# Patient Record
Sex: MALE | Race: WHITE | Employment: OTHER | URBAN - METROPOLITAN AREA
[De-identification: names, ages, dates, MRNs, and addresses within clinical notes are randomized per-mention and may not be internally consistent; named-entity substitution may affect disease eponyms.]

---

## 2020-10-28 ENCOUNTER — HOSPITAL ENCOUNTER (OUTPATIENT)
Age: 70
Setting detail: OBSERVATION
Discharge: OTHER INSTITUTION | End: 2020-10-29
Attending: EMERGENCY MEDICINE | Admitting: HOSPITALIST

## 2020-10-28 ENCOUNTER — APPOINTMENT (OUTPATIENT)
Dept: GENERAL RADIOLOGY | Age: 70
End: 2020-10-28
Attending: EMERGENCY MEDICINE

## 2020-10-28 DIAGNOSIS — I50.9 CONGESTIVE HEART FAILURE, UNSPECIFIED HF CHRONICITY, UNSPECIFIED HEART FAILURE TYPE (HCC): ICD-10-CM

## 2020-10-28 DIAGNOSIS — I21.4 NSTEMI (NON-ST ELEVATED MYOCARDIAL INFARCTION) (HCC): Primary | ICD-10-CM

## 2020-10-28 DIAGNOSIS — R77.8 ELEVATED TROPONIN: ICD-10-CM

## 2020-10-28 PROBLEM — I25.10 CAD (CORONARY ARTERY DISEASE): Status: ACTIVE | Noted: 2020-10-28

## 2020-10-28 PROBLEM — R07.9 CHEST PAIN: Status: ACTIVE | Noted: 2020-10-28

## 2020-10-28 LAB
ALBUMIN SERPL-MCNC: 3.4 G/DL (ref 3.5–5)
ALBUMIN/GLOB SERPL: 0.9 {RATIO} (ref 1.1–2.2)
ALP SERPL-CCNC: 71 U/L (ref 45–117)
ALT SERPL-CCNC: 21 U/L (ref 12–78)
ANION GAP SERPL CALC-SCNC: 9 MMOL/L (ref 5–15)
AST SERPL W P-5'-P-CCNC: 12 U/L (ref 15–37)
ATRIAL RATE: 109 BPM
BASOPHILS # BLD: 0 K/UL (ref 0–0.1)
BASOPHILS NFR BLD: 0 % (ref 0–1)
BILIRUB SERPL-MCNC: 0.6 MG/DL (ref 0.2–1)
BNP SERPL-MCNC: 277 PG/ML
BUN SERPL-MCNC: 20 MG/DL (ref 6–20)
BUN/CREAT SERPL: 14 (ref 12–20)
CA-I BLD-MCNC: 8.4 MG/DL (ref 8.5–10.1)
CALCULATED P AXIS, ECG09: 62 DEGREES
CALCULATED R AXIS, ECG10: 34 DEGREES
CALCULATED T AXIS, ECG11: 78 DEGREES
CHLORIDE SERPL-SCNC: 108 MMOL/L (ref 97–108)
CO2 SERPL-SCNC: 21 MMOL/L (ref 21–32)
CREAT SERPL-MCNC: 1.38 MG/DL (ref 0.7–1.3)
DIAGNOSIS, 93000: NORMAL
DIFFERENTIAL METHOD BLD: ABNORMAL
EOSINOPHIL # BLD: 0.2 K/UL (ref 0–0.4)
EOSINOPHIL NFR BLD: 2 % (ref 0–7)
ERYTHROCYTE [DISTWIDTH] IN BLOOD BY AUTOMATED COUNT: 18.4 % (ref 11.5–14.5)
EST. AVERAGE GLUCOSE BLD GHB EST-MCNC: 206 MG/DL
GLOBULIN SER CALC-MCNC: 3.8 G/DL (ref 2–4)
GLUCOSE BLD STRIP.AUTO-MCNC: 239 MG/DL (ref 65–100)
GLUCOSE SERPL-MCNC: 254 MG/DL (ref 65–100)
HBA1C MFR BLD: 8.8 % (ref 4–5.6)
HCT VFR BLD AUTO: 35.7 % (ref 36.6–50.3)
HGB BLD-MCNC: 11 G/DL (ref 12.1–17)
IMM GRANULOCYTES # BLD AUTO: 0 K/UL (ref 0–0.04)
IMM GRANULOCYTES NFR BLD AUTO: 0 % (ref 0–0.5)
INR PPP: 1.1 (ref 0.9–1.1)
LYMPHOCYTES # BLD: 2.5 K/UL (ref 0.8–3.5)
LYMPHOCYTES NFR BLD: 23 % (ref 12–49)
MCH RBC QN AUTO: 22.7 PG (ref 26–34)
MCHC RBC AUTO-ENTMCNC: 30.8 G/DL (ref 30–36.5)
MCV RBC AUTO: 73.8 FL (ref 80–99)
MONOCYTES # BLD: 0.7 K/UL (ref 0–1)
MONOCYTES NFR BLD: 6 % (ref 5–13)
NEUTS SEG # BLD: 7.3 K/UL (ref 1.8–8)
NEUTS SEG NFR BLD: 69 % (ref 32–75)
P-R INTERVAL, ECG05: 156 MS
PERFORMED BY, TECHID: ABNORMAL
PLATELET # BLD AUTO: 249 K/UL (ref 150–400)
PMV BLD AUTO: 9.8 FL (ref 8.9–12.9)
POTASSIUM SERPL-SCNC: 4 MMOL/L (ref 3.5–5.1)
PROT SERPL-MCNC: 7.2 G/DL (ref 6.4–8.2)
PROTHROMBIN TIME: 14.3 SEC (ref 11.9–14.7)
Q-T INTERVAL, ECG07: 336 MS
QRS DURATION, ECG06: 90 MS
QTC CALCULATION (BEZET), ECG08: 452 MS
RBC # BLD AUTO: 4.84 M/UL (ref 4.1–5.7)
SALICYLATES SERPL-MCNC: 2.5 MG/DL (ref 2.8–20)
SODIUM SERPL-SCNC: 138 MMOL/L (ref 136–145)
TROPONIN I SERPL-MCNC: 0.08 NG/ML
TROPONIN I SERPL-MCNC: 1.8 NG/ML
VENTRICULAR RATE, ECG03: 109 BPM
WBC # BLD AUTO: 10.8 K/UL (ref 4.1–11.1)

## 2020-10-28 PROCEDURE — 99218 HC RM OBSERVATION: CPT

## 2020-10-28 PROCEDURE — 82962 GLUCOSE BLOOD TEST: CPT

## 2020-10-28 PROCEDURE — 77030029065 HC DRSG HEMO QCLOT ZMED -B: Performed by: INTERNAL MEDICINE

## 2020-10-28 PROCEDURE — C1894 INTRO/SHEATH, NON-LASER: HCPCS | Performed by: INTERNAL MEDICINE

## 2020-10-28 PROCEDURE — 74011250636 HC RX REV CODE- 250/636: Performed by: PHYSICIAN ASSISTANT

## 2020-10-28 PROCEDURE — 77030019698 HC SYR ANGI MDLON MRTM -A: Performed by: INTERNAL MEDICINE

## 2020-10-28 PROCEDURE — 36415 COLL VENOUS BLD VENIPUNCTURE: CPT

## 2020-10-28 PROCEDURE — C1887 CATHETER, GUIDING: HCPCS | Performed by: INTERNAL MEDICINE

## 2020-10-28 PROCEDURE — 83036 HEMOGLOBIN GLYCOSYLATED A1C: CPT

## 2020-10-28 PROCEDURE — C1769 GUIDE WIRE: HCPCS | Performed by: INTERNAL MEDICINE

## 2020-10-28 PROCEDURE — 96376 TX/PRO/DX INJ SAME DRUG ADON: CPT

## 2020-10-28 PROCEDURE — 74011000636 HC RX REV CODE- 636: Performed by: INTERNAL MEDICINE

## 2020-10-28 PROCEDURE — 77030003394 HC NDL ART COOK -A: Performed by: INTERNAL MEDICINE

## 2020-10-28 PROCEDURE — 74011000250 HC RX REV CODE- 250: Performed by: INTERNAL MEDICINE

## 2020-10-28 PROCEDURE — 84484 ASSAY OF TROPONIN QUANT: CPT

## 2020-10-28 PROCEDURE — 99152 MOD SED SAME PHYS/QHP 5/>YRS: CPT | Performed by: INTERNAL MEDICINE

## 2020-10-28 PROCEDURE — 96374 THER/PROPH/DIAG INJ IV PUSH: CPT

## 2020-10-28 PROCEDURE — 85025 COMPLETE CBC W/AUTO DIFF WBC: CPT

## 2020-10-28 PROCEDURE — 74011250637 HC RX REV CODE- 250/637: Performed by: NURSE PRACTITIONER

## 2020-10-28 PROCEDURE — 80307 DRUG TEST PRSMV CHEM ANLYZR: CPT

## 2020-10-28 PROCEDURE — 74011250636 HC RX REV CODE- 250/636: Performed by: NURSE PRACTITIONER

## 2020-10-28 PROCEDURE — 74011250637 HC RX REV CODE- 250/637: Performed by: PHYSICIAN ASSISTANT

## 2020-10-28 PROCEDURE — 71045 X-RAY EXAM CHEST 1 VIEW: CPT

## 2020-10-28 PROCEDURE — 93458 L HRT ARTERY/VENTRICLE ANGIO: CPT | Performed by: INTERNAL MEDICINE

## 2020-10-28 PROCEDURE — 74011636637 HC RX REV CODE- 636/637: Performed by: PHYSICIAN ASSISTANT

## 2020-10-28 PROCEDURE — 77030029997 HC DEV COM RDL R BND TELE -B: Performed by: INTERNAL MEDICINE

## 2020-10-28 PROCEDURE — 76210000006 HC OR PH I REC 0.5 TO 1 HR: Performed by: INTERNAL MEDICINE

## 2020-10-28 PROCEDURE — 99285 EMERGENCY DEPT VISIT HI MDM: CPT

## 2020-10-28 PROCEDURE — 99153 MOD SED SAME PHYS/QHP EA: CPT | Performed by: INTERNAL MEDICINE

## 2020-10-28 PROCEDURE — 80053 COMPREHEN METABOLIC PANEL: CPT

## 2020-10-28 PROCEDURE — 85610 PROTHROMBIN TIME: CPT

## 2020-10-28 PROCEDURE — 93005 ELECTROCARDIOGRAM TRACING: CPT

## 2020-10-28 PROCEDURE — 77030016699 HC CATH ANGI DX INFN1 CARD -A: Performed by: INTERNAL MEDICINE

## 2020-10-28 PROCEDURE — 74011250636 HC RX REV CODE- 250/636: Performed by: INTERNAL MEDICINE

## 2020-10-28 PROCEDURE — 83880 ASSAY OF NATRIURETIC PEPTIDE: CPT

## 2020-10-28 RX ORDER — AMLODIPINE BESYLATE 10 MG/1
TABLET ORAL DAILY
COMMUNITY
End: 2022-07-29

## 2020-10-28 RX ORDER — INSULIN LISPRO 100 [IU]/ML
INJECTION, SOLUTION INTRAVENOUS; SUBCUTANEOUS
Status: DISCONTINUED | OUTPATIENT
Start: 2020-10-28 | End: 2020-10-29 | Stop reason: HOSPADM

## 2020-10-28 RX ORDER — HEPARIN SODIUM 1000 [USP'U]/ML
2000 INJECTION, SOLUTION INTRAVENOUS; SUBCUTANEOUS AS NEEDED
Status: DISCONTINUED | OUTPATIENT
Start: 2020-10-28 | End: 2020-10-29 | Stop reason: HOSPADM

## 2020-10-28 RX ORDER — ACETAMINOPHEN 325 MG/1
650 TABLET ORAL
Status: DISCONTINUED | OUTPATIENT
Start: 2020-10-28 | End: 2020-10-29 | Stop reason: HOSPADM

## 2020-10-28 RX ORDER — HEPARIN SODIUM 1000 [USP'U]/ML
4000 INJECTION, SOLUTION INTRAVENOUS; SUBCUTANEOUS AS NEEDED
Status: DISCONTINUED | OUTPATIENT
Start: 2020-10-28 | End: 2020-10-29 | Stop reason: HOSPADM

## 2020-10-28 RX ORDER — HEPARIN SODIUM 1000 [USP'U]/ML
INJECTION, SOLUTION INTRAVENOUS; SUBCUTANEOUS AS NEEDED
Status: DISCONTINUED | OUTPATIENT
Start: 2020-10-28 | End: 2020-10-28 | Stop reason: HOSPADM

## 2020-10-28 RX ORDER — ACETAMINOPHEN 325 MG/1
650 TABLET ORAL
Status: CANCELLED | OUTPATIENT
Start: 2020-10-28

## 2020-10-28 RX ORDER — INSULIN ASPART 100 [IU]/ML
INJECTION, SOLUTION INTRAVENOUS; SUBCUTANEOUS
COMMUNITY
End: 2022-07-29

## 2020-10-28 RX ORDER — GLYBURIDE 5 MG/1
TABLET ORAL
COMMUNITY
End: 2022-07-29

## 2020-10-28 RX ORDER — ENOXAPARIN SODIUM 100 MG/ML
40 INJECTION SUBCUTANEOUS DAILY
Status: DISCONTINUED | OUTPATIENT
Start: 2020-10-29 | End: 2020-10-28

## 2020-10-28 RX ORDER — NORTRIPTYLINE HYDROCHLORIDE 25 MG/1
CAPSULE ORAL
COMMUNITY
End: 2022-07-29

## 2020-10-28 RX ORDER — HEPARIN SODIUM 200 [USP'U]/100ML
INJECTION, SOLUTION INTRAVENOUS
Status: COMPLETED | OUTPATIENT
Start: 2020-10-28 | End: 2020-10-28

## 2020-10-28 RX ORDER — ASPIRIN 81 MG/1
81 TABLET ORAL DAILY
COMMUNITY

## 2020-10-28 RX ORDER — SODIUM CHLORIDE 0.9 % (FLUSH) 0.9 %
5-40 SYRINGE (ML) INJECTION EVERY 8 HOURS
Status: CANCELLED | OUTPATIENT
Start: 2020-10-28

## 2020-10-28 RX ORDER — PROMETHAZINE HYDROCHLORIDE 25 MG/1
12.5 TABLET ORAL
Status: DISCONTINUED | OUTPATIENT
Start: 2020-10-28 | End: 2020-10-29 | Stop reason: HOSPADM

## 2020-10-28 RX ORDER — ATORVASTATIN CALCIUM 40 MG/1
40 TABLET, FILM COATED ORAL DAILY
COMMUNITY

## 2020-10-28 RX ORDER — GUAIFENESIN 100 MG/5ML
81 LIQUID (ML) ORAL
Status: COMPLETED | OUTPATIENT
Start: 2020-10-28 | End: 2020-10-28

## 2020-10-28 RX ORDER — OMEPRAZOLE 20 MG/1
20 CAPSULE, DELAYED RELEASE ORAL DAILY
COMMUNITY

## 2020-10-28 RX ORDER — NITROGLYCERIN 0.4 MG/1
0.4 TABLET SUBLINGUAL AS NEEDED
Status: DISCONTINUED | OUTPATIENT
Start: 2020-10-28 | End: 2020-10-29 | Stop reason: HOSPADM

## 2020-10-28 RX ORDER — AMLODIPINE BESYLATE 5 MG/1
10 TABLET ORAL DAILY
Status: DISCONTINUED | OUTPATIENT
Start: 2020-10-29 | End: 2020-10-29 | Stop reason: HOSPADM

## 2020-10-28 RX ORDER — SPIRONOLACTONE 25 MG/1
25 TABLET ORAL DAILY
Status: DISCONTINUED | OUTPATIENT
Start: 2020-10-29 | End: 2020-10-29 | Stop reason: HOSPADM

## 2020-10-28 RX ORDER — ONDANSETRON 2 MG/ML
4 INJECTION INTRAMUSCULAR; INTRAVENOUS
Status: DISCONTINUED | OUTPATIENT
Start: 2020-10-28 | End: 2020-10-29 | Stop reason: HOSPADM

## 2020-10-28 RX ORDER — NICARDIPINE HYDROCHLORIDE 2.5 MG/ML
INJECTION INTRAVENOUS AS NEEDED
Status: DISCONTINUED | OUTPATIENT
Start: 2020-10-28 | End: 2020-10-28 | Stop reason: HOSPADM

## 2020-10-28 RX ORDER — POLYETHYLENE GLYCOL 3350 17 G/17G
17 POWDER, FOR SOLUTION ORAL DAILY PRN
Status: DISCONTINUED | OUTPATIENT
Start: 2020-10-28 | End: 2020-10-29 | Stop reason: HOSPADM

## 2020-10-28 RX ORDER — LIDOCAINE HYDROCHLORIDE 10 MG/ML
INJECTION INFILTRATION; PERINEURAL AS NEEDED
Status: DISCONTINUED | OUTPATIENT
Start: 2020-10-28 | End: 2020-10-28 | Stop reason: HOSPADM

## 2020-10-28 RX ORDER — FUROSEMIDE 10 MG/ML
40 INJECTION INTRAMUSCULAR; INTRAVENOUS ONCE
Status: COMPLETED | OUTPATIENT
Start: 2020-10-28 | End: 2020-10-28

## 2020-10-28 RX ORDER — LISINOPRIL 20 MG/1
20 TABLET ORAL DAILY
Status: DISCONTINUED | OUTPATIENT
Start: 2020-10-29 | End: 2020-10-29 | Stop reason: HOSPADM

## 2020-10-28 RX ORDER — MIDAZOLAM HYDROCHLORIDE 1 MG/ML
INJECTION, SOLUTION INTRAMUSCULAR; INTRAVENOUS AS NEEDED
Status: DISCONTINUED | OUTPATIENT
Start: 2020-10-28 | End: 2020-10-28 | Stop reason: HOSPADM

## 2020-10-28 RX ORDER — SODIUM CHLORIDE 0.9 % (FLUSH) 0.9 %
5-40 SYRINGE (ML) INJECTION AS NEEDED
Status: DISCONTINUED | OUTPATIENT
Start: 2020-10-28 | End: 2020-10-29 | Stop reason: HOSPADM

## 2020-10-28 RX ORDER — HEPARIN SODIUM 10000 [USP'U]/100ML
12-25 INJECTION, SOLUTION INTRAVENOUS
Status: DISCONTINUED | OUTPATIENT
Start: 2020-10-28 | End: 2020-10-29 | Stop reason: HOSPADM

## 2020-10-28 RX ORDER — PANTOPRAZOLE SODIUM 20 MG/1
20 TABLET, DELAYED RELEASE ORAL DAILY
Status: DISCONTINUED | OUTPATIENT
Start: 2020-10-29 | End: 2020-10-29 | Stop reason: HOSPADM

## 2020-10-28 RX ORDER — HYDROCHLOROTHIAZIDE 25 MG/1
25 TABLET ORAL DAILY
Status: DISCONTINUED | OUTPATIENT
Start: 2020-10-29 | End: 2020-10-29 | Stop reason: HOSPADM

## 2020-10-28 RX ORDER — NORTRIPTYLINE HYDROCHLORIDE 25 MG/1
25 CAPSULE ORAL
Status: DISCONTINUED | OUTPATIENT
Start: 2020-10-28 | End: 2020-10-29 | Stop reason: HOSPADM

## 2020-10-28 RX ORDER — INSULIN GLARGINE 100 [IU]/ML
8 INJECTION, SOLUTION SUBCUTANEOUS
COMMUNITY
End: 2022-07-29

## 2020-10-28 RX ORDER — DEXTROSE 50 % IN WATER (D50W) INTRAVENOUS SYRINGE
25-50 AS NEEDED
Status: DISCONTINUED | OUTPATIENT
Start: 2020-10-28 | End: 2020-10-29 | Stop reason: HOSPADM

## 2020-10-28 RX ORDER — ATORVASTATIN CALCIUM 40 MG/1
40 TABLET, FILM COATED ORAL DAILY
Status: DISCONTINUED | OUTPATIENT
Start: 2020-10-29 | End: 2020-10-29 | Stop reason: HOSPADM

## 2020-10-28 RX ORDER — INSULIN GLARGINE 100 [IU]/ML
8 INJECTION, SOLUTION SUBCUTANEOUS
Status: DISCONTINUED | OUTPATIENT
Start: 2020-10-28 | End: 2020-10-29 | Stop reason: HOSPADM

## 2020-10-28 RX ORDER — LISINOPRIL AND HYDROCHLOROTHIAZIDE 20; 25 MG/1; MG/1
TABLET ORAL DAILY
COMMUNITY
End: 2022-07-29

## 2020-10-28 RX ORDER — ASPIRIN 81 MG/1
81 TABLET ORAL DAILY
Status: DISCONTINUED | OUTPATIENT
Start: 2020-10-29 | End: 2020-10-29 | Stop reason: HOSPADM

## 2020-10-28 RX ORDER — CLOPIDOGREL 300 MG/1
600 TABLET, FILM COATED ORAL ONCE
Status: COMPLETED | OUTPATIENT
Start: 2020-10-28 | End: 2020-10-28

## 2020-10-28 RX ORDER — MAGNESIUM SULFATE 100 %
4 CRYSTALS MISCELLANEOUS AS NEEDED
Status: DISCONTINUED | OUTPATIENT
Start: 2020-10-28 | End: 2020-10-29 | Stop reason: HOSPADM

## 2020-10-28 RX ORDER — SODIUM CHLORIDE 0.9 % (FLUSH) 0.9 %
5-40 SYRINGE (ML) INJECTION EVERY 8 HOURS
Status: DISCONTINUED | OUTPATIENT
Start: 2020-10-28 | End: 2020-10-29 | Stop reason: HOSPADM

## 2020-10-28 RX ORDER — ACETAMINOPHEN 650 MG/1
650 SUPPOSITORY RECTAL
Status: DISCONTINUED | OUTPATIENT
Start: 2020-10-28 | End: 2020-10-29 | Stop reason: HOSPADM

## 2020-10-28 RX ORDER — SODIUM CHLORIDE 0.9 % (FLUSH) 0.9 %
5-40 SYRINGE (ML) INJECTION AS NEEDED
Status: CANCELLED | OUTPATIENT
Start: 2020-10-28

## 2020-10-28 RX ORDER — FENTANYL CITRATE 50 UG/ML
INJECTION, SOLUTION INTRAMUSCULAR; INTRAVENOUS AS NEEDED
Status: DISCONTINUED | OUTPATIENT
Start: 2020-10-28 | End: 2020-10-28 | Stop reason: HOSPADM

## 2020-10-28 RX ADMIN — CLOPIDOGREL BISULFATE 600 MG: 300 TABLET, FILM COATED ORAL at 14:37

## 2020-10-28 RX ADMIN — NORTRIPTYLINE HYDROCHLORIDE 25 MG: 25 CAPSULE ORAL at 23:28

## 2020-10-28 RX ADMIN — HEPARIN SODIUM 12 UNITS/KG/HR: 10000 INJECTION, SOLUTION INTRAVENOUS at 20:00

## 2020-10-28 RX ADMIN — Medication 10 ML: at 22:00

## 2020-10-28 RX ADMIN — ASPIRIN 81 MG CHEWABLE TABLET 81 MG: 81 TABLET CHEWABLE at 14:37

## 2020-10-28 RX ADMIN — INSULIN LISPRO 2 UNITS: 100 INJECTION, SOLUTION INTRAVENOUS; SUBCUTANEOUS at 23:36

## 2020-10-28 RX ADMIN — FUROSEMIDE 40 MG: 10 INJECTION, SOLUTION INTRAMUSCULAR; INTRAVENOUS at 12:15

## 2020-10-28 RX ADMIN — FUROSEMIDE 40 MG: 10 INJECTION, SOLUTION INTRAMUSCULAR; INTRAVENOUS at 14:38

## 2020-10-28 RX ADMIN — INSULIN GLARGINE 8 UNITS: 100 INJECTION, SOLUTION SUBCUTANEOUS at 23:28

## 2020-10-28 NOTE — Clinical Note
TRANSFER - OUT REPORT:     Verbal report given to: Pura. Report consisted of patient's Situation, Background, Assessment and   Recommendations(SBAR). Opportunity for questions and clarification was provided. Patient transported with a Registered Nurse. Patient transported to: PACU.

## 2020-10-28 NOTE — ED PROVIDER NOTES
HPI   Chief Complaint   Patient presents with    Chest Pain   69yoM hx cirrhosis, DM, HTN, stroke presents with chest pain. Pt reports stroke last year with left side numbness, this past few months he has had intermittent mild moderate left shoulder/bicep pain whenever he moved the left arm. This past 3 days he ran out of all his meds and the left shoulder/bicep pain worsened. He says whenever he moves any of his body parts the pain would come up, along with intermittent mild twinges of pain in his entire anterior chest. He thinks his belly and both ankles swelled up as well but they are not painful. He says his BP has been high. He reports chronic SOB due to being bed ridden for 7-8 months. Pt says he has been \"popping baby aspirin like candy\" and says he took at least 8 baby aspirins overnight. Per EMS he had EKG en route showing inferolateral ST depression but no elevation. He denies fever, cough, sick contacts. Pt is on glyburide 5mg, omperazole 20mg, amlodipine 10mg, lisinopril HCTZ 20-25mgaspirin 81mg, nortriptyline 25mg qHS, atorvastatin 40. Pt thinks he also might be on insulin but does not know the dose. Past Medical History:   Diagnosis Date    Cirrhosis ([facility] [address]     Diabetes ([facility] [address] [facility]     Hypertension     Stroke [facility])        Past Surgical History:   Procedure Laterality Date    HX BACK SURGERY      HX GI           No family history on file.     Social History     Socioeconomic History    Marital status: SINGLE     Spouse name: Not on file    Number of children: Not on file    Years of education: Not on file    Highest education level: Not on file   Occupational History    Not on file   Social Needs    Financial resource strain: Not on file    Food insecurity     Worry: Not on file     Inability: Not on file    Transportation needs     Medical: Not on file     Non-medical: Not on file   Tobacco Use    Smoking status: Former Smoker    Smokeless tobacco: Never Used   Substance and Sexual Activity    Alcohol use: Not Currently    Drug use: Not on file    Sexual activity: Not on file   Lifestyle    Physical activity     Days per week: Not on file     Minutes per session: Not on file    Stress: Not on file   Relationships    Social connections     Talks on phone: Not on file     Gets together: Not on file     Attends Hindu service: Not on file     Active member of club or organization: Not on file     Attends meetings of clubs or organizations: Not on file     Relationship status: Not on file    Intimate partner violence     Fear of current or ex partner: Not on file     Emotionally abused: Not on file     Physically abused: Not on file     Forced sexual activity: Not on file   Other Topics Concern    Not on file   Social History Narrative    Not on file         ALLERGIES: Patient has no known allergies. Review of Systems   Respiratory: Positive for shortness of breath (chronic). Cardiovascular: Positive for chest pain and leg swelling. BP high   Gastrointestinal: Positive for abdominal distention. Musculoskeletal:        Left arm pain   All other systems reviewed and are negative. Vitals:    10/28/20 0718   BP: (!) 154/81   Pulse: (!) 109   Resp: 24   Temp: 98.1 °F (36.7 °C)   SpO2: 94%   Weight: 99.8 kg (220 lb)   Height: 5' 8\" (1.727 m)            Physical Exam   Patient Vitals for the past 8 hrs:   Temp Pulse Resp BP SpO2   10/28/20 0718 98.1 °F (36.7 °C) (!) 109 24 (!) 154/81 94 %        Nursing note and vitals reviewed. Constitutional: NAD. Head: Normocephalic and atraumatic. Mouth/Throat: Airway patent. Moist mucous membranes. Eyes: EOMI. No scleral icterus. Neck: Neck supple. Cardiovascular: Tachy rate, regular rhythm. Normal heart sounds. No murmur, rub, or gallop. Good pulses throughout. Pulmonary/Chest: No respiratory distress. Bibasilar crackles. Abdominal/GI: BS normal, Soft, non-tender, moderately distended versus baseline obese.  No rebound or guarding. Musculoskeletal: No gross injuries or deformities. LUE non-tender, ROM intact. Chest wall non-tender. B/l 2+ non-pitting pedal edema up to the calves. Neurological: Alert and oriented to person, place, and time. Cranial Nerves 2-12 intact. Left side sensation decreased to light touch (baseline from old stroke from 2019). No tremor. Psych: Pleasant, cooperative. No SI/HI. Skin: Skin is warm and dry. No rash noted. MDM   Ddx = ACS, angina, heart failure, MSK pain, PE, lower suspicion for aorta emergency, lower suspicion for decompensated cirrhosis without encephalopathy/asterixis; very low suspicion for SBP. EKG read by me at 7:17 showing sinus stachycardia rate 109, inferolateral ST depressions, mild avR elevation, no aVL ST change. No STEMI. Heart score is 8, very high risk for MACE. Labs Reviewed   CBC WITH AUTOMATED DIFF - Abnormal; Notable for the following components:       Result Value    HGB 11.0 (*)     HCT 35.7 (*)     MCV 73.8 (*)     MCH 22.7 (*)     RDW 18.4 (*)     All other components within normal limits   METABOLIC PANEL, COMPREHENSIVE - Abnormal; Notable for the following components:    Glucose 254 (*)     Creatinine 1.38 (*)     GFR est non-AA 51 (*)     Calcium 8.4 (*)     AST (SGOT) 12 (*)     Albumin 3.4 (*)     A-G Ratio 0.9 (*)     All other components within normal limits   TROPONIN I - Abnormal; Notable for the following components:    Troponin-I, Qt. 0.08 (*)     All other components within normal limits   SALICYLATE - Abnormal; Notable for the following components:    Salicylate level 2.5 (*)     All other components within normal limits   PROTHROMBIN TIME + INR   BNP     XR CHEST PORT    (Results Pending)     Medications - No data to display  I, Dwayne Thompson MD, am  the first and primary ED provider for this patient. ED Course as of Oct 28 0826   Wed Oct 28, 2020   0825 On re-assessment pt says he now has mild chest pressure radiating up his neck/chin.  I explained that his troponin is normal and I recommend admission for cardiology evaluation. He agrees with admission. I spoke with Dr. Sulema Wei (hospitalist) for consultation for admission.  Admitting to Dr. Sulema Wei.     [HW]   0825 Troponin-I, Qt.(!): 0.08 [HW]      ED Course User Index  [HW] Jeffery Rosas MD       Procedures

## 2020-10-28 NOTE — H&P
History and Physical    Patient: Carmelo Khan MRN: [id number]  SSN: [id number]    YOB: 1950  Age: 71 y.o. Sex: male      Subjective: Carmelo Khan is a 71 y.o. male who presents emergency department with a history of alcohol abuse, possible cirrhosis, essential hypertension, diabetes mellitus, type II and previous CVA with left-sided paresthesias who ran out of his medications and developed atypical chest pain. Troponin was 0.08 with a chest x-ray showing findings consistent with volume overload. BNP was 277 and the patient has 2+ lower extremity edema. Patient will need assistance from case management with social aspects of his insurance as well as medications assistance and a work-up from cardiology. Past Medical History:   Diagnosis Date    [facility] [address]     [facility]     Hypertension     Stroke [facility])      Past Surgical History:   Procedure Laterality Date    HX BACK SURGERY      HX GI        Family History   Problem Relation Age of Onset    Heart Disease Father      Social History     Tobacco Use    Smoking status: Former Smoker    Smokeless tobacco: Never Used   Substance Use Topics    Alcohol use: Not Currently      Prior to Admission medications    Medication Sig Start Date End Date Taking? Authorizing Provider   lisinopril-hydroCHLOROthiazide (PRINZIDE, ZESTORETIC) 20-25 mg per tablet Take  by mouth daily. Yes Rowan, MD Adam   amLODIPine (NORVASC) 10 mg tablet Take  by mouth daily. Yes Rowan, MD Adam   glyBURIDE (DIABETA) 5 mg tablet Take  by mouth Daily (before breakfast). Yes Rowan, MD Adam   aspirin delayed-release 81 mg tablet Take  by mouth daily. Yes Other, MD Adam   omeprazole (PRILOSEC) 20 mg capsule Take 20 mg by mouth daily. Yes Rowan, MD Adam   atorvastatin (LIPITOR) 40 mg tablet Take  by mouth daily. Yes Rowan, MD Adam   nortriptyline (PAMELOR) 25 mg capsule Take  by mouth nightly.    Yes Rowan, MD Adam   insulin glargine (Lantus U-100 Insulin) 100 unit/mL injection 8 Units by SubCUTAneous route nightly. Yes Other, MD Adam   insulin aspart U-100 (NovoLOG U-100 Insulin aspart) 100 unit/mL injection by SubCUTAneous route. Yes Other, MD Adam        No Known Allergies    Review of Systems:  Constitutional: negative for fevers, chills, sweats, and fatigue  Eyes: negative for visual disturbance, redness, and icterus  Ears, Nose, Mouth, Throat, and Face: negative for ear drainage, nasal congestion, and sore throat  Respiratory: negative for cough, sputum, wheezing, or dyspnea on exertion  Cardiovascular: +chest pain, dyspnea, ++lower extremity edema, dyspnea on exertion  Gastrointestinal: negative for nausea, vomiting, diarrhea, and abdominal pain  Genitourinary:negative for frequency, dysuria, and hematuria  Integument/Breast: negative for rash, skin lesion(s), and dryness  Hematologic/Lymphatic: negative for bleeding, lymphadenopathy, and blood in stool or urine  Musculoskeletal:negative for arthralgias, neck pain, and back pain  Neurological:+ Paresthesias on the left side   behavioral/Psychiatric: negative for anxiety and depression  Endocrine: negative for temperature intolerance and excessive urination  Allergic/Immunologic: negative for urticaria and angioedema    Objective:     Vitals:    10/28/20 0900 10/28/20 1000 10/28/20 1014 10/28/20 1110   BP: (!) 153/80 (!) 151/89  129/78   Pulse: 91 91  87   Resp: 21 25  17   Temp:       SpO2:  92% 96% 95%   Weight:       Height:            Physical Exam:  Constitutional: Alert in no acute distress   HEENT: Sclerae anicteric, The neck is supple. Cardiovascular: Regular rate and rhythm. No murmurs, gallops, or rubs. Niya Simpler Respiratory: Clear breath sounds with no wheezes, rales, or rhonchi. GI: Abdomen nondistended, soft, and nontender. Normal active bowel sounds. Rectal: Deferred   Musculoskeletal: 2+ pitting edema of the lower legs. Extremities have good range of motion.     Neurological: Patient is alert and oriented. Cranial nerves II-XII intact  Psychiatric: Mood appears appropriate with judgement intact. Lymphatic: No cervical or supraclavicular adenopathy. Skin: No rashes or breakdown of the skin    Assessment:     Hospital Problems  Never Reviewed          Codes Class Noted POA    Chest pain ICD-10-CM: R07.9  ICD-9-CM: 786.50  10/28/2020 Unknown            Impression:    1. Atypical chest pain  2. History of alcohol abuse with possible cirrhosis  3. Essential hypertension  4. Acute diastolic heart failure  5. Pulmonary edema  6. Diabetes mellitus, type II  Plan:         Plan:    1. Atypical chest pain  Cardiac consultation  Trend troponins  Echocardiogram  Continue aspirin  Nitroglycerin for pain control  EKG reveals ST depression without elevation in the inferior lateral leads. Repeat EKG as needed  Currently chest pain free    2. History of alcohol abuse with possible cirrhosis  Discharged from prison 3 months ago and discontinued drinking while in prison  Abdomen is soft and nondistended without obvious ascites    3. Essential hypertension  Renew home medications to include Norvasc and lisinopril   Add spironolactone to hydrochlorothiazide although may need Lasix    4. Acute diastolic dysfunction  Echocardiogram pending  Lasix x1  Add spironolactone to hydrochlorothiazide    5. Mild pulmonary edema  Monitor closely    6. Diabetes mellitus, type II  Continue Lantus 8 units every evening  Sliding scale insulin    7.   Social issues  Patient needs assistance with his Medicare insurance eligibility  Patient needs assistance and medication refills  Case management consult    CODE STATUS: Full    DVT prophylaxis: Lovenox  Ulcer prophylaxis: Omeprazole    Admit for observation    Time for admission: 56 minutes    Signed By: Víctor Grimaldo PA-C     October 28, 2020

## 2020-10-28 NOTE — ED TRIAGE NOTES
Pt reports chest pressure and left arm pain. Pt reports that when his left arm is hurting, he was taking baby aspirin, maybe 8-9 a day. Pt reports he had a stroke back in march.

## 2020-10-28 NOTE — DISCHARGE SUMMARY
Admit date: 10/28/2020   Admitting Provider: Josh Whitten MD    Discharge date: 10/28/2020  Discharging Provider: Lucretia Babb PA-C      * Admission Diagnoses: Chest pain [R07.9]    * Discharge Diagnoses:    Hospital Problems as of 10/28/2020 Never Reviewed          Codes Class Noted - Resolved POA    Chest pain ICD-10-CM: R07.9  ICD-9-CM: 786.50  10/28/2020 - Present Yes        CAD (coronary artery disease) ICD-10-CM: I25.10  ICD-9-CM: 414.00  10/28/2020 - Present Unknown        NSTEMI (non-ST elevated myocardial infarction) [facility]) ICD-10-CM: I21.4  ICD-9-CM: 410.70  10/28/2020 - Present Yes              * Hospital Course: This is a 80-year-old male admitted for observation on October 20, 2020 with atypical chest pain. Initial troponin was 0.08. EKG with mild ST depression in 2+ lower extremity edema. Cardiology consultation, Dr. Van De La Paz and repeat troponin that was 1.80 that precipitated a emergent cardiac catheterization. Cardiac catheterization revealed a left anterior descending artery of approximately 80% stenosis. Mid LAD with diffuse 70% disease. Left circumflex with severe disease at 80%. Right coronary artery with severe disease present of approximately 80 disease proximally 90% distally. For this reason the patient will be transferred to [facility] for a coronary artery bypass graft. Dr. Van De La Paz is contacting the transfer center and discussing cardiologist cardiologist.  Currently the patient is chest pain-free. He remains on a heparin drip with aspirin ordered. In addition patient will need case management services at the other facility for assistance with his medications and Medicare establishment.     * Procedures:   Procedure(s):  LEFT HEART CATH / CORONARY ANGIOGRAPHY      Consults:   Dr. Van De La Paz, cardiology    Significant Diagnostic Studies: As discussed in hospital course    Discharge Exam:  Visit Vitals  /74   Pulse 93   Temp 98.1 °F (36.7 °C)   Resp 18   Ht 5' 8\" (1.727 m)   Wt 99.8 kg (220 lb)   SpO2 94%   BMI 33.45 kg/m²     PHYSICAL EXAM:  Constitutional: Alert in no acute distress   HEENT: Sclerae anicteric, The neck is supple. Cardiovascular: Regular rate and rhythm. No murmurs, gallops, or rubs. Tillman Quince Respiratory: Clear breath sounds with no wheezes, rales, or rhonchi. GI: Abdomen nondistended, soft, and nontender. Normal active bowel sounds. Rectal: Deferred   Musculoskeletal: No pitting edema of the lower legs. Extremities have good range of motion. Neurological:  Patient is alert and oriented. Cranial nerves II-XII intact  Psychiatric: Mood appears appropriate with judgement intact. Lymphatic: No cervical or supraclavicular adenopathy.    Skin: 2+ lower extremity edema      Current Facility-Administered Medications:     sodium chloride (NS) flush 5-40 mL, 5-40 mL, IntraVENous, Q8H, Brien Mcneill PA-C    sodium chloride (NS) flush 5-40 mL, 5-40 mL, IntraVENous, PRN, Macarena Mcneill PA-C    acetaminophen (TYLENOL) tablet 650 mg, 650 mg, Oral, Q6H PRN **OR** acetaminophen (TYLENOL) suppository 650 mg, 650 mg, Rectal, Q6H PRN, Macarena Mcneill PA-C    polyethylene glycol (MIRALAX) packet 17 g, 17 g, Oral, DAILY PRN, Macarena Mcneill PA-C    promethazine (PHENERGAN) tablet 12.5 mg, 12.5 mg, Oral, Q6H PRN **OR** ondansetron (ZOFRAN) injection 4 mg, 4 mg, IntraVENous, Q6H PRN, Brien Mcneill PA-C    [START ON 10/29/2020] amLODIPine (NORVASC) tablet 10 mg, 10 mg, Oral, DAILY, Brien Mcneill PA-C    [START ON 10/29/2020] aspirin delayed-release tablet 81 mg, 81 mg, Oral, DAILY, Brien Mcneill PA-C    [START ON 10/29/2020] atorvastatin (LIPITOR) tablet 40 mg, 40 mg, Oral, DAILY, Brien Mcneill PA-C    insulin glargine (LANTUS) injection 8 Units, 8 Units, SubCUTAneous, QHS, Brien Mcneill PA-C    nortriptyline (PAMELOR) capsule 25 mg, 25 mg, Oral, QHS, Brien Mcneill PA-C    [START ON 10/29/2020] pantoprazole (PROTONIX) tablet 20 mg, 20 mg, Oral, DAILY, Edith Mcneill PA-C    nitroglycerin (NITROSTAT) tablet 0.4 mg, 0.4 mg, SubLINGual, PRN, Brien Mcneill PA-C    [START ON 10/29/2020] lisinopriL (PRINIVIL, ZESTRIL) tablet 20 mg, 20 mg, Oral, DAILY, Brien Mcneill PA-C    [START ON 10/29/2020] spironolactone (ALDACTONE) tablet 25 mg, 25 mg, Oral, DAILY, Brien Mcneill PA-C    [START ON 10/29/2020] hydroCHLOROthiazide (HYDRODIURIL) tablet 25 mg, 25 mg, Oral, DAILY, Brien Mcneill PA-C    glucose chewable tablet 16 g, 4 Tab, Oral, PRN, Brien Mcneill PA-C    dextrose (D50W) injection syrg 12.5-25 g, 25-50 mL, IntraVENous, PRN, Edith Mcneill PA-C    glucagon (GLUCAGEN) injection 1 mg, 1 mg, IntraMUSCular, PRN, Edith Mcneill PA-C    insulin lispro (HUMALOG) injection, , SubCUTAneous, AC&HS, Edith Mcneill PA-C    heparin (porcine) 25,000 units in 0.45% saline 250 ml infusion, 12-25 Units/kg/hr (Adjusted), IntraVENous, TITRATE, Radha Rowe NP    heparin (porcine) 1,000 unit/mL injection 4,000 Units, 4,000 Units, IntraVENous, PRN **OR** heparin (porcine) 1,000 unit/mL injection 2,000 Units, 2,000 Units, IntraVENous, PRN, Ronaldo Germain MD    Current Outpatient Medications:     lisinopril-hydroCHLOROthiazide (PRINZIDE, ZESTORETIC) 20-25 mg per tablet, Take  by mouth daily. , Disp: , Rfl:     amLODIPine (NORVASC) 10 mg tablet, Take  by mouth daily. , Disp: , Rfl:     glyBURIDE (DIABETA) 5 mg tablet, Take  by mouth Daily (before breakfast). , Disp: , Rfl:     aspirin delayed-release 81 mg tablet, Take  by mouth daily. , Disp: , Rfl:     omeprazole (PRILOSEC) 20 mg capsule, Take 20 mg by mouth daily. , Disp: , Rfl:     atorvastatin (LIPITOR) 40 mg tablet, Take  by mouth daily. , Disp: , Rfl:     nortriptyline (PAMELOR) 25 mg capsule, Take  by mouth nightly., Disp: , Rfl:     insulin glargine (Lantus U-100 Insulin) 100 unit/mL injection, 8 Units by SubCUTAneous route nightly., Disp: , Rfl:     insulin aspart U-100 (NovoLOG U-100 Insulin aspart) 100 unit/mL injection, by SubCUTAneous route., Disp: , Rfl:     * Discharge Condition: fair  * Disposition: Transfer to [address] [place] [address]    Discharge Medications:  Current Discharge Medication List          * Follow-up Care/Patient Instructions:   Activity: See surgical instructions  Diet: NPO  Wound Care: None needed    Follow-up Information    None         Discharge summary greater than 35 minutes spent with the patient performing discharge instructions, medication review and physical exam    Signed:  Billy Moran PA-C  10/28/2020  5:09 PM

## 2020-10-28 NOTE — Clinical Note
93 [facility]  Hospitalist Group Hospitalist Progress Note Belen Bowen MD 
Answering service: [phone] OR [phone] from in house phone NAME:  Damon Espinoza :  1959 MRN:  [id number] Admission Summary: A 61year old female with past medical history HTN, DM Type II on insulin, Atrial fibrillation/flutter, CKD stage III, recent admission 2020-2020 for left foot cellulitis/OM s/p left BKA, bacteremia MSSA, endocarditis, Pacemaker vegetation s/p lead extraction, presenting to [facility] as a direct transfer from [facility] emergency department for thoracic surgery evaluation.  Patient developed right-sided chest pain/tenderness radiating to right shoulder.  Seen at urgent care and found to have atrial fibrillation with RVR. Sent to the emergency department for further evaluation.  In the ED, CT chest demonstrated septic arthritis of the right sternal clavicular joint and no abscess.  Given Merrem and vancomycin and transferred to [facility] 2020 
  
Interval history / Subjective:  
 
Patient seen and examined at the bedside. No complaints ordered Simone catheter for the IV antibiotics but  INR 2.3  now Coumadin is held discussed with  patient will need reevaluation by PT OT and a IV line will be placed probably today and patient will be [facility] she will need another authorization as per the  no other active issues Assessment & Plan:  
 
Sepsis secondary to septic right sternoclavicular joint and abscess- resolved - Recent complicated hospitalization with MSSA bacteremia/endocarditis - Incision and debridement of right sternoclavicular joint.  Resection of medial third of right clavicle.  Placement of wound VAC on  
- OR cx: MSSA Catheter removed. - continue IV vancomycin - plan is for 6 weeks until 10/5/2020. Will need to see ID within 2 weeks of d/c. Upon discharge will need to place orders for weekly labs with CBC with diff, BMP, and vanc trough to be faxed to Dr. Shan Devries for review - pain control with dilaudid - ID consulted, appreciate recs 
  
Atrial fibrillation with RVR 
- rate controlled, off Cardizem drip, continue with oral diltiazem 
- HR improved and < 100  
- restarted on warfarin-INR was therapeutic and then was held for the [facility] catheter placement for 2 consecutive nights will restart after the catheter placement pharmacy is dosing Hx of pacemaker lead vegetation and s/p removal of device 
-stable, continue cardiac monitoring 
  
PARAG on CKD stage III  
- creatinine starting to rise, now 1.67 from 1.65 yesterday--> 1.86--1.99  
- continue decreased bumex dosing - now at 0.5 BID from 2 mg daily 
-May need to reduce even more stop for a day and hold the Bumex 
- renally dose vanc 
  
T2DM, controlled: Episodes of hyperglycemia noted  
-continue long-acting, continue SSI. A1c 6.1, monitor finger stick glucose 
  
HTN  
- BP stable on hydralazine 
  
Depression 
-stable, continue buspirone, Zoloft Vaginal yeast infection  
-continue nystatin Code status: Full Code DVT prophylaxis: SCD, on coumadin, pharmacy to dose Care Plan discussed with: Patient/Family, Nurse and  Anticipated Disposition: TBD Anticipated Discharge: Next week after the home catheter placed to [facility]  
 
Hospital Problems  Date Reviewed: 8/24/2020 None Vital Signs:  
 Last 24hrs VS reviewed since prior progress note. Most recent are: 
Visit Vitals /75 (BP 1 Location: Left arm, BP Patient Position: At rest) Pulse 86 Temp 97.8 °F (36.6 °C) Resp 16 Ht 5' 10\" (1.778 m) Wt 101.2 kg (223 lb 1.7 oz) SpO2 95% BMI 32.01 kg/m² Intake/Output Summary (Last 24 hours) at 9/8/2020 6093 Last data filed at 9/8/2020 4533 Gross per 24 hour Intake 480 ml Output 1600 ml Net -1120 ml Physical Examination:  
 
Constitutional:  No acute distress, cooperative, pleasant ENT:  Oral mucosa moist, oropharynx benign. Resp:  Chest wall incision at R c/d/i, + drains, CTA bilaterally. No wheezing/rhonchi/rales. No accessory muscle use CV:  Regular rhythm, normal rate, no murmurs, gallops, rubs GI:  Soft, non distended, non tender. normoactive bowel sounds, no hepatosplenomegaly Musculoskeletal:  No edema, warm, Neurologic:  L BKA, R arm in sling. AAOx3, cranial nerves grossly normal, normal speech rate and rhythm Data Review:  
 Review and/or order of clinical lab test 
Review and/or order of tests in the radiology section of CPT Review and/or order of tests in the medicine section of CPT Labs:  
 
No results for input(s): WBC, HGB, HCT, PLT, HGBEXT, HCTEXT, PLTEXT, HGBEXT, HCTEXT, PLTEXT in the last 72 hours. Recent Labs  
  09/08/20 
0506 09/07/20 0245 09/06/20 
0047  136 136  
K 4.2 4.4 4.1  105 105 CO2 24 25 26 BUN 25* 26* 25* CREA 1.92* 1.97* 2.06* GLU 74 87 99  
CA 9.0 8.6 8.6 No results for input(s): ALT, AP, TBIL, TBILI, TP, ALB, GLOB, GGT, AML, LPSE in the last 72 hours. No lab exists for component: SGOT, GPT, AMYP, HLPSE Recent Labs  
  09/08/20 
0506 09/07/20 
0245 09/06/20 
0047 INR 2.3* 2.3* 2.7* PTP 21.9* 22.6* 26.0* No results for input(s): FE, TIBC, PSAT, FERR in the last 72 hours. Lab Results Component Value Date/Time Folate 8.5 03/14/2020 02:49 PM  
  
No results for input(s): PH, PCO2, PO2 in the last 72 hours. No results for input(s): CPK, CKNDX, TROIQ in the last 72 hours. No lab exists for component: CPKMB Lab Results Component Value Date/Time  Cholesterol, total 141 11/11/2019 08:47 AM  
 HDL Cholesterol 32 (L) 11/11/2019 08:47 AM  
 LDL, calculated 82 11/11/2019 08:47 AM  
 Triglyceride 137 11/11/2019 08:47 AM  
 
Lab Results Component Value Date/Time Glucose (POC) 91 09/08/2020 07:48 AM  
 Glucose (POC) 137 (H) 09/07/2020 09:39 PM  
 Glucose (POC) 122 (H) 09/07/2020 04:37 PM  
 Glucose (POC) 118 (H) 09/07/2020 11:59 AM  
 Glucose (POC) 85 09/07/2020 07:41 AM  
 
Lab Results Component Value Date/Time Color YELLOW/STRAW 05/23/2020 02:35 PM  
 Appearance CLOUDY (A) 05/23/2020 02:35 PM  
 Specific gravity 1.009 05/23/2020 02:35 PM  
 pH (UA) 6.0 05/23/2020 02:35 PM  
 Protein Negative 05/23/2020 02:35 PM  
 Glucose Negative 05/23/2020 02:35 PM  
 Ketone Negative 05/23/2020 02:35 PM  
 Bilirubin Negative 05/23/2020 02:35 PM  
 Urobilinogen 0.2 05/23/2020 02:35 PM  
 Nitrites Positive (A) 05/23/2020 02:35 PM  
 Leukocyte Esterase LARGE (A) 05/23/2020 02:35 PM  
 Epithelial cells FEW 05/23/2020 02:35 PM  
 Bacteria 4+ (A) 05/23/2020 02:35 PM  
 WBC >100 (H) 05/23/2020 02:35 PM  
 RBC 0-5 05/23/2020 02:35 PM  
 
 
 
Medications Reviewed:  
 
Current Facility-Administered Medications Medication Dose Route Frequency  bacitracin 500 unit/gram ointment   Topical TID  [START ON 9/9/2020] vancomycin (VANCOCIN) 1250 mg in  ml infusion  1,250 mg IntraVENous Q48H  
 diphenhydrAMINE (BENADRYL) capsule 25 mg  25 mg Oral Q6H PRN  Vancomycin- Pharmacy Dosing   Other Rx Dosing/Monitoring  potassium chloride SR (KLOR-CON 10) tablet 20 mEq  20 mEq Oral DAILY  [Held by provider] bumetanide (BUMEX) tablet 0.5 mg  0.5 mg Oral BID  
 HYDROmorphone (DILAUDID) tablet 3 mg  3 mg Oral Q4H PRN  
 sodium chloride (NS) flush 5-40 mL  5-40 mL IntraVENous Q8H  
 sodium chloride (NS) flush 5-40 mL  5-40 mL IntraVENous PRN  
 naloxone (NARCAN) injection 0.4 mg  0.4 mg IntraVENous PRN  
 ondansetron (ZOFRAN) injection 4 mg  4 mg IntraVENous Q4H PRN  
 insulin glargine (LANTUS) injection 10 Units  10 Units SubCUTAneous QHS  Warfarin- Pharmacy Dosing   Other Rx Dosing/Monitoring  HYDROmorphone (PF) (DILAUDID) injection 0.5 mg  0.5 mg IntraVENous Q3H PRN  
 nystatin (MYCOSTATIN) 100,000 unit/gram cream   Topical BID  sodium chloride (NS) flush 5-40 mL  5-40 mL IntraVENous Q8H  
 sodium chloride (NS) flush 5-40 mL  5-40 mL IntraVENous PRN  
 naloxone (NARCAN) injection 0.1 mg  0.1 mg IntraVENous PRN  
 bisacodyL (DULCOLAX) tablet 10 mg  10 mg Oral DAILY  hydrALAZINE (APRESOLINE) 20 mg/mL injection 20 mg  20 mg IntraVENous Q6H PRN  
 hydrALAZINE (APRESOLINE) tablet 100 mg  100 mg Oral TID  busPIRone (BUSPAR) tablet 10 mg  10 mg Oral BID  dilTIAZem ER (CARDIZEM CD) capsule 180 mg  180 mg Oral DAILY  sertraline (ZOLOFT) tablet 75 mg  75 mg Oral DAILY  acetaminophen (TYLENOL) tablet 650 mg  650 mg Oral Q6H PRN Or  
 acetaminophen (TYLENOL) suppository 650 mg  650 mg Rectal Q6H PRN  polyethylene glycol (MIRALAX) packet 17 g  17 g Oral DAILY PRN  promethazine (PHENERGAN) tablet 12.5 mg  12.5 mg Oral Q6H PRN  
 glucose chewable tablet 16 g  4 Tab Oral PRN  
 glucagon (GLUCAGEN) injection 1 mg  1 mg IntraMUSCular PRN  
 dextrose 10% infusion 0-250 mL  0-250 mL IntraVENous PRN  
 gabapentin (NEURONTIN) capsule 100 mg  100 mg Oral TID PRN  
 insulin regular (NOVOLIN R, HUMULIN R) injection   SubCUTAneous AC&HS  
 
______________________________________________________________________ EXPECTED LENGTH OF STAY: 3d 17h ACTUAL LENGTH OF STAY:          19 Jadon Tyler MD

## 2020-10-28 NOTE — CONSULTS
[facility] CARDIOLOGY  CARDIOLOGY CONSULTATION    REASON FOR CONSULT:  hypertension    REQUESTING PROVIDER:  PABLO Stanley    CHIEF COMPLAINT: Left shoulder/bicep pain x 3 days    HISTORY OF PRESENT ILLNESS:  A  is a 71year-old  male with past medical history significant for hypertension, mild stroke with left-sided weakness/numbness, and DM who presents today for evaluation of mild to moderate left shoulder/bicep pain x 3 days. Worsened by movement and improved with aspirin temporarily, requiring him to re-dose throughout the night. Patient admits to shortness of breath. Denies chest pain or discomfort. The severity is mild to moderate. Denies any recent sick contacts. Since admission, patient has had cardiac labs and EKG. PAST MEDICAL HISTORY:  Notes above. Relevant cardiac issues include hypertension. No recent testing. HOME MEDICATIONS:  Home medications reviewed, no side effects. ALLERGIES:  Allergies reviewed with the patient, XI. SOCIAL HISTORY:  Former smoker, denies heavy alcohol or illicit drug use. REVIEW OF SYSTEMS:  Complete review of systems performed, pertinents noted above, all other systems are negative. PHYSICAL EXAMINATION:  Vital sign assessment reveal a blood pressure of 155/72  and pulse rate of 92. Body mass index is calculated at 33.45. Cardiovascular exam has a heart with a NRR, normal S1 and S2. No murmurs present. There are no rubs or gallops. Good peripheral pulses. No jugular venous distension. No carotid bruits are present. Respiratory exam reveals clear lung fields, no rales or rhonchi. Lymphatic exam reveals 2+ pedal edema L>R and no varicosities. Recent labs results and imaging reviewed. EKG notable findings include marked ST abnormalities with concern for ischemia anterolaterally. CASE WAS DISCUSSED WITH DR. ZURITA [facility] AND OUR IMPRESSION AND RECOMMENDATIONS ARE AS FOLLOWS:    1. NSTEMI: EKG showed ST segment abnormalities. Troponin 0.08, 1.80. Patient is scheduled for cardiac catheterization today. Patient was loaded with Plavix. Lasix 40 mg x 1 dose. Patient verbalized understanding of risks versus benefits of procedure. Consent was obtained and placed chart. 2. Acute/chronic CHF:  . 2+ LE edema. CXR showed bilateral patchy airspace disease. 2-d echo pending. Will continue spironalactone, HCTZ, lisinopril, and lasix. Will repeat labs in the am. Continue to monitor. 3. Hypertension: Patient states that he was out of his home medications for the past 1 week. Started Norvasc 10 mg, HCTZ 25 mg, lisinopril 20 mg, and spironolactone 25 mg. Will continue to monitor. 4. CVA: Patient has a remote history with  left-sided paraesthesias. Continue aspirin and atorvastatin. Thank you for involving us in the care of this patient. Please do not hesitate to call if additional questions arise.

## 2020-10-29 VITALS
DIASTOLIC BLOOD PRESSURE: 77 MMHG | TEMPERATURE: 98.8 F | SYSTOLIC BLOOD PRESSURE: 134 MMHG | OXYGEN SATURATION: 100 % | WEIGHT: 220 LBS | BODY MASS INDEX: 33.34 KG/M2 | HEIGHT: 68 IN | RESPIRATION RATE: 20 BRPM | HEART RATE: 100 BPM

## 2020-10-29 LAB
APTT PPP: 34.7 SEC (ref 23–35.7)
BASOPHILS # BLD: 0 K/UL (ref 0–0.1)
BASOPHILS NFR BLD: 0 % (ref 0–1)
DIFFERENTIAL METHOD BLD: ABNORMAL
EOSINOPHIL # BLD: 0.2 K/UL (ref 0–0.4)
EOSINOPHIL NFR BLD: 1 % (ref 0–7)
ERYTHROCYTE [DISTWIDTH] IN BLOOD BY AUTOMATED COUNT: 18.7 % (ref 11.5–14.5)
HCT VFR BLD AUTO: 37.4 % (ref 36.6–50.3)
HGB BLD-MCNC: 11.3 G/DL (ref 12.1–17)
IMM GRANULOCYTES # BLD AUTO: 0 K/UL (ref 0–0.04)
IMM GRANULOCYTES NFR BLD AUTO: 0 % (ref 0–0.5)
LYMPHOCYTES # BLD: 2.6 K/UL (ref 0.8–3.5)
LYMPHOCYTES NFR BLD: 19 % (ref 12–49)
MCH RBC QN AUTO: 22.3 PG (ref 26–34)
MCHC RBC AUTO-ENTMCNC: 30.2 G/DL (ref 30–36.5)
MCV RBC AUTO: 73.8 FL (ref 80–99)
MONOCYTES # BLD: 1.1 K/UL (ref 0–1)
MONOCYTES NFR BLD: 8 % (ref 5–13)
NEUTS SEG # BLD: 10 K/UL (ref 1.8–8)
NEUTS SEG NFR BLD: 72 % (ref 32–75)
NRBC # BLD: 0 K/UL (ref 0–0.01)
NRBC BLD-RTO: 0 PER 100 WBC
PLATELET # BLD AUTO: 229 K/UL (ref 150–400)
PMV BLD AUTO: 10 FL (ref 8.9–12.9)
RBC # BLD AUTO: 5.07 M/UL (ref 4.1–5.7)
THERAPEUTIC RANGE,PTTT: NORMAL SEC (ref 68–109)
TROPONIN I SERPL-MCNC: 3.78 NG/ML
WBC # BLD AUTO: 13.9 K/UL (ref 4.1–11.1)

## 2020-10-29 PROCEDURE — 99218 HC RM OBSERVATION: CPT

## 2020-10-29 PROCEDURE — 84484 ASSAY OF TROPONIN QUANT: CPT

## 2020-10-29 PROCEDURE — 85730 THROMBOPLASTIN TIME PARTIAL: CPT

## 2020-10-29 PROCEDURE — 74011250636 HC RX REV CODE- 250/636: Performed by: HOSPITALIST

## 2020-10-29 PROCEDURE — 36415 COLL VENOUS BLD VENIPUNCTURE: CPT

## 2020-10-29 PROCEDURE — 96375 TX/PRO/DX INJ NEW DRUG ADDON: CPT

## 2020-10-29 PROCEDURE — 85025 COMPLETE CBC W/AUTO DIFF WBC: CPT

## 2020-10-29 RX ADMIN — HEPARIN SODIUM 4000 UNITS: 1000 INJECTION, SOLUTION INTRAVENOUS; SUBCUTANEOUS at 05:02

## 2020-10-29 NOTE — PROGRESS NOTES
Problem: Falls - Risk of  Goal: *Absence of Falls  Description: Document [facility] Fall Risk and appropriate interventions in the flowsheet.   Outcome: Progressing Towards Goal  Note: Fall Risk Interventions:

## 2020-10-29 NOTE — PROGRESS NOTES
Patient with multivessel disease discharged to [facility] for CABG. Patient discharged with IV to left hand intact with Hep infusing at 16 units/kg/hr. Patient VSS, no apparent distress. Patient transported by [facility] on cardiac monitoring. Telemetry box dc'd and returned to telemetry room.   Report called to Deann Brady RN of [facility].

## 2020-10-29 NOTE — PROGRESS NOTES
Phase 2 outpatient cardiac rehab referral is not appropriate for this patient at this time due to physical and/or mental limitations, diagnosis and/or treatment plans.

## 2022-01-01 ENCOUNTER — TELEPHONE (OUTPATIENT)
Dept: SURGERY | Age: 72
End: 2022-01-01

## 2022-01-01 ENCOUNTER — APPOINTMENT (OUTPATIENT)
Dept: GENERAL RADIOLOGY | Age: 72
DRG: 871 | End: 2022-01-01
Attending: INTERNAL MEDICINE
Payer: MEDICARE

## 2022-01-01 ENCOUNTER — HOSPICE ADMISSION (OUTPATIENT)
Dept: HOSPICE | Facility: HOSPICE | Age: 72
End: 2022-01-01
Payer: MEDICARE

## 2022-01-01 ENCOUNTER — HOSPITAL ENCOUNTER (INPATIENT)
Age: 72
LOS: 2 days | DRG: 951 | End: 2022-12-08
Attending: FAMILY MEDICINE | Admitting: FAMILY MEDICINE

## 2022-01-01 ENCOUNTER — HOSPITAL ENCOUNTER (INPATIENT)
Age: 72
LOS: 7 days | Discharge: HOSPICE | DRG: 871 | End: 2022-12-06
Attending: STUDENT IN AN ORGANIZED HEALTH CARE EDUCATION/TRAINING PROGRAM | Admitting: INTERNAL MEDICINE
Payer: MEDICARE

## 2022-01-01 ENCOUNTER — APPOINTMENT (OUTPATIENT)
Dept: GENERAL RADIOLOGY | Age: 72
DRG: 871 | End: 2022-01-01
Attending: STUDENT IN AN ORGANIZED HEALTH CARE EDUCATION/TRAINING PROGRAM
Payer: MEDICARE

## 2022-01-01 VITALS
BODY MASS INDEX: 20.24 KG/M2 | TEMPERATURE: 99.8 F | RESPIRATION RATE: 35 BRPM | DIASTOLIC BLOOD PRESSURE: 62 MMHG | SYSTOLIC BLOOD PRESSURE: 126 MMHG | WEIGHT: 136.69 LBS | HEART RATE: 92 BPM | OXYGEN SATURATION: 71 % | HEIGHT: 69 IN

## 2022-01-01 VITALS
HEIGHT: 69 IN | TEMPERATURE: 97.8 F | SYSTOLIC BLOOD PRESSURE: 116 MMHG | BODY MASS INDEX: 20.24 KG/M2 | WEIGHT: 136.69 LBS | OXYGEN SATURATION: 96 % | DIASTOLIC BLOOD PRESSURE: 58 MMHG | RESPIRATION RATE: 21 BRPM | HEART RATE: 77 BPM

## 2022-01-01 DIAGNOSIS — J69.0 ASPIRATION PNEUMONIA OF BOTH LUNGS, UNSPECIFIED ASPIRATION PNEUMONIA TYPE, UNSPECIFIED PART OF LUNG (HCC): ICD-10-CM

## 2022-01-01 DIAGNOSIS — I50.9 CONGESTIVE HEART FAILURE, UNSPECIFIED HF CHRONICITY, UNSPECIFIED HEART FAILURE TYPE (HCC): ICD-10-CM

## 2022-01-01 DIAGNOSIS — A49.8 CARBAPENEM-RESISTANT BACTERIAL INFECTION: ICD-10-CM

## 2022-01-01 DIAGNOSIS — A41.9 SEPSIS, DUE TO UNSPECIFIED ORGANISM, UNSPECIFIED WHETHER ACUTE ORGAN DYSFUNCTION PRESENT (HCC): ICD-10-CM

## 2022-01-01 DIAGNOSIS — J96.01 ACUTE RESPIRATORY FAILURE WITH HYPOXIA (HCC): ICD-10-CM

## 2022-01-01 DIAGNOSIS — R41.82 ALTERED MENTAL STATUS, UNSPECIFIED ALTERED MENTAL STATUS TYPE: ICD-10-CM

## 2022-01-01 DIAGNOSIS — R06.03 RESPIRATORY DISTRESS: Primary | ICD-10-CM

## 2022-01-01 DIAGNOSIS — Z16.24 CARBAPENEM-RESISTANT BACTERIAL INFECTION: ICD-10-CM

## 2022-01-01 LAB
ALBUMIN SERPL-MCNC: 1.5 G/DL (ref 3.5–5)
ALBUMIN SERPL-MCNC: 1.6 G/DL (ref 3.5–5)
ALBUMIN SERPL-MCNC: 2.1 G/DL (ref 3.5–5)
ALBUMIN/GLOB SERPL: 0.4 {RATIO} (ref 1.1–2.2)
ALBUMIN/GLOB SERPL: 0.6 {RATIO} (ref 1.1–2.2)
ALP SERPL-CCNC: 152 U/L (ref 45–117)
ALP SERPL-CCNC: 70 U/L (ref 45–117)
ALT SERPL-CCNC: 422 U/L (ref 12–78)
ALT SERPL-CCNC: 48 U/L (ref 12–78)
ANION GAP SERPL CALC-SCNC: 6 MMOL/L (ref 5–15)
ANION GAP SERPL CALC-SCNC: 6 MMOL/L (ref 5–15)
ANION GAP SERPL CALC-SCNC: 7 MMOL/L (ref 5–15)
ANION GAP SERPL CALC-SCNC: 9 MMOL/L (ref 5–15)
ANION GAP SERPL CALC-SCNC: 9 MMOL/L (ref 5–15)
APPEARANCE UR: CLEAR
ARTERIAL PATENCY WRIST A: YES
AST SERPL W P-5'-P-CCNC: 43 U/L (ref 15–37)
AST SERPL W P-5'-P-CCNC: 445 U/L (ref 15–37)
ATRIAL RATE: 102 BPM
BACTERIA SPEC CULT: NORMAL
BASE DEFICIT BLDA-SCNC: 1.9 MMOL/L
BASE EXCESS BLDA CALC-SCNC: 0.4 MMOL/L (ref 0–3)
BASE EXCESS BLDA CALC-SCNC: 1.5 MMOL/L (ref 0–3)
BASE EXCESS BLDA CALC-SCNC: 1.7 MMOL/L (ref 0–3)
BASE EXCESS BLDA CALC-SCNC: 4 MMOL/L (ref 0–3)
BASOPHILS # BLD: 0 K/UL (ref 0–0.1)
BASOPHILS NFR BLD: 0 % (ref 0–1)
BDY SITE: ABNORMAL
BILIRUB SERPL-MCNC: 0.6 MG/DL (ref 0.2–1)
BILIRUB SERPL-MCNC: 1.9 MG/DL (ref 0.2–1)
BILIRUB UR QL: NEGATIVE
BNP SERPL-MCNC: ABNORMAL PG/ML
BODY TEMPERATURE: 101
BODY TEMPERATURE: 98
BODY TEMPERATURE: 98
BODY TEMPERATURE: 99.2
BODY TEMPERATURE: 99.6
BUN SERPL-MCNC: 15 MG/DL (ref 6–20)
BUN SERPL-MCNC: 19 MG/DL (ref 6–20)
BUN SERPL-MCNC: 20 MG/DL (ref 6–20)
BUN SERPL-MCNC: 23 MG/DL (ref 6–20)
BUN SERPL-MCNC: 24 MG/DL (ref 6–20)
BUN/CREAT SERPL: 27 (ref 12–20)
BUN/CREAT SERPL: 32 (ref 12–20)
BUN/CREAT SERPL: 32 (ref 12–20)
BUN/CREAT SERPL: 33 (ref 12–20)
BUN/CREAT SERPL: 35 (ref 12–20)
BUN/CREAT SERPL: 40 (ref 12–20)
BUN/CREAT SERPL: 41 (ref 12–20)
C DIFF GDH STL QL: NEGATIVE
C DIFF TOX A+B STL QL IA: NEGATIVE
CA-I BLD-MCNC: 7.7 MG/DL (ref 8.5–10.1)
CA-I BLD-MCNC: 7.7 MG/DL (ref 8.5–10.1)
CA-I BLD-MCNC: 8 MG/DL (ref 8.5–10.1)
CA-I BLD-MCNC: 8.2 MG/DL (ref 8.5–10.1)
CA-I BLD-MCNC: 8.2 MG/DL (ref 8.5–10.1)
CALCULATED P AXIS, ECG09: 59 DEGREES
CALCULATED R AXIS, ECG10: 5 DEGREES
CALCULATED T AXIS, ECG11: 121 DEGREES
CHLORIDE SERPL-SCNC: 110 MMOL/L (ref 97–108)
CHLORIDE SERPL-SCNC: 112 MMOL/L (ref 97–108)
CHLORIDE SERPL-SCNC: 113 MMOL/L (ref 97–108)
CHLORIDE SERPL-SCNC: 113 MMOL/L (ref 97–108)
CHLORIDE SERPL-SCNC: 117 MMOL/L (ref 97–108)
CHLORIDE SERPL-SCNC: 118 MMOL/L (ref 97–108)
CHLORIDE SERPL-SCNC: 118 MMOL/L (ref 97–108)
CO2 SERPL-SCNC: 21 MMOL/L (ref 21–32)
CO2 SERPL-SCNC: 22 MMOL/L (ref 21–32)
CO2 SERPL-SCNC: 22 MMOL/L (ref 21–32)
CO2 SERPL-SCNC: 23 MMOL/L (ref 21–32)
CO2 SERPL-SCNC: 24 MMOL/L (ref 21–32)
CO2 SERPL-SCNC: 30 MMOL/L (ref 21–32)
CO2 SERPL-SCNC: 30 MMOL/L (ref 21–32)
COHGB MFR BLD: 0 % (ref 1–2)
COHGB MFR BLD: 0.1 % (ref 1–2)
COHGB MFR BLD: 0.3 % (ref 1–2)
COHGB MFR BLD: 0.4 % (ref 1–2)
COHGB MFR BLD: 0.4 % (ref 1–2)
COLOR UR: ABNORMAL
COVID-19 RAPID TEST, COVR: NOT DETECTED
CREAT SERPL-MCNC: 0.49 MG/DL (ref 0.7–1.3)
CREAT SERPL-MCNC: 0.55 MG/DL (ref 0.7–1.3)
CREAT SERPL-MCNC: 0.58 MG/DL (ref 0.7–1.3)
CREAT SERPL-MCNC: 0.6 MG/DL (ref 0.7–1.3)
CREAT SERPL-MCNC: 0.62 MG/DL (ref 0.7–1.3)
CREAT SERPL-MCNC: 0.63 MG/DL (ref 0.7–1.3)
CREAT SERPL-MCNC: 0.66 MG/DL (ref 0.7–1.3)
CRP SERPL-MCNC: 9.24 MG/DL (ref 0–0.6)
DIAGNOSIS, 93000: NORMAL
DIFFERENTIAL METHOD BLD: ABNORMAL
EOSINOPHIL # BLD: 0 K/UL (ref 0–0.4)
EOSINOPHIL NFR BLD: 0 % (ref 0–7)
ERYTHROCYTE [DISTWIDTH] IN BLOOD BY AUTOMATED COUNT: 16.2 % (ref 11.5–14.5)
ERYTHROCYTE [DISTWIDTH] IN BLOOD BY AUTOMATED COUNT: 16.3 % (ref 11.5–14.5)
ERYTHROCYTE [DISTWIDTH] IN BLOOD BY AUTOMATED COUNT: 17.1 % (ref 11.5–14.5)
ERYTHROCYTE [DISTWIDTH] IN BLOOD BY AUTOMATED COUNT: 17.2 % (ref 11.5–14.5)
ERYTHROCYTE [DISTWIDTH] IN BLOOD BY AUTOMATED COUNT: 17.3 % (ref 11.5–14.5)
ERYTHROCYTE [DISTWIDTH] IN BLOOD BY AUTOMATED COUNT: 17.3 % (ref 11.5–14.5)
ERYTHROCYTE [DISTWIDTH] IN BLOOD BY AUTOMATED COUNT: 17.4 % (ref 11.5–14.5)
FIO2 ON VENT: 50 %
FIO2 ON VENT: 60 %
FIO2 ON VENT: 60 %
FIO2 ON VENT: 80 %
FIO2 ON VENT: 80 %
FLUAV AG NPH QL IA: NEGATIVE
FLUBV AG NOSE QL IA: NEGATIVE
GAS FLOW.O2 O2 DELIVERY SYS: 45 L/MIN
GAS FLOW.O2 O2 DELIVERY SYS: 50 L/MIN
GAS FLOW.O2 O2 DELIVERY SYS: 50 L/MIN
GAS FLOW.O2 O2 DELIVERY SYS: 60 L/MIN
GLOBULIN SER CALC-MCNC: 3.6 G/DL (ref 2–4)
GLOBULIN SER CALC-MCNC: 3.9 G/DL (ref 2–4)
GLUCOSE BLD STRIP.AUTO-MCNC: 101 MG/DL (ref 65–100)
GLUCOSE BLD STRIP.AUTO-MCNC: 103 MG/DL (ref 65–100)
GLUCOSE BLD STRIP.AUTO-MCNC: 105 MG/DL (ref 65–100)
GLUCOSE BLD STRIP.AUTO-MCNC: 111 MG/DL (ref 65–100)
GLUCOSE BLD STRIP.AUTO-MCNC: 114 MG/DL (ref 65–100)
GLUCOSE BLD STRIP.AUTO-MCNC: 124 MG/DL (ref 65–100)
GLUCOSE BLD STRIP.AUTO-MCNC: 132 MG/DL (ref 65–100)
GLUCOSE BLD STRIP.AUTO-MCNC: 134 MG/DL (ref 65–100)
GLUCOSE BLD STRIP.AUTO-MCNC: 136 MG/DL (ref 65–100)
GLUCOSE BLD STRIP.AUTO-MCNC: 146 MG/DL (ref 65–100)
GLUCOSE BLD STRIP.AUTO-MCNC: 149 MG/DL (ref 65–100)
GLUCOSE BLD STRIP.AUTO-MCNC: 160 MG/DL (ref 65–100)
GLUCOSE BLD STRIP.AUTO-MCNC: 167 MG/DL (ref 65–100)
GLUCOSE BLD STRIP.AUTO-MCNC: 173 MG/DL (ref 65–100)
GLUCOSE BLD STRIP.AUTO-MCNC: 173 MG/DL (ref 65–100)
GLUCOSE BLD STRIP.AUTO-MCNC: 178 MG/DL (ref 65–100)
GLUCOSE BLD STRIP.AUTO-MCNC: 190 MG/DL (ref 65–100)
GLUCOSE BLD STRIP.AUTO-MCNC: 191 MG/DL (ref 65–100)
GLUCOSE BLD STRIP.AUTO-MCNC: 193 MG/DL (ref 65–100)
GLUCOSE BLD STRIP.AUTO-MCNC: 195 MG/DL (ref 65–100)
GLUCOSE BLD STRIP.AUTO-MCNC: 197 MG/DL (ref 65–100)
GLUCOSE BLD STRIP.AUTO-MCNC: 203 MG/DL (ref 65–100)
GLUCOSE BLD STRIP.AUTO-MCNC: 205 MG/DL (ref 65–100)
GLUCOSE BLD STRIP.AUTO-MCNC: 92 MG/DL (ref 65–100)
GLUCOSE SERPL-MCNC: 117 MG/DL (ref 65–100)
GLUCOSE SERPL-MCNC: 146 MG/DL (ref 65–100)
GLUCOSE SERPL-MCNC: 160 MG/DL (ref 65–100)
GLUCOSE SERPL-MCNC: 163 MG/DL (ref 65–100)
GLUCOSE SERPL-MCNC: 175 MG/DL (ref 65–100)
GLUCOSE SERPL-MCNC: 189 MG/DL (ref 65–100)
GLUCOSE SERPL-MCNC: 194 MG/DL (ref 65–100)
GLUCOSE UR STRIP.AUTO-MCNC: NEGATIVE MG/DL
GRAM STN SPEC: NORMAL
HCO3 BLDA-SCNC: 20 MMOL/L (ref 22–26)
HCO3 BLDA-SCNC: 23 MMOL/L (ref 22–26)
HCO3 BLDA-SCNC: 24 MMOL/L (ref 22–26)
HCO3 BLDA-SCNC: 25 MMOL/L (ref 22–26)
HCO3 BLDA-SCNC: 27 MMOL/L (ref 22–26)
HCT VFR BLD AUTO: 24.9 % (ref 36.6–50.3)
HCT VFR BLD AUTO: 25.6 % (ref 36.6–50.3)
HCT VFR BLD AUTO: 26.8 % (ref 36.6–50.3)
HCT VFR BLD AUTO: 27.7 % (ref 36.6–50.3)
HCT VFR BLD AUTO: 28.8 % (ref 36.6–50.3)
HCT VFR BLD AUTO: 29 % (ref 36.6–50.3)
HCT VFR BLD AUTO: 32 % (ref 36.6–50.3)
HGB BLD-MCNC: 7.9 G/DL (ref 12.1–17)
HGB BLD-MCNC: 8 G/DL (ref 12.1–17)
HGB BLD-MCNC: 8.4 G/DL (ref 12.1–17)
HGB BLD-MCNC: 8.5 G/DL (ref 12.1–17)
HGB BLD-MCNC: 8.7 G/DL (ref 12.1–17)
HGB BLD-MCNC: 8.8 G/DL (ref 12.1–17)
HGB BLD-MCNC: 9.8 G/DL (ref 12.1–17)
HGB UR QL STRIP: NEGATIVE
IMM GRANULOCYTES # BLD AUTO: 0 K/UL
IMM GRANULOCYTES # BLD AUTO: 0 K/UL
IMM GRANULOCYTES # BLD AUTO: 0 K/UL (ref 0–0.04)
IMM GRANULOCYTES # BLD AUTO: 0.1 K/UL (ref 0–0.04)
IMM GRANULOCYTES NFR BLD AUTO: 0 %
IMM GRANULOCYTES NFR BLD AUTO: 0 %
IMM GRANULOCYTES NFR BLD AUTO: 0 % (ref 0–0.5)
IMM GRANULOCYTES NFR BLD AUTO: 1 % (ref 0–0.5)
INTERPRETATION: NORMAL
KETONES UR QL STRIP.AUTO: NEGATIVE MG/DL
LACTATE SERPL-SCNC: 1.6 MMOL/L (ref 0.4–2)
LEUKOCYTE ESTERASE UR QL STRIP.AUTO: NEGATIVE
LYMPHOCYTES # BLD: 0.1 K/UL (ref 0.8–3.5)
LYMPHOCYTES # BLD: 0.4 K/UL (ref 0.8–3.5)
LYMPHOCYTES # BLD: 0.6 K/UL (ref 0.8–3.5)
LYMPHOCYTES # BLD: 0.8 K/UL (ref 0.8–3.5)
LYMPHOCYTES # BLD: 0.8 K/UL (ref 0.8–3.5)
LYMPHOCYTES # BLD: 0.9 K/UL (ref 0.8–3.5)
LYMPHOCYTES NFR BLD: 10 % (ref 12–49)
LYMPHOCYTES NFR BLD: 13 % (ref 12–49)
LYMPHOCYTES NFR BLD: 16 % (ref 12–49)
LYMPHOCYTES NFR BLD: 3 % (ref 12–49)
LYMPHOCYTES NFR BLD: 5 % (ref 12–49)
LYMPHOCYTES NFR BLD: 8 % (ref 12–49)
MCH RBC QN AUTO: 28 PG (ref 26–34)
MCH RBC QN AUTO: 28.3 PG (ref 26–34)
MCH RBC QN AUTO: 28.6 PG (ref 26–34)
MCH RBC QN AUTO: 28.8 PG (ref 26–34)
MCH RBC QN AUTO: 28.9 PG (ref 26–34)
MCH RBC QN AUTO: 29.1 PG (ref 26–34)
MCH RBC QN AUTO: 29.2 PG (ref 26–34)
MCHC RBC AUTO-ENTMCNC: 30.2 G/DL (ref 30–36.5)
MCHC RBC AUTO-ENTMCNC: 30.3 G/DL (ref 30–36.5)
MCHC RBC AUTO-ENTMCNC: 30.6 G/DL (ref 30–36.5)
MCHC RBC AUTO-ENTMCNC: 30.7 G/DL (ref 30–36.5)
MCHC RBC AUTO-ENTMCNC: 31.3 G/DL (ref 30–36.5)
MCHC RBC AUTO-ENTMCNC: 31.3 G/DL (ref 30–36.5)
MCHC RBC AUTO-ENTMCNC: 31.7 G/DL (ref 30–36.5)
MCV RBC AUTO: 91.9 FL (ref 80–99)
MCV RBC AUTO: 92.3 FL (ref 80–99)
MCV RBC AUTO: 92.4 FL (ref 80–99)
MCV RBC AUTO: 92.6 FL (ref 80–99)
MCV RBC AUTO: 92.7 FL (ref 80–99)
MCV RBC AUTO: 94.1 FL (ref 80–99)
MCV RBC AUTO: 94.2 FL (ref 80–99)
METHGB MFR BLD: 0.1 % (ref 0–1.4)
METHGB MFR BLD: 0.3 % (ref 0–1.4)
METHGB MFR BLD: 0.6 % (ref 0–1.4)
MONOCYTES # BLD: 0.1 K/UL (ref 0–1)
MONOCYTES # BLD: 0.2 K/UL (ref 0–1)
MONOCYTES # BLD: 0.3 K/UL (ref 0–1)
MONOCYTES # BLD: 0.4 K/UL (ref 0–1)
MONOCYTES NFR BLD: 1 % (ref 5–13)
MONOCYTES NFR BLD: 3 % (ref 5–13)
MONOCYTES NFR BLD: 3 % (ref 5–13)
MONOCYTES NFR BLD: 4 % (ref 5–13)
MRSA DNA SPEC QL NAA+PROBE: DETECTED
NEUTS BAND NFR BLD MANUAL: 1 % (ref 0–6)
NEUTS SEG # BLD: 13.4 K/UL (ref 1.8–8)
NEUTS SEG # BLD: 4 K/UL (ref 1.8–8)
NEUTS SEG # BLD: 4 K/UL (ref 1.8–8)
NEUTS SEG # BLD: 4.4 K/UL (ref 1.8–8)
NEUTS SEG # BLD: 5.1 K/UL (ref 1.8–8)
NEUTS SEG # BLD: 5.4 K/UL (ref 1.8–8)
NEUTS SEG NFR BLD: 82 % (ref 32–75)
NEUTS SEG NFR BLD: 83 % (ref 32–75)
NEUTS SEG NFR BLD: 85 % (ref 32–75)
NEUTS SEG NFR BLD: 88 % (ref 32–75)
NEUTS SEG NFR BLD: 92 % (ref 32–75)
NEUTS SEG NFR BLD: 92 % (ref 32–75)
NITRITE UR QL STRIP.AUTO: NEGATIVE
NRBC # BLD: 0 K/UL (ref 0–0.01)
NRBC BLD-RTO: 0 PER 100 WBC
OXYHGB MFR BLD: 86.2 % (ref 95–99)
OXYHGB MFR BLD: 88.1 % (ref 95–99)
OXYHGB MFR BLD: 91.2 % (ref 95–99)
OXYHGB MFR BLD: 96.4 % (ref 95–99)
OXYHGB MFR BLD: 98.2 % (ref 95–99)
P-R INTERVAL, ECG05: 144 MS
PCO2 BLDA: 25 MMHG (ref 35–45)
PCO2 BLDA: 28 MMHG (ref 35–45)
PCO2 BLDA: 29 MMHG (ref 35–45)
PCO2 BLDA: 34 MMHG (ref 35–45)
PCO2 BLDA: 36 MMHG (ref 35–45)
PEEP RESPIRATORY: 7 CM[H2O]
PERFORMED BY, TECHID: ABNORMAL
PERFORMED BY, TECHID: NORMAL
PH BLDA: 7.47 [PH] (ref 7.35–7.45)
PH BLDA: 7.51 [PH] (ref 7.35–7.45)
PH BLDA: 7.52 [PH] (ref 7.35–7.45)
PH BLDA: 7.52 [PH] (ref 7.35–7.45)
PH BLDA: 7.55 [PH] (ref 7.35–7.45)
PH UR STRIP: 6 [PH]
PHOSPHATE SERPL-MCNC: 3.3 MG/DL (ref 2.6–4.7)
PLATELET # BLD AUTO: 161 K/UL (ref 150–400)
PLATELET # BLD AUTO: 169 K/UL (ref 150–400)
PLATELET # BLD AUTO: 174 K/UL (ref 150–400)
PLATELET # BLD AUTO: 177 K/UL (ref 150–400)
PLATELET # BLD AUTO: 181 K/UL (ref 150–400)
PLATELET # BLD AUTO: 182 K/UL (ref 150–400)
PLATELET # BLD AUTO: 317 K/UL (ref 150–400)
PMV BLD AUTO: 10.1 FL (ref 8.9–12.9)
PMV BLD AUTO: 10.4 FL (ref 8.9–12.9)
PMV BLD AUTO: 10.4 FL (ref 8.9–12.9)
PMV BLD AUTO: 10.7 FL (ref 8.9–12.9)
PMV BLD AUTO: 10.9 FL (ref 8.9–12.9)
PMV BLD AUTO: 11.1 FL (ref 8.9–12.9)
PMV BLD AUTO: 11.7 FL (ref 8.9–12.9)
PO2 BLDA: 151 MMHG (ref 80–100)
PO2 BLDA: 52 MMHG (ref 80–100)
PO2 BLDA: 57 MMHG (ref 80–100)
PO2 BLDA: 62 MMHG (ref 80–100)
PO2 BLDA: 90 MMHG (ref 80–100)
POTASSIUM SERPL-SCNC: 3 MMOL/L (ref 3.5–5.1)
POTASSIUM SERPL-SCNC: 3 MMOL/L (ref 3.5–5.1)
POTASSIUM SERPL-SCNC: 3.1 MMOL/L (ref 3.5–5.1)
POTASSIUM SERPL-SCNC: 3.2 MMOL/L (ref 3.5–5.1)
POTASSIUM SERPL-SCNC: 3.5 MMOL/L (ref 3.5–5.1)
POTASSIUM SERPL-SCNC: 3.8 MMOL/L (ref 3.5–5.1)
POTASSIUM SERPL-SCNC: 3.9 MMOL/L (ref 3.5–5.1)
PRESSURE SUPPORT SETTING VENT: 25 CM[H2O]
PROCALCITONIN SERPL-MCNC: 0.78 NG/ML
PROT SERPL-MCNC: 5.5 G/DL (ref 6.4–8.2)
PROT SERPL-MCNC: 5.7 G/DL (ref 6.4–8.2)
PROT UR STRIP-MCNC: NEGATIVE MG/DL
Q-T INTERVAL, ECG07: 372 MS
QRS DURATION, ECG06: 90 MS
QTC CALCULATION (BEZET), ECG08: 484 MS
RBC # BLD AUTO: 2.71 M/UL (ref 4.1–5.7)
RBC # BLD AUTO: 2.77 M/UL (ref 4.1–5.7)
RBC # BLD AUTO: 2.89 M/UL (ref 4.1–5.7)
RBC # BLD AUTO: 3 M/UL (ref 4.1–5.7)
RBC # BLD AUTO: 3.08 M/UL (ref 4.1–5.7)
RBC # BLD AUTO: 3.11 M/UL (ref 4.1–5.7)
RBC # BLD AUTO: 3.4 M/UL (ref 4.1–5.7)
RBC MORPH BLD: ABNORMAL
RBC MORPH BLD: ABNORMAL
SAO2 % BLD: 87 % (ref 95–99)
SAO2 % BLD: 89 % (ref 95–99)
SAO2 % BLD: 92 % (ref 95–99)
SAO2 % BLD: 97 % (ref 95–99)
SAO2 % BLD: 99 % (ref 95–99)
SAO2% DEVICE SAO2% SENSOR NAME: ABNORMAL
SERVICE CMNT-IMP: ABNORMAL
SODIUM SERPL-SCNC: 140 MMOL/L (ref 136–145)
SODIUM SERPL-SCNC: 144 MMOL/L (ref 136–145)
SODIUM SERPL-SCNC: 147 MMOL/L (ref 136–145)
SODIUM SERPL-SCNC: 148 MMOL/L (ref 136–145)
SODIUM SERPL-SCNC: 149 MMOL/L (ref 136–145)
SP GR UR REFRACTOMETRY: 1.01 (ref 1–1.03)
SPECIAL REQUESTS,SREQ: NORMAL
SPECIMEN SITE: ABNORMAL
TROPONIN-HIGH SENSITIVITY: 111 NG/L (ref 0–76)
UROBILINOGEN UR QL STRIP.AUTO: 4 EU/DL (ref 0.2–1)
VANCOMYCIN SERPL-MCNC: 16.9 UG/ML
VANCOMYCIN TROUGH SERPL-MCNC: 9.8 UG/ML (ref 5–10)
VENTRICULAR RATE, ECG03: 102 BPM
VT SETTING VENT: 480 ML
WBC # BLD AUTO: 14.8 K/UL (ref 4.1–11.1)
WBC # BLD AUTO: 4.2 K/UL (ref 4.1–11.1)
WBC # BLD AUTO: 4.6 K/UL (ref 4.1–11.1)
WBC # BLD AUTO: 5.3 K/UL (ref 4.1–11.1)
WBC # BLD AUTO: 5.6 K/UL (ref 4.1–11.1)
WBC # BLD AUTO: 6 K/UL (ref 4.1–11.1)
WBC # BLD AUTO: 6.6 K/UL (ref 4.1–11.1)

## 2022-01-01 PROCEDURE — 96365 THER/PROPH/DIAG IV INF INIT: CPT

## 2022-01-01 PROCEDURE — 74011250637 HC RX REV CODE- 250/637: Performed by: INTERNAL MEDICINE

## 2022-01-01 PROCEDURE — 74011250636 HC RX REV CODE- 250/636: Performed by: FAMILY MEDICINE

## 2022-01-01 PROCEDURE — 77010033711 HC HIGH FLOW OXYGEN

## 2022-01-01 PROCEDURE — 74011000258 HC RX REV CODE- 258: Performed by: INTERNAL MEDICINE

## 2022-01-01 PROCEDURE — 74011250636 HC RX REV CODE- 250/636: Performed by: INTERNAL MEDICINE

## 2022-01-01 PROCEDURE — 65270000029 HC RM PRIVATE

## 2022-01-01 PROCEDURE — 36600 WITHDRAWAL OF ARTERIAL BLOOD: CPT

## 2022-01-01 PROCEDURE — 80069 RENAL FUNCTION PANEL: CPT

## 2022-01-01 PROCEDURE — 71045 X-RAY EXAM CHEST 1 VIEW: CPT

## 2022-01-01 PROCEDURE — 82962 GLUCOSE BLOOD TEST: CPT

## 2022-01-01 PROCEDURE — 74011250636 HC RX REV CODE- 250/636: Performed by: NURSE PRACTITIONER

## 2022-01-01 PROCEDURE — 85027 COMPLETE CBC AUTOMATED: CPT

## 2022-01-01 PROCEDURE — 99222 1ST HOSP IP/OBS MODERATE 55: CPT | Performed by: PODIATRIST

## 2022-01-01 PROCEDURE — 99222 1ST HOSP IP/OBS MODERATE 55: CPT | Performed by: INTERNAL MEDICINE

## 2022-01-01 PROCEDURE — 74011000250 HC RX REV CODE- 250: Performed by: INTERNAL MEDICINE

## 2022-01-01 PROCEDURE — 82803 BLOOD GASES ANY COMBINATION: CPT

## 2022-01-01 PROCEDURE — 87186 SC STD MICRODIL/AGAR DIL: CPT

## 2022-01-01 PROCEDURE — 80048 BASIC METABOLIC PNL TOTAL CA: CPT

## 2022-01-01 PROCEDURE — 74011000258 HC RX REV CODE- 258: Performed by: STUDENT IN AN ORGANIZED HEALTH CARE EDUCATION/TRAINING PROGRAM

## 2022-01-01 PROCEDURE — 94761 N-INVAS EAR/PLS OXIMETRY MLT: CPT

## 2022-01-01 PROCEDURE — 86140 C-REACTIVE PROTEIN: CPT

## 2022-01-01 PROCEDURE — 85025 COMPLETE CBC W/AUTO DIFF WBC: CPT

## 2022-01-01 PROCEDURE — 80053 COMPREHEN METABOLIC PANEL: CPT

## 2022-01-01 PROCEDURE — 0656 HSPC GENERAL INPATIENT

## 2022-01-01 PROCEDURE — 36415 COLL VENOUS BLD VENIPUNCTURE: CPT

## 2022-01-01 PROCEDURE — 74011000250 HC RX REV CODE- 250: Performed by: NURSE PRACTITIONER

## 2022-01-01 PROCEDURE — 99291 CRITICAL CARE FIRST HOUR: CPT

## 2022-01-01 PROCEDURE — 93005 ELECTROCARDIOGRAM TRACING: CPT

## 2022-01-01 PROCEDURE — 83880 ASSAY OF NATRIURETIC PEPTIDE: CPT

## 2022-01-01 PROCEDURE — 84145 PROCALCITONIN (PCT): CPT

## 2022-01-01 PROCEDURE — 77010033678 HC OXYGEN DAILY

## 2022-01-01 PROCEDURE — 74011000250 HC RX REV CODE- 250: Performed by: FAMILY MEDICINE

## 2022-01-01 PROCEDURE — 87641 MR-STAPH DNA AMP PROBE: CPT

## 2022-01-01 PROCEDURE — 74011250636 HC RX REV CODE- 250/636

## 2022-01-01 PROCEDURE — 94660 CPAP INITIATION&MGMT: CPT | Performed by: INTERNAL MEDICINE

## 2022-01-01 PROCEDURE — 65610000006 HC RM INTENSIVE CARE

## 2022-01-01 PROCEDURE — 74011250637 HC RX REV CODE- 250/637: Performed by: STUDENT IN AN ORGANIZED HEALTH CARE EDUCATION/TRAINING PROGRAM

## 2022-01-01 PROCEDURE — 74011250636 HC RX REV CODE- 250/636: Performed by: STUDENT IN AN ORGANIZED HEALTH CARE EDUCATION/TRAINING PROGRAM

## 2022-01-01 PROCEDURE — 96367 TX/PROPH/DG ADDL SEQ IV INF: CPT

## 2022-01-01 PROCEDURE — 87324 CLOSTRIDIUM AG IA: CPT

## 2022-01-01 PROCEDURE — 87804 INFLUENZA ASSAY W/OPTIC: CPT

## 2022-01-01 PROCEDURE — 87040 BLOOD CULTURE FOR BACTERIA: CPT

## 2022-01-01 PROCEDURE — 87635 SARS-COV-2 COVID-19 AMP PRB: CPT

## 2022-01-01 PROCEDURE — 99232 SBSQ HOSP IP/OBS MODERATE 35: CPT | Performed by: INTERNAL MEDICINE

## 2022-01-01 PROCEDURE — 87070 CULTURE OTHR SPECIMN AEROBIC: CPT

## 2022-01-01 PROCEDURE — 84484 ASSAY OF TROPONIN QUANT: CPT

## 2022-01-01 PROCEDURE — 80202 ASSAY OF VANCOMYCIN: CPT

## 2022-01-01 PROCEDURE — 87077 CULTURE AEROBIC IDENTIFY: CPT

## 2022-01-01 PROCEDURE — 83605 ASSAY OF LACTIC ACID: CPT

## 2022-01-01 PROCEDURE — 81003 URINALYSIS AUTO W/O SCOPE: CPT

## 2022-01-01 RX ORDER — TRAMADOL HYDROCHLORIDE 50 MG/1
50 TABLET ORAL
Status: DISCONTINUED | OUTPATIENT
Start: 2022-01-01 | End: 2022-01-01 | Stop reason: HOSPADM

## 2022-01-01 RX ORDER — BACLOFEN 5 MG/1
5 TABLET ORAL 2 TIMES DAILY
Status: DISCONTINUED | OUTPATIENT
Start: 2022-01-01 | End: 2022-01-01

## 2022-01-01 RX ORDER — LOSARTAN POTASSIUM 50 MG/1
TABLET ORAL
COMMUNITY
Start: 2022-01-01 | End: 2022-01-01

## 2022-01-01 RX ORDER — ACETAMINOPHEN 650 MG/1
650 SUPPOSITORY RECTAL
Status: DISCONTINUED | OUTPATIENT
Start: 2022-01-01 | End: 2022-01-01 | Stop reason: HOSPADM

## 2022-01-01 RX ORDER — HYDROXYZINE 25 MG/1
25 TABLET, FILM COATED ORAL
Status: DISCONTINUED | OUTPATIENT
Start: 2022-01-01 | End: 2022-01-01

## 2022-01-01 RX ORDER — CARVEDILOL 12.5 MG/1
12.5 TABLET ORAL 2 TIMES DAILY
Status: DISCONTINUED | OUTPATIENT
Start: 2022-01-01 | End: 2022-01-01

## 2022-01-01 RX ORDER — KETOROLAC TROMETHAMINE 15 MG/ML
15 INJECTION, SOLUTION INTRAMUSCULAR; INTRAVENOUS
Status: DISCONTINUED | OUTPATIENT
Start: 2022-01-01 | End: 2022-01-01 | Stop reason: HOSPADM

## 2022-01-01 RX ORDER — MIDODRINE HYDROCHLORIDE 5 MG/1
10 TABLET ORAL
Status: DISCONTINUED | OUTPATIENT
Start: 2022-01-01 | End: 2022-01-01

## 2022-01-01 RX ORDER — GUAIFENESIN 100 MG/5ML
81 LIQUID (ML) ORAL DAILY
COMMUNITY

## 2022-01-01 RX ORDER — ATORVASTATIN CALCIUM 40 MG/1
40 TABLET, FILM COATED ORAL DAILY
Status: DISCONTINUED | OUTPATIENT
Start: 2022-01-01 | End: 2022-01-01

## 2022-01-01 RX ORDER — CLOPIDOGREL BISULFATE 75 MG/1
75 TABLET ORAL DAILY
Status: DISCONTINUED | OUTPATIENT
Start: 2022-01-01 | End: 2022-01-01 | Stop reason: HOSPADM

## 2022-01-01 RX ORDER — LORAZEPAM 2 MG/ML
1 INJECTION INTRAMUSCULAR EVERY 4 HOURS
Status: DISCONTINUED | OUTPATIENT
Start: 2022-01-01 | End: 2022-01-01

## 2022-01-01 RX ORDER — ONDANSETRON 4 MG/1
4 TABLET, ORALLY DISINTEGRATING ORAL
Status: DISCONTINUED | OUTPATIENT
Start: 2022-01-01 | End: 2022-01-01 | Stop reason: HOSPADM

## 2022-01-01 RX ORDER — POTASSIUM CHLORIDE 1.5 G/1.77G
20 POWDER, FOR SOLUTION ORAL 2 TIMES DAILY WITH MEALS
COMMUNITY

## 2022-01-01 RX ORDER — MORPHINE SULFATE 2 MG/ML
1 INJECTION, SOLUTION INTRAMUSCULAR; INTRAVENOUS ONCE
Status: COMPLETED | OUTPATIENT
Start: 2022-01-01 | End: 2022-01-01

## 2022-01-01 RX ORDER — GABAPENTIN 100 MG/1
100 CAPSULE ORAL
Status: DISCONTINUED | OUTPATIENT
Start: 2022-01-01 | End: 2022-01-01 | Stop reason: HOSPADM

## 2022-01-01 RX ORDER — CLOBETASOL PROPIONATE 0.5 MG/G
CREAM TOPICAL
COMMUNITY
Start: 2022-01-01

## 2022-01-01 RX ORDER — FACIAL-BODY WIPES
10 EACH TOPICAL DAILY PRN
Status: DISCONTINUED | OUTPATIENT
Start: 2022-01-01 | End: 2022-01-01 | Stop reason: HOSPADM

## 2022-01-01 RX ORDER — ESCITALOPRAM OXALATE 10 MG/1
20 TABLET ORAL DAILY
Status: DISCONTINUED | OUTPATIENT
Start: 2022-01-01 | End: 2022-01-01

## 2022-01-01 RX ORDER — ASPIRIN 81 MG/1
81 TABLET ORAL DAILY
Status: DISCONTINUED | OUTPATIENT
Start: 2022-01-01 | End: 2022-01-01

## 2022-01-01 RX ORDER — ESCITALOPRAM OXALATE 10 MG/1
20 TABLET ORAL DAILY
Status: DISCONTINUED | OUTPATIENT
Start: 2022-01-01 | End: 2022-01-01 | Stop reason: HOSPADM

## 2022-01-01 RX ORDER — FUROSEMIDE 20 MG/1
40 TABLET ORAL DAILY
Status: ON HOLD | COMMUNITY
Start: 2022-01-01 | End: 2022-01-01

## 2022-01-01 RX ORDER — HYDROMORPHONE HYDROCHLORIDE 1 MG/ML
1 INJECTION, SOLUTION INTRAMUSCULAR; INTRAVENOUS; SUBCUTANEOUS EVERY 4 HOURS
Status: DISCONTINUED | OUTPATIENT
Start: 2022-01-01 | End: 2022-01-01

## 2022-01-01 RX ORDER — INSULIN GLARGINE 100 [IU]/ML
INJECTION, SOLUTION SUBCUTANEOUS
COMMUNITY
End: 2022-01-01

## 2022-01-01 RX ORDER — LORAZEPAM 2 MG/ML
2 INJECTION INTRAMUSCULAR
Status: DISCONTINUED | OUTPATIENT
Start: 2022-01-01 | End: 2022-01-01 | Stop reason: HOSPADM

## 2022-01-01 RX ORDER — TRAMADOL HYDROCHLORIDE 50 MG/1
50 TABLET ORAL
Status: DISCONTINUED | OUTPATIENT
Start: 2022-01-01 | End: 2022-01-01

## 2022-01-01 RX ORDER — FUROSEMIDE 10 MG/ML
40 INJECTION INTRAMUSCULAR; INTRAVENOUS DAILY
Status: DISCONTINUED | OUTPATIENT
Start: 2022-01-01 | End: 2022-01-01 | Stop reason: HOSPADM

## 2022-01-01 RX ORDER — LORAZEPAM 2 MG/ML
1 INJECTION INTRAMUSCULAR
Status: DISCONTINUED | OUTPATIENT
Start: 2022-01-01 | End: 2022-01-01

## 2022-01-01 RX ORDER — SODIUM CHLORIDE 0.9 % (FLUSH) 0.9 %
5-40 SYRINGE (ML) INJECTION EVERY 8 HOURS
Status: DISCONTINUED | OUTPATIENT
Start: 2022-01-01 | End: 2022-01-01 | Stop reason: HOSPADM

## 2022-01-01 RX ORDER — INSULIN ASPART 100 [IU]/ML
INJECTION, SOLUTION INTRAVENOUS; SUBCUTANEOUS
COMMUNITY

## 2022-01-01 RX ORDER — MIDODRINE HYDROCHLORIDE 5 MG/1
10 TABLET ORAL ONCE
Status: ACTIVE | OUTPATIENT
Start: 2022-01-01 | End: 2022-01-01

## 2022-01-01 RX ORDER — MORPHINE SULFATE 2 MG/ML
2 INJECTION, SOLUTION INTRAMUSCULAR; INTRAVENOUS
Status: DISCONTINUED | OUTPATIENT
Start: 2022-01-01 | End: 2022-01-01 | Stop reason: HOSPADM

## 2022-01-01 RX ORDER — BALSAM PERU/CASTOR OIL
OINTMENT (GRAM) TOPICAL 2 TIMES DAILY
Status: DISCONTINUED | OUTPATIENT
Start: 2022-01-01 | End: 2022-01-01 | Stop reason: HOSPADM

## 2022-01-01 RX ORDER — SODIUM CHLORIDE 0.9 % (FLUSH) 0.9 %
5-40 SYRINGE (ML) INJECTION AS NEEDED
Status: DISCONTINUED | OUTPATIENT
Start: 2022-01-01 | End: 2022-01-01 | Stop reason: HOSPADM

## 2022-01-01 RX ORDER — POLYETHYLENE GLYCOL 3350 17 G/17G
17 POWDER, FOR SOLUTION ORAL DAILY PRN
Status: DISCONTINUED | OUTPATIENT
Start: 2022-01-01 | End: 2022-01-01 | Stop reason: HOSPADM

## 2022-01-01 RX ORDER — LOSARTAN POTASSIUM 50 MG/1
100 TABLET ORAL DAILY
Status: DISCONTINUED | OUTPATIENT
Start: 2022-01-01 | End: 2022-01-01

## 2022-01-01 RX ORDER — POTASSIUM CHLORIDE 1.5 G/1.77G
20 POWDER, FOR SOLUTION ORAL DAILY
Status: DISCONTINUED | OUTPATIENT
Start: 2022-01-01 | End: 2022-01-01 | Stop reason: HOSPADM

## 2022-01-01 RX ORDER — POTASSIUM CHLORIDE 1.5 G/1.77G
40 POWDER, FOR SOLUTION ORAL
Status: COMPLETED | OUTPATIENT
Start: 2022-01-01 | End: 2022-01-01

## 2022-01-01 RX ORDER — FUROSEMIDE 10 MG/ML
20 INJECTION INTRAMUSCULAR; INTRAVENOUS
Status: COMPLETED | OUTPATIENT
Start: 2022-01-01 | End: 2022-01-01

## 2022-01-01 RX ORDER — GLYCOPYRROLATE 0.2 MG/ML
0.2 INJECTION INTRAMUSCULAR; INTRAVENOUS EVERY 4 HOURS
Status: DISCONTINUED | OUTPATIENT
Start: 2022-01-01 | End: 2022-01-01

## 2022-01-01 RX ORDER — ATORVASTATIN CALCIUM 40 MG/1
40 TABLET, FILM COATED ORAL DAILY
Status: DISCONTINUED | OUTPATIENT
Start: 2022-01-01 | End: 2022-01-01 | Stop reason: HOSPADM

## 2022-01-01 RX ORDER — CLOPIDOGREL BISULFATE 75 MG/1
75 TABLET ORAL DAILY
Status: DISCONTINUED | OUTPATIENT
Start: 2022-01-01 | End: 2022-01-01

## 2022-01-01 RX ORDER — ONDANSETRON 2 MG/ML
4 INJECTION INTRAMUSCULAR; INTRAVENOUS
Status: DISCONTINUED | OUTPATIENT
Start: 2022-01-01 | End: 2022-01-01 | Stop reason: HOSPADM

## 2022-01-01 RX ORDER — TAMSULOSIN HYDROCHLORIDE 0.4 MG/1
0.4 CAPSULE ORAL
Status: DISCONTINUED | OUTPATIENT
Start: 2022-01-01 | End: 2022-01-01

## 2022-01-01 RX ORDER — ARIPIPRAZOLE 2 MG/1
2 TABLET ORAL DAILY
Status: DISCONTINUED | OUTPATIENT
Start: 2022-01-01 | End: 2022-01-01

## 2022-01-01 RX ORDER — ASPIRIN 300 MG/1
300 SUPPOSITORY RECTAL
Status: COMPLETED | OUTPATIENT
Start: 2022-01-01 | End: 2022-01-01

## 2022-01-01 RX ORDER — BACLOFEN 10 MG/1
5 TABLET ORAL 2 TIMES DAILY
Status: DISCONTINUED | OUTPATIENT
Start: 2022-01-01 | End: 2022-01-01 | Stop reason: HOSPADM

## 2022-01-01 RX ORDER — HYDROMORPHONE HYDROCHLORIDE 2 MG/ML
2 INJECTION, SOLUTION INTRAMUSCULAR; INTRAVENOUS; SUBCUTANEOUS
Status: DISCONTINUED | OUTPATIENT
Start: 2022-01-01 | End: 2022-01-01 | Stop reason: HOSPADM

## 2022-01-01 RX ORDER — HYDROMORPHONE HYDROCHLORIDE 1 MG/ML
1 INJECTION, SOLUTION INTRAMUSCULAR; INTRAVENOUS; SUBCUTANEOUS
Status: DISCONTINUED | OUTPATIENT
Start: 2022-01-01 | End: 2022-01-01

## 2022-01-01 RX ORDER — ARIPIPRAZOLE 2 MG/1
2 TABLET ORAL DAILY
Status: DISCONTINUED | OUTPATIENT
Start: 2022-01-01 | End: 2022-01-01 | Stop reason: HOSPADM

## 2022-01-01 RX ORDER — HYDROMORPHONE HYDROCHLORIDE 2 MG/ML
2 INJECTION, SOLUTION INTRAMUSCULAR; INTRAVENOUS; SUBCUTANEOUS
Status: DISCONTINUED | OUTPATIENT
Start: 2022-12-10 | End: 2022-01-01

## 2022-01-01 RX ORDER — CARVEDILOL 12.5 MG/1
12.5 TABLET ORAL 2 TIMES DAILY
Status: DISCONTINUED | OUTPATIENT
Start: 2022-01-01 | End: 2022-01-01 | Stop reason: HOSPADM

## 2022-01-01 RX ORDER — CHOLECALCIFEROL (VITAMIN D3) 125 MCG
10 CAPSULE ORAL
Status: DISCONTINUED | OUTPATIENT
Start: 2022-01-01 | End: 2022-01-01

## 2022-01-01 RX ORDER — LOSARTAN POTASSIUM 50 MG/1
100 TABLET ORAL DAILY
Status: DISCONTINUED | OUTPATIENT
Start: 2022-01-01 | End: 2022-01-01 | Stop reason: HOSPADM

## 2022-01-01 RX ORDER — CHOLECALCIFEROL (VITAMIN D3) 125 MCG
10 CAPSULE ORAL
Status: DISCONTINUED | OUTPATIENT
Start: 2022-01-01 | End: 2022-01-01 | Stop reason: HOSPADM

## 2022-01-01 RX ORDER — GLYBURIDE 5 MG/1
TABLET ORAL
COMMUNITY
End: 2022-01-01

## 2022-01-01 RX ORDER — MIDODRINE HYDROCHLORIDE 5 MG/1
10 TABLET ORAL
Status: DISCONTINUED | OUTPATIENT
Start: 2022-01-01 | End: 2022-01-01 | Stop reason: HOSPADM

## 2022-01-01 RX ORDER — ACETAMINOPHEN 325 MG/1
650 TABLET ORAL
Status: DISCONTINUED | OUTPATIENT
Start: 2022-01-01 | End: 2022-01-01 | Stop reason: HOSPADM

## 2022-01-01 RX ORDER — ENOXAPARIN SODIUM 100 MG/ML
40 INJECTION SUBCUTANEOUS DAILY
Status: DISCONTINUED | OUTPATIENT
Start: 2022-01-01 | End: 2022-01-01 | Stop reason: HOSPADM

## 2022-01-01 RX ORDER — SODIUM CHLORIDE 9 MG/ML
500 INJECTION, SOLUTION INTRAVENOUS ONCE
Status: COMPLETED | OUTPATIENT
Start: 2022-01-01 | End: 2022-01-01

## 2022-01-01 RX ORDER — MUPIROCIN 20 MG/G
OINTMENT TOPICAL 2 TIMES DAILY
Status: COMPLETED | OUTPATIENT
Start: 2022-01-01 | End: 2022-01-01

## 2022-01-01 RX ORDER — GABAPENTIN 100 MG/1
100 CAPSULE ORAL
Status: DISCONTINUED | OUTPATIENT
Start: 2022-01-01 | End: 2022-01-01

## 2022-01-01 RX ORDER — HYDROXYZINE 25 MG/1
25 TABLET, FILM COATED ORAL
Status: DISCONTINUED | OUTPATIENT
Start: 2022-01-01 | End: 2022-01-01 | Stop reason: HOSPADM

## 2022-01-01 RX ORDER — POTASSIUM CHLORIDE 7.45 MG/ML
10 INJECTION INTRAVENOUS
Status: COMPLETED | OUTPATIENT
Start: 2022-01-01 | End: 2022-01-01

## 2022-01-01 RX ORDER — GLYCOPYRROLATE 0.2 MG/ML
0.2 INJECTION INTRAMUSCULAR; INTRAVENOUS EVERY 6 HOURS
Status: DISCONTINUED | OUTPATIENT
Start: 2022-01-01 | End: 2022-01-01 | Stop reason: HOSPADM

## 2022-01-01 RX ORDER — SODIUM CHLORIDE 0.9 % (FLUSH) 0.9 %
5-10 SYRINGE (ML) INJECTION AS NEEDED
Status: DISCONTINUED | OUTPATIENT
Start: 2022-01-01 | End: 2022-01-01 | Stop reason: HOSPADM

## 2022-01-01 RX ADMIN — CARVEDILOL 12.5 MG: 12.5 TABLET, FILM COATED ORAL at 09:09

## 2022-01-01 RX ADMIN — VANCOMYCIN HYDROCHLORIDE 1000 MG: 1 INJECTION, POWDER, LYOPHILIZED, FOR SOLUTION INTRAVENOUS at 01:34

## 2022-01-01 RX ADMIN — ONDANSETRON 4 MG: 4 TABLET, ORALLY DISINTEGRATING ORAL at 21:30

## 2022-01-01 RX ADMIN — MIDODRINE HYDROCHLORIDE 10 MG: 5 TABLET ORAL at 08:28

## 2022-01-01 RX ADMIN — SODIUM CHLORIDE, PRESERVATIVE FREE 10 ML: 5 INJECTION INTRAVENOUS at 05:20

## 2022-01-01 RX ADMIN — SODIUM CHLORIDE, PRESERVATIVE FREE 10 ML: 5 INJECTION INTRAVENOUS at 05:23

## 2022-01-01 RX ADMIN — CLOPIDOGREL BISULFATE 75 MG: 75 TABLET, FILM COATED ORAL at 08:19

## 2022-01-01 RX ADMIN — FUROSEMIDE 40 MG: 10 INJECTION, SOLUTION INTRAMUSCULAR; INTRAVENOUS at 08:27

## 2022-01-01 RX ADMIN — CLOPIDOGREL BISULFATE 75 MG: 75 TABLET, FILM COATED ORAL at 09:00

## 2022-01-01 RX ADMIN — ARIPIPRAZOLE 2 MG: 2 TABLET ORAL at 08:28

## 2022-01-01 RX ADMIN — SODIUM CHLORIDE, PRESERVATIVE FREE 10 ML: 5 INJECTION INTRAVENOUS at 23:44

## 2022-01-01 RX ADMIN — ESCITALOPRAM OXALATE 20 MG: 10 TABLET ORAL at 08:28

## 2022-01-01 RX ADMIN — SODIUM CHLORIDE, PRESERVATIVE FREE 10 ML: 5 INJECTION INTRAVENOUS at 05:27

## 2022-01-01 RX ADMIN — FUROSEMIDE 40 MG: 10 INJECTION, SOLUTION INTRAMUSCULAR; INTRAVENOUS at 09:32

## 2022-01-01 RX ADMIN — VANCOMYCIN HYDROCHLORIDE 1000 MG: 1 INJECTION, POWDER, LYOPHILIZED, FOR SOLUTION INTRAVENOUS at 12:48

## 2022-01-01 RX ADMIN — MIDODRINE HYDROCHLORIDE 10 MG: 5 TABLET ORAL at 17:57

## 2022-01-01 RX ADMIN — POTASSIUM CHLORIDE 20 MEQ: 1.5 POWDER, FOR SOLUTION ORAL at 13:00

## 2022-01-01 RX ADMIN — TRAMADOL HYDROCHLORIDE 50 MG: 50 TABLET, COATED ORAL at 09:32

## 2022-01-01 RX ADMIN — GLYCOPYRROLATE 0.2 MG: 0.2 INJECTION INTRAMUSCULAR; INTRAVENOUS at 07:54

## 2022-01-01 RX ADMIN — HYDROMORPHONE HYDROCHLORIDE 1 MG: 1 INJECTION, SOLUTION INTRAMUSCULAR; INTRAVENOUS; SUBCUTANEOUS at 15:51

## 2022-01-01 RX ADMIN — BACLOFEN 5 MG: 10 TABLET ORAL at 08:28

## 2022-01-01 RX ADMIN — CLOPIDOGREL BISULFATE 75 MG: 75 TABLET, FILM COATED ORAL at 09:09

## 2022-01-01 RX ADMIN — CEFTAZIDIME, AVIBACTAM 2.5 G: 2; .5 POWDER, FOR SOLUTION INTRAVENOUS at 09:35

## 2022-01-01 RX ADMIN — MUPIROCIN: 20 OINTMENT TOPICAL at 21:17

## 2022-01-01 RX ADMIN — CASTOR OIL AND BALSAM, PERU: 788; 87 OINTMENT TOPICAL at 20:43

## 2022-01-01 RX ADMIN — BACLOFEN 5 MG: 10 TABLET ORAL at 22:20

## 2022-01-01 RX ADMIN — CEFTAZIDIME, AVIBACTAM 2.5 G: 2; .5 POWDER, FOR SOLUTION INTRAVENOUS at 01:02

## 2022-01-01 RX ADMIN — MUPIROCIN: 20 OINTMENT TOPICAL at 09:47

## 2022-01-01 RX ADMIN — BACLOFEN 5 MG: 10 TABLET ORAL at 21:14

## 2022-01-01 RX ADMIN — PIPERACILLIN AND TAZOBACTAM 3.38 G: 3; .375 INJECTION, POWDER, LYOPHILIZED, FOR SOLUTION INTRAVENOUS at 17:44

## 2022-01-01 RX ADMIN — HYDROXYZINE HYDROCHLORIDE 25 MG: 25 TABLET, FILM COATED ORAL at 08:28

## 2022-01-01 RX ADMIN — SODIUM CHLORIDE, PRESERVATIVE FREE 10 ML: 5 INJECTION INTRAVENOUS at 01:34

## 2022-01-01 RX ADMIN — HYDROXYZINE HYDROCHLORIDE 25 MG: 25 TABLET, FILM COATED ORAL at 19:47

## 2022-01-01 RX ADMIN — TRAMADOL HYDROCHLORIDE 50 MG: 50 TABLET, COATED ORAL at 05:12

## 2022-01-01 RX ADMIN — MIDODRINE HYDROCHLORIDE 10 MG: 5 TABLET ORAL at 11:29

## 2022-01-01 RX ADMIN — ENOXAPARIN SODIUM 40 MG: 100 INJECTION SUBCUTANEOUS at 08:37

## 2022-01-01 RX ADMIN — SODIUM CHLORIDE, PRESERVATIVE FREE 10 ML: 5 INJECTION INTRAVENOUS at 09:52

## 2022-01-01 RX ADMIN — BACLOFEN 5 MG: 10 TABLET ORAL at 08:27

## 2022-01-01 RX ADMIN — LORAZEPAM 2 MG: 2 INJECTION INTRAMUSCULAR; INTRAVENOUS at 12:34

## 2022-01-01 RX ADMIN — LORAZEPAM 1 MG: 2 INJECTION INTRAMUSCULAR; INTRAVENOUS at 23:58

## 2022-01-01 RX ADMIN — GLYCOPYRROLATE 0.2 MG: 0.2 INJECTION INTRAMUSCULAR; INTRAVENOUS at 18:48

## 2022-01-01 RX ADMIN — CASTOR OIL AND BALSAM, PERU: 788; 87 OINTMENT TOPICAL at 00:55

## 2022-01-01 RX ADMIN — ENOXAPARIN SODIUM 40 MG: 100 INJECTION SUBCUTANEOUS at 08:26

## 2022-01-01 RX ADMIN — HYDROMORPHONE HYDROCHLORIDE 2 MG: 2 INJECTION, SOLUTION INTRAMUSCULAR; INTRAVENOUS; SUBCUTANEOUS at 23:07

## 2022-01-01 RX ADMIN — ATORVASTATIN CALCIUM 40 MG: 40 TABLET, FILM COATED ORAL at 08:19

## 2022-01-01 RX ADMIN — ENOXAPARIN SODIUM 40 MG: 100 INJECTION SUBCUTANEOUS at 09:10

## 2022-01-01 RX ADMIN — BACLOFEN 5 MG: 10 TABLET ORAL at 09:09

## 2022-01-01 RX ADMIN — CEFTAZIDIME, AVIBACTAM 2.5 G: 2; .5 POWDER, FOR SOLUTION INTRAVENOUS at 21:36

## 2022-01-01 RX ADMIN — GLYCOPYRROLATE 0.2 MG: 0.2 INJECTION INTRAMUSCULAR; INTRAVENOUS at 23:58

## 2022-01-01 RX ADMIN — ATORVASTATIN CALCIUM 40 MG: 40 TABLET, FILM COATED ORAL at 08:27

## 2022-01-01 RX ADMIN — POTASSIUM CHLORIDE 10 MEQ: 7.46 INJECTION, SOLUTION INTRAVENOUS at 13:43

## 2022-01-01 RX ADMIN — CASTOR OIL AND BALSAM, PERU: 788; 87 OINTMENT TOPICAL at 08:28

## 2022-01-01 RX ADMIN — CARVEDILOL 12.5 MG: 12.5 TABLET, FILM COATED ORAL at 08:27

## 2022-01-01 RX ADMIN — ARIPIPRAZOLE 2 MG: 2 TABLET ORAL at 09:36

## 2022-01-01 RX ADMIN — ENOXAPARIN SODIUM 40 MG: 100 INJECTION SUBCUTANEOUS at 09:34

## 2022-01-01 RX ADMIN — CASTOR OIL AND BALSAM, PERU: 788; 87 OINTMENT TOPICAL at 21:02

## 2022-01-01 RX ADMIN — LORAZEPAM 1 MG: 2 INJECTION INTRAMUSCULAR; INTRAVENOUS at 15:51

## 2022-01-01 RX ADMIN — HYDROMORPHONE HYDROCHLORIDE 2 MG: 2 INJECTION INTRAMUSCULAR; INTRAVENOUS; SUBCUTANEOUS at 16:34

## 2022-01-01 RX ADMIN — MUPIROCIN: 20 OINTMENT TOPICAL at 08:27

## 2022-01-01 RX ADMIN — VANCOMYCIN HYDROCHLORIDE 750 MG: 750 INJECTION, POWDER, LYOPHILIZED, FOR SOLUTION INTRAVENOUS at 13:21

## 2022-01-01 RX ADMIN — MIDODRINE HYDROCHLORIDE 10 MG: 5 TABLET ORAL at 09:42

## 2022-01-01 RX ADMIN — SODIUM CHLORIDE, PRESERVATIVE FREE 30 ML: 5 INJECTION INTRAVENOUS at 14:17

## 2022-01-01 RX ADMIN — ARIPIPRAZOLE 2 MG: 2 TABLET ORAL at 08:27

## 2022-01-01 RX ADMIN — ASPIRIN 300 MG: 300 SUPPOSITORY RECTAL at 21:06

## 2022-01-01 RX ADMIN — BACLOFEN 5 MG: 10 TABLET ORAL at 09:36

## 2022-01-01 RX ADMIN — TRAMADOL HYDROCHLORIDE 50 MG: 50 TABLET, COATED ORAL at 20:16

## 2022-01-01 RX ADMIN — ATORVASTATIN CALCIUM 40 MG: 40 TABLET, FILM COATED ORAL at 08:29

## 2022-01-01 RX ADMIN — TRAMADOL HYDROCHLORIDE 50 MG: 50 TABLET, COATED ORAL at 06:34

## 2022-01-01 RX ADMIN — ACETAMINOPHEN 650 MG: 325 SUPPOSITORY RECTAL at 11:28

## 2022-01-01 RX ADMIN — HYDROMORPHONE HYDROCHLORIDE 1 MG: 1 INJECTION, SOLUTION INTRAMUSCULAR; INTRAVENOUS; SUBCUTANEOUS at 07:55

## 2022-01-01 RX ADMIN — GLYCOPYRROLATE 0.2 MG: 0.2 INJECTION INTRAMUSCULAR; INTRAVENOUS at 12:34

## 2022-01-01 RX ADMIN — HYDROMORPHONE HYDROCHLORIDE 2 MG: 2 INJECTION INTRAMUSCULAR; INTRAVENOUS; SUBCUTANEOUS at 13:36

## 2022-01-01 RX ADMIN — TRAMADOL HYDROCHLORIDE 50 MG: 50 TABLET, COATED ORAL at 19:37

## 2022-01-01 RX ADMIN — POTASSIUM CHLORIDE 20 MEQ: 1.5 POWDER, FOR SOLUTION ORAL at 08:27

## 2022-01-01 RX ADMIN — MORPHINE SULFATE 2 MG: 2 INJECTION, SOLUTION INTRAMUSCULAR; INTRAVENOUS at 16:45

## 2022-01-01 RX ADMIN — PIPERACILLIN AND TAZOBACTAM 3.38 G: 3; .375 INJECTION, POWDER, LYOPHILIZED, FOR SOLUTION INTRAVENOUS at 19:11

## 2022-01-01 RX ADMIN — CASTOR OIL AND BALSAM, PERU: 788; 87 OINTMENT TOPICAL at 08:37

## 2022-01-01 RX ADMIN — LORAZEPAM 2 MG: 2 INJECTION INTRAMUSCULAR; INTRAVENOUS at 20:32

## 2022-01-01 RX ADMIN — LORAZEPAM 2 MG: 2 INJECTION INTRAMUSCULAR; INTRAVENOUS at 13:34

## 2022-01-01 RX ADMIN — MELATONIN TAB 5 MG 10 MG: 5 TAB at 21:37

## 2022-01-01 RX ADMIN — BACLOFEN 5 MG: 10 TABLET ORAL at 08:12

## 2022-01-01 RX ADMIN — POTASSIUM CHLORIDE 20 MEQ: 1.5 POWDER, FOR SOLUTION ORAL at 08:29

## 2022-01-01 RX ADMIN — MUPIROCIN: 20 OINTMENT TOPICAL at 13:00

## 2022-01-01 RX ADMIN — POTASSIUM CHLORIDE 20 MEQ: 1.5 POWDER, FOR SOLUTION ORAL at 09:41

## 2022-01-01 RX ADMIN — TRAMADOL HYDROCHLORIDE 50 MG: 50 TABLET, COATED ORAL at 12:48

## 2022-01-01 RX ADMIN — CEFTAZIDIME, AVIBACTAM 2.5 G: 2; .5 POWDER, FOR SOLUTION INTRAVENOUS at 09:10

## 2022-01-01 RX ADMIN — ENOXAPARIN SODIUM 40 MG: 100 INJECTION SUBCUTANEOUS at 08:13

## 2022-01-01 RX ADMIN — BACLOFEN 5 MG: 10 TABLET ORAL at 08:19

## 2022-01-01 RX ADMIN — ESCITALOPRAM OXALATE 20 MG: 10 TABLET ORAL at 09:47

## 2022-01-01 RX ADMIN — POTASSIUM CHLORIDE 40 MEQ: 1.5 POWDER, FOR SOLUTION ORAL at 12:58

## 2022-01-01 RX ADMIN — MIDODRINE HYDROCHLORIDE 10 MG: 5 TABLET ORAL at 17:39

## 2022-01-01 RX ADMIN — PIPERACILLIN AND TAZOBACTAM 3.38 G: 3; .375 INJECTION, POWDER, LYOPHILIZED, FOR SOLUTION INTRAVENOUS at 03:01

## 2022-01-01 RX ADMIN — LORAZEPAM 2 MG: 2 INJECTION INTRAMUSCULAR; INTRAVENOUS at 16:32

## 2022-01-01 RX ADMIN — CARVEDILOL 12.5 MG: 12.5 TABLET, FILM COATED ORAL at 23:43

## 2022-01-01 RX ADMIN — HYDROMORPHONE HYDROCHLORIDE 2 MG: 2 INJECTION, SOLUTION INTRAMUSCULAR; INTRAVENOUS; SUBCUTANEOUS at 20:32

## 2022-01-01 RX ADMIN — ENOXAPARIN SODIUM 40 MG: 100 INJECTION SUBCUTANEOUS at 08:18

## 2022-01-01 RX ADMIN — GLYCOPYRROLATE 0.2 MG: 0.2 INJECTION INTRAMUSCULAR; INTRAVENOUS at 20:07

## 2022-01-01 RX ADMIN — ATORVASTATIN CALCIUM 40 MG: 40 TABLET, FILM COATED ORAL at 08:13

## 2022-01-01 RX ADMIN — POTASSIUM CHLORIDE 40 MEQ: 1.5 POWDER, FOR SOLUTION ORAL at 08:18

## 2022-01-01 RX ADMIN — ACETAMINOPHEN 650 MG: 325 TABLET ORAL at 19:10

## 2022-01-01 RX ADMIN — GABAPENTIN 100 MG: 100 CAPSULE ORAL at 21:37

## 2022-01-01 RX ADMIN — MIDODRINE HYDROCHLORIDE 10 MG: 5 TABLET ORAL at 17:35

## 2022-01-01 RX ADMIN — PIPERACILLIN AND TAZOBACTAM 3.38 G: 3; .375 INJECTION, POWDER, FOR SOLUTION INTRAVENOUS at 11:11

## 2022-01-01 RX ADMIN — GABAPENTIN 100 MG: 100 CAPSULE ORAL at 21:06

## 2022-01-01 RX ADMIN — CARVEDILOL 12.5 MG: 12.5 TABLET, FILM COATED ORAL at 21:03

## 2022-01-01 RX ADMIN — MELATONIN TAB 5 MG 10 MG: 5 TAB at 22:23

## 2022-01-01 RX ADMIN — HYDROMORPHONE HYDROCHLORIDE 2 MG: 2 INJECTION, SOLUTION INTRAMUSCULAR; INTRAVENOUS; SUBCUTANEOUS at 02:02

## 2022-01-01 RX ADMIN — ATORVASTATIN CALCIUM 40 MG: 40 TABLET, FILM COATED ORAL at 09:32

## 2022-01-01 RX ADMIN — HYDROXYZINE HYDROCHLORIDE 25 MG: 25 TABLET, FILM COATED ORAL at 21:01

## 2022-01-01 RX ADMIN — POTASSIUM CHLORIDE 20 MEQ: 1.5 POWDER, FOR SOLUTION ORAL at 09:42

## 2022-01-01 RX ADMIN — CLOPIDOGREL BISULFATE 75 MG: 75 TABLET, FILM COATED ORAL at 11:10

## 2022-01-01 RX ADMIN — CARVEDILOL 12.5 MG: 12.5 TABLET, FILM COATED ORAL at 21:01

## 2022-01-01 RX ADMIN — CLOPIDOGREL BISULFATE 75 MG: 75 TABLET, FILM COATED ORAL at 08:28

## 2022-01-01 RX ADMIN — ACETAMINOPHEN 650 MG: 325 TABLET ORAL at 08:19

## 2022-01-01 RX ADMIN — GLYCOPYRROLATE 0.2 MG: 0.2 INJECTION INTRAMUSCULAR; INTRAVENOUS at 15:50

## 2022-01-01 RX ADMIN — MUPIROCIN: 20 OINTMENT TOPICAL at 20:44

## 2022-01-01 RX ADMIN — MIDODRINE HYDROCHLORIDE 10 MG: 5 TABLET ORAL at 13:21

## 2022-01-01 RX ADMIN — CEFTAZIDIME, AVIBACTAM 2.5 G: 2; .5 POWDER, FOR SOLUTION INTRAVENOUS at 01:37

## 2022-01-01 RX ADMIN — ARIPIPRAZOLE 2 MG: 2 TABLET ORAL at 10:28

## 2022-01-01 RX ADMIN — SODIUM CHLORIDE, PRESERVATIVE FREE 10 ML: 5 INJECTION INTRAVENOUS at 14:15

## 2022-01-01 RX ADMIN — PIPERACILLIN AND TAZOBACTAM 3.38 G: 3; .375 INJECTION, POWDER, LYOPHILIZED, FOR SOLUTION INTRAVENOUS at 03:05

## 2022-01-01 RX ADMIN — POTASSIUM CHLORIDE 10 MEQ: 7.46 INJECTION, SOLUTION INTRAVENOUS at 12:02

## 2022-01-01 RX ADMIN — CASTOR OIL AND BALSAM, PERU: 788; 87 OINTMENT TOPICAL at 09:36

## 2022-01-01 RX ADMIN — ARIPIPRAZOLE 2 MG: 2 TABLET ORAL at 09:32

## 2022-01-01 RX ADMIN — ESCITALOPRAM OXALATE 20 MG: 10 TABLET ORAL at 08:19

## 2022-01-01 RX ADMIN — HYDROMORPHONE HYDROCHLORIDE 2 MG: 2 INJECTION INTRAMUSCULAR; INTRAVENOUS; SUBCUTANEOUS at 12:34

## 2022-01-01 RX ADMIN — CARVEDILOL 12.5 MG: 12.5 TABLET, FILM COATED ORAL at 20:44

## 2022-01-01 RX ADMIN — FUROSEMIDE 20 MG: 10 INJECTION, SOLUTION INTRAMUSCULAR; INTRAVENOUS at 14:55

## 2022-01-01 RX ADMIN — MUPIROCIN: 20 OINTMENT TOPICAL at 21:37

## 2022-01-01 RX ADMIN — LORAZEPAM 2 MG: 2 INJECTION INTRAMUSCULAR; INTRAVENOUS at 18:57

## 2022-01-01 RX ADMIN — ACETAMINOPHEN 650 MG: 325 TABLET ORAL at 19:31

## 2022-01-01 RX ADMIN — SODIUM CHLORIDE, PRESERVATIVE FREE 10 ML: 5 INJECTION INTRAVENOUS at 21:41

## 2022-01-01 RX ADMIN — MUPIROCIN: 20 OINTMENT TOPICAL at 09:36

## 2022-01-01 RX ADMIN — BACLOFEN 5 MG: 10 TABLET ORAL at 20:43

## 2022-01-01 RX ADMIN — CLOPIDOGREL BISULFATE 75 MG: 75 TABLET, FILM COATED ORAL at 09:32

## 2022-01-01 RX ADMIN — PIPERACILLIN AND TAZOBACTAM 3.38 G: 3; .375 INJECTION, POWDER, LYOPHILIZED, FOR SOLUTION INTRAVENOUS at 02:59

## 2022-01-01 RX ADMIN — PIPERACILLIN AND TAZOBACTAM 3.38 G: 3; .375 INJECTION, POWDER, LYOPHILIZED, FOR SOLUTION INTRAVENOUS at 18:04

## 2022-01-01 RX ADMIN — GABAPENTIN 100 MG: 100 CAPSULE ORAL at 23:43

## 2022-01-01 RX ADMIN — SODIUM CHLORIDE, PRESERVATIVE FREE 10 ML: 5 INJECTION INTRAVENOUS at 06:00

## 2022-01-01 RX ADMIN — POTASSIUM CHLORIDE 10 MEQ: 7.46 INJECTION, SOLUTION INTRAVENOUS at 13:50

## 2022-01-01 RX ADMIN — ESCITALOPRAM OXALATE 20 MG: 10 TABLET ORAL at 08:12

## 2022-01-01 RX ADMIN — SODIUM CHLORIDE 500 ML: 9 INJECTION, SOLUTION INTRAVENOUS at 05:02

## 2022-01-01 RX ADMIN — CEFTAZIDIME, AVIBACTAM 2.5 G: 2; .5 POWDER, FOR SOLUTION INTRAVENOUS at 16:38

## 2022-01-01 RX ADMIN — VANCOMYCIN HYDROCHLORIDE 1000 MG: 1 INJECTION, POWDER, LYOPHILIZED, FOR SOLUTION INTRAVENOUS at 01:24

## 2022-01-01 RX ADMIN — CEFTAZIDIME, AVIBACTAM 2.5 G: 2; .5 POWDER, FOR SOLUTION INTRAVENOUS at 11:27

## 2022-01-01 RX ADMIN — VANCOMYCIN HYDROCHLORIDE 1000 MG: 1 INJECTION, POWDER, LYOPHILIZED, FOR SOLUTION INTRAVENOUS at 13:00

## 2022-01-01 RX ADMIN — ACETAMINOPHEN 650 MG: 325 TABLET ORAL at 05:12

## 2022-01-01 RX ADMIN — CASTOR OIL AND BALSAM, PERU: 788; 87 OINTMENT TOPICAL at 08:21

## 2022-01-01 RX ADMIN — MELATONIN TAB 5 MG 10 MG: 5 TAB at 21:30

## 2022-01-01 RX ADMIN — ESCITALOPRAM OXALATE 20 MG: 10 TABLET ORAL at 08:27

## 2022-01-01 RX ADMIN — VANCOMYCIN HYDROCHLORIDE 1000 MG: 1 INJECTION, POWDER, LYOPHILIZED, FOR SOLUTION INTRAVENOUS at 11:28

## 2022-01-01 RX ADMIN — HYDROMORPHONE HYDROCHLORIDE 2 MG: 2 INJECTION INTRAMUSCULAR; INTRAVENOUS; SUBCUTANEOUS at 18:57

## 2022-01-01 RX ADMIN — BACLOFEN 5 MG: 10 TABLET ORAL at 09:32

## 2022-01-01 RX ADMIN — MUPIROCIN: 20 OINTMENT TOPICAL at 08:29

## 2022-01-01 RX ADMIN — CARVEDILOL 12.5 MG: 12.5 TABLET, FILM COATED ORAL at 21:37

## 2022-01-01 RX ADMIN — PIPERACILLIN AND TAZOBACTAM 3.38 G: 3; .375 INJECTION, POWDER, LYOPHILIZED, FOR SOLUTION INTRAVENOUS at 10:00

## 2022-01-01 RX ADMIN — MIDODRINE HYDROCHLORIDE 10 MG: 5 TABLET ORAL at 08:27

## 2022-01-01 RX ADMIN — FUROSEMIDE 40 MG: 10 INJECTION, SOLUTION INTRAMUSCULAR; INTRAVENOUS at 08:28

## 2022-01-01 RX ADMIN — SODIUM CHLORIDE 500 ML: 9 INJECTION, SOLUTION INTRAVENOUS at 01:27

## 2022-01-01 RX ADMIN — CLOPIDOGREL BISULFATE 75 MG: 75 TABLET, FILM COATED ORAL at 09:36

## 2022-01-01 RX ADMIN — TRAMADOL HYDROCHLORIDE 50 MG: 50 TABLET, COATED ORAL at 12:33

## 2022-01-01 RX ADMIN — MUPIROCIN: 20 OINTMENT TOPICAL at 23:43

## 2022-01-01 RX ADMIN — HYDROMORPHONE HYDROCHLORIDE 1 MG: 1 INJECTION, SOLUTION INTRAMUSCULAR; INTRAVENOUS; SUBCUTANEOUS at 20:07

## 2022-01-01 RX ADMIN — BACLOFEN 5 MG: 10 TABLET ORAL at 21:04

## 2022-01-01 RX ADMIN — LORAZEPAM 1 MG: 2 INJECTION INTRAMUSCULAR; INTRAVENOUS at 07:55

## 2022-01-01 RX ADMIN — TRAMADOL HYDROCHLORIDE 50 MG: 50 TABLET, COATED ORAL at 18:36

## 2022-01-01 RX ADMIN — MELATONIN TAB 5 MG 10 MG: 5 TAB at 22:20

## 2022-01-01 RX ADMIN — PIPERACILLIN AND TAZOBACTAM 3.38 G: 3; .375 INJECTION, POWDER, LYOPHILIZED, FOR SOLUTION INTRAVENOUS at 02:21

## 2022-01-01 RX ADMIN — PIPERACILLIN AND TAZOBACTAM 3.38 G: 3; .375 INJECTION, POWDER, LYOPHILIZED, FOR SOLUTION INTRAVENOUS at 11:26

## 2022-01-01 RX ADMIN — MUPIROCIN: 20 OINTMENT TOPICAL at 22:20

## 2022-01-01 RX ADMIN — LORAZEPAM 1 MG: 2 INJECTION INTRAMUSCULAR; INTRAVENOUS at 20:07

## 2022-01-01 RX ADMIN — CASTOR OIL AND BALSAM, PERU: 788; 87 OINTMENT TOPICAL at 09:32

## 2022-01-01 RX ADMIN — VANCOMYCIN HYDROCHLORIDE 1000 MG: 1 INJECTION, POWDER, LYOPHILIZED, FOR SOLUTION INTRAVENOUS at 12:58

## 2022-01-01 RX ADMIN — CASTOR OIL AND BALSAM, PERU: 788; 87 OINTMENT TOPICAL at 09:12

## 2022-01-01 RX ADMIN — MIDODRINE HYDROCHLORIDE 10 MG: 5 TABLET ORAL at 13:00

## 2022-01-01 RX ADMIN — HYDROMORPHONE HYDROCHLORIDE 1 MG: 1 INJECTION, SOLUTION INTRAMUSCULAR; INTRAVENOUS; SUBCUTANEOUS at 23:59

## 2022-01-01 RX ADMIN — MIDODRINE HYDROCHLORIDE 10 MG: 5 TABLET ORAL at 09:32

## 2022-01-01 RX ADMIN — ESCITALOPRAM OXALATE 20 MG: 10 TABLET ORAL at 09:09

## 2022-01-01 RX ADMIN — KETOROLAC TROMETHAMINE 15 MG: 15 INJECTION, SOLUTION INTRAMUSCULAR; INTRAVENOUS at 18:57

## 2022-01-01 RX ADMIN — PIPERACILLIN AND TAZOBACTAM 3.38 G: 3; .375 INJECTION, POWDER, LYOPHILIZED, FOR SOLUTION INTRAVENOUS at 13:20

## 2022-01-01 RX ADMIN — SODIUM CHLORIDE, PRESERVATIVE FREE 10 ML: 5 INJECTION INTRAVENOUS at 13:00

## 2022-01-01 RX ADMIN — SODIUM CHLORIDE, PRESERVATIVE FREE 10 ML: 5 INJECTION INTRAVENOUS at 17:35

## 2022-01-01 RX ADMIN — ACETAMINOPHEN 650 MG: 325 TABLET ORAL at 01:19

## 2022-01-01 RX ADMIN — LORAZEPAM 2 MG: 2 INJECTION INTRAMUSCULAR; INTRAVENOUS at 02:02

## 2022-01-01 RX ADMIN — ESCITALOPRAM OXALATE 20 MG: 10 TABLET ORAL at 09:36

## 2022-01-01 RX ADMIN — SODIUM CHLORIDE, PRESERVATIVE FREE 10 ML: 5 INJECTION INTRAVENOUS at 05:22

## 2022-01-01 RX ADMIN — ATORVASTATIN CALCIUM 40 MG: 40 TABLET, FILM COATED ORAL at 09:36

## 2022-01-01 RX ADMIN — SODIUM CHLORIDE, PRESERVATIVE FREE 10 ML: 5 INJECTION INTRAVENOUS at 22:27

## 2022-01-01 RX ADMIN — GABAPENTIN 100 MG: 100 CAPSULE ORAL at 22:23

## 2022-01-01 RX ADMIN — GABAPENTIN 100 MG: 100 CAPSULE ORAL at 22:20

## 2022-01-01 RX ADMIN — VANCOMYCIN HYDROCHLORIDE 750 MG: 750 INJECTION, POWDER, LYOPHILIZED, FOR SOLUTION INTRAVENOUS at 00:44

## 2022-01-01 RX ADMIN — ARIPIPRAZOLE 2 MG: 2 TABLET ORAL at 08:19

## 2022-01-01 RX ADMIN — SODIUM CHLORIDE, PRESERVATIVE FREE 10 ML: 5 INJECTION INTRAVENOUS at 13:20

## 2022-01-01 RX ADMIN — MIDODRINE HYDROCHLORIDE 10 MG: 5 TABLET ORAL at 12:01

## 2022-01-01 RX ADMIN — SODIUM CHLORIDE, PRESERVATIVE FREE 10 ML: 5 INJECTION INTRAVENOUS at 05:36

## 2022-01-01 RX ADMIN — ARIPIPRAZOLE 2 MG: 2 TABLET ORAL at 09:09

## 2022-01-01 RX ADMIN — PIPERACILLIN AND TAZOBACTAM 3.38 G: 3; .375 INJECTION, POWDER, LYOPHILIZED, FOR SOLUTION INTRAVENOUS at 20:06

## 2022-01-01 RX ADMIN — SODIUM CHLORIDE, PRESERVATIVE FREE 10 ML: 5 INJECTION INTRAVENOUS at 21:39

## 2022-01-01 RX ADMIN — LORAZEPAM 2 MG: 2 INJECTION INTRAMUSCULAR; INTRAVENOUS at 23:07

## 2022-01-01 RX ADMIN — SODIUM CHLORIDE, PRESERVATIVE FREE 10 ML: 5 INJECTION INTRAVENOUS at 14:55

## 2022-01-01 RX ADMIN — MELATONIN TAB 5 MG 10 MG: 5 TAB at 21:06

## 2022-01-01 RX ADMIN — CASTOR OIL AND BALSAM, PERU: 788; 87 OINTMENT TOPICAL at 21:38

## 2022-01-01 RX ADMIN — TRAMADOL HYDROCHLORIDE 50 MG: 50 TABLET, COATED ORAL at 18:06

## 2022-01-01 RX ADMIN — ATORVASTATIN CALCIUM 40 MG: 40 TABLET, FILM COATED ORAL at 09:09

## 2022-01-01 RX ADMIN — MORPHINE SULFATE 2 MG: 2 INJECTION, SOLUTION INTRAMUSCULAR; INTRAVENOUS at 22:27

## 2022-01-01 RX ADMIN — MIDODRINE HYDROCHLORIDE 10 MG: 5 TABLET ORAL at 12:29

## 2022-01-01 RX ADMIN — MORPHINE SULFATE 1 MG: 2 INJECTION, SOLUTION INTRAMUSCULAR; INTRAVENOUS at 22:46

## 2022-01-01 RX ADMIN — FUROSEMIDE 40 MG: 10 INJECTION, SOLUTION INTRAMUSCULAR; INTRAVENOUS at 09:37

## 2022-01-01 RX ADMIN — POTASSIUM CHLORIDE 20 MEQ: 1.5 POWDER, FOR SOLUTION ORAL at 09:09

## 2022-01-01 RX ADMIN — MIDODRINE HYDROCHLORIDE 10 MG: 5 TABLET ORAL at 09:09

## 2022-01-01 RX ADMIN — POTASSIUM CHLORIDE 10 MEQ: 7.46 INJECTION, SOLUTION INTRAVENOUS at 09:47

## 2022-01-01 RX ADMIN — CARVEDILOL 12.5 MG: 12.5 TABLET, FILM COATED ORAL at 22:20

## 2022-01-01 RX ADMIN — FUROSEMIDE 40 MG: 10 INJECTION, SOLUTION INTRAMUSCULAR; INTRAVENOUS at 09:10

## 2022-01-01 RX ADMIN — BACLOFEN 5 MG: 10 TABLET ORAL at 21:37

## 2022-01-01 RX ADMIN — PIPERACILLIN AND TAZOBACTAM 3.38 G: 3; .375 INJECTION, POWDER, LYOPHILIZED, FOR SOLUTION INTRAVENOUS at 19:42

## 2022-01-01 RX ADMIN — ENOXAPARIN SODIUM 40 MG: 100 INJECTION SUBCUTANEOUS at 09:36

## 2022-01-01 RX ADMIN — SODIUM CHLORIDE, PRESERVATIVE FREE 10 ML: 5 INJECTION INTRAVENOUS at 22:23

## 2022-01-01 RX ADMIN — GABAPENTIN 100 MG: 100 CAPSULE ORAL at 21:30

## 2022-01-01 RX ADMIN — SODIUM CHLORIDE, PRESERVATIVE FREE 10 ML: 5 INJECTION INTRAVENOUS at 17:07

## 2022-01-01 RX ADMIN — CARVEDILOL 12.5 MG: 12.5 TABLET, FILM COATED ORAL at 21:29

## 2022-01-01 RX ADMIN — MELATONIN TAB 5 MG 10 MG: 5 TAB at 01:07

## 2022-01-01 RX ADMIN — CASTOR OIL AND BALSAM, PERU: 788; 87 OINTMENT TOPICAL at 21:16

## 2022-01-01 RX ADMIN — PIPERACILLIN AND TAZOBACTAM 3.38 G: 3; .375 INJECTION, POWDER, LYOPHILIZED, FOR SOLUTION INTRAVENOUS at 10:28

## 2022-01-01 RX ADMIN — CARVEDILOL 12.5 MG: 12.5 TABLET, FILM COATED ORAL at 08:28

## 2022-01-01 RX ADMIN — GLYCOPYRROLATE 0.2 MG: 0.2 INJECTION INTRAMUSCULAR; INTRAVENOUS at 00:02

## 2022-01-01 RX ADMIN — MIDODRINE HYDROCHLORIDE 10 MG: 5 TABLET ORAL at 17:07

## 2022-01-01 RX ADMIN — CASTOR OIL AND BALSAM, PERU: 788; 87 OINTMENT TOPICAL at 22:20

## 2022-01-01 RX ADMIN — SODIUM CHLORIDE, PRESERVATIVE FREE 10 ML: 5 INJECTION INTRAVENOUS at 21:06

## 2022-01-01 RX ADMIN — BACLOFEN 5 MG: 10 TABLET ORAL at 23:43

## 2022-07-29 ENCOUNTER — HOSPITAL ENCOUNTER (INPATIENT)
Age: 72
LOS: 3 days | Discharge: NURSING, REHAB OR LONG-TERM CARE | DRG: 065 | End: 2022-08-03
Attending: EMERGENCY MEDICINE | Admitting: INTERNAL MEDICINE
Payer: MEDICARE

## 2022-07-29 ENCOUNTER — APPOINTMENT (OUTPATIENT)
Dept: GENERAL RADIOLOGY | Age: 72
DRG: 065 | End: 2022-07-29
Attending: EMERGENCY MEDICINE
Payer: MEDICARE

## 2022-07-29 ENCOUNTER — APPOINTMENT (OUTPATIENT)
Dept: CT IMAGING | Age: 72
DRG: 065 | End: 2022-07-29
Attending: EMERGENCY MEDICINE
Payer: MEDICARE

## 2022-07-29 DIAGNOSIS — G45.9 TIA (TRANSIENT ISCHEMIC ATTACK): Primary | ICD-10-CM

## 2022-07-29 LAB
ALBUMIN SERPL-MCNC: 3.8 G/DL (ref 3.5–5)
ALBUMIN/GLOB SERPL: 1.1 {RATIO} (ref 1.1–2.2)
ALP SERPL-CCNC: 44 U/L (ref 45–117)
ALT SERPL-CCNC: 24 U/L (ref 12–78)
AMMONIA PLAS-SCNC: 31 UMOL/L
ANION GAP SERPL CALC-SCNC: 11 MMOL/L (ref 5–15)
APPEARANCE UR: ABNORMAL
AST SERPL W P-5'-P-CCNC: 23 U/L (ref 15–37)
BACTERIA URNS QL MICRO: NEGATIVE /HPF
BASOPHILS # BLD: 0 K/UL (ref 0–0.1)
BASOPHILS NFR BLD: 0 % (ref 0–1)
BILIRUB SERPL-MCNC: 1.3 MG/DL (ref 0.2–1)
BILIRUB UR QL: NEGATIVE
BUN SERPL-MCNC: 21 MG/DL (ref 6–20)
BUN/CREAT SERPL: 20 (ref 12–20)
CA-I BLD-MCNC: 9.5 MG/DL (ref 8.5–10.1)
CHLORIDE SERPL-SCNC: 101 MMOL/L (ref 97–108)
CO2 SERPL-SCNC: 25 MMOL/L (ref 21–32)
COLOR UR: ABNORMAL
CREAT SERPL-MCNC: 1.03 MG/DL (ref 0.7–1.3)
DIFFERENTIAL METHOD BLD: ABNORMAL
EOSINOPHIL # BLD: 0.2 K/UL (ref 0–0.4)
EOSINOPHIL NFR BLD: 2 % (ref 0–7)
ERYTHROCYTE [DISTWIDTH] IN BLOOD BY AUTOMATED COUNT: 15.3 % (ref 11.5–14.5)
GLOBULIN SER CALC-MCNC: 3.6 G/DL (ref 2–4)
GLUCOSE BLD STRIP.AUTO-MCNC: 111 MG/DL (ref 65–117)
GLUCOSE SERPL-MCNC: 108 MG/DL (ref 65–100)
GLUCOSE UR STRIP.AUTO-MCNC: NORMAL MG/DL
HCT VFR BLD AUTO: 42.9 % (ref 36.6–50.3)
HGB BLD-MCNC: 14 G/DL (ref 12.1–17)
HGB UR QL STRIP: NEGATIVE
IMM GRANULOCYTES # BLD AUTO: 0 K/UL (ref 0–0.04)
IMM GRANULOCYTES NFR BLD AUTO: 0 % (ref 0–0.5)
INR PPP: 1.1 (ref 0.9–1.1)
KETONES UR QL STRIP.AUTO: NEGATIVE MG/DL
LEUKOCYTE ESTERASE UR QL STRIP.AUTO: 500
LYMPHOCYTES # BLD: 2 K/UL (ref 0.8–3.5)
LYMPHOCYTES NFR BLD: 23 % (ref 12–49)
MCH RBC QN AUTO: 28 PG (ref 26–34)
MCHC RBC AUTO-ENTMCNC: 32.6 G/DL (ref 30–36.5)
MCV RBC AUTO: 85.8 FL (ref 80–99)
MONOCYTES # BLD: 0.8 K/UL (ref 0–1)
MONOCYTES NFR BLD: 9 % (ref 5–13)
NEUTS SEG # BLD: 5.9 K/UL (ref 1.8–8)
NEUTS SEG NFR BLD: 66 % (ref 32–75)
NITRITE UR QL STRIP.AUTO: NEGATIVE
NRBC # BLD: 0 K/UL (ref 0–0.01)
NRBC BLD-RTO: 0 PER 100 WBC
PERFORMED BY, TECHID: NORMAL
PH UR STRIP: 6.5 [PH] (ref 5–8)
PLATELET # BLD AUTO: 186 K/UL (ref 150–400)
PMV BLD AUTO: 10.3 FL (ref 8.9–12.9)
POTASSIUM SERPL-SCNC: 4 MMOL/L (ref 3.5–5.1)
PROT SERPL-MCNC: 7.4 G/DL (ref 6.4–8.2)
PROT UR STRIP-MCNC: NEGATIVE MG/DL
PROTHROMBIN TIME: 14.5 SEC (ref 11.9–14.6)
RBC # BLD AUTO: 5 M/UL (ref 4.1–5.7)
RBC #/AREA URNS HPF: <1 /HPF (ref 0–5)
SODIUM SERPL-SCNC: 137 MMOL/L (ref 136–145)
SP GR UR REFRACTOMETRY: 1.01 (ref 1–1.03)
SP GR UR REFRACTOMETRY: 1.01 (ref 1–1.03)
TROPONIN-HIGH SENSITIVITY: 11 NG/L (ref 0–76)
UA: UC IF INDICATED,UAUC: ABNORMAL
UROBILINOGEN UR QL STRIP.AUTO: NORMAL EU/DL (ref 0.1–1)
WBC # BLD AUTO: 9 K/UL (ref 4.1–11.1)
WBC URNS QL MICRO: 31 /HPF (ref 0–4)

## 2022-07-29 PROCEDURE — 87077 CULTURE AEROBIC IDENTIFY: CPT

## 2022-07-29 PROCEDURE — 81001 URINALYSIS AUTO W/SCOPE: CPT

## 2022-07-29 PROCEDURE — 82140 ASSAY OF AMMONIA: CPT

## 2022-07-29 PROCEDURE — 82962 GLUCOSE BLOOD TEST: CPT

## 2022-07-29 PROCEDURE — 85610 PROTHROMBIN TIME: CPT

## 2022-07-29 PROCEDURE — 80053 COMPREHEN METABOLIC PANEL: CPT

## 2022-07-29 PROCEDURE — G0378 HOSPITAL OBSERVATION PER HR: HCPCS

## 2022-07-29 PROCEDURE — 87086 URINE CULTURE/COLONY COUNT: CPT

## 2022-07-29 PROCEDURE — 93005 ELECTROCARDIOGRAM TRACING: CPT

## 2022-07-29 PROCEDURE — 74011000250 HC RX REV CODE- 250: Performed by: INTERNAL MEDICINE

## 2022-07-29 PROCEDURE — 85025 COMPLETE CBC W/AUTO DIFF WBC: CPT

## 2022-07-29 PROCEDURE — 99285 EMERGENCY DEPT VISIT HI MDM: CPT

## 2022-07-29 PROCEDURE — 84484 ASSAY OF TROPONIN QUANT: CPT

## 2022-07-29 PROCEDURE — 70450 CT HEAD/BRAIN W/O DYE: CPT

## 2022-07-29 PROCEDURE — 36415 COLL VENOUS BLD VENIPUNCTURE: CPT

## 2022-07-29 PROCEDURE — 71045 X-RAY EXAM CHEST 1 VIEW: CPT

## 2022-07-29 PROCEDURE — 87186 SC STD MICRODIL/AGAR DIL: CPT

## 2022-07-29 RX ORDER — ARIPIPRAZOLE 2 MG/1
2 TABLET ORAL DAILY
COMMUNITY

## 2022-07-29 RX ORDER — INSULIN LISPRO 100 [IU]/ML
10 INJECTION, SOLUTION INTRAVENOUS; SUBCUTANEOUS
COMMUNITY
End: 2022-08-03

## 2022-07-29 RX ORDER — ACETAMINOPHEN 650 MG/1
650 SUPPOSITORY RECTAL
Status: DISCONTINUED | OUTPATIENT
Start: 2022-07-29 | End: 2022-08-03 | Stop reason: HOSPADM

## 2022-07-29 RX ORDER — METFORMIN HYDROCHLORIDE 500 MG/1
1000 TABLET ORAL 2 TIMES DAILY WITH MEALS
COMMUNITY

## 2022-07-29 RX ORDER — CHOLECALCIFEROL (VITAMIN D3) 125 MCG
10 CAPSULE ORAL
COMMUNITY

## 2022-07-29 RX ORDER — POTASSIUM CHLORIDE 20 MEQ/1
20 TABLET, EXTENDED RELEASE ORAL 2 TIMES DAILY
COMMUNITY

## 2022-07-29 RX ORDER — LOSARTAN POTASSIUM 100 MG/1
100 TABLET ORAL DAILY
COMMUNITY

## 2022-07-29 RX ORDER — TAMSULOSIN HYDROCHLORIDE 0.4 MG/1
0.4 CAPSULE ORAL
COMMUNITY

## 2022-07-29 RX ORDER — AMOXICILLIN 250 MG
1 CAPSULE ORAL DAILY
COMMUNITY

## 2022-07-29 RX ORDER — AMLODIPINE BESYLATE 5 MG/1
5 TABLET ORAL DAILY
Status: DISCONTINUED | OUTPATIENT
Start: 2022-07-30 | End: 2022-07-30 | Stop reason: ALTCHOICE

## 2022-07-29 RX ORDER — NORTRIPTYLINE HYDROCHLORIDE 25 MG/1
25 CAPSULE ORAL
Status: DISCONTINUED | OUTPATIENT
Start: 2022-07-29 | End: 2022-07-30 | Stop reason: ALTCHOICE

## 2022-07-29 RX ORDER — MAGNESIUM SULFATE 100 %
16 CRYSTALS MISCELLANEOUS AS NEEDED
COMMUNITY
End: 2022-08-03

## 2022-07-29 RX ORDER — ASPIRIN 81 MG/1
81 TABLET ORAL DAILY
Status: DISCONTINUED | OUTPATIENT
Start: 2022-07-30 | End: 2022-08-03 | Stop reason: HOSPADM

## 2022-07-29 RX ORDER — ESCITALOPRAM OXALATE 20 MG/1
20 TABLET ORAL DAILY
COMMUNITY

## 2022-07-29 RX ORDER — LORATADINE 10 MG/1
10 TABLET ORAL DAILY
COMMUNITY
End: 2022-08-03

## 2022-07-29 RX ORDER — ACETAMINOPHEN 325 MG/1
650 TABLET ORAL
COMMUNITY
End: 2022-08-03

## 2022-07-29 RX ORDER — ATORVASTATIN CALCIUM 40 MG/1
40 TABLET, FILM COATED ORAL DAILY
Status: DISCONTINUED | OUTPATIENT
Start: 2022-07-30 | End: 2022-07-30

## 2022-07-29 RX ORDER — ONDANSETRON 4 MG/1
4 TABLET, ORALLY DISINTEGRATING ORAL
Status: DISCONTINUED | OUTPATIENT
Start: 2022-07-29 | End: 2022-08-03 | Stop reason: HOSPADM

## 2022-07-29 RX ORDER — LIDOCAINE 50 MG/G
OINTMENT TOPICAL 2 TIMES DAILY
COMMUNITY
End: 2022-08-03

## 2022-07-29 RX ORDER — ENOXAPARIN SODIUM 100 MG/ML
40 INJECTION SUBCUTANEOUS DAILY
Status: DISCONTINUED | OUTPATIENT
Start: 2022-07-30 | End: 2022-08-03 | Stop reason: HOSPADM

## 2022-07-29 RX ORDER — ACETAMINOPHEN 325 MG/1
650 TABLET ORAL
Status: DISCONTINUED | OUTPATIENT
Start: 2022-07-29 | End: 2022-08-03 | Stop reason: HOSPADM

## 2022-07-29 RX ORDER — HYDROXYZINE 25 MG/1
25 TABLET, FILM COATED ORAL
COMMUNITY

## 2022-07-29 RX ORDER — CARVEDILOL 12.5 MG/1
12.5 TABLET ORAL 2 TIMES DAILY
COMMUNITY

## 2022-07-29 RX ORDER — PANTOPRAZOLE SODIUM 40 MG/1
40 TABLET, DELAYED RELEASE ORAL DAILY
Status: DISCONTINUED | OUTPATIENT
Start: 2022-07-30 | End: 2022-08-03 | Stop reason: HOSPADM

## 2022-07-29 RX ORDER — BACLOFEN 5 MG/1
5 TABLET ORAL 2 TIMES DAILY
COMMUNITY

## 2022-07-29 RX ORDER — INSULIN LISPRO 100 [IU]/ML
INJECTION, SOLUTION INTRAVENOUS; SUBCUTANEOUS
Status: DISCONTINUED | OUTPATIENT
Start: 2022-07-29 | End: 2022-08-03 | Stop reason: HOSPADM

## 2022-07-29 RX ORDER — INSULIN GLARGINE 100 [IU]/ML
8 INJECTION, SOLUTION SUBCUTANEOUS
Status: DISCONTINUED | OUTPATIENT
Start: 2022-07-29 | End: 2022-07-30 | Stop reason: ALTCHOICE

## 2022-07-29 RX ORDER — GABAPENTIN 100 MG/1
100 CAPSULE ORAL
COMMUNITY

## 2022-07-29 RX ORDER — FUROSEMIDE 40 MG/1
40 TABLET ORAL DAILY
COMMUNITY

## 2022-07-29 RX ORDER — POLYETHYLENE GLYCOL 3350 17 G/17G
17 POWDER, FOR SOLUTION ORAL DAILY PRN
Status: DISCONTINUED | OUTPATIENT
Start: 2022-07-29 | End: 2022-08-03 | Stop reason: HOSPADM

## 2022-07-29 RX ORDER — DICLOFENAC SODIUM 10 MG/G
GEL TOPICAL 3 TIMES DAILY
COMMUNITY
End: 2022-08-03

## 2022-07-29 RX ORDER — SODIUM CHLORIDE 0.9 % (FLUSH) 0.9 %
5-40 SYRINGE (ML) INJECTION AS NEEDED
Status: DISCONTINUED | OUTPATIENT
Start: 2022-07-29 | End: 2022-08-03 | Stop reason: HOSPADM

## 2022-07-29 RX ORDER — ONDANSETRON 2 MG/ML
4 INJECTION INTRAMUSCULAR; INTRAVENOUS
Status: DISCONTINUED | OUTPATIENT
Start: 2022-07-29 | End: 2022-08-03 | Stop reason: HOSPADM

## 2022-07-29 RX ORDER — SODIUM CHLORIDE 0.9 % (FLUSH) 0.9 %
5-40 SYRINGE (ML) INJECTION EVERY 8 HOURS
Status: DISCONTINUED | OUTPATIENT
Start: 2022-07-29 | End: 2022-08-03 | Stop reason: HOSPADM

## 2022-07-29 RX ORDER — DEXTROSE MONOHYDRATE 100 MG/ML
0-250 INJECTION, SOLUTION INTRAVENOUS AS NEEDED
Status: DISCONTINUED | OUTPATIENT
Start: 2022-07-29 | End: 2022-08-03 | Stop reason: HOSPADM

## 2022-07-29 RX ORDER — MAGNESIUM SULFATE 100 %
4 CRYSTALS MISCELLANEOUS AS NEEDED
Status: DISCONTINUED | OUTPATIENT
Start: 2022-07-29 | End: 2022-08-03 | Stop reason: HOSPADM

## 2022-07-29 RX ADMIN — SODIUM CHLORIDE, PRESERVATIVE FREE 10 ML: 5 INJECTION INTRAVENOUS at 22:08

## 2022-07-29 NOTE — H&P
History and Physical    Patient: Laly Olson MRN: [id number]  SSN: [id number]    YOB: 1950  Age: 70 y.o. Sex: male      Subjective: Laly Olson is a 70 y.o. male who had reportedly 2 episodes of facial droop. Sent from SNF. Currently patient is oriented and coherent. Able to give personal hx. Denied having had any active fever, cough, n/v/d, chills, focal weakness. He is very anxious and laughs abruptly. Stroke Alert was done with negative findings. Patient has hx of prior CVA; patient said it had cause LLE weakness but with therapy it has resolved. Past Medical History:   Diagnosis Date    Cirrhosis ([facility] [address]     Diabetes ([facility] [address]     Hypertension     Stroke [facility])      Past Surgical History:   Procedure Laterality Date    HX BACK SURGERY      HX GI        Family History   Problem Relation Age of Onset    Heart Disease Father      Social History     Tobacco Use    Smoking status: Former    Smokeless tobacco: Never   Substance Use Topics    Alcohol use: Not Currently      Prior to Admission medications    Medication Sig Start Date End Date Taking? Authorizing Provider   lisinopril-hydroCHLOROthiazide (PRINZIDE, ZESTORETIC) 20-25 mg per tablet Take  by mouth daily. Adam Donahue MD   amLODIPine (NORVASC) 10 mg tablet Take  by mouth daily. Adam Donahue MD   glyBURIDE (DIABETA) 5 mg tablet Take  by mouth Daily (before breakfast). Adam Donahue MD   aspirin delayed-release 81 mg tablet Take  by mouth daily. Adam Donahue MD   omeprazole (PRILOSEC) 20 mg capsule Take 20 mg by mouth daily. Adam Donahue MD   atorvastatin (LIPITOR) 40 mg tablet Take  by mouth daily. Adam Donahue MD   nortriptyline (PAMELOR) 25 mg capsule Take  by mouth nightly. Adam Donahue MD   insulin glargine (Lantus U-100 Insulin) 100 unit/mL injection 8 Units by SubCUTAneous route nightly.     Adam Donahue MD   insulin aspart U-100 (NovoLOG U-100 Insulin aspart) 100 unit/mL injection by SubCUTAneous route. Other, MD Adam        No Known Allergies    Review of Systems:  Constitutional: No fevers, No chills, No night sweats, No fatigue, No weakness, No significant weight change  Eyes: No visual disturbance, No loss of vision  HENT: No nasal congestion, No sore throat, No head lesion, No hearing deficit  Respiratory: No cough, No sputum, No wheezing, No SOB, No hemoptysis, No pleuritic CP  Cardiovascular: No chest pain, No lower extremity edema, No palpitations, No PND, No orthopnea  Gastrointestinal: No nausea, No vomiting, No diarrhea, No constipation, No abdominal pain, No Melena, No hematemesis, No BRBPR  Genitourinary: No frequency, No dysuria, No hematuria  Integument/Breast: No rash, No new skin lesion(s), No dryness, No new palpable nodule  Musculoskeletal: No arthralgias, No neck pain, No back pain, No joint pain, No fall or injury  Neurological: See HPI  Heme/Onc: No overt bleeding noted, No obvious swollen glands  Behavioral/Psychiatric: No change in mood; no SI; no hallucination      Objective:     Vitals:    07/29/22 1432 07/29/22 1445   BP: 130/82    Pulse: 66    Resp: 17    Temp: 97.6 °F (36.4 °C)    SpO2: 100%    Weight:  84.8 kg (187 lb)   Height:  5' 8\" (1.727 m)        Physical Exam:    General: Alert and responsive, NAD  Head: atraumatic  Eye: No overt orbital findings, PERRLA   ENT: No overt hearing loss noted; No nasal lesion; Throat marcus  Neck: Supple, No overt palpable mass, No significant palpable lymph nodes  Vascular: No carotid bruit, palpable pulses bilat  Lung: CTA bilat, vesicular breath sounds  Heart: S1S2, No significant murmur appreciated  Abdomen: Soft, NT, No rigidity, No rebound, Bowel sounds +  Extremities: No edema  M/S: No traumatic change, active mobility noted  Skin: No cyanosis, No rash of significance (other than chronic lesions)  Neurologic: No overt focal motor deficit. Oriented.    Psychiatric: Coherent and age appropriate    Recent Results (from the past 25 hour(s))   PROTHROMBIN TIME + INR    Collection Time: 07/29/22  2:39 PM   Result Value Ref Range    Prothrombin time 14.5 11.9 - 14.6 sec    INR 1.1 0.9 - 1.1     METABOLIC PANEL, COMPREHENSIVE    Collection Time: 07/29/22  2:39 PM   Result Value Ref Range    Sodium 137 136 - 145 mmol/L    Potassium 4.0 3.5 - 5.1 mmol/L    Chloride 101 97 - 108 mmol/L    CO2 25 21 - 32 mmol/L    Anion gap 11 5 - 15 mmol/L    Glucose 108 (H) 65 - 100 mg/dL    BUN 21 (H) 6 - 20 mg/dL    Creatinine 1.03 0.70 - 1.30 mg/dL    BUN/Creatinine ratio 20 12 - 20      GFR est AA >60 >60 ml/min/1.73m2    GFR est non-AA >60 >60 ml/min/1.73m2    Calcium 9.5 8.5 - 10.1 mg/dL    Bilirubin, total 1.3 (H) 0.2 - 1.0 mg/dL    AST (SGOT) 23 15 - 37 U/L    ALT (SGPT) 24 12 - 78 U/L    Alk. phosphatase 44 (L) 45 - 117 U/L    Protein, total 7.4 6.4 - 8.2 g/dL    Albumin 3.8 3.5 - 5.0 g/dL    Globulin 3.6 2.0 - 4.0 g/dL    A-G Ratio 1.1 1.1 - 2.2     TROPONIN-HIGH SENSITIVITY    Collection Time: 07/29/22  2:39 PM   Result Value Ref Range    Troponin-High Sensitivity 11 0 - 76 ng/L   CBC WITH AUTOMATED DIFF    Collection Time: 07/29/22  2:39 PM   Result Value Ref Range    WBC 9.0 4.1 - 11.1 K/uL    RBC 5.00 4. 10 - 5.70 M/uL    HGB 14.0 12.1 - 17.0 g/dL    HCT 42.9 36.6 - 50.3 %    MCV 85.8 80.0 - 99.0 FL    MCH 28.0 26.0 - 34.0 PG    MCHC 32.6 30.0 - 36.5 g/dL    RDW 15.3 (H) 11.5 - 14.5 %    PLATELET 343 553 - 601 K/uL    MPV 10.3 8.9 - 12.9 FL    NRBC 0.0 0.0  WBC    ABSOLUTE NRBC 0.00 0.00 - 0.01 K/uL    NEUTROPHILS 66 32 - 75 %    LYMPHOCYTES 23 12 - 49 %    MONOCYTES 9 5 - 13 %    EOSINOPHILS 2 0 - 7 %    BASOPHILS 0 0 - 1 %    IMMATURE GRANULOCYTES 0 0 - 0.5 %    ABS. NEUTROPHILS 5.9 1.8 - 8.0 K/UL    ABS. LYMPHOCYTES 2.0 0.8 - 3.5 K/UL    ABS. MONOCYTES 0.8 0.0 - 1.0 K/UL    ABS. EOSINOPHILS 0.2 0.0 - 0.4 K/UL    ABS. BASOPHILS 0.0 0.0 - 0.1 K/UL    ABS. IMM.  GRANS. 0.0 0.00 - 0.04 K/UL    DF AUTOMATED         XR Results (maximum last 3):  Results from Hospital Encounter encounter on 07/29/22    XR CHEST PORT    Narrative  Clinical indication: Possible pneumonia. Portable AP view of the chest obtained, comparison October 28, 2020. Prior sternotomy. Normal size heart. No acute infiltrate. Impression  No acute infiltrate. Results from Hospital Encounter encounter on 10/28/20    XR CHEST PORT    Narrative  Study: XR CHEST PORT    Clinical indication: chest pain    Comparison: None. Impression  Findings/impression:    Mild patchy bibasilar airspace disease/edema. Trace pleural effusions. No  evidence of pneumothorax. Cardiac silhouette within normal limits in size. No acute osseous abnormality identified. CT Results (maximum last 3): Results from [place] encounter on 07/29/22    CT CODE NEURO HEAD WO CONTRAST    Narrative  Clinical indication: Code stroke, left-sided weakness. Axial CT scan of the brain obtained without intravenous contrast, no prior. CT  dose reduction was achieved through the use of a standardized protocol tailored  for this examination and automatic exposure control for dose modulation . Carlos Manuel Kartik There is some mild atrophy and nonspecific white matter changes. Remote basal ganglia lacune once bilaterally. Remote left pontine infarct. No extra-axial fluid collection hemorrhage or shift. No mass. Next    Impression  No acute findings,  Atrophy white matter disease and remote basal ganglia infarct.       Active Problems:    TIA (transient ischemic attack) (7/29/2022)        Assessment/Plan:     71 WM from SNF with Hx of Right Basal Ganglia CVA in past with questionable recurrent facial droop; already on ASA and Statin.    => Observe on Tele  => C/s Neuro  => Cont ASA/Statin  => Echo  => Carotid Duplex    Hx DM, HTN    DVT Prophylaxis: AC  Code Status: Presumed FULL      Signed By: Dylan Rutherford MD     July 29, 2022

## 2022-07-29 NOTE — PROGRESS NOTES
Medicare Outpatient Observation Notice (MOON)/ [place] Outpatient Observation Notice (Amauri [place]) provided to patient/representative with verbal explanation of the notice. Time allotted for questions regarding the notice. Patient /representative provided a completed copy of the MOON/VOON notice. Copy placed on bedside chart.       Verbal from Ever Moreno, brother, [phone]

## 2022-07-29 NOTE — ED TRIAGE NOTES
Pt arrived via EMS at [id number] [phone]. Pt came in left sided weakness possible TIA. History of 2 strokes in the past. Neuro assessment completed by doctor. Patient was taken to CT at 14:23.

## 2022-07-29 NOTE — PROGRESS NOTES
Reason for Admission:  TIA                     RUR Score:  N/A                   Plan for utilizing home health:   No       PCP: First and Last name:  None     Name of Practice:    Are you a current patient: Yes/No:    Approximate date of last visit:    Can you participate in a virtual visit with your PCP:                     Current Advanced Directive/Advance Care Plan: Full Code      Healthcare Decision Maker:   Click here to complete [address] including selection of the Healthcare Decision Maker Relationship (ie \"Primary\")       Jessica Higginbotham, keara, [phone]                      Transition of Care Plan:       Called Pt brother, he stated that Pt has been at [facility] for about a year and  half. Verbal choice letter signed for [facility]. Choice letter placed on Pt chart, will send referral.    CM dispo: Return to [facility].

## 2022-07-29 NOTE — ED PROVIDER NOTES
EMERGENCY DEPARTMENT HISTORY AND PHYSICAL EXAM      Date: 7/29/2022  Patient Name: Johnna Puga      History of Presenting Illness     Chief Complaint   Patient presents with    Altered mental status    Extremity Weakness       History Provided By: EMS    HPI: Johnna Puga, 70 y.o. male with a past medical history significant diabetes, hypertension, stroke, and cirrhosis  presents to the ED with cc of alert. Per facility patient had a transient facial droop this morning at an unknown time. Then at 1130 the facial droop recurred so they called EMS. Patient has a history of strokes but unknown baseline deficits. Patient states he feels normal.  He has no complaints at this time. There are no other complaints, changes, or physical findings at this time. PCP: None    Current Outpatient Medications   Medication Sig Dispense Refill    lisinopril-hydroCHLOROthiazide (PRINZIDE, ZESTORETIC) 20-25 mg per tablet Take  by mouth daily. amLODIPine (NORVASC) 10 mg tablet Take  by mouth daily. glyBURIDE (DIABETA) 5 mg tablet Take  by mouth Daily (before breakfast). aspirin delayed-release 81 mg tablet Take  by mouth daily. omeprazole (PRILOSEC) 20 mg capsule Take 20 mg by mouth daily. atorvastatin (LIPITOR) 40 mg tablet Take  by mouth daily. nortriptyline (PAMELOR) 25 mg capsule Take  by mouth nightly. insulin glargine (Lantus U-100 Insulin) 100 unit/mL injection 8 Units by SubCUTAneous route nightly. insulin aspart U-100 (NovoLOG U-100 Insulin aspart) 100 unit/mL injection by SubCUTAneous route.          Past History     Past Medical History:  Past Medical History:   Diagnosis Date    Cirrhosis ([facility] [address]     Diabetes ([facility] [address]     Hypertension     Stroke [facility])        Past Surgical History:  Past Surgical History:   Procedure Laterality Date    HX BACK SURGERY      HX GI         Family History:  Family History   Problem Relation Age of Onset    Heart Disease Father        Social History:  Social History     Tobacco Use    Smoking status: Former    Smokeless tobacco: Never   Substance Use Topics    Alcohol use: Not Currently    Drug use: Not Currently       Allergies:  No Known Allergies      Review of Systems     Review of Systems   Unable to perform ROS: Acuity of condition     Physical Exam     Physical Exam  Vitals and nursing note reviewed. Constitutional:       General: He is not in acute distress. Appearance: Normal appearance. He is not toxic-appearing. Comments: Disheveled appearance   HENT:      Head: Normocephalic and atraumatic. Eyes:      Extraocular Movements: Extraocular movements intact. Pupils: Pupils are equal, round, and reactive to light. Cardiovascular:      Rate and Rhythm: Normal rate and regular rhythm. Pulses: Normal pulses. Pulmonary:      Effort: Pulmonary effort is normal.   Musculoskeletal:         General: No swelling or deformity. Skin:     Coloration: Skin is not pale. Findings: No rash. Neurological:      General: No focal deficit present. Mental Status: He is alert. Cranial Nerves: No cranial nerve deficit. Sensory: No sensory deficit. Comments: A&O x1. Left upper extremity weakness compared to right and decreased  strength on the left. No facial droop.  Normal speech   Psychiatric:         Mood and Affect: Mood normal.         Behavior: Behavior normal.       Lab and Diagnostic Study Results     Labs -     Recent Results (from the past 12 hour(s))   PROTHROMBIN TIME + INR    Collection Time: 07/29/22  2:39 PM   Result Value Ref Range    Prothrombin time 14.5 11.9 - 14.6 sec    INR 1.1 0.9 - 1.1     METABOLIC PANEL, COMPREHENSIVE    Collection Time: 07/29/22  2:39 PM   Result Value Ref Range    Sodium 137 136 - 145 mmol/L    Potassium 4.0 3.5 - 5.1 mmol/L    Chloride 101 97 - 108 mmol/L    CO2 25 21 - 32 mmol/L    Anion gap 11 5 - 15 mmol/L    Glucose 108 (H) 65 - 100 mg/dL    BUN 21 (H) 6 - 20 mg/dL    Creatinine 1.03 0.70 - 1.30 mg/dL    BUN/Creatinine ratio 20 12 - 20      GFR est AA >60 >60 ml/min/1.73m2    GFR est non-AA >60 >60 ml/min/1.73m2    Calcium 9.5 8.5 - 10.1 mg/dL    Bilirubin, total 1.3 (H) 0.2 - 1.0 mg/dL    AST (SGOT) 23 15 - 37 U/L    ALT (SGPT) 24 12 - 78 U/L    Alk. phosphatase 44 (L) 45 - 117 U/L    Protein, total 7.4 6.4 - 8.2 g/dL    Albumin 3.8 3.5 - 5.0 g/dL    Globulin 3.6 2.0 - 4.0 g/dL    A-G Ratio 1.1 1.1 - 2.2     TROPONIN-HIGH SENSITIVITY    Collection Time: 07/29/22  2:39 PM   Result Value Ref Range    Troponin-High Sensitivity 11 0 - 76 ng/L   CBC WITH AUTOMATED DIFF    Collection Time: 07/29/22  2:39 PM   Result Value Ref Range    WBC 9.0 4.1 - 11.1 K/uL    RBC 5.00 4. 10 - 5.70 M/uL    HGB 14.0 12.1 - 17.0 g/dL    HCT 42.9 36.6 - 50.3 %    MCV 85.8 80.0 - 99.0 FL    MCH 28.0 26.0 - 34.0 PG    MCHC 32.6 30.0 - 36.5 g/dL    RDW 15.3 (H) 11.5 - 14.5 %    PLATELET 355 181 - 147 K/uL    MPV 10.3 8.9 - 12.9 FL    NRBC 0.0 0.0  WBC    ABSOLUTE NRBC 0.00 0.00 - 0.01 K/uL    NEUTROPHILS 66 32 - 75 %    LYMPHOCYTES 23 12 - 49 %    MONOCYTES 9 5 - 13 %    EOSINOPHILS 2 0 - 7 %    BASOPHILS 0 0 - 1 %    IMMATURE GRANULOCYTES 0 0 - 0.5 %    ABS. NEUTROPHILS 5.9 1.8 - 8.0 K/UL    ABS. LYMPHOCYTES 2.0 0.8 - 3.5 K/UL    ABS. MONOCYTES 0.8 0.0 - 1.0 K/UL    ABS. EOSINOPHILS 0.2 0.0 - 0.4 K/UL    ABS. BASOPHILS 0.0 0.0 - 0.1 K/UL    ABS. IMM. GRANS. 0.0 0.00 - 0.04 K/UL    DF AUTOMATED         Radiologic Studies -   [unfilled]  CT Results  (Last 48 hours)                 07/29/22 1445  CT CODE NEURO HEAD WO CONTRAST Final result    Impression:  No acute findings,   Atrophy white matter disease and remote basal ganglia infarct. Narrative:  Clinical indication: Code stroke, left-sided weakness. Axial CT scan of the brain obtained without intravenous contrast, no prior.  CT   dose reduction was achieved through the use of a standardized protocol tailored   for this examination and automatic exposure control for dose modulation . Ian Hewitt There is some mild atrophy and nonspecific white matter changes. Remote basal ganglia lacune once bilaterally. Remote left pontine infarct. No extra-axial fluid collection hemorrhage or shift. No mass. Next                 CXR Results  (Last 48 hours)                 07/29/22 1447  XR CHEST PORT Final result    Impression:  No acute infiltrate. Narrative:  Clinical indication: Possible pneumonia. Portable AP view of the chest obtained, comparison October 28, 2020. Prior sternotomy. Normal size heart. No acute infiltrate. Medical Decision Making and ED Course   - I am the first and primary provider for this patient AND AM THE PRIMARY PROVIDER OF RECORD. - I reviewed the vital signs, available nursing notes, past medical history, past surgical history, family history and social history. - Initial assessment performed. The patients presenting problems have been discussed, and the staff are in agreement with the care plan formulated and outlined with them. I have encouraged them to ask questions as they arise throughout their visit. Vital Signs-Reviewed the patient's vital signs. Patient Vitals for the past 12 hrs:   Temp Pulse Resp BP SpO2   07/29/22 1432 97.6 °F (36.4 °C) 66 17 130/82 100 %       Records Reviewed: Nursing Notes      ED Course:       ED Course as of 07/29/22 1626   Fri Jul 29, 2022   130 66-year-old male presents for evaluation of a stroke alert. Per EMS patient had an episode of facial droop this morning that resolved and then recurred. Last known well was 1130 but this is after the first episode of facial droop and that time is unknown. Patient does have a history of stroke but unknown baseline deficits. Attempting to contact facility to get more information at baseline. Patient has no complaints. Blood glucose normal for EMS.   Differential diagnosis includes TIA versus CVA versus metabolic encephalopathy versus electrolyte disarray. Getting labs including CBC, CMP, troponin, INR, UA, chest x-ray and CT head. Teleneurology consulted. Disposition as per clinical course. [LW]   1615 EKG performed at 1431, read at 1440. Sinus bradycardia with a rate of 56. Normal WA, normal QRS, normal QTC. [LW]   Javi Barnes 1460 unremarkable. Spoke with teleneurology. They recommended MRI and metabolic encephalopathy work-up. Still attempting to get more information from nursing home. [LW]   1625 Admitted to Dr. Ashley Peterson. [LW]      ED Course User Index  [LW] Salma Otoole MD     Consultations:       Consultations: -  Hospitalist Consultant: Dr. Ashley Peterson: We have asked for emergent assistance with regard to this patient. We have discussed the patients HPI, ROS, PE and results this far. They will come and evaluate the patient for admission. and - Tele-Neurology Consultant: We have asked for emergent assistance with regard to this patient. We have discussed the acute and any chronic neurologic presentations of this patient with our Neurologist partner as well as the findings on the imaging and labs studies available thus far. They are recommending metabolic encephalopathy workup, MRI      Disposition     Disposition: Admitted to Floor Medical Floor the case was discussed with the admitting physician Ashley Peterson. Admitted    Diagnosis     Clinical Impression:   1. TIA (transient ischemic attack)        Attestations:    Destiny Nowak MD    Please note that this dictation was completed with [employer], the computer voice recognition software. Quite often unanticipated grammatical, syntax, homophones, and other interpretive errors are inadvertently transcribed by the computer software. Please disregard these errors. Please excuse any errors that have escaped final proofreading. Thank you.

## 2022-07-29 NOTE — ED NOTES
Report called to 5 [facility], RN at this time. Receiving nurse denies having any further questions at this time.

## 2022-07-29 NOTE — PROGRESS NOTES
Admission Medication Reconciliation:    Information obtained from:  Transfer papers [facility])    Comments/Recommendations: Reviewed PTA medications and patient's allergies. Removed amlodipine 10 mg  Removed glyburide 5 mg  Removed novolog  Removed lantus  Removed lisinopril-hctz 20-25 mg  Removed nortriptyline 25 mg        Allergies:  Patient has no known allergies. Significant PMH/Disease States:   Past Medical History:   Diagnosis Date    Cirrhosis ([facility] [address]     Diabetes ([facility] [address]     Hypertension     Stroke [facility])      Chief Complaint for this Admission:    Chief Complaint   Patient presents with    Altered mental status    Extremity Weakness     Prior to Admission Medications:   Prior to Admission Medications   Prescriptions Last Dose Informant Patient Reported? Taking? ARIPiprazole (ABILIFY) 2 mg tablet  Transfer Papers Yes Yes   Sig: Take 2 mg by mouth in the morning. acetaminophen (TYLENOL) 325 mg tablet  Transfer Papers Yes Yes   Sig: Take 650 mg by mouth every eight (8) hours as needed for Pain. aspirin delayed-release 81 mg tablet  Transfer Papers Yes Yes   Sig: Take 81 mg by mouth in the morning. atorvastatin (LIPITOR) 40 mg tablet  Transfer Papers Yes Yes   Sig: Take 40 mg by mouth in the morning. baclofen 5 mg tab  Transfer Papers Yes Yes   Sig: Take 5 mg by mouth two (2) times a day. carvediloL (COREG) 12.5 mg tablet  Transfer Papers Yes Yes   Sig: Take 12.5 mg by mouth two (2) times a day. Indications: high blood pressure   diclofenac (VOLTAREN) 1 % gel  Transfer Papers Yes Yes   Sig: Apply  to affected area three (3) times daily. Apply to left great toe   escitalopram oxalate (LEXAPRO) 20 mg tablet  Transfer Papers Yes Yes   Sig: Take 20 mg by mouth in the morning. furosemide (LASIX) 40 mg tablet  Transfer Papers Yes Yes   Sig: Take 40 mg by mouth in the morning.  Indications: visible water retention   gabapentin (NEURONTIN) 100 mg capsule  Transfer Papers Yes Yes Sig: Take 100 mg by mouth nightly. glucose 4 gram chewable tablet  Transfer Papers Yes Yes   Sig: Take 16 g by mouth as needed. BS less than 70   hydrOXYzine HCL (ATARAX) 25 mg tablet  Transfer Papers Yes Yes   Sig: Take 25 mg by mouth nightly as needed for Anxiety. insulin lispro (HUMALOG) 100 unit/mL injection  Transfer Papers Yes Yes   Sig: 10 Units by SubCUTAneous route nightly. lidocaine (XYLOCAINE) 5 % ointment  Transfer Papers Yes Yes   Sig: Apply  to affected area two (2) times a day. Apply to knees   loratadine (CLARITIN) 10 mg tablet  Transfer Papers Yes Yes   Sig: Take 10 mg by mouth in the morning. losartan (COZAAR) 100 mg tablet  Transfer Papers Yes Yes   Sig: Take 100 mg by mouth in the morning. melatonin 5 mg tablet  Transfer Papers Yes Yes   Sig: Take 10 mg by mouth nightly. metFORMIN (GLUCOPHAGE) 500 mg tablet  Transfer Papers Yes Yes   Sig: Take 1,000 mg by mouth two (2) times daily (with meals). omeprazole (PRILOSEC) 20 mg capsule  Transfer Papers Yes Yes   Sig: Take 20 mg by mouth daily. potassium chloride (K-DUR, KLOR-CON M20) 20 mEq tablet  Transfer Papers Yes Yes   Sig: Take 20 mEq by mouth two (2) times a day. senna-docusate (PERICOLACE) 8.6-50 mg per tablet  Transfer Papers Yes Yes   Sig: Take 1 Tablet by mouth in the morning. tamsulosin (FLOMAX) 0.4 mg capsule  Transfer Papers Yes Yes   Sig: Take 0.4 mg by mouth nightly.       Facility-Administered Medications: None       Cristy Fontana

## 2022-07-30 ENCOUNTER — APPOINTMENT (OUTPATIENT)
Dept: NON INVASIVE DIAGNOSTICS | Age: 72
DRG: 065 | End: 2022-07-30
Attending: INTERNAL MEDICINE
Payer: MEDICARE

## 2022-07-30 ENCOUNTER — APPOINTMENT (OUTPATIENT)
Dept: CT IMAGING | Age: 72
DRG: 065 | End: 2022-07-30
Attending: INTERNAL MEDICINE
Payer: MEDICARE

## 2022-07-30 ENCOUNTER — APPOINTMENT (OUTPATIENT)
Dept: MRI IMAGING | Age: 72
DRG: 065 | End: 2022-07-30
Attending: PSYCHIATRY & NEUROLOGY
Payer: MEDICARE

## 2022-07-30 LAB
ALBUMIN SERPL-MCNC: 3.3 G/DL (ref 3.5–5)
ALBUMIN/GLOB SERPL: 1.2 {RATIO} (ref 1.1–2.2)
ALP SERPL-CCNC: 35 U/L (ref 45–117)
ALT SERPL-CCNC: 22 U/L (ref 12–78)
AMMONIA PLAS-SCNC: 35 UMOL/L
ANION GAP SERPL CALC-SCNC: 5 MMOL/L (ref 5–15)
AST SERPL W P-5'-P-CCNC: 24 U/L (ref 15–37)
BASOPHILS # BLD: 0 K/UL (ref 0–0.1)
BASOPHILS NFR BLD: 0 % (ref 0–1)
BILIRUB SERPL-MCNC: 1.4 MG/DL (ref 0.2–1)
BUN SERPL-MCNC: 17 MG/DL (ref 6–20)
BUN/CREAT SERPL: 20 (ref 12–20)
CA-I BLD-MCNC: 8.9 MG/DL (ref 8.5–10.1)
CHLORIDE SERPL-SCNC: 107 MMOL/L (ref 97–108)
CO2 SERPL-SCNC: 29 MMOL/L (ref 21–32)
CREAT SERPL-MCNC: 0.84 MG/DL (ref 0.7–1.3)
CRP SERPL-MCNC: <0.29 MG/DL (ref 0–0.6)
DIFFERENTIAL METHOD BLD: ABNORMAL
ECHO AO ROOT DIAM: 4 CM
ECHO AO ROOT INDEX: 2.01 CM/M2
ECHO AV PEAK VELOCITY: 1.2 M/S
ECHO EST RA PRESSURE: 3 MMHG
ECHO LA DIAMETER INDEX: 2.16 CM/M2
ECHO LA DIAMETER: 4.3 CM
ECHO LA TO AORTIC ROOT RATIO: 1.08
ECHO LA VOL 4C: 59 ML (ref 18–58)
ECHO LA VOLUME INDEX A4C: 30 ML/M2 (ref 16–34)
ECHO LV EDV A2C: 33 ML
ECHO LV EDV A4C: 80 ML
ECHO LV EDV INDEX A4C: 40 ML/M2
ECHO LV EDV NDEX A2C: 17 ML/M2
ECHO LV ESV A2C: 8 ML
ECHO LV ESV A4C: 50 ML
ECHO LV ESV INDEX A2C: 4 ML/M2
ECHO LV ESV INDEX A4C: 25 ML/M2
ECHO LV FRACTIONAL SHORTENING: 49 % (ref 28–44)
ECHO LV INTERNAL DIMENSION DIASTOLE INDEX: 2.16 CM/M2
ECHO LV INTERNAL DIMENSION DIASTOLIC: 4.3 CM (ref 4.2–5.9)
ECHO LV INTERNAL DIMENSION SYSTOLIC INDEX: 1.11 CM/M2
ECHO LV INTERNAL DIMENSION SYSTOLIC: 2.2 CM
ECHO LV IVSD: 1.3 CM (ref 0.6–1)
ECHO LV MASS 2D: 219.8 G (ref 88–224)
ECHO LV MASS INDEX 2D: 110.5 G/M2 (ref 49–115)
ECHO LV POSTERIOR WALL DIASTOLIC: 1.4 CM (ref 0.6–1)
ECHO LV RELATIVE WALL THICKNESS RATIO: 0.65
ECHO LVOT AREA: 4.5 CM2
ECHO LVOT DIAM: 2.4 CM
ECHO MV A VELOCITY: 71.3 M/S
ECHO MV AREA PHT: 2.7 CM2
ECHO MV E VELOCITY: 40.7 M/S
ECHO MV E/A RATIO: 0.57
ECHO MV PRESSURE HALF TIME (PHT): 81 MS
ECHO MV REGURGITANT PEAK GRADIENT: 108 MMHG
ECHO MV REGURGITANT PEAK VELOCITY: 5.2 M/S
ECHO PV MAX VELOCITY: 1 M/S
ECHO PV PEAK GRADIENT: 4 MMHG
ECHO PV REGURGITANT MAX VELOCITY: 0.4 M/S
ECHO RA END SYSTOLIC VOLUME APICAL 4 CHAMBER INDEX BSA: 17 ML/M2
ECHO RA VOLUME: 33 ML
ECHO RIGHT VENTRICULAR SYSTOLIC PRESSURE (RVSP): 34 MMHG
ECHO RV BASAL DIMENSION: 3 CM
ECHO RV MID DIMENSION: 2.2 CM
ECHO TV REGURGITANT MAX VELOCITY: 2.8 M/S
EOSINOPHIL # BLD: 0.1 K/UL (ref 0–0.4)
EOSINOPHIL NFR BLD: 2 % (ref 0–7)
ERYTHROCYTE [DISTWIDTH] IN BLOOD BY AUTOMATED COUNT: 14.8 % (ref 11.5–14.5)
ERYTHROCYTE [SEDIMENTATION RATE] IN BLOOD: 14 MM/HR (ref 0–20)
EST. AVERAGE GLUCOSE BLD GHB EST-MCNC: 146 MG/DL
GLOBULIN SER CALC-MCNC: 2.7 G/DL (ref 2–4)
GLUCOSE BLD STRIP.AUTO-MCNC: 100 MG/DL (ref 65–117)
GLUCOSE BLD STRIP.AUTO-MCNC: 120 MG/DL (ref 65–117)
GLUCOSE BLD STRIP.AUTO-MCNC: 126 MG/DL (ref 65–117)
GLUCOSE BLD STRIP.AUTO-MCNC: 182 MG/DL (ref 65–117)
GLUCOSE SERPL-MCNC: 102 MG/DL (ref 65–100)
HBA1C MFR BLD: 6.7 % (ref 4–5.6)
HCT VFR BLD AUTO: 38.1 % (ref 36.6–50.3)
HGB BLD-MCNC: 12.8 G/DL (ref 12.1–17)
IMM GRANULOCYTES # BLD AUTO: 0 K/UL (ref 0–0.04)
IMM GRANULOCYTES NFR BLD AUTO: 0 % (ref 0–0.5)
LYMPHOCYTES # BLD: 1.7 K/UL (ref 0.8–3.5)
LYMPHOCYTES NFR BLD: 27 % (ref 12–49)
Lab: 0.8 M/S
MAGNESIUM SERPL-MCNC: 1.5 MG/DL (ref 1.6–2.4)
MCH RBC QN AUTO: 28 PG (ref 26–34)
MCHC RBC AUTO-ENTMCNC: 33.6 G/DL (ref 30–36.5)
MCV RBC AUTO: 83.4 FL (ref 80–99)
MONOCYTES # BLD: 0.6 K/UL (ref 0–1)
MONOCYTES NFR BLD: 10 % (ref 5–13)
NEUTS SEG # BLD: 3.7 K/UL (ref 1.8–8)
NEUTS SEG NFR BLD: 61 % (ref 32–75)
NRBC # BLD: 0 K/UL (ref 0–0.01)
NRBC BLD-RTO: 0 PER 100 WBC
PERFORMED BY, TECHID: ABNORMAL
PERFORMED BY, TECHID: NORMAL
PLATELET # BLD AUTO: 153 K/UL (ref 150–400)
PMV BLD AUTO: 10.6 FL (ref 8.9–12.9)
POTASSIUM SERPL-SCNC: 3.6 MMOL/L (ref 3.5–5.1)
PROCALCITONIN SERPL-MCNC: <0.05 NG/ML
PROT SERPL-MCNC: 6 G/DL (ref 6.4–8.2)
RBC # BLD AUTO: 4.57 M/UL (ref 4.1–5.7)
SODIUM SERPL-SCNC: 141 MMOL/L (ref 136–145)
TSH SERPL DL<=0.05 MIU/L-ACNC: 1.67 UIU/ML (ref 0.36–3.74)
WBC # BLD AUTO: 6.1 K/UL (ref 4.1–11.1)

## 2022-07-30 PROCEDURE — 74011000250 HC RX REV CODE- 250: Performed by: INTERNAL MEDICINE

## 2022-07-30 PROCEDURE — 70496 CT ANGIOGRAPHY HEAD: CPT

## 2022-07-30 PROCEDURE — 84443 ASSAY THYROID STIM HORMONE: CPT

## 2022-07-30 PROCEDURE — 93880 EXTRACRANIAL BILAT STUDY: CPT

## 2022-07-30 PROCEDURE — 74011250637 HC RX REV CODE- 250/637: Performed by: INTERNAL MEDICINE

## 2022-07-30 PROCEDURE — 93306 TTE W/DOPPLER COMPLETE: CPT

## 2022-07-30 PROCEDURE — 96372 THER/PROPH/DIAG INJ SC/IM: CPT

## 2022-07-30 PROCEDURE — 36415 COLL VENOUS BLD VENIPUNCTURE: CPT

## 2022-07-30 PROCEDURE — 80053 COMPREHEN METABOLIC PANEL: CPT

## 2022-07-30 PROCEDURE — G0378 HOSPITAL OBSERVATION PER HR: HCPCS

## 2022-07-30 PROCEDURE — 97530 THERAPEUTIC ACTIVITIES: CPT

## 2022-07-30 PROCEDURE — 82140 ASSAY OF AMMONIA: CPT

## 2022-07-30 PROCEDURE — 96374 THER/PROPH/DIAG INJ IV PUSH: CPT

## 2022-07-30 PROCEDURE — 85652 RBC SED RATE AUTOMATED: CPT

## 2022-07-30 PROCEDURE — 97161 PT EVAL LOW COMPLEX 20 MIN: CPT

## 2022-07-30 PROCEDURE — 70551 MRI BRAIN STEM W/O DYE: CPT

## 2022-07-30 PROCEDURE — 74011250636 HC RX REV CODE- 250/636: Performed by: INTERNAL MEDICINE

## 2022-07-30 PROCEDURE — 74011000636 HC RX REV CODE- 636: Performed by: INTERNAL MEDICINE

## 2022-07-30 PROCEDURE — 82962 GLUCOSE BLOOD TEST: CPT

## 2022-07-30 PROCEDURE — 85025 COMPLETE CBC W/AUTO DIFF WBC: CPT

## 2022-07-30 PROCEDURE — 86140 C-REACTIVE PROTEIN: CPT

## 2022-07-30 PROCEDURE — 97165 OT EVAL LOW COMPLEX 30 MIN: CPT

## 2022-07-30 PROCEDURE — 84145 PROCALCITONIN (PCT): CPT

## 2022-07-30 PROCEDURE — 83036 HEMOGLOBIN GLYCOSYLATED A1C: CPT

## 2022-07-30 PROCEDURE — 83735 ASSAY OF MAGNESIUM: CPT

## 2022-07-30 RX ORDER — CHOLECALCIFEROL (VITAMIN D3) 125 MCG
10 CAPSULE ORAL
Status: DISCONTINUED | OUTPATIENT
Start: 2022-07-30 | End: 2022-08-03 | Stop reason: HOSPADM

## 2022-07-30 RX ORDER — HYDROXYZINE 25 MG/1
25 TABLET, FILM COATED ORAL
Status: DISCONTINUED | OUTPATIENT
Start: 2022-07-30 | End: 2022-08-03 | Stop reason: HOSPADM

## 2022-07-30 RX ORDER — CLOPIDOGREL BISULFATE 75 MG/1
75 TABLET ORAL DAILY
Status: DISCONTINUED | OUTPATIENT
Start: 2022-07-30 | End: 2022-08-03 | Stop reason: HOSPADM

## 2022-07-30 RX ORDER — ATORVASTATIN CALCIUM 40 MG/1
80 TABLET, FILM COATED ORAL DAILY
Status: DISCONTINUED | OUTPATIENT
Start: 2022-07-31 | End: 2022-08-03 | Stop reason: HOSPADM

## 2022-07-30 RX ORDER — MAGNESIUM SULFATE HEPTAHYDRATE 40 MG/ML
2 INJECTION, SOLUTION INTRAVENOUS ONCE
Status: COMPLETED | OUTPATIENT
Start: 2022-07-30 | End: 2022-07-30

## 2022-07-30 RX ADMIN — IOPAMIDOL 100 ML: 755 INJECTION, SOLUTION INTRAVENOUS at 18:17

## 2022-07-30 RX ADMIN — SODIUM CHLORIDE, PRESERVATIVE FREE 10 ML: 5 INJECTION INTRAVENOUS at 15:18

## 2022-07-30 RX ADMIN — ATORVASTATIN CALCIUM 40 MG: 40 TABLET, FILM COATED ORAL at 08:42

## 2022-07-30 RX ADMIN — MAGNESIUM SULFATE HEPTAHYDRATE 2 G: 40 INJECTION, SOLUTION INTRAVENOUS at 15:18

## 2022-07-30 RX ADMIN — ENOXAPARIN SODIUM 40 MG: 100 INJECTION SUBCUTANEOUS at 08:42

## 2022-07-30 RX ADMIN — PANTOPRAZOLE SODIUM 40 MG: 40 TABLET, DELAYED RELEASE ORAL at 08:42

## 2022-07-30 RX ADMIN — ASPIRIN 81 MG: 81 TABLET, COATED ORAL at 08:42

## 2022-07-30 RX ADMIN — SODIUM CHLORIDE, PRESERVATIVE FREE 10 ML: 5 INJECTION INTRAVENOUS at 06:34

## 2022-07-30 RX ADMIN — HYDROXYZINE HYDROCHLORIDE 25 MG: 25 TABLET ORAL at 20:50

## 2022-07-30 RX ADMIN — MELATONIN TAB 5 MG 10 MG: 5 TAB at 20:50

## 2022-07-30 RX ADMIN — SODIUM CHLORIDE, PRESERVATIVE FREE 10 ML: 5 INJECTION INTRAVENOUS at 20:50

## 2022-07-30 RX ADMIN — CLOPIDOGREL BISULFATE 75 MG: 75 TABLET ORAL at 15:18

## 2022-07-30 NOTE — PROGRESS NOTES
Hospitalist Progress Note    Subjective:   Daily Progress Note: 7/30/2022 4:00 PM    Admission HPI/Hx:  Vitaliy Gonzales is a 70 y.o. male who had reportedly 2 episodes of facial droop. Sent from SNF. Currently patient is oriented and coherent. Able to give personal hx. Denied having had any active fever, cough, n/v/d, chills, focal weakness. He is very anxious and laughs abruptly. Stroke Alert was done with negative findings. Patient has hx of prior CVA; patient said it had cause LLE weakness but with therapy it has resolved.  -----------------    Subjective: No new issues or neuro changes. Good affect. Carotid Duplex with severe ICA. Explained to patient.     Current Facility-Administered Medications   Medication Dose Route Frequency    hydrOXYzine HCL (ATARAX) tablet 25 mg  25 mg Oral QHS PRN    melatonin tablet 10 mg  10 mg Oral QHS    magnesium sulfate 2 g/50 ml IVPB (premix or compounded)  2 g IntraVENous ONCE    clopidogreL (PLAVIX) tablet 75 mg  75 mg Oral DAILY    [START ON 7/31/2022] atorvastatin (LIPITOR) tablet 80 mg  80 mg Oral DAILY    aspirin delayed-release tablet 81 mg  81 mg Oral DAILY    pantoprazole (PROTONIX) tablet 40 mg  40 mg Oral DAILY    sodium chloride (NS) flush 5-40 mL  5-40 mL IntraVENous Q8H    sodium chloride (NS) flush 5-40 mL  5-40 mL IntraVENous PRN    acetaminophen (TYLENOL) tablet 650 mg  650 mg Oral Q6H PRN    Or    acetaminophen (TYLENOL) suppository 650 mg  650 mg Rectal Q6H PRN    polyethylene glycol (MIRALAX) packet 17 g  17 g Oral DAILY PRN    ondansetron (ZOFRAN ODT) tablet 4 mg  4 mg Oral Q8H PRN    Or    ondansetron (ZOFRAN) injection 4 mg  4 mg IntraVENous Q6H PRN    enoxaparin (LOVENOX) injection 40 mg  40 mg SubCUTAneous DAILY    insulin lispro (HUMALOG) injection   SubCUTAneous AC&HS    glucose chewable tablet 16 g  4 Tablet Oral PRN    glucagon (GLUCAGEN) injection 1 mg  1 mg IntraMUSCular PRN    dextrose 10% infusion 0-250 mL  0-250 mL IntraVENous PRN        Review of Systems    Constitutional: No - fever, chills, weight change    HEENT: No - sore throat, sinus pressure, ear pain    Respiratory: No - cough, sob, wheeze, hemoptysis, pleuritic pain    Cardiovascular: No - chest pain, palpitations, extremity edema    Gastrointestinal: No - n/v/d/bleeding/abd pain    Genitourinary: No - dysuria, hematuria    Neurological: No - headache, focal weakness, LOC, paresthesia    Skin: No - new rash, discoloration    M/S: No - joint pain, back pain, injury      Objective:     Visit Vitals  BP (!) 149/77   Pulse 65   Temp 97.7 °F (36.5 °C)   Resp 16   Ht 5' 8\" (1.727 m)   Wt 84.8 kg (187 lb)   SpO2 94%   BMI 28.43 kg/m²      O2 Device: None (Room air)    Temp (24hrs), Av.7 °F (36.5 °C), Min:97.5 °F (36.4 °C), Max:97.9 °F (36.6 °C)      No intake/output data recorded. No intake/output data recorded. PHYSICAL EXAM    Constitutional: NAD, Awake    Neck: Supple     Cardiovascular: S1S2, no murmur    Respiratory:  CTA ant bilat; no overt wheeze     GI: Soft, NT; no rigidity; + bowel sounds    Musculoskeletal: Moving all extremities spontaneously; no overt injury; no significant joint swelling    Neurological:  AxOx2; No new focal weakness; No tremors; No facial droop. Psychiatric: Appropriate mood; oriented    Skin: No new rash or significant discoloration     Vascular: Palpable pulses;  No edema      Data Review    Recent Results (from the past 24 hour(s))   URINALYSIS W/ REFLEX CULTURE    Collection Time: 22  4:16 PM    Specimen: Urine   Result Value Ref Range    Color Straw     Appearance Hazy Clear    Specific gravity 1.010 1.003 - 1.030      Specific gravity 1.010 1.003 - 1.030      pH (UA) 6.5 5.0 - 8.0      Protein Negative Negative mg/dL    Glucose Normal (A) Negative mg/dL    Ketone Negative Negative mg/dL    Bilirubin Negative Negative      Blood Negative Negative      Urobilinogen Normal 0.1 - 1.0 EU/dL    Nitrites Negative Negative      Leukocyte Esterase 500 (A) Negative      WBC 31 (H) 0 - 4 /hpf    RBC <1 0 - 5 /hpf    Bacteria Negative Negative /hpf    UA:UC IF INDICATED Urine Culture Ordered (A) Culture not indicated by UA result     AMMONIA    Collection Time: 07/29/22  5:04 PM   Result Value Ref Range    Ammonia, plasma 31 <32 umol/L   GLUCOSE, POC    Collection Time: 07/29/22  9:20 PM   Result Value Ref Range    Glucose (POC) 111 65 - 117 mg/dL    Performed by Erwin Leyden    CBC WITH AUTOMATED DIFF    Collection Time: 07/30/22  7:11 AM   Result Value Ref Range    WBC 6.1 4.1 - 11.1 K/uL    RBC 4.57 4.10 - 5.70 M/uL    HGB 12.8 12.1 - 17.0 g/dL    HCT 38.1 36.6 - 50.3 %    MCV 83.4 80.0 - 99.0 FL    MCH 28.0 26.0 - 34.0 PG    MCHC 33.6 30.0 - 36.5 g/dL    RDW 14.8 (H) 11.5 - 14.5 %    PLATELET 864 712 - 531 K/uL    MPV 10.6 8.9 - 12.9 FL    NRBC 0.0 0.0  WBC    ABSOLUTE NRBC 0.00 0.00 - 0.01 K/uL    NEUTROPHILS 61 32 - 75 %    LYMPHOCYTES 27 12 - 49 %    MONOCYTES 10 5 - 13 %    EOSINOPHILS 2 0 - 7 %    BASOPHILS 0 0 - 1 %    IMMATURE GRANULOCYTES 0 0 - 0.5 %    ABS. NEUTROPHILS 3.7 1.8 - 8.0 K/UL    ABS. LYMPHOCYTES 1.7 0.8 - 3.5 K/UL    ABS. MONOCYTES 0.6 0.0 - 1.0 K/UL    ABS. EOSINOPHILS 0.1 0.0 - 0.4 K/UL    ABS. BASOPHILS 0.0 0.0 - 0.1 K/UL    ABS. IMM. GRANS. 0.0 0.00 - 0.04 K/UL    DF AUTOMATED     METABOLIC PANEL, COMPREHENSIVE    Collection Time: 07/30/22  7:11 AM   Result Value Ref Range    Sodium 141 136 - 145 mmol/L    Potassium 3.6 3.5 - 5.1 mmol/L    Chloride 107 97 - 108 mmol/L    CO2 29 21 - 32 mmol/L    Anion gap 5 5 - 15 mmol/L    Glucose 102 (H) 65 - 100 mg/dL    BUN 17 6 - 20 mg/dL    Creatinine 0.84 0.70 - 1.30 mg/dL    BUN/Creatinine ratio 20 12 - 20      GFR est AA >60 >60 ml/min/1.73m2    GFR est non-AA >60 >60 ml/min/1.73m2    Calcium 8.9 8.5 - 10.1 mg/dL    Bilirubin, total 1.4 (H) 0.2 - 1.0 mg/dL    AST (SGOT) 24 15 - 37 U/L    ALT (SGPT) 22 12 - 78 U/L    Alk.  phosphatase 35 (L) 45 - 117 U/L    Protein, total 6.0 (L) 6.4 - 8.2 g/dL    Albumin 3.3 (L) 3.5 - 5.0 g/dL    Globulin 2.7 2.0 - 4.0 g/dL    A-G Ratio 1.2 1.1 - 2.2     C REACTIVE PROTEIN, QT    Collection Time: 07/30/22  7:11 AM   Result Value Ref Range    C-Reactive protein <0.29 0.00 - 0.60 mg/dL   MAGNESIUM    Collection Time: 07/30/22  7:11 AM   Result Value Ref Range    Magnesium 1.5 (L) 1.6 - 2.4 mg/dL   TSH 3RD GENERATION    Collection Time: 07/30/22  7:11 AM   Result Value Ref Range    TSH 1.67 0.36 - 3.74 uIU/mL   AMMONIA    Collection Time: 07/30/22  7:11 AM   Result Value Ref Range    Ammonia, plasma 35 (H) <32 umol/L   PROCALCITONIN    Collection Time: 07/30/22  7:11 AM   Result Value Ref Range    Procalcitonin <0.05 (H) 0 ng/mL   SED RATE (ESR)    Collection Time: 07/30/22  7:11 AM   Result Value Ref Range    Sed rate, automated 14 0 - 20 mm/hr   GLUCOSE, POC    Collection Time: 07/30/22  7:35 AM   Result Value Ref Range    Glucose (POC) 100 65 - 117 mg/dL    Performed by JONH BACON CAROTID BILATERAL    Collection Time: 07/30/22 10:02 AM   Result Value Ref Range    Left CCA dist sys 80.2 cm/s    Left CCA dist carrasco 7.6 cm/s    Left CCA prox sys 73.0 cm/s    Left CCA prox carrasco 7.5 cm/s    Left ICA dist sys 118.0 cm/s    Left ICA dist carrasco 18.3 cm/s    Left ICA mid sys 439.0 cm/s    Left ICA mid carrasco 81.2 cm/s    Left ICA prox sys 470.0 cm/s    Left ICA prox carrasco 107.0 cm/s    Left ECA sys 156.0 cm/s    LEFT EXTERNAL CAROTID ARTERY D 0.00 cm/s    Left subclavian prox .0 cm/s    Left vertebral sys 66.6 cm/s    LEFT VERTEBRAL ARTERY D 10.70 cm/s    Right cca dist sys 83.9 cm/s    Right CCA dist carrasco 11.0 cm/s    Right CCA prox sys 93.5 cm/s    Right CCA prox carrasco 1.4 cm/s    Right ICA dist sys 106.0 cm/s    Right ICA dist carrasco 25.8 cm/s    Right ICA prox sys 60.9 cm/s    Right ICA prox carrasco 15.1 cm/s    Right eca sys 113.0 cm/s    RIGHT EXTERNAL CAROTID ARTERY D 0.00 cm/s    Right subclavian prox .0 cm/s    Right subclavian prox EDV 0.0 cm/s    Right vertebral sys 46.2 cm/s    RIGHT VERTEBRAL ARTERY D 0.00 cm/s    Left arm  mmHg    Right arm  mmHg    Right ICA/CCA sys 1.26     Left ICA/CCA sys 5.88    ECHO ADULT COMPLETE    Collection Time: 07/30/22 11:20 AM   Result Value Ref Range    IVSd 1.3 0.6 - 1.0 cm    Fractional Shortening 2D 49 28 - 44 %    LV ESV A4C 50 mL    LV ESV Index A4C 25 mL/m2    LV EDV A4C 80 mL    LV EDV Index A4C 40 mL/m2    LV ESV A2C 8 mL    LV ESV Index A2C 4 mL/m2    LV EDV A2C 33 (A) mL    LV EDV Index A2C 17 mL/m2    LVIDd 4.3 4.2 - 5.9 cm    LVIDd Index 2.16 cm/m2    LVIDs 2.2 cm    LVIDs Index 1.11 cm/m2    LVPWd 1.4 0.6 - 1.0 cm    LV RWT Ratio 0.65     LV Mass 2D 219.8 88 - 224 g    LV Mass 2D Index 110.5 49 - 115 g/m2    MV E Velocity 40.70 m/s    MV A Velocity 71.30 m/s    MV E/A 0.57     LVOT Diameter 2.4 cm    LVOT Area 4.5 cm2    LA Volume 4C 59 (A) 18 - 58 mL    LA Volume Index 4C 30 16 - 34 mL/m2    LA Diameter 4.3 cm    LA Size Index 2.16 cm/m2    LA/AO Root Ratio 1.08     RV Basal Dimension 3.0 cm    RV Mid Dimension 2.2 cm    RA Volume 33 ml    RA Volume Index A4C 17 mL/m2    Est. RA Pressure 3 mmHg    Aortic Root 4.0 cm    Ao Root Index 2.01 cm/m2    LVOT Peak Velocity Valsalva 0.8 m/s    AV Peak Velocity 1.2 m/s    MR Peak Velocity 5.2 m/s    MR Peak Gradient 108 mmHg    MV PHT 81.0 ms    MV Area by PHT 2.7 cm2    TR Max Velocity 2.80 m/s    RVSP 34 mmHg    DC Max Velocity 0.4 m/s    PV Max Velocity 1.0 m/s    PV Peak Gradient 4 mmHg   GLUCOSE, POC    Collection Time: 07/30/22 11:45 AM   Result Value Ref Range    Glucose (POC) 126 (H) 65 - 117 mg/dL    Performed by [employer] [facility]        DUPLEX CAROTID BILATERAL         XR CHEST PORT   Final Result   No acute infiltrate. CT CODE NEURO HEAD WO CONTRAST   Final Result   No acute findings,   Atrophy white matter disease and remote basal ganglia infarct.       CTA CODE NEURO HEAD AND NECK W CONT    (Results Pending)     Echo 7/30/22       Left Ventricle: Normal left ventricular systolic function with a visually estimated EF of 60 - 65%. Left ventricle size is normal. Increased wall thickness. Findings consistent with concentric hypertrophy. Abnormal diastolic function. Aortic Valve: Tricuspid valve.  --------------------------------------------    Carotid Duplex 7/30/22 - PRELIMINARY:    Right Carotid    Right arm BP: 153 mmHg. There is mild stenosis in the right ICA (<50%) and at the proximal segment. The right ICA has calcific plaque. The right ECA is patent. The right vertebral is antegrade. There is no stenosis in the right vertebral artery. The right subclavian is normal.     Left Carotid    Left arm BP: 165 mmHg. Carotid bulb has diffuse plaque. There is severe stenosis in the left ICA (>70%) and at the proximal segment . The left ICA has severe and heterogeneous plaque. The left ECA is patent. The left vertebral is antegrade. There is no stenosis in the left vertebral artery. The left subclavian is normal.       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 TIA - Facial Droop resolved.     Left ICA Disease  - CTA Head/Neck  - Add Plavix  - Augment Statin    Continue to observe in hospital.    DVT Prophylaxis: On AC  Code Status: Full Code    ________________________________________  Duane Favors, MD

## 2022-07-30 NOTE — PROGRESS NOTES
OCCUPATIONAL THERAPY EVALUATION  Patient: Beckie Olszewski (89 y.o. male)  Date: 7/30/2022  Primary Diagnosis: TIA (transient ischemic attack) [G45.9]       Precautions: fall risk       ASSESSMENT  Pt is a 71 y/o M with PMH of DM, HTN, and stroke presenting to [facility] from [place] with c/o 2 episodes of facial droop, admitted 7/29  and being treated for stroke work up. Per medical chart, stroke alert was done w/ negative findings. Further testing is pending. Pt received semi-supine in bed upon arrival, alert to person and place (disoriented to time and situation), and agreeable to OT/PT evaluations at this time. Per pt report, pt lives at [place]. He reports he ambulates about  ft using RW and reports someone is typically near him when he gets up. He reports 3 falls recently (1 w/out walker and 2 w/ walker). He completes most of his ADLs in bed and people will gather the items for him. He does ambulate to the bathroom. Based on current observations, pt presents with deficits in generalized strength/AROM, bed mobility, static/dynamic sitting balance, static/dynamic standing balance, functional activity tolerance, and confusion impacting overall performance of ADLs and functional transfers/mobility. Pt currently requires SBA and prolonged time for bed mobility and sup>sit transfer w/ HOB slightly elevated. Pt demo'd intact sitting balance at EOB while completing BUE assessment (grossly 3/5 and decreased B shoulder flexion) and OT donned B socks w/ total A. Pt reports occasionally numbness in LUE, however, none at this time and demonstrated intact coordination in [address] [place] [address]. Pt req'd CGA x2 for STS using RW and progressed to CGA x1 for ambulation to chair (see PT note for further gait details). Pt was left in chair w/ all needs/call bell in reach and breakfast tray in front of him. Overall, pt tolerates session fair w/out c/o pain but report slight lightheadedness during change in position.  Pt would benefit from continued skilled OT services to address current impairments, prevent further decline during admission, and improve IND and safety with self cares and functional transfers/mobility. Current OT d/c recommendation return to LTC once medically appropriate. Other factors to consider for discharge: family/social support, DME, time since onset, severity of deficits, decline from functional baseline     Patient will benefit from skilled therapy intervention to address the above noted impairments. PLAN :  Recommendations and Planned Interventions: self care training, functional mobility training, therapeutic exercise, balance training, therapeutic activities, endurance activities, patient education, and home safety training    Frequency/Duration: Patient will be followed by occupational therapy:  3-5x/week to address goals. Recommendation for discharge: (in order for the patient to meet his/her long term goals)  Return to LTC    This discharge recommendation:  Has been made in collaboration with the attending provider and/or case management    IF patient discharges home will need the following DME: none at this time        SUBJECTIVE:   Patient stated Did I have a stroke or heart attack.     OBJECTIVE DATA SUMMARY:   HISTORY:   Past Medical History:   Diagnosis Date    Cirrhosis ([facility] [address]     Diabetes ([facility] [address]     Hypertension     Stroke [facility])      Past Surgical History:   Procedure Laterality Date    HX BACK SURGERY      HX GI         Expanded or extensive additional review of patient history:     Home Situation  Home Environment: [address] [place] [address] Name: Maeve  One/Two Story Residence: One story  Living Alone: No  Support Systems: Child(migdalia)  Patient Expects to be Discharged to[de-identified] Skilled nursing facility  Current DME Used/Available at Home: Walker, rolling      EXAMINATION OF PERFORMANCE DEFICITS:  Cognitive/Behavioral Status:  Neurologic State: Alert  Orientation Level: Oriented to person;Oriented to place; Disoriented to time;Disoriented to situation (asked if he had a heart attack or stroke)  Cognition: Decreased attention/concentration; Follows commands; Impaired decision making; Impulsive;Memory loss;Poor safety awareness               Hearing: Auditory  Auditory Impairment: None        Range of Motion:  AROM: Generally decreased, functional                         Strength:  Strength: Generally decreased, functional                Coordination:  Coordination: Within functional limits  Fine Motor Skills-Upper: Left Intact; Right Intact    Gross Motor Skills-Upper: Left Intact; Right Intact    Tone & Sensation:     Sensation: Intact                      Balance:  Sitting: Intact; Without support  Standing: Impaired; With support  Standing - Static: Fair;Constant support  Standing - Dynamic : Fair;Constant support    Functional Mobility and Transfers for ADLs:  Bed Mobility:  Supine to Sit: Stand-by assistance; Additional time  Sit to Supine: Stand-by assistance; Additional time    Transfers:  Sit to Stand: Contact guard assistance; Additional time;Assist x2  Stand to Sit: Contact guard assistance;Assist x1;Additional time  Bed to Chair: Contact guard assistance;Assist x1;Additional time        ADL Intervention and task modifications:                           Lower Body Dressing Assistance  Socks: Total assistance (dependent)              Therapeutic Exercise:  Pt will benefit from BUE HEP to improve participation in ADLs and mobility. Plan will be initiated at next session. Functional Measure:    Asif KIM \"6 Clicks\"                                                       Daily Activity Inpatient Short Form  How much help from another person does the patient currently need. .. Total; A Lot A Little None   1. Putting on and taking off regular lower body clothing? [x]  1 []  2 []  3 []  4   2. Bathing (including washing, rinsing, drying)? []  1 [x]  2 []  3 []  4   3. Toileting, which includes using toilet, bedpan or urinal? [] 1 [x]  2 []  3 []  4   4. Putting on and taking off regular upper body clothing? []  1 []  2 [x]  3 []  4   5. Taking care of personal grooming such as brushing teeth? []  1 []  2 [x]  3 []  4   6. Eating meals? []  1 []  2 [x]  3 []  4   © , Trustees of [address] [place] [address] [address] [address], under license to [employer]. All rights reserved     Score:      Interpretation of Tool:  Represents clinically-significant functional categories (i.e. Activities of daily living). Percentage of Impairment CH    0%   CI    1-19% CJ    20-39% CK    40-59% CL    60-79% CM    80-99% CN     100%   [facility]  Score 6-24 24 23 20-22 15-19 10-14 7-9 6         Occupational Therapy Evaluation Charge Determination   History Examination Decision-Making   LOW Complexity : Brief history review  LOW Complexity : 1-3 performance deficits relating to physical, cognitive , or psychosocial skils that result in activity limitations and / or participation restrictions  MEDIUM Complexity : Patient may present with comorbidities that affect occupational performnce. Miniml to moderate modification of tasks or assistance (eg, physical or verbal ) with assesment(s) is necessary to enable patient to complete evaluation       Based on the above components, the patient evaluation is determined to be of the following complexity level: LOW   Pain Ratin/10 pain    Activity Tolerance:   Fair and requires rest breaks    After treatment patient left in no apparent distress:    Sitting in chair and Call bell within reach    COMMUNICATION/EDUCATION:   The patients plan of care was discussed with: Physical therapist and Registered nurse. Patient/family have participated as able in goal setting and plan of care. and Patient/family agree to work toward stated goals and plan of care. This patients plan of care is appropriate for delegation to [facility].     OT/PT sessions occurred together for increased patient and clinician safety as pt's first time ambulating in hospital    Thank you for this referral.  Marilu Guzman, OT  Time Calculation: 23 mins   Problem: Self Care Deficits Care Plan (Adult)  Goal: *Acute Goals and Plan of Care (Insert Text)  Description: Pt stated goal \"I want to get better\".      Pt will be IND sup <>sit in prep for EOB ADLs  Pt will be Mod I grooming sitting/standing EOB LRAD  Pt will be IND LE dressing sitting EOB/long sit  Pt will be Mod I sit <>  prep for toileting LRAD  Pt will be Mod I toileting/toilet transfer/cloth mgmt LRAD  Pt will be IND following UE HEP in prep for self care tasks   Outcome: Not Met

## 2022-07-30 NOTE — PROGRESS NOTES
Paged Dr. Flo Moon via  regarding this patient's CT results. Hand off was given to  overnight nurse when Dr. Flo Moon calls back.

## 2022-07-30 NOTE — PROGRESS NOTES
PHYSICAL THERAPY EVALUATION  Patient: Carol Rodriguez (84 y.o. male)  Date: 7/30/2022  Primary Diagnosis: TIA (transient ischemic attack) [G45.9]       Precautions: falls, aspiration       ASSESSMENT  Pt is a 69 yo male admitted on 7/29/2022 for 2 episodes of facial droop per LTC staff; pt currently being treated for TIA. Per medical chart, stroke alert issued at admission. Head CT negative for acute findings, showed atrophy white matter disease and remote basal ganglia infarct. Further testing is pending. PMH: DM, HTN, and CVA. Pt received semi-supine in bed upon arrival, alert to person and place (disoriented to time and situation), and agreeable to PT/OT evaluations at this time. Per pt report, pt lives at [place]. He reports he ambulates about  ft using RW and reports someone is typically near him when he gets up. He reports 3 falls recently (1 w/out walker and 2 w/ walker). He completes most of his ADLs in bed and people will gather the items for him. He does ambulate to the bathroom    Based on the objective data described below, the patient presents with generalized weakness, impaired functional mobility, impaired amb, impaired balance, and decreased activity tolerance. Pt required SBA with additional time for bed mobility, SBA with additional time supine <> sit, CGA with additional time and cueing for safety sit <> stand transfers. Pt amb 3 feet with gt belt, RW, and CGA to min A with additional time and cueing; demonstrating NBOS with short, shuffling step to gt pattern with generalized unsteadiness noted throughout mobility. Pt did fair with session today with intact BUE sensation, decrease in left foot light touch, intact finger opposition bilaterally, and equal  strength, slight decreased MMT left LE (4-/5) compared to right MMT (4/5). Pt will benefit from continued skilled PT to address above deficits and return to PLOF. Current PT DC recommendation SNF vs return to LTC.      Current Level of Function Impacting Discharge (mobility/balance): SBA to min A    Other factors to consider for discharge: PMH, PLOF      PLAN :  Recommendations and Planned Interventions: bed mobility training, transfer training, gait training, therapeutic exercises, patient and family training/education, and therapeutic activities      Recommend with staff: min A, gt belt and RW    Frequency/Duration: Patient will be followed by physical therapy:  2-3x/week to address goals. Recommendation for discharge: (in order for the patient to meet his/her long term goals)  Matt Avelar    This discharge recommendation:  Has been made in collaboration with the attending provider and/or case management         SUBJECTIVE:   Patient stated i'm ravenous.     OBJECTIVE DATA SUMMARY:   HISTORY:    Past Medical History:   Diagnosis Date    Cirrhosis ([facility]     Diabetes ([facility]     Hypertension     Stroke [facility])      Past Surgical History:   Procedure Laterality Date    HX BACK SURGERY      HX GI         Home Situation  Home Environment: [address] [place] [address] Name: [place]  [place] Residence: One story  Living Alone: No  Support Systems: Child(migdalia)  Patient Expects to be Discharged to[de-identified] Skilled nursing facility  Current DME Used/Available at Home: Walker, rolling    EXAMINATION/PRESENTATION/DECISION MAKING:   Critical Behavior:  Neurologic State: Alert  Orientation Level: Oriented to person, Oriented to place, Disoriented to time, Disoriented to situation (asked if he had a heart attack or stroke)  Cognition: Decreased attention/concentration, Follows commands, Impaired decision making, Impulsive, Memory loss, Poor safety awareness     Hearing:   Auditory  Auditory Impairment: None  Skin:  multiple open areas and scabs noted on bilateral LE  Edema: none noted   Range Of Motion:  AROM: Generally decreased, functional                       Strength:    Strength: Generally decreased, functional Tone & Sensation:                  Sensation: Intact               Coordination:  Coordination: Within functional limits  Vision:      Functional Mobility:  Bed Mobility:     Supine to Sit: Stand-by assistance; Additional time  Sit to Supine: Stand-by assistance; Additional time     Transfers:  Sit to Stand: Contact guard assistance; Additional time;Assist x2  Stand to Sit: Contact guard assistance;Assist x1;Additional time        Bed to Chair: Contact guard assistance;Assist x1;Additional time              Balance:   Sitting: Intact; Without support  Standing: Impaired; With support  Standing - Static: Fair;Constant support  Standing - Dynamic : Fair;Constant support  Ambulation/Gait Training:  Distance (ft): 3 Feet (ft)  Assistive Device: Gait belt;Walker, rolling  Ambulation - Level of Assistance: Contact guard assistance;Minimal assistance     Gait Description (WDL): Exceptions to WDL           Base of Support: Widened     Speed/Kassy: Slow;Shuffled         Therapeutic Exercises:   Not completed this session    Functional Measure:  [facility] Mobility Inpatient Short Form  How much difficulty does the patient currently have. .. Unable A Lot A Little None   1. Turning over in bed (including adjusting bedclothes, sheets and blankets)? [] 1   [] 2   [x] 3   [] 4   2. Sitting down on and standing up from a chair with arms ( e.g., wheelchair, bedside commode, etc.)   [] 1   [] 2   [x] 3   [] 4   3. Moving from lying on back to sitting on the side of the bed? [] 1   [] 2   [x] 3   [] 4          How much help from another person does the patient currently need. .. Total A Lot A Little None   4. Moving to and from a bed to a chair (including a wheelchair)? [] 1   [] 2   [x] 3   [] 4   5. Need to walk in hospital room? [] 1   [x] 2   [] 3   [] 4   6. Climbing 3-5 steps with a railing?    [] 1   [x] 2   [] 3   [] 4   © 2007, Trustees of [facility] [employer], under license to [employer]. All rights reserved     Score:  Initial:  Most Recent: X (Date: 22 )   Interpretation of Tool:  Represents activities that are increasingly more difficult (i.e. Bed mobility, Transfers, Gait). Score 24 23 22-20 19-15 14-10 9-7 6   Modifier CH CI CJ CK CL CM CN         Physical Therapy Evaluation Charge Determination   History Examination Presentation Decision-Making   HIGH Complexity :3+ comorbidities / personal factors will impact the outcome/ POC  HIGH Complexity : 4+ Standardized tests and measures addressing body structure, function, activity limitation and / or participation in recreation  LOW Complexity : Stable, uncomplicated  Other outcome measures ampac 6  mod      Based on the above components, the patient evaluation is determined to be of the following complexity level: LOW     Pain Ratin/10 reported    Activity Tolerance:   Good    After treatment patient left in no apparent distress:   Sitting in chair and Call bell within reach and nsg updated. GOALS:    Problem: Mobility Impaired (Adult and Pediatric)  Goal: *Acute Goals and Plan of Care (Insert Text)  Description: Pt stated goal: to get better  Pt will be I with LE HEP in 7 days. Pt will perform bed mobility with mod I in 7 days. Pt will perform transfers with mod I in 7 days. Pt will amb 25-50 feet with LRAD safely with mod I in 7 days. Outcome: Not Met       COMMUNICATION/EDUCATION:   The patients plan of care was discussed with: Occupational therapist, Registered nurse, and Case management. Fall prevention education was provided and the patient/caregiver indicated understanding., Patient/family have participated as able in goal setting and plan of care. , and Patient/family agree to work toward stated goals and plan of care. PT/OT sessions occurred together for increased safety of pt and clinician.        Thank you for this referral.  Leonce Sandifer, PT, DPT   Time Calculation: 21 mins

## 2022-07-30 NOTE — CONSULTS
NEUROLOGY CONSULT    Name Ignacio Hudson Age 70 y.o. MRN [id number]  1950     Referring Physician: None    Chief Complaint: TIA     This is a 70 y.o. right handed  male with history of prior CVA, who was at work yesterday and pushing something when he developed this real intense right-sided headache along with nausea and also felt funny on the left side of the face arm and leg including some numbness. With his previous history of right CVA which affected the left side he got concerned and decided come to the ED. CT scan of the head showed old basal ganglia infarct and some atrophy otherwise no acute findings his symptoms have resolved but still has some headache on the right side he has no previous history of headaches or migraines. At the time of my evaluation this morning the patient denies any left-sided numbness or tingling but still has the headache. Assessment and Plan:  1. Intense right-sided headache with nausea and left-sided numbness and tingling: With previous history of right basal ganglia infarct, the possibility of TIA and lacunar infarct is considered. Differential will include a small possibility of complicated migraine headache. However, he does not have any history of any migraine headaches in the past.  We will order an MRI of the brain without contrast.  Further work-up will depend on the results of the MRI. 2.  History of right CVA with good recovery  3. Cirrhosis of liver  4. Hypertension  5.   Diabetes mellitus    Thank you for the consult,    No Known Allergies     Current Facility-Administered Medications   Medication Dose Route Frequency    hydrOXYzine HCL (ATARAX) tablet 25 mg  25 mg Oral QHS PRN    melatonin tablet 10 mg  10 mg Oral QHS    magnesium sulfate 2 g/50 ml IVPB (premix or compounded)  2 g IntraVENous ONCE    clopidogreL (PLAVIX) tablet 75 mg  75 mg Oral DAILY    [START ON 2022] atorvastatin (LIPITOR) tablet 80 mg  80 mg Oral DAILY    aspirin delayed-release tablet 81 mg  81 mg Oral DAILY    pantoprazole (PROTONIX) tablet 40 mg  40 mg Oral DAILY    sodium chloride (NS) flush 5-40 mL  5-40 mL IntraVENous Q8H    sodium chloride (NS) flush 5-40 mL  5-40 mL IntraVENous PRN    acetaminophen (TYLENOL) tablet 650 mg  650 mg Oral Q6H PRN    Or    acetaminophen (TYLENOL) suppository 650 mg  650 mg Rectal Q6H PRN    polyethylene glycol (MIRALAX) packet 17 g  17 g Oral DAILY PRN    ondansetron (ZOFRAN ODT) tablet 4 mg  4 mg Oral Q8H PRN    Or    ondansetron (ZOFRAN) injection 4 mg  4 mg IntraVENous Q6H PRN    enoxaparin (LOVENOX) injection 40 mg  40 mg SubCUTAneous DAILY    insulin lispro (HUMALOG) injection   SubCUTAneous AC&HS    glucose chewable tablet 16 g  4 Tablet Oral PRN    glucagon (GLUCAGEN) injection 1 mg  1 mg IntraMUSCular PRN    dextrose 10% infusion 0-250 mL  0-250 mL IntraVENous PRN     Past Medical History:   Diagnosis Date    Cir[facility]osis ([facility]     Diabetes ([facility]     Hypertension     Stroke ([facility]      Social History     Tobacco Use    Smoking status: Former    Smokeless tobacco: Never   Substance Use Topics    Alcohol use: Not Currently    Drug use: Not Currently     Exam  Visit Vitals  BP (!) 149/77   Pulse 65   Temp 97.7 °F (36.5 °C)   Resp 16   Ht 5' 8\" (1.727 m)   Wt 84.8 kg (187 lb)   SpO2 94%   BMI 28.43 kg/m²     General: Well developed, well nourished. Patient in no distress   Head: Normocephalic, atraumatic, anicteric sclera   Neck Normal ROM, No thyromegally   Lungs:  Clear to auscultation    Cardiac: Regular rate and rhythm with no murmurs. Abd: Bowel sounds were audible   Ext: No pedal edema   Skin: Supple no rash     NeurologicExam:  Mental Status: Alert and oriented to person place and time   Speech: Fluent no aphasia or dysarthria.    Cranial Nerves:   Pupils are equal round and reactive to light and accommodation, extraocular movements are intact and full, visual fields are intact by confrontation, no nystagmus noted, face is symmetric, sensation in face is intact and symmetric, hearing is intact and symmetric, tongue and uvula are in midline with normal movements, palate is elevating equally, shoulder shrug is intact and symmetric. Motor:  Full and symmetric strength of upper and lower ext . Normal bulk and tone. Reflexes:   Deep tendon reflexes 2/4 and symmetric. Sensory:   Symmetric and intact    Gait:  Gait is deferred. Tremor:   No tremor noted. Cerebellar:  Coordination intact for finger-nose-finger. Neurovascular: No carotid bruits.  No JVD      Lab Review  Lab Results   Component Value Date/Time    WBC 6.1 07/30/2022 07:11 AM    HCT 38.1 07/30/2022 07:11 AM    HGB 12.8 07/30/2022 07:11 AM    PLATELET 371 64/21/5216 07:11 AM     Lab Results   Component Value Date/Time    Sodium 141 07/30/2022 07:11 AM    Potassium 3.6 07/30/2022 07:11 AM    Chloride 107 07/30/2022 07:11 AM    CO2 29 07/30/2022 07:11 AM    Glucose 102 (H) 07/30/2022 07:11 AM    BUN 17 07/30/2022 07:11 AM    Creatinine 0.84 07/30/2022 07:11 AM    Calcium 8.9 07/30/2022 07:11 AM     No results found for: B12LT, FOL, RBCF  No results found for: LDL, LDLC, DLDLP  Lab Results   Component Value Date/Time    Hemoglobin A1c 8.8 (H) 10/28/2020 12:15 PM     No components found for: TROPQUANT  No results found for: KRISTEN

## 2022-07-31 PROBLEM — I63.9 CVA (CEREBRAL VASCULAR ACCIDENT) (HCC): Status: ACTIVE | Noted: 2022-07-31

## 2022-07-31 LAB
GLUCOSE BLD STRIP.AUTO-MCNC: 110 MG/DL (ref 65–117)
GLUCOSE BLD STRIP.AUTO-MCNC: 121 MG/DL (ref 65–117)
GLUCOSE BLD STRIP.AUTO-MCNC: 130 MG/DL (ref 65–117)
GLUCOSE BLD STRIP.AUTO-MCNC: 246 MG/DL (ref 65–117)
PERFORMED BY, TECHID: ABNORMAL
PERFORMED BY, TECHID: NORMAL

## 2022-07-31 PROCEDURE — 65270000029 HC RM PRIVATE

## 2022-07-31 PROCEDURE — 74011250636 HC RX REV CODE- 250/636: Performed by: INTERNAL MEDICINE

## 2022-07-31 PROCEDURE — 74011636637 HC RX REV CODE- 636/637: Performed by: INTERNAL MEDICINE

## 2022-07-31 PROCEDURE — 74011250637 HC RX REV CODE- 250/637: Performed by: INTERNAL MEDICINE

## 2022-07-31 PROCEDURE — 82962 GLUCOSE BLOOD TEST: CPT

## 2022-07-31 PROCEDURE — G0378 HOSPITAL OBSERVATION PER HR: HCPCS

## 2022-07-31 PROCEDURE — 74011000250 HC RX REV CODE- 250: Performed by: INTERNAL MEDICINE

## 2022-07-31 RX ADMIN — ENOXAPARIN SODIUM 40 MG: 100 INJECTION SUBCUTANEOUS at 08:15

## 2022-07-31 RX ADMIN — ATORVASTATIN CALCIUM 80 MG: 40 TABLET, FILM COATED ORAL at 08:15

## 2022-07-31 RX ADMIN — CLOPIDOGREL BISULFATE 75 MG: 75 TABLET ORAL at 08:15

## 2022-07-31 RX ADMIN — SODIUM CHLORIDE, PRESERVATIVE FREE 10 ML: 5 INJECTION INTRAVENOUS at 05:42

## 2022-07-31 RX ADMIN — SODIUM CHLORIDE, PRESERVATIVE FREE 10 ML: 5 INJECTION INTRAVENOUS at 21:12

## 2022-07-31 RX ADMIN — SODIUM CHLORIDE, PRESERVATIVE FREE 10 ML: 5 INJECTION INTRAVENOUS at 14:46

## 2022-07-31 RX ADMIN — MELATONIN TAB 5 MG 10 MG: 5 TAB at 21:12

## 2022-07-31 RX ADMIN — ASPIRIN 81 MG: 81 TABLET, COATED ORAL at 08:15

## 2022-07-31 RX ADMIN — PANTOPRAZOLE SODIUM 40 MG: 40 TABLET, DELAYED RELEASE ORAL at 08:15

## 2022-07-31 RX ADMIN — HYDROXYZINE HYDROCHLORIDE 25 MG: 25 TABLET ORAL at 21:12

## 2022-07-31 RX ADMIN — INSULIN LISPRO 4 UNITS: 100 INJECTION, SOLUTION INTRAVENOUS; SUBCUTANEOUS at 12:46

## 2022-07-31 NOTE — PROGRESS NOTES
Hospitalist Progress Note    Subjective:   Daily Progress Note: 7/31/2022 4:00 PM    Admission HPI/Hx:  Rolando Kc is a 70 y.o. male who had reportedly 2 episodes of facial droop. Sent from SNF. Currently patient is oriented and coherent. Able to give personal hx. Denied having had any active fever, cough, n/v/d, chills, focal weakness. He is very anxious and laughs abruptly. Stroke Alert was done with negative findings. Patient has hx of prior CVA; patient said it had cause LLE weakness but with therapy it has resolved.  -----------------    Subjective: No new change. Continues clinically stability. I discussed with Dr. Trey Jones/Neurology: Vascular consult.       Current Facility-Administered Medications   Medication Dose Route Frequency    hydrOXYzine HCL (ATARAX) tablet 25 mg  25 mg Oral QHS PRN    melatonin tablet 10 mg  10 mg Oral QHS    clopidogreL (PLAVIX) tablet 75 mg  75 mg Oral DAILY    atorvastatin (LIPITOR) tablet 80 mg  80 mg Oral DAILY    aspirin delayed-release tablet 81 mg  81 mg Oral DAILY    pantoprazole (PROTONIX) tablet 40 mg  40 mg Oral DAILY    sodium chloride (NS) flush 5-40 mL  5-40 mL IntraVENous Q8H    sodium chloride (NS) flush 5-40 mL  5-40 mL IntraVENous PRN    acetaminophen (TYLENOL) tablet 650 mg  650 mg Oral Q6H PRN    Or    acetaminophen (TYLENOL) suppository 650 mg  650 mg Rectal Q6H PRN    polyethylene glycol (MIRALAX) packet 17 g  17 g Oral DAILY PRN    ondansetron (ZOFRAN ODT) tablet 4 mg  4 mg Oral Q8H PRN    Or    ondansetron (ZOFRAN) injection 4 mg  4 mg IntraVENous Q6H PRN    enoxaparin (LOVENOX) injection 40 mg  40 mg SubCUTAneous DAILY    insulin lispro (HUMALOG) injection   SubCUTAneous AC&HS    glucose chewable tablet 16 g  4 Tablet Oral PRN    glucagon (GLUCAGEN) injection 1 mg  1 mg IntraMUSCular PRN    dextrose 10% infusion 0-250 mL  0-250 mL IntraVENous PRN        Review of Systems    Constitutional: No - fever, chills, weight change    HEENT: No - sore throat, sinus pressure, ear pain    Respiratory: No - cough, sob, wheeze, hemoptysis, pleuritic pain    Cardiovascular: No - chest pain, palpitations, extremity edema    Gastrointestinal: No - n/v/d/bleeding/abd pain    Genitourinary: No - dysuria, hematuria    Neurological: No - headache, focal weakness, LOC, paresthesia    Skin: No - new rash, discoloration    M/S: No - joint pain, back pain, injury      Objective:     Visit Vitals  /77 (BP 1 Location: Right upper arm, BP Patient Position: At rest)   Pulse 64   Temp 97.7 °F (36.5 °C)   Resp 16   Ht 5' 8\" (1.727 m)   Wt 84.8 kg (187 lb)   SpO2 99%   BMI 28.43 kg/m²      O2 Device: None (Room air)    Temp (24hrs), Av.7 °F (36.5 °C), Min:97.5 °F (36.4 °C), Max:97.9 °F (36.6 °C)      PHYSICAL EXAM    Constitutional: NAD, Awake    Neck: Supple     Cardiovascular: S1S2, no murmur    Respiratory:  CTA ant bilat; no overt wheeze     GI: Soft, NT; no rigidity; + bowel sounds    Musculoskeletal: Moving all extremities spontaneously; no overt injury; no significant joint swelling    Neurological:  AxOx2; No new focal weakness; No tremors; No facial droop. Psychiatric: Appropriate mood; oriented    Skin: No new rash or significant discoloration     Vascular: Palpable pulses;  No edema      Data Review    Recent Results (from the past 24 hour(s))   DUPLEX CAROTID BILATERAL    Collection Time: 22 10:02 AM   Result Value Ref Range    Left CCA dist sys 80.2 cm/s    Left CCA dist carrasco 7.6 cm/s    Left CCA prox sys 73.0 cm/s    Left CCA prox carrasco 7.5 cm/s    Left ICA dist sys 118.0 cm/s    Left ICA dist carrasco 18.3 cm/s    Left ICA mid sys 439.0 cm/s    Left ICA mid carrasco 81.2 cm/s    Left ICA prox sys 470.0 cm/s    Left ICA prox carrasco 107.0 cm/s    Left ECA sys 156.0 cm/s    LEFT EXTERNAL CAROTID ARTERY D 0.00 cm/s    Left subclavian prox .0 cm/s    Left vertebral sys 66.6 cm/s    LEFT VERTEBRAL ARTERY D 10.70 cm/s    Right cca dist sys 83.9 cm/s    Right CCA dist carrasco 11.0 cm/s    Right CCA prox sys 93.5 cm/s    Right CCA prox carrasco 1.4 cm/s    Right ICA dist sys 106.0 cm/s    Right ICA dist carrasco 25.8 cm/s    Right ICA prox sys 60.9 cm/s    Right ICA prox carrasco 15.1 cm/s    Right eca sys 113.0 cm/s    RIGHT EXTERNAL CAROTID ARTERY D 0.00 cm/s    Right subclavian prox .0 cm/s    Right subclavian prox EDV 0.0 cm/s    Right vertebral sys 46.2 cm/s    RIGHT VERTEBRAL ARTERY D 0.00 cm/s    Left arm  mmHg    Right arm  mmHg    Right ICA/CCA sys 1.26     Left ICA/CCA sys 5.88    ECHO ADULT COMPLETE    Collection Time: 07/30/22 11:20 AM   Result Value Ref Range    IVSd 1.3 0.6 - 1.0 cm    Fractional Shortening 2D 49 28 - 44 %    LV ESV A4C 50 mL    LV ESV Index A4C 25 mL/m2    LV EDV A4C 80 mL    LV EDV Index A4C 40 mL/m2    LV ESV A2C 8 mL    LV ESV Index A2C 4 mL/m2    LV EDV A2C 33 (A) mL    LV EDV Index A2C 17 mL/m2    LVIDd 4.3 4.2 - 5.9 cm    LVIDd Index 2.16 cm/m2    LVIDs 2.2 cm    LVIDs Index 1.11 cm/m2    LVPWd 1.4 0.6 - 1.0 cm    LV RWT Ratio 0.65     LV Mass 2D 219.8 88 - 224 g    LV Mass 2D Index 110.5 49 - 115 g/m2    MV E Velocity 40.70 m/s    MV A Velocity 71.30 m/s    MV E/A 0.57     LVOT Diameter 2.4 cm    LVOT Area 4.5 cm2    LA Volume 4C 59 (A) 18 - 58 mL    LA Volume Index 4C 30 16 - 34 mL/m2    LA Diameter 4.3 cm    LA Size Index 2.16 cm/m2    LA/AO Root Ratio 1.08     RV Basal Dimension 3.0 cm    RV Mid Dimension 2.2 cm    RA Volume 33 ml    RA Volume Index A4C 17 mL/m2    Est. RA Pressure 3 mmHg    Aortic Root 4.0 cm    Ao Root Index 2.01 cm/m2    LVOT Peak Velocity Valsalva 0.8 m/s    AV Peak Velocity 1.2 m/s    MR Peak Velocity 5.2 m/s    MR Peak Gradient 108 mmHg    MV PHT 81.0 ms    MV Area by PHT 2.7 cm2    TR Max Velocity 2.80 m/s    RVSP 34 mmHg    NH Max Velocity 0.4 m/s    PV Max Velocity 1.0 m/s    PV Peak Gradient 4 mmHg   GLUCOSE, POC    Collection Time: 07/30/22 11:45 AM   Result Value Ref Range    Glucose (POC) 126 (H) 65 - 117 mg/dL Performed by Kelly Burgess, POC    Collection Time: 07/30/22  4:03 PM   Result Value Ref Range    Glucose (POC) 120 (H) 65 - 117 mg/dL    Performed by JONH FERNANDEZ    GLUCOSE, POC    Collection Time: 07/30/22  9:52 PM   Result Value Ref Range    Glucose (POC) 182 (H) 65 - 117 mg/dL    Performed by [id number] [place] [address] Road, POC    Collection Time: 07/31/22  7:28 AM   Result Value Ref Range    Glucose (POC) 130 (H) 65 - 117 mg/dL    Performed by JONH FERNANDEZ        MRI BRAIN WO CONT   Final Result   Acute ischemia in the right posterior periventricular white matter involving the   right parietal lobe. No evidence of hemorrhage or significant mass effect or   edema. CTA CODE NEURO HEAD AND NECK W CONT   Final Result   Addendum (preliminary) 1 of 1   Addendum: Findings were called to the patient's nurse Khloe Mcmanus at 6:45 PM on   7/30/2022. Final   Severe cervical and cerebral atherosclerotic vasculopathy. .   Greater than 90% stenosis in the proximal ICA on the left with strings of the   proximal left ICA. Severe stenosis/near occlusion proximal M2 anterior division MCA on the right. Severe stenosis/near occlusion proximal M2 segment right MCA posterior division. Mild to moderate stenosis distal left M1. Mild stenosis at proximal right M1. Moderate chronic microvascular ischemic change and cerebral atrophy. Numerous   scattered chronic small remote lacunar infarctions right and left basal ganglia,   right and left frontal lobes, left francine. DUPLEX CAROTID BILATERAL         XR CHEST PORT   Final Result   No acute infiltrate. CT CODE NEURO HEAD WO CONTRAST   Final Result   No acute findings,   Atrophy white matter disease and remote basal ganglia infarct. Echo 7/30/22       Left Ventricle: Normal left ventricular systolic function with a visually estimated EF of 60 - 65%. Left ventricle size is normal. Increased wall thickness.  Findings consistent with concentric hypertrophy. Abnormal diastolic function. Aortic Valve: Tricuspid valve.  --------------------------------------------    Carotid Duplex 7/30/22 - PRELIMINARY:    Right Carotid    Right arm BP: 153 mmHg. There is mild stenosis in the right ICA (<50%) and at the proximal segment. The right ICA has calcific plaque. The right ECA is patent. The right vertebral is antegrade. There is no stenosis in the right vertebral artery. The right subclavian is normal.     Left Carotid    Left arm BP: 165 mmHg. Carotid bulb has diffuse plaque. There is severe stenosis in the left ICA (>70%) and at the proximal segment . The left ICA has severe and heterogeneous plaque. The left ECA is patent. The left vertebral is antegrade. There is no stenosis in the left vertebral artery. The left subclavian is normal.       Assessment/Plan:     70 WM from SNF with Hx of prior Right Basal Ganglia CVA in past with questionable initial recurrent facial droop; was already on ASA and Statin.     Acute CVA with MCA and Left ICA Severe ASCD   SEE MRI & CTA    Presenting sxs resolved    - Vascular c/s  - Continue Plavix + ASA + High dose statin  - PT/OT    I spoke with Dr. Saniya Bowles (Neuro-Interventionalist at [address] [place] [address] in [address]) => continue med mgmt as above; f/up outpatient for revascularization eval.    => Admit    DVT Prophylaxis: On AC  Code Status: Full Code    ________________________________________  Rustam Anderson MD

## 2022-07-31 NOTE — PROGRESS NOTES
CTA head/neck and MRI Brain resulted. Dr. Michelle Wu paged and made aware of results for both tests. Gave orders to consult Vascular Surgeon Dr. Amanda Corrales for greater than 90% stenosis in the proximal left ICA. Dr. Orellana Sat went over [facility] and made sure patient is prescribed aspirin  and plavix. Patient already prescribed both these medications (81 mg ASA daily and 75 mg Plavix daily). No other orders at this time. Will continue Q4H neurochecks and vital signs and will report any changes in patient condition.

## 2022-07-31 NOTE — PROGRESS NOTES
Neurology Progress Note    Patient ID:  Jose Jordan  [id number]  76 y.o.  1950    Subjective: Gary Leary is seen in follow-up and he has been feeling well with no more numbness or tingling of the left side. He also denied any other new neurological symptoms. Patient is a 70 y.o. right handed  male with history of prior CVA, who was at work yesterday and pushing something when he developed this real intense right-sided headache along with nausea and also felt funny on the left side of the face arm and leg including some numbness. With his previous history of right CVA which affected the left side he got concerned and decided come to the ED. CT scan of the head showed old basal ganglia infarct and some atrophy otherwise no acute findings his symptoms have resolved but still has some headache on the right side he has no previous history of headaches or migraines.     Current Facility-Administered Medications   Medication Dose Route Frequency    hydrOXYzine HCL (ATARAX) tablet 25 mg  25 mg Oral QHS PRN    melatonin tablet 10 mg  10 mg Oral QHS    clopidogreL (PLAVIX) tablet 75 mg  75 mg Oral DAILY    atorvastatin (LIPITOR) tablet 80 mg  80 mg Oral DAILY    aspirin delayed-release tablet 81 mg  81 mg Oral DAILY    pantoprazole (PROTONIX) tablet 40 mg  40 mg Oral DAILY    sodium chloride (NS) flush 5-40 mL  5-40 mL IntraVENous Q8H    sodium chloride (NS) flush 5-40 mL  5-40 mL IntraVENous PRN    acetaminophen (TYLENOL) tablet 650 mg  650 mg Oral Q6H PRN    Or    acetaminophen (TYLENOL) suppository 650 mg  650 mg Rectal Q6H PRN    polyethylene glycol (MIRALAX) packet 17 g  17 g Oral DAILY PRN    ondansetron (ZOFRAN ODT) tablet 4 mg  4 mg Oral Q8H PRN    Or    ondansetron (ZOFRAN) injection 4 mg  4 mg IntraVENous Q6H PRN    enoxaparin (LOVENOX) injection 40 mg  40 mg SubCUTAneous DAILY    insulin lispro (HUMALOG) injection   SubCUTAneous AC&HS    glucose chewable tablet 16 g  4 Tablet Oral PRN glucagon (GLUCAGEN) injection 1 mg  1 mg IntraMUSCular PRN    dextrose 10% infusion 0-250 mL  0-250 mL IntraVENous PRN     Objective:     Patient Vitals for the past 8 hrs:   BP Temp Pulse Resp SpO2   07/31/22 1142 (!) 143/69 98.7 °F (37.1 °C) 72 16 94 %   07/31/22 0812 (!) 159/73 98.5 °F (36.9 °C) 69 16 92 %     No intake/output data recorded. No intake/output data recorded. Lab Review   Recent Results (from the past 24 hour(s))   GLUCOSE, POC    Collection Time: 07/30/22  4:03 PM   Result Value Ref Range    Glucose (POC) 120 (H) 65 - 117 mg/dL    Performed by JONH FERNANDEZ    GLUCOSE, POC    Collection Time: 07/30/22  9:52 PM   Result Value Ref Range    Glucose (POC) 182 (H) 65 - 117 mg/dL    Performed by Crystal Hardy, POC    Collection Time: 07/31/22  7:28 AM   Result Value Ref Range    Glucose (POC) 130 (H) 65 - 117 mg/dL    Performed by JONH FERNANDEZ    GLUCOSE, POC    Collection Time: 07/31/22 11:03 AM   Result Value Ref Range    Glucose (POC) 246 (H) 65 - 117 mg/dL    Performed by [facility]      Additional comments: 1. MRI of the brain showed small acute infarct in the right posterior periventricular white matter, involving the right parietal lobe. 2. CTA of the head and neck showed severe cervical and cerebral atherosclerotic vasculopathy. Greater than 90% stenosis in the proximal left ICA. Severe stenosis/near occlusion of proximal M2 anterior and posterior divisions of the right MCA. Mild to moderate stenosis of distal left M1 and mild stenosis of proximal right M1    NEUROLOGICAL EXAM:  Appearance: The patient is well developed, well nourished, provides a coherent history and is in no acute distress. Mental Status: Oriented to time, place and person. Mood and affect appropriate. Cranial Nerves:   Intact visual fields. VANGIE, EOM's full, no nystagmus, no ptosis. Facial movement is symmetric. Hearing is normal bilaterally.     Motor:  5/5 strength in upper and lower proximal and distal muscles. Normal bulk and tone. Reflexes:   Deep tendon reflexes 2/4 and symmetrical.   Sensory:   Normal to touch, pinprick. Gait:  Deferred. Tremor:   No tremor noted. Cerebellar:  No cerebellar signs present. Neurovascular:  Normal heart sounds and regular rhythm. Assessment:   1. Right hemispheric white matter periventricular and parietal infarct: Intense right-sided headache with nausea and left-sided numbness and tingling: His MRI of the brain without contrast showed acute infarct. 2.  Severe left cervical ICA stenosis at 90% along with moderate to severe stenotic disease of the intracranial vessels bilaterally: Consulting vascular surgery. He is already on aspirin and Plavix which we will continue and agree with the increase in the dose of the atorvastatin. We will order lipid panel for tomorrow. If his LDL is high, more than 70, consider Repatha  3. History of right CVA with good recovery  4. Cirrhosis of liver  5. Hypertension  6. Diabetes mellitus     Plan:   1. Continue with aspirin and the Plavix. 2.  Vascular surgery consult for left ICA severe stenosis at 90%  3. Fasting lipid panel in the morning and if the LDL more is more than 70 consider Repatha 140 mg subcu every 2 weeks. Will continue to follow.     Thank you,    Signed:  Alek Pedersen MD  7/31/2022  2:02 PM

## 2022-08-01 LAB
CHOLEST SERPL-MCNC: 100 MG/DL
GLUCOSE BLD STRIP.AUTO-MCNC: 124 MG/DL (ref 65–117)
GLUCOSE BLD STRIP.AUTO-MCNC: 126 MG/DL (ref 65–117)
GLUCOSE BLD STRIP.AUTO-MCNC: 165 MG/DL (ref 65–117)
GLUCOSE BLD STRIP.AUTO-MCNC: 192 MG/DL (ref 65–117)
HDLC SERPL-MCNC: 37 MG/DL
HDLC SERPL: 2.7 {RATIO} (ref 0–5)
LDLC SERPL CALC-MCNC: 32.6 MG/DL (ref 0–100)
LIPID PROFILE,FLP: ABNORMAL
PERFORMED BY, TECHID: ABNORMAL
TRIGL SERPL-MCNC: 152 MG/DL (ref ?–150)
VLDLC SERPL CALC-MCNC: 30.4 MG/DL

## 2022-08-01 PROCEDURE — 36415 COLL VENOUS BLD VENIPUNCTURE: CPT

## 2022-08-01 PROCEDURE — 80061 LIPID PANEL: CPT

## 2022-08-01 PROCEDURE — 74011250637 HC RX REV CODE- 250/637: Performed by: INTERNAL MEDICINE

## 2022-08-01 PROCEDURE — 74011636637 HC RX REV CODE- 636/637: Performed by: INTERNAL MEDICINE

## 2022-08-01 PROCEDURE — 74011000250 HC RX REV CODE- 250: Performed by: INTERNAL MEDICINE

## 2022-08-01 PROCEDURE — 99222 1ST HOSP IP/OBS MODERATE 55: CPT | Performed by: SURGERY

## 2022-08-01 PROCEDURE — 82962 GLUCOSE BLOOD TEST: CPT

## 2022-08-01 PROCEDURE — 74011250636 HC RX REV CODE- 250/636: Performed by: INTERNAL MEDICINE

## 2022-08-01 PROCEDURE — 65270000029 HC RM PRIVATE

## 2022-08-01 RX ADMIN — PANTOPRAZOLE SODIUM 40 MG: 40 TABLET, DELAYED RELEASE ORAL at 08:00

## 2022-08-01 RX ADMIN — CLOPIDOGREL BISULFATE 75 MG: 75 TABLET ORAL at 08:00

## 2022-08-01 RX ADMIN — ASPIRIN 81 MG: 81 TABLET, COATED ORAL at 08:00

## 2022-08-01 RX ADMIN — SODIUM CHLORIDE, PRESERVATIVE FREE 10 ML: 5 INJECTION INTRAVENOUS at 05:44

## 2022-08-01 RX ADMIN — MELATONIN TAB 5 MG 10 MG: 5 TAB at 21:34

## 2022-08-01 RX ADMIN — INSULIN LISPRO 2 UNITS: 100 INJECTION, SOLUTION INTRAVENOUS; SUBCUTANEOUS at 13:11

## 2022-08-01 RX ADMIN — ACETAMINOPHEN 650 MG: 325 TABLET, FILM COATED ORAL at 07:52

## 2022-08-01 RX ADMIN — SODIUM CHLORIDE, PRESERVATIVE FREE 10 ML: 5 INJECTION INTRAVENOUS at 22:52

## 2022-08-01 RX ADMIN — HYDROXYZINE HYDROCHLORIDE 25 MG: 25 TABLET ORAL at 21:37

## 2022-08-01 RX ADMIN — ENOXAPARIN SODIUM 40 MG: 100 INJECTION SUBCUTANEOUS at 08:00

## 2022-08-01 RX ADMIN — ATORVASTATIN CALCIUM 80 MG: 40 TABLET, FILM COATED ORAL at 08:00

## 2022-08-01 NOTE — PROGRESS NOTES
CM reviewed chart. Discharge dispo: SNF. Patient has been accepted back by [facility]. CM will continue to follow.

## 2022-08-01 NOTE — CONSULTS
History and Physical    Chief complaints: Stroke   History of Presenting Illness:  Hossein Howell is a 70 y.o. very pleasant man currently hospitalized with a facial droop and history of left lower leg weakness in the left arm weakness. Patient had a previous 3 strokes in the past.  Patient's facial droop is resolved. Patient examined this morning. He is awake and alert. No obvious distress. Vital signs stable. Patient denies any pain. Denies abdominal pain. Chart reviewed and imagings reviewed. Past Medical History:   Diagnosis Date    Cirrhosis ([facility] [address]     Diabetes ([facility] [address]     Hypertension     Stroke [facility])       Past Surgical History:   Procedure Laterality Date    HX BACK SURGERY      HX GI       Family History   Problem Relation Age of Onset    Heart Disease Father       Social History     Tobacco Use    Smoking status: Former    Smokeless tobacco: Never   Substance Use Topics    Alcohol use: Not Currently       Prior to Admission medications    Medication Sig Start Date End Date Taking? Authorizing Provider   acetaminophen (TYLENOL) 325 mg tablet Take 650 mg by mouth every eight (8) hours as needed for Pain. Yes Provider, Historical   ARIPiprazole (ABILIFY) 2 mg tablet Take 2 mg by mouth in the morning. Yes Provider, Historical   baclofen 5 mg tab Take 5 mg by mouth two (2) times a day. Yes Provider, Historical   carvediloL (COREG) 12.5 mg tablet Take 12.5 mg by mouth two (2) times a day. Indications: high blood pressure   Yes Provider, Historical   diclofenac (VOLTAREN) 1 % gel Apply  to affected area three (3) times daily. Apply to left great toe   Yes Provider, Historical   escitalopram oxalate (LEXAPRO) 20 mg tablet Take 20 mg by mouth in the morning. Yes Provider, Historical   gabapentin (NEURONTIN) 100 mg capsule Take 100 mg by mouth nightly. Yes Provider, Historical   glucose 4 gram chewable tablet Take 16 g by mouth as needed.  BS less than 70   Yes Provider, Historical hydrOXYzine HCL (ATARAX) 25 mg tablet Take 25 mg by mouth nightly as needed for Anxiety. Yes Provider, Historical   insulin lispro (HUMALOG) 100 unit/mL injection 10 Units by SubCUTAneous route nightly. Yes Provider, Historical   furosemide (LASIX) 40 mg tablet Take 40 mg by mouth in the morning. Indications: visible water retention   Yes Provider, Historical   lidocaine (XYLOCAINE) 5 % ointment Apply  to affected area two (2) times a day. Apply to knees   Yes Provider, Historical   loratadine (CLARITIN) 10 mg tablet Take 10 mg by mouth in the morning. Yes Provider, Historical   losartan (COZAAR) 100 mg tablet Take 100 mg by mouth in the morning. Yes Provider, Historical   melatonin 5 mg tablet Take 10 mg by mouth nightly. Yes Provider, Historical   metFORMIN (GLUCOPHAGE) 500 mg tablet Take 1,000 mg by mouth two (2) times daily (with meals). Yes Provider, Historical   potassium chloride (K-DUR, KLOR-CON M20) 20 mEq tablet Take 20 mEq by mouth two (2) times a day. Yes Provider, Historical   senna-docusate (PERICOLACE) 8.6-50 mg per tablet Take 1 Tablet by mouth in the morning. Yes Provider, Historical   tamsulosin (FLOMAX) 0.4 mg capsule Take 0.4 mg by mouth nightly. Yes Provider, Historical   aspirin delayed-release 81 mg tablet Take 81 mg by mouth in the morning. Yes Other, MD Adam   omeprazole (PRILOSEC) 20 mg capsule Take 20 mg by mouth daily. Yes Other, MD Adam   atorvastatin (LIPITOR) 40 mg tablet Take 40 mg by mouth in the morning. Yes Rowan, MD Adam     No Known Allergies     Review of Systems:  Pertinent review of systems discussed in HPI, and rest of organ systems personally reviewed and they are negative.     Objective:   Vital signs reviewed:      Visit Vitals  BP (!) 150/85 (BP 1 Location: Right upper arm)   Pulse 62   Temp 97.9 °F (36.6 °C)   Resp 18   Ht 5' 8\" (1.727 m)   Wt 187 lb (84.8 kg)   SpO2 92%   BMI 28.43 kg/m²       Physical Exam:   General appearance:   Patient is awake and alert, not in particular distress. Head and neck atraumatic normocephalic. ENT shows normal oral mucosa, no jaundice no hoarse voice. Eyes: Pupil equal gaze appropriate. Cardiac system regular rate rhythm. Pulmonary: No audible wheeze. Chest wall: Chest wall excursion normal with respiration cycle, there is no deformity or chest trauma. Abdomen: Soft not tender or distended, bowel sounds active. There is no obvious palpable mass, or hernia. Neurologic: Nonfocal.  Cranial nerves intact, no new focal findings. Musculoskeletal system: Motor function is about 4 out of 5 left arm and the left leg. Hematologic system: No obvious bruising. Psychosocial: Appropriate and cooperative. Vascular examination: Lower and upper extremities warm to touch, no signs of ischemia or cyanosis. Data Review: Labs are reviewed. Discussed  Recent Results (from the past 24 hour(s))   GLUCOSE, POC    Collection Time: 07/31/22 11:03 AM   Result Value Ref Range    Glucose (POC) 246 (H) 65 - 117 mg/dL    Performed by [address] [place] [address], POC    Collection Time: 07/31/22  3:34 PM   Result Value Ref Range    Glucose (POC) 110 65 - 117 mg/dL    Performed by Darrick Morejon    GLUCOSE, POC    Collection Time: 07/31/22  7:13 PM   Result Value Ref Range    Glucose (POC) 121 (H) 65 - 117 mg/dL    Performed by Horris Mcburney    GLUCOSE, POC    Collection Time: 08/01/22  7:25 AM   Result Value Ref Range    Glucose (POC) 124 (H) 65 - 117 mg/dL    Performed by Matthias Hernadezon              Imagings reviewed: discussed as below. No name on file. Assessment:     Active Problems:    TIA (transient ischemic attack) (7/29/2022)      CVA (cerebral vascular accident) ([facility] [address] (7/31/2022)        Plan:     I did review MRI. Patient does have a acute ischemic stroke right parietal area. It appears the patient has completed stroke, not TIA.       CT angiogram neck shows about 50% stenosis right proximal ICA without soft plaque or thrombus. Most likely this is a intracerebral event. However left proximal ICA has significant narrowing may benefit from left carotid endarterectomy. Patient has ischemic changes in MRI. Therefore recommend, carotid endarterectomy outpatient 4 to 6 weeks time. I would recommend cardiac work-up while patient is hospitalized for preop evaluation. Once her cardiac work-up is completed patient can be discharged and I will discuss and offer patient left carotid endarterectomy as outpatient.

## 2022-08-01 NOTE — PROGRESS NOTES
Problem: Falls - Risk of  Goal: *Absence of Falls  Description: Document Tucker Mg Fall Risk and appropriate interventions in the flowsheet.   Outcome: Progressing Towards Goal  Note: Fall Risk Interventions:  Mobility Interventions: Assess mobility with egress test, Patient to call before getting OOB    Mentation Interventions: Bed/chair exit alarm, Adequate sleep, hydration, pain control    Medication Interventions: Bed/chair exit alarm, Patient to call before getting OOB    Elimination Interventions: Call light in reach, Bed/chair exit alarm    History of Falls Interventions: Bed/chair exit alarm

## 2022-08-01 NOTE — PROGRESS NOTES
Hospitalist Progress Note    Subjective:   Daily Progress Note: 8/1/2022 10:45 AM    Patient's main complaint was dysuria that began yesterday. Patient reports no changes in sensation or strength and denied headache, facial droop, chest pain, shortness of breath, nausea, vomiting. Current Facility-Administered Medications   Medication Dose Route Frequency    hydrOXYzine HCL (ATARAX) tablet 25 mg  25 mg Oral QHS PRN    melatonin tablet 10 mg  10 mg Oral QHS    clopidogreL (PLAVIX) tablet 75 mg  75 mg Oral DAILY    atorvastatin (LIPITOR) tablet 80 mg  80 mg Oral DAILY    aspirin delayed-release tablet 81 mg  81 mg Oral DAILY    pantoprazole (PROTONIX) tablet 40 mg  40 mg Oral DAILY    sodium chloride (NS) flush 5-40 mL  5-40 mL IntraVENous Q8H    sodium chloride (NS) flush 5-40 mL  5-40 mL IntraVENous PRN    acetaminophen (TYLENOL) tablet 650 mg  650 mg Oral Q6H PRN    Or    acetaminophen (TYLENOL) suppository 650 mg  650 mg Rectal Q6H PRN    polyethylene glycol (MIRALAX) packet 17 g  17 g Oral DAILY PRN    ondansetron (ZOFRAN ODT) tablet 4 mg  4 mg Oral Q8H PRN    Or    ondansetron (ZOFRAN) injection 4 mg  4 mg IntraVENous Q6H PRN    enoxaparin (LOVENOX) injection 40 mg  40 mg SubCUTAneous DAILY    insulin lispro (HUMALOG) injection   SubCUTAneous AC&HS    glucose chewable tablet 16 g  4 Tablet Oral PRN    glucagon (GLUCAGEN) injection 1 mg  1 mg IntraMUSCular PRN    dextrose 10% infusion 0-250 mL  0-250 mL IntraVENous PRN        Review of Systems  Review of Systems   Constitutional: Negative. HENT: Negative. Eyes: Negative. Respiratory: Negative. Cardiovascular: Negative. Gastrointestinal: Negative. Genitourinary:  Positive for dysuria. Negative for flank pain and hematuria. Musculoskeletal: Negative. Skin: Negative. Neurological: Negative. Endo/Heme/Allergies: Negative. Psychiatric/Behavioral: Negative.             Objective:     Visit Vitals  BP (!) 150/85 (BP 1 Location: Right upper arm)   Pulse 62   Temp 97.9 °F (36.6 °C)   Resp 18   Ht 5' 8\" (1.727 m)   Wt 187 lb (84.8 kg)   SpO2 92%   BMI 28.43 kg/m²      O2 Device: None (Room air)    Temp (24hrs), Av.1 °F (36.7 °C), Min:97.7 °F (36.5 °C), Max:98.8 °F (37.1 °C)      No intake/output data recorded.  190 -  0700  In: 1440 [P.O.:1440]  Out: 2150 [Urine:2150]    Recent Results (from the past 24 hour(s))   GLUCOSE, POC    Collection Time: 22 11:03 AM   Result Value Ref Range    Glucose (POC) 246 (H) 65 - 117 mg/dL    Performed by [address], POC    Collection Time: 22  3:34 PM   Result Value Ref Range    Glucose (POC) 110 65 - 117 mg/dL    Performed by Chalino Arita    GLUCOSE, POC    Collection Time: 22  7:13 PM   Result Value Ref Range    Glucose (POC) 121 (H) 65 - 117 mg/dL    Performed by Darion lAmeida    GLUCOSE, POC    Collection Time: 22  7:25 AM   Result Value Ref Range    Glucose (POC) 124 (H) 65 - 117 mg/dL    Performed by Juanjo Bernardo         MRI BRAIN WO CONT   Final Result   Acute ischemia in the right posterior periventricular white matter involving the   right parietal lobe. No evidence of hemorrhage or significant mass effect or   edema. CTA CODE NEURO HEAD AND NECK W CONT   Final Result   Addendum (preliminary)    Addendum: Findings were called to the patient's nurse Alonzo Chen at 6:45 PM on   2022. Final   Severe cervical and cerebral atherosclerotic vasculopathy. .   Greater than 90% stenosis in the proximal ICA on the left with strings of the   proximal left ICA. Severe stenosis/near occlusion proximal M2 anterior division MCA on the right. Severe stenosis/near occlusion proximal M2 segment right MCA posterior division. Mild to moderate stenosis distal left M1. Mild stenosis at proximal right M1. Moderate chronic microvascular ischemic change and cerebral atrophy.  Numerous   scattered chronic small remote lacunar infarctions right and left basal ganglia,   right and left frontal lobes, left francine. DUPLEX CAROTID BILATERAL         XR CHEST PORT   Final Result   No acute infiltrate. CT CODE NEURO HEAD WO CONTRAST   Final Result   No acute findings,   Atrophy white matter disease and remote basal ganglia infarct. PHYSICAL EXAM:    Physical Exam  Constitutional:       General: He is not in acute distress. Appearance: Normal appearance. He is not ill-appearing. HENT:      Head: Normocephalic and atraumatic. Eyes:      Extraocular Movements: Extraocular movements intact. Pupils: Pupils are equal, round, and reactive to light. Cardiovascular:      Rate and Rhythm: Normal rate and regular rhythm. Pulmonary:      Effort: Pulmonary effort is normal.      Breath sounds: Normal breath sounds. Abdominal:      General: Abdomen is flat. Bowel sounds are normal.      Palpations: Abdomen is soft. Tenderness: There is no abdominal tenderness. Musculoskeletal:         General: No swelling. Cervical back: Normal range of motion and neck supple. Skin:     General: Skin is warm and dry. Findings: Lesion present. Comments: Several scattered ulcer-like wounds on his bilateral knees and anterior shins with dried blood   Neurological:      General: No focal deficit present. Mental Status: He is alert and oriented to person, place, and time. Mental status is at baseline. Cranial Nerves: No cranial nerve deficit. Sensory: No sensory deficit. Motor: No weakness.       Coordination: Coordination normal.   Psychiatric:         Mood and Affect: Mood normal.         Behavior: Behavior normal.        Data Review    Recent Results (from the past 24 hour(s))   GLUCOSE, POC    Collection Time: 07/31/22 11:03 AM   Result Value Ref Range    Glucose (POC) 246 (H) 65 - 117 mg/dL    Performed by [address] [place] [address] POC    Collection Time: 07/31/22  3:34 PM   Result Value Ref Range    Glucose (POC) 110 65 - 117 mg/dL    Performed by Ricardo Estrada, POC    Collection Time: 07/31/22  7:13 PM   Result Value Ref Range    Glucose (POC) 121 (H) 65 - 117 mg/dL    Performed by Shruthi Moody    GLUCOSE, POC    Collection Time: 08/01/22  7:25 AM   Result Value Ref Range    Glucose (POC) 124 (H) 65 - 117 mg/dL    Performed by Amanda Caceres         Assessment/Plan:     Active Problems:    TIA (transient ischemic attack) (7/29/2022)      CVA (cerebral vascular accident) ([facility] [address] (7/31/2022)    Patient was admitted on 7/29/2022 following presentation to the ED for transient facial droop 11:30 AM.  Due to history of strokes, suspected TIA versus CVA. CT was negative, patient was admitted for possible TIA and MRI was ordered. MRI showed acute ischemic stroke of the right parietal area. Bilateral carotid ultrasounds revealed 50% stenosis of right ICA, 90% stenosis of left ICA. Urine culture positive for gram-negative rods. Vascular surgery consultation and will need carotid endarterectomy in approximately 4 weeks due to the recent stroke. Cardiac clearance while patient is admitted and he can be discharged after evaluation back to [address] [place] [address]. Acute ischemic stroke right parietal area,  CTA neck showed 50% stenosis right proximal ICA  Patient currently on aspirin, Plavix and Lipitor, continue    2. Left carotid artery stenosis    Vascular Surgery recommended left carotid endarterectomy as outpatient in 4 to 6 weeks  90% stenosis of the left carotid artery, 50% stenosis of the right carotid artery    3. UTI   gram-negative rods  Rocephin    4. History of right CVA with good recovery  Patient reports no residual weakness    5. Essential hypertension  /85  Allowing permissive hypertension due to the severe stenosis of the left carotid artery. We will allow up to 227 systolic at this point until surgery. Continue Coreg and Lasix as needed    6. Diabetes mellitus   Continue sliding-scale insulin    CODE STATUS: Full code    DVT prophylaxis: Lovenox  Ulcer prophylaxis: Protonix    Discharge barriers: Cardiac clearance and discharge to SNF with carotid enterectomy scheduled for 4 weeks from now    Care Plan discussed with: Patient/Family    Total time spent with patient: >35 minutes.

## 2022-08-01 NOTE — CONSULTS
Neurology Progress Note    Patient ID:  Sena Client  [id number]  51 y.o.  1950    Subjective: Margareth Fisher is seen in follow-up and he has been feeling well with no more numbness or tingling of the left side. He also denied any other new neurological symptoms. Patient is a 70 y.o. right handed  male with history of prior CVA, who was at work yesterday and pushing something when he developed this real intense right-sided headache along with nausea and also felt funny on the left side of the face arm and leg including some numbness. With his previous history of right CVA which affected the left side he got concerned and decided come to the ED. CT scan of the head showed old basal ganglia infarct and some atrophy otherwise no acute findings his symptoms have resolved but still has some headache on the right side he has no previous history of headaches or migraines.     Current Facility-Administered Medications   Medication Dose Route Frequency    hydrOXYzine HCL (ATARAX) tablet 25 mg  25 mg Oral QHS PRN    melatonin tablet 10 mg  10 mg Oral QHS    clopidogreL (PLAVIX) tablet 75 mg  75 mg Oral DAILY    atorvastatin (LIPITOR) tablet 80 mg  80 mg Oral DAILY    aspirin delayed-release tablet 81 mg  81 mg Oral DAILY    pantoprazole (PROTONIX) tablet 40 mg  40 mg Oral DAILY    sodium chloride (NS) flush 5-40 mL  5-40 mL IntraVENous Q8H    sodium chloride (NS) flush 5-40 mL  5-40 mL IntraVENous PRN    acetaminophen (TYLENOL) tablet 650 mg  650 mg Oral Q6H PRN    Or    acetaminophen (TYLENOL) suppository 650 mg  650 mg Rectal Q6H PRN    polyethylene glycol (MIRALAX) packet 17 g  17 g Oral DAILY PRN    ondansetron (ZOFRAN ODT) tablet 4 mg  4 mg Oral Q8H PRN    Or    ondansetron (ZOFRAN) injection 4 mg  4 mg IntraVENous Q6H PRN    enoxaparin (LOVENOX) injection 40 mg  40 mg SubCUTAneous DAILY    insulin lispro (HUMALOG) injection   SubCUTAneous AC&HS    glucose chewable tablet 16 g  4 Tablet Oral PRN glucagon (GLUCAGEN) injection 1 mg  1 mg IntraMUSCular PRN    dextrose 10% infusion 0-250 mL  0-250 mL IntraVENous PRN     Objective:     Patient Vitals for the past 8 hrs:   BP Temp Pulse Resp SpO2   08/01/22 0749 (!) 150/85 97.9 °F (36.6 °C) 62 18 92 %   08/01/22 0315 (!) 149/76 97.9 °F (36.6 °C) 68 18 96 %     No intake/output data recorded. 07/30 1901 - 08/01 0700  In: 1440 [P.O.:1440]  Out: 2150 [Urine:2150]    Lab Review   Recent Results (from the past 24 hour(s))   GLUCOSE, POC    Collection Time: 07/31/22 11:03 AM   Result Value Ref Range    Glucose (POC) 246 (H) 65 - 117 mg/dL    Performed by [facility], POC    Collection Time: 07/31/22  3:34 PM   Result Value Ref Range    Glucose (POC) 110 65 - 117 mg/dL    Performed by Drake Darling    GLUCOSE, POC    Collection Time: 07/31/22  7:13 PM   Result Value Ref Range    Glucose (POC) 121 (H) 65 - 117 mg/dL    Performed by Grant Jin    GLUCOSE, POC    Collection Time: 08/01/22  7:25 AM   Result Value Ref Range    Glucose (POC) 124 (H) 65 - 117 mg/dL    Performed by Paloma Toure      Additional comments: 1. MRI of the brain showed small acute infarct in the right posterior periventricular white matter, involving the right parietal lobe. 2. CTA of the head and neck showed severe cervical and cerebral atherosclerotic vasculopathy. Greater than 90% stenosis in the proximal left ICA. Severe stenosis/near occlusion of proximal M2 anterior and posterior divisions of the right MCA. Mild to moderate stenosis of distal left M1 and mild stenosis of proximal right M1    NEUROLOGICAL EXAM:  Appearance: The patient is well developed, well nourished, provides a coherent history and is in no acute distress. Mental Status: Oriented to time, place and person. Mood and affect appropriate. Cranial Nerves:   Intact visual fields. VANGIE, EOM's full, no nystagmus, no ptosis. Facial movement is symmetric. Hearing is normal bilaterally.     Motor: 5/5 strength in upper and lower proximal and distal muscles. Normal bulk and tone. Reflexes:   Deep tendon reflexes 2/4 and symmetrical.   Sensory:   Normal to touch, pinprick. Gait:  Deferred. Tremor:   No tremor noted. Cerebellar:  No cerebellar signs present. Neurovascular:  Normal heart sounds and regular rhythm. Assessment:   1. Right hemispheric white matter periventricular and parietal infarct: Intense right-sided headache with nausea and left-sided numbness and tingling: His MRI of the brain without contrast showed acute infarct. 2.  Severe left cervical ICA stenosis at 90% along with moderate to severe stenotic disease of the intracranial vessels bilaterally: Consulting vascular surgery. He is already on aspirin and Plavix which we will continue and agree with the increase in the dose of the atorvastatin. We will order lipid panel for tomorrow. If his LDL is high, more than 70, consider Repatha (willl discuss outpatient)  3. History of right CVA with good recovery  4. Cirrhosis of liver  5. Hypertension  6. Diabetes mellitus     Plan:   1. Continue with aspirin and the Plavix, atorvastatin 80mg daily   2. Vascular surgery consult for left ICA severe stenosis at 90%.  CEA planned outpatient  3 follow up with Dr. Julio Sauceda in 4-6 weeks      Thank you,    Signed:  Marilu Brunner, MD  8/1/2022  2:02 PM

## 2022-08-02 PROBLEM — N30.90 CYSTITIS: Status: ACTIVE | Noted: 2022-01-01

## 2022-08-02 PROBLEM — I65.29 CAROTID ARTERY STENOSIS: Status: ACTIVE | Noted: 2022-08-02

## 2022-08-02 LAB
ATRIAL RATE: 56 BPM
ATRIAL RATE: 79 BPM
BACTERIA SPEC CULT: ABNORMAL
CALCULATED P AXIS, ECG09: 55 DEGREES
CALCULATED R AXIS, ECG10: 20 DEGREES
CALCULATED R AXIS, ECG10: 6 DEGREES
CALCULATED T AXIS, ECG11: 130 DEGREES
CALCULATED T AXIS, ECG11: 131 DEGREES
COLONY COUNT,CNT: ABNORMAL
DIAGNOSIS, 93000: NORMAL
DIAGNOSIS, 93000: NORMAL
GLUCOSE BLD STRIP.AUTO-MCNC: 128 MG/DL (ref 65–117)
GLUCOSE BLD STRIP.AUTO-MCNC: 146 MG/DL (ref 65–117)
GLUCOSE BLD STRIP.AUTO-MCNC: 169 MG/DL (ref 65–117)
GLUCOSE BLD STRIP.AUTO-MCNC: 208 MG/DL (ref 65–117)
LEFT ARM BP: 165 MMHG
LEFT CCA DIST DIAS: 7.6 CM/S
LEFT CCA DIST SYS: 80.2 CM/S
LEFT CCA PROX DIAS: 7.5 CM/S
LEFT CCA PROX SYS: 73 CM/S
LEFT ECA DIAS: 0 CM/S
LEFT ECA SYS: 156 CM/S
LEFT ICA DIST DIAS: 18.3 CM/S
LEFT ICA DIST SYS: 118 CM/S
LEFT ICA MID DIAS: 81.2 CM/S
LEFT ICA MID SYS: 439 CM/S
LEFT ICA PROX DIAS: 107 CM/S
LEFT ICA PROX SYS: 470 CM/S
LEFT ICA/CCA SYS: 5.88
LEFT VERTEBRAL DIAS: 10.7 CM/S
LEFT VERTEBRAL SYS: 66.6 CM/S
P-R INTERVAL, ECG05: 142 MS
P-R INTERVAL, ECG05: 156 MS
PERFORMED BY, TECHID: ABNORMAL
Q-T INTERVAL, ECG07: 418 MS
Q-T INTERVAL, ECG07: 460 MS
QRS DURATION, ECG06: 86 MS
QRS DURATION, ECG06: 92 MS
QTC CALCULATION (BEZET), ECG08: 443 MS
QTC CALCULATION (BEZET), ECG08: 479 MS
RIGHT ARM BP: 153 MMHG
RIGHT CCA DIST DIAS: 11 CM/S
RIGHT CCA DIST SYS: 83.9 CM/S
RIGHT CCA PROX DIAS: 1.4 CM/S
RIGHT CCA PROX SYS: 93.5 CM/S
RIGHT ECA DIAS: 0 CM/S
RIGHT ECA SYS: 113 CM/S
RIGHT ICA DIST DIAS: 25.8 CM/S
RIGHT ICA DIST SYS: 106 CM/S
RIGHT ICA PROX DIAS: 15.1 CM/S
RIGHT ICA PROX SYS: 60.9 CM/S
RIGHT ICA/CCA SYS: 1.26
RIGHT VERTEBRAL DIAS: 0 CM/S
RIGHT VERTEBRAL SYS: 46.2 CM/S
SPECIAL REQUESTS,SREQ: ABNORMAL
VAS LEFT SUBCLAVIAN PROX PSV: 131 CM/S
VAS RIGHT SUBCLAVIAN PROX EDV: 0 CM/S
VAS RIGHT SUBCLAVIAN PROX PSV: 251 CM/S
VENTRICULAR RATE, ECG03: 56 BPM
VENTRICULAR RATE, ECG03: 79 BPM

## 2022-08-02 PROCEDURE — 93880 EXTRACRANIAL BILAT STUDY: CPT | Performed by: SURGERY

## 2022-08-02 PROCEDURE — 65270000029 HC RM PRIVATE

## 2022-08-02 PROCEDURE — 74011250637 HC RX REV CODE- 250/637: Performed by: INTERNAL MEDICINE

## 2022-08-02 PROCEDURE — 74011636637 HC RX REV CODE- 636/637: Performed by: INTERNAL MEDICINE

## 2022-08-02 PROCEDURE — 74011250636 HC RX REV CODE- 250/636: Performed by: INTERNAL MEDICINE

## 2022-08-02 PROCEDURE — 97116 GAIT TRAINING THERAPY: CPT

## 2022-08-02 PROCEDURE — 82962 GLUCOSE BLOOD TEST: CPT

## 2022-08-02 PROCEDURE — 99232 SBSQ HOSP IP/OBS MODERATE 35: CPT | Performed by: SURGERY

## 2022-08-02 PROCEDURE — 93005 ELECTROCARDIOGRAM TRACING: CPT

## 2022-08-02 PROCEDURE — 74011000250 HC RX REV CODE- 250: Performed by: INTERNAL MEDICINE

## 2022-08-02 RX ORDER — CLOPIDOGREL BISULFATE 75 MG/1
75 TABLET ORAL DAILY
Qty: 30 TABLET | Refills: 1 | Status: SHIPPED | OUTPATIENT
Start: 2022-08-03

## 2022-08-02 RX ORDER — INSULIN LISPRO 100 [IU]/ML
INJECTION, SOLUTION INTRAVENOUS; SUBCUTANEOUS
Qty: 1 EACH | Refills: 1 | Status: SHIPPED | OUTPATIENT
Start: 2022-08-02

## 2022-08-02 RX ORDER — CEPHALEXIN 500 MG/1
500 CAPSULE ORAL 4 TIMES DAILY
Qty: 28 CAPSULE | Refills: 0 | Status: SHIPPED | OUTPATIENT
Start: 2022-08-02 | End: 2022-08-09

## 2022-08-02 RX ADMIN — CLOPIDOGREL BISULFATE 75 MG: 75 TABLET ORAL at 10:36

## 2022-08-02 RX ADMIN — ATORVASTATIN CALCIUM 80 MG: 40 TABLET, FILM COATED ORAL at 10:37

## 2022-08-02 RX ADMIN — INSULIN LISPRO 2 UNITS: 100 INJECTION, SOLUTION INTRAVENOUS; SUBCUTANEOUS at 10:36

## 2022-08-02 RX ADMIN — ASPIRIN 81 MG: 81 TABLET, COATED ORAL at 10:36

## 2022-08-02 RX ADMIN — MELATONIN TAB 5 MG 10 MG: 5 TAB at 22:14

## 2022-08-02 RX ADMIN — INSULIN LISPRO 4 UNITS: 100 INJECTION, SOLUTION INTRAVENOUS; SUBCUTANEOUS at 13:54

## 2022-08-02 RX ADMIN — SODIUM CHLORIDE, PRESERVATIVE FREE 10 ML: 5 INJECTION INTRAVENOUS at 06:08

## 2022-08-02 RX ADMIN — HYDROXYZINE HYDROCHLORIDE 25 MG: 25 TABLET ORAL at 12:27

## 2022-08-02 RX ADMIN — ENOXAPARIN SODIUM 40 MG: 100 INJECTION SUBCUTANEOUS at 10:36

## 2022-08-02 RX ADMIN — PANTOPRAZOLE SODIUM 40 MG: 40 TABLET, DELAYED RELEASE ORAL at 10:37

## 2022-08-02 NOTE — PROGRESS NOTES
PHYSICAL THERAPY TREATMENT  Patient: Joss Castano (22 y.o. male)  Date: 8/2/2022  Diagnosis: TIA (transient ischemic attack) [G45.9]  [facility] [I63.9] <principal problem not specified>      Precautions:    Chart, physical therapy assessment, plan of care and goals were reviewed. ASSESSMENT  Patient continues with skilled PT services and is progressing towards goals. Patient supine in bed upon approach asleep but awoke to voice and agreed to therapy session today. Patient was SBA for bed mobility,, supine<>sit and scooting EOB. Patient then demonstrated intact sitting balance while sitting EOB. Patient then performed STS to RW at min A and ambulated 12 feet inside room with RW at [facility]. Patient demonstrated slow shuffled gait with decreased YANDEL stride length but no LOB or knee buckling noted. Patient then sat EOB at min A for 1-2 minutes for short rest break before. Performing second STS at min A and ambulating another 12 feet at [facility]. During second ambulation patient urinated on floor. Patient returned to seated EOB at min A and sat EOB while therapist cleaned floor and changed/cleaned patients feet and socks. Patient did not wet gown. Patient then returned to supine in bed at [facility] for sit>supine transfer. Patient then repositioned in bed at max A and left supine in bed with call bell within reach and all needs meet. Current Level of Function Impacting Discharge (mobility/balance): impaired balance, general weakness,a poor activity tolerance    Other factors to consider for discharge: PLOF, assistance at home, level of deficits, acute medical state, timse since onset. PLAN :  Patient continues to benefit from skilled intervention to address the above impairments. Continue treatment per established plan of care. to address goals.     Recommendation for discharge: (in order for the patient to meet his/her long term goals)  Matt Avelar vs return to LTC    This discharge recommendation:  Has been made in collaboration with the attending provider and/or case management    IF patient discharges home will need the following DME: gait belt and rolling walker       SUBJECTIVE:   Patient stated I have walked twice today now.     OBJECTIVE DATA SUMMARY:   Critical Behavior:  Neurologic State: Alert  Orientation Level: Oriented to person  Cognition: Follows commands     Functional Mobility Training:  Bed Mobility:  Rolling: Stand-by assistance  Supine to Sit: Stand-by assistance  Sit to Supine: Stand-by assistance  Scooting: Stand-by assistance  Transfers:  Sit to Stand: Minimum assistance  Stand to Sit: Minimum assistance  Balance:  Sitting: Intact; Without support  Standing: Impaired; With support;Pull to stand  Standing - Static: Fair;Constant support  Standing - Dynamic : Fair;Constant support  Ambulation/Gait Training:  Distance (ft): 24 Feet (ft)  Assistive Device: Gait belt;Walker, rolling  Ambulation - Level of Assistance: Contact guard assistance  Base of Support: Narrowed  Speed/Kassy: Slow;Shuffled;Pace decreased (<100 feet/min)  Pain Ratin/10    Activity Tolerance:   Good  Please refer to the flowsheet for vital signs taken during this treatment. After treatment patient left in no apparent distress:   Supine in bed, Call bell within reach, Bed / chair alarm activated, and Side rails x 3    COMMUNICATION/COLLABORATION:   The patients plan of care was discussed with: Registered nurse. Problem: Mobility Impaired (Adult and Pediatric)  Goal: *Acute Goals and Plan of Care (Insert Text)  Description: Pt stated goal: to get better  Pt will be I with LE HEP in 7 days. Pt will perform bed mobility with mod I in 7 days. Pt will perform transfers with mod I in 7 days. Pt will amb 25-50 feet with LRAD safely with mod I in 7 days.        Outcome: Progressing Towards Goal       Noemi Cain PTA   Time Calculation: 30 mins

## 2022-08-02 NOTE — DISCHARGE SUMMARY
Admit date: 7/29/2022   Admitting Provider: Van Conrad MD    Discharge date: 8/2/2022  Discharging Provider: Chris Garza PA-C      * Admission Diagnoses: TIA (transient ischemic attack) [G45.9]  CVA (cerebral vascular accident) [facility]) [I63.9]    * Discharge Diagnoses:    Hospital Problems as of 8/2/2022 Never Reviewed            Codes Class Noted - Resolved POA    Carotid artery stenosis ICD-10-CM: I65.29  ICD-9-CM: 433.10  8/2/2022 - Present Unknown        Cystitis ICD-10-CM: N30.90  ICD-9-CM: 595.9  8/2/2022 - Present Unknown        CVA (cerebral vascular accident) [facility]) ICD-10-CM: I63.9  ICD-9-CM: 434.91  7/31/2022 - Present Unknown        TIA (transient ischemic attack) ICD-10-CM: G45.9  ICD-9-CM: 435.9  7/29/2022 - Present Unknown           * Hospital Course:   Patient was admitted on 7/29/2022 following presentation to the ED for transient facial droop 11:30 AM.  Due to history of strokes, suspected TIA versus CVA. CT was negative, patient was admitted for possible TIA and MRI was ordered. MRI showed acute ischemic stroke of the right parietal area. Bilateral carotid ultrasounds revealed 50% stenosis of right ICA, 90% stenosis of left ICA. Urine culture positive for gram-negative rods Proteus Mirabella's and sensitive to ceftriaxone and cefazolin. Patient will receive 7 days worth of cefazolin as an outpatient. Vascular surgery consultation and will need carotid endarterectomy in approximately 4 weeks due to the recent stroke. Cardiac consultation and patient will have outpatient stress test prior to carotid endarterectomy. Patient will follow up with Dr. Bharti Reese in approximately 2 weeks. Patient is being discharged to skilled nursing facility. Discussed case with son, Mishel John via phone        Acute ischemic stroke right parietal area,  CTA neck showed 50% stenosis right proximal ICA  Patient currently on aspirin, Plavix and Lipitor,      2.   Left carotid artery stenosis     Vascular Surgery recommended left carotid endarterectomy as outpatient in 4 to 6 weeks  90% stenosis of the left carotid artery, 50% stenosis of the right carotid artery     3. UTI  gram-negative rods  Rocephin-->Keflex for 7 days     4. History of right CVA with good recovery  Patient reports no residual weakness     5. Essential hypertension  /85  Allowing permissive hypertension due to the severe stenosis of the left carotid artery. We will allow up to 130 systolic at this point until surgery. Continue Coreg and Lasix as needed     6. Diabetes mellitus   Continue sliding-scale insulin    * Procedures:   * No surgery found *      Consults:   Cardiology, vascular surgery, neurology    Significant Diagnostic Studies: As discussed in hospital course    Discharge Exam:  Visit Vitals  BP (!) 149/72   Pulse 76   Temp 97.6 °F (36.4 °C)   Resp 18   Ht 5' 8\" (1.727 m)   Wt 84.8 kg (187 lb)   SpO2 96%   BMI 28.43 kg/m²     PHYSICAL EXAM:  Constitutional: Alert in no acute distress   HEENT: Sclerae anicteric, The neck is supple. Cardiovascular: Regular rate and rhythm. No murmurs, gallops, or rubs. Ekta Graven Respiratory: Clear breath sounds with no wheezes, rales, or rhonchi. GI: Abdomen nondistended, soft, and nontender. Normal active bowel sounds. Rectal: Deferred   Musculoskeletal: No pitting edema of the lower legs. Extremities have good range of motion. Neurological:  Patient is alert and oriented. Cranial nerves II-XII intact  Psychiatric: Mildly confused  Lymphatic: No cervical or supraclavicular adenopathy. Skin: No rashes or breakdown of the skin      * Discharge Condition: stable  * Disposition: [facility])    Discharge Medications:  Current Discharge Medication List        START taking these medications    Details   clopidogreL (PLAVIX) 75 mg tab Take 1 Tablet by mouth in the morning.   Qty: 30 Tablet, Refills: 1  Start date: 8/3/2022      cephALEXin (Keflex) 500 mg capsule Take 1 Capsule by mouth four (4) times daily for 7 days. Qty: 28 Capsule, Refills: 0  Start date: 8/2/2022, End date: 8/9/2022           CONTINUE these medications which have CHANGED    Details   insulin lispro (HUMALOG) 100 unit/mL injection INITIATE INSULIN CORRECTIVE PROTOCOL:  Normal Insulin Sensitivity   For Blood Sugar (mg/dL) of:     Less than 150 =   0 units           150 -199 =   2 units  200 -249 =   4 units  250 -299 =   6 units  300 -349 =   8 units  350 and above = 10 units and Call Physician     Initiate Hypoglycemia protocol if blood glucose is <70 mg/dL  Fast Acting - Administer Immediately - or within 15 minutes of start of meal, if mealtime coverage. Qty: 1 Each, Refills: 1  Start date: 8/2/2022           CONTINUE these medications which have NOT CHANGED    Details   ARIPiprazole (ABILIFY) 2 mg tablet Take 2 mg by mouth in the morning. baclofen 5 mg tab Take 5 mg by mouth two (2) times a day. carvediloL (COREG) 12.5 mg tablet Take 12.5 mg by mouth two (2) times a day. Indications: high blood pressure      escitalopram oxalate (LEXAPRO) 20 mg tablet Take 20 mg by mouth in the morning.      gabapentin (NEURONTIN) 100 mg capsule Take 100 mg by mouth nightly. hydrOXYzine HCL (ATARAX) 25 mg tablet Take 25 mg by mouth nightly as needed for Anxiety. furosemide (LASIX) 40 mg tablet Take 40 mg by mouth in the morning. Indications: visible water retention      losartan (COZAAR) 100 mg tablet Take 100 mg by mouth in the morning. melatonin 5 mg tablet Take 10 mg by mouth nightly. metFORMIN (GLUCOPHAGE) 500 mg tablet Take 1,000 mg by mouth two (2) times daily (with meals). potassium chloride (K-DUR, KLOR-CON M20) 20 mEq tablet Take 20 mEq by mouth two (2) times a day. senna-docusate (PERICOLACE) 8.6-50 mg per tablet Take 1 Tablet by mouth in the morning. tamsulosin (FLOMAX) 0.4 mg capsule Take 0.4 mg by mouth nightly.       aspirin delayed-release 81 mg tablet Take 81 mg by mouth in the morning. omeprazole (PRILOSEC) 20 mg capsule Take 20 mg by mouth daily. atorvastatin (LIPITOR) 40 mg tablet Take 40 mg by mouth in the morning. STOP taking these medications       acetaminophen (TYLENOL) 325 mg tablet Comments:   Reason for Stopping:         diclofenac (VOLTAREN) 1 % gel Comments:   Reason for Stopping:         glucose 4 gram chewable tablet Comments:   Reason for Stopping:         lidocaine (XYLOCAINE) 5 % ointment Comments:   Reason for Stopping:         loratadine (CLARITIN) 10 mg tablet Comments:   Reason for Stopping:               * Follow-up Care/Patient Instructions:   Activity: Activity as tolerated  Diet: Diabetic Diet  Wound Care: None needed    Follow-up Information       Follow up With Specialties Details Why Contact Info    None    None ([phone]) Patient stated that they have no PCP      Daryn Persaud MD Surgery Physician, General and Vascular Surgery Follow up in 2 week(s)  [address] [place] [address]  [phone]      Angela Sheldon MD Cardiovascular Disease Physician, Internal Medicine Physician, Interventional Cardiology Physician Follow up in 1 week(s)  Heather Bruce [address]  [address]  [phone]      Lamar Ojeda MD Neurology Follow up in 1 week(s)  Yoel Yao [id number] [facility]  [phone]              Discharge summary greater than 35 minutes spent with the patient performing discharge instructions, medication review and physical exam    Signed:  La Nena Goode PA-C  8/2/2022  3:15 PM

## 2022-08-02 NOTE — PROGRESS NOTES
CM reviewed chart, noted discharge order. Patient will be discharging back to [facility], where he is a long term care resident. [facility] is located at [address] [place] [address] room [address]B. Report may be called to [phone] [facility]. Patient is clear to discharge to SNF by CM. Nurse is aware. Discharge plan of care/case management plan validated with provider discharge order. CM notified patient's son, Nga Madsen ([phone]) of patient's discharge to [facility]. He verbalized his understanding. Medicare pt has received, reviewed, and signed 2nd IM letter informing them of their right to appeal the discharge. Signed copied has been placed on pt bedside chart.

## 2022-08-02 NOTE — PROGRESS NOTES
TRANSFER - OUT REPORT:    Verbal report given to Fred Bill on Katy Aquino  being transferred to [facility](unit) for routine progression of care       Report consisted of patients Situation, Background, Assessment and   Recommendations(SBAR). Information from the following report(s) SBAR, Procedure Summary, Intake/Output, MAR, Accordion, Recent Results, and Med Rec Status was reviewed with the receiving nurse. Lines:       Opportunity for questions and clarification was provided.       Patient transported with:   [employer]

## 2022-08-02 NOTE — PROGRESS NOTES
PROGRESS NOTE      Chief Complaints:  Patient has no new complaints. HPI and  Objective:    Patient examined this morning. No new focal deficits. Patient awake and alert. Coherent. Review of Systems:  Rest review of system negative, personally reviewed. EXAM:  Visit Vitals  BP (!) 152/77 (BP 1 Location: Right upper arm, BP Patient Position: Supine)   Pulse 79   Temp 98.3 °F (36.8 °C)   Resp 18   Ht 5' 8\" (1.727 m)   Wt 187 lb (84.8 kg)   SpO2 93%   BMI 28.43 kg/m²       Patient is awake and alert  Head and neck atraumatic, normocephalic. ENT: No hoarse voice  Cardiac system regular rate rhythm. Pulmonary no audible wheeze  Chest wall excursion normal with respiration cycle  Abdomen is soft not particularly distended. Neurologically nonfocal.  Skin is warm and moist.  Psychosocial: Cooperative. Vascular examination as previously noted no changes. Recent Results (from the past 24 hour(s))   GLUCOSE, POC    Collection Time: 08/01/22 11:58 AM   Result Value Ref Range    Glucose (POC) 192 (H) 65 - 117 mg/dL    Performed by [facility]    GLUCOSE, POC    Collection Time: 08/01/22  5:03 PM   Result Value Ref Range    Glucose (POC) 126 (H) 65 - 117 mg/dL    Performed by [facility] [place])    GLUCOSE, POC    Collection Time: 08/01/22  7:43 PM   Result Value Ref Range    Glucose (POC) 165 (H) 65 - 117 mg/dL    Performed by Megan Hutchins        ASSESSMENT:   Patient is 70 y.o. with diagnosis of : Active Problems:    TIA (transient ischemic attack) (7/29/2022)      CVA (cerebral vascular accident) ([facility] (7/31/2022)        PLAN:                 I discussed with the patient briefly about the outpatient procedure left carotid endarterectomy. Please have a cardiac evaluation prior to discharge, if this cannot be done as inpatient please make outpatient follow-up with cardiology service. And have patient come see me for follow-up 2 weeks.

## 2022-08-02 NOTE — PROGRESS NOTES
Problem: Pressure Injury - Risk of  Goal: *Prevention of pressure injury  Description: Document Kermit Scale and appropriate interventions in the flowsheet. Outcome: Progressing Towards Goal  Note: Pressure Injury Interventions:  Sensory Interventions: Assess changes in LOC, Minimize linen layers, Keep linens dry and wrinkle-free    Moisture Interventions: Minimize layers, Internal/External urinary devices    Activity Interventions: Increase time out of bed    Mobility Interventions: HOB 30 degrees or less    Nutrition Interventions: Document food/fluid/supplement intake    Friction and Shear Interventions: Minimize layers                Problem: Patient Education: Go to Patient Education Activity  Goal: Patient/Family Education  Outcome: Progressing Towards Goal     Problem: Falls - Risk of  Goal: *Absence of Falls  Description: Document Reese Fall Risk and appropriate interventions in the flowsheet.   Outcome: Progressing Towards Goal  Note: Fall Risk Interventions:  Mobility Interventions: Assess mobility with egress test, Patient to call before getting OOB    Mentation Interventions: Bed/chair exit alarm, Adequate sleep, hydration, pain control    Medication Interventions: Bed/chair exit alarm, Patient to call before getting OOB    Elimination Interventions: Call light in reach, Bed/chair exit alarm    History of Falls Interventions: Bed/chair exit alarm         Problem: Patient Education: Go to Patient Education Activity  Goal: Patient/Family Education  Outcome: Progressing Towards Goal

## 2022-08-02 NOTE — PROGRESS NOTES
Problem: Pressure Injury - Risk of  Goal: *Prevention of pressure injury  Description: Document Kermit Scale and appropriate interventions in the flowsheet. Outcome: Progressing Towards Goal  Note: Pressure Injury Interventions:  Sensory Interventions: Assess changes in LOC, Minimize linen layers    Moisture Interventions: Absorbent underpads, Internal/External urinary devices, Minimize layers    Activity Interventions: Increase time out of bed    Mobility Interventions: HOB 30 degrees or less    Nutrition Interventions: Document food/fluid/supplement intake    Friction and Shear Interventions: Minimize layers                Problem: Patient Education: Go to Patient Education Activity  Goal: Patient/Family Education  Outcome: Progressing Towards Goal     Problem: Falls - Risk of  Goal: *Absence of Falls  Description: Document Reese Fall Risk and appropriate interventions in the flowsheet. Outcome: Progressing Towards Goal  Note: Fall Risk Interventions:  Mobility Interventions: Bed/chair exit alarm    Mentation Interventions: Bed/chair exit alarm, Update white board, Reorient patient    Medication Interventions: Bed/chair exit alarm    Elimination Interventions: Bed/chair exit alarm, Call light in reach    History of Falls Interventions: Bed/chair exit alarm         Problem: Diabetes Self-Management  Goal: *Disease process and treatment process  Description: Define diabetes and identify own type of diabetes; list 3 options for treating diabetes. Outcome: Progressing Towards Goal  Goal: *Incorporating nutritional management into lifestyle  Description: Describe effect of type, amount and timing of food on blood glucose; list 3 methods for planning meals. Outcome: Progressing Towards Goal  Goal: *Incorporating physical activity into lifestyle  Description: State effect of exercise on blood glucose levels.   Outcome: Progressing Towards Goal  Goal: *Developing strategies to promote health/change behavior  Description: Define the ABC's of diabetes; identify appropriate screenings, schedule and personal plan for screenings. Outcome: Progressing Towards Goal  Goal: *Using medications safely  Description: State effect of diabetes medications on diabetes; name diabetes medication taking, action and side effects. Outcome: Progressing Towards Goal  Goal: *Monitoring blood glucose, interpreting and using results  Description: Identify recommended blood glucose targets  and personal targets. Outcome: Progressing Towards Goal  Goal: *Prevention, detection, treatment of acute complications  Description: List symptoms of hyper- and hypoglycemia; describe how to treat low blood sugar and actions for lowering  high blood glucose level. Outcome: Progressing Towards Goal  Goal: *Prevention, detection and treatment of chronic complications  Description: Define the natural course of diabetes and describe the relationship of blood glucose levels to long term complications of diabetes.   Outcome: Progressing Towards Goal  Goal: *Developing strategies to address psychosocial issues  Description: Describe feelings about living with diabetes; identify support needed and support network  Outcome: Progressing Towards Goal  Goal: *Insulin pump training  Outcome: Progressing Towards Goal  Goal: *Sick day guidelines  Outcome: Progressing Towards Goal  Goal: *Patient Specific Goal (EDIT GOAL, INSERT TEXT)  Outcome: Progressing Towards Goal     Problem: Patient Education: Go to Patient Education Activity  Goal: Patient/Family Education  Outcome: Progressing Towards Goal

## 2022-08-02 NOTE — PROGRESS NOTES
Bedside shift change report given to [address] [place] [address]) by Lety(offgoing nurse). Report included the following information SBAR, Intake/Output, MAR, Recent Results, and Med Rec Status.

## 2022-08-02 NOTE — CONSULTS
CARDIOLOGY CONSULTATION      REASON FOR CONSULT: Preoperative cardiac risk assessment    REQUESTING PROVIDER: Reasoner    CHIEF COMPLAINT:  Facial droop    HISTORY OF PRESENT ILLNESS:  Eddie Mosley is a 70y.o. year-old male with past medical history significant for CAD s/p CABG 2020, hypertension, CVA, DM who presented to the ED with facial droop. He  was found to have acute CVA on MRI. Carotid duplex with LICA stenosis. Will need left CEA in 4-6 weeks with Dr. Bridger Sheffield. Seen in his room, resting in bed. He feels okay, somewhat poor historian. Says his brother helps out with medical issues. He does not appear to have had any follow up post bypass in 2020. He denies chest pain but does endorse some shortness of breath on exertion. No palpitations, dizziness, syncope. Records from hospital admission course thus far reviewed. INPATIENT MEDICATIONS:  Home medications reviewed.     Current Facility-Administered Medications:     cefTRIAXone (ROCEPHIN) 1 g in sterile water (preservative free) 10 mL IV syringe, 1 g, IntraVENous, Q24H, Brien Mcneill PA-C    hydrOXYzine HCL (ATARAX) tablet 25 mg, 25 mg, Oral, QHS PRN, Kg Garcia MD, 25 mg at 08/02/22 1227    melatonin tablet 10 mg, 10 mg, Oral, QHS, Kg Garcia MD, 10 mg at 08/01/22 2134    clopidogreL (PLAVIX) tablet 75 mg, 75 mg, Oral, DAILY, Skip Valentin MD, 75 mg at 08/02/22 1036    atorvastatin (LIPITOR) tablet 80 mg, 80 mg, Oral, DAILY, Skip Valentin MD, 80 mg at 08/02/22 1037    aspirin delayed-release tablet 81 mg, 81 mg, Oral, DAILY, Skip Valentin MD, 81 mg at 08/02/22 1036    pantoprazole (PROTONIX) tablet 40 mg, 40 mg, Oral, DAILY, Skip Valentin MD, 40 mg at 08/02/22 1037    sodium chloride (NS) flush 5-40 mL, 5-40 mL, IntraVENous, Q8H, Chilo Méndez MD, 10 mL at 08/02/22 5106    sodium chloride (NS) flush 5-40 mL, 5-40 mL, IntraVENous, PRN, Skip Valentin MD    acetaminophen (TYLENOL) tablet 650 mg, 650 mg, Oral, Q6H PRN, 650 mg at 08/01/22 0752 **OR** acetaminophen (TYLENOL) suppository 650 mg, 650 mg, Rectal, Q6H PRN, Harsha Ramirez MD    polyethylene glycol (MIRALAX) packet 17 g, 17 g, Oral, DAILY PRN, Harsha Ramirez MD    ondansetron (ZOFRAN ODT) tablet 4 mg, 4 mg, Oral, Q8H PRN **OR** ondansetron (ZOFRAN) injection 4 mg, 4 mg, IntraVENous, Q6H PRN, Harsha Méndez MD    enoxaparin (LOVENOX) injection 40 mg, 40 mg, SubCUTAneous, DAILY, Harsha Méndez MD, 40 mg at 08/02/22 1036    insulin lispro (HUMALOG) injection, , SubCUTAneous, AC&HS, Soundra Osgood, MD, 2 Units at 08/02/22 1036    glucose chewable tablet 16 g, 4 Tablet, Oral, PRN, Soundra Osgood, MD    glucagon (GLUCAGEN) injection 1 mg, 1 mg, IntraMUSCular, PRN, Harsha Méndez MD    dextrose 10% infusion 0-250 mL, 0-250 mL, IntraVENous, PRN, Harsha Ramirez MD     ALLERGIES:  Allergies reviewed with the patient,No Known Allergies . FAMILY HISTORY:  Family history reviewed. SOCIAL HISTORY:  Notable for  tobacco use, no heavy alcohol or illicit drug use. REVIEW OF SYSTEMS:  Complete review of systems performed, pertinents noted above, all other systems are negative. PHYSICAL EXAMINATION:   General:  Alert, in no acute distress  Cardiovascular:  Regular rate and rhythm  Respiratory:  Clear to auscultation  Abdomen:  Soft  Extremities:  No LE edema    Visit Vitals  BP (!) 186/89 (BP 1 Location: Right upper arm, BP Patient Position: At rest)   Pulse 72   Temp 98 °F (36.7 °C)   Resp 18   Ht 5' 8\" (1.727 m)   Wt 84.8 kg (187 lb)   SpO2 99%   BMI 28.43 kg/m²         Recent labs results and imaging reviewed.         Discussed case with Dr. Sean Ferrell and our impression and recommendations are as follows:  Preoperative cardiac risk management, dyspnea on exertion reported, history of CABG with no follow up  Obtain ECG, echo here with preserved EF  Will plan for OP stress testing about a month post CVA but prior to CEA  Follow up in office in 1-2 weeks  Hypertension, BP elevated, management per neurology  CAD, no chest pain, on plavix, asa, atorvastatin  CVA, neurology following      Thank you for involving us in the care of this patient. Please do not hesitate to call me or Dr. Jacqueline Giron if additional questions arise.

## 2022-08-02 NOTE — PROGRESS NOTES
Called [facility] and set up transportation for patient to go to [facility] @ 7 pm. Reference  # [id number]. Discharge Nurse Dianne notified.

## 2022-08-03 VITALS
HEART RATE: 65 BPM | BODY MASS INDEX: 28.34 KG/M2 | WEIGHT: 187 LBS | RESPIRATION RATE: 18 BRPM | DIASTOLIC BLOOD PRESSURE: 73 MMHG | HEIGHT: 68 IN | SYSTOLIC BLOOD PRESSURE: 152 MMHG | OXYGEN SATURATION: 96 % | TEMPERATURE: 97.6 F

## 2022-08-03 LAB
GLUCOSE BLD STRIP.AUTO-MCNC: 138 MG/DL (ref 65–117)
PERFORMED BY, TECHID: ABNORMAL

## 2022-08-03 PROCEDURE — 74011250637 HC RX REV CODE- 250/637: Performed by: INTERNAL MEDICINE

## 2022-08-03 PROCEDURE — 82962 GLUCOSE BLOOD TEST: CPT

## 2022-08-03 PROCEDURE — 74011250636 HC RX REV CODE- 250/636: Performed by: INTERNAL MEDICINE

## 2022-08-03 RX ADMIN — ENOXAPARIN SODIUM 40 MG: 100 INJECTION SUBCUTANEOUS at 08:02

## 2022-08-03 RX ADMIN — ATORVASTATIN CALCIUM 80 MG: 40 TABLET, FILM COATED ORAL at 08:00

## 2022-08-03 RX ADMIN — PANTOPRAZOLE SODIUM 40 MG: 40 TABLET, DELAYED RELEASE ORAL at 08:01

## 2022-08-03 RX ADMIN — CLOPIDOGREL BISULFATE 75 MG: 75 TABLET ORAL at 08:02

## 2022-08-03 RX ADMIN — ASPIRIN 81 MG: 81 TABLET, COATED ORAL at 08:00

## 2022-08-12 ENCOUNTER — APPOINTMENT (OUTPATIENT)
Dept: GENERAL RADIOLOGY | Age: 72
End: 2022-08-12
Attending: STUDENT IN AN ORGANIZED HEALTH CARE EDUCATION/TRAINING PROGRAM
Payer: MEDICARE

## 2022-08-12 ENCOUNTER — HOSPITAL ENCOUNTER (EMERGENCY)
Age: 72
Discharge: NURSING, REHAB OR LONG-TERM CARE | End: 2022-08-12
Attending: STUDENT IN AN ORGANIZED HEALTH CARE EDUCATION/TRAINING PROGRAM
Payer: MEDICARE

## 2022-08-12 ENCOUNTER — APPOINTMENT (OUTPATIENT)
Dept: CT IMAGING | Age: 72
End: 2022-08-12
Attending: STUDENT IN AN ORGANIZED HEALTH CARE EDUCATION/TRAINING PROGRAM
Payer: MEDICARE

## 2022-08-12 VITALS
OXYGEN SATURATION: 96 % | TEMPERATURE: 98 F | HEIGHT: 68 IN | HEART RATE: 55 BPM | DIASTOLIC BLOOD PRESSURE: 45 MMHG | RESPIRATION RATE: 18 BRPM | BODY MASS INDEX: 28.34 KG/M2 | SYSTOLIC BLOOD PRESSURE: 127 MMHG | WEIGHT: 187 LBS

## 2022-08-12 DIAGNOSIS — R40.4 TRANSIENT ALTERATION OF AWARENESS: Primary | ICD-10-CM

## 2022-08-12 LAB
ALBUMIN SERPL-MCNC: 3.7 G/DL (ref 3.5–5)
ALBUMIN/GLOB SERPL: 1.1 {RATIO} (ref 1.1–2.2)
ALP SERPL-CCNC: 42 U/L (ref 45–117)
ALT SERPL-CCNC: 29 U/L (ref 12–78)
ANION GAP SERPL CALC-SCNC: 10 MMOL/L (ref 5–15)
APPEARANCE UR: CLEAR
AST SERPL W P-5'-P-CCNC: 21 U/L (ref 15–37)
ATRIAL RATE: 49 BPM
BACTERIA URNS QL MICRO: NEGATIVE /HPF
BASOPHILS # BLD: 0 K/UL (ref 0–0.1)
BASOPHILS NFR BLD: 0 % (ref 0–1)
BILIRUB SERPL-MCNC: 1.7 MG/DL (ref 0.2–1)
BILIRUB UR QL: NEGATIVE
BUN SERPL-MCNC: 26 MG/DL (ref 6–20)
BUN/CREAT SERPL: 17 (ref 12–20)
CA-I BLD-MCNC: 9.6 MG/DL (ref 8.5–10.1)
CALCULATED P AXIS, ECG09: 41 DEGREES
CALCULATED R AXIS, ECG10: 1 DEGREES
CALCULATED T AXIS, ECG11: 148 DEGREES
CHLORIDE SERPL-SCNC: 106 MMOL/L (ref 97–108)
CO2 SERPL-SCNC: 23 MMOL/L (ref 21–32)
COLOR UR: YELLOW
CREAT SERPL-MCNC: 1.5 MG/DL (ref 0.7–1.3)
DIAGNOSIS, 93000: NORMAL
DIFFERENTIAL METHOD BLD: ABNORMAL
EOSINOPHIL # BLD: 0.1 K/UL (ref 0–0.4)
EOSINOPHIL NFR BLD: 2 % (ref 0–7)
ERYTHROCYTE [DISTWIDTH] IN BLOOD BY AUTOMATED COUNT: 15.2 % (ref 11.5–14.5)
GLOBULIN SER CALC-MCNC: 3.3 G/DL (ref 2–4)
GLUCOSE SERPL-MCNC: 146 MG/DL (ref 65–100)
GLUCOSE UR STRIP.AUTO-MCNC: NEGATIVE MG/DL
HCT VFR BLD AUTO: 40.4 % (ref 36.6–50.3)
HGB BLD-MCNC: 13.1 G/DL (ref 12.1–17)
HGB UR QL STRIP: NEGATIVE
IMM GRANULOCYTES # BLD AUTO: 0 K/UL (ref 0–0.04)
IMM GRANULOCYTES NFR BLD AUTO: 0 % (ref 0–0.5)
KETONES UR QL STRIP.AUTO: NEGATIVE MG/DL
LEUKOCYTE ESTERASE UR QL STRIP.AUTO: NEGATIVE
LYMPHOCYTES # BLD: 2 K/UL (ref 0.8–3.5)
LYMPHOCYTES NFR BLD: 24 % (ref 12–49)
MCH RBC QN AUTO: 28.2 PG (ref 26–34)
MCHC RBC AUTO-ENTMCNC: 32.4 G/DL (ref 30–36.5)
MCV RBC AUTO: 87.1 FL (ref 80–99)
MONOCYTES # BLD: 1 K/UL (ref 0–1)
MONOCYTES NFR BLD: 11 % (ref 5–13)
MUCOUS THREADS URNS QL MICRO: NORMAL /LPF
NEUTS SEG # BLD: 5.4 K/UL (ref 1.8–8)
NEUTS SEG NFR BLD: 63 % (ref 32–75)
NITRITE UR QL STRIP.AUTO: NEGATIVE
NRBC # BLD: 0 K/UL (ref 0–0.01)
NRBC BLD-RTO: 0 PER 100 WBC
P-R INTERVAL, ECG05: 146 MS
PH UR STRIP: 5 [PH] (ref 5–8)
PLATELET # BLD AUTO: 177 K/UL (ref 150–400)
PMV BLD AUTO: 10.4 FL (ref 8.9–12.9)
POTASSIUM SERPL-SCNC: 4.4 MMOL/L (ref 3.5–5.1)
PROT SERPL-MCNC: 7 G/DL (ref 6.4–8.2)
PROT UR STRIP-MCNC: NEGATIVE MG/DL
Q-T INTERVAL, ECG07: 502 MS
QRS DURATION, ECG06: 88 MS
QTC CALCULATION (BEZET), ECG08: 453 MS
RBC # BLD AUTO: 4.64 M/UL (ref 4.1–5.7)
RBC #/AREA URNS HPF: NORMAL /HPF (ref 0–5)
SODIUM SERPL-SCNC: 139 MMOL/L (ref 136–145)
SP GR UR REFRACTOMETRY: 1.01 (ref 1–1.03)
TROPONIN-HIGH SENSITIVITY: 12 NG/L (ref 0–76)
UA: UC IF INDICATED,UAUC: NORMAL
UROBILINOGEN UR QL STRIP.AUTO: 0.1 EU/DL (ref 0.1–1)
VENTRICULAR RATE, ECG03: 49 BPM
WBC # BLD AUTO: 8.6 K/UL (ref 4.1–11.1)
WBC URNS QL MICRO: NORMAL /HPF (ref 0–4)

## 2022-08-12 PROCEDURE — 93005 ELECTROCARDIOGRAM TRACING: CPT

## 2022-08-12 PROCEDURE — 80053 COMPREHEN METABOLIC PANEL: CPT

## 2022-08-12 PROCEDURE — 70450 CT HEAD/BRAIN W/O DYE: CPT

## 2022-08-12 PROCEDURE — 99285 EMERGENCY DEPT VISIT HI MDM: CPT

## 2022-08-12 PROCEDURE — 72125 CT NECK SPINE W/O DYE: CPT

## 2022-08-12 PROCEDURE — 36415 COLL VENOUS BLD VENIPUNCTURE: CPT

## 2022-08-12 PROCEDURE — 71045 X-RAY EXAM CHEST 1 VIEW: CPT

## 2022-08-12 PROCEDURE — 84484 ASSAY OF TROPONIN QUANT: CPT

## 2022-08-12 PROCEDURE — 85025 COMPLETE CBC W/AUTO DIFF WBC: CPT

## 2022-08-12 PROCEDURE — 74011250636 HC RX REV CODE- 250/636: Performed by: STUDENT IN AN ORGANIZED HEALTH CARE EDUCATION/TRAINING PROGRAM

## 2022-08-12 PROCEDURE — 81001 URINALYSIS AUTO W/SCOPE: CPT

## 2022-08-12 PROCEDURE — 96360 HYDRATION IV INFUSION INIT: CPT

## 2022-08-12 RX ADMIN — SODIUM CHLORIDE 1000 ML: 9 INJECTION, SOLUTION INTRAVENOUS at 14:47

## 2022-08-12 NOTE — ED TRIAGE NOTES
[facility] was called for a the pt who had a moment where he was altered; pt seemed to have a staring gaze and leaning to the right; staff reported that pt also did have a fall last night as well;

## 2022-08-12 NOTE — ED PROVIDER NOTES
EMERGENCY DEPARTMENT HISTORY AND PHYSICAL EXAM      Date: 8/12/2022  Patient Name: Marcio Sow    History of Presenting Illness     Chief Complaint   Patient presents with    Altered mental status       History Provided By: Patient    HPI: Marcio Sow, 70 y.o. male with a past medical history significant diabetes and stroke presents to the ED with cc of fall, mental status change patient was recently admitted to hospital for TIA, Mri did show an acute acute ischemic stroke of right parietal area. Additionally bilateral carotid ultrasound showed stenosis of right ICA and left ICA. Patient was sent to ED today because patient was reportedly minimally responsive while seated in medical lounge chair. Upon EMS arrival EMS concerned about rightward gaze preference, right facial droop. On route patient became more responsive, stated that he felt well, denied any weakness dizziness. Currently patient states he feels well, cannot exactly recall what happened. Denies any weakness on any 1 side no numbness tingling, no chest pains or shortness of breath. Patient reportedly states that he did fall yesterday, denies any extremity pain, no deformity able to ambulate today. .    There are no other complaints, changes, or physical findings at this time. PCP: None    Current Outpatient Medications   Medication Sig Dispense Refill    insulin lispro (HUMALOG) 100 unit/mL injection INITIATE INSULIN CORRECTIVE PROTOCOL:  Normal Insulin Sensitivity   For Blood Sugar (mg/dL) of:     Less than 150 =   0 units           150 -199 =   2 units  200 -249 =   4 units  250 -299 =   6 units  300 -349 =   8 units  350 and above = 10 units and Call Physician     Initiate Hypoglycemia protocol if blood glucose is <70 mg/dL  Fast Acting - Administer Immediately - or within 15 minutes of start of meal, if mealtime coverage. 1 Each 1    clopidogreL (PLAVIX) 75 mg tab Take 1 Tablet by mouth in the morning.  30 Tablet 1    ARIPiprazole (ABILIFY) 2 mg tablet Take 2 mg by mouth in the morning. baclofen 5 mg tab Take 5 mg by mouth two (2) times a day. carvediloL (COREG) 12.5 mg tablet Take 12.5 mg by mouth two (2) times a day. Indications: high blood pressure      escitalopram oxalate (LEXAPRO) 20 mg tablet Take 20 mg by mouth in the morning.      gabapentin (NEURONTIN) 100 mg capsule Take 100 mg by mouth nightly. hydrOXYzine HCL (ATARAX) 25 mg tablet Take 25 mg by mouth nightly as needed for Anxiety. furosemide (LASIX) 40 mg tablet Take 40 mg by mouth in the morning. Indications: visible water retention      losartan (COZAAR) 100 mg tablet Take 100 mg by mouth in the morning. melatonin 5 mg tablet Take 10 mg by mouth nightly. metFORMIN (GLUCOPHAGE) 500 mg tablet Take 1,000 mg by mouth two (2) times daily (with meals). potassium chloride (K-DUR, KLOR-CON M20) 20 mEq tablet Take 20 mEq by mouth two (2) times a day. senna-docusate (PERICOLACE) 8.6-50 mg per tablet Take 1 Tablet by mouth in the morning. tamsulosin (FLOMAX) 0.4 mg capsule Take 0.4 mg by mouth nightly. aspirin delayed-release 81 mg tablet Take 81 mg by mouth in the morning. omeprazole (PRILOSEC) 20 mg capsule Take 20 mg by mouth daily. atorvastatin (LIPITOR) 40 mg tablet Take 40 mg by mouth in the morning.          Past History     Past Medical History:  Past Medical History:   Diagnosis Date    Cirrhosis ([facility] [address]     Diabetes ([facility] [address]     Hypertension     Stroke [facility])        Past Surgical History:  Past Surgical History:   Procedure Laterality Date    HX BACK SURGERY      HX GI         Family History:  Family History   Problem Relation Age of Onset    Heart Disease Father        Social History:  Social History     Tobacco Use    Smoking status: Former    Smokeless tobacco: Never   Substance Use Topics    Alcohol use: Not Currently    Drug use: Not Currently       Allergies:  No Known Allergies      Review of Systems     Review of Systems   Constitutional:  Negative for activity change, appetite change, chills and fever. HENT:  Negative for congestion, sore throat and trouble swallowing. Eyes:  Negative for photophobia and visual disturbance. Respiratory:  Negative for cough, chest tightness and shortness of breath. Cardiovascular:  Negative for chest pain and palpitations. Gastrointestinal:  Negative for abdominal pain and nausea. Genitourinary:  Negative for dysuria and flank pain. Musculoskeletal:  Negative for arthralgias and neck pain. Skin:  Negative for color change and pallor. Neurological:  Positive for light-headedness. Negative for dizziness, weakness, numbness and headaches. Physical Exam     Physical Exam  Vitals and nursing note reviewed. Constitutional:       General: He is not in acute distress. Appearance: Normal appearance. He is normal weight. He is not toxic-appearing. HENT:      Head: Normocephalic and atraumatic. Nose: Nose normal.      Mouth/Throat:      Mouth: Mucous membranes are moist.   Eyes:      General: No scleral icterus. Extraocular Movements: Extraocular movements intact. Pupils: Pupils are equal, round, and reactive to light. Cardiovascular:      Rate and Rhythm: Normal rate and regular rhythm. Pulses: Normal pulses. Heart sounds: Normal heart sounds. No murmur heard. Pulmonary:      Effort: Pulmonary effort is normal.      Breath sounds: Normal breath sounds. Abdominal:      General: Abdomen is flat. Bowel sounds are normal.      Palpations: Abdomen is soft. Musculoskeletal:         General: No swelling or tenderness. Normal range of motion. Cervical back: Normal range of motion and neck supple. No rigidity. Skin:     General: Skin is warm and dry. Capillary Refill: Capillary refill takes less than 2 seconds. Neurological:      General: No focal deficit present.       Mental Status: He is alert and oriented to person, place, and time.      GCS: GCS eye subscore is 4. GCS verbal subscore is 5. GCS motor subscore is 6. Cranial Nerves: No cranial nerve deficit, dysarthria or facial asymmetry. Sensory: No sensory deficit. Motor: No weakness. Coordination: Coordination normal.   Psychiatric:         Mood and Affect: Mood normal.         Speech: Speech normal.         Behavior: Behavior normal.       Diagnostic Study Results     Labs -     Recent Results (from the past 12 hour(s))   EKG, 12 LEAD, INITIAL    Collection Time: 08/12/22 11:12 AM   Result Value Ref Range    Ventricular Rate 49 BPM    Atrial Rate 49 BPM    P-R Interval 146 ms    QRS Duration 88 ms    Q-T Interval 502 ms    QTC Calculation (Bezet) 453 ms    Calculated P Axis 41 degrees    Calculated R Axis 1 degrees    Calculated T Axis 148 degrees    Diagnosis       Sinus bradycardia  ST & T wave abnormality, consider lateral ischemia  Abnormal ECG  When compared with ECG of 02-AUG-2022 13:46,  Vent. rate has decreased BY  30 BPM  Nonspecific T wave abnormality has replaced inverted T waves in Anterior   leads  Confirmed by Benoit Thomas MD, Edwin Maldonado ([id number]) on 8/12/2022 4:37:57 PM     CBC WITH AUTOMATED DIFF    Collection Time: 08/12/22 11:20 AM   Result Value Ref Range    WBC 8.6 4.1 - 11.1 K/uL    RBC 4.64 4.10 - 5.70 M/uL    HGB 13.1 12.1 - 17.0 g/dL    HCT 40.4 36.6 - 50.3 %    MCV 87.1 80.0 - 99.0 FL    MCH 28.2 26.0 - 34.0 PG    MCHC 32.4 30.0 - 36.5 g/dL    RDW 15.2 (H) 11.5 - 14.5 %    PLATELET 680 534 - 622 K/uL    MPV 10.4 8.9 - 12.9 FL    NRBC 0.0 0.0  WBC    ABSOLUTE NRBC 0.00 0.00 - 0.01 K/uL    NEUTROPHILS 63 32 - 75 %    LYMPHOCYTES 24 12 - 49 %    MONOCYTES 11 5 - 13 %    EOSINOPHILS 2 0 - 7 %    BASOPHILS 0 0 - 1 %    IMMATURE GRANULOCYTES 0 0 - 0.5 %    ABS. NEUTROPHILS 5.4 1.8 - 8.0 K/UL    ABS. LYMPHOCYTES 2.0 0.8 - 3.5 K/UL    ABS. MONOCYTES 1.0 0.0 - 1.0 K/UL    ABS. EOSINOPHILS 0.1 0.0 - 0.4 K/UL    ABS. BASOPHILS 0.0 0.0 - 0.1 K/UL    ABS. IMM. GRANS. 0.0 0.00 - 0.04 K/UL    DF AUTOMATED     METABOLIC PANEL, COMPREHENSIVE    Collection Time: 08/12/22 11:20 AM   Result Value Ref Range    Sodium 139 136 - 145 mmol/L    Potassium 4.4 3.5 - 5.1 mmol/L    Chloride 106 97 - 108 mmol/L    CO2 23 21 - 32 mmol/L    Anion gap 10 5 - 15 mmol/L    Glucose 146 (H) 65 - 100 mg/dL    BUN 26 (H) 6 - 20 mg/dL    Creatinine 1.50 (H) 0.70 - 1.30 mg/dL    BUN/Creatinine ratio 17 12 - 20      GFR est AA 56 (L) >60 ml/min/1.73m2    GFR est non-AA 46 (L) >60 ml/min/1.73m2    Calcium 9.6 8.5 - 10.1 mg/dL    Bilirubin, total 1.7 (H) 0.2 - 1.0 mg/dL    AST (SGOT) 21 15 - 37 U/L    ALT (SGPT) 29 12 - 78 U/L    Alk. phosphatase 42 (L) 45 - 117 U/L    Protein, total 7.0 6.4 - 8.2 g/dL    Albumin 3.7 3.5 - 5.0 g/dL    Globulin 3.3 2.0 - 4.0 g/dL    A-G Ratio 1.1 1.1 - 2.2     TROPONIN-HIGH SENSITIVITY    Collection Time: 08/12/22 11:20 AM   Result Value Ref Range    Troponin-High Sensitivity 12 0 - 76 ng/L   URINALYSIS W/ REFLEX CULTURE    Collection Time: 08/12/22  2:31 PM    Specimen: Urine   Result Value Ref Range    Color Yellow      Appearance Clear Clear      Specific gravity 1.010 1.003 - 1.030      pH (UA) 5.0 5.0 - 8.0      Protein Negative Negative mg/dL    Glucose Negative Negative mg/dL    Ketone Negative Negative mg/dL    Bilirubin Negative Negative      Blood Negative Negative      Urobilinogen 0.1 0.1 - 1.0 EU/dL    Nitrites Negative Negative      Leukocyte Esterase Negative Negative      UA:UC IF INDICATED Culture not indicated by UA result Culture not indicated by UA result      WBC 0-4 0 - 4 /hpf    RBC 0-5 0 - 5 /hpf    Bacteria Negative Negative /hpf    Mucus Trace /lpf       Radiologic Studies -   [unfilled]  CT Results  (Last 48 hours)                 08/12/22 1419  CT HEAD WO CONT Final result    Impression:  No acute intracranial hemorrhage, mass or infarct.  Right parietal   encephalomalacia at site of prior known infarct with no significant change in bilateral basal ganglia and corona radiata lacunar infarcts. No significant   change in pattern of atrophy and chronic white matter change most compatible   with small vessel ischemic and/or senescent change. Intracranial   atherosclerosis. Narrative:  EXAM: CT HEAD WO CONT       INDICATION: s/p fall       COMPARISON: MRI 7/30/2022 and CT 7/29/2022. CONTRAST: None. TECHNIQUE: Unenhanced CT of the head was performed using 5 mm images. Brain and   bone windows were generated. Coronal and sagittal reformats. CT dose reduction   was achieved through use of a standardized protocol tailored for this   examination and automatic exposure control for dose modulation. FINDINGS: There is expected evolution of encephalomalacia in the right parietal   lobe corresponding with the previously seen right parietal infarct on MRI   without hemorrhagic transformation. There is no new acute intracranial   hemorrhage, mass, mass effect or herniation. Ventricles and sulci show no   significant change in proportionate and symmetric prominence. There is no   significant change in multiple bilateral basal ganglia and corona radiata   lacunae There is no significant change in pattern of periventricular white   matter hypodensity. The gray-white matter differentiation is well-preserved. Atherosclerotic calcifications are seen within the carotid siphons and vertebral   arteries. The mastoid air cells are well pneumatized. The visualized paranasal   sinuses are normal.           08/12/22 1419  CT SPINE CERV WO CONT Final result    Impression:  No fracture or other acute findings. Multilevel degenerative spine   changes mostly at the C4-5 level as above. Narrative:  EXAM:  CT CERVICAL SPINE WITHOUT CONTRAST       INDICATION: s/p fall. COMPARISON: None. CONTRAST:  None. TECHNIQUE: Multislice helical CT of the cervical spine was performed without   intravenous contrast administration.   Sagittal and coronal reformats were   generated. CT dose reduction was achieved through use of a standardized   protocol tailored for this examination and automatic exposure control for dose   modulation. FINDINGS:       The alignment is within normal limits. There is no fracture or compression   deformity. The odontoid process is intact. The craniocervical junction is within   normal limits. There is loss of vertical disc height at the C4-C5, C5-6, C6-7,   and C7-T1 levels with autofusion at C6-7. The incidentally imaged soft tissues are within normal limits. Visualized lung   apices show severe centrilobular bullous emphysematous change. Atherosclerotic   calcifications of the carotids are noted. C2-C3: There is no spinal canal or neural foraminal stenosis. C3-C4: There is no spinal canal or neural foraminal stenosis. C4-C5: There is posterior disc osteophyte complex with large right uncovertebral   hypertrophy and severe right neural foraminal narrowing. There is mild spinal   canal stenosis and minimal left neural foraminal narrowing. C5-C6: There is no spinal canal or neural foraminal stenosis. C6-C7: There is no spinal canal or neural foraminal stenosis. C7-T1: There is no spinal canal or neural foraminal stenosis. CXR Results  (Last 48 hours)                 08/12/22 1156  XR CHEST PORT Final result    Impression:  There is minor blunting left CP angle which may represent a very   small effusion. There is minor left basilar atelectasis otherwise lungs are   clear. Narrative:  EXAM: XR CHEST PORT       INDICATION: AMS       COMPARISON: 7/29/2022       FINDINGS: A portable AP radiograph of the chest was obtained at 1135 hours. The   patient is on a cardiac monitor. The left hand obscures a portion of the left   upper thorax. The right lung is clear. There is minor blunting of the left CP   angle. There is minor left basilar atelectasis. Kathrene Bolk  Heart size is stable. .  Patient   is status post median sternotomy. .                    Medical Decision Making and ED Course   I am the first provider for this patient. I reviewed the vital signs, available nursing notes, past medical history, past surgical history, family history and social history. Vital Signs-Reviewed the patient's vital signs. Patient Vitals for the past 12 hrs:   Temp Pulse Resp BP SpO2   08/12/22 1524 -- (!) 50 -- (!) 143/57 --   08/12/22 1509 -- (!) 48 -- (!) 137/49 --   08/12/22 1454 -- (!) 48 -- 128/78 --   08/12/22 1424 -- 71 24 (!) 144/62 --   08/12/22 1101 97.6 °F (36.4 °C) (!) 47 16 (!) 94/50 94 %       EKG interpretation: (Preliminary)  Sinus bradycardia 49, T wave inversions in lateral leads, unchanged from previous EKG. No ST elevations, normal intervals      Records Reviewed: Nursing Notes    The patient presents with brief upper total altered mentation yesterday at nursing home with a differential diagnosis of cranial hemorrhage, cervical spine injury, CVA, TIA, electro abnormality, UTI, dehydration, ACS      Provider Notes (Medical Decision Making):     MDM     54-year-old male, history of prior CVA, COPD, presents emergency department for evaluation of brief episode of minimal responsiveness while seated in chair today, EMS was concerned about possible rightward gaze deviation and right facial droop. Symptoms have currently resolved, patient denies any weakness, is alert and oriented x3. Patient was placed in cervical collar by EMS, patient denies any cervical spine tenderness, will obtain head CT, C-spine CT to rule any traumatic injury. Additionally we will draw basic lab work including cardiac enzymes, patient's vitals show mild hypotension 94/50 additionally patient bradycardic at 47. EKG shows no signs of blocks or significant rhythm abnormalities from previous EKG. Will administer 1 L IV fluids, continue to monitor patient.     ED Course:   Initial assessment performed. The patients presenting problems have been discussed, and they are in agreement with the care plan formulated and outlined with them. I have encouraged them to ask questions as they arise throughout their visit. ED Course as of 08/12/22 1832   Fri Aug 12, 2022   1516 Head CT shows no signs of acute intracranial injury, cervical spine CT shows no injury. Chest x-ray shows small pleural effusion, no signs of acute injury. [PZ]   1536 Repeat vitals show blood pressure 139/70, pulse 60. At this time feel patient stable for discharge. Will discharge patient back to nursing home most likely patient had a vasovagal event. [PZ]      ED Course User Index  [PZ] Josefa Ralph MD         Procedures       Argentina Guerrero MD  Procedures                 Disposition       Discharged    Remove if not discharged  DISCHARGE PLAN:  1. Current Discharge Medication List        CONTINUE these medications which have NOT CHANGED    Details   insulin lispro (HUMALOG) 100 unit/mL injection INITIATE INSULIN CORRECTIVE PROTOCOL:  Normal Insulin Sensitivity   For Blood Sugar (mg/dL) of:     Less than 150 =   0 units           150 -199 =   2 units  200 -249 =   4 units  250 -299 =   6 units  300 -349 =   8 units  350 and above = 10 units and Call Physician     Initiate Hypoglycemia protocol if blood glucose is <70 mg/dL  Fast Acting - Administer Immediately - or within 15 minutes of start of meal, if mealtime coverage. Qty: 1 Each, Refills: 1      clopidogreL (PLAVIX) 75 mg tab Take 1 Tablet by mouth in the morning. Qty: 30 Tablet, Refills: 1      ARIPiprazole (ABILIFY) 2 mg tablet Take 2 mg by mouth in the morning. baclofen 5 mg tab Take 5 mg by mouth two (2) times a day. carvediloL (COREG) 12.5 mg tablet Take 12.5 mg by mouth two (2) times a day.  Indications: high blood pressure      escitalopram oxalate (LEXAPRO) 20 mg tablet Take 20 mg by mouth in the morning.      gabapentin (NEURONTIN) 100 mg capsule Take 100 mg by mouth nightly. hydrOXYzine HCL (ATARAX) 25 mg tablet Take 25 mg by mouth nightly as needed for Anxiety. furosemide (LASIX) 40 mg tablet Take 40 mg by mouth in the morning. Indications: visible water retention      losartan (COZAAR) 100 mg tablet Take 100 mg by mouth in the morning. melatonin 5 mg tablet Take 10 mg by mouth nightly. metFORMIN (GLUCOPHAGE) 500 mg tablet Take 1,000 mg by mouth two (2) times daily (with meals). potassium chloride (K-DUR, KLOR-CON M20) 20 mEq tablet Take 20 mEq by mouth two (2) times a day. senna-docusate (PERICOLACE) 8.6-50 mg per tablet Take 1 Tablet by mouth in the morning. tamsulosin (FLOMAX) 0.4 mg capsule Take 0.4 mg by mouth nightly. aspirin delayed-release 81 mg tablet Take 81 mg by mouth in the morning. omeprazole (PRILOSEC) 20 mg capsule Take 20 mg by mouth daily. atorvastatin (LIPITOR) 40 mg tablet Take 40 mg by mouth in the morning. 2.   Follow-up Information       Follow up With Specialties Details Why Contact Info    Your primary care physician  In 3 days As needed           3. Return to ED if worse     Diagnosis     Clinical Impression:   1. Transient alteration of awareness        Attestations:    Americo Grubbs MD    Please note that this dictation was completed with [employer], the computer voice recognition software. Quite often unanticipated grammatical, syntax, homophones, and other interpretive errors are inadvertently transcribed by the computer software. Please disregard these errors. Please excuse any errors that have escaped final proofreading. Thank you.

## 2022-09-15 ENCOUNTER — OFFICE VISIT (OUTPATIENT)
Dept: SURGERY | Age: 72
End: 2022-09-15
Payer: MEDICARE

## 2022-09-15 ENCOUNTER — TELEPHONE (OUTPATIENT)
Dept: SURGERY | Age: 72
End: 2022-09-15

## 2022-09-15 VITALS
SYSTOLIC BLOOD PRESSURE: 112 MMHG | HEART RATE: 79 BPM | OXYGEN SATURATION: 98 % | BODY MASS INDEX: 27.62 KG/M2 | DIASTOLIC BLOOD PRESSURE: 59 MMHG | HEIGHT: 69 IN | TEMPERATURE: 97.5 F

## 2022-09-15 DIAGNOSIS — I65.29 STENOSIS OF CAROTID ARTERY, UNSPECIFIED LATERALITY: Primary | ICD-10-CM

## 2022-09-15 PROCEDURE — 3017F COLORECTAL CA SCREEN DOC REV: CPT | Performed by: SURGERY

## 2022-09-15 PROCEDURE — G8510 SCR DEP NEG, NO PLAN REQD: HCPCS | Performed by: SURGERY

## 2022-09-15 PROCEDURE — G8536 NO DOC ELDER MAL SCRN: HCPCS | Performed by: SURGERY

## 2022-09-15 PROCEDURE — G8419 CALC BMI OUT NRM PARAM NOF/U: HCPCS | Performed by: SURGERY

## 2022-09-15 PROCEDURE — G8427 DOCREV CUR MEDS BY ELIG CLIN: HCPCS | Performed by: SURGERY

## 2022-09-15 PROCEDURE — 99213 OFFICE O/P EST LOW 20 MIN: CPT | Performed by: SURGERY

## 2022-09-15 PROCEDURE — 1101F PT FALLS ASSESS-DOCD LE1/YR: CPT | Performed by: SURGERY

## 2022-09-15 PROCEDURE — 1123F ACP DISCUSS/DSCN MKR DOCD: CPT | Performed by: SURGERY

## 2022-09-15 RX ORDER — CEPHALEXIN 500 MG/1
500 CAPSULE ORAL 3 TIMES DAILY
COMMUNITY
Start: 2022-09-12 | End: 2022-09-19

## 2022-09-15 RX ORDER — TRAMADOL HYDROCHLORIDE 50 MG/1
50 TABLET ORAL
COMMUNITY

## 2022-09-15 NOTE — TELEPHONE ENCOUNTER
Arsh Pink (son) came in this morning after his dads appointment and wanted to know if someone could call him with an update on what happen in the appointment w/ Dr Nelson Luz.  Please call when able [phone]

## 2022-09-15 NOTE — PROGRESS NOTES
Chief Complaint   Patient presents with    [facility] follow up - left carotid artery stenosis     Visit Vitals  BP (!) 112/59 (BP 1 Location: Right upper arm, BP Patient Position: Sitting, BP Cuff Size: Adult)   Pulse 79   Temp 97.5 °F (36.4 °C) (Temporal)   Ht 5' 9\" (1.753 m)   SpO2 98%   BMI 27.62 kg/m²

## 2022-09-15 NOTE — TELEPHONE ENCOUNTER
Called patient's son, Sasha Montanez. Reviewed Dr. Pita Luna orders from the patient's visit today. Patient is scheduled with [facility] on 9/19/2022 at 8:45am. I gave the appointment information to patient's son. He states he will try to make that appointment, but is unsure if he will be able to make it. Advised him to call our office with any other questions/concerns.

## 2022-09-19 NOTE — PROGRESS NOTES
VASCULAR FOLLOW UP      Subjective:   CHIEF COMPLAINTS:  Carotid artery stenosis. PRESENTATION OF ILLNESS:  Jose Jordan is a 70 y.o. very pleasant gentleman recently hospitalized with stroke symptoms. Patient was found to have a 50% stenosis of right ICA whereas left side 90% stenosis. Patient states he feels very weak and fatigued. Patient currently on wheelchair-bound. Patient is yet to see cardiologist.    Past Medical History:   Diagnosis Date    Cirrhosis ([facility] [address]     Diabetes ([facility] [address]     Hypertension     Stroke [facility])       Past Surgical History:   Procedure Laterality Date    HX BACK SURGERY      HX GI       Family History   Problem Relation Age of Onset    Heart Disease Father       Social History     Tobacco Use    Smoking status: Former    Smokeless tobacco: Never   Substance Use Topics    Alcohol use: Not Currently       Prior to Admission medications    Medication Sig Start Date End Date Taking? Authorizing Provider   cephALEXin (KEFLEX) 500 mg capsule Take 500 mg by mouth three (3) times daily. 9/12/22 9/19/22 Yes Provider, Historical   traMADoL (ULTRAM) 50 mg tablet Take 50 mg by mouth every six (6) hours as needed for Pain. Yes Provider, Historical   insulin lispro (HUMALOG) 100 unit/mL injection INITIATE INSULIN CORRECTIVE PROTOCOL:  Normal Insulin Sensitivity   For Blood Sugar (mg/dL) of:     Less than 150 =   0 units           150 -199 =   2 units  200 -249 =   4 units  250 -299 =   6 units  300 -349 =   8 units  350 and above = 10 units and Call Physician     Initiate Hypoglycemia protocol if blood glucose is <70 mg/dL  Fast Acting - Administer Immediately - or within 15 minutes of start of meal, if mealtime coverage. 8/2/22  Yes Jes Mcneill PA-C   clopidogreL (PLAVIX) 75 mg tab Take 1 Tablet by mouth in the morning. 8/3/22  Yes Jse Mcneill PA-C   ARIPiprazole (ABILIFY) 2 mg tablet Take 2 mg by mouth in the morning.    Yes Provider, Historical   baclofen 5 mg tab Take 5 mg by mouth two (2) times a day. Yes Provider, Historical   carvediloL (COREG) 12.5 mg tablet Take 12.5 mg by mouth two (2) times a day. Indications: high blood pressure   Yes Provider, Historical   escitalopram oxalate (LEXAPRO) 20 mg tablet Take 20 mg by mouth in the morning. Yes Provider, Historical   gabapentin (NEURONTIN) 100 mg capsule Take 100 mg by mouth nightly. Yes Provider, Historical   hydrOXYzine HCL (ATARAX) 25 mg tablet Take 25 mg by mouth nightly as needed for Anxiety. Yes Provider, Historical   losartan (COZAAR) 100 mg tablet Take 100 mg by mouth in the morning. Yes Provider, Historical   melatonin 5 mg tablet Take 10 mg by mouth nightly. Yes Provider, Historical   metFORMIN (GLUCOPHAGE) 500 mg tablet Take 1,000 mg by mouth two (2) times daily (with meals). Yes Provider, Historical   potassium chloride (K-DUR, KLOR-CON M20) 20 mEq tablet Take 20 mEq by mouth two (2) times a day. Yes Provider, Historical   senna-docusate (PERICOLACE) 8.6-50 mg per tablet Take 1 Tablet by mouth in the morning. Yes Provider, Historical   tamsulosin (FLOMAX) 0.4 mg capsule Take 0.4 mg by mouth nightly. Yes Provider, Historical   aspirin delayed-release 81 mg tablet Take 81 mg by mouth in the morning. Yes Other, MD Adam   omeprazole (PRILOSEC) 20 mg capsule Take 20 mg by mouth daily. Yes Other, MD Adam   atorvastatin (LIPITOR) 40 mg tablet Take 40 mg by mouth in the morning. Yes Rowan, MD Adam   furosemide (LASIX) 40 mg tablet Take 40 mg by mouth in the morning.  Indications: visible water retention  Patient not taking: Reported on 9/15/2022    Provider, Historical     No Known Allergies     Review of Systems:  I reviewed the rest of organ systems personally and they were negative signed by Dr. Ac Rand    Objective:   Visit Vitals  BP (!) 112/59 (BP 1 Location: Right upper arm, BP Patient Position: Sitting, BP Cuff Size: Adult)   Pulse 79   Temp 97.5 °F (36.4 °C) (Temporal)   Ht 5' 9\" (1.753 m)   SpO2 98%   BMI 27.62 kg/m²     VITAL SIGNS REVIEWED. Physical Exam:  Patient is well-nourished pleasant in conversation is appropriate. Head and neck examination atraumatic, normocephalic. Gaze appropriate. Conversation appropriate. Neck examination shows supple. No mass. No obvious carotid bruit. Chest examination shows lungs are clear bilaterally well-expanded, no crackles or wheezes. Cardiovascular system regular rate, no obvious murmur. Skin warm to touch  and moist, no skin lesions. Abdomen is soft ,not tender or distended bowel sounds present. No palpable mass. Neurological examinations, no focal neuro deficits moving all 4 extremities. Cranial nerves intact. Sensation is intact as well. Hematologic: No obvious bruise or swelling or obvious lymphadenopathy. Psychosocial: Appropriate. Has good effect. Musculoskeletal system: No muscle wasting, appropriate movements upper and lower extremity. Vascular examination: No carotid bruit. Data Review:   No visits with results within 1 Month(s) from this visit.    Latest known visit with results is:   Admission on 08/12/2022, Discharged on 08/12/2022   Component Date Value Ref Range Status    WBC 08/12/2022 8.6  4.1 - 11.1 K/uL Final    RBC 08/12/2022 4.64  4.10 - 5.70 M/uL Final    HGB 08/12/2022 13.1  12.1 - 17.0 g/dL Final    HCT 08/12/2022 40.4  36.6 - 50.3 % Final    MCV 08/12/2022 87.1  80.0 - 99.0 FL Final    MCH 08/12/2022 28.2  26.0 - 34.0 PG Final    MCHC 08/12/2022 32.4  30.0 - 36.5 g/dL Final    RDW 08/12/2022 15.2 (A) 11.5 - 14.5 % Final    PLATELET 69/74/0148 224  150 - 400 K/uL Final    MPV 08/12/2022 10.4  8.9 - 12.9 FL Final    NRBC 08/12/2022 0.0  0.0  WBC Final    ABSOLUTE NRBC 08/12/2022 0.00  0.00 - 0.01 K/uL Final    NEUTROPHILS 08/12/2022 63  32 - 75 % Final    LYMPHOCYTES 08/12/2022 24  12 - 49 % Final    MONOCYTES 08/12/2022 11  5 - 13 % Final    EOSINOPHILS 08/12/2022 2  0 - 7 % Final    BASOPHILS 08/12/2022 0  0 - 1 % Final    IMMATURE GRANULOCYTES 08/12/2022 0  0 - 0.5 % Final    ABS. NEUTROPHILS 08/12/2022 5.4  1.8 - 8.0 K/UL Final    ABS. LYMPHOCYTES 08/12/2022 2.0  0.8 - 3.5 K/UL Final    ABS. MONOCYTES 08/12/2022 1.0  0.0 - 1.0 K/UL Final    ABS. EOSINOPHILS 08/12/2022 0.1  0.0 - 0.4 K/UL Final    ABS. BASOPHILS 08/12/2022 0.0  0.0 - 0.1 K/UL Final    ABS. IMM. GRANS. 08/12/2022 0.0  0.00 - 0.04 K/UL Final    DF 08/12/2022 AUTOMATED    Final    Sodium 08/12/2022 139  136 - 145 mmol/L Final    Potassium 08/12/2022 4.4  3.5 - 5.1 mmol/L Final    Chloride 08/12/2022 106  97 - 108 mmol/L Final    CO2 08/12/2022 23  21 - 32 mmol/L Final    Anion gap 08/12/2022 10  5 - 15 mmol/L Final    Glucose 08/12/2022 146 (A) 65 - 100 mg/dL Final    BUN 08/12/2022 26 (A) 6 - 20 mg/dL Final    Creatinine 08/12/2022 1.50 (A) 0.70 - 1.30 mg/dL Final    BUN/Creatinine ratio 08/12/2022 17  12 - 20   Final    GFR est AA 08/12/2022 56 (A) >60 ml/min/1.73m2 Final    GFR est non-AA 08/12/2022 46 (A) >60 ml/min/1.73m2 Final    Calcium 08/12/2022 9.6  8.5 - 10.1 mg/dL Final    Bilirubin, total 08/12/2022 1.7 (A) 0.2 - 1.0 mg/dL Final    AST (SGOT) 08/12/2022 21  15 - 37 U/L Final    ALT (SGPT) 08/12/2022 29  12 - 78 U/L Final    Alk.  phosphatase 08/12/2022 42 (A) 45 - 117 U/L Final    Protein, total 08/12/2022 7.0  6.4 - 8.2 g/dL Final    Albumin 08/12/2022 3.7  3.5 - 5.0 g/dL Final    Globulin 08/12/2022 3.3  2.0 - 4.0 g/dL Final    A-G Ratio 08/12/2022 1.1  1.1 - 2.2   Final    Troponin-High Sensitivity 08/12/2022 12  0 - 76 ng/L Final    Ventricular Rate 08/12/2022 49  BPM Final    Atrial Rate 08/12/2022 49  BPM Final    P-R Interval 08/12/2022 146  ms Final    QRS Duration 08/12/2022 88  ms Final    Q-T Interval 08/12/2022 502  ms Final    QTC Calculation (Bezet) 08/12/2022 453  ms Final    Calculated P Axis 08/12/2022 41  degrees Final    Calculated R Axis 08/12/2022 1  degrees Final    Calculated T Axis 08/12/2022 148  degrees Final Diagnosis 08/12/2022    Final                    Value:Sinus bradycardia  ST & T wave abnormality, consider lateral ischemia  Abnormal ECG  When compared with ECG of 02-AUG-2022 13:46,  Vent. rate has decreased BY  30 BPM  Nonspecific T wave abnormality has replaced inverted T waves in Anterior   leads  Confirmed by Ancelmo Ellison MD, [place] [facility] ([id number]) on 8/12/2022 4:37:57 PM      Color 08/12/2022 Yellow    Final    Appearance 08/12/2022 Clear  Clear   Final    Specific gravity 08/12/2022 1.010  1.003 - 1.030   Final    pH (UA) 08/12/2022 5.0  5.0 - 8.0   Final    Protein 08/12/2022 Negative  Negative mg/dL Final    Glucose 08/12/2022 Negative  Negative mg/dL Final    Ketone 08/12/2022 Negative  Negative mg/dL Final    Bilirubin 08/12/2022 Negative  Negative   Final    Blood 08/12/2022 Negative  Negative   Final    Urobilinogen 08/12/2022 0.1  0.1 - 1.0 EU/dL Final    Nitrites 08/12/2022 Negative  Negative   Final    Leukocyte Esterase 08/12/2022 Negative  Negative   Final    UA:UC IF INDICATED 08/12/2022 Culture not indicated by UA result  Culture not indicated by UA result   Final    WBC 08/12/2022 0-4  0 - 4 /hpf Final    RBC 08/12/2022 0-5  0 - 5 /hpf Final    Bacteria 08/12/2022 Negative  Negative /hpf Final    Mucus 08/12/2022 Trace  /lpf Final        Assessment:     Problem List Items Addressed This Visit          Circulatory    Stenosis of carotid artery - Primary           Plan:     Patient requires a preop clearance for possible carotid surgery. Patient supposed to see [facility] cardiology. Patient appears to be very weak today. I am not sure patient will be a good candidate for open carotid surgery on the left side. We will await for cardiac work-up and will go from there.         Yvon Trevino MD

## 2022-09-20 ENCOUNTER — TELEPHONE (OUTPATIENT)
Dept: SURGERY | Age: 72
End: 2022-09-20

## 2022-09-20 NOTE — TELEPHONE ENCOUNTER
Gretchen Gacsa called this afternoon and stated that his father was supposed to have gone to a cardiologist appointment yesterday that was supposed to be set up between Dr Leobardo Oconnor (nurse) & rehab, the rehab stated noone reached out to them regarding a cardiologist appointment so his father did not go. Mishel John asked if Araseli could contact [address] [place] [address] and coordinate that cardiologist appointment for his father so he could be cleared for surgery.

## 2022-09-20 NOTE — TELEPHONE ENCOUNTER
[address] [place] [address]. They have rescheduled patient for 10/6/2022 at [address] [facility] [address] at [facility]. I called patient's son back and gave him the appointment information. Advised him that his dad does need to come back and see Dr. Jennifer Fisher once he's been to the cardiologist, but we should wait to scheduled until after he's made that appointment. He verbalized understanding and is agreeable about making the next appointment with Dr. Jennifer Fisher. He states he is going to try and make it to the cardiology appointment. Advised to call us back if there is anything else we can do.

## 2022-10-05 ENCOUNTER — TELEPHONE (OUTPATIENT)
Dept: SURGERY | Age: 72
End: 2022-10-05

## 2022-10-05 NOTE — TELEPHONE ENCOUNTER
Patients son Heavenly Karimi called stating that he has been trying to get in touch with [facility] to find out about [facility] cardiologist appointment.  He would like to know when the appointment is and when should they make a follow up with Abhijeet Day. Call back # [phone]

## 2022-10-13 ENCOUNTER — TELEPHONE (OUTPATIENT)
Dept: SURGERY | Age: 72
End: 2022-10-13

## 2022-10-13 NOTE — TELEPHONE ENCOUNTER
Lauren Valverde, son, called stating patient had appt with cardiologist and wants to know if surgery is going to be scheduled or if patient needs to be seen here first? Please call back [phone]

## 2022-10-18 NOTE — TELEPHONE ENCOUNTER
Tomás Billie Baker son Heavenly Karimi called in about his father follow up appointment with cardiology if you could give him a call back when able [phone]

## 2022-10-18 NOTE — TELEPHONE ENCOUNTER
Called son back - no answer so I left a message letting him know that I had sent over a cardiac clearance request to [facility] to see about his dad having surgery. Advised to call back if he has any questions.

## 2022-10-19 NOTE — TELEPHONE ENCOUNTER
Patient's son, Terry Tolbert, called me back. He wasn't able to make it to patient's cardiology appointment. I updated him that I had sent a fax requesting a cardiac clearance. I told him I will contact [facility] and request an update and then call him back with it.

## 2022-10-19 NOTE — TELEPHONE ENCOUNTER
[facility] and requested last office note from 10/6/2022. They took a message and state they were sending a message to the medical record team and confirmed the fax number.

## 2022-10-20 NOTE — TELEPHONE ENCOUNTER
Patient's son called again today regarding cardiac clearance. Please call with information.  [phone]

## 2022-10-21 NOTE — TELEPHONE ENCOUNTER
Spoke to [facility]. According to the  - patient came to his appointment on 10/6/2022. He was then scheduled for a nuclear stress test on 10/17/2022 but it was cancelled for an unknown reason. Patient is scheduled to see them on 11/3/2022. I called patient's son and gave him the up date. He verbalized appreciation and states he will contact the nursing home to make sure they get transportation arranged for the patient. Render Risk Assessment In Note?: no

## 2022-10-21 NOTE — TELEPHONE ENCOUNTER
Called patient's son - no answer, so I left a message letting him know I had reached out to [facility] for the records, and that our fax machine has been down for most of the day.

## 2022-10-21 NOTE — TELEPHONE ENCOUNTER
Son called me back. I gave him the update. He states that his uncle went to the nursing home the other day and was told that patient did not get seen due to a transportation issue. He wasn't completely sure what happened. I advised him that when I called [facility], the man that answered just put a message in, but I would reach again and see if I could speak to a nurse and find out what happened. Son verbalized understanding and requested a call back if I find out anything today and that he will call me again on Monday if he hasn't heard from me.

## 2022-10-26 NOTE — TELEPHONE ENCOUNTER
Kartik Quiñones wanted to know if Dr. Pooja Welch heard anything back from the cardiologist to see if his father was approved.  Please call back when able [phone]

## 2022-10-26 NOTE — TELEPHONE ENCOUNTER
Called son back. Advised him that the cardiologist won't clear him until after the stress test is done and that's scheduled on 11/3/2022. He asked for the time and I told him I wasn't sure. He asked if they said why he wasn't seen at his last appointment. I told him they didn't tell me. He confirmed the cardiologist as [facility] and will contact them for further information.

## 2022-11-02 ENCOUNTER — TRANSCRIBE ORDER (OUTPATIENT)
Dept: SCHEDULING | Age: 72
End: 2022-11-02

## 2022-11-08 ENCOUNTER — APPOINTMENT (OUTPATIENT)
Dept: GENERAL RADIOLOGY | Age: 72
DRG: 871 | End: 2022-11-08
Attending: EMERGENCY MEDICINE
Payer: MEDICARE

## 2022-11-08 ENCOUNTER — TRANSCRIBE ORDER (OUTPATIENT)
Dept: SCHEDULING | Age: 72
End: 2022-11-08

## 2022-11-08 ENCOUNTER — APPOINTMENT (OUTPATIENT)
Dept: CT IMAGING | Age: 72
DRG: 871 | End: 2022-11-08
Attending: HOSPITALIST
Payer: MEDICARE

## 2022-11-08 ENCOUNTER — HOSPITAL ENCOUNTER (INPATIENT)
Age: 72
LOS: 19 days | Discharge: NURSING, REHAB OR LONG-TERM CARE | DRG: 871 | End: 2022-11-27
Attending: EMERGENCY MEDICINE | Admitting: INTERNAL MEDICINE
Payer: MEDICARE

## 2022-11-08 DIAGNOSIS — A41.9 SEVERE SEPSIS (HCC): ICD-10-CM

## 2022-11-08 DIAGNOSIS — R41.82 ALTERED MENTAL STATUS, UNSPECIFIED ALTERED MENTAL STATUS TYPE: ICD-10-CM

## 2022-11-08 DIAGNOSIS — L97.519 ULCER OF TOE OF RIGHT FOOT, UNSPECIFIED ULCER STAGE (HCC): ICD-10-CM

## 2022-11-08 DIAGNOSIS — N30.00 ACUTE CYSTITIS WITHOUT HEMATURIA: ICD-10-CM

## 2022-11-08 DIAGNOSIS — J69.0 ASPIRATION PNEUMONIA, UNSPECIFIED ASPIRATION PNEUMONIA TYPE, UNSPECIFIED LATERALITY, UNSPECIFIED PART OF LUNG (HCC): ICD-10-CM

## 2022-11-08 DIAGNOSIS — D72.829 LEUKOCYTOSIS, UNSPECIFIED TYPE: ICD-10-CM

## 2022-11-08 DIAGNOSIS — N39.0 URINARY TRACT INFECTION WITHOUT HEMATURIA, SITE UNSPECIFIED: Primary | ICD-10-CM

## 2022-11-08 DIAGNOSIS — I25.10 CAD (CORONARY ARTERY DISEASE): Primary | ICD-10-CM

## 2022-11-08 DIAGNOSIS — N30.90 CYSTITIS: ICD-10-CM

## 2022-11-08 DIAGNOSIS — I95.9 HYPOTENSION, UNSPECIFIED HYPOTENSION TYPE: ICD-10-CM

## 2022-11-08 DIAGNOSIS — J96.01 ACUTE RESPIRATORY FAILURE WITH HYPOXIA (HCC): ICD-10-CM

## 2022-11-08 DIAGNOSIS — A49.02 MRSA INFECTION: ICD-10-CM

## 2022-11-08 DIAGNOSIS — I63.9 ACUTE CVA (CEREBROVASCULAR ACCIDENT) (HCC): ICD-10-CM

## 2022-11-08 DIAGNOSIS — L97.519 CHRONIC ULCER OF GREAT TOE OF RIGHT FOOT, UNSPECIFIED ULCER STAGE (HCC): ICD-10-CM

## 2022-11-08 DIAGNOSIS — R65.20 SEVERE SEPSIS (HCC): ICD-10-CM

## 2022-11-08 LAB
ALBUMIN SERPL-MCNC: 2.8 G/DL (ref 3.5–5)
ALBUMIN/GLOB SERPL: 1 {RATIO} (ref 1.1–2.2)
ALP SERPL-CCNC: 39 U/L (ref 45–117)
ALT SERPL-CCNC: 15 U/L (ref 12–78)
ANION GAP SERPL CALC-SCNC: 12 MMOL/L (ref 5–15)
APPEARANCE UR: ABNORMAL
ARTERIAL PATENCY WRIST A: YES
AST SERPL W P-5'-P-CCNC: 29 U/L (ref 15–37)
BACTERIA URNS QL MICRO: ABNORMAL /HPF
BASE DEFICIT BLDA-SCNC: 10 MMOL/L
BASOPHILS # BLD: 0 K/UL (ref 0–0.1)
BASOPHILS NFR BLD: 0 % (ref 0–1)
BDY SITE: ABNORMAL
BILIRUB SERPL-MCNC: 1.1 MG/DL (ref 0.2–1)
BILIRUB UR QL: NEGATIVE
BUN SERPL-MCNC: 36 MG/DL (ref 6–20)
BUN/CREAT SERPL: 25 (ref 12–20)
CA-I BLD-MCNC: 8.1 MG/DL (ref 8.5–10.1)
CHLORIDE SERPL-SCNC: 105 MMOL/L (ref 97–108)
CO2 SERPL-SCNC: 18 MMOL/L (ref 21–32)
COHGB MFR BLD: 0.5 % (ref 1–2)
COLOR UR: ABNORMAL
CREAT SERPL-MCNC: 1.46 MG/DL (ref 0.7–1.3)
DIFFERENTIAL METHOD BLD: ABNORMAL
EOSINOPHIL # BLD: 0 K/UL (ref 0–0.4)
EOSINOPHIL NFR BLD: 0 % (ref 0–7)
ERYTHROCYTE [DISTWIDTH] IN BLOOD BY AUTOMATED COUNT: 15.9 % (ref 11.5–14.5)
FIO2 ON VENT: 44 %
GAS FLOW.O2 O2 DELIVERY SYS: 6 L/MIN
GLOBULIN SER CALC-MCNC: 2.8 G/DL (ref 2–4)
GLUCOSE BLD STRIP.AUTO-MCNC: 78 MG/DL (ref 65–100)
GLUCOSE BLD STRIP.AUTO-MCNC: 79 MG/DL (ref 65–100)
GLUCOSE BLD STRIP.AUTO-MCNC: 83 MG/DL (ref 65–100)
GLUCOSE SERPL-MCNC: 134 MG/DL (ref 65–100)
GLUCOSE UR STRIP.AUTO-MCNC: NEGATIVE MG/DL
HCO3 BLDA-SCNC: 12 MMOL/L (ref 22–26)
HCT VFR BLD AUTO: 38.2 % (ref 36.6–50.3)
HGB BLD-MCNC: 12.5 G/DL (ref 12.1–17)
HGB UR QL STRIP: ABNORMAL
HYALINE CASTS URNS QL MICRO: ABNORMAL /LPF (ref 0–5)
IMM GRANULOCYTES # BLD AUTO: 0.1 K/UL (ref 0–0.04)
IMM GRANULOCYTES NFR BLD AUTO: 1 % (ref 0–0.5)
KETONES UR QL STRIP.AUTO: 5 MG/DL
LACTATE SERPL-SCNC: 2.1 MMOL/L (ref 0.4–2)
LACTATE SERPL-SCNC: 2.4 MMOL/L (ref 0.4–2)
LEUKOCYTE ESTERASE UR QL STRIP.AUTO: ABNORMAL
LYMPHOCYTES # BLD: 1.7 K/UL (ref 0.8–3.5)
LYMPHOCYTES NFR BLD: 11 % (ref 12–49)
MCH RBC QN AUTO: 28.4 PG (ref 26–34)
MCHC RBC AUTO-ENTMCNC: 32.7 G/DL (ref 30–36.5)
MCV RBC AUTO: 86.8 FL (ref 80–99)
METHGB MFR BLD: 0.3 % (ref 0–1.4)
MONOCYTES # BLD: 0.9 K/UL (ref 0–1)
MONOCYTES NFR BLD: 6 % (ref 5–13)
MUCOUS THREADS URNS QL MICRO: ABNORMAL /LPF
NEUTS SEG # BLD: 13 K/UL (ref 1.8–8)
NEUTS SEG NFR BLD: 82 % (ref 32–75)
NITRITE UR QL STRIP.AUTO: NEGATIVE
NRBC # BLD: 0 K/UL (ref 0–0.01)
NRBC BLD-RTO: 0 PER 100 WBC
OXYHGB MFR BLD: 96.7 % (ref 95–99)
PCO2 BLDA: 20 MMHG (ref 35–45)
PERFORMED BY, TECHID: ABNORMAL
PERFORMED BY, TECHID: NORMAL
PH BLDA: 7.41 [PH] (ref 7.35–7.45)
PH UR STRIP: 5 [PH] (ref 5–8)
PLATELET # BLD AUTO: 182 K/UL (ref 150–400)
PMV BLD AUTO: 10.1 FL (ref 8.9–12.9)
PO2 BLDA: 103 MMHG (ref 80–100)
POTASSIUM SERPL-SCNC: 4.8 MMOL/L (ref 3.5–5.1)
PROT SERPL-MCNC: 5.6 G/DL (ref 6.4–8.2)
PROT UR STRIP-MCNC: NEGATIVE MG/DL
RBC # BLD AUTO: 4.4 M/UL (ref 4.1–5.7)
RBC #/AREA URNS HPF: ABNORMAL /HPF (ref 0–5)
SAO2 % BLD: 98 % (ref 95–99)
SAO2% DEVICE SAO2% SENSOR NAME: ABNORMAL
SODIUM SERPL-SCNC: 135 MMOL/L (ref 136–145)
SP GR UR REFRACTOMETRY: 1.01 (ref 1–1.03)
SPECIMEN SITE: ABNORMAL
TROPONIN-HIGH SENSITIVITY: 26 NG/L (ref 0–76)
UROBILINOGEN UR QL STRIP.AUTO: 0.1 EU/DL (ref 0.1–1)
WBC # BLD AUTO: 15.8 K/UL (ref 4.1–11.1)
WBC URNS QL MICRO: >100 /HPF (ref 0–4)

## 2022-11-08 PROCEDURE — 82803 BLOOD GASES ANY COMBINATION: CPT

## 2022-11-08 PROCEDURE — 65610000006 HC RM INTENSIVE CARE

## 2022-11-08 PROCEDURE — 70450 CT HEAD/BRAIN W/O DYE: CPT

## 2022-11-08 PROCEDURE — 36600 WITHDRAWAL OF ARTERIAL BLOOD: CPT

## 2022-11-08 PROCEDURE — 74011250636 HC RX REV CODE- 250/636: Performed by: EMERGENCY MEDICINE

## 2022-11-08 PROCEDURE — 74011000250 HC RX REV CODE- 250: Performed by: INTERNAL MEDICINE

## 2022-11-08 PROCEDURE — 36415 COLL VENOUS BLD VENIPUNCTURE: CPT

## 2022-11-08 PROCEDURE — 82962 GLUCOSE BLOOD TEST: CPT

## 2022-11-08 PROCEDURE — 81001 URINALYSIS AUTO W/SCOPE: CPT

## 2022-11-08 PROCEDURE — 74011250636 HC RX REV CODE- 250/636: Performed by: INTERNAL MEDICINE

## 2022-11-08 PROCEDURE — 84484 ASSAY OF TROPONIN QUANT: CPT

## 2022-11-08 PROCEDURE — 83605 ASSAY OF LACTIC ACID: CPT

## 2022-11-08 PROCEDURE — 87040 BLOOD CULTURE FOR BACTERIA: CPT

## 2022-11-08 PROCEDURE — 71045 X-RAY EXAM CHEST 1 VIEW: CPT

## 2022-11-08 PROCEDURE — 93005 ELECTROCARDIOGRAM TRACING: CPT

## 2022-11-08 PROCEDURE — 74011000250 HC RX REV CODE- 250

## 2022-11-08 PROCEDURE — 87086 URINE CULTURE/COLONY COUNT: CPT

## 2022-11-08 PROCEDURE — 85025 COMPLETE CBC W/AUTO DIFF WBC: CPT

## 2022-11-08 PROCEDURE — 99285 EMERGENCY DEPT VISIT HI MDM: CPT

## 2022-11-08 PROCEDURE — 80053 COMPREHEN METABOLIC PANEL: CPT

## 2022-11-08 RX ORDER — ARIPIPRAZOLE 2 MG/1
2 TABLET ORAL DAILY
Status: DISCONTINUED | OUTPATIENT
Start: 2022-11-09 | End: 2022-11-19

## 2022-11-08 RX ORDER — PANTOPRAZOLE SODIUM 20 MG/1
20 TABLET, DELAYED RELEASE ORAL DAILY
Status: DISCONTINUED | OUTPATIENT
Start: 2022-11-09 | End: 2022-11-09

## 2022-11-08 RX ORDER — BACLOFEN 10 MG/1
5 TABLET ORAL
Status: DISCONTINUED | OUTPATIENT
Start: 2022-11-08 | End: 2022-11-09

## 2022-11-08 RX ORDER — NOREPINEPHRINE BIT/0.9 % NACL 8 MG/250ML
INFUSION BOTTLE (ML) INTRAVENOUS
Status: COMPLETED
Start: 2022-11-08 | End: 2022-11-08

## 2022-11-08 RX ORDER — ACETAMINOPHEN 325 MG/1
650 TABLET ORAL
Status: DISCONTINUED | OUTPATIENT
Start: 2022-11-08 | End: 2022-11-27 | Stop reason: HOSPADM

## 2022-11-08 RX ORDER — GABAPENTIN 100 MG/1
100 CAPSULE ORAL
Status: DISCONTINUED | OUTPATIENT
Start: 2022-11-08 | End: 2022-11-09

## 2022-11-08 RX ORDER — ATORVASTATIN CALCIUM 40 MG/1
40 TABLET, FILM COATED ORAL DAILY
Status: DISCONTINUED | OUTPATIENT
Start: 2022-11-09 | End: 2022-11-09

## 2022-11-08 RX ORDER — CARVEDILOL 12.5 MG/1
12.5 TABLET ORAL 2 TIMES DAILY
Status: DISCONTINUED | OUTPATIENT
Start: 2022-11-08 | End: 2022-11-19

## 2022-11-08 RX ORDER — HYDROXYZINE 25 MG/1
25 TABLET, FILM COATED ORAL
Status: DISCONTINUED | OUTPATIENT
Start: 2022-11-08 | End: 2022-11-09

## 2022-11-08 RX ORDER — ASPIRIN 81 MG/1
81 TABLET ORAL DAILY
Status: DISCONTINUED | OUTPATIENT
Start: 2022-11-09 | End: 2022-11-09

## 2022-11-08 RX ORDER — FUROSEMIDE 40 MG/1
20 TABLET ORAL DAILY
Status: DISCONTINUED | OUTPATIENT
Start: 2022-11-09 | End: 2022-11-19

## 2022-11-08 RX ORDER — MORPHINE SULFATE 4 MG/ML
4 INJECTION INTRAVENOUS ONCE
Status: COMPLETED | OUTPATIENT
Start: 2022-11-08 | End: 2022-11-08

## 2022-11-08 RX ORDER — FUROSEMIDE 40 MG/1
40 TABLET ORAL DAILY
Status: DISCONTINUED | OUTPATIENT
Start: 2022-11-09 | End: 2022-11-08

## 2022-11-08 RX ORDER — POLYETHYLENE GLYCOL 3350 17 G/17G
17 POWDER, FOR SOLUTION ORAL DAILY PRN
Status: DISCONTINUED | OUTPATIENT
Start: 2022-11-08 | End: 2022-11-09

## 2022-11-08 RX ORDER — CLOPIDOGREL BISULFATE 75 MG/1
75 TABLET ORAL DAILY
Status: DISCONTINUED | OUTPATIENT
Start: 2022-11-09 | End: 2022-11-19

## 2022-11-08 RX ORDER — AMOXICILLIN 250 MG
1 CAPSULE ORAL DAILY
Status: DISCONTINUED | OUTPATIENT
Start: 2022-11-09 | End: 2022-11-19

## 2022-11-08 RX ORDER — INSULIN LISPRO 100 [IU]/ML
INJECTION, SOLUTION INTRAVENOUS; SUBCUTANEOUS
Status: DISCONTINUED | OUTPATIENT
Start: 2022-11-08 | End: 2022-11-27 | Stop reason: HOSPADM

## 2022-11-08 RX ORDER — ONDANSETRON 4 MG/1
4 TABLET, ORALLY DISINTEGRATING ORAL
Status: DISCONTINUED | OUTPATIENT
Start: 2022-11-08 | End: 2022-11-27 | Stop reason: HOSPADM

## 2022-11-08 RX ORDER — SODIUM CHLORIDE 0.9 % (FLUSH) 0.9 %
5-40 SYRINGE (ML) INJECTION AS NEEDED
Status: DISCONTINUED | OUTPATIENT
Start: 2022-11-08 | End: 2022-11-27 | Stop reason: HOSPADM

## 2022-11-08 RX ORDER — ENOXAPARIN SODIUM 100 MG/ML
40 INJECTION SUBCUTANEOUS DAILY
Status: DISCONTINUED | OUTPATIENT
Start: 2022-11-09 | End: 2022-11-27 | Stop reason: HOSPADM

## 2022-11-08 RX ORDER — LOSARTAN POTASSIUM 50 MG/1
50 TABLET ORAL DAILY
Status: DISCONTINUED | OUTPATIENT
Start: 2022-11-09 | End: 2022-11-19

## 2022-11-08 RX ORDER — ACETAMINOPHEN 650 MG/1
650 SUPPOSITORY RECTAL
Status: DISCONTINUED | OUTPATIENT
Start: 2022-11-08 | End: 2022-11-27 | Stop reason: HOSPADM

## 2022-11-08 RX ORDER — SODIUM BICARBONATE 1 MEQ/ML
SYRINGE (ML) INTRAVENOUS
Status: COMPLETED
Start: 2022-11-08 | End: 2022-11-08

## 2022-11-08 RX ORDER — TAMSULOSIN HYDROCHLORIDE 0.4 MG/1
0.4 CAPSULE ORAL
Status: DISCONTINUED | OUTPATIENT
Start: 2022-11-08 | End: 2022-11-19

## 2022-11-08 RX ORDER — SODIUM CHLORIDE 0.9 % (FLUSH) 0.9 %
5-40 SYRINGE (ML) INJECTION EVERY 8 HOURS
Status: DISCONTINUED | OUTPATIENT
Start: 2022-11-08 | End: 2022-11-27 | Stop reason: HOSPADM

## 2022-11-08 RX ORDER — SODIUM BICARBONATE 1 MEQ/ML
100 SYRINGE (ML) INTRAVENOUS ONCE
Status: COMPLETED | OUTPATIENT
Start: 2022-11-08 | End: 2022-11-08

## 2022-11-08 RX ORDER — ONDANSETRON 2 MG/ML
4 INJECTION INTRAMUSCULAR; INTRAVENOUS
Status: DISCONTINUED | OUTPATIENT
Start: 2022-11-08 | End: 2022-11-27 | Stop reason: HOSPADM

## 2022-11-08 RX ORDER — NOREPINEPHRINE BIT/0.9 % NACL 8 MG/250ML
5-50 INFUSION BOTTLE (ML) INTRAVENOUS
Status: DISCONTINUED | OUTPATIENT
Start: 2022-11-08 | End: 2022-11-11

## 2022-11-08 RX ORDER — ESCITALOPRAM OXALATE 10 MG/1
20 TABLET ORAL DAILY
Status: DISCONTINUED | OUTPATIENT
Start: 2022-11-09 | End: 2022-11-09

## 2022-11-08 RX ORDER — DEXTROSE MONOHYDRATE 100 MG/ML
0-250 INJECTION, SOLUTION INTRAVENOUS AS NEEDED
Status: DISCONTINUED | OUTPATIENT
Start: 2022-11-08 | End: 2022-11-27 | Stop reason: HOSPADM

## 2022-11-08 RX ORDER — TRAMADOL HYDROCHLORIDE 50 MG/1
50 TABLET ORAL
Status: DISCONTINUED | OUTPATIENT
Start: 2022-11-08 | End: 2022-11-09

## 2022-11-08 RX ORDER — POTASSIUM CHLORIDE 20 MEQ/1
20 TABLET, EXTENDED RELEASE ORAL 2 TIMES DAILY
Status: DISCONTINUED | OUTPATIENT
Start: 2022-11-08 | End: 2022-11-09

## 2022-11-08 RX ORDER — CHOLECALCIFEROL (VITAMIN D3) 125 MCG
10 CAPSULE ORAL
Status: DISCONTINUED | OUTPATIENT
Start: 2022-11-08 | End: 2022-11-09

## 2022-11-08 RX ORDER — MAGNESIUM SULFATE 100 %
4 CRYSTALS MISCELLANEOUS AS NEEDED
Status: DISCONTINUED | OUTPATIENT
Start: 2022-11-08 | End: 2022-11-27 | Stop reason: HOSPADM

## 2022-11-08 RX ADMIN — CEFTRIAXONE 2 G: 2 INJECTION, POWDER, FOR SOLUTION INTRAMUSCULAR; INTRAVENOUS at 14:12

## 2022-11-08 RX ADMIN — Medication 10 MCG/MIN: at 22:02

## 2022-11-08 RX ADMIN — SODIUM CHLORIDE 1000 ML: 9 INJECTION, SOLUTION INTRAVENOUS at 11:38

## 2022-11-08 RX ADMIN — SODIUM BICARBONATE 100 MEQ: 84 INJECTION, SOLUTION INTRAVENOUS at 22:01

## 2022-11-08 RX ADMIN — MORPHINE SULFATE 4 MG: 4 INJECTION, SOLUTION INTRAMUSCULAR; INTRAVENOUS at 13:58

## 2022-11-08 RX ADMIN — Medication 100 MEQ: at 22:01

## 2022-11-08 RX ADMIN — SODIUM CHLORIDE, PRESERVATIVE FREE 10 ML: 5 INJECTION INTRAVENOUS at 22:00

## 2022-11-08 NOTE — ED PROVIDER NOTES
EMERGENCY DEPARTMENT HISTORY AND PHYSICAL EXAM      Date: 11/8/2022  Patient Name: Edwin Mayorga    History of Presenting Illness     Chief Complaint   Patient presents with    Hypotension       History Provided By: Patient    HPI: Edwin Mayorga, 70 y.o. male  with a past medical history significant diabetes and stroke presents to the ED with weakness and hypotension. Patient had gotten breakfast and the nursing home staff was bathing him when he became unresponsive. He's blood pressure was 70/42. On EMS arrival and improved somewhat to the 80s. He was given a small bolus of fluid and improved further. Patient has no complaints aside from chronic back pain    There are no other complaints, changes, or physical findings at this time. PCP: Ocie Runner, MD    No current facility-administered medications on file prior to encounter. Current Outpatient Medications on File Prior to Encounter   Medication Sig Dispense Refill    traMADoL (ULTRAM) 50 mg tablet Take 50 mg by mouth every six (6) hours as needed for Pain. insulin lispro (HUMALOG) 100 unit/mL injection INITIATE INSULIN CORRECTIVE PROTOCOL:  Normal Insulin Sensitivity   For Blood Sugar (mg/dL) of:     Less than 150 =   0 units           150 -199 =   2 units  200 -249 =   4 units  250 -299 =   6 units  300 -349 =   8 units  350 and above = 10 units and Call Physician     Initiate Hypoglycemia protocol if blood glucose is <70 mg/dL  Fast Acting - Administer Immediately - or within 15 minutes of start of meal, if mealtime coverage. 1 Each 1    clopidogreL (PLAVIX) 75 mg tab Take 1 Tablet by mouth in the morning. 30 Tablet 1    ARIPiprazole (ABILIFY) 2 mg tablet Take 2 mg by mouth in the morning. baclofen 5 mg tab Take 5 mg by mouth two (2) times a day. carvediloL (COREG) 12.5 mg tablet Take 12.5 mg by mouth two (2) times a day.  Indications: high blood pressure      escitalopram oxalate (LEXAPRO) 20 mg tablet Take 20 mg by mouth in the morning.      gabapentin (NEURONTIN) 100 mg capsule Take 100 mg by mouth nightly. hydrOXYzine HCL (ATARAX) 25 mg tablet Take 25 mg by mouth nightly as needed for Anxiety. furosemide (LASIX) 40 mg tablet Take 40 mg by mouth in the morning. Indications: visible water retention (Patient not taking: Reported on 9/15/2022)      losartan (COZAAR) 100 mg tablet Take 100 mg by mouth in the morning. melatonin 5 mg tablet Take 10 mg by mouth nightly. metFORMIN (GLUCOPHAGE) 500 mg tablet Take 1,000 mg by mouth two (2) times daily (with meals). potassium chloride (K-DUR, KLOR-CON M20) 20 mEq tablet Take 20 mEq by mouth two (2) times a day. senna-docusate (PERICOLACE) 8.6-50 mg per tablet Take 1 Tablet by mouth in the morning. tamsulosin (FLOMAX) 0.4 mg capsule Take 0.4 mg by mouth nightly. aspirin delayed-release 81 mg tablet Take 81 mg by mouth in the morning. omeprazole (PRILOSEC) 20 mg capsule Take 20 mg by mouth daily. atorvastatin (LIPITOR) 40 mg tablet Take 40 mg by mouth in the morning. Past History     Past Medical History:  Past Medical History:   Diagnosis Date    Cirrhosis ([facility] [address]     Diabetes ([facility] [address]     Hypertension     Stroke [facility])        Past Surgical History:  Past Surgical History:   Procedure Laterality Date    HX BACK SURGERY      HX GI         Family History:  Family History   Problem Relation Age of Onset    Heart Disease Father        Social History:  Social History     Tobacco Use    Smoking status: Former    Smokeless tobacco: Never   Vaping Use    Vaping Use: Never used   Substance Use Topics    Alcohol use: Not Currently    Drug use: Not Currently       Allergies:  No Known Allergies    Review of Systems   Review of Systems   Constitutional:  Negative for chills and fever. HENT:  Negative for sore throat. Eyes:  Negative for redness. Respiratory:  Negative for shortness of breath. Cardiovascular:  Negative for chest pain. Gastrointestinal:  Negative for abdominal pain, nausea and vomiting. Genitourinary:  Negative for flank pain. Musculoskeletal:  Negative for myalgias. Skin:  Negative for rash. Neurological:  Negative for headaches. Physical Exam   Physical Exam  Vitals and nursing note reviewed. Constitutional:       General: He is not in acute distress. Appearance: Normal appearance. HENT:      Head: Normocephalic and atraumatic. Mouth/Throat:      Mouth: Mucous membranes are moist.   Eyes:      Extraocular Movements: Extraocular movements intact. Conjunctiva/sclera: Conjunctivae normal.   Cardiovascular:      Rate and Rhythm: Normal rate and regular rhythm. Pulmonary:      Effort: Pulmonary effort is normal. No respiratory distress. Breath sounds: Normal breath sounds. No wheezing, rhonchi or rales. Abdominal:      General: There is no distension. Palpations: Abdomen is soft. Tenderness: There is no abdominal tenderness. Musculoskeletal:         General: Normal range of motion. Cervical back: Normal range of motion. Skin:     General: Skin is warm and dry. Comments: Stage I sacral decubitus ulcer   Neurological:      Mental Status: He is alert. Mental status is at baseline. Lab and Diagnostic Study Results   Labs -     Recent Results (from the past 12 hour(s))   METABOLIC PANEL, COMPREHENSIVE    Collection Time: 11/08/22 11:02 AM   Result Value Ref Range    Sodium 135 (L) 136 - 145 mmol/L    Potassium 4.8 3.5 - 5.1 mmol/L    Chloride 105 97 - 108 mmol/L    CO2 18 (L) 21 - 32 mmol/L    Anion gap 12 5 - 15 mmol/L    Glucose 134 (H) 65 - 100 mg/dL    BUN 36 (H) 6 - 20 mg/dL    Creatinine 1.46 (H) 0.70 - 1.30 mg/dL    BUN/Creatinine ratio 25 (H) 12 - 20      eGFR 51 (L) >60 ml/min/1.73m2    Calcium 8.1 (L) 8.5 - 10.1 mg/dL    Bilirubin, total 1.1 (H) 0.2 - 1.0 mg/dL    AST (SGOT) 29 15 - 37 U/L    ALT (SGPT) 15 12 - 78 U/L    Alk.  phosphatase 39 (L) 45 - 117 U/L Protein, total 5.6 (L) 6.4 - 8.2 g/dL    Albumin 2.8 (L) 3.5 - 5.0 g/dL    Globulin 2.8 2.0 - 4.0 g/dL    A-G Ratio 1.0 (L) 1.1 - 2.2     CBC WITH AUTOMATED DIFF    Collection Time: 11/08/22 11:02 AM   Result Value Ref Range    WBC 15.8 (H) 4.1 - 11.1 K/uL    RBC 4.40 4. 10 - 5.70 M/uL    HGB 12.5 12.1 - 17.0 g/dL    HCT 38.2 36.6 - 50.3 %    MCV 86.8 80.0 - 99.0 FL    MCH 28.4 26.0 - 34.0 PG    MCHC 32.7 30.0 - 36.5 g/dL    RDW 15.9 (H) 11.5 - 14.5 %    PLATELET 325 520 - 155 K/uL    MPV 10.1 8.9 - 12.9 FL    NRBC 0.0 0.0  WBC    ABSOLUTE NRBC 0.00 0.00 - 0.01 K/uL    NEUTROPHILS 82 (H) 32 - 75 %    LYMPHOCYTES 11 (L) 12 - 49 %    MONOCYTES 6 5 - 13 %    EOSINOPHILS 0 0 - 7 %    BASOPHILS 0 0 - 1 %    IMMATURE GRANULOCYTES 1 (H) 0 - 0.5 %    ABS. NEUTROPHILS 13.0 (H) 1.8 - 8.0 K/UL    ABS. LYMPHOCYTES 1.7 0.8 - 3.5 K/UL    ABS. MONOCYTES 0.9 0.0 - 1.0 K/UL    ABS. EOSINOPHILS 0.0 0.0 - 0.4 K/UL    ABS. BASOPHILS 0.0 0.0 - 0.1 K/UL    ABS. IMM.  GRANS. 0.1 (H) 0.00 - 0.04 K/UL    DF AUTOMATED     LACTIC ACID    Collection Time: 11/08/22 11:02 AM   Result Value Ref Range    Lactic acid 2.4 (HH) 0.4 - 2.0 mmol/L   TROPONIN-HIGH SENSITIVITY    Collection Time: 11/08/22 11:02 AM   Result Value Ref Range    Troponin-High Sensitivity 26 0 - 76 ng/L   URINALYSIS W/ RFLX MICROSCOPIC    Collection Time: 11/08/22 11:46 AM   Result Value Ref Range    Color Yellow/Straw      Appearance Turbid (A) Clear      Specific gravity 1.013 1.003 - 1.030      pH (UA) 5.0 5.0 - 8.0      Protein Negative Negative mg/dL    Glucose Negative Negative mg/dL    Ketone 5 (A) Negative mg/dL    Bilirubin Negative Negative      Blood Small (A) Negative      Urobilinogen 0.1 0.1 - 1.0 EU/dL    Nitrites Negative Negative      Leukocyte Esterase Large (A) Negative     URINE MICROSCOPIC    Collection Time: 11/08/22 11:46 AM   Result Value Ref Range    WBC >100 (H) 0 - 4 /hpf    RBC 10-20 0 - 5 /hpf    Bacteria 4+ (A) Negative /hpf    Mucus 4+ (A) Negative /lpf    Hyaline cast 10-20 0 - 5 /lpf       Radiologic Studies -   @lastxrresult@  CT Results  (Last 48 hours)      None          CXR Results  (Last 48 hours)                 11/08/22 1108  XR CHEST PORT Final result    Impression:  No acute process. Narrative:  INDICATION: hypotension       EXAM:  AP CHEST RADIOGRAPH       COMPARISON: August 12, 2022       FINDINGS:       AP portable view of the chest demonstrates prior median sternotomy. Heart size   is normal. There is no edema, effusion, consolidation, or pneumothorax. The   osseous structures are unremarkable. Medical Decision Making and ED Course   Differential Diagnosis & Medical Decision Making Provider Note:     77-year-old male presenting with episode of transient hypotension. Blood pressure improving and back to baseline mental status on arrival.  Work-up shows urinary tract infection. No evidence of pneumonia or other infectious process. Will start on IV antibiotics and admit    - I am the first provider for this patient. I reviewed the vital signs, available nursing notes, past medical history, past surgical history, family history and social history. The patients presenting problems have been discussed, and they are in agreement with the care plan formulated and outlined with them. I have encouraged them to ask questions as they arise throughout their visit. Vital Signs-Reviewed the patient's vital signs. Patient Vitals for the past 12 hrs:   Temp Pulse Resp BP SpO2   11/08/22 1050 97.9 °F (36.6 °C) 60 18 118/74 96 %       ED Course:   ED Course as of 11/08/22 1255   Tue Nov 08, 2022   1153 Lactic acid(!!): 2.4 [KK]   1244 Leukocyte Esterase(!): Large [KK]   1244 Bacteria(!): 4+ [KK]   1244 WBC(!): >100 [KK]   1251 Pressure continues to improve. Extremities well perfused. Blood pressure stable.   Patient has no complaints of aside from chronic low back pain likely secondary to his mild decubitus ulcer [KK] 1255 NSR, rate 60, normal axis, normal intervals, no ST elevation/depression, no TWI, QTc 504   [KK]      ED Course User Index  [KK] Lam Carlisle MD            Disposition   Disposition: Admitted to Floor Medical Floor the case was discussed with the admitting physician Dr Alo Richardson     Diagnosis/Clinical Impression     Clinical Impression:   1. Urinary tract infection without hematuria, site unspecified    2. Hypotension, unspecified hypotension type        Attestations: Jimena Toscano MD, am the primary clinician of record. Please note that this dictation was completed with [employer] the [employer] voice recognition software. Quite often unanticipated grammatical, syntax, homophones, and other interpretive errors are inadvertently transcribed by the computer software. Please disregard these errors. Please excuse any errors that have escaped final proofreading. Thank you.

## 2022-11-08 NOTE — ED TRIAGE NOTES
[facility] pt is from [facility] when after breakfast pt was having assistance while showering and then pt became unresponsive BP 78/42 with staff at that time; upon EMS arrival SBP in the 80s, after given a small blous of NS SBP in 90s; pt was also placed on NC o2

## 2022-11-08 NOTE — ACP (ADVANCE CARE PLANNING)
Advance Care Planning   Healthcare Decision Maker:       Primary Decision Maker: Morgan Landry - [phone]    [facility] [phone]

## 2022-11-08 NOTE — H&P
GENERAL GENERIC H&P/CONSULT    Subjective:    71M with past medical history of stroke, diabetes, hypertension, resides in a nursing facility. Recent admission 7/29/22-8/2/22 from discharge summary:  Patient was admitted on 7/29/2022 following presentation to the ED for transient facial droop 11:30 AM.  Due to history of strokes, suspected TIA versus CVA. CT was negative, patient was admitted for possible TIA and MRI was ordered. MRI showed acute ischemic stroke of the right parietal area. Bilateral carotid ultrasounds revealed 50% stenosis of right ICA, 90% stenosis of left ICA. Urine culture positive for gram-negative rods Proteus Mirabella's and sensitive to ceftriaxone and cefazolin. Patient will receive 7 days worth of cefazolin as an outpatient. Vascular surgery consultation and will need carotid endarterectomy in approximately 4 weeks due to the recent stroke. Cardiac consultation and patient will have outpatient stress test prior to carotid endarterectomy. Patient will follow up with Dr. Yahir Silva in approximately 2 weeks. Patient is being discharged to skilled nursing facility. Discussed case with son, Becca Palma via phone. 7/30/22  carotid duplex  Impression:  1. Bilateral subclavian arteries are patent. 2.  Bilateral common carotid patent with mild to moderate irregular heterogeneous plaque throughout. 3.  Right proximal ECA shows no obvious plaque. 4.  Right proximal ICA shows mild irregular heterogeneous plaque without significant stenosis. 5.  Left proximal ECA shows mild heterogeneous plaque noted. 6.  Left proximal ICA shows irregular mixed plaque with high-grade stenosis, per criteria greater than 70% stenosis. 7.  Bilateral vertebral arteries are patent and antegrade flow. 7/30/22 echocardiogram    Left Ventricle: Normal left ventricular systolic function with a visually estimated EF of 60 - 65%. Left ventricle size is normal. Increased wall thickness.  Findings consistent with concentric hypertrophy. Abnormal diastolic function. Aortic Valve: Tricuspid valve. 9/19/22 vascular surgery outpatient Dr Tova Siegel  Patient requires a preop clearance for possible carotid surgery. Patient supposed to see [facility] cardiology. Patient appears to be very weak today. I am not sure patient will be a good candidate for open carotid surgery on the left side. We will await for cardiac work-up and will go from there. 11/8/22 presents to hospital from nursing home with weakness and hypotension for one day. Associated with poor responsiveness. Found hypotensive on arrival to the ED, with metabolic encephalopathy, improved with intravenous fluid bolus. Found to have urinary infection, started on antibiotic, and I have been asked to admit to hospital for further stabilization of his condition. Past Medical History:   Diagnosis Date    Cirrhosis ([facility] [address]     Diabetes ([facility] [address]     Hypertension     Stroke [facility])       Past Surgical History:   Procedure Laterality Date    HX BACK SURGERY      HX GI        Prior to Admission medications    Medication Sig Start Date End Date Taking? Authorizing Provider   traMADoL (ULTRAM) 50 mg tablet Take 50 mg by mouth every six (6) hours as needed for Pain. Provider, Historical   insulin lispro (HUMALOG) 100 unit/mL injection INITIATE INSULIN CORRECTIVE PROTOCOL:  Normal Insulin Sensitivity   For Blood Sugar (mg/dL) of:     Less than 150 =   0 units           150 -199 =   2 units  200 -249 =   4 units  250 -299 =   6 units  300 -349 =   8 units  350 and above = 10 units and Call Physician     Initiate Hypoglycemia protocol if blood glucose is <70 mg/dL  Fast Acting - Administer Immediately - or within 15 minutes of start of meal, if mealtime coverage. 8/2/22   Aaliyah Mcneill PA-C   clopidogreL (PLAVIX) 75 mg tab Take 1 Tablet by mouth in the morning. 8/3/22   Aaliyah Mcneill PA-C   ARIPiprazole (ABILIFY) 2 mg tablet Take 2 mg by mouth in the morning. Provider, Historical   baclofen 5 mg tab Take 5 mg by mouth two (2) times a day. Provider, Historical   carvediloL (COREG) 12.5 mg tablet Take 12.5 mg by mouth two (2) times a day. Indications: high blood pressure    Provider, Historical   escitalopram oxalate (LEXAPRO) 20 mg tablet Take 20 mg by mouth in the morning. Provider, Historical   gabapentin (NEURONTIN) 100 mg capsule Take 100 mg by mouth nightly. Provider, Historical   hydrOXYzine HCL (ATARAX) 25 mg tablet Take 25 mg by mouth nightly as needed for Anxiety. Provider, Historical   furosemide (LASIX) 40 mg tablet Take 40 mg by mouth in the morning. Indications: visible water retention  Patient not taking: Reported on 9/15/2022    Provider, Historical   losartan (COZAAR) 100 mg tablet Take 100 mg by mouth in the morning. Provider, Historical   melatonin 5 mg tablet Take 10 mg by mouth nightly. Provider, Historical   metFORMIN (GLUCOPHAGE) 500 mg tablet Take 1,000 mg by mouth two (2) times daily (with meals). Provider, Historical   potassium chloride (K-DUR, KLOR-CON M20) 20 mEq tablet Take 20 mEq by mouth two (2) times a day. Provider, Historical   senna-docusate (PERICOLACE) 8.6-50 mg per tablet Take 1 Tablet by mouth in the morning. Provider, Historical   tamsulosin (FLOMAX) 0.4 mg capsule Take 0.4 mg by mouth nightly. Provider, Historical   aspirin delayed-release 81 mg tablet Take 81 mg by mouth in the morning. Adam Donahue MD   omeprazole (PRILOSEC) 20 mg capsule Take 20 mg by mouth daily. Adam Donahue MD   atorvastatin (LIPITOR) 40 mg tablet Take 40 mg by mouth in the morning.     Adam Donahue MD     No Known Allergies   Social History     Tobacco Use    Smoking status: Former    Smokeless tobacco: Never   Substance Use Topics    Alcohol use: Not Currently      Family History   Problem Relation Age of Onset    Heart Disease Father       Review of Systems   All other systems reviewed and are negative. Objective:    No intake/output data recorded. No intake/output data recorded. Patient Vitals for the past 8 hrs:   BP Temp Pulse Resp SpO2 Height Weight   11/08/22 1050 118/74 97.9 °F (36.6 °C) 60 18 96 % 5' 9\" (1.753 m) 84.8 kg (187 lb)     Physical Exam  Vitals and nursing note reviewed. HENT:      Head: Normocephalic and atraumatic. Mouth/Throat:      Mouth: Mucous membranes are dry. Eyes:      Extraocular Movements: Extraocular movements intact. Pupils: Pupils are equal, round, and reactive to light. Cardiovascular:      Rate and Rhythm: Normal rate and regular rhythm. Pulses: Normal pulses. Heart sounds: Normal heart sounds. Pulmonary:      Effort: Pulmonary effort is normal.      Breath sounds: Normal breath sounds. Abdominal:      General: Abdomen is flat. Palpations: Abdomen is soft. Skin:     General: Skin is warm. Neurological:      Mental Status: He is alert. Mental status is at baseline. Psychiatric:         Mood and Affect: Mood normal.        Labs:    Recent Results (from the past 24 hour(s))   METABOLIC PANEL, COMPREHENSIVE    Collection Time: 11/08/22 11:02 AM   Result Value Ref Range    Sodium 135 (L) 136 - 145 mmol/L    Potassium 4.8 3.5 - 5.1 mmol/L    Chloride 105 97 - 108 mmol/L    CO2 18 (L) 21 - 32 mmol/L    Anion gap 12 5 - 15 mmol/L    Glucose 134 (H) 65 - 100 mg/dL    BUN 36 (H) 6 - 20 mg/dL    Creatinine 1.46 (H) 0.70 - 1.30 mg/dL    BUN/Creatinine ratio 25 (H) 12 - 20      eGFR 51 (L) >60 ml/min/1.73m2    Calcium 8.1 (L) 8.5 - 10.1 mg/dL    Bilirubin, total 1.1 (H) 0.2 - 1.0 mg/dL    AST (SGOT) 29 15 - 37 U/L    ALT (SGPT) 15 12 - 78 U/L    Alk.  phosphatase 39 (L) 45 - 117 U/L    Protein, total 5.6 (L) 6.4 - 8.2 g/dL    Albumin 2.8 (L) 3.5 - 5.0 g/dL    Globulin 2.8 2.0 - 4.0 g/dL    A-G Ratio 1.0 (L) 1.1 - 2.2     CBC WITH AUTOMATED DIFF    Collection Time: 11/08/22 11:02 AM   Result Value Ref Range    WBC 15.8 (H) 4.1 - 11.1 K/uL RBC 4.40 4. 10 - 5.70 M/uL    HGB 12.5 12.1 - 17.0 g/dL    HCT 38.2 36.6 - 50.3 %    MCV 86.8 80.0 - 99.0 FL    MCH 28.4 26.0 - 34.0 PG    MCHC 32.7 30.0 - 36.5 g/dL    RDW 15.9 (H) 11.5 - 14.5 %    PLATELET 096 208 - 887 K/uL    MPV 10.1 8.9 - 12.9 FL    NRBC 0.0 0.0  WBC    ABSOLUTE NRBC 0.00 0.00 - 0.01 K/uL    NEUTROPHILS 82 (H) 32 - 75 %    LYMPHOCYTES 11 (L) 12 - 49 %    MONOCYTES 6 5 - 13 %    EOSINOPHILS 0 0 - 7 %    BASOPHILS 0 0 - 1 %    IMMATURE GRANULOCYTES 1 (H) 0 - 0.5 %    ABS. NEUTROPHILS 13.0 (H) 1.8 - 8.0 K/UL    ABS. LYMPHOCYTES 1.7 0.8 - 3.5 K/UL    ABS. MONOCYTES 0.9 0.0 - 1.0 K/UL    ABS. EOSINOPHILS 0.0 0.0 - 0.4 K/UL    ABS. BASOPHILS 0.0 0.0 - 0.1 K/UL    ABS. IMM.  GRANS. 0.1 (H) 0.00 - 0.04 K/UL    DF AUTOMATED     LACTIC ACID    Collection Time: 11/08/22 11:02 AM   Result Value Ref Range    Lactic acid 2.4 (HH) 0.4 - 2.0 mmol/L   TROPONIN-HIGH SENSITIVITY    Collection Time: 11/08/22 11:02 AM   Result Value Ref Range    Troponin-High Sensitivity 26 0 - 76 ng/L   URINALYSIS W/ RFLX MICROSCOPIC    Collection Time: 11/08/22 11:46 AM   Result Value Ref Range    Color Yellow/Straw      Appearance Turbid (A) Clear      Specific gravity 1.013 1.003 - 1.030      pH (UA) 5.0 5.0 - 8.0      Protein Negative Negative mg/dL    Glucose Negative Negative mg/dL    Ketone 5 (A) Negative mg/dL    Bilirubin Negative Negative      Blood Small (A) Negative      Urobilinogen 0.1 0.1 - 1.0 EU/dL    Nitrites Negative Negative      Leukocyte Esterase Large (A) Negative     URINE MICROSCOPIC    Collection Time: 11/08/22 11:46 AM   Result Value Ref Range    WBC >100 (H) 0 - 4 /hpf    RBC 10-20 0 - 5 /hpf    Bacteria 4+ (A) Negative /hpf    Mucus 4+ (A) Negative /lpf    Hyaline cast 10-20 0 - 5 /lpf       ECG:   Telemetry monitor    Chest X-Ray:   INDICATION: hypotension     EXAM:  AP CHEST RADIOGRAPH     COMPARISON: August 12, 2022     FINDINGS:     AP portable view of the chest demonstrates prior median sternotomy. Heart size  is normal. There is no edema, effusion, consolidation, or pneumothorax. The  osseous structures are unremarkable. IMPRESSION  No acute process. Assessment:  Active Problems:    UTI (urinary tract infection) (11/8/2022)    71M with past medical history of stroke, diabetes, hypertension, resides in a nursing facility. Recent admission 7/29/22-8/2/22 from discharge summary:  Patient was admitted on 7/29/2022 following presentation to the ED for transient facial droop 11:30 AM.  Due to history of strokes, suspected TIA versus CVA. CT was negative, patient was admitted for possible TIA and MRI was ordered. MRI showed acute ischemic stroke of the right parietal area. Bilateral carotid ultrasounds revealed 50% stenosis of right ICA, 90% stenosis of left ICA. Urine culture positive for gram-negative rods Proteus Mirabella's and sensitive to ceftriaxone and cefazolin. Patient will receive 7 days worth of cefazolin as an outpatient. Vascular surgery consultation and will need carotid endarterectomy in approximately 4 weeks due to the recent stroke. Cardiac consultation and patient will have outpatient stress test prior to carotid endarterectomy. Patient will follow up with Dr. Lucina Berman in approximately 2 weeks. Patient is being discharged to skilled nursing facility. Discussed case with son, Nya Vicente via phone. 7/30/22  carotid duplex  Impression:  1. Bilateral subclavian arteries are patent. 2.  Bilateral common carotid patent with mild to moderate irregular heterogeneous plaque throughout. 3.  Right proximal ECA shows no obvious plaque. 4.  Right proximal ICA shows mild irregular heterogeneous plaque without significant stenosis. 5.  Left proximal ECA shows mild heterogeneous plaque noted. 6.  Left proximal ICA shows irregular mixed plaque with high-grade stenosis, per criteria greater than 70% stenosis. 7.  Bilateral vertebral arteries are patent and antegrade flow.     7/30/22 echocardiogram    Left Ventricle: Normal left ventricular systolic function with a visually estimated EF of 60 - 65%. Left ventricle size is normal. Increased wall thickness. Findings consistent with concentric hypertrophy. Abnormal diastolic function. Aortic Valve: Tricuspid valve. 9/19/22 vascular surgery outpatient Dr Moreno Sorensen  Patient requires a preop clearance for possible carotid surgery. Patient supposed to see [facility] cardiology. Patient appears to be very weak today. I am not sure patient will be a good candidate for open carotid surgery on the left side. We will await for cardiac work-up and will go from there. 11/8/22 presents to hospital from nursing home with weakness and hypotension for one day. Associated with poor responsiveness. Found hypotensive on arrival to the ED, with metabolic encephalopathy, improved with intravenous fluid bolus. Found to have urinary infection, started on antibiotic, and I have been asked to admit to hospital for further stabilization of his condition. MICROBIOLOGY    7/29/22 Urine  Proteus mirabilis    11/8/22 Blood  Pending  11/8/22 Urine  Pending    Plan:    1) Complex urinary infection at admission, further complicated with hypotension and metabolic encephalopathy. Supportive care   Ceftriaxone for now pending further culture data    2) Cardiovascular disease including hypertension, carotid artery disease. In the outpatient setting had been following with  [facility] and Dr Moreno Sorensen for anticipated carotid endarterectomy.      Telemetry monitoring   Recent echocardiogram as above   Continue present outpatient regimen   Cardiac clearance for carotid endarterectomy    3) DVT prophylaxis with enoxaparin    4) Dispo return to skilled nursing once stable          Signed:  Jas Macedo MD 11/8/2022

## 2022-11-08 NOTE — PROGRESS NOTES
Reason for Admission:   UTI                    RUR Score:  18%                PCP: First and Last name:   David Vazquez MD     Name of Practice:    Are you a current patient: Yes/No: Yes   Approximate date of last visit: Seen @ the facility. Can you participate in a virtual visit if needed: No    Do you (patient/family) have any concerns for transition/discharge? No              Plan for utilizing home health:son Matthew Quigley) gave verbal consent to Choice Letter via phone for pt to return to [facility] upon discharge. Referral sent via discharge. Pt is total care. Current Advanced Directive/Advance Care Plan:  Full Code      Healthcare Decision Maker:             Primary Decision MakerPaolo Harris - [phone]    Primary Decision Maker: Arvie Duverney  Maria Eugeniaer - [phone]    Transition of Care Plan:       D/C Plan is to return to [facility] via transportation upon discharge.

## 2022-11-09 ENCOUNTER — APPOINTMENT (OUTPATIENT)
Dept: GENERAL RADIOLOGY | Age: 72
DRG: 871 | End: 2022-11-09
Attending: NURSE PRACTITIONER
Payer: MEDICARE

## 2022-11-09 ENCOUNTER — APPOINTMENT (OUTPATIENT)
Dept: INTERVENTIONAL RADIOLOGY/VASCULAR | Age: 72
DRG: 871 | End: 2022-11-09
Attending: INTERNAL MEDICINE
Payer: MEDICARE

## 2022-11-09 LAB
ALBUMIN SERPL-MCNC: 2.2 G/DL (ref 3.5–5)
ALBUMIN/GLOB SERPL: 0.8 {RATIO} (ref 1.1–2.2)
ALP SERPL-CCNC: 58 U/L (ref 45–117)
ALT SERPL-CCNC: 18 U/L (ref 12–78)
ANION GAP SERPL CALC-SCNC: 13 MMOL/L (ref 5–15)
AST SERPL W P-5'-P-CCNC: 46 U/L (ref 15–37)
BASOPHILS # BLD: 0 K/UL (ref 0–0.1)
BASOPHILS NFR BLD: 0 % (ref 0–1)
BILIRUB SERPL-MCNC: 0.8 MG/DL (ref 0.2–1)
BUN SERPL-MCNC: 40 MG/DL (ref 6–20)
BUN/CREAT SERPL: 24 (ref 12–20)
CA-I BLD-MCNC: 7.1 MG/DL (ref 8.5–10.1)
CHLORIDE SERPL-SCNC: 109 MMOL/L (ref 97–108)
CK SERPL-CCNC: 977 U/L (ref 39–308)
CO2 SERPL-SCNC: 17 MMOL/L (ref 21–32)
CREAT SERPL-MCNC: 1.65 MG/DL (ref 0.7–1.3)
CRP SERPL-MCNC: 4.02 MG/DL (ref 0–0.6)
DIFFERENTIAL METHOD BLD: ABNORMAL
EOSINOPHIL # BLD: 5.1 K/UL (ref 0–0.4)
EOSINOPHIL NFR BLD: 12 % (ref 0–7)
ERYTHROCYTE [DISTWIDTH] IN BLOOD BY AUTOMATED COUNT: 16 % (ref 11.5–14.5)
ERYTHROCYTE [SEDIMENTATION RATE] IN BLOOD: 8 MM/HR (ref 0–20)
EST. AVERAGE GLUCOSE BLD GHB EST-MCNC: 131 MG/DL
GLOBULIN SER CALC-MCNC: 2.9 G/DL (ref 2–4)
GLUCOSE BLD STRIP.AUTO-MCNC: 130 MG/DL (ref 65–100)
GLUCOSE BLD STRIP.AUTO-MCNC: 159 MG/DL (ref 65–100)
GLUCOSE BLD STRIP.AUTO-MCNC: 206 MG/DL (ref 65–100)
GLUCOSE BLD STRIP.AUTO-MCNC: 69 MG/DL (ref 65–100)
GLUCOSE SERPL-MCNC: 173 MG/DL (ref 65–100)
HBA1C MFR BLD: 6.2 % (ref 4–5.6)
HCT VFR BLD AUTO: 34.2 % (ref 36.6–50.3)
HGB BLD-MCNC: 11.9 G/DL (ref 12.1–17)
IMM GRANULOCYTES # BLD AUTO: 0 K/UL
IMM GRANULOCYTES NFR BLD AUTO: 0 %
LYMPHOCYTES # BLD: 0.8 K/UL (ref 0.8–3.5)
LYMPHOCYTES NFR BLD: 2 % (ref 12–49)
MAGNESIUM SERPL-MCNC: 0.9 MG/DL (ref 1.6–2.4)
MCH RBC QN AUTO: 29.2 PG (ref 26–34)
MCHC RBC AUTO-ENTMCNC: 34.8 G/DL (ref 30–36.5)
MCV RBC AUTO: 84 FL (ref 80–99)
MONOCYTES # BLD: 0.8 K/UL (ref 0–1)
MONOCYTES NFR BLD: 2 % (ref 5–13)
MRSA DNA SPEC QL NAA+PROBE: DETECTED
MYELOCYTES NFR BLD MANUAL: 3 %
NEUTS SEG # BLD: 34.3 K/UL (ref 1.8–8)
NEUTS SEG NFR BLD: 81 % (ref 32–75)
NRBC # BLD: 0 K/UL (ref 0–0.01)
NRBC BLD-RTO: 0 PER 100 WBC
PERFORMED BY, TECHID: ABNORMAL
PERFORMED BY, TECHID: NORMAL
PHOSPHATE SERPL-MCNC: 1.8 MG/DL (ref 2.6–4.7)
PLATELET # BLD AUTO: 191 K/UL (ref 150–400)
PMV BLD AUTO: 11 FL (ref 8.9–12.9)
POTASSIUM SERPL-SCNC: 3.7 MMOL/L (ref 3.5–5.1)
PROT SERPL-MCNC: 5.1 G/DL (ref 6.4–8.2)
RBC # BLD AUTO: 4.07 M/UL (ref 4.1–5.7)
RBC MORPH BLD: ABNORMAL
SODIUM SERPL-SCNC: 139 MMOL/L (ref 136–145)
WBC # BLD AUTO: 42.4 K/UL (ref 4.1–11.1)

## 2022-11-09 PROCEDURE — 87186 SC STD MICRODIL/AGAR DIL: CPT

## 2022-11-09 PROCEDURE — 74011250636 HC RX REV CODE- 250/636: Performed by: INTERNAL MEDICINE

## 2022-11-09 PROCEDURE — 99222 1ST HOSP IP/OBS MODERATE 55: CPT | Performed by: PODIATRIST

## 2022-11-09 PROCEDURE — 74011000258 HC RX REV CODE- 258: Performed by: HOSPITALIST

## 2022-11-09 PROCEDURE — 74011250637 HC RX REV CODE- 250/637: Performed by: INTERNAL MEDICINE

## 2022-11-09 PROCEDURE — 80053 COMPREHEN METABOLIC PANEL: CPT

## 2022-11-09 PROCEDURE — 87077 CULTURE AEROBIC IDENTIFY: CPT

## 2022-11-09 PROCEDURE — 71045 X-RAY EXAM CHEST 1 VIEW: CPT

## 2022-11-09 PROCEDURE — 36415 COLL VENOUS BLD VENIPUNCTURE: CPT

## 2022-11-09 PROCEDURE — B543ZZA ULTRASONOGRAPHY OF RIGHT JUGULAR VEINS, GUIDANCE: ICD-10-PCS | Performed by: RADIOLOGY

## 2022-11-09 PROCEDURE — 74011250637 HC RX REV CODE- 250/637: Performed by: HOSPITALIST

## 2022-11-09 PROCEDURE — 86140 C-REACTIVE PROTEIN: CPT

## 2022-11-09 PROCEDURE — 87641 MR-STAPH DNA AMP PROBE: CPT

## 2022-11-09 PROCEDURE — 85025 COMPLETE CBC W/AUTO DIFF WBC: CPT

## 2022-11-09 PROCEDURE — 74011250636 HC RX REV CODE- 250/636: Performed by: HOSPITALIST

## 2022-11-09 PROCEDURE — C1894 INTRO/SHEATH, NON-LASER: HCPCS

## 2022-11-09 PROCEDURE — 74011000250 HC RX REV CODE- 250: Performed by: INTERNAL MEDICINE

## 2022-11-09 PROCEDURE — 65610000006 HC RM INTENSIVE CARE

## 2022-11-09 PROCEDURE — 05HM33Z INSERTION OF INFUSION DEVICE INTO RIGHT INTERNAL JUGULAR VEIN, PERCUTANEOUS APPROACH: ICD-10-PCS | Performed by: RADIOLOGY

## 2022-11-09 PROCEDURE — 83735 ASSAY OF MAGNESIUM: CPT

## 2022-11-09 PROCEDURE — 87205 SMEAR GRAM STAIN: CPT

## 2022-11-09 PROCEDURE — 74011000250 HC RX REV CODE- 250: Performed by: HOSPITALIST

## 2022-11-09 PROCEDURE — 85652 RBC SED RATE AUTOMATED: CPT

## 2022-11-09 PROCEDURE — 99222 1ST HOSP IP/OBS MODERATE 55: CPT | Performed by: SURGERY

## 2022-11-09 PROCEDURE — 82550 ASSAY OF CK (CPK): CPT

## 2022-11-09 PROCEDURE — 83036 HEMOGLOBIN GLYCOSYLATED A1C: CPT

## 2022-11-09 PROCEDURE — 74011000258 HC RX REV CODE- 258: Performed by: INTERNAL MEDICINE

## 2022-11-09 PROCEDURE — 77010033678 HC OXYGEN DAILY

## 2022-11-09 PROCEDURE — 76937 US GUIDE VASCULAR ACCESS: CPT

## 2022-11-09 PROCEDURE — 84100 ASSAY OF PHOSPHORUS: CPT

## 2022-11-09 PROCEDURE — 99223 1ST HOSP IP/OBS HIGH 75: CPT | Performed by: INTERNAL MEDICINE

## 2022-11-09 RX ORDER — HYDROXYZINE 25 MG/1
25 TABLET, FILM COATED ORAL
Status: DISCONTINUED | OUTPATIENT
Start: 2022-11-09 | End: 2022-11-19

## 2022-11-09 RX ORDER — MUPIROCIN 20 MG/G
OINTMENT TOPICAL 2 TIMES DAILY
Status: COMPLETED | OUTPATIENT
Start: 2022-11-09 | End: 2022-11-13

## 2022-11-09 RX ORDER — MAGNESIUM SULFATE 4 G/50ML
4 INJECTION INTRAVENOUS ONCE
Status: DISCONTINUED | OUTPATIENT
Start: 2022-11-09 | End: 2022-11-09

## 2022-11-09 RX ORDER — PANTOPRAZOLE SODIUM 40 MG/1
40 GRANULE, DELAYED RELEASE ORAL DAILY
Status: DISCONTINUED | OUTPATIENT
Start: 2022-11-10 | End: 2022-11-19

## 2022-11-09 RX ORDER — GUAIFENESIN 100 MG/5ML
81 LIQUID (ML) ORAL DAILY
Status: DISCONTINUED | OUTPATIENT
Start: 2022-11-10 | End: 2022-11-19

## 2022-11-09 RX ORDER — DEXTROSE MONOHYDRATE AND SODIUM CHLORIDE 5; .9 G/100ML; G/100ML
100 INJECTION, SOLUTION INTRAVENOUS CONTINUOUS
Status: DISCONTINUED | OUTPATIENT
Start: 2022-11-09 | End: 2022-11-12

## 2022-11-09 RX ORDER — DOXAZOSIN 2 MG/1
1 TABLET ORAL
Status: DISCONTINUED | OUTPATIENT
Start: 2022-11-09 | End: 2022-11-19

## 2022-11-09 RX ORDER — SODIUM CHLORIDE 9 MG/ML
250 INJECTION, SOLUTION INTRAVENOUS CONTINUOUS
Status: DISPENSED | OUTPATIENT
Start: 2022-11-09 | End: 2022-11-09

## 2022-11-09 RX ORDER — POLYETHYLENE GLYCOL 3350 17 G/17G
17 POWDER, FOR SOLUTION ORAL DAILY PRN
Status: DISCONTINUED | OUTPATIENT
Start: 2022-11-09 | End: 2022-11-27 | Stop reason: HOSPADM

## 2022-11-09 RX ORDER — MAGNESIUM SULFATE HEPTAHYDRATE 40 MG/ML
2 INJECTION, SOLUTION INTRAVENOUS
Status: COMPLETED | OUTPATIENT
Start: 2022-11-09 | End: 2022-11-09

## 2022-11-09 RX ORDER — ATORVASTATIN CALCIUM 40 MG/1
40 TABLET, FILM COATED ORAL DAILY
Status: DISCONTINUED | OUTPATIENT
Start: 2022-11-10 | End: 2022-11-19

## 2022-11-09 RX ORDER — BACLOFEN 10 MG/1
5 TABLET ORAL
Status: DISCONTINUED | OUTPATIENT
Start: 2022-11-09 | End: 2022-11-27 | Stop reason: HOSPADM

## 2022-11-09 RX ORDER — GABAPENTIN 100 MG/1
100 CAPSULE ORAL
Status: DISCONTINUED | OUTPATIENT
Start: 2022-11-09 | End: 2022-11-19

## 2022-11-09 RX ORDER — DEXTROSE MONOHYDRATE AND SODIUM CHLORIDE 5; .9 G/100ML; G/100ML
250 INJECTION, SOLUTION INTRAVENOUS CONTINUOUS
Status: DISCONTINUED | OUTPATIENT
Start: 2022-11-09 | End: 2022-11-09

## 2022-11-09 RX ORDER — ESCITALOPRAM OXALATE 10 MG/1
20 TABLET ORAL DAILY
Status: DISCONTINUED | OUTPATIENT
Start: 2022-11-10 | End: 2022-11-19

## 2022-11-09 RX ORDER — CHOLECALCIFEROL (VITAMIN D3) 125 MCG
10 CAPSULE ORAL
Status: DISCONTINUED | OUTPATIENT
Start: 2022-11-09 | End: 2022-11-19

## 2022-11-09 RX ORDER — TRAMADOL HYDROCHLORIDE 50 MG/1
50 TABLET ORAL
Status: DISCONTINUED | OUTPATIENT
Start: 2022-11-09 | End: 2022-11-27 | Stop reason: HOSPADM

## 2022-11-09 RX ORDER — POTASSIUM CHLORIDE 1.5 G/1.77G
20 POWDER, FOR SOLUTION ORAL 2 TIMES DAILY
Status: DISCONTINUED | OUTPATIENT
Start: 2022-11-09 | End: 2022-11-19

## 2022-11-09 RX ADMIN — VANCOMYCIN HYDROCHLORIDE 2000 MG: 10 INJECTION, POWDER, LYOPHILIZED, FOR SOLUTION INTRAVENOUS at 01:59

## 2022-11-09 RX ADMIN — MAGNESIUM SULFATE HEPTAHYDRATE 2 G: 2 INJECTION, SOLUTION INTRAVENOUS at 04:27

## 2022-11-09 RX ADMIN — POTASSIUM CHLORIDE 20 MEQ: 1.5 FOR SOLUTION ORAL at 21:18

## 2022-11-09 RX ADMIN — MUPIROCIN: 20 OINTMENT TOPICAL at 09:52

## 2022-11-09 RX ADMIN — SODIUM CHLORIDE, PRESERVATIVE FREE 10 ML: 5 INJECTION INTRAVENOUS at 05:07

## 2022-11-09 RX ADMIN — SODIUM CHLORIDE 250 ML/HR: 9 INJECTION, SOLUTION INTRAVENOUS at 00:52

## 2022-11-09 RX ADMIN — POTASSIUM PHOSPHATE, MONOBASIC POTASSIUM PHOSPHATE, DIBASIC: 224; 236 INJECTION, SOLUTION, CONCENTRATE INTRAVENOUS at 04:31

## 2022-11-09 RX ADMIN — BACLOFEN 5 MG: 10 TABLET ORAL at 21:17

## 2022-11-09 RX ADMIN — ENOXAPARIN SODIUM 40 MG: 100 INJECTION SUBCUTANEOUS at 09:51

## 2022-11-09 RX ADMIN — DOXAZOSIN 1 MG: 2 TABLET ORAL at 21:18

## 2022-11-09 RX ADMIN — Medication 30 MCG/MIN: at 02:28

## 2022-11-09 RX ADMIN — MAGNESIUM SULFATE HEPTAHYDRATE 2 G: 2 INJECTION, SOLUTION INTRAVENOUS at 06:26

## 2022-11-09 RX ADMIN — SODIUM CHLORIDE, PRESERVATIVE FREE 1 G: 5 INJECTION INTRAVENOUS at 00:51

## 2022-11-09 RX ADMIN — SODIUM CHLORIDE, PRESERVATIVE FREE 10 ML: 5 INJECTION INTRAVENOUS at 21:18

## 2022-11-09 RX ADMIN — DEXTROSE AND SODIUM CHLORIDE 100 ML/HR: 5; 900 INJECTION, SOLUTION INTRAVENOUS at 08:00

## 2022-11-09 RX ADMIN — MUPIROCIN: 20 OINTMENT TOPICAL at 21:17

## 2022-11-09 RX ADMIN — MELATONIN TAB 5 MG 10 MG: 5 TAB at 22:17

## 2022-11-09 RX ADMIN — DEXTROSE AND SODIUM CHLORIDE 250 ML/HR: 5; 900 INJECTION, SOLUTION INTRAVENOUS at 04:28

## 2022-11-09 RX ADMIN — GABAPENTIN 100 MG: 100 CAPSULE ORAL at 21:18

## 2022-11-09 RX ADMIN — MEROPENEM 1 G: 1 INJECTION, POWDER, FOR SOLUTION INTRAVENOUS at 14:03

## 2022-11-09 NOTE — CONSULTS
[facility]    Consult Note    Subjective:         Date of Consultation: November 9, 2022     Referring Physician: Bea Boo MD     Patient is a 70 y.o.  male who is being seen for right hallux ulceration. Patient has a hx of stenosis of carotid artery, CVA, diabetes mellitus, and hypertension. Patient is seen in ICU. Patient is not alert but he is in no acute distress. Unable to obtain history.     Patient Active Problem List    Diagnosis Date Noted    UTI (urinary tract infection) 11/08/2022    Stenosis of carotid artery 08/02/2022    Cystitis 08/02/2022    CVA (cerebral vascular accident) ([facility] [address] 07/31/2022    TIA (transient ischemic attack) 07/29/2022    Chest pain 10/28/2020    CAD (coronary artery disease) 10/28/2020    NSTEMI (non-ST elevated myocardial infarction) ([facility] [address] 10/28/2020     Past Medical History:   Diagnosis Date    Cirrhosis ([address]     Diabetes ([facility] [address]     Hypertension     Stroke ([facility] [address]       Past Surgical History:   Procedure Laterality Date    HX BACK SURGERY      HX GI      IR INSERT NON TUNL CVC OVER 5 YRS  11/9/2022      Family History   Problem Relation Age of Onset    Heart Disease Father       Social History     Tobacco Use    Smoking status: Former    Smokeless tobacco: Never   Substance Use Topics    Alcohol use: Not Currently     Current Facility-Administered Medications   Medication Dose Route Frequency Provider Last Rate Last Admin    meropenem (MERREM) 1 g in 0.9% sodium chloride (MBP/ADV) 50 mL MBP  1 g IntraVENous Q12H Bea Boo MD 16.7 mL/hr at 11/09/22 1403 1 g at 11/09/22 1403    dextrose 5% and 0.9% NaCl infusion  250 mL/hr IntraVENous CONTINUOUS Bea Boo  mL/hr at 11/09/22 0428 250 mL/hr at 11/09/22 0428    VANCOMYCIN INFORMATION NOTE   Other Rx Dosing/Monitoring Bea Boo MD        mupirocin (BACTROBAN) 2 % ointment   Topical BID Bea Boo MD   Given at 11/09/22 0952    dextrose 5% and 0.9% NaCl infusion  100 mL/hr IntraVENous CONTINUOUS Erick Sewell  mL/hr at 11/09/22 0800 100 mL/hr at 11/09/22 0800    [START ON 11/10/2022] Vancomycin - please draw level 11/10 with AM labs. Thanks!    Other MD Ancelmo Saleh ON 11/10/2022] aspirin chewable tablet 81 mg  81 mg Per NG tube DAILY Jas Costello MD Alveda Laura ON 11/10/2022] atorvastatin (LIPITOR) tablet 40 mg  40 mg Per NG tube DAILY Jas Costello MD Alveda Laura ON 11/10/2022] escitalopram oxalate (LEXAPRO) tablet 20 mg  20 mg Per NG tube DAILY Jas Costello MD        gabapentin (NEURONTIN) capsule 100 mg  100 mg Per NG tube QHS Jas Costello MD        melatonin tablet 10 mg  10 mg Per NG tube QHS Jas Costello MD        baclofen (LIORESAL) tablet 5 mg  5 mg Per NG tube BID PRN Sean Hawk MD        hydrOXYzine HCL (ATARAX) tablet 25 mg  25 mg Per NG tube QHS PRALMA Hawk MD        polyethylene glycol [facility]) packet 17 g  17 g Per NG tube DAILY PRALMA Hawk MD        traMADoL [facility]) tablet 50 mg  50 mg Per NG tube Q6H PRN Sean Hawk MD        doxazosin (CARDURA) tablet 1 mg  1 mg Per NG tube QHS Jas Costello MD        potassium chloride (KLOR-CON) packet for solution 20 mEq  20 mEq Per NG tube BID MD Ancelmo Horvath ON 11/10/2022] pantoprazole (PROTONIX) granules for oral suspension 40 mg  40 mg Per NG tube DAILY Jas Costello MD        sodium chloride (NS) flush 5-40 mL  5-40 mL IntraVENous Jas Dean MD   10 mL at 11/09/22 0507    sodium chloride (NS) flush 5-40 mL  5-40 mL IntraVENous PRN Sean Hawk MD        acetaminophen (TYLENOL) tablet 650 mg  650 mg Oral Q6H PRN Sean Hawk MD        Or    acetaminophen (TYLENOL) suppository 650 mg  650 mg Rectal Q6H PRN Sean Hawk MD        ondansetron (ZOFRAN ODT) tablet 4 mg  4 mg Oral Q8H PRN Sean Hawk MD        Or    ondansetron [facility]) injection 4 mg  4 mg IntraVENous Q6H TIMI Gupta MD        enoxaparin (LOVENOX) injection 40 mg  40 mg SubCUTAneous DAILY Key Gupta MD   40 mg at 11/09/22 0951    glucose chewable tablet 16 g  4 Tablet Oral PRN Key Gupta MD        glucagon [facility]) injection 1 mg  1 mg IntraMUSCular PRN Key Gupta MD        dextrose 10% infusion 0-250 mL  0-250 mL IntraVENous PRN Key Gupta MD        insulin lispro (HUMALOG) injection   SubCUTAneous AC&HS Jas Cervantes MD        [facility] by provider] ARIPiprazole (ABILIFY) tablet 2 mg  2 mg Oral DAILY Jas Cervantes MD        [facility] by provider] carvediloL (COREG) tablet 12.5 mg  12.5 mg Oral BID Key Gupta MD        [facility] by provider] clopidogreL (PLAVIX) tablet 75 mg  75 mg Oral DAILY Feliciano Cervantes MD        [facility] by provider] losartan (COZAAR) tablet 50 mg  50 mg Oral DAILY Jas Cervantes MD        senna-docusate (PERICOLACE) 8.6-50 mg per tablet 1 Tablet  1 Tablet Oral DAILY Jas Cervantes MD        [facility] by provider] tamsulosin [facility]) capsule 0.4 mg  0.4 mg Oral QHS Key Gupta MD        [facility] by provider] furosemide (LASIX) tablet 20 mg  20 mg Oral DAILY Jas Cervantes MD        NOREPINephrine (LEVOPHED) 8 mg in 0.9% NS 250ml infusion  5-50 mcg/min IntraVENous TITRATE Victor M Delong MD   Stopped at 11/09/22 3063      No Known Allergies     Review of Systems: Unable to obtain review of systems due to patient's condition.     Objective:     Patient Vitals for the past 8 hrs:   BP Temp Pulse Resp SpO2   11/09/22 1600 -- -- 76 -- --   11/09/22 1556 109/61 -- 75 18 --   11/09/22 1526 (!) 110/53 -- 81 23 --   11/09/22 1500 -- 98.6 °F (37 °C) -- -- --   11/09/22 1456 108/64 -- 82 19 100 %   11/09/22 1426 118/60 -- 84 26 100 %   11/09/22 1356 124/62 -- 82 26 100 %   11/09/22 1326 (!) 101/53 -- 77 22 100 %   11/09/22 1256 (!) 87/46 -- 74 23 100 %   11/09/22 1241 -- 98.5 °F (36.9 °C) -- -- --   11/09/22 1226 (!) 87/45 98.5 °F (36.9 °C) 73 16 100 %   11/09/22 1200 -- -- 82 -- -- 22 1156 (!) 92/44 -- 81 16 --   22 1126 114/63 -- 87 27 --   22 1056 (!) 103/57 -- 77 19 --   22 1026 (!) 86/64 -- 73 22 --   22 0956 -- -- -- -- 99 %   22 0845 (!) 98/51 -- 75 20 100 %     Temp (24hrs), Av.8 °F (37.1 °C), Min:98 °F (36.7 °C), Max:99.8 °F (37.7 °C)      Physical Exam:    GEN: Pt is in NAD. No dressing noted to B/L LE. No family noted at [facility]  DERM: Ulcer noted to the dorsal aspect of the right hallux. No fluctuance noted. Wound base is 100% fibrotic. Eschar noted to the right fifth toe. VASC: Pedal pulses (DP/PT) diminished to B/L LE. CFT<3sec to all digits of B/L LE. No pedal hair growth noted to the level of the digits for B/L LE. Skin temp is warm to cool from proximal to distal for B/L LE. Neg homans/brain signs to B/L LE. No varicosities or telangectasias noted to B/L LE.  NEURO: Protective and epicritic sensations absent intact to B/L LE  MSK: (-) POP, No gross deformities. Good muscle tone and bulk noted to B/L LE.       Lab Review:   Recent Results (from the past 24 hour(s))   GLUCOSE, POC    Collection Time: 22  4:46 PM   Result Value Ref Range    Glucose (POC) 79 65 - 100 mg/dL    Performed by Anabel Church, POC    Collection Time: 22  8:16 PM   Result Value Ref Range    Glucose (POC) 83 65 - 100 mg/dL    Performed by Chaovimal Williamsonorn    BLOOD GAS, ARTERIAL    Collection Time: 22  8:47 PM   Result Value Ref Range    pH 7.41 7.35 - 7.45      PCO2 20 (L) 35 - 45 mmHg    PO2 103 (H) 80 - 100 mmHg    O2 SATURATION 98 95 - 99 %    BICARBONATE 12 (L) 22 - 26 mmol/L    BASE DEFICIT 10.0 mmol/L    O2 METHOD Nasal Cannula      O2 FLOW RATE 6.00 L/min    FIO2 44.0 %    Sample source Arterial      SITE Right Brachial      BRITNEY'S TEST YES      Carboxy-Hgb 0.5 (L) 1 - 2 %    Methemoglobin 0.3 0 - 1.4 %    Oxyhemoglobin 96.7 95 - 99 %    Performed by [id number] [facility] [address], POC    Collection Time: 22  9:10 PM   Result Value Ref Range    Glucose (POC) 78 65 - 100 mg/dL    Performed by Bj Julian    GLUCOSE, POC    Collection Time: 11/08/22 10:15 PM   Result Value Ref Range    Glucose (POC) 69 65 - 100 mg/dL    Performed by Javi Cohen, COMPREHENSIVE    Collection Time: 11/09/22  2:10 AM   Result Value Ref Range    Sodium 139 136 - 145 mmol/L    Potassium 3.7 3.5 - 5.1 mmol/L    Chloride 109 (H) 97 - 108 mmol/L    CO2 17 (L) 21 - 32 mmol/L    Anion gap 13 5 - 15 mmol/L    Glucose 173 (H) 65 - 100 mg/dL    BUN 40 (H) 6 - 20 mg/dL    Creatinine 1.65 (H) 0.70 - 1.30 mg/dL    BUN/Creatinine ratio 24 (H) 12 - 20      eGFR 44 (L) >60 ml/min/1.73m2    Calcium 7.1 (L) 8.5 - 10.1 mg/dL    Bilirubin, total 0.8 0.2 - 1.0 mg/dL    AST (SGOT) 46 (H) 15 - 37 U/L    ALT (SGPT) 18 12 - 78 U/L    Alk. phosphatase 58 45 - 117 U/L    Protein, total 5.1 (L) 6.4 - 8.2 g/dL    Albumin 2.2 (L) 3.5 - 5.0 g/dL    Globulin 2.9 2.0 - 4.0 g/dL    A-G Ratio 0.8 (L) 1.1 - 2.2     MAGNESIUM    Collection Time: 11/09/22  2:10 AM   Result Value Ref Range    Magnesium 0.9 (LL) 1.6 - 2.4 mg/dL   CBC WITH AUTOMATED DIFF    Collection Time: 11/09/22  2:10 AM   Result Value Ref Range    WBC 42.4 (H) 4.1 - 11.1 K/uL    RBC 4.07 (L) 4.10 - 5.70 M/uL    HGB 11.9 (L) 12.1 - 17.0 g/dL    HCT 34.2 (L) 36.6 - 50.3 %    MCV 84.0 80.0 - 99.0 FL    MCH 29.2 26.0 - 34.0 PG    MCHC 34.8 30.0 - 36.5 g/dL    RDW 16.0 (H) 11.5 - 14.5 %    PLATELET 681 896 - 721 K/uL    MPV 11.0 8.9 - 12.9 FL    NRBC 0.0 0.0  WBC    ABSOLUTE NRBC 0.00 0.00 - 0.01 K/uL    NEUTROPHILS 81 (H) 32 - 75 %    LYMPHOCYTES 2 (L) 12 - 49 %    MONOCYTES 2 (L) 5 - 13 %    EOSINOPHILS 12 (H) 0 - 7 %    BASOPHILS 0 0 - 1 %    MYELOCYTES 3 (H) 0 %    IMMATURE GRANULOCYTES 0 %    ABS. NEUTROPHILS 34.3 (H) 1.8 - 8.0 K/UL    ABS. LYMPHOCYTES 0.8 0.8 - 3.5 K/UL    ABS. MONOCYTES 0.8 0.0 - 1.0 K/UL    ABS. EOSINOPHILS 5.1 (H) 0.0 - 0.4 K/UL    ABS. BASOPHILS 0.0 0.0 - 0.1 K/UL    ABS. IMM. [place]. 0.0 K/UL    DF Manual      RBC COMMENTS Newell cells  1+        RBC COMMENTS Polychromasia  1+        RBC COMMENTS Teardrop cells  1+        RBC COMMENTS Anisocytosis  1+       C REACTIVE PROTEIN, QT    Collection Time: 11/09/22  2:10 AM   Result Value Ref Range    C-Reactive protein 4.02 (H) 0.00 - 0.60 mg/dL   SED RATE (ESR)    Collection Time: 11/09/22  2:10 AM   Result Value Ref Range    Sed rate, automated 8 0 - 20 mm/hr   PHOSPHORUS    Collection Time: 11/09/22  2:10 AM   Result Value Ref Range    Phosphorus 1.8 (L) 2.6 - 4.7 mg/dL   CK    Collection Time: 11/09/22  2:10 AM   Result Value Ref Range     (H) 39 - 308 U/L   CULTURE, WOUND W GRAM STAIN    Collection Time: 11/09/22  2:10 AM    Specimen: Toe; Wound   Result Value Ref Range    Special Requests: No Special Requests      GRAM STAIN Occasional WBCs seen      GRAM STAIN        2+ Gram positive cocci in pairs chains and clusters    Culture result: PENDING    MRSA SCREEN - PCR (NASAL)    Collection Time: 11/09/22  2:31 AM   Result Value Ref Range    MRSA by PCR, Nasal DETECTED (A) Not Detected     GLUCOSE, POC    Collection Time: 11/09/22 11:06 AM   Result Value Ref Range    Glucose (POC) 206 (H) 65 - 100 mg/dL    Performed by Cesar Rapp (Float)    GLUCOSE, POC    Collection Time: 11/09/22  3:51 PM   Result Value Ref Range    Glucose (POC) 159 (H) 65 - 100 mg/dL    Performed by Cesar Raesthela (Float)                 Assessment:     Ulcer right hallux  Diabetes mellitus  Sepsis    Plan:     - Patient seen and evaluated at bedside  - Current labs personally reviewed and findings dicussed with patient  - Pending CT scan to the right foot to rule out any abscess or osteomyelitis. - Wound care: Clean ulceration with normal saline. Apply Betadine soaked gauze 4 x 4 gauze, Kerlix and secure with tape.   -Awaiting results of CT to possibly do a bedside debridement or in OR setting.  -Continue IV antibiotics as per infectious disease Dr. Nieto MixOrdered KRISTI to assess lower extremity vascularity.  -We will continue to follow patient    [place] LADI Alas, [address] [facility], [address] [facility] [address] [place] [id number] Opal MCKENZIE, [address] [facility]  O: [phone]  F: [phone]

## 2022-11-09 NOTE — CONSULTS
IMPRESSION:   Acute hypoxic respiratory failure  Severe sepsis with septic shock  Metabolic encephalopathy  Probably urinary tract infection  Leukocytosis  Carotid artery disease history of CVA  Additional workup outlined below  Pt is at high risk of sudden decline and decompensation with life threatening consequenses and continued end organ dysfunction and failure  Pt is critically ill.  Time spent with pt and staff actively rendering care, managing pt and coordinating care as stated below;  55 minutes, exclusive of any procedures      RECOMMENDATIONS/PLAN:   ICU monitoring  58-year-old male nursing home resident admitted with hypotension and unresponsiveness  Hemodynamically unstable started on IV fluid and he was also on Levophed we will wean  Panculture IV antibiotics  Need vascular surgery consult for possible need endarterectomy more stable  Previous 2D echo showed ejection fraction 60 to 65%  Intubate and place on vent if NIV fails  Agree with Empir[facility] IV antibiotics pending culture results   Follow culture results on meropenem and vancomycin  CVP monitoring  IV vasopressors for circulatory shock refractory to fluids to maintain SBP> 90  Transfuse prn to maintain Hgb > 7  Labs to follow electrolytes, renal function and and blood counts  Bronchial hygiene with respiratory therapy techniques, bronchodilators  Pt needs IV fluids with additives and Drug therapy requiring intensive monitoring for toxicity  Prescription drug management with home med reconciliation reviewed  DVT, SUP prophylaxis  Will be available to assist in medical management while in the CCU pending disposition     [x] High complexity decision making was performed  [x] See my orders for details  HPI  58-year-old nursing home resident came in because of unresponsiveness he had a history of stroke in the past patient was hypotensive hypoxic rapid response was called he was admitted to the floor initially received IV fluid hemodynamically unstable started on vasopressors Levophed transferred to ICU White count was elevated he was put on oxygen 4 L nasal cannula unable to get any started the patient he has a right foot wound and multiple wounds of the lower extremities dressing in place. He has carotid artery stenosis only supposed to go for surgery but unable to get cardiac clearance because of nuclear stress test he has 90% stenosis of left ICA and 50% stenosis of right ICA. So now he is admitted to ICU and critical care consult was called  PMH:  has a past medical history of Cirrhosis ([facility] [address] Diabetes ([facility] [address] Hypertension, and Stroke ([facility] [address] PSH:   has a past surgical history that includes hx back surgery and hx gi. FHX: family history includes Heart Disease in his father. SHX:  reports that he has quit smoking. He has never used smokeless tobacco. He reports that he does not currently use alcohol. He reports that he does not currently use drugs.     ALL: No Known Allergies     MEDS:   [x] Reviewed - As Below   [] Not reviewed    Current Facility-Administered Medications   Medication    meropenem (MERREM) 1 g in 0.9% sodium chloride (MBP/ADV) 50 mL MBP    dextrose 5% and 0.9% NaCl infusion    VANCOMYCIN INFORMATION NOTE    VANCOMYCIN RANDOM LEVEL DUE AT 1800 ON 11/9    potassium phosphate 15 mmol in 0.9% sodium chloride 250 mL infusion    magnesium sulfate 2 g/50 ml IVPB (premix or compounded)    mupirocin (BACTROBAN) 2 % ointment    sodium chloride (NS) flush 5-40 mL    sodium chloride (NS) flush 5-40 mL    acetaminophen (TYLENOL) tablet 650 mg    Or    acetaminophen (TYLENOL) suppository 650 mg    polyethylene glycol (MIRALAX) packet 17 g    ondansetron (ZOFRAN ODT) tablet 4 mg    Or    ondansetron (ZOFRAN) injection 4 mg    enoxaparin (LOVENOX) injection 40 mg    glucose chewable tablet 16 g    glucagon (GLUCAGEN) injection 1 mg    dextrose 10% infusion 0-250 mL    insulin lispro (HUMALOG) injection    [Held by provider] ARIPiprazole (ABILIFY) tablet 2 mg    aspirin delayed-release tablet 81 mg    atorvastatin (LIPITOR) tablet 40 mg    baclofen (LIORESAL) tablet 5 mg    [Held by provider] carvediloL (COREG) tablet 12.5 mg    [Held by provider] clopidogreL (PLAVIX) tablet 75 mg    escitalopram oxalate (LEXAPRO) tablet 20 mg    gabapentin (NEURONTIN) capsule 100 mg    hydrOXYzine HCL (ATARAX) tablet 25 mg    [Held by provider] losartan (COZAAR) tablet 50 mg    melatonin tablet 10 mg    pantoprazole (PROTONIX) tablet 20 mg    potassium chloride (K-DUR, KLOR-CON M20) SR tablet 20 mEq    senna-docusate (PERICOLACE) 8.6-50 mg per tablet 1 Tablet    tamsulosin (FLOMAX) capsule 0.4 mg    traMADoL (ULTRAM) tablet 50 mg    [Held by provider] furosemide (LASIX) tablet 20 mg    NOREPINephrine (LEVOPHED) 8 mg in 0.9% NS 250ml infusion      MAR reviewed and pertinent medications noted or modified as needed   Current Facility-Administered Medications   Medication    meropenem (MERREM) 1 g in 0.9% sodium chloride (MBP/ADV) 50 mL MBP    dextrose 5% and 0.9% NaCl infusion    VANCOMYCIN INFORMATION NOTE    VANCOMYCIN RANDOM LEVEL DUE AT 1800 ON 11/9    potassium phosphate 15 mmol in 0.9% sodium chloride 250 mL infusion    magnesium sulfate 2 g/50 ml IVPB (premix or compounded)    mupirocin (BACTROBAN) 2 % ointment    sodium chloride (NS) flush 5-40 mL    sodium chloride (NS) flush 5-40 mL    acetaminophen (TYLENOL) tablet 650 mg    Or    acetaminophen (TYLENOL) suppository 650 mg    polyethylene glycol (MIRALAX) packet 17 g    ondansetron (ZOFRAN ODT) tablet 4 mg    Or    ondansetron (ZOFRAN) injection 4 mg    enoxaparin (LOVENOX) injection 40 mg    glucose chewable tablet 16 g    glucagon (GLUCAGEN) injection 1 mg    dextrose 10% infusion 0-250 mL    insulin lispro (HUMALOG) injection    [Held by provider] ARIPiprazole (ABILIFY) tablet 2 mg    aspirin delayed-release tablet 81 mg    atorvastatin (LIPITOR) tablet 40 mg    baclofen (LIORESAL) tablet 5 mg    [Held by provider] carvediloL (COREG) tablet 12.5 mg    [Held by provider] clopidogreL (PLAVIX) tablet 75 mg    escitalopram oxalate (LEXAPRO) tablet 20 mg    gabapentin (NEURONTIN) capsule 100 mg    hydrOXYzine HCL (ATARAX) tablet 25 mg    [Held by provider] losartan (COZAAR) tablet 50 mg    melatonin tablet 10 mg    pantoprazole (PROTONIX) tablet 20 mg    potassium chloride (K-DUR, KLOR-CON M20) SR tablet 20 mEq    senna-docusate (PERICOLACE) 8.6-50 mg per tablet 1 Tablet    tamsulosin (FLOMAX) capsule 0.4 mg    traMADoL (ULTRAM) tablet 50 mg    [Held by provider] furosemide (LASIX) tablet 20 mg    NOREPINephrine (LEVOPHED) 8 mg in 0.9% NS 250ml infusion      PMH:  has a past medical history of Cirrhosis ([facility] [address] Diabetes ([facility] [address] Hypertension, and Stroke ([facility] [address] PSH:   has a past surgical history that includes hx back surgery and hx gi. FHX: family history includes Heart Disease in his father. SHX:  reports that he has quit smoking. He has never used smokeless tobacco. He reports that he does not currently use alcohol. He reports that he does not currently use drugs. ROS:Review of systems not obtained due to patient factors. Hemodynamics:    CO:    CI:    CVP:    SVR:   PAP Systolic:    PAP Diastolic:    PVR:    CW18:        Ventilator Settings:      Mode Rate TV Press PEEP FiO2 PIP Min.  Vent                              Vital Signs: Telemetry:    normal sinus rhythm Intake/Output:   Visit Vitals  /64 Comment: Decreased Levo to 5 mcg/min   Pulse 80   Temp 99.8 °F (37.7 °C) Comment: removed blankets   Resp 22   Ht 5' 9\" (1.753 m)   Wt 55.4 kg (122 lb 2.2 oz)   SpO2 100%   BMI 18.04 kg/m²       Temp (24hrs), Av.5 °F (36.9 °C), Min:97.8 °F (36.6 °C), Max:99.8 °F (37.7 °C)        O2 Device: Nasal cannula O2 Flow Rate (L/min): 3 l/min       Wt Readings from Last 4 Encounters:   22 55.4 kg (122 lb 2.2 oz)   22 84.8 kg (187 lb)   22 84.8 kg (187 lb)   10/28/20 99.8 kg (220 lb) Intake/Output Summary (Last 24 hours) at 11/9/2022 0750  Last data filed at 11/9/2022 0605  Gross per 24 hour   Intake 2230.42 ml   Output 800 ml   Net 1430.42 ml       Last shift:      No intake/output data recorded. Last 3 shifts: 11/07 1901 - 11/09 0700  In: 2230.4 [I.V.:2230.4]  Out: 800 [Urine:800]       Physical Exam:     General:  male; unresponsive home oxygen 3 L nasal cannula  HEENT: NCAT, poor dentition, lips and mucosa dry  Eyes: anicteric; conjunctiva clear  Neck: no nodes, trach midline; no accessory MM use.   Chest: no deformity,   Cardiac: R regular; no murmur;   Lungs: distant breath sounds; wheezes  Abd: soft, NT, hypoactive BS  Ext: Lower extremity wound bandage in place  : NO kennedy, clear urine  Neuro: Unresponsive  Psych-unable to assess  Skin: warm, dry, no cyanosis;   Pulses: 1-2+ Bilateral pedal, radial  Capillary: brisk; pale      DATA:    MAR reviewed and pertinent medications noted or modified as needed  MEDS:   Current Facility-Administered Medications   Medication    meropenem (MERREM) 1 g in 0.9% sodium chloride (MBP/ADV) 50 mL MBP    dextrose 5% and 0.9% NaCl infusion    VANCOMYCIN INFORMATION NOTE    VANCOMYCIN RANDOM LEVEL DUE AT 1800 ON 11/9    potassium phosphate 15 mmol in 0.9% sodium chloride 250 mL infusion    magnesium sulfate 2 g/50 ml IVPB (premix or compounded)    mupirocin (BACTROBAN) 2 % ointment    sodium chloride (NS) flush 5-40 mL    sodium chloride (NS) flush 5-40 mL    acetaminophen (TYLENOL) tablet 650 mg    Or    acetaminophen (TYLENOL) suppository 650 mg    polyethylene glycol (MIRALAX) packet 17 g    ondansetron (ZOFRAN ODT) tablet 4 mg    Or    ondansetron (ZOFRAN) injection 4 mg    enoxaparin (LOVENOX) injection 40 mg    glucose chewable tablet 16 g    glucagon (GLUCAGEN) injection 1 mg    dextrose 10% infusion 0-250 mL    insulin lispro (HUMALOG) injection    [Held by provider] ARIPiprazole (ABILIFY) tablet 2 mg    aspirin delayed-release tablet 81 mg    atorvastatin (LIPITOR) tablet 40 mg    baclofen (LIORESAL) tablet 5 mg    [Held by provider] carvediloL (COREG) tablet 12.5 mg    [Held by provider] clopidogreL (PLAVIX) tablet 75 mg    escitalopram oxalate (LEXAPRO) tablet 20 mg    gabapentin (NEURONTIN) capsule 100 mg    hydrOXYzine HCL (ATARAX) tablet 25 mg    [Held by provider] losartan (COZAAR) tablet 50 mg    melatonin tablet 10 mg    pantoprazole (PROTONIX) tablet 20 mg    potassium chloride (K-DUR, KLOR-CON M20) SR tablet 20 mEq    senna-docusate (PERICOLACE) 8.6-50 mg per tablet 1 Tablet    tamsulosin (FLOMAX) capsule 0.4 mg    traMADoL (ULTRAM) tablet 50 mg    [Held by provider] furosemide (LASIX) tablet 20 mg    NOREPINephrine (LEVOPHED) 8 mg in 0.9% NS 250ml infusion        Labs:    Recent Labs     11/09/22 0210 11/08/22  1102   WBC 42.4* 15.8*   HGB 11.9* 12.5    182     Recent Labs     11/09/22  0210 11/08/22  1325 11/08/22  1102     --  135*   K 3.7  --  4.8   *  --  105   CO2 17*  --  18*   *  --  134*   BUN 40*  --  36*   CREA 1.65*  --  1.46*   CA 7.1*  --  8.1*   MG 0.9*  --   --    PHOS 1.8*  --   --    LAC  --  2.1* 2.4*   ALB 2.2*  --  2.8*   ALT 18  --  15     Recent Labs     11/08/22 2047   PH 7.41   PCO2 20*   PO2 103*   HCO3 12*   FIO2 44.0     Recent Labs     11/09/22 0210   *     No results found for: BNPP, BNP   Lab Results   Component Value Date/Time    Culture result: No growth after 6 hours 11/08/2022 01:25 PM    Culture result: Proteus mirabilis (A) 07/29/2022 04:16 PM     Lab Results   Component Value Date/Time    TSH 1.67 07/30/2022 07:11 AM        Imaging:    Results from Hospital Encounter encounter on 11/08/22    XR CHEST PORT    Narrative  INDICATION: hypotension    EXAM:  AP CHEST RADIOGRAPH    COMPARISON: August 12, 2022    FINDINGS:    AP portable view of the chest demonstrates prior median sternotomy.  Heart size  is normal. There is no edema, effusion, consolidation, or pneumothorax. The  osseous structures are unremarkable. Impression  No acute process. Results from [place] encounter on 11/08/22    CT HEAD WO CONT    Narrative  EXAM: CT HEAD WO CONT    INDICATION: AMS    COMPARISON: 8/12/2022. CONTRAST: None. TECHNIQUE: Unenhanced CT of the head was performed using 5 mm images. Brain and  bone windows were generated. Coronal and sagittal reformats. CT dose reduction  was achieved through use of a standardized protocol tailored for this  examination and automatic exposure control for dose modulation. FINDINGS:    Atrophy is noted. Small vessel ischemic changes are stable compared to the prior  exam. Chronic right occipital infarct is stable. There is no intracranial  hemorrhage, extra-axial collection, or mass effect. The basilar cisterns are  open. No CT evidence of acute infarct. The bone windows demonstrate no abnormalities. The visualized portions of the  paranasal sinuses and mastoid air cells are clear. Impression  No acute process or change compared to the prior exam.      This care involved high complexity decision making which includes independently reviewing the patient's past medical records, current laboratory results, medication profiles that were immediately available to me and actual Xray images at the bedside in order to assess, support vital system function, and to treat this degree of vital organ system failure, and to prevent further life threatening deterioration of the patients condition. I was in direct communication with the nursing staff throughout this time.     Medical Decision Making Today  Reviewed the flowsheet and previous days notes  Reviewed and summarized records or history from previous days note or discussions with staff, family  Parenteral controlled substances - Reviewed/ Adjusted / Audra Nay / Started  High Risk Drug therapy requiring intensive monitoring for toxicity: eg steroids, pressors, antibiotics  Review and order of Clinical lab tests  Review and Order of Radiology tests  Review and Order of Medicine tests  Independent visualization of radiologic Images  Reviewed Ventilator / NiPPV  I have personally reviewed the patients ECG / Telemetry  Diagnostic endoscopies with identified risk factors    I have provided total of 55  minutes of critical care time rendering care exclusive of any procedures. During this entire length of time the patient's condition was unstable, unpredictable and critically ill in the CCU/ ICU. I was immediately available to the patient whose care required several interactions with nursing, multidisciplinary team members leading to multiple interventions with fluid resuscitation and medication adjustments to optimize respiratory support, hemodynamic treatment, medication changes based on repeat labs results, reviews, exams and assessments. The reason for providing this level of medical care was due to a critical illness that impaired one or more vital organ systems, such that there was a high probability of sudden or life threatening deterioration in the patient's condition.

## 2022-11-09 NOTE — PROGRESS NOTES
[facility] Hospitalist Progress Note  Jas Macedo MD    Date:11/9/2022       Darcie Bowser  Patient Lana Ramirez     Date of Birth:[birth year]     Age:71 y.o. 71M with past medical history of stroke, diabetes, hypertension, resides in a nursing facility. Recent admission 7/29/22-8/2/22 from discharge summary:  Patient was admitted on 7/29/2022 following presentation to the ED for transient facial droop 11:30 AM.  Due to history of strokes, suspected TIA versus CVA. CT was negative, patient was admitted for possible TIA and MRI was ordered. MRI showed acute ischemic stroke of the right parietal area. Bilateral carotid ultrasounds revealed 50% stenosis of right ICA, 90% stenosis of left ICA. Urine culture positive for gram-negative rods Proteus Mirabella's and sensitive to ceftriaxone and cefazolin. Patient will receive 7 days worth of cefazolin as an outpatient. Vascular surgery consultation and will need carotid endarterectomy in approximately 4 weeks due to the recent stroke. Cardiac consultation and patient will have outpatient stress test prior to carotid endarterectomy. Patient will follow up with Dr. Martita French in approximately 2 weeks. Patient is being discharged to skilled nursing facility. Discussed case with son, Calvin Mata via phone. 7/30/22  carotid duplex  Impression:  1. Bilateral subclavian arteries are patent. 2.  Bilateral common carotid patent with mild to moderate irregular heterogeneous plaque throughout. 3.  Right proximal ECA shows no obvious plaque. 4.  Right proximal ICA shows mild irregular heterogeneous plaque without significant stenosis. 5.  Left proximal ECA shows mild heterogeneous plaque noted. 6.  Left proximal ICA shows irregular mixed plaque with high-grade stenosis, per criteria greater than 70% stenosis. 7.  Bilateral vertebral arteries are patent and antegrade flow.      7/30/22 echocardiogram    Left Ventricle: Normal left ventricular systolic function with a visually estimated EF of 60 - 65%. Left ventricle size is normal. Increased wall thickness. Findings consistent with concentric hypertrophy. Abnormal diastolic function. Aortic Valve: Tricuspid valve. 22 vascular surgery outpatient Dr Kenneth Baez  Patient requires a preop clearance for possible carotid surgery. Patient supposed to see [facility] cardiology. Patient appears to be very weak today. I am not sure patient will be a good candidate for open carotid surgery on the left side. We will await for cardiac work-up and will go from there. 22 presents to hospital from nursing home with weakness and hypotension for one day. Associated with poor responsiveness. Found hypotensive on arrival to the ED, with metabolic encephalopathy, improved with intravenous fluid bolus. Found to have urinary infection, started on antibiotic, and I have been asked to admit to hospital for further stabilization of his condition. 22 transferred to ICU overnight for poor responsiveness and for septic shock. Managed with fluids and levophed    Subjective    Subjective:  Symptoms:  Stable. Review of Systems   All other systems reviewed and are negative. Objective         Vitals Last 24 Hours:  TEMPERATURE:  Temp  Av.5 °F (36.9 °C)  Min: 97.8 °F (36.6 °C)  Max: 99.8 °F (37.7 °C)  RESPIRATIONS RANGE: Resp  Av.5  Min: 16  Max: 32  PULSE OXIMETRY RANGE: SpO2  Av.7 %  Min: 58 %  Max: 100 %  PULSE RANGE: Pulse  Av.4  Min: 59  Max: 98  BLOOD PRESSURE RANGE: Systolic (22RER), CTN:526 , Min:61 , MBR:184   ; Diastolic (97CQW), DCP:43, Min:33, Max:100    I/O (24Hr): Intake/Output Summary (Last 24 hours) at 2022 0716  Last data filed at 2022 2914  Gross per 24 hour   Intake 2230.42 ml   Output 800 ml   Net 1430.42 ml     Objective:  General Appearance:  Comfortable. Vital signs: (most recent): Blood pressure 111/64, pulse 80, temperature 99.8 °F (37.7 °C), resp.  rate 22, height 5' 9\" (1.753 m), weight 55.4 kg (122 lb 2.2 oz), SpO2 100 %. HEENT: Normal HEENT exam.    Lungs:  Normal effort. Heart: Normal rate. Regular rhythm. Abdomen: Abdomen is soft. Neurological: (lethargic). Skin:  Warm. Labs/Imaging/Diagnostics    Labs:  CBC:  Recent Labs     11/09/22  0210 11/08/22  1102   WBC 42.4* 15.8*   RBC 4.07* 4.40   HGB 11.9* 12.5   HCT 34.2* 38.2   MCV 84.0 86.8   RDW 16.0* 15.9*    182     CHEMISTRIES:  Recent Labs     11/09/22  0210 11/08/22  1102    135*   K 3.7 4.8   * 105   CO2 17* 18*   BUN 40* 36*   CA 7.1* 8.1*   PHOS 1.8*  --    MG 0.9*  --    PT/INR:No results for input(s): INR, INREXT in the last 72 hours. No lab exists for component: PROTIME  APTT:No results for input(s): APTT in the last 72 hours. LIVER PROFILE:  Recent Labs     11/09/22  0210 11/08/22  1102   AST 46* 29   ALT 18 15     Lab Results   Component Value Date/Time    ALT (SGPT) 18 11/09/2022 02:10 AM    AST (SGOT) 46 (H) 11/09/2022 02:10 AM    Alk. phosphatase 58 11/09/2022 02:10 AM    Bilirubin, total 0.8 11/09/2022 02:10 AM       Imaging Last 24 Hours:  CT HEAD WO CONT    Result Date: 11/8/2022  EXAM: CT HEAD WO CONT INDICATION: AMS COMPARISON: 8/12/2022. CONTRAST: None. TECHNIQUE: Unenhanced CT of the head was performed using 5 mm images. Brain and bone windows were generated. Coronal and sagittal reformats. CT dose reduction was achieved through use of a standardized protocol tailored for this examination and automatic exposure control for dose modulation. FINDINGS: Atrophy is noted. Small vessel ischemic changes are stable compared to the prior exam. Chronic right occipital infarct is stable. There is no intracranial hemorrhage, extra-axial collection, or mass effect. The basilar cisterns are open. No CT evidence of acute infarct. The bone windows demonstrate no abnormalities. The visualized portions of the paranasal sinuses and mastoid air cells are clear.      No acute process or change compared to the prior exam.     XR CHEST PORT    Result Date: 11/8/2022  INDICATION: hypotension EXAM:  AP CHEST RADIOGRAPH COMPARISON: August 12, 2022 FINDINGS: AP portable view of the chest demonstrates prior median sternotomy. Heart size is normal. There is no edema, effusion, consolidation, or pneumothorax. The osseous structures are unremarkable. No acute process. Assessment//Plan   Active Problems:    UTI (urinary tract infection) (11/8/2022)    CT Results  (Last 48 hours)                 11/08/22 2129  CT HEAD WO CONT Final result    Impression:  No acute process or change compared to the prior exam.               Narrative:  EXAM: CT HEAD WO CONT       INDICATION: AMS       COMPARISON: 8/12/2022. CONTRAST: None. TECHNIQUE: Unenhanced CT of the head was performed using 5 mm images. Brain and   bone windows were generated. Coronal and sagittal reformats. CT dose reduction   was achieved through use of a standardized protocol tailored for this   examination and automatic exposure control for dose modulation. FINDINGS:       Atrophy is noted. Small vessel ischemic changes are stable compared to the prior   exam. Chronic right occipital infarct is stable. There is no intracranial   hemorrhage, extra-axial collection, or mass effect. The basilar cisterns are   open. No CT evidence of acute infarct. The bone windows demonstrate no abnormalities. The visualized portions of the   paranasal sinuses and mastoid air cells are clear. Assessment & Plan  71M with past medical history of stroke, diabetes, hypertension, resides in a nursing facility. Recent admission 7/29/22-8/2/22 from discharge summary:  Patient was admitted on 7/29/2022 following presentation to the ED for transient facial droop 11:30 AM.  Due to history of strokes, suspected TIA versus CVA. CT was negative, patient was admitted for possible TIA and MRI was ordered.   MRI showed acute ischemic stroke of the right parietal area. Bilateral carotid ultrasounds revealed 50% stenosis of right ICA, 90% stenosis of left ICA. Urine culture positive for gram-negative rods Proteus Mirabella's and sensitive to ceftriaxone and cefazolin. Patient will receive 7 days worth of cefazolin as an outpatient. Vascular surgery consultation and will need carotid endarterectomy in approximately 4 weeks due to the recent stroke. Cardiac consultation and patient will have outpatient stress test prior to carotid endarterectomy. Patient will follow up with Dr. Khanh Moore in approximately 2 weeks. Patient is being discharged to skilled nursing facility. Discussed case with son, Karla Max via phone. 7/30/22  carotid duplex  Impression:  1. Bilateral subclavian arteries are patent. 2.  Bilateral common carotid patent with mild to moderate irregular heterogeneous plaque throughout. 3.  Right proximal ECA shows no obvious plaque. 4.  Right proximal ICA shows mild irregular heterogeneous plaque without significant stenosis. 5.  Left proximal ECA shows mild heterogeneous plaque noted. 6.  Left proximal ICA shows irregular mixed plaque with high-grade stenosis, per criteria greater than 70% stenosis. 7.  Bilateral vertebral arteries are patent and antegrade flow. 7/30/22 echocardiogram    Left Ventricle: Normal left ventricular systolic function with a visually estimated EF of 60 - 65%. Left ventricle size is normal. Increased wall thickness. Findings consistent with concentric hypertrophy. Abnormal diastolic function. Aortic Valve: Tricuspid valve. 9/19/22 vascular surgery outpatient Dr Ines Pastrana  Patient requires a preop clearance for possible carotid surgery. Patient supposed to see [facility] cardiology. Patient appears to be very weak today. I am not sure patient will be a good candidate for open carotid surgery on the left side. We will await for cardiac work-up and will go from there.      11/8/22 presents to hospital from nursing home with weakness and hypotension for one day. Associated with poor responsiveness. Found hypotensive on arrival to the ED, with metabolic encephalopathy, improved with intravenous fluid bolus. Found to have urinary infection, started on antibiotic, and I have been asked to admit to hospital for further stabilization of his condition. 11/9/22 transferred to ICU overnight for poor responsiveness and for septic shock. Managed with fluids and levophed    MICROBIOLOGY    7/29/22            Urine                Proteus mirabilis     11/8/22            Blood               Negative so far  11/8/22            Urine                Pending  11/9/22 MRSA nasal Positive  11/9/22 Toe  Pending    ASSESSMENT AND PLAN    1) Septic shock in the setting of complex urinary infection at admission, further complicated with hypotension and metabolic encephalopathy. Admitted to ICU  Supportive care  Antibiotics adjusted to meropenem and vancomycin     2) Cardiovascular disease including hypertension, carotid artery disease. History of CVA. In the outpatient setting had been following with [facility] and Dr Sanju Escudero for anticipated carotid endarterectomy. Telemetry monitoring              Recent echocardiogram as above              Continue present outpatient regimen              Cardiac clearance for carotid endarterectomy    3) Chronic foot wounds with foul smelling drainage.      Antibiotics as above   Wound care inpatient and outpatient     4) DVT prophylaxis with enoxaparin     5) Dispo return to skilled nursing once stable                     Electronically signed by Collette Pina MD on 11/9/2022 at 7:16 AM

## 2022-11-09 NOTE — PROGRESS NOTES
0845: Pts son Lauren Cosby called and update was given as well as security code. 0900: Spoke with Dr. Shayna Townsend, [facility] to do straight cath for outstanding urine culture, OK to insert NGT, OK to place orders for central line if needs Levo today. 1115: Straight cathed for urine culture, tolerated fair, some resistance noted with cath insertion. 1140: 14 fr NGT placed to left nare on 3rd attempt. 1200: MAPS <65 Angio consulted for central line placement. Called son to obtain consent, no answer, message left. 1345: Consent obtained from son for central line, IR aware. 1430: Right IJ placed per IR.     1630: Son in at bedside, updated on pt condition. 1730: Significant other and step-daughter at bedside, updated on pt condition.

## 2022-11-09 NOTE — PROGRESS NOTES
OT orders received and acknowledged, however patient transferred to ICU from  due to medical decline. Per verbal order via [employer] from Dr. Porsche Zambrano, hold OT/PT today 11/9. Will continue to follow. Will need new OT evaluation order once pt medically stable. Thank you.

## 2022-11-09 NOTE — PROGRESS NOTES
CARDIOLOGY PROGRESS NOTE      Patient Name: Nati Capellan  Age: 70 y.o. Gender:male  :1950  MRN: [id number]    Patient seen and examined. This is a patient who is followed for preoperative cardiac risk assessment. Resting in bed. Eyes closed. Moved to ICU overnight. No other complaints reported. Telemetry reviewed, there were no events noted in the past 24 hours. Unable to obtain ROS. Current medications reviewed. Physical Examination    No Known Allergies    Vital signs are stable. Asleep  Heart is regular, rate and rhythm. Lungs are diminished  Abdomen is soft, nontender, normal bowel sounds. Extremities have no edema. Labs reviewed: Lab results reviewed. For significant abnormal values and values requiring intervention, see assessment and plan. Recent Results (from the past 12 hour(s))   MRSA SCREEN - PCR (NASAL)    Collection Time: 22  2:31 AM   Result Value Ref Range    MRSA by PCR, Nasal DETECTED (A) Not Detected     GLUCOSE, POC    Collection Time: 22 11:06 AM   Result Value Ref Range    Glucose (POC) 206 (H) 65 - 100 mg/dL    Performed by [facility])           Case discussed with Dr. Dawit Sears and our impression and recommendations are as follows:  Preoperative cardiac risk assessment, planned CEA with Dr. Liam Cameron in the upcoming months, history of CABG, [place]  Echo over the summer with preserved EF  OP stress test planned, initially cancelled in office as he was unable to stop onto the camera but now scheduled here on   There is a nuclear medicine isotope shortage at the present time and he is here for an active infection and has been hypotensive, now in ICU. Not appropriate to reschedule stress for now. Hypertension, BP borderline, hold home meds, on pressors  CAD, no chest pain, on plavix, asa, atorvastatin, coreg at home  UTI, wounds, sepsis per primary, ID    Please do not hesitate to call me or Dr. Dawit Sears if additional questions arise.     Lazarus Moats [facility]  11/9/2022

## 2022-11-09 NOTE — PROGRESS NOTES
Vancomycin Dosing Consult  Sophia Salinas is a 70 y.o. male with sepsis / urinary infection (?). Pharmacy was consulted by Dr. Martha Troncoso to dose and monitor Vancomycin. Today is day 1. Antibiotic regimen: Vancomycin + Merrem    Temp (24hrs), Av.3 °F (36.8 °C), Min:97.8 °F (36.6 °C), Max:99.1 °F (37.3 °C)    Recent Labs     22  1102   WBC 15.8*     Recent Labs     22  1102   CREA 1.46*   BUN 36*       Estimated Creatinine Clearance: 46.4 mL/min (A) (based on SCr of 1.46 mg/dL (H)). ml/min  Concomitant nephrotoxic drugs: Loop diuretics and Vasopressors    Cultures:    Blood: NGTD, prelim   Wound (toe): pending   Urine: pending    MRSA Swab: Pending    Target range: Trough 10-15 mcg/mL      Ht Readings from Last 1 Encounters:   22 175.3 cm (69\")     Wt Readings from Last 1 Encounters:   22 84.8 kg (187 lb)     Ideal body weight: 70.7 kg (155 lb 13.8 oz)  Adjusted ideal body weight: 76.3 kg (168 lb 5.1 oz)        Assessment/Plan:   Afebrile, leukocytosis  Patient has INDIRA  Started Vancomycin 2000mg IV x1 now. Pharmacy will order a random level at 1800 on  to monitor.   Antimicrobial stop date TBD

## 2022-11-09 NOTE — CONSULTS
Infectious Disease Consult Note    Reason for Consult: Sepsis   Date of Consultation: November 9, 2022  Date of Admission: 11/8/2022  Referring Physician: Hospitalist     HPI: 75-y.o WM admitted on 11/08 w a diagnosis of UTI after presenting from SNF w decreased responsiveness and hypotension. ID consult requested for multiple wounds and sepsis. He was admitted to the floors, later transferred to the ICU for worsening clinical status. He was unresponsive during my assessment, unable to provide any history, history obtained from chart documentations. His med history is sig for CAD, HTN, carotid stenosis was scheduled for endatherectomy by Lisa Peterson in 12/2022, CVA and liver Cirrhosis. He has been afebrile since presentation but hypotensive requiring pressor support, and maintaining O2 sats on 3 L. Initial labs showed a leukocytosis of 15.8 trended up to 42.4 on todays labs, Cr 1.46, up to 1.65, lactic acid 2.4. and CRP 4.02. His UA is abnormal w significant pyuria and bacteriuria. CT head was neg for acute findings. Admission CXR is neg for focal infiltrates. He is on Meropenem and Vanc, has no documented allergies to antibiotics. Review of Systems: Unobtainable, minimally responsive     Past Medical History:  Past Medical History:   Diagnosis Date    Cirrhosis ([facility]     Diabetes ([facility]     Hypertension     Stroke [facility])        Past surgical history   Past Surgical History:   Procedure Laterality Date    HX BACK SURGERY      HX GI          Social History   Social History     Tobacco Use    Smoking status: Former    Smokeless tobacco: Never   Vaping Use    Vaping Use: Never used   Substance Use Topics    Alcohol use: Not Currently    Drug use: Not Currently        Family history   Family History   Problem Relation Age of Onset    Heart Disease Father         Allergies:  No Known Allergies      Medications:  No current facility-administered medications on file prior to encounter.      Current Outpatient Medications on File Prior to Encounter   Medication Sig Dispense Refill    traMADoL (ULTRAM) 50 mg tablet Take 50 mg by mouth every six (6) hours as needed for Pain. insulin lispro (HUMALOG) 100 unit/mL injection INITIATE INSULIN CORRECTIVE PROTOCOL:  Normal Insulin Sensitivity   For Blood Sugar (mg/dL) of:     Less than 150 =   0 units           150 -199 =   2 units  200 -249 =   4 units  250 -299 =   6 units  300 -349 =   8 units  350 and above = 10 units and Call Physician     Initiate Hypoglycemia protocol if blood glucose is <70 mg/dL  Fast Acting - Administer Immediately - or within 15 minutes of start of meal, if mealtime coverage. 1 Each 1    clopidogreL (PLAVIX) 75 mg tab Take 1 Tablet by mouth in the morning. 30 Tablet 1    ARIPiprazole (ABILIFY) 2 mg tablet Take 2 mg by mouth in the morning. baclofen 5 mg tab Take 5 mg by mouth two (2) times a day. carvediloL (COREG) 12.5 mg tablet Take 12.5 mg by mouth two (2) times a day. Indications: high blood pressure      escitalopram oxalate (LEXAPRO) 20 mg tablet Take 20 mg by mouth in the morning.      gabapentin (NEURONTIN) 100 mg capsule Take 100 mg by mouth nightly. hydrOXYzine HCL (ATARAX) 25 mg tablet Take 25 mg by mouth nightly as needed for Anxiety. furosemide (LASIX) 40 mg tablet Take 40 mg by mouth daily. Indications: visible water retention      losartan (COZAAR) 100 mg tablet Take 100 mg by mouth in the morning. melatonin 5 mg tablet Take 10 mg by mouth nightly. metFORMIN (GLUCOPHAGE) 500 mg tablet Take 1,000 mg by mouth two (2) times daily (with meals). potassium chloride (K-DUR, KLOR-CON M20) 20 mEq tablet Take 20 mEq by mouth two (2) times a day. senna-docusate (PERICOLACE) 8.6-50 mg per tablet Take 1 Tablet by mouth in the morning. tamsulosin (FLOMAX) 0.4 mg capsule Take 0.4 mg by mouth nightly. aspirin delayed-release 81 mg tablet Take 81 mg by mouth in the morning.       omeprazole (PRILOSEC) 20 mg capsule Take 20 mg by mouth daily. atorvastatin (LIPITOR) 40 mg tablet Take 40 mg by mouth in the morning.          Physical Exam:    Vitals: Patient Vitals for the past 24 hrs:   Temp Pulse Resp BP SpO2   11/09/22 0956 -- -- -- -- 99 %   11/09/22 0845 -- 75 20 (!) 98/51 100 %   11/09/22 0842 99.6 °F (37.6 °C) -- -- -- --   11/09/22 0830 -- 74 20 (!) 87/51 100 %   11/09/22 0815 -- 74 20 (!) 86/50 100 %   11/09/22 0800 -- 73 20 (!) 84/52 100 %   11/09/22 0745 -- 77 24 (!) 108/54 100 %   11/09/22 0730 -- 77 24 98/62 --   11/09/22 0715 -- 77 26 (!) 99/55 100 %   11/09/22 0700 98.6 °F (37 °C) 80 25 102/64 100 %   11/09/22 0600 -- 80 22 111/64 100 %   11/09/22 0545 -- 80 23 108/66 100 %   11/09/22 0530 -- 82 24 123/61 100 %   11/09/22 0515 -- 82 22 (!) 118/59 100 %   11/09/22 0500 -- 86 25 (!) 124/59 100 %   11/09/22 0445 -- 87 22 133/62 100 %   11/09/22 0430 -- 88 25 (!) 145/67 100 %   11/09/22 0415 -- 88 23 (!) 142/63 100 %   11/09/22 0400 99.8 °F (37.7 °C) 89 23 (!) 140/66 100 %   11/09/22 0345 -- 88 25 (!) 140/64 100 %   11/09/22 0330 -- 88 24 137/61 100 %   11/09/22 0315 -- 87 24 135/64 100 %   11/09/22 0300 -- 87 24 137/66 100 %   11/09/22 0245 -- 85 24 130/65 100 %   11/09/22 0230 -- 83 23 (!) 121/56 100 %   11/09/22 0215 -- -- 26 124/60 100 %   11/09/22 0200 -- -- 27 120/61 100 %   11/09/22 0145 -- -- 26 (!) 84/33 100 %   11/09/22 0130 -- 84 27 115/60 100 %   11/09/22 0115 -- 85 26 (!) 128/57 100 %   11/09/22 0100 -- 90 28 119/81 100 %   11/09/22 0045 -- 90 28 133/77 100 %   11/09/22 0030 -- 93 26 115/60 100 %   11/09/22 0015 98 °F (36.7 °C) 93 25 105/88 100 %   11/09/22 0000 -- 90 24 (!) 106/35 100 %   11/08/22 2358 -- 87 -- -- --   11/08/22 2345 -- 89 23 120/71 100 %   11/08/22 2330 -- 87 22 (!) 128/53 100 %   11/08/22 2315 -- 86 22 124/60 100 %   11/08/22 2300 -- 83 25 (!) 109/59 100 %   11/08/22 2245 -- 86 23 (!) 119/56 100 %   11/08/22 2230 -- 86 26 (!) 94/54 100 %   11/08/22 2216 -- 84 23 (!) 105/45 -- 11/08/22 2208 -- 83 27 (!) 70/49 --   11/08/22 2201 -- 83 26 (!) 66/39 --   11/08/22 2145 -- 98 20 125/63 --   11/08/22 2141 -- 86 25 (!) 112/58 100 %   11/08/22 2113 -- 89 26 (!) 92/50 100 %   11/08/22 2100 -- 86 26 91/61 --   11/08/22 2048 99.1 °F (37.3 °C) 86 (!) 32 (!) 80/51 100 %   11/08/22 2030 -- -- -- (!) 83/54 --   11/08/22 2025 -- -- -- (!) 61/42 --   11/08/22 2019 -- 83 -- -- --   11/08/22 2004 98.2 °F (36.8 °C) 68 18 (!) 61/42 (!) 58 %   11/08/22 1558 97.8 °F (36.6 °C) (!) 59 18 (!) 105/57 96 %   11/08/22 1520 -- 67 18 (!) 111/52 --   11/08/22 1350 -- 63 18 99/67 100 %   11/08/22 1320 -- (!) 59 20 111/63 --   11/08/22 1250 -- 63 16 (!) 116/100 --   11/08/22 1220 -- 63 18 131/78 --     GEN: NAD, decreased responsiveness  HEENT: NCAT, PERRLA, eyes closed   HEART: S1, S2+, RRR, No murmur   Lungs: CTA B/l, decreased at the bases   Abdomen: soft, ND, NT, +BS   Genitourinary: no genital discharge, no kennedy  Extremities: right great toe ulcer, multiple superficial ulcerations on b/l legs   Skin: NO sacral ulcer, per RN, sacral areas not personally examined       Labs:   Recent Labs     11/09/22 0210 11/08/22  1102   WBC 42.4* 15.8*   HGB 11.9* 12.5   HCT 34.2* 38.2    182     Recent Labs     11/09/22  0210 11/08/22  1102   BUN 40* 36*   CREA 1.65* 1.46*       Lab Results   Component Value Date/Time    C-Reactive protein 4.02 (H) 11/09/2022 02:10 AM        Microbiology Data:  - Blood 11/08: Pending   - Urine Cx 11/08: Pending   - Right great toe wound Cx 11/09: Pending     Imaging:   CXR 11/08: AP portable view of the chest demonstrates prior median sternotomy. Heart size  is normal. There is no edema, effusion, consolidation, or pneumothorax. The  osseous structures are unremarkable.     Assessment / Plan:     Severe sepsis due to UTI and infected wounds (right great toe ulcer)         No focal infiltrates on CXR, maintaining O2 sats on 2 L, clear lungs on exam         Afebrile, hemodynamically unstable requiring pressor support         Significant leukocytosis of 42.4 today's labs, No reports of diarrhea concerning for CDI        Blood, wound and urine Cxs are pending         Continue on Vanc and Meropenem, S/p Ceftriaxone         Consider CT chest/abdo/pel if continued rise in WBC           2. UTI, abnormal UA, Cx is pending, continue on Meropenem pending urine Cx     3. Multiple superficial leg ulcers, necrotic right great toe ulcer      No sacral ulcer per ICU staff. ? Underlying PAD, Wound Cx done, will follow       Continue antibiotics as above. Will follow CT of right foot     4. INDIRA, baseline renal function unknown, rising Cr on todays labs     5.  AMS/Decreased responsiveness, not following commands     Michaelle Arceo MD     11/9/2022

## 2022-11-09 NOTE — PROGRESS NOTES
28-year-old male with history of CVA admitted today with complaining of sudden onset of unresponsiveness. He was admitted to medical floor for further evaluation. Around 8:00 this evening he started becoming unresponsive, not even responding to painful stimuli. Found with hypotension and hypoxia on 2 L, rapid response was called and given IV fluids and transferred to ICU. But he continued to becoming hypotensive, bladder scan was done suggestive of 300 urine and he urinated more than 200 in the ICU. He is started on Levophed in ICU, blood pressure was a little stable but needed to increase the dose. Patient is very lethargic and not responding on oxygen nasal cannula saturating fair. Has wound near the toes on right foot that is draining foul-smelling purulent drainage and has multiple wounds on both lower extremities with multiple dressings in place. Per nursing staff there is a sacral pressure ulcer but not draining any and he has a dressing in place. He also looks dry on examination was given IV fluids, still with hypotension. I requested for maintenance fluids to continue after 2 L of bolus. He has normal ejection fraction on echocardiogram done July 2022.

## 2022-11-09 NOTE — PROGRESS NOTES
Assessed patient at 2012. Pt appears SOB, non responsive to painful stimuli, and non verbal. Skin tugor poor, and pulse feels weak. Rapid response called. Vitals obtained T 98.9, HR: 83 (Normal Sinus per tele) R:26 (shallow), BP: 71/56 Left arm. O2: 90% on 2L. Glucose 83. Rapid team quickly arrived. NS bolus initiated, 2nd PIV obtained by supervisor. Stat head CT ordered for decreased LOC. Pt transferred to  with all personal belongings. Beside shift given to nurse initiating care of patient.

## 2022-11-09 NOTE — PROGRESS NOTES
Orders received for swallow evaluation, and chart reviewed. Patient sleeping soundly, does not alert to verbal cues or sternal rub. Will attempt eval on a later date.

## 2022-11-09 NOTE — WOUND CARE
IP WOUND CONSULT    [address] [facility] RECORD NUMBER:  [id number]  AGE: 70 y.o. GENDER: male  : 1950  TODAY'S DATE:  2022  Places; hospital units: 56    GENERAL     [] Follow-up   [x] New Consult          PAST MEDICAL HISTORY    Past Medical History:   Diagnosis Date    Cirrhosis ([facility]     Diabetes ([facility] [address]     Hypertension     Stroke ([facility] [address]         ALLERGIES    No Known Allergies       Orientation: Not alert     Continent: incontinent  barrier: Primo Fit    [address]    Nutritionist Consulted: Yes for wounds  Nutrition Status:    Support Surface: [place] ICU bed    Contributing Factors: anticoagulation therapy, edema, decreased mobility, shear force, incontinence of stool, and incontinence of urine      Assessment   Patient is resting in bed, not alert. Patient has multiple scabs noted to bilateral lower legs and upper thighs, all unclear etiology at this time. All wounds cleansed with NS and a thin layer of therahoney applied to each wound and covered with ABD pads and rolled gauze. Patient has diabetic wound to right great toe, wound covered with slough and wet eschar, small area of pink/red wound bed visible. Wound cleansed with NS, thin layer of therahoney applied and covered with gauze and rolled gauze. Patient has diabetic wound noted to right 5th covered with dry eschar/scab. Wound cleansed with NS, a thin layer of therahoney applied and covered with gauze and rolled gauze. Stage 1 PI noted to right heel, intact blister. Area covered with non-adherent foam pad and wrapped with rolled gauze. Patient has offloading boots applied to bilateral feet. sDTI that is opening to coccyx, wound bed pink/red purple/maroon. Wound cleansed with NS, opticel ag to be applied to wound bed and covered with sacral foam dressing. Podiatry has been consulted to evaluate wound to toe. Wound Toe (Comment  which one) Anterior;Right purulent drainage. Large wound on R big toe.  (Active)   Wound Image   11/09/22 1029   Wound Etiology Other (Comment) 11/09/22 1029   Dressing Status New dressing applied 11/09/22 1029   Cleansed Cleansed with saline 11/09/22 1029   Dressing/Treatment Honey gel/honey paste;Gauze dressing/dressing sponge;Roll gauze 11/09/22 1029   Wound Length (cm) 5 cm 11/09/22 1029   Wound Width (cm) 5 cm 11/09/22 1029   Wound Depth (cm) 0.1 cm 11/09/22 1029   Wound Surface Area (cm^2) 25 cm^2 11/09/22 1029   Wound Volume (cm^3) 2.5 cm^3 11/09/22 [address]; Devitalized tissue;Pink/red 11/09/22 1029   Drainage Amount Scant 11/09/22 1029   Drainage Description Thick 11/09/22 1029   Wound Odor None 11/09/22 1029   Alecia-Wound/Incision Assessment Edematous 11/09/22 1029   Edges Defined edges 11/09/22 1029   Number of days: 1       Wound Sacrum Posterior DTI/Stage 2 (Active)   Wound Image   11/09/22 1032   Wound Etiology Deep Tissue/Injury 11/09/22 1032   Dressing Status New dressing applied 11/09/22 1032   Cleansed Cleansed with saline 11/09/22 1032   Dressing/Treatment Alginate with Ag; Foam 11/09/22 1032   Wound Length (cm) 1.3 cm 11/09/22 1032   Wound Width (cm) 0.5 cm 11/09/22 1032   Wound Depth (cm) 0.2 cm 11/09/22 1032   Wound Surface Area (cm^2) 0.65 cm^2 11/09/22 1032   Wound Volume (cm^3) 0.13 cm^3 11/09/22 1032   Wound Assessment Purple/maroon;Pink/red;Denuded 11/09/22 1032   Drainage Amount Scant 11/09/22 1032   Drainage Description Serosanguinous 11/09/22 1032   Wound Odor None 11/09/22 1032   Alecia-Wound/Incision Assessment Non-Blanchable erythema 11/09/22 1032   Edges Flat/open edges 11/09/22 1032   Number of days: 1       Wound Leg lower Anterior; Left Abrasions and Scabs (Active)   Wound Image   11/09/22 1026   Dressing Status New dressing applied 11/09/22 1026   Cleansed Cleansed with saline 11/09/22 1026   Dressing/Treatment Honey gel/honey paste;ABD pad;Roll gauze 11/09/22 1026   Wound Assessment Dry;Slough; Other (Comment) 11/09/22 1026   Drainage Amount Scant 11/09/22 1026   Drainage Description Yellow 11/09/22 1026   Wound Odor None 11/09/22 1026   Alecia-Wound/Incision Assessment Blanchable erythema 11/09/22 1026   Number of days: 1       Wound Leg lower Anterior;Right abrasions and scabs (Active)   Wound Image   11/09/22 1024   Dressing Status New dressing applied 11/09/22 1024   Cleansed Cleansed with saline 11/09/22 1024   Dressing/Treatment Honey gel/honey paste;ABD pad;Roll gauze 11/09/22 [address]; Other (Comment) 11/09/22 1024   Drainage Amount Scant 11/09/22 1024   Drainage Description Yellow 11/09/22 1024   Wound Odor None 11/09/22 1024   Alecia-Wound/Incision Assessment Blanchable erythema 11/09/22 1024   Number of days: 1       Wound Thigh Anterior;Right;Medial (Active)   Wound Image   11/09/22 1027   Wound Etiology Other (Comment) 11/09/22 1027   Dressing Status New dressing applied 11/09/22 1027   Cleansed Cleansed with saline 11/09/22 1027   Dressing/Treatment Honey gel/honey paste; Foam 11/09/22 [address]; Devitalized tissue; Other (Comment) 11/09/22 1027   Drainage Amount Scant 11/09/22 1027   Drainage Description Sanguineous 11/09/22 1027   Wound Odor None 11/09/22 1027   Alecia-Wound/Incision Assessment Blanchable erythema 11/09/22 1027   Number of days: 0       Wound Thigh Anterior;Left;Medial (Active)   Wound Image   11/09/22 1028   Wound Etiology Other (Comment) 11/09/22 1028   Dressing Status New dressing applied 11/09/22 1028   Cleansed Cleansed with saline 11/09/22 1028   Dressing/Treatment Honey gel/honey paste; Foam 11/09/22 1028   Wound Assessment Slough;Eschar moist 11/09/22 1028   Drainage Amount Scant 11/09/22 1028   Drainage Description Serosanguinous 11/09/22 1028   Wound Odor None 11/09/22 1028   Alecia-Wound/Incision Assessment Blanchable erythema 11/09/22 1028   Number of days: 0       Wound Toe (Comment  which one) Anterior;Right 5th toe (Active)   Wound Image   11/09/22 1031   Dressing Status New dressing applied 11/09/22 1031   Cleansed Cleansed with saline 11/09/22 1031   Dressing/Treatment Honey gel/honey paste;Gauze dressing/dressing sponge;Roll gauze 11/09/22 1031   Wound Assessment Dry;Eschar dry 11/09/22 1031   Drainage Amount None 11/09/22 1031   Wound Odor None 11/09/22 1031   Number of days: 0       Wound Heel Right Intact blister (Active)   Wound Image   11/09/22 1032   Wound Etiology Pressure Stage 1 11/09/22 1032   Dressing Status New dressing applied 11/09/22 1032   Cleansed Cleansed with saline 11/09/22 1032   Dressing/Treatment Foam 11/09/22 1032   Offloading for Diabetic Foot Ulcers Offloading boot 11/09/22 1032   Wound Assessment Fluid filled blister 11/09/22 1032   Drainage Amount None 11/09/22 1032   Wound Odor None 11/09/22 1032   Alecia-Wound/Incision Assessment Non-Blanchable erythema 11/09/22 1032   Number of days: 0                Skin Care & Pressure Relief Recommendations  Minimize layers of linen  Pads under patient to optimize support surface  Turn/reposition approximately every 2 hours  Pillow wedges  Manage incontinence   Promote continence; Skin Protective lotion/cream to buttocks and sacrum daily and as needed with incontinence care  Offload heels pillows  Offloading boots      Discharge Wound Care Needs:    Teaching completed with:   [] Patient           [] Family member       [] Caregiver          [] Nursing  [] Other    Patient/Caregiver Teaching:  Level of patient/caregiver understanding able to:   [] Indicates understanding       [] Needs reinforcement  [] Unsuccessful      [] Verbal Understanding  [] Demonstrated understanding       [] No evidence of learning  [] Refused teaching         [] N/A       Usman Figeuroa, RN, BSN  [facility] [place]   11/09/22  10:34 AM

## 2022-11-09 NOTE — PROGRESS NOTES
Pt transferred to ICU since placement of SLP consult. Per nsg, pt not appropriate for SLP intervention this date, NG tube to be placed. MD requests hold SLP this date and cont to follow.

## 2022-11-10 ENCOUNTER — APPOINTMENT (OUTPATIENT)
Dept: GENERAL RADIOLOGY | Age: 72
DRG: 871 | End: 2022-11-10
Attending: INTERNAL MEDICINE
Payer: MEDICARE

## 2022-11-10 ENCOUNTER — APPOINTMENT (OUTPATIENT)
Dept: NON INVASIVE DIAGNOSTICS | Age: 72
DRG: 871 | End: 2022-11-10
Attending: NURSE PRACTITIONER
Payer: MEDICARE

## 2022-11-10 ENCOUNTER — APPOINTMENT (OUTPATIENT)
Dept: NON INVASIVE DIAGNOSTICS | Age: 72
DRG: 871 | End: 2022-11-10
Attending: PODIATRIST
Payer: MEDICARE

## 2022-11-10 LAB
ALBUMIN SERPL-MCNC: 2 G/DL (ref 3.5–5)
ALBUMIN SERPL-MCNC: 2.1 G/DL (ref 3.5–5)
ALBUMIN/GLOB SERPL: 0.7 {RATIO} (ref 1.1–2.2)
ALBUMIN/GLOB SERPL: 0.8 {RATIO} (ref 1.1–2.2)
ALP SERPL-CCNC: 50 U/L (ref 45–117)
ALP SERPL-CCNC: 51 U/L (ref 45–117)
ALT SERPL-CCNC: 49 U/L (ref 12–78)
ALT SERPL-CCNC: 57 U/L (ref 12–78)
ANION GAP SERPL CALC-SCNC: 7 MMOL/L (ref 5–15)
ANION GAP SERPL CALC-SCNC: 7 MMOL/L (ref 5–15)
ARTERIAL PATENCY WRIST A: YES
AST SERPL W P-5'-P-CCNC: 233 U/L (ref 15–37)
AST SERPL W P-5'-P-CCNC: 259 U/L (ref 15–37)
BACTERIA SPEC CULT: NORMAL
BASE EXCESS BLDA CALC-SCNC: 0.9 MMOL/L (ref 0–3)
BASOPHILS # BLD: 0 K/UL (ref 0–0.1)
BASOPHILS NFR BLD: 0 % (ref 0–1)
BDY SITE: ABNORMAL
BILIRUB SERPL-MCNC: 0.6 MG/DL (ref 0.2–1)
BILIRUB SERPL-MCNC: 0.7 MG/DL (ref 0.2–1)
BNP SERPL-MCNC: 6958 PG/ML
BODY TEMPERATURE: 98.3
BUN SERPL-MCNC: 16 MG/DL (ref 6–20)
BUN SERPL-MCNC: 17 MG/DL (ref 6–20)
BUN/CREAT SERPL: 24 (ref 12–20)
BUN/CREAT SERPL: 25 (ref 12–20)
CA-I BLD-MCNC: 7 MG/DL (ref 8.5–10.1)
CA-I BLD-MCNC: 7 MG/DL (ref 8.5–10.1)
CHLORIDE SERPL-SCNC: 116 MMOL/L (ref 97–108)
CHLORIDE SERPL-SCNC: 117 MMOL/L (ref 97–108)
CO2 SERPL-SCNC: 23 MMOL/L (ref 21–32)
CO2 SERPL-SCNC: 24 MMOL/L (ref 21–32)
COHGB MFR BLD: 0 % (ref 1–2)
COLONY COUNT,CNT: NORMAL
COLONY COUNT,CNT: NORMAL
CREAT SERPL-MCNC: 0.68 MG/DL (ref 0.7–1.3)
CREAT SERPL-MCNC: 0.68 MG/DL (ref 0.7–1.3)
DIFFERENTIAL METHOD BLD: ABNORMAL
ECHO AO ASC DIAM: 3.6 CM
ECHO AO ASCENDING AORTA INDEX: 2.14 CM/M2
ECHO AO DESC DIAM: 2.2 CM
ECHO AO DESCENDING AORTA INDEX: 1.31 CM/M2
ECHO AO ROOT DIAM: 3.8 CM
ECHO AO ROOT INDEX: 2.26 CM/M2
ECHO AV PEAK GRADIENT: 4 MMHG
ECHO AV PEAK VELOCITY: 1 M/S
ECHO AV VELOCITY RATIO: 1
ECHO EST RA PRESSURE: 15 MMHG
ECHO IVC PROX: 2.3 CM
ECHO LA AREA 4C: 28 CM2
ECHO LA DIAMETER INDEX: 2.98 CM/M2
ECHO LA DIAMETER: 5 CM
ECHO LA MAJOR AXIS: 6.4 CM
ECHO LA TO AORTIC ROOT RATIO: 1.32
ECHO LV E' LATERAL VELOCITY: 12 CM/S
ECHO LV E' SEPTAL VELOCITY: 7 CM/S
ECHO LV EDV A2C: 121 ML
ECHO LV EDV A4C: 93 ML
ECHO LV EDV INDEX A4C: 55 ML/M2
ECHO LV EDV NDEX A2C: 72 ML/M2
ECHO LV EJECTION FRACTION A2C: 57 %
ECHO LV EJECTION FRACTION A4C: 49 %
ECHO LV EJECTION FRACTION BIPLANE: 54 % (ref 55–100)
ECHO LV ESV A2C: 52 ML
ECHO LV ESV A4C: 47 ML
ECHO LV ESV INDEX A2C: 31 ML/M2
ECHO LV ESV INDEX A4C: 28 ML/M2
ECHO LV FRACTIONAL SHORTENING: 25 % (ref 28–44)
ECHO LV INTERNAL DIMENSION DIASTOLE INDEX: 2.86 CM/M2
ECHO LV INTERNAL DIMENSION DIASTOLIC: 4.8 CM (ref 4.2–5.9)
ECHO LV INTERNAL DIMENSION SYSTOLIC INDEX: 2.14 CM/M2
ECHO LV INTERNAL DIMENSION SYSTOLIC: 3.6 CM
ECHO LV IVSD: 1.4 CM (ref 0.6–1)
ECHO LV MASS 2D: 181.9 G (ref 88–224)
ECHO LV MASS INDEX 2D: 108.3 G/M2 (ref 49–115)
ECHO LV POSTERIOR WALL DIASTOLIC: 0.7 CM (ref 0.6–1)
ECHO LV RELATIVE WALL THICKNESS RATIO: 0.29
ECHO LVOT PEAK GRADIENT: 4 MMHG
ECHO LVOT PEAK VELOCITY: 1 M/S
ECHO MV A VELOCITY: 0.54 M/S
ECHO MV E DECELERATION TIME (DT): 95 MS
ECHO MV E VELOCITY: 0.76 M/S
ECHO MV E/A RATIO: 1.41
ECHO MV E/E' LATERAL: 6.33
ECHO MV E/E' RATIO (AVERAGED): 8.6
ECHO MV E/E' SEPTAL: 10.86
ECHO MV MAX VELOCITY: 0.9 M/S
ECHO MV MEAN GRADIENT: 1 MMHG
ECHO MV MEAN VELOCITY: 0.5 M/S
ECHO MV PEAK GRADIENT: 3 MMHG
ECHO MV REGURGITANT PEAK GRADIENT: 71 MMHG
ECHO MV REGURGITANT PEAK VELOCITY: 4.2 M/S
ECHO MV VTI: 17.4 CM
ECHO PULMONARY ARTERY END DIASTOLIC PRESSURE: 6 MMHG
ECHO PV REGURGITANT MAX VELOCITY: 1.2 M/S
ECHO RIGHT VENTRICULAR SYSTOLIC PRESSURE (RVSP): 59 MMHG
ECHO RV TAPSE: 1.8 CM (ref 1.7–?)
ECHO TV REGURGITANT MAX VELOCITY: 3.31 M/S
ECHO TV REGURGITANT PEAK GRADIENT: 44 MMHG
EOSINOPHIL # BLD: 0 K/UL (ref 0–0.4)
EOSINOPHIL NFR BLD: 0 % (ref 0–7)
ERYTHROCYTE [DISTWIDTH] IN BLOOD BY AUTOMATED COUNT: 16.2 % (ref 11.5–14.5)
FIO2 ON VENT: 28 %
GAS FLOW.O2 O2 DELIVERY SYS: 2 L/MIN
GLOBULIN SER CALC-MCNC: 2.5 G/DL (ref 2–4)
GLOBULIN SER CALC-MCNC: 2.8 G/DL (ref 2–4)
GLUCOSE BLD STRIP.AUTO-MCNC: 126 MG/DL (ref 65–100)
GLUCOSE BLD STRIP.AUTO-MCNC: 129 MG/DL (ref 65–100)
GLUCOSE BLD STRIP.AUTO-MCNC: 174 MG/DL (ref 65–100)
GLUCOSE BLD STRIP.AUTO-MCNC: 98 MG/DL (ref 65–100)
GLUCOSE SERPL-MCNC: 172 MG/DL (ref 65–100)
GLUCOSE SERPL-MCNC: 174 MG/DL (ref 65–100)
HCO3 BLDA-SCNC: 23 MMOL/L (ref 22–26)
HCT VFR BLD AUTO: 30 % (ref 36.6–50.3)
HGB BLD-MCNC: 10.2 G/DL (ref 12.1–17)
IMM GRANULOCYTES # BLD AUTO: 0.5 K/UL (ref 0–0.04)
IMM GRANULOCYTES NFR BLD AUTO: 3 % (ref 0–0.5)
LYMPHOCYTES # BLD: 0.6 K/UL (ref 0.8–3.5)
LYMPHOCYTES NFR BLD: 4 % (ref 12–49)
MAGNESIUM SERPL-MCNC: 2.1 MG/DL (ref 1.6–2.4)
MCH RBC QN AUTO: 28.8 PG (ref 26–34)
MCHC RBC AUTO-ENTMCNC: 34 G/DL (ref 30–36.5)
MCV RBC AUTO: 84.7 FL (ref 80–99)
METHGB MFR BLD: 0.2 % (ref 0–1.4)
MONOCYTES # BLD: 0.5 K/UL (ref 0–1)
MONOCYTES NFR BLD: 3 % (ref 5–13)
NEUTS SEG # BLD: 14.9 K/UL (ref 1.8–8)
NEUTS SEG NFR BLD: 90 % (ref 32–75)
NRBC # BLD: 0 K/UL (ref 0–0.01)
NRBC BLD-RTO: 0 PER 100 WBC
OXYHGB MFR BLD: 95.1 % (ref 95–99)
PCO2 BLDA: 30 MMHG (ref 35–45)
PERFORMED BY, TECHID: ABNORMAL
PERFORMED BY, TECHID: NORMAL
PH BLDA: 7.51 [PH] (ref 7.35–7.45)
PLATELET # BLD AUTO: 83 K/UL (ref 150–400)
PMV BLD AUTO: 11 FL (ref 8.9–12.9)
PO2 BLDA: 74 MMHG (ref 80–100)
POTASSIUM SERPL-SCNC: 3 MMOL/L (ref 3.5–5.1)
POTASSIUM SERPL-SCNC: 3 MMOL/L (ref 3.5–5.1)
PROT SERPL-MCNC: 4.6 G/DL (ref 6.4–8.2)
PROT SERPL-MCNC: 4.8 G/DL (ref 6.4–8.2)
RBC # BLD AUTO: 3.54 M/UL (ref 4.1–5.7)
SAO2 % BLD: 95 % (ref 95–99)
SAO2% DEVICE SAO2% SENSOR NAME: ABNORMAL
SODIUM SERPL-SCNC: 147 MMOL/L (ref 136–145)
SODIUM SERPL-SCNC: 147 MMOL/L (ref 136–145)
SPECIAL REQUESTS,SREQ: NORMAL
SPECIMEN SITE: ABNORMAL
VANCOMYCIN SERPL-MCNC: 7.7 UG/ML
WBC # BLD AUTO: 16.5 K/UL (ref 4.1–11.1)

## 2022-11-10 PROCEDURE — 97161 PT EVAL LOW COMPLEX 20 MIN: CPT

## 2022-11-10 PROCEDURE — 82962 GLUCOSE BLOOD TEST: CPT

## 2022-11-10 PROCEDURE — 80053 COMPREHEN METABOLIC PANEL: CPT

## 2022-11-10 PROCEDURE — 93306 TTE W/DOPPLER COMPLETE: CPT

## 2022-11-10 PROCEDURE — 65270000029 HC RM PRIVATE

## 2022-11-10 PROCEDURE — 99232 SBSQ HOSP IP/OBS MODERATE 35: CPT | Performed by: INTERNAL MEDICINE

## 2022-11-10 PROCEDURE — 93308 TTE F-UP OR LMTD: CPT

## 2022-11-10 PROCEDURE — 74011250636 HC RX REV CODE- 250/636: Performed by: INTERNAL MEDICINE

## 2022-11-10 PROCEDURE — 74011250637 HC RX REV CODE- 250/637: Performed by: HOSPITALIST

## 2022-11-10 PROCEDURE — 85025 COMPLETE CBC W/AUTO DIFF WBC: CPT

## 2022-11-10 PROCEDURE — 83735 ASSAY OF MAGNESIUM: CPT

## 2022-11-10 PROCEDURE — 80202 ASSAY OF VANCOMYCIN: CPT

## 2022-11-10 PROCEDURE — 74011000258 HC RX REV CODE- 258: Performed by: INTERNAL MEDICINE

## 2022-11-10 PROCEDURE — 73630 X-RAY EXAM OF FOOT: CPT

## 2022-11-10 PROCEDURE — 99232 SBSQ HOSP IP/OBS MODERATE 35: CPT | Performed by: PODIATRIST

## 2022-11-10 PROCEDURE — 74011000258 HC RX REV CODE- 258: Performed by: HOSPITALIST

## 2022-11-10 PROCEDURE — 83880 ASSAY OF NATRIURETIC PEPTIDE: CPT

## 2022-11-10 PROCEDURE — 74011250636 HC RX REV CODE- 250/636: Performed by: HOSPITALIST

## 2022-11-10 PROCEDURE — 36415 COLL VENOUS BLD VENIPUNCTURE: CPT

## 2022-11-10 PROCEDURE — 71045 X-RAY EXAM CHEST 1 VIEW: CPT

## 2022-11-10 PROCEDURE — 93925 LOWER EXTREMITY STUDY: CPT

## 2022-11-10 PROCEDURE — 36600 WITHDRAWAL OF ARTERIAL BLOOD: CPT

## 2022-11-10 PROCEDURE — 74011250637 HC RX REV CODE- 250/637: Performed by: INTERNAL MEDICINE

## 2022-11-10 PROCEDURE — 82803 BLOOD GASES ANY COMBINATION: CPT

## 2022-11-10 PROCEDURE — 97530 THERAPEUTIC ACTIVITIES: CPT

## 2022-11-10 PROCEDURE — 74011000250 HC RX REV CODE- 250: Performed by: INTERNAL MEDICINE

## 2022-11-10 RX ORDER — POTASSIUM CHLORIDE 7.45 MG/ML
10 INJECTION INTRAVENOUS
Status: COMPLETED | OUTPATIENT
Start: 2022-11-10 | End: 2022-11-10

## 2022-11-10 RX ADMIN — GABAPENTIN 100 MG: 100 CAPSULE ORAL at 22:37

## 2022-11-10 RX ADMIN — MEROPENEM 1 G: 1 INJECTION, POWDER, FOR SOLUTION INTRAVENOUS at 12:38

## 2022-11-10 RX ADMIN — POTASSIUM CHLORIDE 10 MEQ: 7.46 INJECTION, SOLUTION INTRAVENOUS at 09:40

## 2022-11-10 RX ADMIN — SENNOSIDES AND DOCUSATE SODIUM 1 TABLET: 50; 8.6 TABLET ORAL at 08:46

## 2022-11-10 RX ADMIN — MEROPENEM 1 G: 1 INJECTION, POWDER, FOR SOLUTION INTRAVENOUS at 01:28

## 2022-11-10 RX ADMIN — ENOXAPARIN SODIUM 40 MG: 100 INJECTION SUBCUTANEOUS at 08:40

## 2022-11-10 RX ADMIN — POTASSIUM CHLORIDE 10 MEQ: 7.46 INJECTION, SOLUTION INTRAVENOUS at 12:38

## 2022-11-10 RX ADMIN — TRAMADOL HYDROCHLORIDE 50 MG: 50 TABLET, COATED ORAL at 14:42

## 2022-11-10 RX ADMIN — MUPIROCIN: 20 OINTMENT TOPICAL at 22:37

## 2022-11-10 RX ADMIN — VANCOMYCIN HYDROCHLORIDE 750 MG: 750 INJECTION, POWDER, LYOPHILIZED, FOR SOLUTION INTRAVENOUS at 08:39

## 2022-11-10 RX ADMIN — ASPIRIN 81 MG 81 MG: 81 TABLET ORAL at 08:41

## 2022-11-10 RX ADMIN — MUPIROCIN: 20 OINTMENT TOPICAL at 08:44

## 2022-11-10 RX ADMIN — ESCITALOPRAM OXALATE 20 MG: 10 TABLET ORAL at 08:40

## 2022-11-10 RX ADMIN — DOXAZOSIN 1 MG: 2 TABLET ORAL at 22:37

## 2022-11-10 RX ADMIN — POTASSIUM CHLORIDE 10 MEQ: 7.46 INJECTION, SOLUTION INTRAVENOUS at 10:43

## 2022-11-10 RX ADMIN — SODIUM CHLORIDE, PRESERVATIVE FREE 10 ML: 5 INJECTION INTRAVENOUS at 22:47

## 2022-11-10 RX ADMIN — MELATONIN TAB 5 MG 10 MG: 5 TAB at 22:37

## 2022-11-10 RX ADMIN — SODIUM CHLORIDE, PRESERVATIVE FREE 10 ML: 5 INJECTION INTRAVENOUS at 16:01

## 2022-11-10 RX ADMIN — POTASSIUM CHLORIDE 10 MEQ: 7.46 INJECTION, SOLUTION INTRAVENOUS at 11:14

## 2022-11-10 RX ADMIN — POTASSIUM CHLORIDE 20 MEQ: 1.5 FOR SOLUTION ORAL at 22:37

## 2022-11-10 RX ADMIN — ATORVASTATIN CALCIUM 40 MG: 40 TABLET, FILM COATED ORAL at 08:40

## 2022-11-10 RX ADMIN — POTASSIUM CHLORIDE 20 MEQ: 1.5 FOR SOLUTION ORAL at 08:39

## 2022-11-10 RX ADMIN — POTASSIUM CHLORIDE 10 MEQ: 7.46 INJECTION, SOLUTION INTRAVENOUS at 10:16

## 2022-11-10 RX ADMIN — DEXTROSE AND SODIUM CHLORIDE 100 ML/HR: 5; 900 INJECTION, SOLUTION INTRAVENOUS at 04:25

## 2022-11-10 RX ADMIN — PANTOPRAZOLE SODIUM 40 MG: 40 GRANULE, DELAYED RELEASE ORAL at 08:40

## 2022-11-10 RX ADMIN — TRAMADOL HYDROCHLORIDE 50 MG: 50 TABLET, COATED ORAL at 19:30

## 2022-11-10 RX ADMIN — POTASSIUM CHLORIDE 10 MEQ: 7.46 INJECTION, SOLUTION INTRAVENOUS at 08:38

## 2022-11-10 RX ADMIN — VANCOMYCIN HYDROCHLORIDE 1000 MG: 1 INJECTION, POWDER, LYOPHILIZED, FOR SOLUTION INTRAVENOUS at 15:59

## 2022-11-10 NOTE — CONSULTS
Nutrition Assessment     Type and Reason for Visit: Consult, TF Orders    Nutrition Recommendations/Plan:   NPO, advance per SLP rec's. Initiate TF via NGT as Osmolite 1.2 Michael at 20 mL/hr, advance to goal rate of 60 mL/hr. Flush with 120 mL H2O Q4H via NGT. Provides 1728 kcal (92%), 86 gm PRO (113%), 1887 mL Total Fluids (100%). Monitor and document TF initiation, rate, flushes, tolerances, BMs in I/Os. Nutrition Assessment:    RD communicated w/ SLP via [employer] regarding NPO vs diet initiation. SLP rec'd NPO w/ TF for nutrition needs. Attending MD also consulted RD for TF rec's. RD to provide regimen above. Estimated Daily Nutrient Needs:  Energy (kcal):  7450-4399 kcal/d  (30-35 kcal/kg)  Protein (g):  76 gm/d       Fluid (ml/day):  9024-9713 mL/d    Current Nutrition Therapies:  No diet orders on file    Anthropometric Measures:  Height:  5' 9\" (175.3 cm)  Current Body Wt:  62.8 kg (138 lb 7.2 oz) (11/10, RD obtained.)  BMI: 20.4    Nutrition Diagnosis:   Inadequate oral intake related to cognitive or neurological impairment as evidenced by intake 0-25%, weight loss    Nutrition Interventions:   Food and/or Nutrient Delivery: Continue NPO  Nutrition Education/Counseling: No recommendations at this time  Coordination of Nutrition Care: Continue to monitor while inpatient, Speech therapy, Swallow evaluation  Plan of Care discussed with: RN    Goals:  Goals: Meet at least 75% of estimated needs, PO intake 50% or greater, within 2 days, other (specify)  Specify Other Goals: Initate means to meet ~50% of EENs within 2-3 days. Wt maintenance of +/- 0.5 kg x7 days. Kiki Sam RD  Contact: ext. [phone].

## 2022-11-10 NOTE — PROGRESS NOTES
[facility]    Progress Note    Date:11/10/2022       Room:[facility]  Patient Name:Stanislaw Stephens     Date of Birth:     Age:71 y.o. Subjective:     Patient is a 70 y.o.  male who is being seen for right hallux ulceration. Patient has a hx of stenosis of carotid artery, CVA, diabetes mellitus, and hypertension. Patient is seen in ICU. Patient is not alert but he is in no acute distress. Unable to obtain history. Objective:     Vitals Last 24 Hours:  TEMPERATURE:  Temp  Av.4 °F (36.9 °C)  Min: 97.8 °F (36.6 °C)  Max: 98.8 °F (37.1 °C)  RESPIRATIONS RANGE: Resp  Av.8  Min: 13  Max: 26  PULSE OXIMETRY RANGE: SpO2  Av.2 %  Min: 83 %  Max: 100 %  PULSE RANGE: Pulse  Av.1  Min: 73  Max: 94  BLOOD PRESSURE RANGE: Systolic (26UPL), OBR:796 , Min:105 , LWM:021        Diastolic (80UWP), TIA:02, Min:42, Max:88        I/O (24Hr): Intake/Output Summary (Last 24 hours) at 11/10/2022 1505  Last data filed at 11/10/2022 0606  Gross per 24 hour   Intake 2818.33 ml   Output 1200 ml   Net 1618.33 ml     Physical Exam:    GEN: Pt is in NAD. No dressing noted to B/L LE. No family noted at [facility]  DERM: Ulcer noted to the dorsal aspect of the right hallux. No fluctuance noted. Wound base is 100% eschar. Eschar noted to the right fifth toe. VASC: Pedal pulses (DP/PT) diminished to B/L LE. CFT<3sec to all digits of B/L LE. No pedal hair growth noted to the level of the digits for B/L LE. Skin temp is warm to cool from proximal to distal for B/L LE. Neg homans/brain signs to B/L LE. No varicosities or telangectasias noted to B/L LE.  NEURO: Protective and epicritic sensations absent intact to B/L LE  MSK: (-) POP, No gross deformities. Good muscle tone and bulk noted to B/L SHAUNA.         Labs/Imaging/Diagnostics:     Labs:  CBC:  Recent Labs     11/10/22  0340 22  0210 22  1102   WBC 16.5* 42.4* 15.8*   RBC 3.54* 4.07* 4.40   HGB 10.2* 11.9* 12.5   HCT 30.0* 34.2* 38.2   MCV 84.7 84.0 86.8   RDW 16.2* 16.0* 15.9*   PLT 83* 191 182     CHEMISTRIES:  Recent Labs     11/10/22  0735 11/10/22  0340 11/09/22  0210   * 147* 139   K 3.0* 3.0* 3.7   * 117* 109*   CO2 24 23 17*   BUN 16 17 40*   CA 7.0* 7.0* 7.1*   PHOS  --   --  1.8*   MG  --  2.1 0.9*   PT/INR:  No results for input(s): INR, INREXT, INREXT, INREXT in the last 72 hours. No lab exists for component: PROTIME  APTT:No results for input(s): APTT in the last 72 hours. LIVER PROFILE:  Recent Labs     11/10/22  0735 11/10/22  0340 11/09/22  0210   * 233* 46*   ALT 57 49 18     Lab Results   Component Value Date/Time    ALT (SGPT) 57 11/10/2022 07:35 AM    AST (SGOT) 259 (H) 11/10/2022 07:35 AM    Alk. phosphatase 51 11/10/2022 07:35 AM    Bilirubin, total 0.7 11/10/2022 07:35 AM       Imaging Last 24 Hours:  XR FOOT RT MIN 3 V    Result Date: 11/10/2022  EXAM: XR FOOT RT MIN 3 V INDICATION: Chronic ulcer, eval for osteomyelitis. COMPARISON: None. FINDINGS: Three views of the right foot demonstrate generalized osseous demineralization without fracture or dislocation. There is no soft tissue gas. There is no osseous erosion. No osseous erosion or soft tissue gas. .    XR CHEST PORT    Result Date: 11/10/2022  EXAM:  XR CHEST PORT INDICATION: CHF COMPARISON: 11/9/2022 TECHNIQUE: Semiupright portable chest AP view FINDINGS: Median sternotomy changes and nasogastric tube. The cardiac silhouette is within normal limits. Mild interstitial opacities unchanged. The lungs and pleural spaces are clear. The visualized bones and upper abdomen are age-appropriate. Mild interstitial opacities which may represent mild pulmonary edema unchanged     ECHO ADULT COMPLETE    Result Date: 11/10/2022    Left Ventricle: Normal left ventricular systolic function with a visually estimated EF of 50 - 55%. Left ventricle size is normal. Mild septal thickening. Normal wall motion. Normal diastolic function.    Right Ventricle: Right ventricle is mildly dilated. Normal wall thickness. Mitral Valve: Mild regurgitation. Left Atrium: Left atrium is dilated. Assessment:     Ulcer right hallux  Diabetes mellitus       Plan:   -Patient was seen and evaluated at bedside  - Current labs personally reviewed and findings dicussed with patient  - Ulcer of the right hallux was evaluated. Ulceration now has eschar formation with no fluctuance. - Wound care: Clean ulceration with normal saline. Apply Betadine soaked gauze 4 x 4 gauze, Kerlix and secure with tape. Daily dressing changes  -X-rays of the right foot were seen and evaluated. Patient has no osseous erosion or soft tissue gas.   -We will continue to follow patient    Nola Daniel DPM, [address] [facility], [address] [place] [address] and [address]  O: [phone]  F: [phone]    Electronically signed by Nola Daniel DPM on 11/10/2022 at 8:38 AM

## 2022-11-10 NOTE — PROGRESS NOTES
IMPRESSION:   Acute hypoxic respiratory failure  Severe sepsis with septic shock  Metabolic encephalopathy  Probably urinary tract infection  Leukocytosis  Carotid artery disease history of CVA  Additional workup outlined below  Pt is at high risk of sudden decline and decompensation with life threatening consequenses and continued end organ dysfunction and failure  Pt is critically ill.  Time spent with pt and staff actively rendering care, managing pt and coordinating care as stated below; 30 minutes, exclusive of any procedures      RECOMMENDATIONS/PLAN:   ICU monitoring  42-year-old male nursing home resident admitted with hypotension and unresponsiveness  Hemodynamically unstable started on IV fluid and now he is off Levophed decrease IV fluid  Panculture IV antibiotics, ABG shows respiratory alkalosis  Need vascular surgery consult for possible need endarterectomy when more stable  Previous 2D echo showed ejection fraction 60 to 65%  Intubate and place on vent if NIV fails  Chest x-ray shows fluid overload congestive changes  will get BNP  Agree with Empiric IV antibiotics pending culture results   Follow culture results on meropenem and vancomycin  CVP monitoring  IV vasopressors for circulatory shock refractory to fluids to maintain SBP> 90  Transfuse prn to maintain Hgb > 7  Labs to follow electrolytes, renal function and and blood counts  Bronchial hygiene with respiratory therapy techniques, bronchodilators  Pt needs IV fluids with additives and Drug therapy requiring intensive monitoring for toxicity  Prescription drug management with home med reconciliation reviewed  DVT, SUP prophylaxis  Will be available to assist in medical management while in the CCU pending disposition     [x] High complexity decision making was performed  [x] See my orders for details  HPI  42-year-old nursing home resident came in because of unresponsiveness he had a history of stroke in the past patient was hypotensive hypoxic rapid response was called he was admitted to the floor initially received IV fluid hemodynamically unstable started on vasopressors Levophed transferred to ICU White count was elevated he was put on oxygen 4 L nasal cannula unable to get any started the patient he has a right foot wound and multiple wounds of the lower extremities dressing in place. He has carotid artery stenosis only supposed to go for surgery but unable to get cardiac clearance because of nuclear stress test he has 90% stenosis of left ICA and 50% stenosis of right ICA. So now he is admitted to ICU and critical care consult was called  PMH:  has a past medical history of Cirrhosis ([facility] [address] Diabetes ([facility] [address] Hypertension, and Stroke ([facility] [address] PSH:   has a past surgical history that includes hx back surgery; hx gi; and ir insert non tunl cvc over 5 yrs (11/9/2022). FHX: family history includes Heart Disease in his father. SHX:  reports that he has quit smoking. He has never used smokeless tobacco. He reports that he does not currently use alcohol. He reports that he does not currently use drugs.     ALL: No Known Allergies     MEDS:   [x] Reviewed - As Below   [] Not reviewed    Current Facility-Administered Medications   Medication    potassium chloride 10 mEq in 100 ml IVPB    vancomycin (VANCOCIN) 750 mg in 0.9% sodium chloride 250 mL ([id number])    meropenem (MERREM) 1 g in 0.9% sodium chloride (MBP/ADV) 50 mL MBP    VANCOMYCIN INFORMATION NOTE    mupirocin (BACTROBAN) 2 % ointment    dextrose 5% and 0.9% NaCl infusion    aspirin chewable tablet 81 mg    atorvastatin (LIPITOR) tablet 40 mg    escitalopram oxalate (LEXAPRO) tablet 20 mg    gabapentin (NEURONTIN) capsule 100 mg    melatonin tablet 10 mg    baclofen (LIORESAL) tablet 5 mg    hydrOXYzine HCL (ATARAX) tablet 25 mg    polyethylene glycol (MIRALAX) packet 17 g    traMADoL (ULTRAM) tablet 50 mg    doxazosin (CARDURA) tablet 1 mg    potassium chloride (KLOR-CON) packet for solution 20 mEq    pantoprazole (PROTONIX) granules for oral suspension 40 mg    sodium chloride (NS) flush 5-40 mL    sodium chloride (NS) flush 5-40 mL    acetaminophen (TYLENOL) tablet 650 mg    Or    acetaminophen (TYLENOL) suppository 650 mg    ondansetron (ZOFRAN ODT) tablet 4 mg    Or    ondansetron (ZOFRAN) injection 4 mg    enoxaparin (LOVENOX) injection 40 mg    glucose chewable tablet 16 g    glucagon (GLUCAGEN) injection 1 mg    dextrose 10% infusion 0-250 mL    insulin lispro (HUMALOG) injection    [Held by provider] ARIPiprazole (ABILIFY) tablet 2 mg    [Held by provider] carvediloL (COREG) tablet 12.5 mg    [Held by provider] clopidogreL (PLAVIX) tablet 75 mg    [Held by provider] losartan (COZAAR) tablet 50 mg    senna-docusate (PERICOLACE) 8.6-50 mg per tablet 1 Tablet    [Held by provider] tamsulosin (FLOMAX) capsule 0.4 mg    [Held by provider] furosemide (LASIX) tablet 20 mg    NOREPINephrine (LEVOPHED) 8 mg in 0.9% NS 250ml infusion      MAR reviewed and pertinent medications noted or modified as needed   Current Facility-Administered Medications   Medication    potassium chloride 10 mEq in 100 ml IVPB    vancomycin (VANCOCIN) 750 mg in 0.9% sodium chloride 250 mL ([id number])    meropenem (MERREM) 1 g in 0.9% sodium chloride (MBP/ADV) 50 mL MBP    VANCOMYCIN INFORMATION NOTE    mupirocin (BACTROBAN) 2 % ointment    dextrose 5% and 0.9% NaCl infusion    aspirin chewable tablet 81 mg    atorvastatin (LIPITOR) tablet 40 mg    escitalopram oxalate (LEXAPRO) tablet 20 mg    gabapentin (NEURONTIN) capsule 100 mg    melatonin tablet 10 mg    baclofen (LIORESAL) tablet 5 mg    hydrOXYzine HCL (ATARAX) tablet 25 mg    polyethylene glycol (MIRALAX) packet 17 g    traMADoL (ULTRAM) tablet 50 mg    doxazosin (CARDURA) tablet 1 mg    potassium chloride (KLOR-CON) packet for solution 20 mEq    pantoprazole (PROTONIX) granules for oral suspension 40 mg    sodium chloride (NS) flush 5-40 mL    sodium chloride (NS) flush 5-40 mL    acetaminophen (TYLENOL) tablet 650 mg    Or    acetaminophen (TYLENOL) suppository 650 mg    ondansetron (ZOFRAN ODT) tablet 4 mg    Or    ondansetron (ZOFRAN) injection 4 mg    enoxaparin (LOVENOX) injection 40 mg    glucose chewable tablet 16 g    glucagon (GLUCAGEN) injection 1 mg    dextrose 10% infusion 0-250 mL    insulin lispro (HUMALOG) injection    [Held by provider] ARIPiprazole (ABILIFY) tablet 2 mg    [Held by provider] carvediloL (COREG) tablet 12.5 mg    [Held by provider] clopidogreL (PLAVIX) tablet 75 mg    [Held by provider] losartan (COZAAR) tablet 50 mg    senna-docusate (PERICOLACE) 8.6-50 mg per tablet 1 Tablet    [Held by provider] tamsulosin (FLOMAX) capsule 0.4 mg    [Held by provider] furosemide (LASIX) tablet 20 mg    NOREPINephrine (LEVOPHED) 8 mg in 0.9% NS 250ml infusion      PMH:  has a past medical history of Cirrhosis ([facility] [address] Diabetes ([facility] [address] Hypertension, and Stroke ([facility] [address] PSH:   has a past surgical history that includes hx back surgery; hx gi; and ir insert non tunl cvc over 5 yrs (2022). FHX: family history includes Heart Disease in his father. SHX:  reports that he has quit smoking. He has never used smokeless tobacco. He reports that he does not currently use alcohol. He reports that he does not currently use drugs. ROS:Review of systems not obtained due to patient factors. Hemodynamics:    CO:    CI:    CVP:    SVR:   PAP Systolic:    PAP Diastolic:    PVR:    AM83:        Ventilator Settings:      Mode Rate TV Press PEEP FiO2 PIP Min.  Vent                              Vital Signs: Telemetry:    normal sinus rhythm Intake/Output:   Visit Vitals  /72   Pulse 82   Temp 98.3 °F (36.8 °C)   Resp 14   Ht 5' 9\" (1.753 m)   Wt 55.4 kg (122 lb 2.2 oz)   SpO2 95%   BMI 18.04 kg/m²       Temp (24hrs), Av.7 °F (37.1 °C), Min:98.3 °F (36.8 °C), Max:99.6 °F (37.6 °C)        O2 Device: None (Room air) O2 Flow Rate (L/min): 2 l/min       Wt Readings from Last 4 Encounters:   11/09/22 55.4 kg (122 lb 2.2 oz)   08/12/22 84.8 kg (187 lb)   07/29/22 84.8 kg (187 lb)   10/28/20 99.8 kg (220 lb)          Intake/Output Summary (Last 24 hours) at 11/10/2022 0801  Last data filed at 11/10/2022 0606  Gross per 24 hour   Intake 2818.33 ml   Output 1275 ml   Net 1543.33 ml         Last shift:      No intake/output data recorded. Last 3 shifts: 11/08 1901 - 11/10 0700  In: 5424.7 [I.V.:5424.7]  Out: 2075 [Urine:2075]       Physical Exam:     General:  male; unresponsive home oxygen 3 L nasal cannula  HEENT: NCAT, poor dentition, lips and mucosa dry  Eyes: anicteric; conjunctiva clear  Neck: no nodes, trach midline; no accessory MM use.   Chest: no deformity,   Cardiac: R regular; no murmur;   Lungs: distant breath sounds; wheezes  Abd: soft, NT, hypoactive BS  Ext: Lower extremity wound bandage in place  : NO kennedy, clear urine  Neuro: Unresponsive  Psych-unable to assess  Skin: warm, dry, no cyanosis;   Pulses: 1-2+ Bilateral pedal, radial  Capillary: brisk; pale      DATA:    MAR reviewed and pertinent medications noted or modified as needed  MEDS:   Current Facility-Administered Medications   Medication    potassium chloride 10 mEq in 100 ml IVPB    vancomycin (VANCOCIN) 750 mg in 0.9% sodium chloride 250 mL ([id number])    meropenem (MERREM) 1 g in 0.9% sodium chloride (MBP/ADV) 50 mL MBP    VANCOMYCIN INFORMATION NOTE    mupirocin (BACTROBAN) 2 % ointment    dextrose 5% and 0.9% NaCl infusion    aspirin chewable tablet 81 mg    atorvastatin (LIPITOR) tablet 40 mg    escitalopram oxalate (LEXAPRO) tablet 20 mg    gabapentin (NEURONTIN) capsule 100 mg    melatonin tablet 10 mg    baclofen (LIORESAL) tablet 5 mg    hydrOXYzine HCL (ATARAX) tablet 25 mg    polyethylene glycol (MIRALAX) packet 17 g    traMADoL (ULTRAM) tablet 50 mg    doxazosin (CARDURA) tablet 1 mg    potassium chloride (KLOR-CON) packet for solution 20 mEq    pantoprazole (PROTONIX) granules for oral suspension 40 mg    sodium chloride (NS) flush 5-40 mL    sodium chloride (NS) flush 5-40 mL    acetaminophen (TYLENOL) tablet 650 mg    Or    acetaminophen (TYLENOL) suppository 650 mg    ondansetron (ZOFRAN ODT) tablet 4 mg    Or    ondansetron (ZOFRAN) injection 4 mg    enoxaparin (LOVENOX) injection 40 mg    glucose chewable tablet 16 g    glucagon (GLUCAGEN) injection 1 mg    dextrose 10% infusion 0-250 mL    insulin lispro (HUMALOG) injection    [Held by provider] ARIPiprazole (ABILIFY) tablet 2 mg    [Held by provider] carvediloL (COREG) tablet 12.5 mg    [Held by provider] clopidogreL (PLAVIX) tablet 75 mg    [Held by provider] losartan (COZAAR) tablet 50 mg    senna-docusate (PERICOLACE) 8.6-50 mg per tablet 1 Tablet    [Held by provider] tamsulosin (FLOMAX) capsule 0.4 mg    [Held by provider] furosemide (LASIX) tablet 20 mg    NOREPINephrine (LEVOPHED) 8 mg in 0.9% NS 250ml infusion        Labs:    Recent Labs     11/10/22  0340 11/09/22  0210 11/08/22  1102   WBC 16.5* 42.4* 15.8*   HGB 10.2* 11.9* 12.5   PLT 83* 191 182       Recent Labs     11/10/22  0340 11/09/22  0210 11/08/22  1325 11/08/22  1102   * 139  --  135*   K 3.0* 3.7  --  4.8   * 109*  --  105   CO2 23 17*  --  18*   * 173*  --  134*   BUN 17 40*  --  36*   CREA 0.68* 1.65*  --  1.46*   CA 7.0* 7.1*  --  8.1*   MG 2.1 0.9*  --   --    PHOS  --  1.8*  --   --    LAC  --   --  2.1* 2.4*   ALB 2.0* 2.2*  --  2.8*   ALT 49 18  --  15       Recent Labs     11/10/22  0418 11/08/22 2047   PH 7.51* 7.41   PCO2 30* 20*   PO2 74* 103*   HCO3 23 12*   FIO2 28.0 44.0       Recent Labs     11/09/22  0210   *       No results found for: BNPP, BNP   Lab Results   Component Value Date/Time    Culture result: PENDING 11/09/2022 02:10 AM    Culture result: No growth 1 day 11/08/2022 01:25 PM    Culture result: Proteus mirabilis (A) 07/29/2022 04:16 PM     Lab Results   Component Value Date/Time    TSH 1.67 07/30/2022 07:11 AM Imaging:    Results from [place] encounter on 11/08/22    XR CHEST PORT    Addendum 11/9/2022  3:08 PM  Addendum: NG tube is in appropriate position tip extends below the diaphragm. Right IJ catheter. No pneumothorax on the right. Narrative  EXAM:  XR CHEST PORT    INDICATION: Central Line Placement    COMPARISON: One day prior. TECHNIQUE: Single frontal view of the chest.    FINDINGS: Right IJ catheter tip projects over the right atrium. No visualized  pneumothorax. Enteric tube terminates below the level the diaphragm outside the  field of view. Mild bibasilar opacities suspected to represent atelectasis. No  convincing evidence of an effusion. Status post median sternotomy. Impression  1. Status post right IJ catheter placement without evidence of complication. Results from [place] encounter on 11/08/22    CT HEAD WO CONT    Narrative  EXAM: CT HEAD WO CONT    INDICATION: AMS    COMPARISON: 8/12/2022. CONTRAST: None. TECHNIQUE: Unenhanced CT of the head was performed using 5 mm images. Brain and  bone windows were generated. Coronal and sagittal reformats. CT dose reduction  was achieved through use of a standardized protocol tailored for this  examination and automatic exposure control for dose modulation. FINDINGS:    Atrophy is noted. Small vessel ischemic changes are stable compared to the prior  exam. Chronic right occipital infarct is stable. There is no intracranial  hemorrhage, extra-axial collection, or mass effect. The basilar cisterns are  open. No CT evidence of acute infarct. The bone windows demonstrate no abnormalities. The visualized portions of the  paranasal sinuses and mastoid air cells are clear.     Impression  No acute process or change compared to the prior exam.      11/10 patient is barely responsive now on room air off pressors getting IV fluid chest x-ray shows congestive changes IV fluid has been decreased, replace potassium, WBC much improved

## 2022-11-10 NOTE — PROGRESS NOTES
Vancomycin Dosing Consult  Emilia Srinivasan is a 70 y.o. male with sepsis / urinary infection (?). Pharmacy was consulted by Dr. Chencho Hastings to dose and monitor Vancomycin. Today is day 2. Antibiotic regimen: Vancomycin + meropenem    Temp (24hrs), Av.4 °F (36.9 °C), Min:97.8 °F (36.6 °C), Max:98.8 °F (37.1 °C)    Recent Labs     11/10/22  0340 22  0210 22  1102   WBC 16.5* 42.4* 15.8*     Recent Labs     11/10/22  0735 11/10/22  0340 22  0210 22  1102   CREA 0.68* 0.68* 1.65* 1.46*   BUN 16 17 40* 36*       Estimated Creatinine Clearance: 85.6 mL/min (A) (by C-G formula based on SCr of 0.68 mg/dL (L)). ml/min  Concomitant nephrotoxic drugs: Loop diuretics and Vasopressors    Cultures:    Blood: Ng x 2d   Wound (toe): probable staph aureus, heavy diptheroids   Urine: negative    MRSA Swab: Detected     Target range: AUC/TELMA 400-600    Recent level history:  Date/Time Dose & Interval Measured Level (mcg/mL) Associated AUC/TELMA   11/10 0340 2000 mg x 1 7.7        Assessment/Plan:   Renal function has returned to baseline. Wound culture with probable staph aureus, sensitivities pending  Vancomycin 1000 mg IV q12h was ordered.     Repeat level is scheduled for  1300  Antimicrobial stop date TBD     Stanislaw Prajapati, PharmD, BCPS, BCCCP  Clinical Pharmacist   (available on [employer]) No change

## 2022-11-10 NOTE — PROGRESS NOTES
Infectious Disease Progress Note           Subjective:   Stable, more alert today but remains confused, off pressor support, no reports of frequent BM. Afebrile, WBC down to 16.5 from 42.4  Objective:   Physical Exam:     Visit Vitals  /81   Pulse 81   Temp 97.8 °F (36.6 °C)   Resp 19   Ht 5' 9\" (1.753 m)   Wt 122 lb 2.2 oz (55.4 kg)   SpO2 96%   BMI 18.04 kg/m²    O2 Flow Rate (L/min): 2 l/min O2 Device: None (Room air)    Temp (24hrs), Av.4 °F (36.9 °C), Min:97.8 °F (36.6 °C), Max:98.6 °F (37 °C)    No intake/output data recorded.     1901 - 11/10 0700  In: 5424.7 [I.V.:5424.7]  Out:  [Urine:]    General: NAD, alert, confused   HEENT: VANGIE, Moist mucosa   Lungs:Decreased at the bases, no wheeze/rhonchi   Heart: S1S2+, RRR, no murmur  Abdo: Soft, NT, ND, +BS   Exts:Right great toe ulcer, multiple superficial ulcers on b/l legs   Skin: b/l leg ulcers, stable ulcerations on b/l feet     Data Review:       Recent Days:  Recent Labs     11/10/22  0340 22  0210 22  1102   WBC 16.5* 42.4* 15.8*   HGB 10.2* 11.9* 12.5   HCT 30.0* 34.2* 38.2   PLT 83* 191 182     Recent Labs     11/10/22  0735 11/10/22  0340 22  0210   BUN 16 17 40*   CREA 0.68* 0.68* 1.65*       Lab Results   Component Value Date/Time    C-Reactive protein 4.02 (H) 2022 02:10 AM          Microbiology     Results       Procedure Component Value Units Date/Time    MRSA SCREEN - PCR (NASAL) [[id number]]  (Abnormal) Collected: 22 023    Order Status: Completed Specimen: Swab Updated: 22     MRSA by PCR, Nasal DETECTED        Comment: Results verified, [email or web address] Akbar Raymond STAIN [[id number]] Collected: 22 0210    Order Status: Completed Specimen: Wound from Toe Updated: 11/10/22 1026     Special Requests: No Special Requests        GRAM STAIN Occasional WBCs seen               2+ Gram positive cocci in pairs chains and clusters           Culture result:       Heavy Probable Staphylococcus aureus            Heavy Diphtheroids       CULTURE, BLOOD, PAIRED [[id number]] Collected: 11/08/22 1325    Order Status: Completed Specimen: Blood Updated: 11/09/22 1501     Special Requests: No Special Requests        Culture result: No growth 1 day       CULTURE, URINE [[id number]] Collected: 11/08/22 1115    Order Status: Sent Specimen: Urine Updated: 11/09/22 1159               Diagnostics   CXR Results  (Last 48 hours)                 11/10/22 0308  XR CHEST PORT Final result    Impression:      Mild interstitial opacities which may represent mild pulmonary edema unchanged           Narrative:  EXAM:  XR CHEST PORT       INDICATION: CHF       COMPARISON: 11/9/2022       TECHNIQUE: Semiupright portable chest AP view       FINDINGS: Median sternotomy changes and nasogastric tube. The cardiac silhouette   is within normal limits. Mild interstitial opacities unchanged. The lungs and pleural spaces are clear. The visualized bones and upper abdomen   are age-appropriate. Assessment/Plan     Severe sepsis due to UTI and infected wounds (right great toe ulcer)         Afebrile, leukocytosis trending down on todays labs, off pressor support         Urine Cx is pending, blood Cx neg (11/08), staph aureus isolated from wound Cx         Will leave on Vanc and Meropenem pending finalization of Cx         Routine labs in the morning           2. UTI, abnormal UA, Cx has not resulted, sample collected from a straight cath      3. Multiple superficial leg ulcers, eschar on right great toe       Staph aureus isolated from right great ulcer, will follow susceptibility results       X-ray of right foot to eval for osteomyelitis. Suspected PAD, KRISTI ordered      4. INDIRA, due to sepsis syndrome, Cr normalized on todays labs      5.  AMS/Decreased responsiveness, more alert today but confused     Claudette Kaiser MD    11/10/2022

## 2022-11-10 NOTE — PROGRESS NOTES
[facility] Hospitalist Progress Note  Jas Vaughan MD    Date:11/10/2022       Room:[facility]  Patient Rich Headley     Date of Birth:[birth year]     Age:71 y.o. 71M with past medical history of stroke, diabetes, hypertension, resides in a nursing facility. Recent admission 7/29/22-8/2/22 from discharge summary:  Patient was admitted on 7/29/2022 following presentation to the ED for transient facial droop 11:30 AM.  Due to history of strokes, suspected TIA versus CVA. CT was negative, patient was admitted for possible TIA and MRI was ordered. MRI showed acute ischemic stroke of the right parietal area. Bilateral carotid ultrasounds revealed 50% stenosis of right ICA, 90% stenosis of left ICA. Urine culture positive for gram-negative rods Proteus Mirabella's and sensitive to ceftriaxone and cefazolin. Patient will receive 7 days worth of cefazolin as an outpatient. Vascular surgery consultation and will need carotid endarterectomy in approximately 4 weeks due to the recent stroke. Cardiac consultation and patient will have outpatient stress test prior to carotid endarterectomy. Patient will follow up with Dr. Marylee Conner in approximately 2 weeks. Patient is being discharged to skilled nursing facility. Discussed case with son, Loree Rivera via phone. 7/30/22  carotid duplex  Impression:  1. Bilateral subclavian arteries are patent. 2.  Bilateral common carotid patent with mild to moderate irregular heterogeneous plaque throughout. 3.  Right proximal ECA shows no obvious plaque. 4.  Right proximal ICA shows mild irregular heterogeneous plaque without significant stenosis. 5.  Left proximal ECA shows mild heterogeneous plaque noted. 6.  Left proximal ICA shows irregular mixed plaque with high-grade stenosis, per criteria greater than 70% stenosis. 7.  Bilateral vertebral arteries are patent and antegrade flow.      7/30/22 echocardiogram    Left Ventricle: Normal left ventricular systolic function with a visually estimated EF of 60 - 65%. Left ventricle size is normal. Increased wall thickness. Findings consistent with concentric hypertrophy. Abnormal diastolic function. Aortic Valve: Tricuspid valve. 22 vascular surgery outpatient Dr Deuce Major  Patient requires a preop clearance for possible carotid surgery. Patient supposed to see [facility] cardiology. Patient appears to be very weak today. I am not sure patient will be a good candidate for open carotid surgery on the left side. We will await for cardiac work-up and will go from there. 22 presents to hospital from nursing home with weakness and hypotension for one day. Associated with poor responsiveness. Found hypotensive on arrival to the ED, with metabolic encephalopathy, improved with intravenous fluid bolus. Found to have urinary infection, started on antibiotic, and I have been asked to admit to hospital for further stabilization of his condition. 22 transferred to ICU overnight for poor responsiveness and for septic shock. Managed with fluids and levophed    11/10/22 improved blood pressures, improved responsiveness  Continuing wound care for foot wounds  Continuing antibiotics for urinary sepsis    Subjective    Subjective:  Symptoms:  Stable. Review of Systems   All other systems reviewed and are negative. Objective         Vitals Last 24 Hours:  TEMPERATURE:  Temp  Av.7 °F (37.1 °C)  Min: 98.3 °F (36.8 °C)  Max: 99.6 °F (37.6 °C)  RESPIRATIONS RANGE: Resp  Av.4  Min: 13  Max: 27  PULSE OXIMETRY RANGE: SpO2  Av.1 %  Min: 83 %  Max: 100 %  PULSE RANGE: Pulse  Av  Min: 73  Max: 88  BLOOD PRESSURE RANGE: Systolic (80VXW), BQC:663 , Min:84 , YSS:684   ; Diastolic (86JEM), XZB:83, Min:42, Max:81    I/O (24Hr):     Intake/Output Summary (Last 24 hours) at 11/10/2022 0723  Last data filed at 11/10/2022 0606  Gross per 24 hour   Intake 2818.33 ml   Output 1275 ml   Net 1543.33 ml       Objective:  General Appearance:  Comfortable. Vital signs: (most recent): Blood pressure 135/72, pulse 82, temperature 98.3 °F (36.8 °C), resp. rate 14, height 5' 9\" (1.753 m), weight 55.4 kg (122 lb 2.2 oz), SpO2 95 %. HEENT: Normal HEENT exam.    Lungs:  Normal effort. Heart: Normal rate. Regular rhythm. Abdomen: Abdomen is soft. Neurological: (lethargic). Skin:  Warm. 11/9/22 wound care note as follows  Patient is resting in bed, not alert. Patient has multiple scabs noted to bilateral lower legs and upper thighs, all unclear etiology at this time. All wounds cleansed with NS and a thin layer of therahoney applied to each wound and covered with ABD pads and rolled gauze. Patient has diabetic wound to right great toe, wound covered with slough and wet eschar, small area of pink/red wound bed visible. Wound cleansed with NS, thin layer of therahoney applied and covered with gauze and rolled gauze. Patient has diabetic wound noted to right 5th covered with dry eschar/scab. Wound cleansed with NS, a thin layer of therahoney applied and covered with gauze and rolled gauze. Stage 1 PI noted to right heel, intact blister. Area covered with non-adherent foam pad and wrapped with rolled gauze. Patient has offloading boots applied to bilateral feet. sDTI that is opening to coccyx, wound bed pink/red purple/maroon. Wound cleansed with NS, opticel ag to be applied to wound bed and covered with sacral foam dressing. Podiatry has been consulted to evaluate wound to toe.          Labs/Imaging/Diagnostics    Labs:  CBC:  Recent Labs     11/10/22  0340 11/09/22  0210 11/08/22  1102   WBC 16.5* 42.4* 15.8*   RBC 3.54* 4.07* 4.40   HGB 10.2* 11.9* 12.5   HCT 30.0* 34.2* 38.2   MCV 84.7 84.0 86.8   RDW 16.2* 16.0* 15.9*   PLT 83* 191 182       CHEMISTRIES:  Recent Labs     11/10/22  0340 11/09/22  0210 11/08/22  1102   * 139 135*   K 3.0* 3.7 4.8   * 109* 105   CO2 23 17* 18*   BUN 17 40* 36*   CA 7.0* 7. 1* 8.1*   PHOS  --  1.8*  --    MG 2.1 0.9*  --      PT/INR:No results for input(s): INR, INREXT, INREXT in the last 72 hours. No lab exists for component: PROTIME  APTT:No results for input(s): APTT in the last 72 hours. LIVER PROFILE:  Recent Labs     11/10/22  0340 11/09/22  0210 11/08/22  1102   * 46* 29   ALT 49 18 15       Lab Results   Component Value Date/Time    ALT (SGPT) 49 11/10/2022 03:40 AM    AST (SGOT) 233 (H) 11/10/2022 03:40 AM    Alk. phosphatase 50 11/10/2022 03:40 AM    Bilirubin, total 0.6 11/10/2022 03:40 AM       Imaging Last 24 Hours:  XR CHEST PORT    Result Date: 11/10/2022  EXAM:  XR CHEST PORT INDICATION: CHF COMPARISON: 11/9/2022 TECHNIQUE: Semiupright portable chest AP view FINDINGS: Median sternotomy changes and nasogastric tube. The cardiac silhouette is within normal limits. Mild interstitial opacities unchanged. The lungs and pleural spaces are clear. The visualized bones and upper abdomen are age-appropriate. Mild interstitial opacities which may represent mild pulmonary edema unchanged     XR CHEST PORT    Addendum Date: 11/9/2022    Addendum: NG tube is in appropriate position tip extends below the diaphragm. Right IJ catheter. No pneumothorax on the right. Result Date: 11/9/2022  EXAM:  XR CHEST PORT INDICATION: Central Line Placement COMPARISON: One day prior. TECHNIQUE: Single frontal view of the chest. FINDINGS: Right IJ catheter tip projects over the right atrium. No visualized pneumothorax. Enteric tube terminates below the level the diaphragm outside the field of view. Mild bibasilar opacities suspected to represent atelectasis. No convincing evidence of an effusion. Status post median sternotomy. 1.  Status post right IJ catheter placement without evidence of complication.      IR INSERT NON TUNL CVC OVER 5 YRS    Result Date: 11/9/2022  PROCEDURE:  ULTRASOUND GUIDED NON-TUNNELED CENTRAL VENOUS CATHETER PLACEMENT HISTORY: Yesi Gomes is a 70 years old Male with need for central venous access. :  Grace Laurent NP ATTENDING:  Denia Garvin MD CONSENT:  After full discussion of the procedure, including risks, benefits and alternatives, both verbal and written consent were obtained. TECHNIQUE: A timeout was called to verify the correct patient, procedure, site and allergies. This procedure was performed at the bedside with ultrasound guidance. Preliminary ultrasound imaging of the right neck demonstrated a patent and compressible internal jugular vein. Images were archived to PACS. The skin was prepped and draped utilizing maximal sterile barrier technique. 1% lidocaine was utilized for local anesthesia. The right internal jugular vein was accessed under direct sonographic guidance using a 21 gauge micropuncture needle. A 0.018 inch guidewire was advanced through the needle into the vein. The needle was then exchanged for a 5 [place] micropuncture sheath. A 0.035 inch J wire was advanced through the sheath into the inferior vena cava. The sheath was removed and a 16 cm triple lumen temporary central venous catheter was advanced over the the wire. The wire was removed. Each port was aspirated and flushed with sterile saline. The catheter was secured to the patient's skin with 2-0 non-absorbable suture. A dry sterile dressing was applied. There were no immediate complications. The patient tolerated the procedure without difficulty. ESTIMATED BLOOD LOSS:  < 5 ml     Technically successful ultrasound guided placement of a right internal jugular vein temporary central venous catheter. A post procedure chest x-ray is pending.    Assessment//Plan   Active Problems:    UTI (urinary tract infection) (11/8/2022)  CT Results  (Last 48 hours)                 11/08/22 2129  CT HEAD WO CONT Final result    Impression:  No acute process or change compared to the prior exam.               Narrative:  EXAM: CT HEAD WO CONT       INDICATION: AMS COMPARISON: 8/12/2022. CONTRAST: None. TECHNIQUE: Unenhanced CT of the head was performed using 5 mm images. Brain and   bone windows were generated. Coronal and sagittal reformats. CT dose reduction   was achieved through use of a standardized protocol tailored for this   examination and automatic exposure control for dose modulation. FINDINGS:       Atrophy is noted. Small vessel ischemic changes are stable compared to the prior   exam. Chronic right occipital infarct is stable. There is no intracranial   hemorrhage, extra-axial collection, or mass effect. The basilar cisterns are   open. No CT evidence of acute infarct. The bone windows demonstrate no abnormalities. The visualized portions of the   paranasal sinuses and mastoid air cells are clear. Assessment & Plan  71M with past medical history of stroke, diabetes, hypertension, resides in a nursing facility. Recent admission 7/29/22-8/2/22 from discharge summary:  Patient was admitted on 7/29/2022 following presentation to the ED for transient facial droop 11:30 AM.  Due to history of strokes, suspected TIA versus CVA. CT was negative, patient was admitted for possible TIA and MRI was ordered. MRI showed acute ischemic stroke of the right parietal area. Bilateral carotid ultrasounds revealed 50% stenosis of right ICA, 90% stenosis of left ICA. Urine culture positive for gram-negative rods Proteus Mirabella's and sensitive to ceftriaxone and cefazolin. Patient will receive 7 days worth of cefazolin as an outpatient. Vascular surgery consultation and will need carotid endarterectomy in approximately 4 weeks due to the recent stroke. Cardiac consultation and patient will have outpatient stress test prior to carotid endarterectomy. Patient will follow up with Dr. Yahir Silva in approximately 2 weeks. Patient is being discharged to skilled nursing facility. Discussed case with son, Becca Palma via phone.      7/30/22 carotid duplex  Impression:  1. Bilateral subclavian arteries are patent. 2.  Bilateral common carotid patent with mild to moderate irregular heterogeneous plaque throughout. 3.  Right proximal ECA shows no obvious plaque. 4.  Right proximal ICA shows mild irregular heterogeneous plaque without significant stenosis. 5.  Left proximal ECA shows mild heterogeneous plaque noted. 6.  Left proximal ICA shows irregular mixed plaque with high-grade stenosis, per criteria greater than 70% stenosis. 7.  Bilateral vertebral arteries are patent and antegrade flow. 7/30/22 echocardiogram    Left Ventricle: Normal left ventricular systolic function with a visually estimated EF of 60 - 65%. Left ventricle size is normal. Increased wall thickness. Findings consistent with concentric hypertrophy. Abnormal diastolic function. Aortic Valve: Tricuspid valve. 9/19/22 vascular surgery outpatient Dr Prudence Casiano  Patient requires a preop clearance for possible carotid surgery. Patient supposed to see [facility] cardiology. Patient appears to be very weak today. I am not sure patient will be a good candidate for open carotid surgery on the left side. We will await for cardiac work-up and will go from there. 11/8/22 presents to hospital from nursing home with weakness and hypotension for one day. Associated with poor responsiveness. Found hypotensive on arrival to the ED, with metabolic encephalopathy, improved with intravenous fluid bolus. Found to have urinary infection, started on antibiotic, and I have been asked to admit to hospital for further stabilization of his condition. 11/9/22 transferred to ICU overnight for poor responsiveness and for septic shock.   Managed with fluids and levophed    11/10/22 improved blood pressures, improved responsiveness  Continuing wound care for foot wounds  Continuing antibiotics for urinary sepsis    MICROBIOLOGY    7/29/22            Urine                Proteus mirabilis 11/8/22            Blood               Negative so far  11/8/22            Urine                Pending  11/9/22 MRSA nasal Positive  11/9/22 Toe  2+ Gram positive cocci in pairs chains and clusters    ASSESSMENT AND PLAN    1) Septic shock in the setting of complex urinary infection at admission, further complicated with hypotension and metabolic encephalopathy. Admitted to ICU  Supportive care  Antibiotics adjusted to meropenem and vancomycin     2) Cardiovascular disease including hypertension, carotid artery disease. History of CVA. In the outpatient setting had been following with [facility] cardiology and Dr Kalyani Garcia for anticipated carotid endarterectomy. Telemetry monitoring              Recent echocardiogram as above              Continue present outpatient regimen              Cardiac clearance for carotid endarterectomy    3) Chronic foot wounds with foul smelling drainage in the setting of peripheral vascular disease.      Antibiotics as above   Wound care inpatient and outpatient     4) DVT prophylaxis with enoxaparin     5) Dispo return to skilled nursing once stable                     Electronically signed by Debbie Sykes MD on 11/10/2022 at 7:16 AM

## 2022-11-10 NOTE — PROGRESS NOTES
Recent clinicals sent via [facility] to [facility]. Patient is a long term care resident.             Eusebio Ochoa, ALIS

## 2022-11-10 NOTE — PROGRESS NOTES
Comprehensive Nutrition Assessment    Type and Reason for Visit: Initial, Positive nutrition screen (MST 4, Low BMI)    Nutrition Recommendations/Plan:   NPO, consider re-consult SLP prior to diet initiation. Continue IVF+D5 for protein sparing kcals (Provides 408 kcal/d). Monitor and document weights, PO intake, BMs in I/Os. Malnutrition Assessment:  Malnutrition Status:  Insufficient data (11/10/22 1356)    Context:  Acute illness     Findings of the 6 clinical characteristics of malnutrition:   Energy Intake:  Mild decrease in energy intake (specify) (Poor PO intake x2 days)  Weight Loss:  Unable to assess     Body Fat Loss:  Unable to assess,     Muscle Mass Loss:  Unable to assess,    Fluid Accumulation:  No significant fluid accumulation,     Strength:  Not performed     Nutrition Assessment:    Admitted for UTI. Screened for MST 4 and low BMI. Pt reported w/ improved alertness; however, not appropriate for PO diet during visit(unable to stay awake during RD visit). MST 4 likely d/t clerical error aeb noted unsure wt loss. Pt inappropriate to verify weight change at this time; however, noted [id number]# 3 and 5 months ago per chart review,? wt loss. KWH=156# at bedside today. RD rec's NPO, SLP consult when PO diet appropriate. Continue IVF+D5 in interim. Labs: Na 147, Cl 116, K 3.0, , Ca 7.0, , TP 4.6, H/H 10.2/30.0. A1c WNL. Meds: D5+NS @ 100 mL/hr, KCl, pericolace, PPI, merrem, cardura, lipitor, aspirin, vancomycin. Nutrition Related Findings:    NFPE unsuccessful at visit; appears nourished. Reattempt when appropriate. No N/V/D/C per RN. SLP rec'd NPO w/ NGT placement. Last BM PTA. No edema. Wound Type: Deep tissue injury, Diabetic ulcer, Multiple, Stage I, Pressure injury (DTI- Sacrum; St I- R.  Heel; Multiple DM ulcers.)    Current Nutrition Intake & Therapies:  Average Meal Intake: 0%  Average Supplement Intake: None ordered  ADULT DIET Regular; Low Fat/Low Chol/High Fiber/2 gm Na    Anthropometric Measures:  Height: 5' 9\" (175.3 cm)  Ideal Body Weight (IBW): 160 lbs (73 kg)  Current Body Wt:  62.8 kg (138 lb 7.2 oz) (11/10, RD obtained.), 86.5 % IBW. Bed scale  Current BMI (kg/m2): 20.4  Usual Body Weight: 84.4 kg (186 lb) (? per chart review 3 and 5 months ago.)  % Weight Change (Calculated): -25.6  Weight Adjustment: No adjustment  BMI Category: Underweight (BMI less than 22) age over 72    Estimated Daily Nutrient Needs:  Energy Requirements Based On: Kcal/kg  Weight Used for Energy Requirements: Current  Energy (kcal/day): 3058-3720 kcal/d  (30-35 kcal/kg)  Weight Used for Protein Requirements: Current  Protein (g/day): 76 gm/d  Method Used for Fluid Requirements: 1 ml/kcal  Fluid (ml/day): 0893-5821 mL/d    Nutrition Diagnosis:   Inadequate oral intake related to cognitive or neurological impairment as evidenced by intake 0-25%, weight loss    Nutrition Interventions:   Food and/or Nutrient Delivery: Continue NPO  Nutrition Education/Counseling: No recommendations at this time  Coordination of Nutrition Care: Continue to monitor while inpatient, Speech therapy, Swallow evaluation  Plan of Care discussed with: RN    Goals:  Goals: Meet at least 75% of estimated needs, PO intake 50% or greater, within 2 days, other (specify)  Specify Other Goals: Initate means to meet ~50% of EENs within 2-3 days. Wt maintenance of +/- 0.5 kg x7 days. Nutrition Monitoring and Evaluation:   Behavioral-Environmental Outcomes: None identified  Food/Nutrient Intake Outcomes: Diet advancement/tolerance, Food and nutrient intake, IVF intake  Physical Signs/Symptoms Outcomes: Biochemical data, Chewing or swallowing, Meal time behavior, Weight, Hemodynamic status, Skin    Discharge Planning: Too soon to determine    Yuly Yo RD  Contact: ext. [phone].

## 2022-11-10 NOTE — PROGRESS NOTES
PHYSICAL THERAPY EVALUATION  Patient: Drea Viera (63 y.o. male)  Date: 11/10/2022  Primary Diagnosis: UTI (urinary tract infection) [N39.0]       Precautions: fall      ASSESSMENT  Pt is a 70 y.o. male admitted on 11/8/2022 for sepsis and facial droop; CT negative but MRI showed acute stroke rt parietal area. pt currently being treated for stroke . Patient is waking up a little now but still unable to participate in full assessment. Required max assist for bed mob and scooting up in the bed with Ax2. Patient is a NH resident. PLOF unknown. We will keep patient on PT caseload and work with patient as he is able. Pt supine upon PT arrival, agreeable to evaluation. Pt A&O x 1. Based on the objective data described, the patient presents with generalized weakness, impaired functional mobility, impaired amb, impaired balance, and decreased endurance to activities. (See below for objective details and assist levels). Overall pt tolerated session fair  today with PT. Pt will benefit from continued skilled PT to address above deficits and return to PLOF. Current PT DC recommendation Matt Avelar once medically appropriate. Current Level of Function Impacting Discharge (mobility/balance): decreased mobility than PLOF ,decreased gen strength    Other factors to consider for discharge: LTC      PLAN :  Recommendations and Planned Interventions: bed mobility training, transfer training, therapeutic exercises, patient and family training/education, and therapeutic activities      Recommend for staff: Frequent repositioning to prevent skin breakdown and Use of bed/chair alarm for safety    Frequency/Duration: Patient will be followed by physical therapy:  1-2x/week to address goals.     Recommendation for discharge: (in order for the patient to meet his/her long term goals)  [facility]    This discharge recommendation:  Has been made in collaboration with the attending provider and/or case management    IF patient discharges home will need the following DME: to be determined (TBD)         SUBJECTIVE:   Patient stated non verbal.moans    OBJECTIVE DATA SUMMARY:   HISTORY:    Past Medical History:   Diagnosis Date    Cirrhosis ([facility] [address]     Diabetes ([facility] [address]     Hypertension     Stroke ([facility] [address]      Past Surgical History:   Procedure Laterality Date    HX BACK SURGERY      HX GI      IR INSERT NON TUNL CVC OVER 5 YRS  11/9/2022       Home Situation  Home Environment: Long term care  One/Two Story Residence: Other (Comment)  Living Alone: No  Support Systems: Inpatient Rehab Facility  Patient Expects to be Discharged to[de-identified] Long Term Care  Current DME Used/Available at Home: None    EXAMINATION/PRESENTATION/DECISION MAKING:   Critical Behavior:  Neurologic State: Eyes open to voice, Confused  Orientation Level: Oriented to person  Cognition: Decreased attention/concentration     Hearing: Auditory  Auditory Impairment: None  Skin:  intact  Edema:   Range Of Motion:  AROM: Generally decreased, functional                       Strength:    Strength: Generally decreased, functional                       Functional Mobility:  Bed Mobility:  Rolling: Maximum assistance;Assist x2        Scooting: Maximum assistance; Total assistance;Assist x2                                      Therapeutic Exercises:   Paasive rom bill les    Functional Measure:  Haylee Mae -PAC 6 Clicks         Basic Mobility Inpatient Short Form  How much difficulty does the patient currently have. .. Unable A Lot A Little None   1. Turning over in bed (including adjusting bedclothes, sheets and blankets)? [] 1   [x] 2   [] 3   [] 4   2. Sitting down on and standing up from a chair with arms ( e.g., wheelchair, bedside commode, etc.)   [x] 1   [] 2   [] 3   [] 4   3. Moving from lying on back to sitting on the side of the bed? [x] 1   [] 2   [] 3   [] 4          How much help from another person does the patient currently need. ..  Total A Lot A Little None   4. Moving to and from a bed to a chair (including a wheelchair)? [x] 1   [] 2   [] 3   [] 4   5. Need to walk in hospital room? [x] 1   [] 2   [] 3   [] 4   6. Climbing 3-5 steps with a railing? [x] 1   [] 2   [] 3   [] 4   © , Trustees of [facility] [employer], under license to [employer]. All rights reserved     Score:  Initial:  Most Recent: X (Date: 11/10/2022 )   Interpretation of Tool:  Represents activities that are increasingly more difficult (i.e. Bed mobility, Transfers, Gait). Score 24 23 22-20 19-15 14-10 9-7 6   Modifier CH CI CJ CK CL CM CN         Physical Therapy Evaluation Charge Determination   History Examination Presentation Decision-Making   LOW Complexity : Zero comorbidities / personal factors that will impact the outcome / POC LOW Complexity : 1-2 Standardized tests and measures addressing body structure, function, activity limitation and / or participation in recreation  LOW Complexity : Stable, uncomplicated  Other outcome measures [facility] 6        Based on the above components, the patient evaluation is determined to be of the following complexity level: LOW     Pain Ratin/10 faces with movements    Activity Tolerance:   Fair and Poor    After treatment patient left in no apparent distress:   Bed locked and in lowest position Supine in bed, Heels elevated for pressure relief, Bed / chair alarm activated, and Side rails x 3 . GOALS:    Problem: Mobility Impaired (Adult and Pediatric)  Goal: *Acute Goals and Plan of Care (Insert Text)  Description: Patient stated goal: none verbalized  Patient will move from supine to sit and sit to supine , scoot up and down, and roll side to side in bed with moderate assistance  within 7 day(s). Patient will transfer from bed to chair and chair to bed with moderate assistance  using the least restrictive device within 7 day(s).     Patient will improve static sitting balance to minimal assistance within 1 week(s). Outcome: Progressing Towards Goal       COMMUNICATION/EDUCATION:   The patients plan of care was discussed with: Registered nurse. Patient is unable to participate in goal setting and plan of care.          Thank you for this referral.  Ca Menchaca, PT   Time Calculation: 20 mins

## 2022-11-10 NOTE — CONSULTS
History and Physical    Chief complaints: TIA   History of Presenting Illness:  Meg Worthington is a 70 y.o. very pleasant man presents hospital with TIA symptoms. I was requested to reevaluate the patient. Patient examined ICU however he currently nonresponsive. Patient apparently had a facial droop. Patient is well-known to me. Patient had a completed stroke and found to have high-grade stenosis left proximal ICA. I did recommend carotid endarterectomy once he recovered from stroke about 4 to 6 weeks time. Patient currently being treated with UTI as well. Past Medical History:   Diagnosis Date    Cirrhosis ([facility] [address]     Diabetes ([facility] [address]     Hypertension     Stroke [facility])       Past Surgical History:   Procedure Laterality Date    HX BACK SURGERY      HX GI      IR INSERT NON TUNL CVC OVER 5 YRS  11/9/2022     Family History   Problem Relation Age of Onset    Heart Disease Father       Social History     Tobacco Use    Smoking status: Former    Smokeless tobacco: Never   Substance Use Topics    Alcohol use: Not Currently       Prior to Admission medications    Medication Sig Start Date End Date Taking? Authorizing Provider   traMADoL (ULTRAM) 50 mg tablet Take 50 mg by mouth every six (6) hours as needed for Pain. Provider, Historical   insulin lispro (HUMALOG) 100 unit/mL injection INITIATE INSULIN CORRECTIVE PROTOCOL:  Normal Insulin Sensitivity   For Blood Sugar (mg/dL) of:     Less than 150 =   0 units           150 -199 =   2 units  200 -249 =   4 units  250 -299 =   6 units  300 -349 =   8 units  350 and above = 10 units and Call Physician     Initiate Hypoglycemia protocol if blood glucose is <70 mg/dL  Fast Acting - Administer Immediately - or within 15 minutes of start of meal, if mealtime coverage. 8/2/22   Cecil Mcneill PA-C   clopidogreL (PLAVIX) 75 mg tab Take 1 Tablet by mouth in the morning.  8/3/22   Cecil Mcneill PA-C   ARIPiprazole (ABILIFY) 2 mg tablet Take 2 mg by mouth in the morning. Provider, Historical   baclofen 5 mg tab Take 5 mg by mouth two (2) times a day. Provider, Historical   carvediloL (COREG) 12.5 mg tablet Take 12.5 mg by mouth two (2) times a day. Indications: high blood pressure    Provider, Historical   escitalopram oxalate (LEXAPRO) 20 mg tablet Take 20 mg by mouth in the morning. Provider, Historical   gabapentin (NEURONTIN) 100 mg capsule Take 100 mg by mouth nightly. Provider, Historical   hydrOXYzine HCL (ATARAX) 25 mg tablet Take 25 mg by mouth nightly as needed for Anxiety. Provider, Historical   furosemide (LASIX) 40 mg tablet Take 40 mg by mouth daily. Indications: visible water retention    Provider, Historical   losartan (COZAAR) 100 mg tablet Take 100 mg by mouth in the morning. Provider, Historical   melatonin 5 mg tablet Take 10 mg by mouth nightly. Provider, Historical   metFORMIN (GLUCOPHAGE) 500 mg tablet Take 1,000 mg by mouth two (2) times daily (with meals). Provider, Historical   potassium chloride (K-DUR, KLOR-CON M20) 20 mEq tablet Take 20 mEq by mouth two (2) times a day. Provider, Historical   senna-docusate (PERICOLACE) 8.6-50 mg per tablet Take 1 Tablet by mouth in the morning. Provider, Historical   tamsulosin (FLOMAX) 0.4 mg capsule Take 0.4 mg by mouth nightly. Provider, Historical   aspirin delayed-release 81 mg tablet Take 81 mg by mouth in the morning. Adam Donahue MD   omeprazole (PRILOSEC) 20 mg capsule Take 20 mg by mouth daily. Adam Donahue MD   atorvastatin (LIPITOR) 40 mg tablet Take 40 mg by mouth in the morning. Adam Donahue MD     No Known Allergies     Review of Systems:  Pertinent review of systems discussed in HPI, and rest of organ systems personally reviewed and they are negative.     Objective:   Vital signs reviewed:      Visit Vitals  /72   Pulse 82   Temp 98.3 °F (36.8 °C)   Resp 14   Ht 5' 9\" (1.753 m)   Wt 122 lb 2.2 oz (55.4 kg)   SpO2 95%   BMI 18.04 kg/m² Physical Exam:   General appearance:   Head and neck atraumatic normocephalic. ENT shows normal oral mucosa, no jaundice no hoarse voice. Eyes: Pupil equal gaze appropriate. Cardiac system regular rate rhythm. Pulmonary: No audible wheeze. Chest wall: Chest wall excursion normal with respiration cycle, there is no deformity or chest trauma. Abdomen: Soft not tender or distended, bowel sounds active. There is no obvious palpable mass, or hernia. Neurologic: Nonfocal.  Musculoskeletal system: Unable to assess skin: Warm and moist.  Hematologic system: No obvious bruising. Psychosocial: Appropriate and cooperative. Vascular examination:             Data Review: Labs are reviewed.  Discussed  Recent Results (from the past 24 hour(s))   GLUCOSE, POC    Collection Time: 11/09/22 11:06 AM   Result Value Ref Range    Glucose (POC) 206 (H) 65 - 100 mg/dL    Performed by Buzzy Goldberg (Float)    GLUCOSE, POC    Collection Time: 11/09/22  3:51 PM   Result Value Ref Range    Glucose (POC) 159 (H) 65 - 100 mg/dL    Performed by Buzzy Goldberg (Float)    GLUCOSE, POC    Collection Time: 11/09/22 10:19 PM   Result Value Ref Range    Glucose (POC) 130 (H) 65 - 100 mg/dL    Performed by [facility]    MAGNESIUM    Collection Time: 11/10/22  3:40 AM   Result Value Ref Range    Magnesium 2.1 1.6 - 2.4 mg/dL   CBC WITH AUTOMATED DIFF    Collection Time: 11/10/22  3:40 AM   Result Value Ref Range    WBC 16.5 (H) 4.1 - 11.1 K/uL    RBC 3.54 (L) 4.10 - 5.70 M/uL    HGB 10.2 (L) 12.1 - 17.0 g/dL    HCT 30.0 (L) 36.6 - 50.3 %    MCV 84.7 80.0 - 99.0 FL    MCH 28.8 26.0 - 34.0 PG    MCHC 34.0 30.0 - 36.5 g/dL    RDW 16.2 (H) 11.5 - 14.5 %    PLATELET 83 (L) 819 - 400 K/uL    MPV 11.0 8.9 - 12.9 FL    NRBC 0.0 0.0  WBC    ABSOLUTE NRBC 0.00 0.00 - 0.01 K/uL    NEUTROPHILS 90 (H) 32 - 75 %    LYMPHOCYTES 4 (L) 12 - 49 %    MONOCYTES 3 (L) 5 - 13 %    EOSINOPHILS 0 0 - 7 %    BASOPHILS 0 0 - 1 %    IMMATURE GRANULOCYTES 3 (H) 0 - 0.5 %    ABS. NEUTROPHILS 14.9 (H) 1.8 - 8.0 K/UL    ABS. LYMPHOCYTES 0.6 (L) 0.8 - 3.5 K/UL    ABS. MONOCYTES 0.5 0.0 - 1.0 K/UL    ABS. EOSINOPHILS 0.0 0.0 - 0.4 K/UL    ABS. BASOPHILS 0.0 0.0 - 0.1 K/UL    ABS. IMM. GRANS. 0.5 (H) 0.00 - 0.04 K/UL    DF AUTOMATED     METABOLIC PANEL, COMPREHENSIVE    Collection Time: 11/10/22  3:40 AM   Result Value Ref Range    Sodium 147 (H) 136 - 145 mmol/L    Potassium 3.0 (L) 3.5 - 5.1 mmol/L    Chloride 117 (H) 97 - 108 mmol/L    CO2 23 21 - 32 mmol/L    Anion gap 7 5 - 15 mmol/L    Glucose 174 (H) 65 - 100 mg/dL    BUN 17 6 - 20 mg/dL    Creatinine 0.68 (L) 0.70 - 1.30 mg/dL    BUN/Creatinine ratio 25 (H) 12 - 20      eGFR >60 >60 ml/min/1.73m2    Calcium 7.0 (L) 8.5 - 10.1 mg/dL    Bilirubin, total 0.6 0.2 - 1.0 mg/dL    AST (SGOT) 233 (H) 15 - 37 U/L    ALT (SGPT) 49 12 - 78 U/L    Alk. phosphatase 50 45 - 117 U/L    Protein, total 4.8 (L) 6.4 - 8.2 g/dL    Albumin 2.0 (L) 3.5 - 5.0 g/dL    Globulin 2.8 2.0 - 4.0 g/dL    A-G Ratio 0.7 (L) 1.1 - 2.2     VANCOMYCIN, RANDOM    Collection Time: 11/10/22  3:40 AM   Result Value Ref Range    Vancomycin, random 7.7 ug/mL   BLOOD GAS, ARTERIAL    Collection Time: 11/10/22  4:18 AM   Result Value Ref Range    pH 7.51 (H) 7.35 - 7.45      PCO2 30 (L) 35 - 45 mmHg    PO2 74 (L) 80 - 100 mmHg    O2 SATURATION 95 95 - 99 %    BICARBONATE 23 22 - 26 mmol/L    BASE EXCESS 0.9 0 - 3 mmol/L    O2 METHOD Nasal Cannula      O2 FLOW RATE 2.00 L/min    FIO2 28.0 %    Sample source Arterial      SITE Left Radial      BRITNEY'S TEST YES      Carboxy-Hgb 0.0 (L) 1 - 2 %    Methemoglobin 0.2 0 - 1.4 %    Oxyhemoglobin 95.1 95 - 99 %    Performed by [facility] 98.3     GLUCOSE, POC    Collection Time: 11/10/22  7:15 AM   Result Value Ref Range    Glucose (POC) 174 (H) 65 - 100 mg/dL    Performed by Nazia 19 reviewed: discussed as below. No name on file.   Assessment:     Active Problems:    UTI (urinary tract infection) (11/8/2022)        Plan:     Recommend continue conservative management for now. With  recent onset of morbid conditions probably patient will be better served with a carotid stent rather than carotid endarterectomy. Once the patient is more stable, patient will be offered carotid stent. I will talk to patient's son as well. And will make outpatient referral for this. I will follow.

## 2022-11-10 NOTE — PROGRESS NOTES
CARDIOLOGY PROGRESS NOTE      Patient Name: Gerry Rangel  Age: 70 y.o. Gender:male  :1950  MRN: [id number]    Patient seen and examined. This is a patient who is followed for preoperative cardiac risk assessment. Resting in bed, echo being done at the bedside. Eyes closed, occasionally moans. Following commands, more alert today. No other complaints reported. Telemetry reviewed, currently SR PVCs    Limited ROS. Current medications reviewed. Physical Examination    No Known Allergies  Vital signs are stable. In NAD. Heart is regular, rate and rhythm. Lungs are diminished  Abdomen is soft, nontender, normal bowel sounds. Extremities have no edema. Labs reviewed: Lab results reviewed. For significant abnormal values and values requiring intervention, see assessment and plan. Recent Results (from the past 12 hour(s))   MAGNESIUM    Collection Time: 11/10/22  3:40 AM   Result Value Ref Range    Magnesium 2.1 1.6 - 2.4 mg/dL   CBC WITH AUTOMATED DIFF    Collection Time: 11/10/22  3:40 AM   Result Value Ref Range    WBC 16.5 (H) 4.1 - 11.1 K/uL    RBC 3.54 (L) 4.10 - 5.70 M/uL    HGB 10.2 (L) 12.1 - 17.0 g/dL    HCT 30.0 (L) 36.6 - 50.3 %    MCV 84.7 80.0 - 99.0 FL    MCH 28.8 26.0 - 34.0 PG    MCHC 34.0 30.0 - 36.5 g/dL    RDW 16.2 (H) 11.5 - 14.5 %    PLATELET 83 (L) 345 - 400 K/uL    MPV 11.0 8.9 - 12.9 FL    NRBC 0.0 0.0  WBC    ABSOLUTE NRBC 0.00 0.00 - 0.01 K/uL    NEUTROPHILS 90 (H) 32 - 75 %    LYMPHOCYTES 4 (L) 12 - 49 %    MONOCYTES 3 (L) 5 - 13 %    EOSINOPHILS 0 0 - 7 %    BASOPHILS 0 0 - 1 %    IMMATURE GRANULOCYTES 3 (H) 0 - 0.5 %    ABS. NEUTROPHILS 14.9 (H) 1.8 - 8.0 K/UL    ABS. LYMPHOCYTES 0.6 (L) 0.8 - 3.5 K/UL    ABS. MONOCYTES 0.5 0.0 - 1.0 K/UL    ABS. EOSINOPHILS 0.0 0.0 - 0.4 K/UL    ABS. BASOPHILS 0.0 0.0 - 0.1 K/UL    ABS. IMM.  GRANS. 0.5 (H) 0.00 - 0.04 K/UL    DF AUTOMATED     METABOLIC PANEL, COMPREHENSIVE    Collection Time: 11/10/22  3:40 AM   Result Value Ref Range    Sodium 147 (H) 136 - 145 mmol/L    Potassium 3.0 (L) 3.5 - 5.1 mmol/L    Chloride 117 (H) 97 - 108 mmol/L    CO2 23 21 - 32 mmol/L    Anion gap 7 5 - 15 mmol/L    Glucose 174 (H) 65 - 100 mg/dL    BUN 17 6 - 20 mg/dL    Creatinine 0.68 (L) 0.70 - 1.30 mg/dL    BUN/Creatinine ratio 25 (H) 12 - 20      eGFR >60 >60 ml/min/1.73m2    Calcium 7.0 (L) 8.5 - 10.1 mg/dL    Bilirubin, total 0.6 0.2 - 1.0 mg/dL    AST (SGOT) 233 (H) 15 - 37 U/L    ALT (SGPT) 49 12 - 78 U/L    Alk.  phosphatase 50 45 - 117 U/L    Protein, total 4.8 (L) 6.4 - 8.2 g/dL    Albumin 2.0 (L) 3.5 - 5.0 g/dL    Globulin 2.8 2.0 - 4.0 g/dL    A-G Ratio 0.7 (L) 1.1 - 2.2     VANCOMYCIN, RANDOM    Collection Time: 11/10/22  3:40 AM   Result Value Ref Range    Vancomycin, random 7.7 ug/mL   BLOOD GAS, ARTERIAL    Collection Time: 11/10/22  4:18 AM   Result Value Ref Range    pH 7.51 (H) 7.35 - 7.45      PCO2 30 (L) 35 - 45 mmHg    PO2 74 (L) 80 - 100 mmHg    O2 SATURATION 95 95 - 99 %    BICARBONATE 23 22 - 26 mmol/L    BASE EXCESS 0.9 0 - 3 mmol/L    O2 METHOD Nasal Cannula      O2 FLOW RATE 2.00 L/min    FIO2 28.0 %    Sample source Arterial      SITE Left Radial      BRITNEY'S TEST YES      Carboxy-Hgb 0.0 (L) 1 - 2 %    Methemoglobin 0.2 0 - 1.4 %    Oxyhemoglobin 95.1 95 - 99 %    Performed by Rodney Jackson     TEMPERATURE 98.3     GLUCOSE, POC    Collection Time: 11/10/22  7:15 AM   Result Value Ref Range    Glucose (POC) 174 (H) 65 - 100 mg/dL    Performed by Camilla Nelson    METABOLIC PANEL, COMPREHENSIVE    Collection Time: 11/10/22  7:35 AM   Result Value Ref Range    Sodium 147 (H) 136 - 145 mmol/L    Potassium 3.0 (L) 3.5 - 5.1 mmol/L    Chloride 116 (H) 97 - 108 mmol/L    CO2 24 21 - 32 mmol/L    Anion gap 7 5 - 15 mmol/L    Glucose 172 (H) 65 - 100 mg/dL    BUN 16 6 - 20 mg/dL    Creatinine 0.68 (L) 0.70 - 1.30 mg/dL    BUN/Creatinine ratio 24 (H) 12 - 20      eGFR >60 >60 ml/min/1.73m2    Calcium 7.0 (L) 8.5 - 10.1 mg/dL Bilirubin, total 0.7 0.2 - 1.0 mg/dL    AST (SGOT) 259 (H) 15 - 37 U/L    ALT (SGPT) 57 12 - 78 U/L    Alk.  phosphatase 51 45 - 117 U/L    Protein, total 4.6 (L) 6.4 - 8.2 g/dL    Albumin 2.1 (L) 3.5 - 5.0 g/dL    Globulin 2.5 2.0 - 4.0 g/dL    A-G Ratio 0.8 (L) 1.1 - 2.2     NT-PRO BNP    Collection Time: 11/10/22  7:35 AM   Result Value Ref Range    NT pro-BNP 6,958 (H) <125 pg/mL   ECHO ADULT COMPLETE    Collection Time: 11/10/22 10:30 AM   Result Value Ref Range    LV EDV A2C 121 mL    LV EDV A4C 93 mL    LV ESV A2C 52 mL    LV ESV A4C 47 mL    IVSd 1.4 (A) 0.6 - 1.0 cm    LVIDd 4.8 4.2 - 5.9 cm    LVIDs 3.6 cm    LVOT Peak Velocity 1.0 m/s    LVOT Peak Gradient 4 mmHg    LVPWd 0.7 0.6 - 1.0 cm    LV E' Lateral Velocity 12 cm/s    LV E' Septal Velocity 7 cm/s    LV Ejection Fraction A2C 57 %    LV Ejection Fraction A4C 49 %    EF BP 54 (A) 55 - 100 %    LA Major [place] 6.4 cm    LA Area 4C 28.0 cm2    LA Diameter 5.0 cm    AV Peak Velocity 1.0 m/s    AV Peak Gradient 4 mmHg    Aortic Root 3.8 cm    Ascending Aorta 3.6 cm    Descending Aorta 2.2 cm    MR Peak Velocity 4.2 m/s    MR Peak Gradient 71 mmHg    MV Max Velocity 0.9 m/s    MV Peak Gradient 3 mmHg    MV E Wave Deceleration Time 95.0 ms    MV A Velocity 0.54 m/s    MV E Velocity 0.76 m/s    MV Mean Gradient 1 mmHg    MV VTI 17.4 cm    MV Mean Velocity 0.5 m/s    NY Max Velocity 1.2 m/s    Pulmonary Artery EDP 6 mmHg    Est. RA Pressure 15 mmHg    TR Max Velocity 3.31 m/s    TR Peak Gradient 44 mmHg    TAPSE 1.8 1.7 cm    Fractional Shortening 2D 25 28 - 44 %    LV ESV Index A4C 28 mL/m2    LV EDV Index A4C 55 mL/m2    LV ESV Index A2C 31 mL/m2    LV EDV Index A2C 72 mL/m2    LVIDd Index 2.86 cm/m2    LVIDs Index 2.14 cm/m2    LV RWT Ratio 0.29     LV Mass 2D 181.9 88 - 224 g    LV Mass 2D Index 108.3 49 - 115 g/m2    MV E/A 1.41     E/E' Ratio (Averaged) 8.60     E/E' Lateral 6.33     E/E' Septal 10.86     LA Size Index 2.98 cm/m2    LA/AO Root Ratio 1.32 Ao Root Index 2.26 cm/m2    Ascending Aorta Index 2.14 cm/m2    AV Velocity Ratio 1.00     RVSP 59 mmHg    Descending Aorta Index 1.31 cm/m2    IVC Proxmal 2.3 cm   GLUCOSE, POC    Collection Time: 11/10/22 11:16 AM   Result Value Ref Range    Glucose (POC) 129 (H) 65 - 100 mg/dL    Performed by Kymberly Simons           Case discussed with Dr. Isrrael Khoury and our impression and recommendations are as follows:  Preoperative cardiac risk assessment, planned CEA with Dr. Angy Andrade in the upcoming months, history of CABG, [place]  Echo over the summer with preserved EF, limited echo with EF 50-55% with mild MR  OP stress test planned, initially cancelled in office as he was unable to stop onto the camera but now scheduled here on 12/6  There is a nuclear medicine isotope shortage at the present time and he is here for an active infection and has been hypotensive, now in ICU. Not appropriate to reschedule stress for now. Hypertension, BP borderline, now off pressors  CAD, no chest pain, on plavix, asa, atorvastatin, coreg at home, holiding antiplatelets given thrombocytopenia  UTI, wounds, sepsis per primary, ID    Please do not hesitate to call me or Dr. Isrrael Khoury if additional questions arise.     Rosa Maria Menjivar NP  11/10/2022

## 2022-11-10 NOTE — PROGRESS NOTES
Problem: Falls - Risk of  Goal: *Absence of Falls  Description: Document Varghese Aleman Fall Risk and appropriate interventions in the flowsheet. Outcome: Progressing Towards Goal  Note: Fall Risk Interventions:                 Elimination Interventions: Toileting schedule/hourly rounds, Bed/chair exit alarm              Problem: Risk for Spread of Infection  Goal: Prevent transmission of infectious organism to others  Description: Prevent the transmission of infectious organisms to other patients, staff members, and visitors. Outcome: Progressing Towards Goal     Problem: Impaired Skin Integrity/Pressure Injury Treatment  Goal: *Improvement of Existing Pressure Injury  Outcome: Progressing Towards Goal  Goal: *Prevention of pressure injury  Description: Document Kermit Scale and appropriate interventions in the flowsheet. Outcome: Progressing Towards Goal  Note: Pressure Injury Interventions:  Sensory Interventions: Turn and reposition approx. every two hours (pillows and wedges if needed), Pressure redistribution bed/mattress (bed type), Monitor skin under medical devices, Minimize linen layers, Maintain/enhance activity level, Keep linens dry and wrinkle-free, Float heels, Check visual cues for pain, Avoid rigorous massage over bony prominences, Assess need for specialty bed    Moisture Interventions: Absorbent underpads, Apply protective barrier, creams and emollients, Check for incontinence Q2 hours and as needed, Contain wound drainage, Internal/External urinary devices, Maintain skin hydration (lotion/cream), Minimize layers, Moisture barrier    Activity Interventions: Pressure redistribution bed/mattress(bed type)    Mobility Interventions: Turn and reposition approx.  every two hours(pillow and wedges), Pressure redistribution bed/mattress (bed type), HOB 30 degrees or less, Float heels    Nutrition Interventions: Discuss nutritional consult with provider    Friction and Shear Interventions: Minimize layers, HOB 30 degrees or less, Foam dressings/transparent film/skin sealants, Apply protective barrier, creams and emollients

## 2022-11-10 NOTE — PROGRESS NOTES
Consult received and attempted. Pt is responsive to clinician but not able to maintain adequate alertness to participate in bedside sw evaluation. THICK yellowish colored sputum suctioned from oral cavity. Pt with NG tube, TF not yet initiated. Recommend d/c PO diet order and initiate TF if/as medically indicated.

## 2022-11-11 ENCOUNTER — APPOINTMENT (OUTPATIENT)
Dept: GENERAL RADIOLOGY | Age: 72
DRG: 871 | End: 2022-11-11
Attending: INTERNAL MEDICINE
Payer: MEDICARE

## 2022-11-11 ENCOUNTER — APPOINTMENT (OUTPATIENT)
Dept: CT IMAGING | Age: 72
DRG: 871 | End: 2022-11-11
Attending: PODIATRIST
Payer: MEDICARE

## 2022-11-11 LAB
ALBUMIN SERPL-MCNC: 2 G/DL (ref 3.5–5)
ALBUMIN/GLOB SERPL: 0.8 {RATIO} (ref 1.1–2.2)
ALP SERPL-CCNC: 52 U/L (ref 45–117)
ALT SERPL-CCNC: 68 U/L (ref 12–78)
ANION GAP SERPL CALC-SCNC: 6 MMOL/L (ref 5–15)
AST SERPL W P-5'-P-CCNC: 265 U/L (ref 15–37)
ATRIAL RATE: 60 BPM
BACTERIA SPEC CULT: NORMAL
BACTERIA SPEC CULT: NORMAL
BASOPHILS # BLD: 0 K/UL (ref 0–0.1)
BASOPHILS NFR BLD: 0 % (ref 0–1)
BILIRUB SERPL-MCNC: 0.9 MG/DL (ref 0.2–1)
BUN SERPL-MCNC: 11 MG/DL (ref 6–20)
BUN/CREAT SERPL: 25 (ref 12–20)
CA-I BLD-MCNC: 6.8 MG/DL (ref 8.5–10.1)
CALCULATED P AXIS, ECG09: 70 DEGREES
CALCULATED R AXIS, ECG10: 42 DEGREES
CALCULATED T AXIS, ECG11: 101 DEGREES
CHLORIDE SERPL-SCNC: 117 MMOL/L (ref 97–108)
CO2 SERPL-SCNC: 24 MMOL/L (ref 21–32)
CREAT SERPL-MCNC: 0.44 MG/DL (ref 0.7–1.3)
CRP SERPL-MCNC: 5.67 MG/DL (ref 0–0.6)
DIAGNOSIS, 93000: NORMAL
DIFFERENTIAL METHOD BLD: ABNORMAL
EOSINOPHIL # BLD: 0 K/UL (ref 0–0.4)
EOSINOPHIL NFR BLD: 0 % (ref 0–7)
ERYTHROCYTE [DISTWIDTH] IN BLOOD BY AUTOMATED COUNT: 16.1 % (ref 11.5–14.5)
ERYTHROCYTE [SEDIMENTATION RATE] IN BLOOD: 27 MM/HR (ref 0–20)
GLOBULIN SER CALC-MCNC: 2.5 G/DL (ref 2–4)
GLUCOSE BLD STRIP.AUTO-MCNC: 100 MG/DL (ref 65–100)
GLUCOSE BLD STRIP.AUTO-MCNC: 115 MG/DL (ref 65–100)
GLUCOSE BLD STRIP.AUTO-MCNC: 145 MG/DL (ref 65–100)
GLUCOSE BLD STRIP.AUTO-MCNC: 98 MG/DL (ref 65–100)
GLUCOSE SERPL-MCNC: 120 MG/DL (ref 65–100)
GRAM STN SPEC: NORMAL
GRAM STN SPEC: NORMAL
HCT VFR BLD AUTO: 30.1 % (ref 36.6–50.3)
HGB BLD-MCNC: 10.3 G/DL (ref 12.1–17)
IMM GRANULOCYTES # BLD AUTO: 0.3 K/UL (ref 0–0.04)
IMM GRANULOCYTES NFR BLD AUTO: 3 % (ref 0–0.5)
LYMPHOCYTES # BLD: 0.8 K/UL (ref 0.8–3.5)
LYMPHOCYTES NFR BLD: 6 % (ref 12–49)
MCH RBC QN AUTO: 29 PG (ref 26–34)
MCHC RBC AUTO-ENTMCNC: 34.2 G/DL (ref 30–36.5)
MCV RBC AUTO: 84.8 FL (ref 80–99)
MONOCYTES # BLD: 0.3 K/UL (ref 0–1)
MONOCYTES NFR BLD: 3 % (ref 5–13)
NEUTS SEG # BLD: 11.2 K/UL (ref 1.8–8)
NEUTS SEG NFR BLD: 88 % (ref 32–75)
NRBC # BLD: 0 K/UL (ref 0–0.01)
NRBC BLD-RTO: 0 PER 100 WBC
P-R INTERVAL, ECG05: 132 MS
PERFORMED BY, TECHID: ABNORMAL
PERFORMED BY, TECHID: ABNORMAL
PERFORMED BY, TECHID: NORMAL
PERFORMED BY, TECHID: NORMAL
PLATELET # BLD AUTO: 86 K/UL (ref 150–400)
PMV BLD AUTO: 11.4 FL (ref 8.9–12.9)
POTASSIUM SERPL-SCNC: 3.4 MMOL/L (ref 3.5–5.1)
PROCALCITONIN SERPL-MCNC: 49.13 NG/ML
PROT SERPL-MCNC: 4.5 G/DL (ref 6.4–8.2)
Q-T INTERVAL, ECG07: 504 MS
QRS DURATION, ECG06: 78 MS
QTC CALCULATION (BEZET), ECG08: 504 MS
RBC # BLD AUTO: 3.55 M/UL (ref 4.1–5.7)
SODIUM SERPL-SCNC: 147 MMOL/L (ref 136–145)
SPECIAL REQUESTS,SREQ: NORMAL
VANCOMYCIN SERPL-MCNC: 14.5 UG/ML
VENTRICULAR RATE, ECG03: 60 BPM
WBC # BLD AUTO: 12.7 K/UL (ref 4.1–11.1)

## 2022-11-11 PROCEDURE — 84145 PROCALCITONIN (PCT): CPT

## 2022-11-11 PROCEDURE — 74011636637 HC RX REV CODE- 636/637: Performed by: INTERNAL MEDICINE

## 2022-11-11 PROCEDURE — 73701 CT LOWER EXTREMITY W/DYE: CPT

## 2022-11-11 PROCEDURE — 71045 X-RAY EXAM CHEST 1 VIEW: CPT

## 2022-11-11 PROCEDURE — 74011000250 HC RX REV CODE- 250: Performed by: INTERNAL MEDICINE

## 2022-11-11 PROCEDURE — 74011000636 HC RX REV CODE- 636: Performed by: INTERNAL MEDICINE

## 2022-11-11 PROCEDURE — 80202 ASSAY OF VANCOMYCIN: CPT

## 2022-11-11 PROCEDURE — 74011250637 HC RX REV CODE- 250/637: Performed by: INTERNAL MEDICINE

## 2022-11-11 PROCEDURE — 80053 COMPREHEN METABOLIC PANEL: CPT

## 2022-11-11 PROCEDURE — 82962 GLUCOSE BLOOD TEST: CPT

## 2022-11-11 PROCEDURE — 74011000258 HC RX REV CODE- 258: Performed by: INTERNAL MEDICINE

## 2022-11-11 PROCEDURE — 74011250636 HC RX REV CODE- 250/636: Performed by: HOSPITALIST

## 2022-11-11 PROCEDURE — 85652 RBC SED RATE AUTOMATED: CPT

## 2022-11-11 PROCEDURE — 99232 SBSQ HOSP IP/OBS MODERATE 35: CPT | Performed by: INTERNAL MEDICINE

## 2022-11-11 PROCEDURE — 74011250637 HC RX REV CODE- 250/637: Performed by: HOSPITALIST

## 2022-11-11 PROCEDURE — 74011250636 HC RX REV CODE- 250/636: Performed by: INTERNAL MEDICINE

## 2022-11-11 PROCEDURE — 65270000029 HC RM PRIVATE

## 2022-11-11 PROCEDURE — 36415 COLL VENOUS BLD VENIPUNCTURE: CPT

## 2022-11-11 PROCEDURE — 74011000258 HC RX REV CODE- 258: Performed by: HOSPITALIST

## 2022-11-11 PROCEDURE — 85025 COMPLETE CBC W/AUTO DIFF WBC: CPT

## 2022-11-11 PROCEDURE — 86140 C-REACTIVE PROTEIN: CPT

## 2022-11-11 RX ADMIN — BACLOFEN 5 MG: 10 TABLET ORAL at 12:05

## 2022-11-11 RX ADMIN — HYDROXYZINE HYDROCHLORIDE 25 MG: 25 TABLET, FILM COATED ORAL at 03:02

## 2022-11-11 RX ADMIN — PIPERACILLIN AND TAZOBACTAM 4.5 G: 4; .5 INJECTION, POWDER, FOR SOLUTION INTRAVENOUS at 12:05

## 2022-11-11 RX ADMIN — MUPIROCIN: 20 OINTMENT TOPICAL at 12:05

## 2022-11-11 RX ADMIN — SODIUM CHLORIDE, PRESERVATIVE FREE 10 ML: 5 INJECTION INTRAVENOUS at 23:04

## 2022-11-11 RX ADMIN — IOPAMIDOL 100 ML: 755 INJECTION, SOLUTION INTRAVENOUS at 15:59

## 2022-11-11 RX ADMIN — ATORVASTATIN CALCIUM 40 MG: 40 TABLET, FILM COATED ORAL at 12:05

## 2022-11-11 RX ADMIN — INSULIN LISPRO 2 UNITS: 100 INJECTION, SOLUTION INTRAVENOUS; SUBCUTANEOUS at 13:30

## 2022-11-11 RX ADMIN — PIPERACILLIN AND TAZOBACTAM 3.38 G: 3; .375 INJECTION, POWDER, FOR SOLUTION INTRAVENOUS at 18:07

## 2022-11-11 RX ADMIN — VANCOMYCIN HYDROCHLORIDE 1000 MG: 1 INJECTION, POWDER, LYOPHILIZED, FOR SOLUTION INTRAVENOUS at 02:51

## 2022-11-11 RX ADMIN — PANTOPRAZOLE SODIUM 40 MG: 40 GRANULE, DELAYED RELEASE ORAL at 12:05

## 2022-11-11 RX ADMIN — ASPIRIN 81 MG 81 MG: 81 TABLET ORAL at 12:06

## 2022-11-11 RX ADMIN — ESCITALOPRAM OXALATE 20 MG: 10 TABLET ORAL at 12:05

## 2022-11-11 RX ADMIN — VANCOMYCIN HYDROCHLORIDE 1000 MG: 1 INJECTION, POWDER, LYOPHILIZED, FOR SOLUTION INTRAVENOUS at 18:06

## 2022-11-11 RX ADMIN — POTASSIUM CHLORIDE 20 MEQ: 1.5 FOR SOLUTION ORAL at 12:06

## 2022-11-11 RX ADMIN — SODIUM CHLORIDE, PRESERVATIVE FREE 10 ML: 5 INJECTION INTRAVENOUS at 06:32

## 2022-11-11 RX ADMIN — SODIUM CHLORIDE, PRESERVATIVE FREE 10 ML: 5 INJECTION INTRAVENOUS at 18:08

## 2022-11-11 RX ADMIN — TRAMADOL HYDROCHLORIDE 50 MG: 50 TABLET, COATED ORAL at 03:02

## 2022-11-11 RX ADMIN — MEROPENEM 1 G: 1 INJECTION, POWDER, FOR SOLUTION INTRAVENOUS at 02:51

## 2022-11-11 RX ADMIN — SENNOSIDES AND DOCUSATE SODIUM 1 TABLET: 50; 8.6 TABLET ORAL at 12:06

## 2022-11-11 RX ADMIN — ENOXAPARIN SODIUM 40 MG: 100 INJECTION SUBCUTANEOUS at 12:05

## 2022-11-11 NOTE — PROGRESS NOTES
Bedside shift change report given to [address] (oncoming nurse) by Eloy Mckoy (offgoing nurse). Report included the following information SBAR.

## 2022-11-11 NOTE — PROGRESS NOTES
Pt NG tube clogged - informed Dr Maier Courser and will place another and order CXR for placement confirmation.

## 2022-11-11 NOTE — PROGRESS NOTES
Dr Deborah Brothers came to the room to assess the patient and asked if there had been any improvement in mentation or was he still non-responsive. I let her know that he had been mildly interactive - the patient had awoken a bit while assessing and administering meds, so I asked him if his first name was Caryle Ferraris - he said \"uh huh\" (affirmative), if he knew he was in the hospital - he said \"uh huh\" (affirmative). He then moaned loudly and I asked him if he were in any pain and he shook his head no and gave a \"nuh uh\" sound.     She said she would talk with Dr Shayna Townsend about getting with neurology to see if head MRI was warranted

## 2022-11-11 NOTE — PROGRESS NOTES
CARDIOLOGY PROGRESS NOTE      Patient Name: Joseluis Sandhu  Age: 70 y.o. Gender:male  :1950  MRN: [id number]    Patient seen and examined. This is a patient who is followed for preoperative cardiac risk assessment. Resting in bed. Alert, calm, NGT in place. Following commands, but still confused. No other complaints reported. Telemetry reviewed    Limited ROS. Current medications reviewed. Physical Examination    No Known Allergies  Vital signs are stable. In NAD. Heart is regular, rate and rhythm. Lungs are diminished  Abdomen is soft, nontender, normal bowel sounds. Extremities have no edema. Labs reviewed: Lab results reviewed. For significant abnormal values and values requiring intervention, see assessment and plan. Recent Results (from the past 12 hour(s))   CBC WITH AUTOMATED DIFF    Collection Time: 22  3:28 AM   Result Value Ref Range    WBC 12.7 (H) 4.1 - 11.1 K/uL    RBC 3.55 (L) 4.10 - 5.70 M/uL    HGB 10.3 (L) 12.1 - 17.0 g/dL    HCT 30.1 (L) 36.6 - 50.3 %    MCV 84.8 80.0 - 99.0 FL    MCH 29.0 26.0 - 34.0 PG    MCHC 34.2 30.0 - 36.5 g/dL    RDW 16.1 (H) 11.5 - 14.5 %    PLATELET 86 (L) 236 - 400 K/uL    MPV 11.4 8.9 - 12.9 FL    NRBC 0.0 0.0  WBC    ABSOLUTE NRBC 0.00 0.00 - 0.01 K/uL    NEUTROPHILS 88 (H) 32 - 75 %    LYMPHOCYTES 6 (L) 12 - 49 %    MONOCYTES 3 (L) 5 - 13 %    EOSINOPHILS 0 0 - 7 %    BASOPHILS 0 0 - 1 %    IMMATURE GRANULOCYTES 3 (H) 0 - 0.5 %    ABS. NEUTROPHILS 11.2 (H) 1.8 - 8.0 K/UL    ABS. LYMPHOCYTES 0.8 0.8 - 3.5 K/UL    ABS. MONOCYTES 0.3 0.0 - 1.0 K/UL    ABS. EOSINOPHILS 0.0 0.0 - 0.4 K/UL    ABS. BASOPHILS 0.0 0.0 - 0.1 K/UL    ABS. IMM.  GRANS. 0.3 (H) 0.00 - 0.04 K/UL    DF AUTOMATED     METABOLIC PANEL, COMPREHENSIVE    Collection Time: 22  3:28 AM   Result Value Ref Range    Sodium 147 (H) 136 - 145 mmol/L    Potassium 3.4 (L) 3.5 - 5.1 mmol/L    Chloride 117 (H) 97 - 108 mmol/L    CO2 24 21 - 32 mmol/L    Anion gap 6 5 - 15 mmol/L    Glucose 120 (H) 65 - 100 mg/dL    BUN 11 6 - 20 mg/dL    Creatinine 0.44 (L) 0.70 - 1.30 mg/dL    BUN/Creatinine ratio 25 (H) 12 - 20      eGFR >60 >60 ml/min/1.73m2    Calcium 6.8 (L) 8.5 - 10.1 mg/dL    Bilirubin, total 0.9 0.2 - 1.0 mg/dL    AST (SGOT) 265 (H) 15 - 37 U/L    ALT (SGPT) 68 12 - 78 U/L    Alk. phosphatase 52 45 - 117 U/L    Protein, total 4.5 (L) 6.4 - 8.2 g/dL    Albumin 2.0 (L) 3.5 - 5.0 g/dL    Globulin 2.5 2.0 - 4.0 g/dL    A-G Ratio 0.8 (L) 1.1 - 2.2     C REACTIVE PROTEIN, QT    Collection Time: 11/11/22  3:28 AM   Result Value Ref Range    C-Reactive protein 5.67 (H) 0.00 - 0.60 mg/dL   SED RATE (ESR)    Collection Time: 11/11/22  3:28 AM   Result Value Ref Range    Sed rate, automated 27 (H) 0 - 20 mm/hr   PROCALCITONIN    Collection Time: 11/11/22  3:28 AM   Result Value Ref Range    Procalcitonin 49.13 (H) 0 ng/mL   GLUCOSE, POC    Collection Time: 11/11/22  8:06 AM   Result Value Ref Range    Glucose (POC) 115 (H) 65 - 100 mg/dL    Performed by Artie Armas    GLUCOSE, POC    Collection Time: 11/11/22 12:21 PM   Result Value Ref Range    Glucose (POC) 145 (H) 65 - 100 mg/dL    Performed by Remigio Verdugo, RANDOM    Collection Time: 11/11/22 12:58 PM   Result Value Ref Range    Vancomycin, random 14.5 ug/mL          Case discussed with Dr. Kate Miramontes and our impression and recommendations are as follows:  Preoperative cardiac risk assessment, planned CEA with Dr. Forrest Florence in the upcoming months, history of CABG, QU[place]  Echo over the summer with preserved EF, limited echo with EF 50-55% with mild MR  OP stress test planned, initially cancelled in office as he was unable to stop onto the camera but now scheduled here on 12/6  There is a nuclear medicine isotope shortage at the present time and he is here for an active infection and has been hypotensive. Not appropriate to reschedule stress for now.   Hypertension, BP stable off pressors  CAD, no chest pain, on plavix, asa, atorvastatin, coreg at home, holiding antiplatelets given thrombocytopenia, resume when able  UTI, wounds, sepsis per primary, ID    Will plan on OP stress as scheduled on 12/6, will sign off. Please reconsult if needed. Please do not hesitate to call me or Dr. Garcia if additional questions arise.     Modesta Oliveros, NP  11/11/2022

## 2022-11-11 NOTE — PROGRESS NOTES
Patient will return to [facility] once medically stable. Updated clinicals sent via [facility].

## 2022-11-11 NOTE — PROGRESS NOTES
OT order and acknowledged. Per medical chart, pt is LTC resident. In IDR, therapist informed no skilled evaluation require for pt to return. Please re-consult if status changes. Thank you.

## 2022-11-11 NOTE — PROGRESS NOTES
IMPRESSION:   Acute hypoxic respiratory failure  Severe sepsis with septic shock  Metabolic encephalopathy  Probably urinary tract infection  Leukocytosis  Carotid artery disease history of CVA  Additional workup outlined below  Pt is at high risk of sudden decline and decompensation with life threatening consequenses and continued end organ dysfunction and failure  Pt is critically ill.  Time spent with pt and staff actively rendering care, managing pt and coordinating care as stated below; 30 minutes, exclusive of any procedures      RECOMMENDATIONS/PLAN:   ICU monitoring  79-year-old male nursing home resident admitted with hypotension and unresponsiveness  Hemodynamically unstable started on IV fluid and now he is off Levophed decrease IV fluid  Panculture IV antibiotics, ABG shows respiratory alkalosis  Need vascular surgery consult for possible need endarterectomy when more stable  Previous 2D echo showed ejection fraction 60 to 65%  Intubate and place on vent if NIV fails  Chest x-ray shows fluid overload congestive changes  will get BNP  Agree with Empiric IV antibiotics pending culture results   Follow culture results on meropenem and vancomycin  CVP monitoring  IV vasopressors for circulatory shock refractory to fluids to maintain SBP> 90  Transfuse prn to maintain Hgb > 7  Labs to follow electrolytes, renal function and and blood counts  Bronchial hygiene with respiratory therapy techniques, bronchodilators  Pt needs IV fluids with additives and Drug therapy requiring intensive monitoring for toxicity  Prescription drug management with home med reconciliation reviewed  DVT, SUP prophylaxis  Will be available to assist in medical management while in the CCU pending disposition     [x] High complexity decision making was performed  [x] See my orders for details  HPI  79-year-old nursing home resident came in because of unresponsiveness he had a history of stroke in the past patient was hypotensive hypoxic rapid response was called he was admitted to the floor initially received IV fluid hemodynamically unstable started on vasopressors Levophed transferred to ICU White count was elevated he was put on oxygen 4 L nasal cannula unable to get any started the patient he has a right foot wound and multiple wounds of the lower extremities dressing in place. He has carotid artery stenosis only supposed to go for surgery but unable to get cardiac clearance because of nuclear stress test he has 90% stenosis of left ICA and 50% stenosis of right ICA. So now he is admitted to ICU and critical care consult was called  PMH:  has a past medical history of Cirrhosis ([facility] [address] Diabetes ([facility] [address] Hypertension, and Stroke ([facility] [address] PSH:   has a past surgical history that includes hx back surgery; hx gi; and ir insert non tunl cvc over 5 yrs (11/9/2022). FHX: family history includes Heart Disease in his father. SHX:  reports that he has quit smoking. He has never used smokeless tobacco. He reports that he does not currently use alcohol. He reports that he does not currently use drugs. ALL: No Known Allergies     MEDS:   [x] Reviewed - As Below   [] Not reviewed    Current Facility-Administered Medications   Medication    vancomycin (VANCOCIN) 1,000 mg in 0.9% sodium chloride 250 mL ([id number])    Vancomycin - please draw level 11/11 1300. Thanks!     meropenem (MERREM) 1 g in 0.9% sodium chloride (MBP/ADV) 50 mL MBP    VANCOMYCIN INFORMATION NOTE    mupirocin (BACTROBAN) 2 % ointment    dextrose 5% and 0.9% NaCl infusion    aspirin chewable tablet 81 mg    atorvastatin (LIPITOR) tablet 40 mg    escitalopram oxalate (LEXAPRO) tablet 20 mg    gabapentin (NEURONTIN) capsule 100 mg    melatonin tablet 10 mg    baclofen (LIORESAL) tablet 5 mg    hydrOXYzine HCL (ATARAX) tablet 25 mg    polyethylene glycol (MIRALAX) packet 17 g    traMADoL (ULTRAM) tablet 50 mg    doxazosin (CARDURA) tablet 1 mg    potassium chloride (KLOR-CON) packet for solution 20 mEq    pantoprazole (PROTONIX) granules for oral suspension 40 mg    sodium chloride (NS) flush 5-40 mL    sodium chloride (NS) flush 5-40 mL    acetaminophen (TYLENOL) tablet 650 mg    Or    acetaminophen (TYLENOL) suppository 650 mg    ondansetron (ZOFRAN ODT) tablet 4 mg    Or    ondansetron (ZOFRAN) injection 4 mg    enoxaparin (LOVENOX) injection 40 mg    glucose chewable tablet 16 g    glucagon (GLUCAGEN) injection 1 mg    dextrose 10% infusion 0-250 mL    insulin lispro (HUMALOG) injection    [Held by provider] ARIPiprazole (ABILIFY) tablet 2 mg    [Held by provider] carvediloL (COREG) tablet 12.5 mg    [Held by provider] clopidogreL (PLAVIX) tablet 75 mg    [Held by provider] losartan (COZAAR) tablet 50 mg    senna-docusate (PERICOLACE) 8.6-50 mg per tablet 1 Tablet    [Held by provider] tamsulosin (FLOMAX) capsule 0.4 mg    [Held by provider] furosemide (LASIX) tablet 20 mg    NOREPINephrine (LEVOPHED) 8 mg in 0.9% NS 250ml infusion      MAR reviewed and pertinent medications noted or modified as needed   Current Facility-Administered Medications   Medication    vancomycin (VANCOCIN) 1,000 mg in 0.9% sodium chloride 250 mL ([id number])    Vancomycin - please draw level 11/11 1300. Thanks!     meropenem (MERREM) 1 g in 0.9% sodium chloride (MBP/ADV) 50 mL MBP    VANCOMYCIN INFORMATION NOTE    mupirocin (BACTROBAN) 2 % ointment    dextrose 5% and 0.9% NaCl infusion    aspirin chewable tablet 81 mg    atorvastatin (LIPITOR) tablet 40 mg    escitalopram oxalate (LEXAPRO) tablet 20 mg    gabapentin (NEURONTIN) capsule 100 mg    melatonin tablet 10 mg    baclofen (LIORESAL) tablet 5 mg    hydrOXYzine HCL (ATARAX) tablet 25 mg    polyethylene glycol (MIRALAX) packet 17 g    traMADoL (ULTRAM) tablet 50 mg    doxazosin (CARDURA) tablet 1 mg    potassium chloride (KLOR-CON) packet for solution 20 mEq    pantoprazole (PROTONIX) granules for oral suspension 40 mg    sodium chloride (NS) flush 5-40 mL sodium chloride (NS) flush 5-40 mL    acetaminophen (TYLENOL) tablet 650 mg    Or    acetaminophen (TYLENOL) suppository 650 mg    ondansetron (ZOFRAN ODT) tablet 4 mg    Or    ondansetron (ZOFRAN) injection 4 mg    enoxaparin (LOVENOX) injection 40 mg    glucose chewable tablet 16 g    glucagon (GLUCAGEN) injection 1 mg    dextrose 10% infusion 0-250 mL    insulin lispro (HUMALOG) injection    [Held by provider] ARIPiprazole (ABILIFY) tablet 2 mg    [Held by provider] carvediloL (COREG) tablet 12.5 mg    [Held by provider] clopidogreL (PLAVIX) tablet 75 mg    [Held by provider] losartan (COZAAR) tablet 50 mg    senna-docusate (PERICOLACE) 8.6-50 mg per tablet 1 Tablet    [Held by provider] tamsulosin (FLOMAX) capsule 0.4 mg    [Held by provider] furosemide (LASIX) tablet 20 mg    NOREPINephrine (LEVOPHED) 8 mg in 0.9% NS 250ml infusion      PMH:  has a past medical history of Cirrhosis ([facility] [address] Diabetes ([facility] [address] Hypertension, and Stroke ([facility] [address] PSH:   has a past surgical history that includes hx back surgery; hx gi; and ir insert non tunl cvc over 5 yrs (2022). FHX: family history includes Heart Disease in his father. SHX:  reports that he has quit smoking. He has never used smokeless tobacco. He reports that he does not currently use alcohol. He reports that he does not currently use drugs. ROS:Review of systems not obtained due to patient factors. Hemodynamics:    CO:    CI:    CVP:    SVR:   PAP Systolic:    PAP Diastolic:    PVR:    DO76:        Ventilator Settings:      Mode Rate TV Press PEEP FiO2 PIP Min.  Vent                              Vital Signs: Telemetry:    normal sinus rhythm Intake/Output:   Visit Vitals  BP (!) 149/80   Pulse 65   Temp 97.5 °F (36.4 °C)   Resp 20   Ht 5' 9\" (1.753 m)   Wt 62.5 kg (137 lb 12.6 oz)   SpO2 100%   BMI 20.35 kg/m²       Temp (24hrs), Av.1 °F (36.7 °C), Min:97.5 °F (36.4 °C), Max:98.8 °F (37.1 °C)        O2 Device: None (Room air) O2 Flow Rate (L/min): 2 l/min       Wt Readings from Last 4 Encounters:   11/10/22 62.5 kg (137 lb 12.6 oz)   08/12/22 84.8 kg (187 lb)   07/29/22 84.8 kg (187 lb)   10/28/20 99.8 kg (220 lb)        No intake or output data in the 24 hours ending 11/11/22 0813      Last shift:      No intake/output data recorded. Last 3 shifts: 11/09 1901 - 11/11 0700  In: 1210 [I.V.:1210]  Out: 700 [Urine:700]       Physical Exam:     General:  male; unresponsive home oxygen 3 L nasal cannula  HEENT: NCAT, poor dentition, lips and mucosa dry  Eyes: anicteric; conjunctiva clear  Neck: no nodes, trach midline; no accessory MM use. Chest: no deformity,   Cardiac: R regular; no murmur;   Lungs: distant breath sounds; wheezes  Abd: soft, NT, hypoactive BS  Ext: Lower extremity wound bandage in place  : NO kennedy, clear urine  Neuro: Unresponsive  Psych-unable to assess  Skin: warm, dry, no cyanosis;   Pulses: 1-2+ Bilateral pedal, radial  Capillary: brisk; pale      DATA:    MAR reviewed and pertinent medications noted or modified as needed  MEDS:   Current Facility-Administered Medications   Medication    vancomycin (VANCOCIN) 1,000 mg in 0.9% sodium chloride 250 mL ([id number])    Vancomycin - please draw level 11/11 1300. Thanks!     meropenem (MERREM) 1 g in 0.9% sodium chloride (MBP/ADV) 50 mL MBP    VANCOMYCIN INFORMATION NOTE    mupirocin (BACTROBAN) 2 % ointment    dextrose 5% and 0.9% NaCl infusion    aspirin chewable tablet 81 mg    atorvastatin (LIPITOR) tablet 40 mg    escitalopram oxalate (LEXAPRO) tablet 20 mg    gabapentin (NEURONTIN) capsule 100 mg    melatonin tablet 10 mg    baclofen (LIORESAL) tablet 5 mg    hydrOXYzine HCL (ATARAX) tablet 25 mg    polyethylene glycol (MIRALAX) packet 17 g    traMADoL (ULTRAM) tablet 50 mg    doxazosin (CARDURA) tablet 1 mg    potassium chloride (KLOR-CON) packet for solution 20 mEq    pantoprazole (PROTONIX) granules for oral suspension 40 mg    sodium chloride (NS) flush 5-40 mL    sodium chloride (NS) flush 5-40 mL    acetaminophen (TYLENOL) tablet 650 mg    Or    acetaminophen (TYLENOL) suppository 650 mg    ondansetron (ZOFRAN ODT) tablet 4 mg    Or    ondansetron (ZOFRAN) injection 4 mg    enoxaparin (LOVENOX) injection 40 mg    glucose chewable tablet 16 g    glucagon (GLUCAGEN) injection 1 mg    dextrose 10% infusion 0-250 mL    insulin lispro (HUMALOG) injection    [Held by provider] ARIPiprazole (ABILIFY) tablet 2 mg    [Held by provider] carvediloL (COREG) tablet 12.5 mg    [Held by provider] clopidogreL (PLAVIX) tablet 75 mg    [Held by provider] losartan (COZAAR) tablet 50 mg    senna-docusate (PERICOLACE) 8.6-50 mg per tablet 1 Tablet    [Held by provider] tamsulosin (FLOMAX) capsule 0.4 mg    [Held by provider] furosemide (LASIX) tablet 20 mg    NOREPINephrine (LEVOPHED) 8 mg in 0.9% NS 250ml infusion        Labs:    Recent Labs     11/11/22  0328 11/10/22  0340 11/09/22  0210   WBC 12.7* 16.5* 42.4*   HGB 10.3* 10.2* 11.9*   PLT 86* 83* 191       Recent Labs     11/11/22  0328 11/10/22  0735 11/10/22  0340 11/09/22  0210 11/08/22  1325 11/08/22  1102   * 147* 147* 139  --  135*   K 3.4* 3.0* 3.0* 3.7  --  4.8   * 116* 117* 109*  --  105   CO2 24 24 23 17*  --  18*   * 172* 174* 173*  --  134*   BUN 11 16 17 40*  --  36*   CREA 0.44* 0.68* 0.68* 1.65*  --  1.46*   CA 6.8* 7.0* 7.0* 7.1*  --  8.1*   MG  --   --  2.1 0.9*  --   --    PHOS  --   --   --  1.8*  --   --    LAC  --   --   --   --  2.1* 2.4*   ALB 2.0* 2.1* 2.0* 2.2*  --  2.8*   ALT 68 57 49 18  --  15       Recent Labs     11/10/22  0418 11/08/22 2047   PH 7.51* 7.41   PCO2 30* 20*   PO2 74* 103*   HCO3 23 12*   FIO2 28.0 44.0       Recent Labs     11/09/22  0210   *       No results found for: BNPP, BNP   Lab Results   Component Value Date/Time    Culture result: Heavy Probable Staphylococcus aureus 11/09/2022 02:10 AM    Culture result: Heavy Diphtheroids 11/09/2022 02:10 AM    Culture result: No growth 3 days 11/08/2022 01:25 PM     Lab Results   Component Value Date/Time    TSH 1.67 07/30/2022 07:11 AM        Imaging:    Results from [place] encounter on 11/08/22    XR FOOT RT MIN 3 V    Narrative  EXAM: XR FOOT RT MIN 3 V    INDICATION: Chronic ulcer, eval for osteomyelitis. COMPARISON: None. FINDINGS: Three views of the right foot demonstrate generalized osseous  demineralization without fracture or dislocation. There is no soft tissue gas. There is no osseous erosion. Impression  No osseous erosion or soft tissue gas. .      Results from [place] encounter on 11/08/22    CT HEAD WO CONT    Narrative  EXAM: CT HEAD WO CONT    INDICATION: AMS    COMPARISON: 8/12/2022. CONTRAST: None. TECHNIQUE: Unenhanced CT of the head was performed using 5 mm images. Brain and  bone windows were generated. Coronal and sagittal reformats. CT dose reduction  was achieved through use of a standardized protocol tailored for this  examination and automatic exposure control for dose modulation. FINDINGS:    Atrophy is noted. Small vessel ischemic changes are stable compared to the prior  exam. Chronic right occipital infarct is stable. There is no intracranial  hemorrhage, extra-axial collection, or mass effect. The basilar cisterns are  open. No CT evidence of acute infarct. The bone windows demonstrate no abnormalities. The visualized portions of the  paranasal sinuses and mastoid air cells are clear. Impression  No acute process or change compared to the prior exam.      11/10 patient is barely responsive now on room air off pressors getting IV fluid chest x-ray shows congestive changes IV fluid has been decreased, replace potassium, WBC much improved  11/11 Patient is out of ICU, responding slightly, he is on room air. PCO2 30. WBC improving, continues to decrease.

## 2022-11-11 NOTE — PROGRESS NOTES
Mr Hossein Flores awoke from a dead sleep, looked at me and said \"I gotta poop! \". I asked if he felt ok with a bedpan and he said \"no\".

## 2022-11-11 NOTE — PROGRESS NOTES
Infectious Disease Progress Note           Subjective:   Pt remains unresponsive, no fever/chills, transferred out of the ICU, no acute events since last seen, Afebrile, leukocytosis trending down on todays labs, Mixed kristen isolated from urine Cx (), blood Cx () is neg, and MRSA from Cx of left great toe   Objective:   Physical Exam:     Visit Vitals  BP (!) 149/80   Pulse 65   Temp 97.5 °F (36.4 °C)   Resp 20   Ht 5' 9\" (1.753 m)   Wt 137 lb 12.6 oz (62.5 kg)   SpO2 100%   BMI 20.35 kg/m²    O2 Flow Rate (L/min): 2 l/min O2 Device: None (Room air)    Temp (24hrs), Av.7 °F (36.5 °C), Min:97.5 °F (36.4 °C), Max:97.9 °F (36.6 °C)    No intake/output data recorded.     1901 -  0700  In: 1210 [I.V.:1210]  Out: 700 [Urine:700]    General: NAD, alert, unresponsive   HEENT: VANGIE, Moist mucosa   Lungs:Decreased at the bases, no wheeze/rhonchi   Heart: S1S2+, RRR, no murmur  Abdo: Soft, NT, ND, +BS   Exts:Right great toe ulcer, multiple superficial ulcers on b/l legs   Skin: b/l leg ulcers, stable ulcerations on b/l feet     Data Review:       Recent Days:  Recent Labs     228 11/10/22  0340 22  0210   WBC 12.7* 16.5* 42.4*   HGB 10.3* 10.2* 11.9*   HCT 30.1* 30.0* 34.2*   PLT 86* 83* 191       Recent Labs     22  0328 11/10/22  0735 11/10/22  0340   BUN 11 16 17   CREA 0.44* 0.68* 0.68*         Lab Results   Component Value Date/Time    C-Reactive protein 5.67 (H) 2022 03:28 AM        Microbiology     Results       Procedure Component Value Units Date/Time    MRSA SCREEN - PCR (NASAL) [[id number]]  (Abnormal) Collected: 22 0231    Order Status: Completed Specimen: Swab Updated: 22     MRSA by PCR, Nasal DETECTED        Comment: Results verified, [email or web address] [[id number]]  (Susceptibility) Collected: 22 0210    Order Status: Completed Specimen: Wound from Toe Updated: 11/11/22 1327     Special Requests: No Special Requests        GRAM STAIN Occasional WBCs seen               2+ Gram positive cocci in pairs chains and clusters           Culture result:       Heavy * methicillin resistant staphylococcus aureus *            Heavy Diphtheroids       Susceptibility        Staphylococcus aureus Methcillin Resistant      TELMA      Ciprofloxacin ($) Resistant      Clindamycin ($) Resistant      Daptomycin ($$$$$) Susceptible      Doxycycline ($$) Intermediate      Erythromycin ($$$$) Resistant      Gentamicin ($) Susceptible      Inducible Clindamycin  See below  [1]       Levofloxacin ($) Resistant      Linezolid ($$$$$) Susceptible      Oxacillin Resistant      Rifampin ($$$$) Susceptible  [2]       Tetracycline Resistant      Trimeth/Sulfa Susceptible      Vancomycin ($) Susceptible                   [1]  HIDE     [2]  Rifampin is not to be used for mono-therapy. CULTURE, BLOOD, PAIRED [[id number]] Collected: 11/08/22 1325    Order Status: Completed Specimen: Blood Updated: 11/11/22 0452     Special Requests: No Special Requests        Culture result: No growth 3 days       CULTURE, URINE [[id number]] Collected: 11/08/22 1115    Order Status: Completed Specimen: Urine Updated: 11/10/22 1506     Special Requests: No Special Requests        Joppa Count 20,000        Joppa Count colonies/ml        Culture result:       Mixed urogenital kristen isolated                     Diagnostics   CXR Results  (Last 48 hours)                 11/10/22 0308  XR CHEST PORT Final result    Impression:      Mild interstitial opacities which may represent mild pulmonary edema unchanged           Narrative:  EXAM:  XR CHEST PORT       INDICATION: CHF       COMPARISON: 11/9/2022       TECHNIQUE: Semiupright portable chest AP view       FINDINGS: Median sternotomy changes and nasogastric tube. The cardiac silhouette   is within normal limits. Mild interstitial opacities unchanged. The lungs and pleural spaces are clear. The visualized bones and upper abdomen   are age-appropriate. Assessment/Plan     Severe sepsis on admission due to UTI and infected wounds (right great toe ulcer), suspected aspiration PNA         Remains afebrile, WBC trending down on serial labs         Urine Cx is pending, blood Cx neg (11/08), MRSA isolated from Cx of left great toe         De-escalate antibiotic coverage to Zosyn, continue on Vanc for MRSA coverage, d/c meropenem        Routine labs in the morning           2. UTI, abnormal UA, Mixed kristen isolated from urine Cx (11/08). + External kennedy cath      3. Multiple superficial leg ulcers, eschar on right great toe       MRSA isolated from left great toe Cx. No osseous abnormality on X-ray (11/11)       Seen by DPM, CT of right foot ordered, will follow results       Continue on Vanc for MRSA coverage      4. INDIRA, due to sepsis syndrome, Cr normalized on todays labs      5. Decreased responsiveness, baseline mentation unknown, at risk for aspiration PNA     6. Recent CVA w Acute ischemia in the right posterior periventricular white matter involving the  right parietal lobe on MRI 07/2022.        No acute findings noted on CT head (11/09), May need a repeat MRI head, given unresponsiveness     Tristian Rodrigez MD    11/11/2022

## 2022-11-11 NOTE — PROGRESS NOTES
IMPRESSION:   Acute hypoxic respiratory failure  Severe sepsis with septic shock  Metabolic encephalopathy  Probably urinary tract infection  Leukocytosis  Carotid artery disease history of CVA      RECOMMENDATIONS/PLAN:   66-year-old male nursing home resident admitted with hypotension and unresponsiveness  Hemodynamically unstable started on IV fluid and now he is off Levophed decrease IV fluid  Panculture IV antibiotics, ABG shows respiratory alkalosis  Need vascular surgery consult for possible need endarterectomy when more stable  Previous 2D echo showed ejection fraction 60 to 65%  Intubate and place on vent if NIV fails  Chest x-ray shows fluid overload congestive changes  will get BNP     [x] High complexity decision making was performed  [x] See my orders for details  HPI  66-year-old nursing home resident came in because of unresponsiveness he had a history of stroke in the past patient was hypotensive hypoxic rapid response was called he was admitted to the floor initially received IV fluid hemodynamically unstable started on vasopressors Levophed transferred to ICU White count was elevated he was put on oxygen 4 L nasal cannula unable to get any started the patient he has a right foot wound and multiple wounds of the lower extremities dressing in place. He has carotid artery stenosis only supposed to go for surgery but unable to get cardiac clearance because of nuclear stress test he has 90% stenosis of left ICA and 50% stenosis of right ICA. So now he is admitted to ICU and critical care consult was called  PMH:  has a past medical history of Cirrhosis ([facility] [address] Diabetes ([facility] [address] Hypertension, and Stroke ([facility] [address] PSH:   has a past surgical history that includes hx back surgery; hx gi; and ir insert non tunl cvc over 5 yrs (11/9/2022). FHX: family history includes Heart Disease in his father. SHX:  reports that he has quit smoking.  He has never used smokeless tobacco. He reports that he does not currently use alcohol. He reports that he does not currently use drugs. ALL: No Known Allergies     MEDS:   [x] Reviewed - As Below   [] Not reviewed    Current Facility-Administered Medications   Medication    vancomycin (VANCOCIN) 1,000 mg in 0.9% sodium chloride 250 mL ([id number])    Vancomycin - please draw level 11/11 1300. Thanks!     meropenem (MERREM) 1 g in 0.9% sodium chloride (MBP/ADV) 50 mL MBP    VANCOMYCIN INFORMATION NOTE    mupirocin (BACTROBAN) 2 % ointment    dextrose 5% and 0.9% NaCl infusion    aspirin chewable tablet 81 mg    atorvastatin (LIPITOR) tablet 40 mg    escitalopram oxalate (LEXAPRO) tablet 20 mg    gabapentin (NEURONTIN) capsule 100 mg    melatonin tablet 10 mg    baclofen (LIORESAL) tablet 5 mg    hydrOXYzine HCL (ATARAX) tablet 25 mg    polyethylene glycol (MIRALAX) packet 17 g    traMADoL (ULTRAM) tablet 50 mg    doxazosin (CARDURA) tablet 1 mg    potassium chloride (KLOR-CON) packet for solution 20 mEq    pantoprazole (PROTONIX) granules for oral suspension 40 mg    sodium chloride (NS) flush 5-40 mL    sodium chloride (NS) flush 5-40 mL    acetaminophen (TYLENOL) tablet 650 mg    Or    acetaminophen (TYLENOL) suppository 650 mg    ondansetron (ZOFRAN ODT) tablet 4 mg    Or    ondansetron (ZOFRAN) injection 4 mg    enoxaparin (LOVENOX) injection 40 mg    glucose chewable tablet 16 g    glucagon (GLUCAGEN) injection 1 mg    dextrose 10% infusion 0-250 mL    insulin lispro (HUMALOG) injection    [Held by provider] ARIPiprazole (ABILIFY) tablet 2 mg    [Held by provider] carvediloL (COREG) tablet 12.5 mg    [Held by provider] clopidogreL (PLAVIX) tablet 75 mg    [Held by provider] losartan (COZAAR) tablet 50 mg    senna-docusate (PERICOLACE) 8.6-50 mg per tablet 1 Tablet    [Held by provider] tamsulosin (FLOMAX) capsule 0.4 mg    [Held by provider] furosemide (LASIX) tablet 20 mg    NOREPINephrine (LEVOPHED) 8 mg in 0.9% NS 250ml infusion      MAR reviewed and pertinent medications noted or modified as needed   Current Facility-Administered Medications   Medication    vancomycin (VANCOCIN) 1,000 mg in 0.9% sodium chloride 250 mL ([id number])    Vancomycin - please draw level 11/11 1300. Thanks! meropenem (MERREM) 1 g in 0.9% sodium chloride (MBP/ADV) 50 mL MBP    VANCOMYCIN INFORMATION NOTE    mupirocin (BACTROBAN) 2 % ointment    dextrose 5% and 0.9% NaCl infusion    aspirin chewable tablet 81 mg    atorvastatin (LIPITOR) tablet 40 mg    escitalopram oxalate (LEXAPRO) tablet 20 mg    gabapentin (NEURONTIN) capsule 100 mg    melatonin tablet 10 mg    baclofen (LIORESAL) tablet 5 mg    hydrOXYzine HCL (ATARAX) tablet 25 mg    polyethylene glycol (MIRALAX) packet 17 g    traMADoL (ULTRAM) tablet 50 mg    doxazosin (CARDURA) tablet 1 mg    potassium chloride (KLOR-CON) packet for solution 20 mEq    pantoprazole (PROTONIX) granules for oral suspension 40 mg    sodium chloride (NS) flush 5-40 mL    sodium chloride (NS) flush 5-40 mL    acetaminophen (TYLENOL) tablet 650 mg    Or    acetaminophen (TYLENOL) suppository 650 mg    ondansetron (ZOFRAN ODT) tablet 4 mg    Or    ondansetron (ZOFRAN) injection 4 mg    enoxaparin (LOVENOX) injection 40 mg    glucose chewable tablet 16 g    glucagon (GLUCAGEN) injection 1 mg    dextrose 10% infusion 0-250 mL    insulin lispro (HUMALOG) injection    [Held by provider] ARIPiprazole (ABILIFY) tablet 2 mg    [Held by provider] carvediloL (COREG) tablet 12.5 mg    [Held by provider] clopidogreL (PLAVIX) tablet 75 mg    [Held by provider] losartan (COZAAR) tablet 50 mg    senna-docusate (PERICOLACE) 8.6-50 mg per tablet 1 Tablet    [Held by provider] tamsulosin (FLOMAX) capsule 0.4 mg    [Held by provider] furosemide (LASIX) tablet 20 mg    NOREPINephrine (LEVOPHED) 8 mg in 0.9% NS 250ml infusion      PMH:  has a past medical history of Cirrhosis ([facility] [address] Diabetes ([facility] [address] Hypertension, and Stroke ([facility] [address]   PSH:   has a past surgical history that includes hx back surgery; hx gi; and ir insert non tunl cvc over 5 yrs (2022). FHX: family history includes Heart Disease in his father. SHX:  reports that he has quit smoking. He has never used smokeless tobacco. He reports that he does not currently use alcohol. He reports that he does not currently use drugs. ROS:Review of systems not obtained due to patient factors. Hemodynamics:    CO:    CI:    CVP:    SVR:   PAP Systolic:    PAP Diastolic:    PVR:    ZS31:        Ventilator Settings:      Mode Rate TV Press PEEP FiO2 PIP Min. Vent                              Vital Signs: Telemetry:    normal sinus rhythm Intake/Output:   Visit Vitals  BP (!) 149/80   Pulse 65   Temp 97.5 °F (36.4 °C)   Resp 20   Ht 5' 9\" (1.753 m)   Wt 62.5 kg (137 lb 12.6 oz)   SpO2 100%   BMI 20.35 kg/m²       Temp (24hrs), Av.1 °F (36.7 °C), Min:97.5 °F (36.4 °C), Max:98.8 °F (37.1 °C)        O2 Device: None (Room air) O2 Flow Rate (L/min): 2 l/min       Wt Readings from Last 4 Encounters:   11/10/22 62.5 kg (137 lb 12.6 oz)   22 84.8 kg (187 lb)   22 84.8 kg (187 lb)   10/28/20 99.8 kg (220 lb)        No intake or output data in the 24 hours ending 22 0946      Last shift:      No intake/output data recorded. Last 3 shifts:  1901 -  0700  In: 1210 [I.V.:1210]  Out: 700 [Urine:700]       Physical Exam:     General: He is on room air  HEENT: NCAT, poor dentition, lips and mucosa dry  Eyes: anicteric; conjunctiva clear  Neck: no nodes, trach midline; no accessory MM use.   Chest: no deformity,   Cardiac: R regular; no murmur;   Lungs: distant breath sounds; wheezes  Abd: soft, NT, hypoactive BS  Ext: Lower extremity wound bandage in place  : NO kennedy, clear urine  Neuro: Unresponsive  Psych-unable to assess  Skin: warm, dry, no cyanosis;   Pulses: 1-2+ Bilateral pedal, radial  Capillary: brisk; pale      DATA:    MAR reviewed and pertinent medications noted or modified as needed  MEDS:   Current Facility-Administered Medications   Medication    vancomycin (VANCOCIN) 1,000 mg in 0.9% sodium chloride 250 mL ([id number])    Vancomycin - please draw level 11/11 1300. Thanks!     meropenem (MERREM) 1 g in 0.9% sodium chloride (MBP/ADV) 50 mL MBP    VANCOMYCIN INFORMATION NOTE    mupirocin (BACTROBAN) 2 % ointment    dextrose 5% and 0.9% NaCl infusion    aspirin chewable tablet 81 mg    atorvastatin (LIPITOR) tablet 40 mg    escitalopram oxalate (LEXAPRO) tablet 20 mg    gabapentin (NEURONTIN) capsule 100 mg    melatonin tablet 10 mg    baclofen (LIORESAL) tablet 5 mg    hydrOXYzine HCL (ATARAX) tablet 25 mg    polyethylene glycol (MIRALAX) packet 17 g    traMADoL (ULTRAM) tablet 50 mg    doxazosin (CARDURA) tablet 1 mg    potassium chloride (KLOR-CON) packet for solution 20 mEq    pantoprazole (PROTONIX) granules for oral suspension 40 mg    sodium chloride (NS) flush 5-40 mL    sodium chloride (NS) flush 5-40 mL    acetaminophen (TYLENOL) tablet 650 mg    Or    acetaminophen (TYLENOL) suppository 650 mg    ondansetron (ZOFRAN ODT) tablet 4 mg    Or    ondansetron (ZOFRAN) injection 4 mg    enoxaparin (LOVENOX) injection 40 mg    glucose chewable tablet 16 g    glucagon (GLUCAGEN) injection 1 mg    dextrose 10% infusion 0-250 mL    insulin lispro (HUMALOG) injection    [Held by provider] ARIPiprazole (ABILIFY) tablet 2 mg    [Held by provider] carvediloL (COREG) tablet 12.5 mg    [Held by provider] clopidogreL (PLAVIX) tablet 75 mg    [Held by provider] losartan (COZAAR) tablet 50 mg    senna-docusate (PERICOLACE) 8.6-50 mg per tablet 1 Tablet    [Held by provider] tamsulosin (FLOMAX) capsule 0.4 mg    [Held by provider] furosemide (LASIX) tablet 20 mg    NOREPINephrine (LEVOPHED) 8 mg in 0.9% NS 250ml infusion        Labs:    Recent Labs     11/11/22  0328 11/10/22  0340 11/09/22  0210   WBC 12.7* 16.5* 42.4*   HGB 10.3* 10.2* 11.9*   PLT 86* 83* 191       Recent Labs     11/11/22  0328 11/10/22  0724 11/10/22  0340 11/09/22  0210 11/08/22  1325 11/08/22  1102   * 147* 147* 139  --  135*   K 3.4* 3.0* 3.0* 3.7  --  4.8   * 116* 117* 109*  --  105   CO2 24 24 23 17*  --  18*   * 172* 174* 173*  --  134*   BUN 11 16 17 40*  --  36*   CREA 0.44* 0.68* 0.68* 1.65*  --  1.46*   CA 6.8* 7.0* 7.0* 7.1*  --  8.1*   MG  --   --  2.1 0.9*  --   --    PHOS  --   --   --  1.8*  --   --    LAC  --   --   --   --  2.1* 2.4*   ALB 2.0* 2.1* 2.0* 2.2*  --  2.8*   ALT 68 57 49 18  --  15       Recent Labs     11/10/22  0418 11/08/22 2047   PH 7.51* 7.41   PCO2 30* 20*   PO2 74* 103*   HCO3 23 12*   FIO2 28.0 44.0       Recent Labs     11/09/22  0210   *       No results found for: BNPP, BNP   Lab Results   Component Value Date/Time    Culture result: Heavy Probable Staphylococcus aureus 11/09/2022 02:10 AM    Culture result: Heavy Diphtheroids 11/09/2022 02:10 AM    Culture result: No growth 3 days 11/08/2022 01:25 PM     Lab Results   Component Value Date/Time    TSH 1.67 07/30/2022 07:11 AM        Imaging:    Results from Hospital Encounter encounter on 11/08/22    XR FOOT RT MIN 3 V    Narrative  EXAM: XR FOOT RT MIN 3 V    INDICATION: Chronic ulcer, eval for osteomyelitis. COMPARISON: None. FINDINGS: Three views of the right foot demonstrate generalized osseous  demineralization without fracture or dislocation. There is no soft tissue gas. There is no osseous erosion. Impression  No osseous erosion or soft tissue gas. .      Results from [place] encounter on 11/08/22    CT HEAD WO CONT    Narrative  EXAM: CT HEAD WO CONT    INDICATION: AMS    COMPARISON: 8/12/2022. CONTRAST: None. TECHNIQUE: Unenhanced CT of the head was performed using 5 mm images. Brain and  bone windows were generated. Coronal and sagittal reformats.  CT dose reduction  was achieved through use of a standardized protocol tailored for this  examination and automatic exposure control for dose modulation. FINDINGS:    Atrophy is noted. Small vessel ischemic changes are stable compared to the prior  exam. Chronic right occipital infarct is stable. There is no intracranial  hemorrhage, extra-axial collection, or mass effect. The basilar cisterns are  open. No CT evidence of acute infarct. The bone windows demonstrate no abnormalities. The visualized portions of the  paranasal sinuses and mastoid air cells are clear. Impression  No acute process or change compared to the prior exam.      11/10 patient is barely responsive now on room air off pressors getting IV fluid chest x-ray shows congestive changes IV fluid has been decreased, replace potassium, WBC much improved  11/11 Patient is out of ICU, responding slightly, he is on room air. PCO2 30. WBC improving, continues to decrease.   Waiting for vascular studies of the foot

## 2022-11-11 NOTE — PROGRESS NOTES
Multiple attempts for bedside swallow evaluation. Patient continues to be not appropriate s/t alertness and lethargy. Patient did alert to say name, however fell back asleep and can not maintain alertness for safe p.o trials. NG on place w/ TF running. Continue NPO w/ TF via NG. Please re consut when alertness improves.

## 2022-11-11 NOTE — PROGRESS NOTES
[facility] Hospitalist Progress Note  Jas Alex MD    Date:11/11/2022       Room:[facility] [place]  Didi Paniagua     Date of Birth:[birth year]     Age:71 y.o. 71M with past medical history of stroke, diabetes, hypertension, resides in a nursing facility. Recent admission 7/29/22-8/2/22 from discharge summary:  Patient was admitted on 7/29/2022 following presentation to the ED for transient facial droop 11:30 AM.  Due to history of strokes, suspected TIA versus CVA. CT was negative, patient was admitted for possible TIA and MRI was ordered. MRI showed acute ischemic stroke of the right parietal area. Bilateral carotid ultrasounds revealed 50% stenosis of right ICA, 90% stenosis of left ICA. Urine culture positive for gram-negative rods Proteus Mirabella's and sensitive to ceftriaxone and cefazolin. Patient will receive 7 days worth of cefazolin as an outpatient. Vascular surgery consultation and will need carotid endarterectomy in approximately 4 weeks due to the recent stroke. Cardiac consultation and patient will have outpatient stress test prior to carotid endarterectomy. Patient will follow up with Dr. Lauren Rodas in approximately 2 weeks. Patient is being discharged to skilled nursing facility. Discussed case with son, Addison Hidalgo via phone. 7/30/22  carotid duplex  Impression:  1. Bilateral subclavian arteries are patent. 2.  Bilateral common carotid patent with mild to moderate irregular heterogeneous plaque throughout. 3.  Right proximal ECA shows no obvious plaque. 4.  Right proximal ICA shows mild irregular heterogeneous plaque without significant stenosis. 5.  Left proximal ECA shows mild heterogeneous plaque noted. 6.  Left proximal ICA shows irregular mixed plaque with high-grade stenosis, per criteria greater than 70% stenosis. 7.  Bilateral vertebral arteries are patent and antegrade flow.      7/30/22 echocardiogram    Left Ventricle: Normal left ventricular systolic function with a visually estimated EF of 60 - 65%. Left ventricle size is normal. Increased wall thickness. Findings consistent with concentric hypertrophy. Abnormal diastolic function. Aortic Valve: Tricuspid valve. 22 vascular surgery outpatient Dr Whit Dasilva  Patient requires a preop clearance for possible carotid surgery. Patient supposed to see [facility] cardiology. Patient appears to be very weak today. I am not sure patient will be a good candidate for open carotid surgery on the left side. We will await for cardiac work-up and will go from there. 22 presents to hospital from nursing home with weakness and hypotension for one day. Associated with poor responsiveness. Found hypotensive on arrival to the ED, with metabolic encephalopathy, improved with intravenous fluid bolus. Found to have urinary infection, started on antibiotic, and I have been asked to admit to hospital for further stabilization of his condition. 22 transferred to ICU overnight for poor responsiveness and for septic shock. Managed with fluids and levophed    22 out of ICU now in general medical section  Awaiting imaging and vascular studies of the foot    Subjective    Subjective:  Symptoms:  Stable. Review of Systems   All other systems reviewed and are negative. Objective         Vitals Last 24 Hours:  TEMPERATURE:  Temp  Av.1 °F (36.7 °C)  Min: 97.5 °F (36.4 °C)  Max: 98.8 °F (37.1 °C)  RESPIRATIONS RANGE: Resp  Av  Min: 15  Max: 24  PULSE OXIMETRY RANGE: SpO2  Av.4 %  Min: 94 %  Max: 100 %  PULSE RANGE: Pulse  Av.8  Min: 65  Max: 94  BLOOD PRESSURE RANGE: Systolic (22PLN), BLH:802 , Min:119 , WCO:115   ; Diastolic (27RHI), XYV:44, Min:61, Max:87    I/O (24Hr): No intake or output data in the 24 hours ending 22 08    Objective:  General Appearance:  Comfortable. Vital signs: (most recent): Blood pressure (!) 149/80, pulse 65, temperature 97.5 °F (36.4 °C), resp.  rate 20, height 5' 9\" (1.753 m), weight 62.5 kg (137 lb 12.6 oz), SpO2 100 %. HEENT: Normal HEENT exam.    Lungs:  Normal effort. Heart: Normal rate. Regular rhythm. Abdomen: Abdomen is soft. Neurological: (lethargic). Skin:  Warm. 11/9/22 wound care note as follows  Patient is resting in bed, not alert. Patient has multiple scabs noted to bilateral lower legs and upper thighs, all unclear etiology at this time. All wounds cleansed with NS and a thin layer of therahoney applied to each wound and covered with ABD pads and rolled gauze. Patient has diabetic wound to right great toe, wound covered with slough and wet eschar, small area of pink/red wound bed visible. Wound cleansed with NS, thin layer of therahoney applied and covered with gauze and rolled gauze. Patient has diabetic wound noted to right 5th covered with dry eschar/scab. Wound cleansed with NS, a thin layer of therahoney applied and covered with gauze and rolled gauze. Stage 1 PI noted to right heel, intact blister. Area covered with non-adherent foam pad and wrapped with rolled gauze. Patient has offloading boots applied to bilateral feet. sDTI that is opening to coccyx, wound bed pink/red purple/maroon. Wound cleansed with NS, opticel ag to be applied to wound bed and covered with sacral foam dressing. Podiatry has been consulted to evaluate wound to toe.          Labs/Imaging/Diagnostics    Labs:  CBC:  Recent Labs     11/11/22  0328 11/10/22  0340 11/09/22  0210   WBC 12.7* 16.5* 42.4*   RBC 3.55* 3.54* 4.07*   HGB 10.3* 10.2* 11.9*   HCT 30.1* 30.0* 34.2*   MCV 84.8 84.7 84.0   RDW 16.1* 16.2* 16.0*   PLT 86* 83* 191       CHEMISTRIES:  Recent Labs     11/11/22  0328 11/10/22  0735 11/10/22  0340 11/09/22  0210   * 147* 147* 139   K 3.4* 3.0* 3.0* 3.7   * 116* 117* 109*   CO2 24 24 23 17*   BUN 11 16 17 40*   CA 6.8* 7.0* 7.0* 7.1*   PHOS  --   --   --  1.8*   MG  --   --  2.1 0.9*     PT/INR:No results for input(s): INR, INREXT, INREXT in the last 72 hours. No lab exists for component: PROTIME  APTT:No results for input(s): APTT in the last 72 hours. LIVER PROFILE:  Recent Labs     11/11/22  0328 11/10/22  0735 11/10/22  0340   * 259* 233*   ALT 68 57 49       Lab Results   Component Value Date/Time    ALT (SGPT) 68 11/11/2022 03:28 AM    AST (SGOT) 265 (H) 11/11/2022 03:28 AM    Alk. phosphatase 52 11/11/2022 03:28 AM    Bilirubin, total 0.9 11/11/2022 03:28 AM       Imaging Last 24 Hours:  XR FOOT RT MIN 3 V    Result Date: 11/10/2022  EXAM: XR FOOT RT MIN 3 V INDICATION: Chronic ulcer, eval for osteomyelitis. COMPARISON: None. FINDINGS: Three views of the right foot demonstrate generalized osseous demineralization without fracture or dislocation. There is no soft tissue gas. There is no osseous erosion. No osseous erosion or soft tissue gas. Fely Sauceda ECHO ADULT COMPLETE    Result Date: 11/10/2022    Left Ventricle: Normal left ventricular systolic function with a visually estimated EF of 50 - 55%. Left ventricle size is normal. Mild septal thickening. Normal wall motion. Normal diastolic function. Right Ventricle: Right ventricle is mildly dilated. Normal wall thickness. Mitral Valve: Mild regurgitation. Left Atrium: Left atrium is dilated. Assessment//Plan   Active Problems:    UTI (urinary tract infection) (11/8/2022)  CT Results  (Last 48 hours)      None            Assessment & Plan  71M with past medical history of stroke, diabetes, hypertension, resides in a nursing facility. Recent admission 7/29/22-8/2/22 from discharge summary:  Patient was admitted on 7/29/2022 following presentation to the ED for transient facial droop 11:30 AM.  Due to history of strokes, suspected TIA versus CVA. CT was negative, patient was admitted for possible TIA and MRI was ordered. MRI showed acute ischemic stroke of the right parietal area.   Bilateral carotid ultrasounds revealed 50% stenosis of right ICA, 90% stenosis of left ICA. Urine culture positive for gram-negative rods Proteus Mirabella's and sensitive to ceftriaxone and cefazolin. Patient will receive 7 days worth of cefazolin as an outpatient. Vascular surgery consultation and will need carotid endarterectomy in approximately 4 weeks due to the recent stroke. Cardiac consultation and patient will have outpatient stress test prior to carotid endarterectomy. Patient will follow up with Dr. Silver Vanegas in approximately 2 weeks. Patient is being discharged to skilled nursing facility. Discussed case with son, Vania Daniels via phone. 7/30/22  carotid duplex  Impression:  1. Bilateral subclavian arteries are patent. 2.  Bilateral common carotid patent with mild to moderate irregular heterogeneous plaque throughout. 3.  Right proximal ECA shows no obvious plaque. 4.  Right proximal ICA shows mild irregular heterogeneous plaque without significant stenosis. 5.  Left proximal ECA shows mild heterogeneous plaque noted. 6.  Left proximal ICA shows irregular mixed plaque with high-grade stenosis, per criteria greater than 70% stenosis. 7.  Bilateral vertebral arteries are patent and antegrade flow. 7/30/22 echocardiogram    Left Ventricle: Normal left ventricular systolic function with a visually estimated EF of 60 - 65%. Left ventricle size is normal. Increased wall thickness. Findings consistent with concentric hypertrophy. Abnormal diastolic function. Aortic Valve: Tricuspid valve. 9/19/22 vascular surgery outpatient Dr Aretha Adam  Patient requires a preop clearance for possible carotid surgery. Patient supposed to see [facility] cardiology. Patient appears to be very weak today. I am not sure patient will be a good candidate for open carotid surgery on the left side. We will await for cardiac work-up and will go from there. 11/8/22 presents to hospital from nursing home with weakness and hypotension for one day. Associated with poor responsiveness.  Found hypotensive on arrival to the ED, with metabolic encephalopathy, improved with intravenous fluid bolus. Found to have urinary infection, started on antibiotic, and I have been asked to admit to hospital for further stabilization of his condition. 11/9/22 transferred to ICU overnight for poor responsiveness and for septic shock. Managed with fluids and levophed    11/11/22 out of ICU now in general medical section  Awaiting imaging and vascular studies of the foot    MICROBIOLOGY    7/29/22            Urine                Proteus mirabilis     11/8/22            Blood              Negative  11/8/22            Urine              Mixed urogenital kristen isolated   11/9/22 MRSA nasal Positive  11/9/22 Toe  Heavy Probable Staphylococcus aureus    ASSESSMENT AND PLAN    1) Septic shock in the setting of complex urinary infection at admission, further complicated with hypotension and metabolic encephalopathy. Improved and now out of ICU   Urine cultures unrevealing   Blood cultures negative   Antibiotics per ID remain for complex foot infection     2) Cardiovascular disease including hypertension, carotid artery disease. History of CVA. In the outpatient setting had been following with [facility] cardiology and Dr Sheron Holstein for anticipated carotid endarterectomy. Further complicated with peripheral vascular disease. Recent echocardiogram as above              Continue present outpatient regimen    3) Chronic foot wounds with foul smelling drainage in the setting of peripheral vascular disease.      Antibiotics per ID   Wound care inpatient and outpatient     4) DVT prophylaxis with enoxaparin     5) Dispo return to skilled nursing once stable                     Electronically signed by Leonela Avelar MD on 11/11/2022 at 7:16 AM

## 2022-11-11 NOTE — PROGRESS NOTES
Vancomycin Dosing Consult  Buster Lombardo is a 70 y.o. male with Severe Sepsis due to UTI and toe ulcer. Pharmacy was consulted by Dr. Claryce Scheuermann to dose and monitor Vancomycin. Today is day 3. Antibiotic regimen: Vancomycin + Zosyn    Temp (24hrs), Av.9 °F (36.6 °C), Min:97.5 °F (36.4 °C), Max:98.7 °F (37.1 °C)    Recent Labs     22  0328 11/10/22  0340 22  0210   WBC 12.7* 16.5* 42.4*     Recent Labs     22  0328 11/10/22  0735 11/10/22  0340 22  0210 22  1102   CREA 0.44* 0.68* 0.68* 1.65* 1.46*   BUN 11 16 17 40* 36*       Estimated Creatinine Clearance: 85.6 mL/min (A) (by C-G formula based on SCr of 0.44 mg/dL (L)).  ml/min  Concomitant nephrotoxic drugs: Loop diuretics and Vasopressors    Cultures:    Blood: Ng x 3d   Wound (toe): probable staph aureus, heavy diptheroids   Urine: 20,000 colonies/mL, Mixed urogential Nataly isolated (Final result)   Wound: 2+ GPC in pairs chains and clusters, Heavy MRSA, Heavy Diphtheroids (Final result)    MRSA Swab: Detected     Target range: AUC/TELMA 400-600    Recent level history:  Date/Time Dose & Interval Measured Level (mcg/mL) Associated AUC/TELMA   11/10 0340 2000 mg x 1 7.7     1000mg IV x 1 14.5 413       Assessment/Plan:   Renal function has returned to baseline, Procal is significantly high, ESR is elevated and WBC trending down  Continue Vancomycin 1000 mg IV q12h with a projected AUC of 413   Repeat level is scheduled for  at  1300  Antimicrobial stop date SHA Freeman, PharmD, BCPS, BCCCP  Clinical Pharmacist   (available on [employer])

## 2022-11-12 ENCOUNTER — APPOINTMENT (OUTPATIENT)
Dept: GENERAL RADIOLOGY | Age: 72
DRG: 871 | End: 2022-11-12
Attending: HOSPITALIST
Payer: MEDICARE

## 2022-11-12 LAB
ANION GAP SERPL CALC-SCNC: 7 MMOL/L (ref 5–15)
BUN SERPL-MCNC: 10 MG/DL (ref 6–20)
BUN/CREAT SERPL: 21 (ref 12–20)
CA-I BLD-MCNC: 7.5 MG/DL (ref 8.5–10.1)
CHLORIDE SERPL-SCNC: 117 MMOL/L (ref 97–108)
CO2 SERPL-SCNC: 25 MMOL/L (ref 21–32)
CREAT SERPL-MCNC: 0.48 MG/DL (ref 0.7–1.3)
GLUCOSE BLD STRIP.AUTO-MCNC: 158 MG/DL (ref 65–100)
GLUCOSE BLD STRIP.AUTO-MCNC: 171 MG/DL (ref 65–100)
GLUCOSE BLD STRIP.AUTO-MCNC: 176 MG/DL (ref 65–100)
GLUCOSE BLD STRIP.AUTO-MCNC: 190 MG/DL (ref 65–100)
GLUCOSE BLD STRIP.AUTO-MCNC: 210 MG/DL (ref 65–100)
GLUCOSE SERPL-MCNC: 132 MG/DL (ref 65–100)
LACTATE SERPL-SCNC: 1.3 MMOL/L (ref 0.4–2)
LEFT CFA PROX SYS PSV: 70.6 CM/S
LEFT DIST ATA VELOCITY: 24.1 CM/S
LEFT DIST PTA PSV: 33.2 CM/S
LEFT POPLITEAL PROX SYS PSV: 32.8 CM/S
LEFT PROX PFA A PSV: 50.7 CM/S
LEFT SFA PROX VEL RATIO: 1.03
LEFT SUPER FEMORAL PROX SYS PSV: 72.4 CM/S
PERFORMED BY, TECHID: ABNORMAL
PHOSPHATE SERPL-MCNC: 1.8 MG/DL (ref 2.6–4.7)
POTASSIUM SERPL-SCNC: 3.4 MMOL/L (ref 3.5–5.1)
RIGHT DIST ATA VELOCITY: 16.3 CM/S
RIGHT DIST PTA PSV: 12.9 CM/S
RIGHT POP A PROX VEL RATIO: 0.82
RIGHT POPLITEAL PROX SYS PSV: 30.1 CM/S
RIGHT PROX PFA A PSV: 91.7 CM/S
RIGHT SFA DIST VEL RATIO: 0.54
RIGHT SUPER FEMORAL DIST SYS PSV: 36.8 CM/S
RIGHT SUPER FEMORAL MID SYS PSV: 68.7 CM/S
SODIUM SERPL-SCNC: 149 MMOL/L (ref 136–145)

## 2022-11-12 PROCEDURE — 74011000258 HC RX REV CODE- 258: Performed by: INTERNAL MEDICINE

## 2022-11-12 PROCEDURE — 82962 GLUCOSE BLOOD TEST: CPT

## 2022-11-12 PROCEDURE — 74011250636 HC RX REV CODE- 250/636: Performed by: INTERNAL MEDICINE

## 2022-11-12 PROCEDURE — 99232 SBSQ HOSP IP/OBS MODERATE 35: CPT | Performed by: PODIATRIST

## 2022-11-12 PROCEDURE — 74011250637 HC RX REV CODE- 250/637: Performed by: INTERNAL MEDICINE

## 2022-11-12 PROCEDURE — 36415 COLL VENOUS BLD VENIPUNCTURE: CPT

## 2022-11-12 PROCEDURE — 65270000029 HC RM PRIVATE

## 2022-11-12 PROCEDURE — 80048 BASIC METABOLIC PNL TOTAL CA: CPT

## 2022-11-12 PROCEDURE — 74011250636 HC RX REV CODE- 250/636: Performed by: HOSPITALIST

## 2022-11-12 PROCEDURE — 74011250637 HC RX REV CODE- 250/637: Performed by: HOSPITALIST

## 2022-11-12 PROCEDURE — 84100 ASSAY OF PHOSPHORUS: CPT

## 2022-11-12 PROCEDURE — 74011000250 HC RX REV CODE- 250: Performed by: INTERNAL MEDICINE

## 2022-11-12 PROCEDURE — 74011636637 HC RX REV CODE- 636/637: Performed by: INTERNAL MEDICINE

## 2022-11-12 PROCEDURE — 93925 LOWER EXTREMITY STUDY: CPT | Performed by: SURGERY

## 2022-11-12 PROCEDURE — 83605 ASSAY OF LACTIC ACID: CPT

## 2022-11-12 PROCEDURE — 71045 X-RAY EXAM CHEST 1 VIEW: CPT

## 2022-11-12 PROCEDURE — 92610 EVALUATE SWALLOWING FUNCTION: CPT

## 2022-11-12 RX ADMIN — DOXAZOSIN 1 MG: 2 TABLET ORAL at 21:37

## 2022-11-12 RX ADMIN — VANCOMYCIN HYDROCHLORIDE 1000 MG: 1 INJECTION, POWDER, LYOPHILIZED, FOR SOLUTION INTRAVENOUS at 05:54

## 2022-11-12 RX ADMIN — INSULIN LISPRO 2 UNITS: 100 INJECTION, SOLUTION INTRAVENOUS; SUBCUTANEOUS at 11:32

## 2022-11-12 RX ADMIN — SODIUM CHLORIDE, PRESERVATIVE FREE 10 ML: 5 INJECTION INTRAVENOUS at 21:38

## 2022-11-12 RX ADMIN — PIPERACILLIN AND TAZOBACTAM 3.38 G: 3; .375 INJECTION, POWDER, FOR SOLUTION INTRAVENOUS at 16:59

## 2022-11-12 RX ADMIN — MUPIROCIN: 20 OINTMENT TOPICAL at 02:16

## 2022-11-12 RX ADMIN — MELATONIN TAB 5 MG 10 MG: 5 TAB at 21:37

## 2022-11-12 RX ADMIN — INSULIN LISPRO 2 UNITS: 100 INJECTION, SOLUTION INTRAVENOUS; SUBCUTANEOUS at 16:59

## 2022-11-12 RX ADMIN — PANTOPRAZOLE SODIUM 40 MG: 40 GRANULE, DELAYED RELEASE ORAL at 09:14

## 2022-11-12 RX ADMIN — ACETAMINOPHEN 650 MG: 325 TABLET ORAL at 17:57

## 2022-11-12 RX ADMIN — SODIUM CHLORIDE, PRESERVATIVE FREE 10 ML: 5 INJECTION INTRAVENOUS at 14:55

## 2022-11-12 RX ADMIN — ATORVASTATIN CALCIUM 40 MG: 40 TABLET, FILM COATED ORAL at 09:14

## 2022-11-12 RX ADMIN — PIPERACILLIN AND TAZOBACTAM 3.38 G: 3; .375 INJECTION, POWDER, FOR SOLUTION INTRAVENOUS at 02:16

## 2022-11-12 RX ADMIN — MUPIROCIN: 20 OINTMENT TOPICAL at 21:38

## 2022-11-12 RX ADMIN — VANCOMYCIN HYDROCHLORIDE 1000 MG: 1 INJECTION, POWDER, LYOPHILIZED, FOR SOLUTION INTRAVENOUS at 18:09

## 2022-11-12 RX ADMIN — ASPIRIN 81 MG 81 MG: 81 TABLET ORAL at 09:14

## 2022-11-12 RX ADMIN — CARVEDILOL 12.5 MG: 12.5 TABLET, FILM COATED ORAL at 21:37

## 2022-11-12 RX ADMIN — ESCITALOPRAM OXALATE 20 MG: 10 TABLET ORAL at 09:14

## 2022-11-12 RX ADMIN — DEXTROSE AND SODIUM CHLORIDE 100 ML/HR: 5; 900 INJECTION, SOLUTION INTRAVENOUS at 02:18

## 2022-11-12 RX ADMIN — MUPIROCIN: 20 OINTMENT TOPICAL at 09:14

## 2022-11-12 RX ADMIN — PIPERACILLIN AND TAZOBACTAM 3.38 G: 3; .375 INJECTION, POWDER, FOR SOLUTION INTRAVENOUS at 09:12

## 2022-11-12 RX ADMIN — SENNOSIDES AND DOCUSATE SODIUM 1 TABLET: 50; 8.6 TABLET ORAL at 09:14

## 2022-11-12 RX ADMIN — POTASSIUM CHLORIDE 20 MEQ: 1.5 FOR SOLUTION ORAL at 21:36

## 2022-11-12 RX ADMIN — GABAPENTIN 100 MG: 100 CAPSULE ORAL at 21:36

## 2022-11-12 RX ADMIN — ENOXAPARIN SODIUM 40 MG: 100 INJECTION SUBCUTANEOUS at 09:13

## 2022-11-12 RX ADMIN — POTASSIUM CHLORIDE 20 MEQ: 1.5 FOR SOLUTION ORAL at 09:14

## 2022-11-12 NOTE — PROGRESS NOTES
CXR result for NG tube placement:  Nasogastric tube tip advanced overlying the gastric body. MD Garcia notified and said its ok to restart tube feed. Nil residual. Osmolite 1.2 restarted @ 20mls/hr

## 2022-11-12 NOTE — PROGRESS NOTES
Problem: Falls - Risk of  Goal: *Absence of Falls  Description: Document Lary Embs Fall Risk and appropriate interventions in the flowsheet.   Outcome: Progressing Towards Goal  Note: Fall Risk Interventions:       Mentation Interventions: Bed/chair exit alarm, Reorient patient    Medication Interventions: Bed/chair exit alarm    Elimination Interventions: Call light in reach

## 2022-11-12 NOTE — PROGRESS NOTES
NG tube replaced at MD discretion 18F placed, auscultated and heard placement, STAT CXR ordered to confirm placement. CXR suggest NG is over the gastric fundus. Reached out to MD Mi June for advice on how to proceed. Jose suggest advancing the NG tube slightly farther and repeating the CXR. NG tube advanced. Stat CXR ordered. Awaiting results.

## 2022-11-12 NOTE — PROGRESS NOTES
Problem: Falls - Risk of  Goal: *Absence of Falls  Description: Document [place] Fall Risk and appropriate interventions in the flowsheet. Outcome: Progressing Towards Goal  Note: Fall Risk Interventions:       Mentation Interventions: Bed/chair exit alarm    Medication Interventions: Bed/chair exit alarm    Elimination Interventions: Bed/chair exit alarm, Call light in reach              Problem: Risk for Spread of Infection  Goal: Prevent transmission of infectious organism to others  Description: Prevent the transmission of infectious organisms to other patients, staff members, and visitors.   Outcome: Progressing Towards Goal

## 2022-11-12 NOTE — PROGRESS NOTES
[facility] Hospitalist Progress Note  Jas Albert MD    Date:11/12/2022       Room:[facility]  Patient Clarice Ledesma     Date of Birth:[birth year]     Age:71 y.o. 71M with past medical history of stroke, diabetes, hypertension, resides in a nursing facility. Recent admission 7/29/22-8/2/22 from discharge summary:  Patient was admitted on 7/29/2022 following presentation to the ED for transient facial droop 11:30 AM.  Due to history of strokes, suspected TIA versus CVA. CT was negative, patient was admitted for possible TIA and MRI was ordered. MRI showed acute ischemic stroke of the right parietal area. Bilateral carotid ultrasounds revealed 50% stenosis of right ICA, 90% stenosis of left ICA. Urine culture positive for gram-negative rods Proteus Mirabella's and sensitive to ceftriaxone and cefazolin. Patient will receive 7 days worth of cefazolin as an outpatient. Vascular surgery consultation and will need carotid endarterectomy in approximately 4 weeks due to the recent stroke. Cardiac consultation and patient will have outpatient stress test prior to carotid endarterectomy. Patient will follow up with Dr. Leonidas Sun in approximately 2 weeks. Patient is being discharged to skilled nursing facility. Discussed case with son, Lauren Valverde via phone. 7/30/22  carotid duplex  Impression:  1. Bilateral subclavian arteries are patent. 2.  Bilateral common carotid patent with mild to moderate irregular heterogeneous plaque throughout. 3.  Right proximal ECA shows no obvious plaque. 4.  Right proximal ICA shows mild irregular heterogeneous plaque without significant stenosis. 5.  Left proximal ECA shows mild heterogeneous plaque noted. 6.  Left proximal ICA shows irregular mixed plaque with high-grade stenosis, per criteria greater than 70% stenosis. 7.  Bilateral vertebral arteries are patent and antegrade flow.      7/30/22 echocardiogram    Left Ventricle: Normal left ventricular systolic function with a visually estimated EF of 60 - 65%. Left ventricle size is normal. Increased wall thickness. Findings consistent with concentric hypertrophy. Abnormal diastolic function. Aortic Valve: Tricuspid valve. 9/19/22 vascular surgery outpatient Dr Aretha Adam  Patient requires a preop clearance for possible carotid surgery. Patient supposed to see [facility]. Patient appears to be very weak today. I am not sure patient will be a good candidate for open carotid surgery on the left side. We will await for cardiac work-up and will go from there. 11/8/22 presents to hospital from nursing home with weakness and hypotension for one day. Associated with poor responsiveness. Found hypotensive on arrival to the ED, with metabolic encephalopathy, improved with intravenous fluid bolus. Found to have urinary infection, started on antibiotic, and I have been asked to admit to hospital for further stabilization of his condition. 11/9/22 transferred to ICU overnight for poor responsiveness and for septic shock. Managed with fluids and levophed    11/10/22 echocardiogram    Left Ventricle: Normal left ventricular systolic function with a visually estimated EF of 50 - 55%. Left ventricle size is normal. Mild septal thickening. Normal wall motion. Normal diastolic function. Right Ventricle: Right ventricle is mildly dilated. Normal wall thickness. Mitral Valve: Mild regurgitation. Left Atrium: Left atrium is dilated. 11/10/22 lower art duplex  Impression:  1. Right common femoral artery and proximal superficial femoral artery occluded and and popliteal artery patent and with patent distal anterior tibial artery and posterior tibial artery. There must be collateralization noted from the occluded femoral artery to popliteal artery. 2.  Left  mid to distal superficial artery occluded, with patent distal anterior tibial artery and posterior tibial artery on the left side.     11/12/22 no new complaints  Presently on tube feeds pending swallow evaluation    Subjective    Subjective:  Symptoms:  Stable. Review of Systems   All other systems reviewed and are negative. Objective         Vitals Last 24 Hours:  TEMPERATURE:  Temp  Av.2 °F (36.8 °C)  Min: 97.5 °F (36.4 °C)  Max: 98.5 °F (36.9 °C)  RESPIRATIONS RANGE: Resp  Av.3  Min: 18  Max: 20  PULSE OXIMETRY RANGE: SpO2  Av.5 %  Min: 92 %  Max: 99 %  PULSE RANGE: Pulse  Av.8  Min: 65  Max: 88  BLOOD PRESSURE RANGE: Systolic (27OUQ), CEDRICK:960 , Min:139 , AVB:440   ; Diastolic (26FVO), AYM:55, Min:75, Max:83    I/O (24Hr): Intake/Output Summary (Last 24 hours) at 2022 1013  Last data filed at 2022 0558  Gross per 24 hour   Intake 183 ml   Output 1300 ml   Net -1117 ml       Objective:  General Appearance:  Comfortable. Vital signs: (most recent): Blood pressure (!) 157/75, pulse 75, temperature 98.5 °F (36.9 °C), resp. rate 20, height 5' 9\" (1.753 m), weight 62.5 kg (137 lb 12.6 oz), SpO2 93 %. HEENT: Normal HEENT exam.    Lungs:  Normal effort. Heart: Normal rate. Regular rhythm. Abdomen: Abdomen is soft. Neurological: (lethargic). Skin:  Warm. 22 wound care note as follows  Patient is resting in bed, not alert. Patient has multiple scabs noted to bilateral lower legs and upper thighs, all unclear etiology at this time. All wounds cleansed with NS and a thin layer of therahoney applied to each wound and covered with ABD pads and rolled gauze. Patient has diabetic wound to right great toe, wound covered with slough and wet eschar, small area of pink/red wound bed visible. Wound cleansed with NS, thin layer of therahoney applied and covered with gauze and rolled gauze. Patient has diabetic wound noted to right 5th covered with dry eschar/scab. Wound cleansed with NS, a thin layer of therahoney applied and covered with gauze and rolled gauze.    Stage 1 PI noted to right heel, intact blister. Area covered with non-adherent foam pad and wrapped with rolled gauze. Patient has offloading boots applied to bilateral feet. sDTI that is opening to coccyx, wound bed pink/red purple/maroon. Wound cleansed with NS, opticel ag to be applied to wound bed and covered with sacral foam dressing. Podiatry has been consulted to evaluate wound to toe. Labs/Imaging/Diagnostics    Labs:  CBC:  Recent Labs     11/11/22  0328 11/10/22  0340   WBC 12.7* 16.5*   RBC 3.55* 3.54*   HGB 10.3* 10.2*   HCT 30.1* 30.0*   MCV 84.8 84.7   RDW 16.1* 16.2*   PLT 86* 83*       CHEMISTRIES:  Recent Labs     11/12/22  0529 11/11/22  0328 11/10/22  0735 11/10/22  0340   * 147* 147* 147*   K 3.4* 3.4* 3.0* 3.0*   * 117* 116* 117*   CO2 25 24 24 23   BUN 10 11 16 17   CA 7.5* 6.8* 7.0* 7.0*   PHOS 1.8*  --   --   --    MG  --   --   --  2.1     PT/INR:No results for input(s): INR, INREXT, INREXT in the last 72 hours. No lab exists for component: PROTIME  APTT:No results for input(s): APTT in the last 72 hours. LIVER PROFILE:  Recent Labs     11/11/22  0328 11/10/22  0735 11/10/22  0340   * 259* 233*   ALT 68 57 49       Lab Results   Component Value Date/Time    ALT (SGPT) 68 11/11/2022 03:28 AM    AST (SGOT) 265 (H) 11/11/2022 03:28 AM    Alk. phosphatase 52 11/11/2022 03:28 AM    Bilirubin, total 0.9 11/11/2022 03:28 AM       Imaging Last 24 Hours:  CT FOOT RT W CONT    Result Date: 11/11/2022  EXAM: CT FOOT RT W CONT INDICATION: Right foot swelling and abscess. Evaluate for osteomyelitis. Hypertension, diabetes, and cirrhosis. COMPARISON: Right foot views on 11/10/2022 CONTRAST: 100 mL of Isovue-370. TECHNIQUE: Helical CT of the right foot during uneventful rapid bolus intravenous contrast administration. Coronal and Sagittal reformats were generated. Images reviewed in soft tissue and bone windows.  CT dose reduction was achieved through the use of a standardized protocol tailored for this examination and automatic exposure control for dose modulation. FINDINGS: Bones: Mild osteopenia. No fracture or osteomyelitis. Joint fluid: Physiologic. Articulations: no evidence of septic arthritis. 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 Tendons: No full-thickness tendon tear. Muscles: Mild diffuse atrophy. Soft tissue mass: None. No fluid collection. 1. No abscess or osteomyelitis. 2. No fracture. XR CHEST PORT    Result Date: 11/12/2022  INDICATION: Ng tube placement Advanced NG per MD order, checking for placement again EXAMINATION:  AP CHEST, PORTABLE COMPARISON: 11/11/2022 FINDINGS: Single AP portable view of the chest demonstrates nasogastric tube advanced with tip over the gastric body. The cardiomediastinal silhouette is unchanged. No pneumothorax. Vague bibasilar opacities are unchanged. Nasogastric tube tip advanced overlying the gastric body. XR CHEST PORT    Result Date: 11/12/2022  INDICATION: NG tube placement EXAMINATION:  AP CHEST, PORTABLE COMPARISON: Earlier today FINDINGS: Single AP portable view of the chest demonstrates nasogastric tube tip over the gastric fundus, retracted in the interval. The cardiomediastinal silhouette is unchanged. Mild right greater than left basilar airspace opacities similar to prior study. Nasogastric tube tip over the gastric fundus. XR CHEST PORT    Result Date: 11/11/2022  EXAM:  XR CHEST PORT INDICATION: Enteric tube placement. Hypotension. COMPARISON: Portable chest on 11/10/2022 and 11/8/2022. TECHNIQUE: Semiupright portable chest AP view FINDINGS: Enteric tube extends into the stomach to the right of midline. Sternotomy wires are unchanged. Cardiac monitoring wires overlie the thorax. The cardiac silhouette is within normal limits. The pulmonary vasculature is within normal limits. Reticular interstitial opacities are increased and mild. No pneumothorax. Bones are osteopenic.      Increased mild interstitial pulmonary edema versus pneumonia. Enteric tube extends into the gastric antrum or first portion of the duodenum. Consider retraction 8-10 cm if the intention is gastric luminal termination. Assessment//Plan   Active Problems:    UTI (urinary tract infection) (11/8/2022)  CT Results  (Last 48 hours)                 11/11/22 1559  CT FOOT RT W CONT Final result    Impression:      1. No abscess or osteomyelitis. 2. No fracture. Narrative:  EXAM: CT FOOT RT W CONT       INDICATION: Right foot swelling and abscess. Evaluate for osteomyelitis. Hypertension, diabetes, and cirrhosis. COMPARISON: Right foot views on 11/10/2022       CONTRAST: 100 mL of Isovue-370. TECHNIQUE: Helical CT of the right foot during uneventful rapid bolus   intravenous contrast administration. Coronal and Sagittal reformats were   generated. Images reviewed in soft tissue and bone windows. CT dose reduction   was achieved through the use of a standardized protocol tailored for this   examination and automatic exposure control for dose modulation. FINDINGS: Bones: Mild osteopenia. No fracture or osteomyelitis. Joint fluid: Physiologic. Articulations: no evidence of septic arthritis. Mild first MTP and moderate MTS   osteoarthritis. Tendons: No full-thickness tendon tear. Muscles: Mild diffuse atrophy. Soft tissue mass: None. No fluid collection. Assessment & Plan  71M with past medical history of stroke, diabetes, hypertension, resides in a nursing facility. Recent admission 7/29/22-8/2/22 from discharge summary:  Patient was admitted on 7/29/2022 following presentation to the ED for transient facial droop 11:30 AM.  Due to history of strokes, suspected TIA versus CVA. CT was negative, patient was admitted for possible TIA and MRI was ordered. MRI showed acute ischemic stroke of the right parietal area.   Bilateral carotid ultrasounds revealed 50% stenosis of right ICA, 90% stenosis of left ICA. Urine culture positive for gram-negative rods Proteus Mirabella's and sensitive to ceftriaxone and cefazolin. Patient will receive 7 days worth of cefazolin as an outpatient. Vascular surgery consultation and will need carotid endarterectomy in approximately 4 weeks due to the recent stroke. Cardiac consultation and patient will have outpatient stress test prior to carotid endarterectomy. Patient will follow up with Dr. Carlos Eduardo Fountain in approximately 2 weeks. Patient is being discharged to skilled nursing facility. Discussed case with son, Josefa Vale via phone. 7/30/22  carotid duplex  Impression:  1. Bilateral subclavian arteries are patent. 2.  Bilateral common carotid patent with mild to moderate irregular heterogeneous plaque throughout. 3.  Right proximal ECA shows no obvious plaque. 4.  Right proximal ICA shows mild irregular heterogeneous plaque without significant stenosis. 5.  Left proximal ECA shows mild heterogeneous plaque noted. 6.  Left proximal ICA shows irregular mixed plaque with high-grade stenosis, per criteria greater than 70% stenosis. 7.  Bilateral vertebral arteries are patent and antegrade flow. 7/30/22 echocardiogram    Left Ventricle: Normal left ventricular systolic function with a visually estimated EF of 60 - 65%. Left ventricle size is normal. Increased wall thickness. Findings consistent with concentric hypertrophy. Abnormal diastolic function. Aortic Valve: Tricuspid valve. 9/19/22 vascular surgery outpatient Dr Roxie Cosby  Patient requires a preop clearance for possible carotid surgery. Patient supposed to see [facility] cardiology. Patient appears to be very weak today. I am not sure patient will be a good candidate for open carotid surgery on the left side. We will await for cardiac work-up and will go from there. 11/8/22 presents to hospital from nursing home with weakness and hypotension for one day. Associated with poor responsiveness.  Found hypotensive on arrival to the ED, with metabolic encephalopathy, improved with intravenous fluid bolus. Found to have urinary infection, started on antibiotic, and I have been asked to admit to hospital for further stabilization of his condition. 11/9/22 transferred to ICU overnight for poor responsiveness and for septic shock. Managed with fluids and levophed    11/10/22 echocardiogram    Left Ventricle: Normal left ventricular systolic function with a visually estimated EF of 50 - 55%. Left ventricle size is normal. Mild septal thickening. Normal wall motion. Normal diastolic function. Right Ventricle: Right ventricle is mildly dilated. Normal wall thickness. Mitral Valve: Mild regurgitation. Left Atrium: Left atrium is dilated. 11/10/22 lower art duplex  Impression:  1. Right common femoral artery and proximal superficial femoral artery occluded and and popliteal artery patent and with patent distal anterior tibial artery and posterior tibial artery. There must be collateralization noted from the occluded femoral artery to popliteal artery. 2.  Left  mid to distal superficial artery occluded, with patent distal anterior tibial artery and posterior tibial artery on the left side. 11/12/22 no new complaints  Presently on tube feeds pending swallow evaluation    MICROBIOLOGY    7/29/22            Urine                Proteus mirabilis     11/8/22            Blood              Negative  11/8/22            Urine              Mixed urogenital kirsten isolated   11/9/22 MRSA nasal Positive  11/9/22 Toe  Staphylococcus aureus Methcillin Resistant    ASSESSMENT AND PLAN    1) Septic shock in the setting of complex urinary infection at admission, further complicated with hypotension and metabolic encephalopathy.       Improved and now out of ICU   Urine cultures unrevealing   Blood cultures negative   Antibiotics per ID remain for complex foot infection     2) Cardiovascular disease including hypertension, carotid artery disease. History of CVA. In the outpatient setting had been following with [facility] cardiology and Dr Kenneth Baez for anticipated carotid endarterectomy. Further complicated with peripheral vascular disease. Recent echocardiogram as above              Continue present outpatient regimen    3) Chronic foot wounds with foul smelling drainage in the setting of peripheral vascular disease.      Antibiotics per ID   Wound care inpatient and outpatient     4) DVT prophylaxis with enoxaparin     5) Dispo return to skilled nursing once stable                     Electronically signed by Yumiko Lucas MD on 11/12/2022 at 7:16 AM

## 2022-11-12 NOTE — PROGRESS NOTES
Cxr result- retract NG tube 8-10cm. Attempted to do same and tube was accidentally dislodged by patient. 18Fr placed and stat cxr ordered.

## 2022-11-12 NOTE — PROGRESS NOTES
[facility]    Progress Note    Date:2022       Room:[facility]  Patient Name:Stanislaw Stephens     Date of Birth:     Age:71 y.o. Subjective:     Patient is a 70 y.o.  male who is being seen for right hallux ulceration. Patient has a hx of stenosis of carotid artery, CVA, diabetes mellitus, and hypertension. Patient is seen in ICU. Patient is not alert but he is in no acute distress. Unable to obtain history. Objective:     Vitals Last 24 Hours:  TEMPERATURE:  Temp  Av.2 °F (36.8 °C)  Min: 97.5 °F (36.4 °C)  Max: 98.5 °F (36.9 °C)  RESPIRATIONS RANGE: Resp  Av.3  Min: 18  Max: 20  PULSE OXIMETRY RANGE: SpO2  Av.5 %  Min: 92 %  Max: 99 %  PULSE RANGE: Pulse  Av.8  Min: 65  Max: 88  BLOOD PRESSURE RANGE: Systolic (18XNW), RQF:678 , Min:139 , EZR:148        Diastolic (55WLR), NOT:09, Min:75, Max:83        I/O (24Hr): Intake/Output Summary (Last 24 hours) at 2022 0913  Last data filed at 2022 0558  Gross per 24 hour   Intake 183 ml   Output 1300 ml   Net -1117 ml     Physical Exam:    GEN: Pt is in NAD. No dressing noted to B/L LE. No family noted at [facility]  DERM: Ulcer noted to the dorsal aspect of the right hallux. No fluctuance noted. Wound base right hallux is 100% eschar. Eschar noted to the right fifth toe. VASC: Pedal pulses (DP/PT) diminished to B/L LE. CFT<3sec to all digits of B/L LE. No pedal hair growth noted to the level of the digits for B/L LE. Skin temp is warm to cool from proximal to distal for B/L LE. Neg homans/brain signs to B/L LE. No varicosities or telangectasias noted to B/L LE.  NEURO: Protective and epicritic sensations absent intact to B/L LE  MSK: (-) POP, No gross deformities. Good muscle tone and bulk noted to B/L SHAUNA.       Labs/Imaging/Diagnostics:     Labs:  CBC:  Recent Labs     22  0328 11/10/22  0340   WBC 12.7* 16.5*   RBC 3.55* 3.54*   HGB 10.3* 10.2*   HCT 30.1* 30.0*   MCV 84.8 84.7   RDW 16.1* 16.2*   PLT 86* 83*     CHEMISTRIES:  Recent Labs     11/12/22  0529 11/11/22  0328 11/10/22  0735 11/10/22  0340   * 147* 147* 147*   K 3.4* 3.4* 3.0* 3.0*   * 117* 116* 117*   CO2 25 24 24 23   BUN 10 11 16 17   CA 7.5* 6.8* 7.0* 7.0*   PHOS 1.8*  --   --   --    MG  --   --   --  2.1   PT/INR:  No results for input(s): INR, INREXT, INREXT, INREXT in the last 72 hours. No lab exists for component: PROTIME  APTT:No results for input(s): APTT in the last 72 hours. LIVER PROFILE:  Recent Labs     11/11/22  0328 11/10/22  0735 11/10/22  0340   * 259* 233*   ALT 68 57 49     Lab Results   Component Value Date/Time    ALT (SGPT) 68 11/11/2022 03:28 AM    AST (SGOT) 265 (H) 11/11/2022 03:28 AM    Alk. phosphatase 52 11/11/2022 03:28 AM    Bilirubin, total 0.9 11/11/2022 03:28 AM       Imaging Last 24 Hours:  CT FOOT RT W CONT    Result Date: 11/11/2022  EXAM: CT FOOT RT W CONT INDICATION: Right foot swelling and abscess. Evaluate for osteomyelitis. Hypertension, diabetes, and cirrhosis. COMPARISON: Right foot views on 11/10/2022 CONTRAST: 100 mL of Isovue-370. TECHNIQUE: Helical CT of the right foot during uneventful rapid bolus intravenous contrast administration. Coronal and Sagittal reformats were generated. Images reviewed in soft tissue and bone windows. CT dose reduction was achieved through the use of a standardized protocol tailored for this examination and automatic exposure control for dose modulation. FINDINGS: Bones: Mild osteopenia. No fracture or osteomyelitis. Joint fluid: Physiologic. Articulations: no evidence of septic arthritis. 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 Tendons: No full-thickness tendon tear. Muscles: Mild diffuse atrophy. Soft tissue mass: None. No fluid collection. 1. No abscess or osteomyelitis. 2. No fracture.     XR CHEST PORT    Result Date: 11/12/2022  INDICATION: Ng tube placement Advanced NG per MD order, checking for placement again EXAMINATION:  AP CHEST, PORTABLE COMPARISON: 11/11/2022 FINDINGS: Single AP portable view of the chest demonstrates nasogastric tube advanced with tip over the gastric body. The cardiomediastinal silhouette is unchanged. No pneumothorax. Vague bibasilar opacities are unchanged. Nasogastric tube tip advanced overlying the gastric body. XR CHEST PORT    Result Date: 11/12/2022  INDICATION: NG tube placement EXAMINATION:  AP CHEST, PORTABLE COMPARISON: Earlier today FINDINGS: Single AP portable view of the chest demonstrates nasogastric tube tip over the gastric fundus, retracted in the interval. The cardiomediastinal silhouette is unchanged. Mild right greater than left basilar airspace opacities similar to prior study. Nasogastric tube tip over the gastric fundus. XR CHEST PORT    Result Date: 11/11/2022  EXAM:  XR CHEST PORT INDICATION: Enteric tube placement. Hypotension. COMPARISON: Portable chest on 11/10/2022 and 11/8/2022. TECHNIQUE: Semiupright portable chest AP view FINDINGS: Enteric tube extends into the stomach to the right of midline. Sternotomy wires are unchanged. Cardiac monitoring wires overlie the thorax. The cardiac silhouette is within normal limits. The pulmonary vasculature is within normal limits. Reticular interstitial opacities are increased and mild. No pneumothorax. Bones are osteopenic. Increased mild interstitial pulmonary edema versus pneumonia. Enteric tube extends into the gastric antrum or first portion of the duodenum. Consider retraction 8-10 cm if the intention is gastric luminal termination. Assessment:   Ulcer right hallux  Diabetes mellitus       Plan:   -Patient was seen and evaluated at bedside  - Current labs personally reviewed and findings dicussed with patient  - Ulcer of the right hallux was evaluated. Ulceration continues  with eschar with no fluctuance. No periwound erythema.  - Wound care: Clean ulceration with normal saline.   Apply Betadine soaked gauze 4 x 4 gauze, Kerlix and secure with tape.   Daily dressing changes  -Continue antibiotics as per infectious disease Dr. Pooja Cedeño will continue to follow patient    [address]ah Rivero [address], [address] [facility], [address] [facility] and [address]  O: [phone]  F: [phone]    Electronically signed by Sheila Hernandez DPM on 11/12/2022 at 8:38 AM

## 2022-11-12 NOTE — PROGRESS NOTES
Problem: Dysphagia (Adult)  Goal: *Acute Goals and Plan of Care (Insert Text)  Description: Speech Therapy Goals  Initiated 11/12/2022  -Patient stated goal: \"I want to eat. \"  -Patient will participate in modified barium swallow study within 5 day(s). [ ] Not met  [ ]  MET   [ ] Progressing  [ ] Melissa Velma  -Patient will tolerate PO trials with SLP only without signs/symptoms of aspiration given minimal cues within 5 day(s). [ ] Not met  [ ]  MET   [ ] Progressing  [ ] Elizabethmikayla Velma  -Patient will demonstrate understanding of swallow safety precautions and aspiration precautions, diet recs with minimal cues within 5 day(s). [ ] Not met  [ ]  MET   [ ] Progressing  [ ] Discontinue   Outcome: Not Progressing Towards Goal    SPEECH [address] EVALUATION  Patient: Chasity García (89 y.o. male)  Date: 11/12/2022  Primary Diagnosis: UTI (urinary tract infection) [N39.0]       Precautions: Aspirationi       ASSESSMENT :  Based on the objective data described below, the patient presents with moderate-severe oropharyngeal dysphagia. Patient positioned upright, yelling out random requests, but able to follow directions; Ox1. Patient is currently NPO receiving NG tube feeding. Patient given cold water, yogurt, and mildly thick applesauce. Straw sips were attempted but the patient could not suck from straw. Oral phase: Edentulous. Patient stated he wears dentures but they were at home and he doesn't use them to eat. Difficulty manipulating bolus, increased AP transit. Pharyngeal phase: delayed swallows requiring tactile and verbal cues to swallow. Weak HLE via digital palpation. Noted mild wet vocal quality, resulting in throat clearing and expectorating phlegm. Patient will benefit from skilled intervention to address the above impairments.   Patients rehabilitation potential is considered to be Fair     PLAN :  Recommendations and Planned Interventions:  Remain NPO with NG tube feeding. SLP to provide PO trials. Frequency/Duration: Patient will be followed by speech-language pathology daily to address goals. Discharge Recommendations: To Be Determined     SUBJECTIVE:   Patient Bring it up. OBJECTIVE:     CXR Results  (Last 48 hours)                 11/12/22 0144  XR CHEST PORT Final result    Impression:  Nasogastric tube tip advanced overlying the gastric body. Narrative:  INDICATION: Ng tube placement Advanced NG per MD order, checking for placement   again       EXAMINATION:  AP CHEST, PORTABLE       COMPARISON: 11/11/2022       FINDINGS: Single AP portable view of the chest demonstrates nasogastric tube   advanced with tip over the gastric body. The cardiomediastinal silhouette is   unchanged. No pneumothorax. Vague bibasilar opacities are unchanged. 11/11/22 2317  XR CHEST PORT Final result    Impression:  Nasogastric tube tip over the gastric fundus. Narrative:  INDICATION: NG tube placement       EXAMINATION:  AP CHEST, PORTABLE       COMPARISON: Earlier today       FINDINGS: Single AP portable view of the chest demonstrates nasogastric tube tip   over the gastric fundus, retracted in the interval. The cardiomediastinal   silhouette is unchanged. Mild right greater than left basilar airspace opacities   similar to prior study. 11/11/22 1650  XR CHEST PORT Final result    Impression:  Increased mild interstitial pulmonary edema versus pneumonia. Enteric tube extends into the gastric antrum or first portion of the duodenum. Consider retraction 8-10 cm if the intention is gastric luminal termination. Narrative:  EXAM:  XR CHEST PORT       INDICATION: Enteric tube placement. Hypotension. COMPARISON: Portable chest on 11/10/2022 and 11/8/2022. TECHNIQUE: Semiupright portable chest AP view       FINDINGS: Enteric tube extends into the stomach to the right of midline. Sternotomy wires are unchanged.  Cardiac monitoring wires overlie the thorax. The   cardiac silhouette is within normal limits. The pulmonary vasculature is within   normal limits. Reticular interstitial opacities are increased and mild. No pneumothorax. Bones   are osteopenic. CT Results  (Last 48 hours)                 11/11/22 1559  CT FOOT RT W CONT Final result    Impression:      1. No abscess or osteomyelitis. 2. No fracture. Narrative:  EXAM: CT FOOT RT W CONT       INDICATION: Right foot swelling and abscess. Evaluate for osteomyelitis. Hypertension, diabetes, and cirrhosis. COMPARISON: Right foot views on 11/10/2022       CONTRAST: 100 mL of Isovue-370. TECHNIQUE: Helical CT of the right foot during uneventful rapid bolus   intravenous contrast administration. Coronal and Sagittal reformats were   generated. Images reviewed in soft tissue and bone windows. CT dose reduction   was achieved through the use of a standardized protocol tailored for this   examination and automatic exposure control for dose modulation. FINDINGS: Bones: Mild osteopenia. No fracture or osteomyelitis. Joint fluid: Physiologic. Articulations: no evidence of septic arthritis. Mild first MTP and moderate MTS   osteoarthritis. Tendons: No full-thickness tendon tear. Muscles: Mild diffuse atrophy. Soft tissue mass: None. No fluid collection.                     Past Medical History:   Diagnosis Date    Cirrhosis ([facility] [address]     Diabetes ([facility] [address]     Hypertension     Stroke [facility])      Past Surgical History:   Procedure Laterality Date    HX BACK SURGERY      HX GI      IR INSERT NON TUNL CVC OVER 5 YRS  11/9/2022     Prior Level of Function/Home Situation: Dependent  Home Situation  Home Environment: Long term care  One/Two Story Residence: Other (Comment)  Living Alone: No  Support Systems: Inpatient [facility]  Patient Expects to be Discharged to[de-identified] Long Term Care  Current DME Used/Available at Home: None  Diet prior to admission: Unknown  Current Diet:  NPO   Cognitive and Communication Status:  Neurologic State: Confused, Alert  Orientation Level: Disoriented to place, Disoriented to situation, Disoriented to time  Cognition: Decreased attention/concentration         Voice: Wet vocal quality after PO trials that resolved after clearing throat and coughing up phlegm. Pain:  Pain Scale 1: Numeric (0 - 10)  Pain Intensity 1: 0       After treatment:   Patient left in no apparent distress in bed, Call bell within reach, and Nursing notified    COMMUNICATION/EDUCATION:   Patient was educated regarding purpose of SLP assessment, POC, diet recs and sw safety precautions. Patient demonstrated Jah Deal understanding as evidenced by AMS. The patient's plan of care including recommendations, planned interventions, and recommended diet changes were discussed with: Registered nurse and Physician. Patient is unable to participate in goal setting and plan of care.     Thank you for this referral.  Zayra Ramírez M.S. CCC-SLP

## 2022-11-13 LAB
ANION GAP SERPL CALC-SCNC: 4 MMOL/L (ref 5–15)
BUN SERPL-MCNC: 12 MG/DL (ref 6–20)
BUN/CREAT SERPL: 21 (ref 12–20)
CA-I BLD-MCNC: 7.3 MG/DL (ref 8.5–10.1)
CHLORIDE SERPL-SCNC: 120 MMOL/L (ref 97–108)
CO2 SERPL-SCNC: 26 MMOL/L (ref 21–32)
CREAT SERPL-MCNC: 0.56 MG/DL (ref 0.7–1.3)
GLUCOSE BLD STRIP.AUTO-MCNC: 150 MG/DL (ref 65–100)
GLUCOSE BLD STRIP.AUTO-MCNC: 152 MG/DL (ref 65–100)
GLUCOSE BLD STRIP.AUTO-MCNC: 177 MG/DL (ref 65–100)
GLUCOSE BLD STRIP.AUTO-MCNC: 178 MG/DL (ref 65–100)
GLUCOSE SERPL-MCNC: 201 MG/DL (ref 65–100)
PERFORMED BY, TECHID: ABNORMAL
POTASSIUM SERPL-SCNC: 3.5 MMOL/L (ref 3.5–5.1)
SODIUM SERPL-SCNC: 150 MMOL/L (ref 136–145)
VANCOMYCIN SERPL-MCNC: 15 UG/ML

## 2022-11-13 PROCEDURE — 82962 GLUCOSE BLOOD TEST: CPT

## 2022-11-13 PROCEDURE — 36415 COLL VENOUS BLD VENIPUNCTURE: CPT

## 2022-11-13 PROCEDURE — 65270000029 HC RM PRIVATE

## 2022-11-13 PROCEDURE — 74011636637 HC RX REV CODE- 636/637: Performed by: INTERNAL MEDICINE

## 2022-11-13 PROCEDURE — 74011000250 HC RX REV CODE- 250: Performed by: INTERNAL MEDICINE

## 2022-11-13 PROCEDURE — 80202 ASSAY OF VANCOMYCIN: CPT

## 2022-11-13 PROCEDURE — 74011000258 HC RX REV CODE- 258: Performed by: INTERNAL MEDICINE

## 2022-11-13 PROCEDURE — 74011250636 HC RX REV CODE- 250/636: Performed by: INTERNAL MEDICINE

## 2022-11-13 PROCEDURE — 74011250637 HC RX REV CODE- 250/637: Performed by: HOSPITALIST

## 2022-11-13 PROCEDURE — 74011250636 HC RX REV CODE- 250/636: Performed by: HOSPITALIST

## 2022-11-13 PROCEDURE — 74011250637 HC RX REV CODE- 250/637: Performed by: INTERNAL MEDICINE

## 2022-11-13 PROCEDURE — 80048 BASIC METABOLIC PNL TOTAL CA: CPT

## 2022-11-13 RX ADMIN — ESCITALOPRAM OXALATE 20 MG: 10 TABLET ORAL at 09:06

## 2022-11-13 RX ADMIN — PIPERACILLIN AND TAZOBACTAM 3.38 G: 3; .375 INJECTION, POWDER, FOR SOLUTION INTRAVENOUS at 08:56

## 2022-11-13 RX ADMIN — POTASSIUM CHLORIDE 20 MEQ: 1.5 FOR SOLUTION ORAL at 09:05

## 2022-11-13 RX ADMIN — FUROSEMIDE 20 MG: 40 TABLET ORAL at 09:04

## 2022-11-13 RX ADMIN — INSULIN LISPRO 2 UNITS: 100 INJECTION, SOLUTION INTRAVENOUS; SUBCUTANEOUS at 08:55

## 2022-11-13 RX ADMIN — CARVEDILOL 12.5 MG: 12.5 TABLET, FILM COATED ORAL at 09:08

## 2022-11-13 RX ADMIN — POTASSIUM CHLORIDE 20 MEQ: 1.5 FOR SOLUTION ORAL at 21:47

## 2022-11-13 RX ADMIN — GABAPENTIN 100 MG: 100 CAPSULE ORAL at 21:46

## 2022-11-13 RX ADMIN — TRAMADOL HYDROCHLORIDE 50 MG: 50 TABLET, COATED ORAL at 09:59

## 2022-11-13 RX ADMIN — CLOPIDOGREL BISULFATE 75 MG: 75 TABLET, FILM COATED ORAL at 09:06

## 2022-11-13 RX ADMIN — TRAMADOL HYDROCHLORIDE 50 MG: 50 TABLET, COATED ORAL at 01:18

## 2022-11-13 RX ADMIN — BACLOFEN 5 MG: 10 TABLET ORAL at 13:22

## 2022-11-13 RX ADMIN — ASPIRIN 81 MG 81 MG: 81 TABLET ORAL at 09:06

## 2022-11-13 RX ADMIN — SENNOSIDES AND DOCUSATE SODIUM 1 TABLET: 50; 8.6 TABLET ORAL at 09:06

## 2022-11-13 RX ADMIN — SODIUM CHLORIDE, PRESERVATIVE FREE 10 ML: 5 INJECTION INTRAVENOUS at 21:55

## 2022-11-13 RX ADMIN — PIPERACILLIN AND TAZOBACTAM 3.38 G: 3; .375 INJECTION, POWDER, FOR SOLUTION INTRAVENOUS at 01:15

## 2022-11-13 RX ADMIN — ARIPIPRAZOLE 2 MG: 2 TABLET ORAL at 09:06

## 2022-11-13 RX ADMIN — MUPIROCIN: 20 OINTMENT TOPICAL at 09:08

## 2022-11-13 RX ADMIN — PIPERACILLIN AND TAZOBACTAM 3.38 G: 3; .375 INJECTION, POWDER, FOR SOLUTION INTRAVENOUS at 17:50

## 2022-11-13 RX ADMIN — ACETAMINOPHEN 650 MG: 325 TABLET ORAL at 13:19

## 2022-11-13 RX ADMIN — VANCOMYCIN HYDROCHLORIDE 1000 MG: 1 INJECTION, POWDER, LYOPHILIZED, FOR SOLUTION INTRAVENOUS at 05:25

## 2022-11-13 RX ADMIN — HYDROXYZINE HYDROCHLORIDE 25 MG: 25 TABLET, FILM COATED ORAL at 01:18

## 2022-11-13 RX ADMIN — INSULIN LISPRO 2 UNITS: 100 INJECTION, SOLUTION INTRAVENOUS; SUBCUTANEOUS at 17:49

## 2022-11-13 RX ADMIN — VANCOMYCIN HYDROCHLORIDE 1000 MG: 1 INJECTION, POWDER, LYOPHILIZED, FOR SOLUTION INTRAVENOUS at 17:51

## 2022-11-13 RX ADMIN — DOXAZOSIN 1 MG: 2 TABLET ORAL at 21:46

## 2022-11-13 RX ADMIN — LOSARTAN POTASSIUM 50 MG: 50 TABLET, FILM COATED ORAL at 09:06

## 2022-11-13 RX ADMIN — PANTOPRAZOLE SODIUM 40 MG: 40 GRANULE, DELAYED RELEASE ORAL at 09:04

## 2022-11-13 RX ADMIN — TRAMADOL HYDROCHLORIDE 50 MG: 50 TABLET, COATED ORAL at 18:46

## 2022-11-13 RX ADMIN — CARVEDILOL 12.5 MG: 12.5 TABLET, FILM COATED ORAL at 21:46

## 2022-11-13 RX ADMIN — MELATONIN TAB 5 MG 10 MG: 5 TAB at 21:46

## 2022-11-13 RX ADMIN — SODIUM CHLORIDE, PRESERVATIVE FREE 10 ML: 5 INJECTION INTRAVENOUS at 05:32

## 2022-11-13 RX ADMIN — SODIUM CHLORIDE, PRESERVATIVE FREE 10 ML: 5 INJECTION INTRAVENOUS at 14:44

## 2022-11-13 RX ADMIN — ENOXAPARIN SODIUM 40 MG: 100 INJECTION SUBCUTANEOUS at 08:56

## 2022-11-13 RX ADMIN — MUPIROCIN: 20 OINTMENT TOPICAL at 21:47

## 2022-11-13 RX ADMIN — ATORVASTATIN CALCIUM 40 MG: 40 TABLET, FILM COATED ORAL at 09:05

## 2022-11-13 NOTE — PROGRESS NOTES
Problem: Falls - Risk of  Goal: *Absence of Falls  Description: Document Kiki Romeroadilene Fall Risk and appropriate interventions in the flowsheet.   Outcome: Progressing Towards Goal  Note: Fall Risk Interventions:       Mentation Interventions: Bed/chair exit alarm, Door open when patient unattended, Familiar objects from home, More frequent rounding, Reorient patient    Medication Interventions: Bed/chair exit alarm    Elimination Interventions: Bed/chair exit alarm

## 2022-11-13 NOTE — PROGRESS NOTES
[facility] Hospitalist Progress Note  Jas Rowland MD    Date:11/13/2022       Room:[facility]  Patient Name:Stanislaw Rivera     Date of Birth:[birth year]     Age:71 y.o. 71M with past medical history of stroke, diabetes, hypertension, resides in a nursing facility. Recent admission 7/29/22-8/2/22 from discharge summary:  Patient was admitted on 7/29/2022 following presentation to the ED for transient facial droop 11:30 AM.  Due to history of strokes, suspected TIA versus CVA. CT was negative, patient was admitted for possible TIA and MRI was ordered. MRI showed acute ischemic stroke of the right parietal area. Bilateral carotid ultrasounds revealed 50% stenosis of right ICA, 90% stenosis of left ICA. Urine culture positive for gram-negative rods Proteus Mirabella's and sensitive to ceftriaxone and cefazolin. Patient will receive 7 days worth of cefazolin as an outpatient. Vascular surgery consultation and will need carotid endarterectomy in approximately 4 weeks due to the recent stroke. Cardiac consultation and patient will have outpatient stress test prior to carotid endarterectomy. Patient will follow up with Dr. Simeon Gradna in approximately 2 weeks. Patient is being discharged to skilled nursing facility. Discussed case with son, Manjit Martinez via phone. 7/30/22  carotid duplex  Impression:  1. Bilateral subclavian arteries are patent. 2.  Bilateral common carotid patent with mild to moderate irregular heterogeneous plaque throughout. 3.  Right proximal ECA shows no obvious plaque. 4.  Right proximal ICA shows mild irregular heterogeneous plaque without significant stenosis. 5.  Left proximal ECA shows mild heterogeneous plaque noted. 6.  Left proximal ICA shows irregular mixed plaque with high-grade stenosis, per criteria greater than 70% stenosis. 7.  Bilateral vertebral arteries are patent and antegrade flow.      7/30/22 echocardiogram    Left Ventricle: Normal left ventricular systolic function with a visually estimated EF of 60 - 65%. Left ventricle size is normal. Increased wall thickness. Findings consistent with concentric hypertrophy. Abnormal diastolic function. Aortic Valve: Tricuspid valve. 9/19/22 vascular surgery outpatient Dr Pamela Zuñiga  Patient requires a preop clearance for possible carotid surgery. Patient supposed to see [facility] cardiology. Patient appears to be very weak today. I am not sure patient will be a good candidate for open carotid surgery on the left side. We will await for cardiac work-up and will go from there. 11/8/22 presents to hospital from nursing home with weakness and hypotension for one day. Associated with poor responsiveness. Found hypotensive on arrival to the ED, with metabolic encephalopathy, improved with intravenous fluid bolus. Found to have urinary infection, started on antibiotic, and I have been asked to admit to hospital for further stabilization of his condition. 11/9/22 transferred to ICU overnight for poor responsiveness and for septic shock. Managed with fluids and levophed    11/10/22 echocardiogram    Left Ventricle: Normal left ventricular systolic function with a visually estimated EF of 50 - 55%. Left ventricle size is normal. Mild septal thickening. Normal wall motion. Normal diastolic function. Right Ventricle: Right ventricle is mildly dilated. Normal wall thickness. Mitral Valve: Mild regurgitation. Left Atrium: Left atrium is dilated. 11/10/22 lower art duplex  Impression:  1. Right common femoral artery and proximal superficial femoral artery occluded and and popliteal artery patent and with patent distal anterior tibial artery and posterior tibial artery. There must be collateralization noted from the occluded femoral artery to popliteal artery. 2.  Left  mid to distal superficial artery occluded, with patent distal anterior tibial artery and posterior tibial artery on the left side.     11/13/22 no new complaints  Presently on tube feeds pending swallow evaluation    Subjective    Subjective:  Symptoms:  Stable. Review of Systems   All other systems reviewed and are negative. Objective         Vitals Last 24 Hours:  TEMPERATURE:  Temp  Av.8 °F (37.1 °C)  Min: 98.3 °F (36.8 °C)  Max: 99.9 °F (37.7 °C)  RESPIRATIONS RANGE: Resp  Av.3  Min: 20  Max: 21  PULSE OXIMETRY RANGE: SpO2  Av %  Min: 91 %  Max: 95 %  PULSE RANGE: Pulse  Av.3  Min: 75  Max: 86  BLOOD PRESSURE RANGE: Systolic (27IPU), ADK:089 , Min:121 , DORCAS:433   ; Diastolic (30TFO), SNU:07, Min:72, Max:79    I/O (24Hr): Intake/Output Summary (Last 24 hours) at 2022 0936  Last data filed at 2022 1817  Gross per 24 hour   Intake 540 ml   Output 151 ml   Net 389 ml       Objective:  General Appearance:  Comfortable. Vital signs: (most recent): Blood pressure (!) 144/79, pulse 80, temperature 98.6 °F (37 °C), resp. rate 21, height 5' 9\" (1.753 m), weight 62.5 kg (137 lb 12.6 oz), SpO2 91 %. HEENT: Normal HEENT exam.    Lungs:  Normal effort. Heart: Normal rate. Regular rhythm. Abdomen: Abdomen is soft. Neurological: (lethargic). Skin:  Warm. 22 wound care note as follows  Patient is resting in bed, not alert. Patient has multiple scabs noted to bilateral lower legs and upper thighs, all unclear etiology at this time. All wounds cleansed with NS and a thin layer of therahoney applied to each wound and covered with ABD pads and rolled gauze. Patient has diabetic wound to right great toe, wound covered with slough and wet eschar, small area of pink/red wound bed visible. Wound cleansed with NS, thin layer of therahoney applied and covered with gauze and rolled gauze. Patient has diabetic wound noted to right 5th covered with dry eschar/scab. Wound cleansed with NS, a thin layer of therahoney applied and covered with gauze and rolled gauze. Stage 1 PI noted to right heel, intact blister. Area covered with non-adherent foam pad and wrapped with rolled gauze. Patient has offloading boots applied to bilateral feet. sDTI that is opening to coccyx, wound bed pink/red purple/maroon. Wound cleansed with NS, opticel ag to be applied to wound bed and covered with sacral foam dressing. Podiatry has been consulted to evaluate wound to toe. Labs/Imaging/Diagnostics    Labs:  CBC:  Recent Labs     11/11/22  0328   WBC 12.7*   RBC 3.55*   HGB 10.3*   HCT 30.1*   MCV 84.8   RDW 16.1*   PLT 86*       CHEMISTRIES:  Recent Labs     11/13/22  0343 11/12/22  0529 11/11/22  0328   * 149* 147*   K 3.5 3.4* 3.4*   * 117* 117*   CO2 26 25 24   BUN 12 10 11   CA 7.3* 7.5* 6.8*   PHOS  --  1.8*  --      PT/INR:No results for input(s): INR, INREXT, INREXT in the last 72 hours. No lab exists for component: PROTIME  APTT:No results for input(s): APTT in the last 72 hours. LIVER PROFILE:  Recent Labs     11/11/22 0328   *   ALT 68       Lab Results   Component Value Date/Time    ALT (SGPT) 68 11/11/2022 03:28 AM    AST (SGOT) 265 (H) 11/11/2022 03:28 AM    Alk. phosphatase 52 11/11/2022 03:28 AM    Bilirubin, total 0.9 11/11/2022 03:28 AM       Imaging Last 24 Hours:  No results found. Assessment//Plan   Active Problems:    UTI (urinary tract infection) (11/8/2022)  CT Results  (Last 48 hours)                 11/11/22 1559  CT FOOT RT W CONT Final result    Impression:      1. No abscess or osteomyelitis. 2. No fracture. Narrative:  EXAM: CT FOOT RT W CONT       INDICATION: Right foot swelling and abscess. Evaluate for osteomyelitis. Hypertension, diabetes, and cirrhosis. COMPARISON: Right foot views on 11/10/2022       CONTRAST: 100 mL of Isovue-370. TECHNIQUE: Helical CT of the right foot during uneventful rapid bolus   intravenous contrast administration. Coronal and Sagittal reformats were   generated. Images reviewed in soft tissue and bone windows.  CT dose reduction   was achieved through the use of a standardized protocol tailored for this   examination and automatic exposure control for dose modulation. FINDINGS: Bones: Mild osteopenia. No fracture or osteomyelitis. Joint fluid: Physiologic. Articulations: no evidence of septic arthritis. Mild first MTP and moderate MTS   osteoarthritis. Tendons: No full-thickness tendon tear. Muscles: Mild diffuse atrophy. Soft tissue mass: None. No fluid collection. Assessment & Plan  71M with past medical history of stroke, diabetes, hypertension, resides in a nursing facility. Recent admission 7/29/22-8/2/22 from discharge summary:  Patient was admitted on 7/29/2022 following presentation to the ED for transient facial droop 11:30 AM.  Due to history of strokes, suspected TIA versus CVA. CT was negative, patient was admitted for possible TIA and MRI was ordered. MRI showed acute ischemic stroke of the right parietal area. Bilateral carotid ultrasounds revealed 50% stenosis of right ICA, 90% stenosis of left ICA. Urine culture positive for gram-negative rods Proteus Mirabella's and sensitive to ceftriaxone and cefazolin. Patient will receive 7 days worth of cefazolin as an outpatient. Vascular surgery consultation and will need carotid endarterectomy in approximately 4 weeks due to the recent stroke. Cardiac consultation and patient will have outpatient stress test prior to carotid endarterectomy. Patient will follow up with Dr. Lucina Berman in approximately 2 weeks. Patient is being discharged to skilled nursing facility. Discussed case with son, Nay Vicente via phone. 7/30/22  carotid duplex  Impression:  1. Bilateral subclavian arteries are patent. 2.  Bilateral common carotid patent with mild to moderate irregular heterogeneous plaque throughout. 3.  Right proximal ECA shows no obvious plaque.   4.  Right proximal ICA shows mild irregular heterogeneous plaque without significant stenosis. 5.  Left proximal ECA shows mild heterogeneous plaque noted. 6.  Left proximal ICA shows irregular mixed plaque with high-grade stenosis, per criteria greater than 70% stenosis. 7.  Bilateral vertebral arteries are patent and antegrade flow. 7/30/22 echocardiogram    Left Ventricle: Normal left ventricular systolic function with a visually estimated EF of 60 - 65%. Left ventricle size is normal. Increased wall thickness. Findings consistent with concentric hypertrophy. Abnormal diastolic function. Aortic Valve: Tricuspid valve. 9/19/22 vascular surgery outpatient Dr Ovalle Him  Patient requires a preop clearance for possible carotid surgery. Patient supposed to see [facility] cardiology. Patient appears to be very weak today. I am not sure patient will be a good candidate for open carotid surgery on the left side. We will await for cardiac work-up and will go from there. 11/8/22 presents to hospital from nursing home with weakness and hypotension for one day. Associated with poor responsiveness. Found hypotensive on arrival to the ED, with metabolic encephalopathy, improved with intravenous fluid bolus. Found to have urinary infection, started on antibiotic, and I have been asked to admit to hospital for further stabilization of his condition. 11/9/22 transferred to ICU overnight for poor responsiveness and for septic shock. Managed with fluids and levophed    11/10/22 echocardiogram    Left Ventricle: Normal left ventricular systolic function with a visually estimated EF of 50 - 55%. Left ventricle size is normal. Mild septal thickening. Normal wall motion. Normal diastolic function. Right Ventricle: Right ventricle is mildly dilated. Normal wall thickness. Mitral Valve: Mild regurgitation. Left Atrium: Left atrium is dilated. 11/10/22 lower art duplex  Impression:  1.   Right common femoral artery and proximal superficial femoral artery occluded and and popliteal artery patent and with patent distal anterior tibial artery and posterior tibial artery. There must be collateralization noted from the occluded femoral artery to popliteal artery. 2.  Left  mid to distal superficial artery occluded, with patent distal anterior tibial artery and posterior tibial artery on the left side. 11/13/22 no new complaints  Presently on tube feeds pending swallow evaluation    MICROBIOLOGY    7/29/22            Urine                Proteus mirabilis     11/8/22            Blood              Negative  11/8/22            Urine              Mixed urogenital kristen isolated   11/9/22 MRSA nasal Positive  11/9/22 Toe  Staphylococcus aureus Methcillin Resistant    ASSESSMENT AND PLAN    1) Septic shock in the setting of complex urinary infection at admission, further complicated with hypotension and metabolic encephalopathy. Improved and now out of ICU   Urine cultures unrevealing   Blood cultures negative   Antibiotics per ID remain for complex foot infection     2) Cardiovascular disease including hypertension, carotid artery disease. History of CVA. In the outpatient setting had been following with [facility] cardiology and Dr Kenneth Baez for anticipated carotid endarterectomy. Further complicated with peripheral vascular disease. Recent echocardiogram as above              Continue present outpatient regimen    3) Chronic foot wounds with foul smelling drainage in the setting of peripheral vascular disease.      Antibiotics per ID   Wound care inpatient and outpatient     4) DVT prophylaxis with enoxaparin     5) Dispo return to skilled nursing once stable                     Electronically signed by Yumiko Lucas MD on 11/13/2022 at 7:16 AM

## 2022-11-13 NOTE — PROGRESS NOTES
IMPRESSION:   Acute hypoxic respiratory failure resolved  Severe sepsis   Metabolic encephalopathy  Probably urinary tract infection  Leukocytosis improved  Carotid artery disease history of CVA  Hypokalemia      RECOMMENDATIONS/PLAN:   42-year-old male nursing home resident admitted with hypotension and unresponsiveness  Hemodynamically stable  Panculture IV antibiotics, ABG shows respiratory alkalosis  Need vascular surgery consult for possible need endarterectomy when more stable  Previous 2D echo showed ejection fraction 60 to 65%  Chest x-ray shows fluid overload congestive changes   Replace potassium     [x] High complexity decision making was performed  [x] See my orders for details  HPI  42-year-old nursing home resident came in because of unresponsiveness he had a history of stroke in the past patient was hypotensive hypoxic rapid response was called he was admitted to the floor initially received IV fluid hemodynamically unstable started on vasopressors Levophed transferred to ICU White count was elevated he was put on oxygen 4 L nasal cannula unable to get any started the patient he has a right foot wound and multiple wounds of the lower extremities dressing in place. He has carotid artery stenosis only supposed to go for surgery but unable to get cardiac clearance because of nuclear stress test he has 90% stenosis of left ICA and 50% stenosis of right ICA. So now he is admitted to ICU and critical care consult was called  PMH:  has a past medical history of Cirrhosis ([facility] [address] Diabetes ([address] Hypertension, and Stroke ([facility] [address] PSH:   has a past surgical history that includes hx back surgery; hx gi; and ir insert non tunl cvc over 5 yrs (11/9/2022). FHX: family history includes Heart Disease in his father. SHX:  reports that he has quit smoking. He has never used smokeless tobacco. He reports that he does not currently use alcohol. He reports that he does not currently use drugs.     ALL: No Known Allergies     MEDS:   [x] Reviewed - As Below   [] Not reviewed    Current Facility-Administered Medications   Medication    piperacillin-tazobactam (ZOSYN) 3.375 g in 0.9% sodium chloride (MBP/ADV) 100 mL MBP    Vancomycin Random Level Due on 11/13 at 1300    vancomycin (VANCOCIN) 1,000 mg in 0.9% sodium chloride 250 mL ([id number])    VANCOMYCIN INFORMATION NOTE    mupirocin (BACTROBAN) 2 % ointment    aspirin chewable tablet 81 mg    atorvastatin (LIPITOR) tablet 40 mg    escitalopram oxalate (LEXAPRO) tablet 20 mg    gabapentin (NEURONTIN) capsule 100 mg    melatonin tablet 10 mg    baclofen (LIORESAL) tablet 5 mg    hydrOXYzine HCL (ATARAX) tablet 25 mg    polyethylene glycol (MIRALAX) packet 17 g    traMADoL (ULTRAM) tablet 50 mg    doxazosin (CARDURA) tablet 1 mg    potassium chloride (KLOR-CON) packet for solution 20 mEq    pantoprazole (PROTONIX) granules for oral suspension 40 mg    sodium chloride (NS) flush 5-40 mL    sodium chloride (NS) flush 5-40 mL    acetaminophen (TYLENOL) tablet 650 mg    Or    acetaminophen (TYLENOL) suppository 650 mg    ondansetron (ZOFRAN ODT) tablet 4 mg    Or    ondansetron (ZOFRAN) injection 4 mg    enoxaparin (LOVENOX) injection 40 mg    glucose chewable tablet 16 g    glucagon (GLUCAGEN) injection 1 mg    dextrose 10% infusion 0-250 mL    insulin lispro (HUMALOG) injection    ARIPiprazole (ABILIFY) tablet 2 mg    carvediloL (COREG) tablet 12.5 mg    clopidogreL (PLAVIX) tablet 75 mg    losartan (COZAAR) tablet 50 mg    senna-docusate (PERICOLACE) 8.6-50 mg per tablet 1 Tablet    [Held by provider] tamsulosin (FLOMAX) capsule 0.4 mg    furosemide (LASIX) tablet 20 mg      MAR reviewed and pertinent medications noted or modified as needed   Current Facility-Administered Medications   Medication    piperacillin-tazobactam (ZOSYN) 3.375 g in 0.9% sodium chloride (MBP/ADV) 100 mL MBP    Vancomycin Random Level Due on 11/13 at 1300    vancomycin (VANCOCIN) 1,000 mg in 0.9% sodium chloride 250 mL ([id number])    VANCOMYCIN INFORMATION NOTE    mupirocin (BACTROBAN) 2 % ointment    aspirin chewable tablet 81 mg    atorvastatin (LIPITOR) tablet 40 mg    escitalopram oxalate (LEXAPRO) tablet 20 mg    gabapentin (NEURONTIN) capsule 100 mg    melatonin tablet 10 mg    baclofen (LIORESAL) tablet 5 mg    hydrOXYzine HCL (ATARAX) tablet 25 mg    polyethylene glycol (MIRALAX) packet 17 g    traMADoL (ULTRAM) tablet 50 mg    doxazosin (CARDURA) tablet 1 mg    potassium chloride (KLOR-CON) packet for solution 20 mEq    pantoprazole (PROTONIX) granules for oral suspension 40 mg    sodium chloride (NS) flush 5-40 mL    sodium chloride (NS) flush 5-40 mL    acetaminophen (TYLENOL) tablet 650 mg    Or    acetaminophen (TYLENOL) suppository 650 mg    ondansetron (ZOFRAN ODT) tablet 4 mg    Or    ondansetron (ZOFRAN) injection 4 mg    enoxaparin (LOVENOX) injection 40 mg    glucose chewable tablet 16 g    glucagon (GLUCAGEN) injection 1 mg    dextrose 10% infusion 0-250 mL    insulin lispro (HUMALOG) injection    ARIPiprazole (ABILIFY) tablet 2 mg    carvediloL (COREG) tablet 12.5 mg    clopidogreL (PLAVIX) tablet 75 mg    losartan (COZAAR) tablet 50 mg    senna-docusate (PERICOLACE) 8.6-50 mg per tablet 1 Tablet    [Held by provider] tamsulosin (FLOMAX) capsule 0.4 mg    furosemide (LASIX) tablet 20 mg      PMH:  has a past medical history of Cirrhosis ([facility] [address] Diabetes ([facility] [address] Hypertension, and Stroke ([facility] [address] PSH:   has a past surgical history that includes hx back surgery; hx gi; and ir insert non tunl cvc over 5 yrs (11/9/2022). FHX: family history includes Heart Disease in his father. SHX:  reports that he has quit smoking. He has never used smokeless tobacco. He reports that he does not currently use alcohol. He reports that he does not currently use drugs. ROS:Review of systems not obtained due to patient factors.       Hemodynamics:    CO:    CI:    CVP:    SVR:   PAP Systolic:    PAP Diastolic: PVR:    SV02:        Ventilator Settings:      Mode Rate TV Press PEEP FiO2 PIP Min. Vent                              Vital Signs: Telemetry:    normal sinus rhythm Intake/Output:   Visit Vitals  BP (!) 144/79 (BP 1 Location: Left upper arm)   Pulse 80   Temp 98.6 °F (37 °C)   Resp 21   Ht 5' 9\" (1.753 m)   Wt 62.5 kg (137 lb 12.6 oz)   SpO2 91%   BMI 20.35 kg/m²       Temp (24hrs), Av.8 °F (37.1 °C), Min:98.3 °F (36.8 °C), Max:99.9 °F (37.7 °C)        O2 Device: None (Room air) O2 Flow Rate (L/min): 2 l/min       Wt Readings from Last 4 Encounters:   11/10/22 62.5 kg (137 lb 12.6 oz)   22 84.8 kg (187 lb)   22 84.8 kg (187 lb)   10/28/20 99.8 kg (220 lb)          Intake/Output Summary (Last 24 hours) at 2022 0931  Last data filed at 2022 1817  Gross per 24 hour   Intake 540 ml   Output 151 ml   Net 389 ml         Last shift:      No intake/output data recorded. Last 3 shifts:  1901 -  0700  In: 723 [I.V.:300]  Out: 1451 [Urine:1450]       Physical Exam:     General: He is on room air  HEENT: NCAT, poor dentition, lips and mucosa dry  Eyes: anicteric; conjunctiva clear  Neck: no nodes, trach midline; no accessory MM use.   Chest: no deformity,   Cardiac: R regular; no murmur;   Lungs: distant breath sounds; wheezes  Abd: soft, NT, hypoactive BS  Ext: Lower extremity wound bandage in place  : NO kennedy, clear urine  Neuro: Unresponsive  Psych-unable to assess  Skin: warm, dry, no cyanosis;   Pulses: 1-2+ Bilateral pedal, radial  Capillary: brisk; pale      DATA:    MAR reviewed and pertinent medications noted or modified as needed  MEDS:   Current Facility-Administered Medications   Medication    piperacillin-tazobactam (ZOSYN) 3.375 g in 0.9% sodium chloride (MBP/ADV) 100 mL MBP    Vancomycin Random Level Due on  at 1300    vancomycin (VANCOCIN) 1,000 mg in 0.9% sodium chloride 250 mL ([id number])    VANCOMYCIN INFORMATION NOTE    mupirocin (BACTROBAN) 2 % ointment aspirin chewable tablet 81 mg    atorvastatin (LIPITOR) tablet 40 mg    escitalopram oxalate (LEXAPRO) tablet 20 mg    gabapentin (NEURONTIN) capsule 100 mg    melatonin tablet 10 mg    baclofen (LIORESAL) tablet 5 mg    hydrOXYzine HCL (ATARAX) tablet 25 mg    polyethylene glycol (MIRALAX) packet 17 g    traMADoL (ULTRAM) tablet 50 mg    doxazosin (CARDURA) tablet 1 mg    potassium chloride (KLOR-CON) packet for solution 20 mEq    pantoprazole (PROTONIX) granules for oral suspension 40 mg    sodium chloride (NS) flush 5-40 mL    sodium chloride (NS) flush 5-40 mL    acetaminophen (TYLENOL) tablet 650 mg    Or    acetaminophen (TYLENOL) suppository 650 mg    ondansetron (ZOFRAN ODT) tablet 4 mg    Or    ondansetron (ZOFRAN) injection 4 mg    enoxaparin (LOVENOX) injection 40 mg    glucose chewable tablet 16 g    glucagon (GLUCAGEN) injection 1 mg    dextrose 10% infusion 0-250 mL    insulin lispro (HUMALOG) injection    ARIPiprazole (ABILIFY) tablet 2 mg    carvediloL (COREG) tablet 12.5 mg    clopidogreL (PLAVIX) tablet 75 mg    losartan (COZAAR) tablet 50 mg    senna-docusate (PERICOLACE) 8.6-50 mg per tablet 1 Tablet    [Held by provider] tamsulosin (FLOMAX) capsule 0.4 mg    furosemide (LASIX) tablet 20 mg        Labs:    Recent Labs     11/11/22  0328   WBC 12.7*   HGB 10.3*   PLT 86*       Recent Labs     11/13/22  0343 11/12/22  0529 11/11/22  0328   * 149* 147*   K 3.5 3.4* 3.4*   * 117* 117*   CO2 26 25 24   * 132* 120*   BUN 12 10 11   CREA 0.56* 0.48* 0.44*   CA 7.3* 7.5* 6.8*   PHOS  --  1.8*  --    LAC  --  1.3  --    ALB  --   --  2.0*   ALT  --   --  68       No results for input(s): PH, PCO2, PO2, HCO3, FIO2 in the last 72 hours. No results for input(s): CPK, CKNDX, TROIQ in the last 72 hours.     No lab exists for component: CPKMB    No results found for: BNPP, BNP   Lab Results   Component Value Date/Time    Culture result:  11/09/2022 02:10 AM     Heavy * methicillin resistant staphylococcus aureus *    Culture result: Heavy Diphtheroids 11/09/2022 02:10 AM    Culture result: No growth 5 days 11/08/2022 01:25 PM     Lab Results   Component Value Date/Time    TSH 1.67 07/30/2022 07:11 AM        Imaging:    Results from [place] encounter on 11/08/22    XR CHEST PORT    Narrative  INDICATION: Ng tube placement Advanced NG per MD order, checking for placement  again    EXAMINATION:  AP CHEST, PORTABLE    COMPARISON: 11/11/2022    FINDINGS: Single AP portable view of the chest demonstrates nasogastric tube  advanced with tip over the gastric body. The cardiomediastinal silhouette is  unchanged. No pneumothorax. Vague bibasilar opacities are unchanged. Impression  Nasogastric tube tip advanced overlying the gastric body. Results from [place] encounter on 11/08/22    CT FOOT RT W CONT    Narrative  EXAM: CT FOOT RT W CONT    INDICATION: Right foot swelling and abscess. Evaluate for osteomyelitis. Hypertension, diabetes, and cirrhosis. COMPARISON: Right foot views on 11/10/2022    CONTRAST: 100 mL of [id number]. TECHNIQUE: Helical CT of the right foot during uneventful rapid bolus  intravenous contrast administration. Coronal and Sagittal reformats were  generated. Images reviewed in soft tissue and bone windows. CT dose reduction  was achieved through the use of a standardized protocol tailored for this  examination and automatic exposure control for dose modulation. FINDINGS: Bones: Mild osteopenia. No fracture or osteomyelitis. Joint fluid: Physiologic. Articulations: no evidence of septic arthritis. 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 Tendons: No full-thickness tendon tear. Muscles: Mild diffuse atrophy. Soft tissue mass: None. No fluid collection. Impression  1. No abscess or osteomyelitis. 2. No fracture.       11/10 patient is barely responsive now on room air off pressors getting IV fluid chest x-ray shows congestive changes IV fluid has been decreased, replace potassium, WBC much improved  11/11 Patient is out of ICU, responding slightly, he is on room air. PCO2 30. WBC improving, continues to decrease. 11/12 On room air, condition remains same open eyes but does not follow any commands, replace potassium  11/13 Room air, no O2 in hospital. He opened his eyes, tried to respond slightly by denying SOB, but unable to communicate clearly. NG tube in place.

## 2022-11-13 NOTE — PROGRESS NOTES
Vancomycin Dosing Consult  Jessica Yanez is a 70 y.o. male with severe sepsis due to UTI and toe ulcer. Pharmacy was consulted by Dr. Ronal Shipman to dose and monitor Vancomycin. Today is day 5. Antibiotic regimen: Vancomycin + Zosyn    Temp (24hrs), Av.8 °F (37.1 °C), Min:98.3 °F (36.8 °C), Max:99.9 °F (37.7 °C)    Recent Labs     22  0328   WBC 12.7*     Recent Labs     22  0343 22  0529 22  0328 11/10/22  0735 11/10/22  0340 22  0210 22  1102   CREA 0.56* 0.48* 0.44* 0.68* 0.68* 1.65* 1.46*   BUN 12 10 11 16 17 40* 36*       Estimated Creatinine Clearance: 85.6 mL/min (A) (by C-G formula based on SCr of 0.56 mg/dL (L)).  ml/min  Concomitant nephrotoxic drugs: Loop diuretics    Cultures:    Blood: ngtd, prelim   Urine: 20,000 colonies/mL, Mixed urogential Nataly isolated (final)   Wound: Heavy MRSA, Heavy Diphtheroids (final)    MRSA Swab: Detected     Target range: AUC/TELMA 400-600    Recent level history:  Date/Time Dose & Interval Measured Level (mcg/mL) Associated AUC/TELMA   11/10 0340 2,000 mg IV x 1 dose 7.7 N/A    1258 1,000 mg IV x 1 dose 14.5 413    1322 1,000 mg IV q12h 15 438     Assessment/Plan:   Afebrile, leukocytosis, elevated CRP and PCT  Continue vancomycin 1,000 mg IV every 12 hours for a projected AUC/TELMA ratio of 438  Vancomycin level scheduled for 11/15 0400  Antimicrobial stop date TBD

## 2022-11-13 NOTE — PROGRESS NOTES
IMPRESSION:   Acute hypoxic respiratory failure resolved  Severe sepsis   Metabolic encephalopathy  Probably urinary tract infection  Leukocytosis improved  Carotid artery disease history of CVA  Hypokalemia      RECOMMENDATIONS/PLAN:   77-year-old male nursing home resident admitted with hypotension and unresponsiveness  Hemodynamically stable  Panculture IV antibiotics, ABG shows respiratory alkalosis  Need vascular surgery consult for possible need endarterectomy when more stable  Previous 2D echo showed ejection fraction 60 to 65%  Chest x-ray shows fluid overload congestive changes   Replace potassium     [x] High complexity decision making was performed  [x] See my orders for details  HPI  77-year-old nursing home resident came in because of unresponsiveness he had a history of stroke in the past patient was hypotensive hypoxic rapid response was called he was admitted to the floor initially received IV fluid hemodynamically unstable started on vasopressors Levophed transferred to ICU White count was elevated he was put on oxygen 4 L nasal cannula unable to get any started the patient he has a right foot wound and multiple wounds of the lower extremities dressing in place. He has carotid artery stenosis only supposed to go for surgery but unable to get cardiac clearance because of nuclear stress test he has 90% stenosis of left ICA and 50% stenosis of right ICA. So now he is admitted to ICU and critical care consult was called  PMH:  has a past medical history of Cirrhosis ([facility] [address] Diabetes ([address] Hypertension, and Stroke ([facility] [address] PSH:   has a past surgical history that includes hx back surgery; hx gi; and ir insert non tunl cvc over 5 yrs (11/9/2022). FHX: family history includes Heart Disease in his father. SHX:  reports that he has quit smoking. He has never used smokeless tobacco. He reports that he does not currently use alcohol. He reports that he does not currently use drugs.     ALL: No Known Allergies     MEDS:   [x] Reviewed - As Below   [] Not reviewed    Current Facility-Administered Medications   Medication    piperacillin-tazobactam (ZOSYN) 3.375 g in 0.9% sodium chloride (MBP/ADV) 100 mL MBP    Vancomycin Random Level Due on 11/13 at 1300    vancomycin (VANCOCIN) 1,000 mg in 0.9% sodium chloride 250 mL ([id number])    VANCOMYCIN INFORMATION NOTE    mupirocin (BACTROBAN) 2 % ointment    aspirin chewable tablet 81 mg    atorvastatin (LIPITOR) tablet 40 mg    escitalopram oxalate (LEXAPRO) tablet 20 mg    gabapentin (NEURONTIN) capsule 100 mg    melatonin tablet 10 mg    baclofen (LIORESAL) tablet 5 mg    hydrOXYzine HCL (ATARAX) tablet 25 mg    polyethylene glycol (MIRALAX) packet 17 g    traMADoL (ULTRAM) tablet 50 mg    doxazosin (CARDURA) tablet 1 mg    potassium chloride (KLOR-CON) packet for solution 20 mEq    pantoprazole (PROTONIX) granules for oral suspension 40 mg    sodium chloride (NS) flush 5-40 mL    sodium chloride (NS) flush 5-40 mL    acetaminophen (TYLENOL) tablet 650 mg    Or    acetaminophen (TYLENOL) suppository 650 mg    ondansetron (ZOFRAN ODT) tablet 4 mg    Or    ondansetron (ZOFRAN) injection 4 mg    enoxaparin (LOVENOX) injection 40 mg    glucose chewable tablet 16 g    glucagon (GLUCAGEN) injection 1 mg    dextrose 10% infusion 0-250 mL    insulin lispro (HUMALOG) injection    ARIPiprazole (ABILIFY) tablet 2 mg    carvediloL (COREG) tablet 12.5 mg    clopidogreL (PLAVIX) tablet 75 mg    losartan (COZAAR) tablet 50 mg    senna-docusate (PERICOLACE) 8.6-50 mg per tablet 1 Tablet    [Held by provider] tamsulosin (FLOMAX) capsule 0.4 mg    furosemide (LASIX) tablet 20 mg      MAR reviewed and pertinent medications noted or modified as needed   Current Facility-Administered Medications   Medication    piperacillin-tazobactam (ZOSYN) 3.375 g in 0.9% sodium chloride (MBP/ADV) 100 mL MBP    Vancomycin Random Level Due on 11/13 at 1300    vancomycin (VANCOCIN) 1,000 mg in 0.9% sodium chloride 250 mL ([id number])    VANCOMYCIN INFORMATION NOTE    mupirocin (BACTROBAN) 2 % ointment    aspirin chewable tablet 81 mg    atorvastatin (LIPITOR) tablet 40 mg    escitalopram oxalate (LEXAPRO) tablet 20 mg    gabapentin (NEURONTIN) capsule 100 mg    melatonin tablet 10 mg    baclofen (LIORESAL) tablet 5 mg    hydrOXYzine HCL (ATARAX) tablet 25 mg    polyethylene glycol (MIRALAX) packet 17 g    traMADoL (ULTRAM) tablet 50 mg    doxazosin (CARDURA) tablet 1 mg    potassium chloride (KLOR-CON) packet for solution 20 mEq    pantoprazole (PROTONIX) granules for oral suspension 40 mg    sodium chloride (NS) flush 5-40 mL    sodium chloride (NS) flush 5-40 mL    acetaminophen (TYLENOL) tablet 650 mg    Or    acetaminophen (TYLENOL) suppository 650 mg    ondansetron (ZOFRAN ODT) tablet 4 mg    Or    ondansetron (ZOFRAN) injection 4 mg    enoxaparin (LOVENOX) injection 40 mg    glucose chewable tablet 16 g    glucagon (GLUCAGEN) injection 1 mg    dextrose 10% infusion 0-250 mL    insulin lispro (HUMALOG) injection    ARIPiprazole (ABILIFY) tablet 2 mg    carvediloL (COREG) tablet 12.5 mg    clopidogreL (PLAVIX) tablet 75 mg    losartan (COZAAR) tablet 50 mg    senna-docusate (PERICOLACE) 8.6-50 mg per tablet 1 Tablet    [Held by provider] tamsulosin (FLOMAX) capsule 0.4 mg    furosemide (LASIX) tablet 20 mg      PMH:  has a past medical history of Cirrhosis ([facility] [address] Diabetes ([facility] [address] Hypertension, and Stroke ([facility] [address] PSH:   has a past surgical history that includes hx back surgery; hx gi; and ir insert non tunl cvc over 5 yrs (11/9/2022). FHX: family history includes Heart Disease in his father. SHX:  reports that he has quit smoking. He has never used smokeless tobacco. He reports that he does not currently use alcohol. He reports that he does not currently use drugs. ROS:Review of systems not obtained due to patient factors.       Hemodynamics:    CO:    CI:    CVP:    SVR:   PAP Systolic:    PAP Diastolic: PVR:    SV02:        Ventilator Settings:      Mode Rate TV Press PEEP FiO2 PIP Min. Vent                              Vital Signs: Telemetry:    normal sinus rhythm Intake/Output:   Visit Vitals  BP (!) 144/79 (BP 1 Location: Left upper arm)   Pulse 80   Temp 98.6 °F (37 °C)   Resp 21   Ht 5' 9\" (1.753 m)   Wt 62.5 kg (137 lb 12.6 oz)   SpO2 91%   BMI 20.35 kg/m²       Temp (24hrs), Av.8 °F (37.1 °C), Min:98.3 °F (36.8 °C), Max:99.9 °F (37.7 °C)        O2 Device: None (Room air) O2 Flow Rate (L/min): 2 l/min       Wt Readings from Last 4 Encounters:   11/10/22 62.5 kg (137 lb 12.6 oz)   22 84.8 kg (187 lb)   22 84.8 kg (187 lb)   10/28/20 99.8 kg (220 lb)          Intake/Output Summary (Last 24 hours) at 2022 1024  Last data filed at 2022 1817  Gross per 24 hour   Intake 540 ml   Output 151 ml   Net 389 ml         Last shift:      No intake/output data recorded. Last 3 shifts:  1901 -  0700  In: 723 [I.V.:300]  Out: 1451 [Urine:1450]       Physical Exam:     General: He is on room air  HEENT: NCAT, poor dentition, lips and mucosa dry  Eyes: anicteric; conjunctiva clear  Neck: no nodes, trach midline; no accessory MM use.   Chest: no deformity,   Cardiac: R regular; no murmur;   Lungs: distant breath sounds; wheezes  Abd: soft, NT, hypoactive BS  Ext: Lower extremity wound bandage in place  : NO kennedy, clear urine  Neuro: Unresponsive  Psych-unable to assess  Skin: warm, dry, no cyanosis;   Pulses: 1-2+ Bilateral pedal, radial  Capillary: brisk; pale      DATA:    MAR reviewed and pertinent medications noted or modified as needed  MEDS:   Current Facility-Administered Medications   Medication    piperacillin-tazobactam (ZOSYN) 3.375 g in 0.9% sodium chloride (MBP/ADV) 100 mL MBP    Vancomycin Random Level Due on  at 1300    vancomycin (VANCOCIN) 1,000 mg in 0.9% sodium chloride 250 mL ([id number])    VANCOMYCIN INFORMATION NOTE    mupirocin (BACTROBAN) 2 % ointment aspirin chewable tablet 81 mg    atorvastatin (LIPITOR) tablet 40 mg    escitalopram oxalate (LEXAPRO) tablet 20 mg    gabapentin (NEURONTIN) capsule 100 mg    melatonin tablet 10 mg    baclofen (LIORESAL) tablet 5 mg    hydrOXYzine HCL (ATARAX) tablet 25 mg    polyethylene glycol (MIRALAX) packet 17 g    traMADoL (ULTRAM) tablet 50 mg    doxazosin (CARDURA) tablet 1 mg    potassium chloride (KLOR-CON) packet for solution 20 mEq    pantoprazole (PROTONIX) granules for oral suspension 40 mg    sodium chloride (NS) flush 5-40 mL    sodium chloride (NS) flush 5-40 mL    acetaminophen (TYLENOL) tablet 650 mg    Or    acetaminophen (TYLENOL) suppository 650 mg    ondansetron (ZOFRAN ODT) tablet 4 mg    Or    ondansetron (ZOFRAN) injection 4 mg    enoxaparin (LOVENOX) injection 40 mg    glucose chewable tablet 16 g    glucagon (GLUCAGEN) injection 1 mg    dextrose 10% infusion 0-250 mL    insulin lispro (HUMALOG) injection    ARIPiprazole (ABILIFY) tablet 2 mg    carvediloL (COREG) tablet 12.5 mg    clopidogreL (PLAVIX) tablet 75 mg    losartan (COZAAR) tablet 50 mg    senna-docusate (PERICOLACE) 8.6-50 mg per tablet 1 Tablet    [Held by provider] tamsulosin (FLOMAX) capsule 0.4 mg    furosemide (LASIX) tablet 20 mg        Labs:    Recent Labs     11/11/22  0328   WBC 12.7*   HGB 10.3*   PLT 86*       Recent Labs     11/13/22  0343 11/12/22  0529 11/11/22  0328   * 149* 147*   K 3.5 3.4* 3.4*   * 117* 117*   CO2 26 25 24   * 132* 120*   BUN 12 10 11   CREA 0.56* 0.48* 0.44*   CA 7.3* 7.5* 6.8*   PHOS  --  1.8*  --    LAC  --  1.3  --    ALB  --   --  2.0*   ALT  --   --  68       No results for input(s): PH, PCO2, PO2, HCO3, FIO2 in the last 72 hours. No results for input(s): CPK, CKNDX, TROIQ in the last 72 hours.     No lab exists for component: CPKMB    No results found for: BNPP, BNP   Lab Results   Component Value Date/Time    Culture result:  11/09/2022 02:10 AM     Heavy * methicillin resistant staphylococcus aureus *    Culture result: Heavy Diphtheroids 11/09/2022 02:10 AM    Culture result: No growth 5 days 11/08/2022 01:25 PM     Lab Results   Component Value Date/Time    TSH 1.67 07/30/2022 07:11 AM        Imaging:    Results from [place] encounter on 11/08/22    XR CHEST PORT    Narrative  INDICATION: Ng tube placement Advanced NG per MD order, checking for placement  again    EXAMINATION:  AP CHEST, PORTABLE    COMPARISON: 11/11/2022    FINDINGS: Single AP portable view of the chest demonstrates nasogastric tube  advanced with tip over the gastric body. The cardiomediastinal silhouette is  unchanged. No pneumothorax. Vague bibasilar opacities are unchanged. Impression  Nasogastric tube tip advanced overlying the gastric body. Results from [place] encounter on 11/08/22    CT FOOT RT W CONT    Narrative  EXAM: CT FOOT RT W CONT    INDICATION: Right foot swelling and abscess. Evaluate for osteomyelitis. Hypertension, diabetes, and cirrhosis. COMPARISON: Right foot views on 11/10/2022    CONTRAST: 100 mL of Isovue-370. TECHNIQUE: Helical CT of the right foot during uneventful rapid bolus  intravenous contrast administration. Coronal and Sagittal reformats were  generated. Images reviewed in soft tissue and bone windows. CT dose reduction  was achieved through the use of a standardized protocol tailored for this  examination and automatic exposure control for dose modulation. FINDINGS: Bones: Mild osteopenia. No fracture or osteomyelitis. Joint fluid: Physiologic. Articulations: no evidence of septic arthritis. 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 Tendons: No full-thickness tendon tear. Muscles: Mild diffuse atrophy. Soft tissue mass: None. No fluid collection. Impression  1. No abscess or osteomyelitis. 2. No fracture.       11/10 patient is barely responsive now on room air off pressors getting IV fluid chest x-ray shows congestive changes IV fluid has been decreased, replace potassium, WBC much improved  11/11 Patient is out of ICU, responding slightly, he is on room air. PCO2 30. WBC improving, continues to decrease. 11/12 On room air, condition remains same open eyes but does not follow any commands, replace potassium  11/13 Room air, no O2 in hospital. He opened his eyes, tried to respond slightly by denying SOB, but unable to communicate clearly. NG tube in place.

## 2022-11-13 NOTE — PROGRESS NOTES
Problem: Falls - Risk of  Goal: *Absence of Falls  Description: Document Marissa Branham Fall Risk and appropriate interventions in the flowsheet.   Outcome: Progressing Towards Goal  Note: Fall Risk Interventions:       Mentation Interventions: Bed/chair exit alarm    Medication Interventions: Bed/chair exit alarm    Elimination Interventions: Call light in reach              Problem: Patient Education: Go to Patient Education Activity  Goal: Patient/Family Education  Outcome: Progressing Towards Goal     Problem: Discharge Planning  Goal: *Discharge to safe environment  Outcome: Progressing Towards Goal  Goal: *Knowledge of medication management  Outcome: Progressing Towards Goal  Goal: *Knowledge of discharge instructions  Outcome: Progressing Towards Goal     Problem: Patient Education: Go to Patient Education Activity  Goal: Patient/Family Education  Outcome: Progressing Towards Goal     Problem: Sepsis: Day of Diagnosis  Goal: Off Pathway (Use only if patient is Off Pathway)  Outcome: Progressing Towards Goal  Goal: *Fluid resuscitation  Outcome: Progressing Towards Goal  Goal: *Paired blood cultures prior to first dose of antibiotic  Outcome: Progressing Towards Goal  Goal: *First dose of  appropriate antibiotic within 3 hours of arrival to ED, within 1 hour of arrival to ICU  Outcome: Progressing Towards Goal  Goal: *Lactic acid with first set of blood cultures  Outcome: Progressing Towards Goal  Goal: *Pneumococcal immunization (if eligible)  Outcome: Progressing Towards Goal  Goal: *Influenza immunization (if eligible)  Outcome: Progressing Towards Goal  Goal: Activity/Safety  Outcome: Progressing Towards Goal  Goal: Consults, if ordered  Outcome: Progressing Towards Goal  Goal: Diagnostic Test/Procedures  Outcome: Progressing Towards Goal  Goal: Nutrition/Diet  Outcome: Progressing Towards Goal  Goal: Discharge Planning  Outcome: Progressing Towards Goal  Goal: Medications  Outcome: Progressing Towards Goal  Goal: Respiratory  Outcome: Progressing Towards Goal  Goal: Treatments/Interventions/Procedures  Outcome: Progressing Towards Goal  Goal: Psychosocial  Outcome: Progressing Towards Goal     Problem: Sepsis: Day 2  Goal: Off Pathway (Use only if patient is Off Pathway)  Outcome: Progressing Towards Goal  Goal: *Central Venous Pressure maintained at 8-12 mm Hg  Outcome: Progressing Towards Goal  Goal: *Hemodynamically stable  Outcome: Progressing Towards Goal  Goal: *Tolerating diet  Outcome: Progressing Towards Goal  Goal: Activity/Safety  Outcome: Progressing Towards Goal  Goal: Consults, if ordered  Outcome: Progressing Towards Goal  Goal: Diagnostic Test/Procedures  Outcome: Progressing Towards Goal  Goal: Nutrition/Diet  Outcome: Progressing Towards Goal  Goal: Discharge Planning  Outcome: Progressing Towards Goal  Goal: Medications  Outcome: Progressing Towards Goal  Goal: Respiratory  Outcome: Progressing Towards Goal  Goal: Treatments/Interventions/Procedures  Outcome: Progressing Towards Goal  Goal: Psychosocial  Outcome: Progressing Towards Goal     Problem: Sepsis: Day 3  Goal: Off Pathway (Use only if patient is Off Pathway)  Outcome: Progressing Towards Goal  Goal: *Central Venous Pressure maintained at 8-12 mm Hg  Outcome: Progressing Towards Goal  Goal: *Oxygen saturation within defined limits  Outcome: Progressing Towards Goal  Goal: *Vital sign stability  Outcome: Progressing Towards Goal  Goal: *Tolerating diet  Outcome: Progressing Towards Goal  Goal: *Demonstrates progressive activity  Outcome: Progressing Towards Goal  Goal: Activity/Safety  Outcome: Progressing Towards Goal  Goal: Consults, if ordered  Outcome: Progressing Towards Goal  Goal: Diagnostic Test/Procedures  Outcome: Progressing Towards Goal  Goal: Nutrition/Diet  Outcome: Progressing Towards Goal  Goal: Discharge Planning  Outcome: Progressing Towards Goal  Goal: Medications  Outcome: Progressing Towards Goal  Goal: Respiratory  Outcome: Progressing Towards Goal  Goal: Treatments/Interventions/Procedures  Outcome: Progressing Towards Goal  Goal: Psychosocial  Outcome: Progressing Towards Goal     Problem: Sepsis: Day 4  Goal: Off Pathway (Use only if patient is Off Pathway)  Outcome: Progressing Towards Goal  Goal: Activity/Safety  Outcome: Progressing Towards Goal  Goal: Consults, if ordered  Outcome: Progressing Towards Goal  Goal: Diagnostic Test/Procedures  Outcome: Progressing Towards Goal  Goal: Nutrition/Diet  Outcome: Progressing Towards Goal  Goal: Discharge Planning  Outcome: Progressing Towards Goal  Goal: Medications  Outcome: Progressing Towards Goal  Goal: Respiratory  Outcome: Progressing Towards Goal  Goal: Treatments/Interventions/Procedures  Outcome: Progressing Towards Goal  Goal: Psychosocial  Outcome: Progressing Towards Goal  Goal: *Oxygen saturation within defined limits  Outcome: Progressing Towards Goal  Goal: *Hemodynamically stable  Outcome: Progressing Towards Goal  Goal: *Vital signs within defined limits  Outcome: Progressing Towards Goal  Goal: *Tolerating diet  Outcome: Progressing Towards Goal  Goal: *Demonstrates progressive activity  Outcome: Progressing Towards Goal  Goal: *Fluid volume maintenance  Outcome: Progressing Towards Goal     Problem: Sepsis: Day 5  Goal: Off Pathway (Use only if patient is Off Pathway)  Outcome: Progressing Towards Goal  Goal: *Oxygen saturation within defined limits  Outcome: Progressing Towards Goal  Goal: *Vital signs within defined limits  Outcome: Progressing Towards Goal  Goal: *Tolerating diet  Outcome: Progressing Towards Goal  Goal: *Demonstrates progressive activity  Outcome: Progressing Towards Goal  Goal: *Discharge plan identified  Outcome: Progressing Towards Goal  Goal: Activity/Safety  Outcome: Progressing Towards Goal  Goal: Consults, if ordered  Outcome: Progressing Towards Goal  Goal: Diagnostic Test/Procedures  Outcome: Progressing Towards Goal  Goal: Nutrition/Diet  Outcome: Progressing Towards Goal  Goal: Discharge Planning  Outcome: Progressing Towards Goal  Goal: Medications  Outcome: Progressing Towards Goal  Goal: Respiratory  Outcome: Progressing Towards Goal  Goal: Treatments/Interventions/Procedures  Outcome: Progressing Towards Goal  Goal: Psychosocial  Outcome: Progressing Towards Goal

## 2022-11-13 NOTE — PROGRESS NOTES
Skilled ST attempted but patient was asleep. SLP did not attempt to awake due to the nurse reporting the patient has not been sleeping well. Will attempt again tomorrow.

## 2022-11-14 ENCOUNTER — APPOINTMENT (OUTPATIENT)
Dept: GENERAL RADIOLOGY | Age: 72
DRG: 871 | End: 2022-11-14
Attending: SURGERY
Payer: MEDICARE

## 2022-11-14 ENCOUNTER — APPOINTMENT (OUTPATIENT)
Dept: GENERAL RADIOLOGY | Age: 72
DRG: 871 | End: 2022-11-14
Attending: STUDENT IN AN ORGANIZED HEALTH CARE EDUCATION/TRAINING PROGRAM
Payer: MEDICARE

## 2022-11-14 LAB
ANION GAP SERPL CALC-SCNC: 4 MMOL/L (ref 5–15)
BACTERIA SPEC CULT: NORMAL
BUN SERPL-MCNC: 12 MG/DL (ref 6–20)
BUN/CREAT SERPL: 24 (ref 12–20)
CA-I BLD-MCNC: 7.4 MG/DL (ref 8.5–10.1)
CHLORIDE SERPL-SCNC: 119 MMOL/L (ref 97–108)
CO2 SERPL-SCNC: 26 MMOL/L (ref 21–32)
CREAT SERPL-MCNC: 0.5 MG/DL (ref 0.7–1.3)
ERYTHROCYTE [DISTWIDTH] IN BLOOD BY AUTOMATED COUNT: 16.5 % (ref 11.5–14.5)
GLUCOSE BLD STRIP.AUTO-MCNC: 102 MG/DL (ref 65–100)
GLUCOSE BLD STRIP.AUTO-MCNC: 142 MG/DL (ref 65–100)
GLUCOSE BLD STRIP.AUTO-MCNC: 145 MG/DL (ref 65–100)
GLUCOSE BLD STRIP.AUTO-MCNC: 89 MG/DL (ref 65–100)
GLUCOSE SERPL-MCNC: 173 MG/DL (ref 65–100)
HCT VFR BLD AUTO: 30.5 % (ref 36.6–50.3)
HGB BLD-MCNC: 10.1 G/DL (ref 12.1–17)
MCH RBC QN AUTO: 28.6 PG (ref 26–34)
MCHC RBC AUTO-ENTMCNC: 33.1 G/DL (ref 30–36.5)
MCV RBC AUTO: 86.4 FL (ref 80–99)
NRBC # BLD: 0 K/UL (ref 0–0.01)
NRBC BLD-RTO: 0 PER 100 WBC
PERFORMED BY, TECHID: ABNORMAL
PERFORMED BY, TECHID: NORMAL
PLATELET # BLD AUTO: 96 K/UL (ref 150–400)
PMV BLD AUTO: 11.3 FL (ref 8.9–12.9)
POTASSIUM SERPL-SCNC: 3.6 MMOL/L (ref 3.5–5.1)
RBC # BLD AUTO: 3.53 M/UL (ref 4.1–5.7)
SODIUM SERPL-SCNC: 149 MMOL/L (ref 136–145)
SPECIAL REQUESTS,SREQ: NORMAL
WBC # BLD AUTO: 4.9 K/UL (ref 4.1–11.1)

## 2022-11-14 PROCEDURE — 85027 COMPLETE CBC AUTOMATED: CPT

## 2022-11-14 PROCEDURE — 92526 ORAL FUNCTION THERAPY: CPT

## 2022-11-14 PROCEDURE — 71045 X-RAY EXAM CHEST 1 VIEW: CPT

## 2022-11-14 PROCEDURE — 99232 SBSQ HOSP IP/OBS MODERATE 35: CPT | Performed by: PODIATRIST

## 2022-11-14 PROCEDURE — 65270000029 HC RM PRIVATE

## 2022-11-14 PROCEDURE — 80048 BASIC METABOLIC PNL TOTAL CA: CPT

## 2022-11-14 PROCEDURE — 82962 GLUCOSE BLOOD TEST: CPT

## 2022-11-14 PROCEDURE — 74011250636 HC RX REV CODE- 250/636: Performed by: HOSPITALIST

## 2022-11-14 PROCEDURE — 74011250637 HC RX REV CODE- 250/637: Performed by: INTERNAL MEDICINE

## 2022-11-14 PROCEDURE — 99232 SBSQ HOSP IP/OBS MODERATE 35: CPT | Performed by: INTERNAL MEDICINE

## 2022-11-14 PROCEDURE — 74011000258 HC RX REV CODE- 258: Performed by: INTERNAL MEDICINE

## 2022-11-14 PROCEDURE — 74011000250 HC RX REV CODE- 250: Performed by: INTERNAL MEDICINE

## 2022-11-14 PROCEDURE — 74011250636 HC RX REV CODE- 250/636: Performed by: INTERNAL MEDICINE

## 2022-11-14 PROCEDURE — 36415 COLL VENOUS BLD VENIPUNCTURE: CPT

## 2022-11-14 PROCEDURE — 74011636637 HC RX REV CODE- 636/637: Performed by: INTERNAL MEDICINE

## 2022-11-14 RX ORDER — MUPIROCIN 20 MG/G
OINTMENT TOPICAL DAILY
Status: DISCONTINUED | OUTPATIENT
Start: 2022-11-14 | End: 2022-11-14 | Stop reason: CLARIF

## 2022-11-14 RX ADMIN — ESCITALOPRAM OXALATE 20 MG: 10 TABLET ORAL at 10:10

## 2022-11-14 RX ADMIN — CLOPIDOGREL BISULFATE 75 MG: 75 TABLET, FILM COATED ORAL at 10:10

## 2022-11-14 RX ADMIN — POTASSIUM CHLORIDE 20 MEQ: 1.5 FOR SOLUTION ORAL at 10:10

## 2022-11-14 RX ADMIN — VANCOMYCIN HYDROCHLORIDE 1000 MG: 1 INJECTION, POWDER, LYOPHILIZED, FOR SOLUTION INTRAVENOUS at 05:20

## 2022-11-14 RX ADMIN — TRAMADOL HYDROCHLORIDE 50 MG: 50 TABLET, COATED ORAL at 12:15

## 2022-11-14 RX ADMIN — CARVEDILOL 12.5 MG: 12.5 TABLET, FILM COATED ORAL at 10:10

## 2022-11-14 RX ADMIN — SENNOSIDES AND DOCUSATE SODIUM 1 TABLET: 50; 8.6 TABLET ORAL at 10:11

## 2022-11-14 RX ADMIN — INSULIN LISPRO 2 UNITS: 100 INJECTION, SOLUTION INTRAVENOUS; SUBCUTANEOUS at 13:30

## 2022-11-14 RX ADMIN — SODIUM CHLORIDE, PRESERVATIVE FREE 10 ML: 5 INJECTION INTRAVENOUS at 17:28

## 2022-11-14 RX ADMIN — PIPERACILLIN AND TAZOBACTAM 3.38 G: 3; .375 INJECTION, POWDER, FOR SOLUTION INTRAVENOUS at 02:35

## 2022-11-14 RX ADMIN — FUROSEMIDE 20 MG: 40 TABLET ORAL at 10:10

## 2022-11-14 RX ADMIN — INSULIN LISPRO 2 UNITS: 100 INJECTION, SOLUTION INTRAVENOUS; SUBCUTANEOUS at 10:09

## 2022-11-14 RX ADMIN — PANTOPRAZOLE SODIUM 40 MG: 40 GRANULE, DELAYED RELEASE ORAL at 10:10

## 2022-11-14 RX ADMIN — SODIUM CHLORIDE, PRESERVATIVE FREE 10 ML: 5 INJECTION INTRAVENOUS at 17:20

## 2022-11-14 RX ADMIN — ARIPIPRAZOLE 2 MG: 2 TABLET ORAL at 10:10

## 2022-11-14 RX ADMIN — TRAMADOL HYDROCHLORIDE 50 MG: 50 TABLET, COATED ORAL at 03:08

## 2022-11-14 RX ADMIN — LOSARTAN POTASSIUM 50 MG: 50 TABLET, FILM COATED ORAL at 10:10

## 2022-11-14 RX ADMIN — ENOXAPARIN SODIUM 40 MG: 100 INJECTION SUBCUTANEOUS at 10:09

## 2022-11-14 RX ADMIN — ASPIRIN 81 MG 81 MG: 81 TABLET ORAL at 10:10

## 2022-11-14 RX ADMIN — PIPERACILLIN AND TAZOBACTAM 3.38 G: 3; .375 INJECTION, POWDER, FOR SOLUTION INTRAVENOUS at 10:09

## 2022-11-14 RX ADMIN — VANCOMYCIN HYDROCHLORIDE 1000 MG: 1 INJECTION, POWDER, LYOPHILIZED, FOR SOLUTION INTRAVENOUS at 17:20

## 2022-11-14 RX ADMIN — ATORVASTATIN CALCIUM 40 MG: 40 TABLET, FILM COATED ORAL at 10:10

## 2022-11-14 NOTE — PROGRESS NOTES
[facility]    Progress Note    Date:2022       [facility]  Patient Name:Stanislaw Stephens     Date of Birth:     Age:71 y.o. Subjective:     Patient is a 70 y.o.  male who is being seen for right foot ulceration. Patient is not alert but he is in no acute distress. Unable to obtain history. Objective:     Vitals Last 24 Hours:  TEMPERATURE:  Temp  Av.2 °F (36.8 °C)  Min: 97.8 °F (36.6 °C)  Max: 98.6 °F (37 °C)  RESPIRATIONS RANGE: Resp  Av.3  Min: 18  Max: 19  PULSE OXIMETRY RANGE: SpO2  Av.8 %  Min: 94 %  Max: 96 %  PULSE RANGE: Pulse  Av  Min: 68  Max: 71  BLOOD PRESSURE RANGE: Systolic (99BKK), IVW:279 , Min:145 , CZM:483        Diastolic (80UYQ), ANNE:13, Min:68, Max:83        I/O (24Hr): Intake/Output Summary (Last 24 hours) at 2022 1324  Last data filed at 2022 0544  Gross per 24 hour   Intake --   Output 350 ml   Net -350 ml     Physical Exam:    GEN: Pt is in NAD. Dressing noted to B/L LE. No family noted at [facility]  DERM: Ulcer noted to the dorsal aspect of the right hallux. No fluctuance noted. Wound base right hallux is 100% eschar. Eschar noted to the right fifth toe. VASC: Pedal pulses (DP/PT) diminished to B/L LE. CFT<3sec to all digits of B/L LE. No pedal hair growth noted to the level of the digits for B/L LE. Skin temp is warm to cool from proximal to distal for B/L LE. Neg homans/brain signs to B/L LE. No varicosities or telangectasias noted to B/L LE.  NEURO: Protective and epicritic sensations absent intact to B/L LE  MSK: (-) POP, No gross deformities. Good muscle tone and bulk noted to B/L LE.   Labs/Imaging/Diagnostics:     Labs:  CBC:  Recent Labs     22  0758   WBC 4.9   RBC 3.53*   HGB 10.1*   HCT 30.5*   MCV 86.4   RDW 16.5*   PLT 96*     CHEMISTRIES:  Recent Labs     22  0758 22  0343 22  0529   * 150* 149*   K 3.6 3.5 3.4*   * 120* 117*   CO2 26 26 25   BUN 12 12 10   CA 7. 4* 7.3* 7.5*   PHOS  --   --  1.8*   PT/INR:  No results for input(s): INR, INREXT, INREXT, INREXT in the last 72 hours. No lab exists for component: PROTIME  APTT:No results for input(s): APTT in the last 72 hours. LIVER PROFILE:  No results for input(s): AST, ALT in the last 72 hours. No lab exists for component: Nakita Senters ALKPHOS  Lab Results   Component Value Date/Time    ALT (SGPT) 68 11/11/2022 03:28 AM    AST (SGOT) 265 (H) 11/11/2022 03:28 AM    Alk. phosphatase 52 11/11/2022 03:28 AM    Bilirubin, total 0.9 11/11/2022 03:28 AM       Imaging Last 24 Hours:  No results found. Assessment:   Ulcer right hallux  Diabetes mellitus    Plan:   - Patient was seen and evaluated at bedside  - Current labs personally reviewed and findings dicussed with patient  - Wound care: Clean ulceration with normal saline. Apply Betadine soaked gauze 4 x 4 gauze, Kerlix and secure with tape.   Daily dressing changes  -Continue antibiotics as per infectious disease Dr. Marlen Goode will continue to follow patient    Jah Ray [address], [address] [facility], [address] [place] [address]  O: [phone]  F: [phone]    Electronically signed by Aydee Raza DPM on 11/14/2022 at 8:38 AM

## 2022-11-14 NOTE — PROGRESS NOTES
Chart reviewed. Patient is a LTC resident at [facility] and will return upon DC. Updated therapy notes uploaded in [address] CM will continue to follow for medical clearance.

## 2022-11-14 NOTE — PROGRESS NOTES
Hospitalist Progress Note    Subjective:   Daily Progress Note: 11/14/2022 2:05 PM    Hospital Course: Eufemia Webber is a 77-year-old male with a PMHx of stroke, hypertension, DM, and cirrhosis who presents to hospital from nursing home with weakness and hypotension x1 day. Associated with poor responsiveness. Found hypotensive on arrival to the ED, with metabolic encephalopathy, improved with intravenous fluid bolus. Found to have urinary infection, started on IV ceftriaxone, and admitted for further management. On 11/9/22 transferred to ICU overnight for poor responsiveness and for septic shock. Managed with fluids and levophed  Duplex shows right common femoral artery and proximal superficial femoral artery occluded and and popliteal artery patent and with patent distal anterior tibial artery and posterior tibial artery. There must be collateralization noted from the occluded femoral artery to popliteal artery. Also noted left  mid to distal superficial artery occluded, with patent distal anterior tibial artery and posterior tibial artery on the left side. Patient has carotid artery stenosis and supposed to go for surgery but unable to get cardiac clearance because of nuclear stress test he has 90% stenosis of left ICA and 50% stenosis of right ICA. Outpatient vascular follow-up for carotid stent per vascular surgery, Dr. Claudette Gooden. Subjective:    Patient seen and examined at bedside. No new complaints today. Resting comfortably.      Current Facility-Administered Medications   Medication Dose Route Frequency    [START ON 11/15/2022] Vancomycin Level Due 11/15 0400   Other ONCE    piperacillin-tazobactam (ZOSYN) 3.375 g in 0.9% sodium chloride (MBP/ADV) 100 mL MBP  3.375 g IntraVENous Q8H    vancomycin (VANCOCIN) 1,000 mg in 0.9% sodium chloride 250 mL ([id number])  1,000 mg IntraVENous Q12H    VANCOMYCIN INFORMATION NOTE   Other Rx Dosing/Monitoring    aspirin chewable tablet 81 mg  81 mg Per NG tube DAILY atorvastatin (LIPITOR) tablet 40 mg  40 mg Per NG tube DAILY    escitalopram oxalate (LEXAPRO) tablet 20 mg  20 mg Per NG tube DAILY    gabapentin (NEURONTIN) capsule 100 mg  100 mg Per NG tube QHS    melatonin tablet 10 mg  10 mg Per NG tube QHS    baclofen (LIORESAL) tablet 5 mg  5 mg Per NG tube BID PRN    hydrOXYzine HCL (ATARAX) tablet 25 mg  25 mg Per NG tube QHS PRN    polyethylene glycol (MIRALAX) packet 17 g  17 g Per NG tube DAILY PRN    traMADoL (ULTRAM) tablet 50 mg  50 mg Per NG tube Q6H PRN    doxazosin (CARDURA) tablet 1 mg  1 mg Per NG tube QHS    potassium chloride (KLOR-CON) packet for solution 20 mEq  20 mEq Per NG tube BID    pantoprazole (PROTONIX) granules for oral suspension 40 mg  40 mg Per NG tube DAILY    sodium chloride (NS) flush 5-40 mL  5-40 mL IntraVENous Q8H    sodium chloride (NS) flush 5-40 mL  5-40 mL IntraVENous PRN    acetaminophen (TYLENOL) tablet 650 mg  650 mg Oral Q6H PRN    Or    acetaminophen (TYLENOL) suppository 650 mg  650 mg Rectal Q6H PRN    ondansetron (ZOFRAN ODT) tablet 4 mg  4 mg Oral Q8H PRN    Or    ondansetron (ZOFRAN) injection 4 mg  4 mg IntraVENous Q6H PRN    enoxaparin (LOVENOX) injection 40 mg  40 mg SubCUTAneous DAILY    glucose chewable tablet 16 g  4 Tablet Oral PRN    glucagon (GLUCAGEN) injection 1 mg  1 mg IntraMUSCular PRN    dextrose 10% infusion 0-250 mL  0-250 mL IntraVENous PRN    insulin lispro (HUMALOG) injection   SubCUTAneous AC&HS    ARIPiprazole (ABILIFY) tablet 2 mg  2 mg Oral DAILY    carvediloL (COREG) tablet 12.5 mg  12.5 mg Oral BID    clopidogreL (PLAVIX) tablet 75 mg  75 mg Oral DAILY    losartan (COZAAR) tablet 50 mg  50 mg Oral DAILY    senna-docusate (PERICOLACE) 8.6-50 mg per tablet 1 Tablet  1 Tablet Oral DAILY    [Held by provider] tamsulosin (FLOMAX) capsule 0.4 mg  0.4 mg Oral QHS    furosemide (LASIX) tablet 20 mg  20 mg Oral DAILY        Review of Systems  Constitutional: No fevers, No chills, No sweats, No fatigue, No Weakness  Eyes: No redness  Ears, nose, mouth, throat, and face: No nasal congestion, No sore throat, No voice change  Respiratory: No Shortness of Breath, No cough, No wheezing  Cardiovascular: No chest pain, No palpitations, No extremity edema  Gastrointestinal: No nausea, No vomiting, No diarrhea, No abdominal pain  Genitourinary: No frequency, No dysuria, No hematuria  Integument/breast: No skin lesion(s)   Neurological: No Confusion, No headaches, No dizziness      Objective:     Visit Vitals  BP (!) 179/83 (BP 1 Location: Left upper arm, BP Patient Position: At rest;Lying)   Pulse 69   Temp 98.3 °F (36.8 °C)   Resp 19   Ht 5' 9\" (1.753 m)   Wt 62.5 kg (137 lb 12.6 oz)   SpO2 94%   BMI 20.35 kg/m²    O2 Flow Rate (L/min): 2 l/min O2 Device: None (Room air)    Temp (24hrs), Av.2 °F (36.8 °C), Min:97.8 °F (36.6 °C), Max:98.6 °F (37 °C)      No intake/output data recorded.  1901 -  0700  In: -   Out: 350 [Urine:350]    PHYSICAL EXAM:  Constitutional: No acute distress  Skin: Extremities and face reveal no rashes. HEENT: Sclerae anicteric. Extra-occular muscles are intact. No oral ulcers. The neck is supple and no masses. Cardiovascular: Regular rate and rhythm. Respiratory:  Clear breath sounds bilaterally with no wheezes, rales, or rhonchi. GI: Abdomen nondistended, soft, and nontender. Normal active bowel sounds. Rectal: Deferred   Musculoskeletal: No pitting edema of the lower legs. Able to move all ext  Neurological:  Patient is alert and oriented.  Cranial nerves II-XII grossly intact  Psychiatric: Mood appears appropriate       Data Review    Recent Results (from the past 24 hour(s))   GLUCOSE, POC    Collection Time: 22  4:18 PM   Result Value Ref Range    Glucose (POC) 178 (H) 65 - 100 mg/dL    Performed by Gavino Mancilla, POC    Collection Time: 22  8:19 PM   Result Value Ref Range    Glucose (POC) 152 (H) 65 - 100 mg/dL    Performed by Jamie Lisa    CBC W/O DIFF    Collection Time: 11/14/22  7:58 AM   Result Value Ref Range    WBC 4.9 4.1 - 11.1 K/uL    RBC 3.53 (L) 4.10 - 5.70 M/uL    HGB 10.1 (L) 12.1 - 17.0 g/dL    HCT 30.5 (L) 36.6 - 50.3 %    MCV 86.4 80.0 - 99.0 FL    MCH 28.6 26.0 - 34.0 PG    MCHC 33.1 30.0 - 36.5 g/dL    RDW 16.5 (H) 11.5 - 14.5 %    PLATELET 96 (L) 941 - 400 K/uL    MPV 11.3 8.9 - 12.9 FL    NRBC 0.0 0.0  WBC    ABSOLUTE NRBC 0.00 0.00 - 8.52 K/uL   METABOLIC PANEL, BASIC    Collection Time: 11/14/22  7:58 AM   Result Value Ref Range    Sodium 149 (H) 136 - 145 mmol/L    Potassium 3.6 3.5 - 5.1 mmol/L    Chloride 119 (H) 97 - 108 mmol/L    CO2 26 21 - 32 mmol/L    Anion gap 4 (L) 5 - 15 mmol/L    Glucose 173 (H) 65 - 100 mg/dL    BUN 12 6 - 20 mg/dL    Creatinine 0.50 (L) 0.70 - 1.30 mg/dL    BUN/Creatinine ratio 24 (H) 12 - 20      eGFR >60 >60 ml/min/1.73m2    Calcium 7.4 (L) 8.5 - 10.1 mg/dL   GLUCOSE, POC    Collection Time: 11/14/22  9:47 AM   Result Value Ref Range    Glucose (POC) 142 (H) 65 - 100 mg/dL    Performed by Sukumar Bear    GLUCOSE, POC    Collection Time: 11/14/22 12:21 PM   Result Value Ref Range    Glucose (POC) 145 (H) 65 - 100 mg/dL    Performed by Sukumar Bear        XR CHEST PORT   Final Result   Nasogastric tube tip advanced overlying the gastric body. XR CHEST PORT   Final Result   Nasogastric tube tip over the gastric fundus. XR CHEST PORT   Final Result   Increased mild interstitial pulmonary edema versus pneumonia. Enteric tube extends into the gastric antrum or first portion of the duodenum. Consider retraction 8-10 cm if the intention is gastric luminal termination. CT FOOT RT W CONT   Final Result      1. No abscess or osteomyelitis. 2. No fracture. DUPLEX LOWER EXT ARTERY BILAT   Final Result      XR FOOT RT MIN 3 V   Final Result   No osseous erosion or soft tissue gas. .      XR CHEST PORT   Final Result      Mild interstitial opacities which may represent mild pulmonary edema unchanged         XR CHEST PORT   Final Result   Addendum (preliminary) 1 of 1   Addendum: NG tube is in appropriate position tip extends below the    diaphragm. Right IJ catheter. No pneumothorax on the right. Final   1. Status post right IJ catheter placement without evidence of complication. IR INSERT NON TUNL CVC OVER 5 YRS   Final Result   Technically successful ultrasound guided placement of a right internal jugular   vein temporary central venous catheter. A post procedure chest x-ray is   pending. CT HEAD WO CONT   Final Result   No acute process or change compared to the prior exam.            XR CHEST PORT   Final Result   No acute process. IR US GUIDED VASCULAR ACCESS    (Results Pending)       Active Problems:    UTI (urinary tract infection) (11/8/2022)        Assessment/Plan:     1. Septic shock s/t UTI and right great toe ulcer  - Complicated with hypotension and metabolic encephalopathy. - Urine cultures negative  - Blood cultures negative  - MRSA isolated from Cx of left great toe   - Antibiotics per ID. De-escalate antibiotic coverage to Zosyn, continue on Vanc for MRSA coverage, d/c meropenem. 2. Hypertension  - Continue home medications    3. Carotid artery disease  4. Hx of recent CVA  - Following with [facility] cardiology outpatient and Dr Oniel Spring for anticipated carotid stent. - Acute ischemia in the right posterior periventricular white matter involving the  right parietal lobe on MRI 07/2022  - CT head 11/09 negative for acute findings   - Recent echocardiogram reviewed  - 90% stenosis of left ICA and 50% stenosis of right ICA. O/p vascular f/u.      5. Chronic foot wounds   - Antibiotics per ID  - Wound care inpatient and outpatient  - Duplex shows right common femoral artery and proximal superficial femoral artery occluded and and popliteal artery patent and with patent distal anterior tibial artery and posterior tibial artery.   There must be collateralization noted from the occluded femoral artery to popliteal artery. Also noted left  mid to distal superficial artery occluded, with patent distal anterior tibial artery and posterior tibial artery on the left side. - Vascular surgery, Dr. Angy Andrade, following      DVT Prophylaxis: Lovenox  Code Status: Full  POA:    Discharge Barriers:   - ID clearance     Care Plan discussed with: patient and nursing     Total time spent with patient: >35 minutes.

## 2022-11-14 NOTE — PROGRESS NOTES
SPEECH LANGUAGE PATHOLOGY DYSPHAGIA TREATMENT  Patient: Sophia Salinas (54 y.o. male)  Date: 11/14/2022  Diagnosis: UTI (urinary tract infection) [N39.0] <principal problem not specified>      Precautions: Aspiration     ASSESSMENT:  Patient presents with mild-mod oropharyngeal dysphagia. Oral phase c/b delayed A-P transit, min bolus holding, and no oral residue. Patient receptive to min verbal and tactile cues to swallow. Pharyngeal phase c/b mild-moderately delayed swallow initiation and occasional audible swallow. HLE adequate to palpation. Overt s/s of pen/aspiration c/b throat clear and wet vocal quality following single straw sips of thin and mildly thick liquid and puree     PLAN:  Recommendations and Planned Interventions:  Recommend continue NPO with NG tube feedings per MD. Rec continue PO trials with clinician. Rec MBSS to further assess swallow function when medically appropriate    Patient continues to benefit from skilled intervention to address the above impairments. Continue treatment per established plan of care. Frequency/Duration: Patient will be followed by speech-language pathology 5 times a week to address goals. Discharge Recommendations: To Be Determined     SUBJECTIVE:   Patient is agreeable to beginning treatment     OBJECTIVE:   Cognitive and Communication Status:  Neurologic State: Alert  Orientation Level: Oriented to person  Cognition: Decreased attention/concentration, Decreased command following    Dysphagia Treatment:  Oral Assessment:  P.O. Trials:  Patient Position:  (HOB raised)  Vocal quality prior to P.O.: No impairment  Consistency Presented: Thin liquid; Ice chips; Nectar thick liquid;Puree  How Presented: SLP-fed/presented;Spoon;Straw  How Much:  (Multiple presentations)  Bolus Acceptance: No impairment  Bolus Formation/Control: Impaired  Type of Impairment: Delayed; Other (comment) (Min bolus holding)  Propulsion: Delayed (# of seconds)  Oral Residue: None  Initiation of Swallow: Delayed (# of seconds)  Laryngeal Elevation: Functional  Aspiration Signs/Symptoms: Change vocal quality;Clear throat  Pharyngeal Phase Characteristics: Altered vocal quality; Audible swallow  Oral Phase Severity: Mild  Pharyngeal Phase Severity : Moderate    Pain: 0    After treatment:   Patient left in no apparent distress in bed, Call bell within reach, and Nursing notified    COMMUNICATION/EDUCATION:   Patient was educated regarding his deficit(s) of dysphagia, swallow safety precautions, diet recs and POC. He demonstrated Guarded understanding as evidenced by AMS. The patient's plan of care including recommendations, planned interventions, and recommended diet changes were discussed with: Registered nurse. Problem: Dysphagia (Adult)  Goal: *Acute Goals and Plan of Care (Insert Text)  Description: Speech Therapy Goals  Initiated 11/12/2022  -Patient stated goal: \"I want to eat. \"  -Patient will participate in modified barium swallow study within 5 day(s). [ ] Not met  [ ]  MET   [ ] Progressing  [ ] Citlaly Sharp  -Patient will tolerate PO trials with SLP only without signs/symptoms of aspiration given minimal cues within 5 day(s). [ ] Not met  [ ]  MET   [ ] Progressing  [ ] Citlaly Sharp  -Patient will demonstrate understanding of swallow safety precautions and aspiration precautions, diet recs with minimal cues within 5 day(s).         [ ] Not met  [ ]  MET   [ ] Progressing  [ ] Discontinue   Outcome: Omid Espana M.S. CCC-SLP  Time Calculation: 14 mins

## 2022-11-14 NOTE — PROGRESS NOTES
IMPRESSION:   Acute hypoxic respiratory failure resolved  Severe sepsis   Metabolic encephalopathy  Probably urinary tract infection  Leukocytosis improved  Carotid artery disease history of CVA  Hypokalemia      RECOMMENDATIONS/PLAN:   49-year-old male nursing home resident admitted with hypotension and unresponsiveness  Hemodynamically stable  Panculture IV antibiotics, ABG shows respiratory alkalosis  Need vascular surgery consult for possible need endarterectomy when more stable  Previous 2D echo showed ejection fraction 60 to 65%  Chest x-ray shows fluid overload congestive changes   Replace potassium     [x] High complexity decision making was performed  [x] See my orders for details  HPI  49-year-old nursing home resident came in because of unresponsiveness he had a history of stroke in the past patient was hypotensive hypoxic rapid response was called he was admitted to the floor initially received IV fluid hemodynamically unstable started on vasopressors Levophed transferred to ICU White count was elevated he was put on oxygen 4 L nasal cannula unable to get any started the patient he has a right foot wound and multiple wounds of the lower extremities dressing in place. He has carotid artery stenosis only supposed to go for surgery but unable to get cardiac clearance because of nuclear stress test he has 90% stenosis of left ICA and 50% stenosis of right ICA. So now he is admitted to ICU and critical care consult was called  PMH:  has a past medical history of Cirrhosis ([facility] [address] Diabetes ([address] [address] Hypertension, and Stroke ([facility] [address] PSH:   has a past surgical history that includes hx back surgery; hx gi; and ir insert non tunl cvc over 5 yrs (11/9/2022). FHX: family history includes Heart Disease in his father. SHX:  reports that he has quit smoking. He has never used smokeless tobacco. He reports that he does not currently use alcohol. He reports that he does not currently use drugs.     ALL: No Known Allergies     MEDS:   [x] Reviewed - As Below   [] Not reviewed    Current Facility-Administered Medications   Medication    mupirocin (BACTROBAN) 2 % ointment    [START ON 11/15/2022] Vancomycin Level Due 11/15 0400    piperacillin-tazobactam (ZOSYN) 3.375 g in 0.9% sodium chloride (MBP/ADV) 100 mL MBP    vancomycin (VANCOCIN) 1,000 mg in 0.9% sodium chloride 250 mL ([id number])    VANCOMYCIN INFORMATION NOTE    aspirin chewable tablet 81 mg    atorvastatin (LIPITOR) tablet 40 mg    escitalopram oxalate (LEXAPRO) tablet 20 mg    gabapentin (NEURONTIN) capsule 100 mg    melatonin tablet 10 mg    baclofen (LIORESAL) tablet 5 mg    hydrOXYzine HCL (ATARAX) tablet 25 mg    polyethylene glycol (MIRALAX) packet 17 g    traMADoL (ULTRAM) tablet 50 mg    doxazosin (CARDURA) tablet 1 mg    potassium chloride (KLOR-CON) packet for solution 20 mEq    pantoprazole (PROTONIX) granules for oral suspension 40 mg    sodium chloride (NS) flush 5-40 mL    sodium chloride (NS) flush 5-40 mL    acetaminophen (TYLENOL) tablet 650 mg    Or    acetaminophen (TYLENOL) suppository 650 mg    ondansetron (ZOFRAN ODT) tablet 4 mg    Or    ondansetron (ZOFRAN) injection 4 mg    enoxaparin (LOVENOX) injection 40 mg    glucose chewable tablet 16 g    glucagon (GLUCAGEN) injection 1 mg    dextrose 10% infusion 0-250 mL    insulin lispro (HUMALOG) injection    ARIPiprazole (ABILIFY) tablet 2 mg    carvediloL (COREG) tablet 12.5 mg    clopidogreL (PLAVIX) tablet 75 mg    losartan (COZAAR) tablet 50 mg    senna-docusate (PERICOLACE) 8.6-50 mg per tablet 1 Tablet    [Held by provider] tamsulosin (FLOMAX) capsule 0.4 mg    furosemide (LASIX) tablet 20 mg      MAR reviewed and pertinent medications noted or modified as needed   Current Facility-Administered Medications   Medication    mupirocin (BACTROBAN) 2 % ointment    [START ON 11/15/2022] Vancomycin Level Due 11/15 0400    piperacillin-tazobactam (ZOSYN) 3.375 g in 0.9% sodium chloride (MBP/ADV) 100 mL MBP    vancomycin (VANCOCIN) 1,000 mg in 0.9% sodium chloride 250 mL ([id number])    VANCOMYCIN INFORMATION NOTE    aspirin chewable tablet 81 mg    atorvastatin (LIPITOR) tablet 40 mg    escitalopram oxalate (LEXAPRO) tablet 20 mg    gabapentin (NEURONTIN) capsule 100 mg    melatonin tablet 10 mg    baclofen (LIORESAL) tablet 5 mg    hydrOXYzine HCL (ATARAX) tablet 25 mg    polyethylene glycol (MIRALAX) packet 17 g    traMADoL (ULTRAM) tablet 50 mg    doxazosin (CARDURA) tablet 1 mg    potassium chloride (KLOR-CON) packet for solution 20 mEq    pantoprazole (PROTONIX) granules for oral suspension 40 mg    sodium chloride (NS) flush 5-40 mL    sodium chloride (NS) flush 5-40 mL    acetaminophen (TYLENOL) tablet 650 mg    Or    acetaminophen (TYLENOL) suppository 650 mg    ondansetron (ZOFRAN ODT) tablet 4 mg    Or    ondansetron (ZOFRAN) injection 4 mg    enoxaparin (LOVENOX) injection 40 mg    glucose chewable tablet 16 g    glucagon (GLUCAGEN) injection 1 mg    dextrose 10% infusion 0-250 mL    insulin lispro (HUMALOG) injection    ARIPiprazole (ABILIFY) tablet 2 mg    carvediloL (COREG) tablet 12.5 mg    clopidogreL (PLAVIX) tablet 75 mg    losartan (COZAAR) tablet 50 mg    senna-docusate (PERICOLACE) 8.6-50 mg per tablet 1 Tablet    [Held by provider] tamsulosin (FLOMAX) capsule 0.4 mg    furosemide (LASIX) tablet 20 mg      PMH:  has a past medical history of Cirrhosis ([facility] [address] Diabetes ([facility] [address] Hypertension, and Stroke ([facility] [address] PSH:   has a past surgical history that includes hx back surgery; hx gi; and ir insert non tunl cvc over 5 yrs (11/9/2022). FHX: family history includes Heart Disease in his father. SHX:  reports that he has quit smoking. He has never used smokeless tobacco. He reports that he does not currently use alcohol. He reports that he does not currently use drugs. ROS:Review of systems not obtained due to patient factors.       Hemodynamics:    CO:    CI:    CVP:    SVR:   PAP Systolic: PAP Diastolic:    PVR:    N[place]23:        Ventilator Settings:      Mode Rate TV Press PEEP FiO2 PIP Min. Vent                              Vital Signs: Telemetry:    normal sinus rhythm Intake/Output:   Visit Vitals  BP (!) 179/83 (BP 1 Location: Left upper arm, BP Patient Position: At rest;Lying)   Pulse 69   Temp 98.3 °F (36.8 °C)   Resp 19   Ht 5' 9\" (1.753 m)   Wt 62.5 kg (137 lb 12.6 oz)   SpO2 94%   BMI 20.35 kg/m²       Temp (24hrs), Av.2 °F (36.8 °C), Min:97.8 °F (36.6 °C), Max:98.6 °F (37 °C)        O2 Device: None (Room air) O2 Flow Rate (L/min): 2 l/min       Wt Readings from Last 4 Encounters:   11/10/22 62.5 kg (137 lb 12.6 oz)   22 84.8 kg (187 lb)   22 84.8 kg (187 lb)   10/28/20 99.8 kg (220 lb)          Intake/Output Summary (Last 24 hours) at 2022 1007  Last data filed at 2022 0544  Gross per 24 hour   Intake --   Output 350 ml   Net -350 ml         Last shift:      No intake/output data recorded. Last 3 shifts:  1901 -  0700  In: -   Out: 350 [Urine:350]       Physical Exam:     General: He is on room air  HEENT: NCAT, poor dentition, lips and mucosa dry  Eyes: anicteric; conjunctiva clear  Neck: no nodes, trach midline; no accessory MM use.   Chest: no deformity,   Cardiac: R regular; no murmur;   Lungs: distant breath sounds; wheezes  Abd: soft, NT, hypoactive BS  Ext: Lower extremity wound bandage in place  : NO kennedy, clear urine  Neuro: Unresponsive  Psych-unable to assess  Skin: warm, dry, no cyanosis;   Pulses: 1-2+ Bilateral pedal, radial  Capillary: brisk; pale      DATA:    MAR reviewed and pertinent medications noted or modified as needed  MEDS:   Current Facility-Administered Medications   Medication    mupirocin (BACTROBAN) 2 % ointment    [START ON 11/15/2022] Vancomycin Level Due 11/15 0400    piperacillin-tazobactam (ZOSYN) 3.375 g in 0.9% sodium chloride (MBP/ADV) 100 mL MBP    vancomycin (VANCOCIN) 1,000 mg in 0.9% sodium chloride 250 mL ([id number])    VANCOMYCIN INFORMATION NOTE    aspirin chewable tablet 81 mg    atorvastatin (LIPITOR) tablet 40 mg    escitalopram oxalate (LEXAPRO) tablet 20 mg    gabapentin (NEURONTIN) capsule 100 mg    melatonin tablet 10 mg    baclofen (LIORESAL) tablet 5 mg    hydrOXYzine HCL (ATARAX) tablet 25 mg    polyethylene glycol (MIRALAX) packet 17 g    traMADoL (ULTRAM) tablet 50 mg    doxazosin (CARDURA) tablet 1 mg    potassium chloride (KLOR-CON) packet for solution 20 mEq    pantoprazole (PROTONIX) granules for oral suspension 40 mg    sodium chloride (NS) flush 5-40 mL    sodium chloride (NS) flush 5-40 mL    acetaminophen (TYLENOL) tablet 650 mg    Or    acetaminophen (TYLENOL) suppository 650 mg    ondansetron (ZOFRAN ODT) tablet 4 mg    Or    ondansetron (ZOFRAN) injection 4 mg    enoxaparin (LOVENOX) injection 40 mg    glucose chewable tablet 16 g    glucagon (GLUCAGEN) injection 1 mg    dextrose 10% infusion 0-250 mL    insulin lispro (HUMALOG) injection    ARIPiprazole (ABILIFY) tablet 2 mg    carvediloL (COREG) tablet 12.5 mg    clopidogreL (PLAVIX) tablet 75 mg    losartan (COZAAR) tablet 50 mg    senna-docusate (PERICOLACE) 8.6-50 mg per tablet 1 Tablet    [Held by provider] tamsulosin (FLOMAX) capsule 0.4 mg    furosemide (LASIX) tablet 20 mg        Labs:    Recent Labs     11/14/22  0758   WBC 4.9   HGB 10.1*   PLT 96*       Recent Labs     11/14/22  0758 11/13/22  0343 11/12/22  0529   * 150* 149*   K 3.6 3.5 3.4*   * 120* 117*   CO2 26 26 25   * 201* 132*   BUN 12 12 10   CREA 0.50* 0.56* 0.48*   CA 7.4* 7.3* 7.5*   PHOS  --   --  1.8*   LAC  --   --  1.3       No results for input(s): PH, PCO2, PO2, HCO3, FIO2 in the last 72 hours. No results for input(s): CPK, CKNDX, TROIQ in the last 72 hours.     No lab exists for component: CPKMB    No results found for: BNPP, BNP   Lab Results   Component Value Date/Time    Culture result:  11/09/2022 02:10 AM     Heavy * methicillin resistant staphylococcus aureus *    Culture result: Heavy Diphtheroids 11/09/2022 02:10 AM    Culture result: No growth 6 days 11/08/2022 01:25 PM     Lab Results   Component Value Date/Time    TSH 1.67 07/30/2022 07:11 AM        Imaging:    Results from Hospital Encounter encounter on 11/08/22    XR CHEST PORT    Narrative  INDICATION: Ng tube placement Advanced NG per MD order, checking for placement  again    EXAMINATION:  AP CHEST, PORTABLE    COMPARISON: 11/11/2022    FINDINGS: Single AP portable view of the chest demonstrates nasogastric tube  advanced with tip over the gastric body. The cardiomediastinal silhouette is  unchanged. No pneumothorax. Vague bibasilar opacities are unchanged. Impression  Nasogastric tube tip advanced overlying the gastric body. Results from [place] encounter on 11/08/22    CT FOOT RT W CONT    Narrative  EXAM: CT FOOT RT W CONT    INDICATION: Right foot swelling and abscess. Evaluate for osteomyelitis. Hypertension, diabetes, and cirrhosis. COMPARISON: Right foot views on 11/10/2022    CONTRAST: 100 mL of Isovue-370. TECHNIQUE: Helical CT of the right foot during uneventful rapid bolus  intravenous contrast administration. Coronal and Sagittal reformats were  generated. Images reviewed in soft tissue and bone windows. CT dose reduction  was achieved through the use of a standardized protocol tailored for this  examination and automatic exposure control for dose modulation. FINDINGS: Bones: Mild osteopenia. No fracture or osteomyelitis. Joint fluid: Physiologic. Articulations: no evidence of septic arthritis. 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 Tendons: No full-thickness tendon tear. Muscles: Mild diffuse atrophy. Soft tissue mass: None. No fluid collection. Impression  1. No abscess or osteomyelitis. 2. No fracture.       11/10 patient is barely responsive now on room air off pressors getting IV fluid chest x-ray shows congestive changes IV fluid has been decreased, replace potassium, WBC much improved  11/11 Patient is out of ICU, responding slightly, he is on room air. PCO2 30. WBC improving, continues to decrease. 11/12 On room air, condition remains same open eyes but does not follow any commands, replace potassium  11/13 Room air, no O2 in hospital. He opened his eyes, tried to respond slightly by denying SOB, but unable to communicate clearly. NG tube in place. 11/14 Pt is seen resting, he is not currently on any O2, SpO2 94%. NG tube in place.

## 2022-11-14 NOTE — PROGRESS NOTES
Infectious Disease Progress Note           Subjective:   Pt seen and examined at bedside.  Stable, denies new complaints, unresponsive, not following commands, intermittent yelling per RN   Objective:   Physical Exam:     Visit Vitals  BP (!) 179/83 (BP 1 Location: Left upper arm, BP Patient Position: At rest;Lying)   Pulse 69   Temp 98.3 °F (36.8 °C)   Resp 19   Ht 5' 9\" (1.753 m)   Wt 137 lb 12.6 oz (62.5 kg)   SpO2 94%   BMI 20.35 kg/m²    O2 Flow Rate (L/min): 2 l/min O2 Device: None (Room air)    Temp (24hrs), Av.2 °F (36.8 °C), Min:97.8 °F (36.6 °C), Max:98.6 °F (37 °C)    701 - 1900  In: 120   Out: -    1901 -  0700  In: -   Out: 350 [Urine:350]    General: NAD, alert, unresponsive   HEENT: VANGIE, Moist mucosa   Lungs:Decreased at the bases, no wheeze/rhonchi   Heart: S1S2+, RRR, no murmur  Abdo: Soft, NT, ND, +BS   Exts:Right great toe ulcer, multiple superficial ulcers on b/l legs   Skin: b/l leg ulcers, stable ulcerations on b/l feet     Data Review:       Recent Days:  Recent Labs     22  0758   WBC 4.9   HGB 10.1*   HCT 30.5*   PLT 96*       Recent Labs     22  0758 22  0343 22  0529   BUN 12 12 10   CREA 0.50* 0.56* 0.48*         Lab Results   Component Value Date/Time    C-Reactive protein 5.67 (H) 2022 03:28 AM        Microbiology     Results       Procedure Component Value Units Date/Time    MRSA SCREEN - PCR (NASAL) [[id number]]  (Abnormal) Collected: 22    Order Status: Completed Specimen: Swab Updated: 22     MRSA by PCR, Nasal DETECTED        Comment: Results verified, phoned to and read back by [email or web address]       CULTURE, Bruna Arn STAIN [[id number]]  (Susceptibility) Collected: 22    Order Status: Completed Specimen: Wound from Toe Updated: 22 1327     Special Requests: No Special Requests        GRAM STAIN Occasional WBCs seen               2+ Gram positive cocci in pairs chains and clusters           Culture result:       Heavy * methicillin resistant staphylococcus aureus *            Heavy Diphtheroids       Susceptibility        Staphylococcus aureus Methcillin Resistant      TELMA      Ciprofloxacin ($) Resistant      Clindamycin ($) Resistant      Daptomycin ($$$$$) Susceptible      Doxycycline ($$) Intermediate      Erythromycin ($$$$) Resistant      Gentamicin ($) Susceptible      Inducible Clindamycin  See below  [1]       Levofloxacin ($) Resistant      Linezolid ($$$$$) Susceptible      Oxacillin Resistant      Rifampin ($$$$) Susceptible  [2]       Tetracycline Resistant      Trimeth/Sulfa Susceptible      Vancomycin ($) Susceptible                   [1]  HIDE     [2]  Rifampin is not to be used for mono-therapy. CULTURE, BLOOD, PAIRED [[id number]] Collected: 11/08/22 1325    Order Status: Completed Specimen: Blood Updated: 11/14/22 0121     Special Requests: No Special Requests        Culture result: No growth 6 days       CULTURE, URINE [[id number]] Collected: 11/08/22 1115    Order Status: Completed Specimen: Urine Updated: 11/10/22 1506     Special Requests: No Special Requests        [place] Count 20,000        [place] Count colonies/ml        Culture result:       Mixed urogenital kristen isolated                     Diagnostics   CXR Results  (Last 48 hours)      None               Assessment/Plan     Multiple superficial leg ulcers, eschar on right great toe MRSA isolated from left great toe Cx        No abscess or osteomyelitis on CT (11/11)         Afebrile w a normal WBC on routine labs        Continue on Vanc for MRSA coverage, day # 6        Routine wound care, CBC w AM labs     2. UTI, abnormal UA, Mixed kristen isolated from urine Cx (11/08). + External kennedy cath     3. Sepsis on admission due to above, clinically celio      Afebrile, WBC normalized. MRSA isolated from wound Cx     Blood and urine Cxs are negative.  Continue on Vanc for MRSA coverage      Will d/c Zosyn since no focal infiltrates on CXR and pt on RA      4. Recent CVA w Acute ischemia in the right posterior periventricular white matter involving the  right parietal lobe on MRI 07/2022.        Remains unreponsive, not following commands, consider MRI of head sine CT unremarkable      Mis Ren MD    11/14/2022

## 2022-11-14 NOTE — PROGRESS NOTES
IMPRESSION:   Acute hypoxic respiratory failure resolved  Severe sepsis   Metabolic encephalopathy  Probably urinary tract infection  Leukocytosis improved  Carotid artery disease history of CVA  Hypokalemia      RECOMMENDATIONS/PLAN:   68-year-old male nursing home resident admitted with hypotension and unresponsiveness  Hemodynamically stable  Panculture IV antibiotics, ABG shows respiratory alkalosis  Need vascular surgery consult for possible need endarterectomy when more stable  Previous 2D echo showed ejection fraction 60 to 65%  Chest x-ray shows fluid overload congestive changes   Replace potassium     [x] High complexity decision making was performed  [x] See my orders for details  HPI  68-year-old nursing home resident came in because of unresponsiveness he had a history of stroke in the past patient was hypotensive hypoxic rapid response was called he was admitted to the floor initially received IV fluid hemodynamically unstable started on vasopressors Levophed transferred to ICU White count was elevated he was put on oxygen 4 L nasal cannula unable to get any started the patient he has a right foot wound and multiple wounds of the lower extremities dressing in place. He has carotid artery stenosis only supposed to go for surgery but unable to get cardiac clearance because of nuclear stress test he has 90% stenosis of left ICA and 50% stenosis of right ICA. So now he is admitted to ICU and critical care consult was called  PMH:  has a past medical history of Cirrhosis ([facility] [address] Diabetes ([address] Hypertension, and Stroke ([facility] [address] PSH:   has a past surgical history that includes hx back surgery; hx gi; and ir insert non tunl cvc over 5 yrs (11/9/2022). FHX: family history includes Heart Disease in his father. SHX:  reports that he has quit smoking. He has never used smokeless tobacco. He reports that he does not currently use alcohol. He reports that he does not currently use drugs.     ALL: No Known Allergies     MEDS:   [x] Reviewed - As Below   [] Not reviewed    Current Facility-Administered Medications   Medication    mupirocin (BACTROBAN) 2 % ointment    [START ON 11/15/2022] Vancomycin Level Due 11/15 0400    piperacillin-tazobactam (ZOSYN) 3.375 g in 0.9% sodium chloride (MBP/ADV) 100 mL MBP    vancomycin (VANCOCIN) 1,000 mg in 0.9% sodium chloride 250 mL ([id number])    VANCOMYCIN INFORMATION NOTE    aspirin chewable tablet 81 mg    atorvastatin (LIPITOR) tablet 40 mg    escitalopram oxalate (LEXAPRO) tablet 20 mg    gabapentin (NEURONTIN) capsule 100 mg    melatonin tablet 10 mg    baclofen (LIORESAL) tablet 5 mg    hydrOXYzine HCL (ATARAX) tablet 25 mg    polyethylene glycol (MIRALAX) packet 17 g    traMADoL (ULTRAM) tablet 50 mg    doxazosin (CARDURA) tablet 1 mg    potassium chloride (KLOR-CON) packet for solution 20 mEq    pantoprazole (PROTONIX) granules for oral suspension 40 mg    sodium chloride (NS) flush 5-40 mL    sodium chloride (NS) flush 5-40 mL    acetaminophen (TYLENOL) tablet 650 mg    Or    acetaminophen (TYLENOL) suppository 650 mg    ondansetron (ZOFRAN ODT) tablet 4 mg    Or    ondansetron (ZOFRAN) injection 4 mg    enoxaparin (LOVENOX) injection 40 mg    glucose chewable tablet 16 g    glucagon (GLUCAGEN) injection 1 mg    dextrose 10% infusion 0-250 mL    insulin lispro (HUMALOG) injection    ARIPiprazole (ABILIFY) tablet 2 mg    carvediloL (COREG) tablet 12.5 mg    clopidogreL (PLAVIX) tablet 75 mg    losartan (COZAAR) tablet 50 mg    senna-docusate (PERICOLACE) 8.6-50 mg per tablet 1 Tablet    [Held by provider] tamsulosin (FLOMAX) capsule 0.4 mg    furosemide (LASIX) tablet 20 mg      MAR reviewed and pertinent medications noted or modified as needed   Current Facility-Administered Medications   Medication    mupirocin (BACTROBAN) 2 % ointment    [START ON 11/15/2022] Vancomycin Level Due 11/15 0400    piperacillin-tazobactam (ZOSYN) 3.375 g in 0.9% sodium chloride (MBP/ADV) 100 mL MBP    vancomycin (VANCOCIN) 1,000 mg in 0.9% sodium chloride 250 mL ([id number])    VANCOMYCIN INFORMATION NOTE    aspirin chewable tablet 81 mg    atorvastatin (LIPITOR) tablet 40 mg    escitalopram oxalate (LEXAPRO) tablet 20 mg    gabapentin (NEURONTIN) capsule 100 mg    melatonin tablet 10 mg    baclofen (LIORESAL) tablet 5 mg    hydrOXYzine HCL (ATARAX) tablet 25 mg    polyethylene glycol (MIRALAX) packet 17 g    traMADoL (ULTRAM) tablet 50 mg    doxazosin (CARDURA) tablet 1 mg    potassium chloride (KLOR-CON) packet for solution 20 mEq    pantoprazole (PROTONIX) granules for oral suspension 40 mg    sodium chloride (NS) flush 5-40 mL    sodium chloride (NS) flush 5-40 mL    acetaminophen (TYLENOL) tablet 650 mg    Or    acetaminophen (TYLENOL) suppository 650 mg    ondansetron (ZOFRAN ODT) tablet 4 mg    Or    ondansetron (ZOFRAN) injection 4 mg    enoxaparin (LOVENOX) injection 40 mg    glucose chewable tablet 16 g    glucagon (GLUCAGEN) injection 1 mg    dextrose 10% infusion 0-250 mL    insulin lispro (HUMALOG) injection    ARIPiprazole (ABILIFY) tablet 2 mg    carvediloL (COREG) tablet 12.5 mg    clopidogreL (PLAVIX) tablet 75 mg    losartan (COZAAR) tablet 50 mg    senna-docusate (PERICOLACE) 8.6-50 mg per tablet 1 Tablet    [Held by provider] tamsulosin (FLOMAX) capsule 0.4 mg    furosemide (LASIX) tablet 20 mg      PMH:  has a past medical history of Cirrhosis ([facility] [address] Diabetes ([facility] [address] Hypertension, and Stroke ([facility] [address] PSH:   has a past surgical history that includes hx back surgery; hx gi; and ir insert non tunl cvc over 5 yrs (11/9/2022). FHX: family history includes Heart Disease in his father. SHX:  reports that he has quit smoking. He has never used smokeless tobacco. He reports that he does not currently use alcohol. He reports that he does not currently use drugs. ROS:Review of systems not obtained due to patient factors.       Hemodynamics:    CO:    CI:    CVP:    SVR:   PAP Systolic: PAP Diastolic:    PVR:    GC30:        Ventilator Settings:      Mode Rate TV Press PEEP FiO2 PIP Min. Vent                              Vital Signs: Telemetry:    normal sinus rhythm Intake/Output:   Visit Vitals  BP (!) 179/83 (BP 1 Location: Left upper arm, BP Patient Position: At rest;Lying)   Pulse 69   Temp 98.3 °F (36.8 °C)   Resp 19   Ht 5' 9\" (1.753 m)   Wt 62.5 kg (137 lb 12.6 oz)   SpO2 94%   BMI 20.35 kg/m²       Temp (24hrs), Av.2 °F (36.8 °C), Min:97.8 °F (36.6 °C), Max:98.6 °F (37 °C)        O2 Device: None (Room air) O2 Flow Rate (L/min): 2 l/min       Wt Readings from Last 4 Encounters:   11/10/22 62.5 kg (137 lb 12.6 oz)   22 84.8 kg (187 lb)   22 84.8 kg (187 lb)   10/28/20 99.8 kg (220 lb)          Intake/Output Summary (Last 24 hours) at 2022 0858  Last data filed at 2022 0544  Gross per 24 hour   Intake --   Output 350 ml   Net -350 ml         Last shift:      No intake/output data recorded. Last 3 shifts:  1901 -  0700  In: -   Out: 350 [Urine:350]       Physical Exam:     General: He is on room air  HEENT: NCAT, poor dentition, lips and mucosa dry  Eyes: anicteric; conjunctiva clear  Neck: no nodes, trach midline; no accessory MM use.   Chest: no deformity,   Cardiac: R regular; no murmur;   Lungs: distant breath sounds; wheezes  Abd: soft, NT, hypoactive BS  Ext: Lower extremity wound bandage in place  : NO kennedy, clear urine  Neuro: Unresponsive  Psych-unable to assess  Skin: warm, dry, no cyanosis;   Pulses: 1-2+ Bilateral pedal, radial  Capillary: brisk; pale      DATA:    MAR reviewed and pertinent medications noted or modified as needed  MEDS:   Current Facility-Administered Medications   Medication    mupirocin (BACTROBAN) 2 % ointment    [START ON 11/15/2022] Vancomycin Level Due 11/15 0400    piperacillin-tazobactam (ZOSYN) 3.375 g in 0.9% sodium chloride (MBP/ADV) 100 mL MBP    vancomycin (VANCOCIN) 1,000 mg in 0.9% sodium chloride 250 mL ([id number])    VANCOMYCIN INFORMATION NOTE    aspirin chewable tablet 81 mg    atorvastatin (LIPITOR) tablet 40 mg    escitalopram oxalate (LEXAPRO) tablet 20 mg    gabapentin (NEURONTIN) capsule 100 mg    melatonin tablet 10 mg    baclofen (LIORESAL) tablet 5 mg    hydrOXYzine HCL (ATARAX) tablet 25 mg    polyethylene glycol (MIRALAX) packet 17 g    traMADoL (ULTRAM) tablet 50 mg    doxazosin (CARDURA) tablet 1 mg    potassium chloride (KLOR-CON) packet for solution 20 mEq    pantoprazole (PROTONIX) granules for oral suspension 40 mg    sodium chloride (NS) flush 5-40 mL    sodium chloride (NS) flush 5-40 mL    acetaminophen (TYLENOL) tablet 650 mg    Or    acetaminophen (TYLENOL) suppository 650 mg    ondansetron (ZOFRAN ODT) tablet 4 mg    Or    ondansetron (ZOFRAN) injection 4 mg    enoxaparin (LOVENOX) injection 40 mg    glucose chewable tablet 16 g    glucagon (GLUCAGEN) injection 1 mg    dextrose 10% infusion 0-250 mL    insulin lispro (HUMALOG) injection    ARIPiprazole (ABILIFY) tablet 2 mg    carvediloL (COREG) tablet 12.5 mg    clopidogreL (PLAVIX) tablet 75 mg    losartan (COZAAR) tablet 50 mg    senna-docusate (PERICOLACE) 8.6-50 mg per tablet 1 Tablet    [Held by provider] tamsulosin (FLOMAX) capsule 0.4 mg    furosemide (LASIX) tablet 20 mg        Labs:    No results for input(s): WBC, HGB, PLT, INR, APTT, HGBEXT, PLTEXT, INREXT, HGBEXT, PLTEXT, INREXT in the last 72 hours. No lab exists for component: FIB, DDMER    Recent Labs     11/13/22  0343 11/12/22  0529   * 149*   K 3.5 3.4*   * 117*   CO2 26 25   * 132*   BUN 12 10   CREA 0.56* 0.48*   CA 7.3* 7.5*   PHOS  --  1.8*   LAC  --  1.3       No results for input(s): PH, PCO2, PO2, HCO3, FIO2 in the last 72 hours. No results for input(s): CPK, CKNDX, TROIQ in the last 72 hours.     No lab exists for component: CPKMB    No results found for: BNPP, BNP   Lab Results   Component Value Date/Time    Culture result:  11/09/2022 02:10 AM     Heavy * methicillin resistant staphylococcus aureus *    Culture result: Heavy Diphtheroids 11/09/2022 02:10 AM    Culture result: No growth 6 days 11/08/2022 01:25 PM     Lab Results   Component Value Date/Time    TSH 1.67 07/30/2022 07:11 AM        Imaging:    Results from Hospital Encounter encounter on 11/08/22    XR CHEST PORT    Narrative  INDICATION: Ng tube placement Advanced NG per MD order, checking for placement  again    EXAMINATION:  AP CHEST, PORTABLE    COMPARISON: 11/11/2022    FINDINGS: Single AP portable view of the chest demonstrates nasogastric tube  advanced with tip over the gastric body. The cardiomediastinal silhouette is  unchanged. No pneumothorax. Vague bibasilar opacities are unchanged. Impression  Nasogastric tube tip advanced overlying the gastric body. Results from [place] encounter on 11/08/22    CT FOOT RT W CONT    Narrative  EXAM: CT FOOT RT W CONT    INDICATION: Right foot swelling and abscess. Evaluate for osteomyelitis. Hypertension, diabetes, and cirrhosis. COMPARISON: Right foot views on 11/10/2022    CONTRAST: 100 mL of Isovue-370. TECHNIQUE: Helical CT of the right foot during uneventful rapid bolus  intravenous contrast administration. Coronal and Sagittal reformats were  generated. Images reviewed in soft tissue and bone windows. CT dose reduction  was achieved through the use of a standardized protocol tailored for this  examination and automatic exposure control for dose modulation. FINDINGS: Bones: Mild osteopenia. No fracture or osteomyelitis. Joint fluid: Physiologic. Articulations: no evidence of septic arthritis. 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 Tendons: No full-thickness tendon tear. Muscles: Mild diffuse atrophy. Soft tissue mass: None. No fluid collection. Impression  1. No abscess or osteomyelitis. 2. No fracture.       11/10 patient is barely responsive now on room air off pressors getting IV fluid chest x-ray shows congestive changes IV fluid has been decreased, replace potassium, WBC much improved  11/11 Patient is out of ICU, responding slightly, he is on room air. PCO2 30. WBC improving, continues to decrease. 11/12 On room air, condition remains same open eyes but does not follow any commands, replace potassium  11/13 Room air, no O2 in hospital. He opened his eyes, tried to respond slightly by denying SOB, but unable to communicate clearly. NG tube in place. 11/14 Pt is seen resting, he is not currently on any O2, SpO2 94%. NG tube in place.

## 2022-11-14 NOTE — PROGRESS NOTES
Problem: Falls - Risk of  Goal: *Absence of Falls  Description: Document [place] Fall Risk and appropriate interventions in the flowsheet.   Outcome: Progressing Towards Goal  Note: Fall Risk Interventions:       Mentation Interventions: Bed/chair exit alarm    Medication Interventions: Bed/chair exit alarm    Elimination Interventions: Bed/chair exit alarm              Problem: Patient Education: Go to Patient Education Activity  Goal: Patient/Family Education  Outcome: Progressing Towards Goal     Problem: Discharge Planning  Goal: *Discharge to safe environment  Outcome: Progressing Towards Goal  Goal: *Knowledge of medication management  Outcome: Progressing Towards Goal  Goal: *Knowledge of discharge instructions  Outcome: Progressing Towards Goal     Problem: Patient Education: Go to Patient Education Activity  Goal: Patient/Family Education  Outcome: Progressing Towards Goal     Problem: Sepsis: Day of Diagnosis  Goal: Off Pathway (Use only if patient is Off Pathway)  Outcome: Progressing Towards Goal  Goal: *Fluid resuscitation  Outcome: Progressing Towards Goal  Goal: *Paired blood cultures prior to first dose of antibiotic  Outcome: Progressing Towards Goal  Goal: *First dose of  appropriate antibiotic within 3 hours of arrival to ED, within 1 hour of arrival to ICU  Outcome: Progressing Towards Goal  Goal: *Lactic acid with first set of blood cultures  Outcome: Progressing Towards Goal  Goal: *Pneumococcal immunization (if eligible)  Outcome: Progressing Towards Goal  Goal: *Influenza immunization (if eligible)  Outcome: Progressing Towards Goal  Goal: Activity/Safety  Outcome: Progressing Towards Goal  Goal: Consults, if ordered  Outcome: Progressing Towards Goal  Goal: Diagnostic Test/Procedures  Outcome: Progressing Towards Goal  Goal: Nutrition/Diet  Outcome: Progressing Towards Goal  Goal: Discharge Planning  Outcome: Progressing Towards Goal  Goal: Medications  Outcome: Progressing Towards Goal  Goal: Respiratory  Outcome: Progressing Towards Goal  Goal: Treatments/Interventions/Procedures  Outcome: Progressing Towards Goal  Goal: Psychosocial  Outcome: Progressing Towards Goal     Problem: Sepsis: Day 2  Goal: Off Pathway (Use only if patient is Off Pathway)  Outcome: Progressing Towards Goal  Goal: *Central Venous Pressure maintained at 8-12 mm Hg  Outcome: Progressing Towards Goal  Goal: *Hemodynamically stable  Outcome: Progressing Towards Goal  Goal: *Tolerating diet  Outcome: Progressing Towards Goal  Goal: Activity/Safety  Outcome: Progressing Towards Goal  Goal: Consults, if ordered  Outcome: Progressing Towards Goal  Goal: Diagnostic Test/Procedures  Outcome: Progressing Towards Goal  Goal: Nutrition/Diet  Outcome: Progressing Towards Goal  Goal: Discharge Planning  Outcome: Progressing Towards Goal  Goal: Medications  Outcome: Progressing Towards Goal  Goal: Respiratory  Outcome: Progressing Towards Goal  Goal: Treatments/Interventions/Procedures  Outcome: Progressing Towards Goal  Goal: Psychosocial  Outcome: Progressing Towards Goal     Problem: Sepsis: Day 3  Goal: Off Pathway (Use only if patient is Off Pathway)  Outcome: Progressing Towards Goal  Goal: *Central Venous Pressure maintained at 8-12 mm Hg  Outcome: Progressing Towards Goal  Goal: *Oxygen saturation within defined limits  Outcome: Progressing Towards Goal  Goal: *Vital sign stability  Outcome: Progressing Towards Goal  Goal: *Tolerating diet  Outcome: Progressing Towards Goal  Goal: *Demonstrates progressive activity  Outcome: Progressing Towards Goal  Goal: Activity/Safety  Outcome: Progressing Towards Goal  Goal: Consults, if ordered  Outcome: Progressing Towards Goal  Goal: Diagnostic Test/Procedures  Outcome: Progressing Towards Goal  Goal: Nutrition/Diet  Outcome: Progressing Towards Goal  Goal: Discharge Planning  Outcome: Progressing Towards Goal  Goal: Medications  Outcome: Progressing Towards Goal  Goal: Respiratory  Outcome: Progressing Towards Goal  Goal: Treatments/Interventions/Procedures  Outcome: Progressing Towards Goal  Goal: Psychosocial  Outcome: Progressing Towards Goal     Problem: Sepsis: Day 4  Goal: Off Pathway (Use only if patient is Off Pathway)  Outcome: Progressing Towards Goal  Goal: Activity/Safety  Outcome: Progressing Towards Goal  Goal: Consults, if ordered  Outcome: Progressing Towards Goal  Goal: Diagnostic Test/Procedures  Outcome: Progressing Towards Goal  Goal: Nutrition/Diet  Outcome: Progressing Towards Goal  Goal: Discharge Planning  Outcome: Progressing Towards Goal  Goal: Medications  Outcome: Progressing Towards Goal  Goal: Respiratory  Outcome: Progressing Towards Goal  Goal: Treatments/Interventions/Procedures  Outcome: Progressing Towards Goal  Goal: Psychosocial  Outcome: Progressing Towards Goal  Goal: *Oxygen saturation within defined limits  Outcome: Progressing Towards Goal  Goal: *Hemodynamically stable  Outcome: Progressing Towards Goal  Goal: *Vital signs within defined limits  Outcome: Progressing Towards Goal  Goal: *Tolerating diet  Outcome: Progressing Towards Goal  Goal: *Demonstrates progressive activity  Outcome: Progressing Towards Goal  Goal: *Fluid volume maintenance  Outcome: Progressing Towards Goal     Problem: Sepsis: Day 5  Goal: Off Pathway (Use only if patient is Off Pathway)  Outcome: Progressing Towards Goal  Goal: *Oxygen saturation within defined limits  Outcome: Progressing Towards Goal  Goal: *Vital signs within defined limits  Outcome: Progressing Towards Goal  Goal: *Tolerating diet  Outcome: Progressing Towards Goal  Goal: *Demonstrates progressive activity  Outcome: Progressing Towards Goal  Goal: *Discharge plan identified  Outcome: Progressing Towards Goal  Goal: Activity/Safety  Outcome: Progressing Towards Goal  Goal: Consults, if ordered  Outcome: Progressing Towards Goal  Goal: Diagnostic Test/Procedures  Outcome: Progressing Towards Goal  Goal: Nutrition/Diet  Outcome: Progressing Towards Goal  Goal: Discharge Planning  Outcome: Progressing Towards Goal  Goal: Medications  Outcome: Progressing Towards Goal  Goal: Respiratory  Outcome: Progressing Towards Goal  Goal: Treatments/Interventions/Procedures  Outcome: Progressing Towards Goal  Goal: Psychosocial  Outcome: Progressing Towards Goal

## 2022-11-14 NOTE — PROGRESS NOTES
Patient had removed right mitt from hand and removed NG tube. NG tube inserted in Left nare. TF on hold pending CXR notified Jessica Aden.

## 2022-11-15 ENCOUNTER — ANESTHESIA EVENT (OUTPATIENT)
Dept: ENDOSCOPY | Age: 72
DRG: 871 | End: 2022-11-15
Payer: MEDICARE

## 2022-11-15 ENCOUNTER — APPOINTMENT (OUTPATIENT)
Dept: GENERAL RADIOLOGY | Age: 72
DRG: 871 | End: 2022-11-15
Attending: SURGERY
Payer: MEDICARE

## 2022-11-15 LAB
ANION GAP SERPL CALC-SCNC: 6 MMOL/L (ref 5–15)
BUN SERPL-MCNC: 11 MG/DL (ref 6–20)
BUN/CREAT SERPL: 19 (ref 12–20)
CA-I BLD-MCNC: 7.8 MG/DL (ref 8.5–10.1)
CHLORIDE SERPL-SCNC: 117 MMOL/L (ref 97–108)
CO2 SERPL-SCNC: 27 MMOL/L (ref 21–32)
CREAT SERPL-MCNC: 0.57 MG/DL (ref 0.7–1.3)
ERYTHROCYTE [DISTWIDTH] IN BLOOD BY AUTOMATED COUNT: 16.4 % (ref 11.5–14.5)
GLUCOSE BLD STRIP.AUTO-MCNC: 144 MG/DL (ref 65–100)
GLUCOSE BLD STRIP.AUTO-MCNC: 180 MG/DL (ref 65–100)
GLUCOSE BLD STRIP.AUTO-MCNC: 204 MG/DL (ref 65–100)
GLUCOSE BLD STRIP.AUTO-MCNC: 212 MG/DL (ref 65–100)
GLUCOSE BLD STRIP.AUTO-MCNC: 219 MG/DL (ref 65–100)
GLUCOSE SERPL-MCNC: 150 MG/DL (ref 65–100)
HCT VFR BLD AUTO: 31.3 % (ref 36.6–50.3)
HGB BLD-MCNC: 10.4 G/DL (ref 12.1–17)
MCH RBC QN AUTO: 28.7 PG (ref 26–34)
MCHC RBC AUTO-ENTMCNC: 33.2 G/DL (ref 30–36.5)
MCV RBC AUTO: 86.5 FL (ref 80–99)
NRBC # BLD: 0 K/UL (ref 0–0.01)
NRBC BLD-RTO: 0 PER 100 WBC
PERFORMED BY, TECHID: ABNORMAL
PLATELET # BLD AUTO: 125 K/UL (ref 150–400)
PMV BLD AUTO: 11.4 FL (ref 8.9–12.9)
POTASSIUM SERPL-SCNC: 3.8 MMOL/L (ref 3.5–5.1)
RBC # BLD AUTO: 3.62 M/UL (ref 4.1–5.7)
SODIUM SERPL-SCNC: 150 MMOL/L (ref 136–145)
VANCOMYCIN SERPL-MCNC: 11 UG/ML
WBC # BLD AUTO: 5.5 K/UL (ref 4.1–11.1)

## 2022-11-15 PROCEDURE — 80202 ASSAY OF VANCOMYCIN: CPT

## 2022-11-15 PROCEDURE — 74011250636 HC RX REV CODE- 250/636: Performed by: STUDENT IN AN ORGANIZED HEALTH CARE EDUCATION/TRAINING PROGRAM

## 2022-11-15 PROCEDURE — 71045 X-RAY EXAM CHEST 1 VIEW: CPT

## 2022-11-15 PROCEDURE — 74011000250 HC RX REV CODE- 250: Performed by: STUDENT IN AN ORGANIZED HEALTH CARE EDUCATION/TRAINING PROGRAM

## 2022-11-15 PROCEDURE — 85027 COMPLETE CBC AUTOMATED: CPT

## 2022-11-15 PROCEDURE — 74011000250 HC RX REV CODE- 250: Performed by: INTERNAL MEDICINE

## 2022-11-15 PROCEDURE — 74011636637 HC RX REV CODE- 636/637: Performed by: INTERNAL MEDICINE

## 2022-11-15 PROCEDURE — 92526 ORAL FUNCTION THERAPY: CPT

## 2022-11-15 PROCEDURE — 74011250636 HC RX REV CODE- 250/636: Performed by: HOSPITALIST

## 2022-11-15 PROCEDURE — 65270000029 HC RM PRIVATE

## 2022-11-15 PROCEDURE — 74011250636 HC RX REV CODE- 250/636: Performed by: INTERNAL MEDICINE

## 2022-11-15 PROCEDURE — 36415 COLL VENOUS BLD VENIPUNCTURE: CPT

## 2022-11-15 PROCEDURE — 99232 SBSQ HOSP IP/OBS MODERATE 35: CPT | Performed by: INTERNAL MEDICINE

## 2022-11-15 PROCEDURE — 74011250637 HC RX REV CODE- 250/637: Performed by: INTERNAL MEDICINE

## 2022-11-15 PROCEDURE — 82962 GLUCOSE BLOOD TEST: CPT

## 2022-11-15 PROCEDURE — 80048 BASIC METABOLIC PNL TOTAL CA: CPT

## 2022-11-15 RX ORDER — HYDRALAZINE HYDROCHLORIDE 20 MG/ML
20 INJECTION INTRAMUSCULAR; INTRAVENOUS
Status: DISCONTINUED | OUTPATIENT
Start: 2022-11-15 | End: 2022-11-27 | Stop reason: HOSPADM

## 2022-11-15 RX ORDER — DEXTROSE MONOHYDRATE AND SODIUM CHLORIDE 5; .45 G/100ML; G/100ML
75 INJECTION, SOLUTION INTRAVENOUS CONTINUOUS
Status: DISCONTINUED | OUTPATIENT
Start: 2022-11-15 | End: 2022-11-20

## 2022-11-15 RX ADMIN — CARVEDILOL 12.5 MG: 12.5 TABLET, FILM COATED ORAL at 03:36

## 2022-11-15 RX ADMIN — PANTOPRAZOLE SODIUM 40 MG: 40 GRANULE, DELAYED RELEASE ORAL at 09:07

## 2022-11-15 RX ADMIN — MELATONIN TAB 5 MG 10 MG: 5 TAB at 22:33

## 2022-11-15 RX ADMIN — CARVEDILOL 12.5 MG: 12.5 TABLET, FILM COATED ORAL at 09:07

## 2022-11-15 RX ADMIN — POTASSIUM CHLORIDE 20 MEQ: 1.5 FOR SOLUTION ORAL at 22:36

## 2022-11-15 RX ADMIN — DEXTROSE AND SODIUM CHLORIDE 75 ML/HR: 5; 450 INJECTION, SOLUTION INTRAVENOUS at 09:07

## 2022-11-15 RX ADMIN — DOXAZOSIN 1 MG: 2 TABLET ORAL at 22:33

## 2022-11-15 RX ADMIN — SODIUM CHLORIDE, PRESERVATIVE FREE 10 ML: 5 INJECTION INTRAVENOUS at 22:44

## 2022-11-15 RX ADMIN — VANCOMYCIN HYDROCHLORIDE 1000 MG: 1 INJECTION, POWDER, LYOPHILIZED, FOR SOLUTION INTRAVENOUS at 06:19

## 2022-11-15 RX ADMIN — SODIUM CHLORIDE, PRESERVATIVE FREE 10 ML: 5 INJECTION INTRAVENOUS at 06:24

## 2022-11-15 RX ADMIN — ENOXAPARIN SODIUM 40 MG: 100 INJECTION SUBCUTANEOUS at 09:26

## 2022-11-15 RX ADMIN — CLOPIDOGREL BISULFATE 75 MG: 75 TABLET, FILM COATED ORAL at 09:07

## 2022-11-15 RX ADMIN — FUROSEMIDE 20 MG: 40 TABLET ORAL at 09:07

## 2022-11-15 RX ADMIN — SODIUM CHLORIDE, PRESERVATIVE FREE 10 ML: 5 INJECTION INTRAVENOUS at 03:37

## 2022-11-15 RX ADMIN — ATORVASTATIN CALCIUM 40 MG: 40 TABLET, FILM COATED ORAL at 09:07

## 2022-11-15 RX ADMIN — ESCITALOPRAM OXALATE 20 MG: 10 TABLET ORAL at 09:26

## 2022-11-15 RX ADMIN — POTASSIUM CHLORIDE 20 MEQ: 1.5 FOR SOLUTION ORAL at 09:07

## 2022-11-15 RX ADMIN — CARVEDILOL 12.5 MG: 12.5 TABLET, FILM COATED ORAL at 22:36

## 2022-11-15 RX ADMIN — TRAMADOL HYDROCHLORIDE 50 MG: 50 TABLET, COATED ORAL at 22:33

## 2022-11-15 RX ADMIN — ARIPIPRAZOLE 2 MG: 2 TABLET ORAL at 09:26

## 2022-11-15 RX ADMIN — POTASSIUM CHLORIDE 20 MEQ: 1.5 FOR SOLUTION ORAL at 03:36

## 2022-11-15 RX ADMIN — INSULIN LISPRO 2 UNITS: 100 INJECTION, SOLUTION INTRAVENOUS; SUBCUTANEOUS at 22:32

## 2022-11-15 RX ADMIN — GABAPENTIN 100 MG: 100 CAPSULE ORAL at 22:33

## 2022-11-15 RX ADMIN — DOXAZOSIN 1 MG: 2 TABLET ORAL at 03:36

## 2022-11-15 RX ADMIN — LOSARTAN POTASSIUM 50 MG: 50 TABLET, FILM COATED ORAL at 09:07

## 2022-11-15 RX ADMIN — HYDRALAZINE HYDROCHLORIDE 20 MG: 20 INJECTION, SOLUTION INTRAMUSCULAR; INTRAVENOUS at 16:18

## 2022-11-15 RX ADMIN — SODIUM CHLORIDE, PRESERVATIVE FREE 10 ML: 5 INJECTION INTRAVENOUS at 16:09

## 2022-11-15 RX ADMIN — ASPIRIN 81 MG 81 MG: 81 TABLET ORAL at 09:07

## 2022-11-15 RX ADMIN — INSULIN LISPRO 3 UNITS: 100 INJECTION, SOLUTION INTRAVENOUS; SUBCUTANEOUS at 16:13

## 2022-11-15 RX ADMIN — GABAPENTIN 100 MG: 100 CAPSULE ORAL at 03:36

## 2022-11-15 RX ADMIN — INSULIN LISPRO 2 UNITS: 100 INJECTION, SOLUTION INTRAVENOUS; SUBCUTANEOUS at 09:26

## 2022-11-15 RX ADMIN — VANCOMYCIN HYDROCHLORIDE 1000 MG: 1 INJECTION, POWDER, LYOPHILIZED, FOR SOLUTION INTRAVENOUS at 17:19

## 2022-11-15 NOTE — PROGRESS NOTES
Vancomycin Dosing Consult  Drea Rivas is a 70 y.o. male with severe sepsis due to UTI and toe ulcer. Pharmacy was consulted by Dr. Anitha Mckeon to dose and monitor Vancomycin. Today is day 7. Antibiotic regimen: Vancomycin     Temp (24hrs), Av.5 °F (36.9 °C), Min:98.5 °F (36.9 °C), Max:98.5 °F (36.9 °C)    Recent Labs     11/15/22  0544 22  0758   WBC 5.5 4.9     Recent Labs     11/15/22  0544 22  0758 22  0343 22  0529 22  0328 11/10/22  0735 11/10/22  0340   CREA 0.57* 0.50* 0.56* 0.48* 0.44* 0.68* 0.68*   BUN 11 12 12 10 11 16 17     Recent Labs     22  0328 22  0210   CRP 5.67* 4.02*     Recent Labs     22  0328   PCT 49.13*       Est CrCl: 85.6 ml/min  Concomitant nephrotoxic drugs: Loop diuretics    Cultures:    Blood: ngtd, prelim   Urine: 20,000 colonies/mL, Mixed urogential Nataly isolated (final)   Wound: Heavy MRSA, Heavy Diphtheroids (final)    MRSA Swab: Detected     Target range: AUC/TELMA 400-600    Last Level: 11.0 mcg/mL    Recent level history:  Date/Time Dose & Interval Measured Level (mcg/mL) Associated AUC/TELMA   11/10 0340 2,000 mg IV x 1 dose 7.7 N/A    1258 1,000 mg IV x 1 dose 14.5 413    1322 1,000 mg IV q12h 15 438   11/15 0544 1,000 mg IV q 12 h 11.0 494          Assessment/Plan:   Afebrile, WBC WNL, CRP and procal remain elevated  Continue regimen of vancomycin 1000mg IV q 12 h; predicts AUC ~ 494  Vancomycin level scheduled for  at 0400;  Pharmacy to adjust therapy as indicated  Antimicrobial stop date TBD

## 2022-11-15 NOTE — PROGRESS NOTES
IMPRESSION:   Acute hypoxic respiratory failure resolved  Severe sepsis   Metabolic encephalopathy  Probably urinary tract infection  Leukocytosis improved  Carotid artery disease history of CVA  Hypokalemia      RECOMMENDATIONS/PLAN:   70-year-old male nursing home resident admitted with hypotension and unresponsiveness  Hemodynamically stable  Panculture IV antibiotics, ABG shows respiratory alkalosis  Need vascular surgery consult for possible need endarterectomy when more stable  Previous 2D echo showed ejection fraction 60 to 65%  Chest x-ray shows fluid overload congestive changes   Replace potassium     [x] High complexity decision making was performed  [x] See my orders for details  HPI  70-year-old nursing home resident came in because of unresponsiveness he had a history of stroke in the past patient was hypotensive hypoxic rapid response was called he was admitted to the floor initially received IV fluid hemodynamically unstable started on vasopressors Levophed transferred to ICU White count was elevated he was put on oxygen 4 L nasal cannula unable to get any started the patient he has a right foot wound and multiple wounds of the lower extremities dressing in place. He has carotid artery stenosis only supposed to go for surgery but unable to get cardiac clearance because of nuclear stress test he has 90% stenosis of left ICA and 50% stenosis of right ICA. So now he is admitted to ICU and critical care consult was called  PMH:  has a past medical history of Cirrhosis ([facility] [address] Diabetes ([address] [address] Hypertension, and Stroke ([facility] [address] PSH:   has a past surgical history that includes hx back surgery; hx gi; and ir insert non tunl cvc over 5 yrs (11/9/2022). FHX: family history includes Heart Disease in his father. SHX:  reports that he has quit smoking. He has never used smokeless tobacco. He reports that he does not currently use alcohol. He reports that he does not currently use drugs.     ALL: No Known Allergies     MEDS:   [x] Reviewed - As Below   [] Not reviewed    Current Facility-Administered Medications   Medication    dextrose 5 % - 0.45% NaCl infusion    vancomycin (VANCOCIN) 1,000 mg in 0.9% sodium chloride 250 mL ([id number])    VANCOMYCIN INFORMATION NOTE    aspirin chewable tablet 81 mg    atorvastatin (LIPITOR) tablet 40 mg    escitalopram oxalate (LEXAPRO) tablet 20 mg    gabapentin (NEURONTIN) capsule 100 mg    melatonin tablet 10 mg    baclofen (LIORESAL) tablet 5 mg    hydrOXYzine HCL (ATARAX) tablet 25 mg    polyethylene glycol (MIRALAX) packet 17 g    traMADoL (ULTRAM) tablet 50 mg    doxazosin (CARDURA) tablet 1 mg    potassium chloride (KLOR-CON) packet for solution 20 mEq    pantoprazole (PROTONIX) granules for oral suspension 40 mg    sodium chloride (NS) flush 5-40 mL    sodium chloride (NS) flush 5-40 mL    acetaminophen (TYLENOL) tablet 650 mg    Or    acetaminophen (TYLENOL) suppository 650 mg    ondansetron (ZOFRAN ODT) tablet 4 mg    Or    ondansetron (ZOFRAN) injection 4 mg    enoxaparin (LOVENOX) injection 40 mg    glucose chewable tablet 16 g    glucagon (GLUCAGEN) injection 1 mg    dextrose 10% infusion 0-250 mL    insulin lispro (HUMALOG) injection    ARIPiprazole (ABILIFY) tablet 2 mg    carvediloL (COREG) tablet 12.5 mg    clopidogreL (PLAVIX) tablet 75 mg    losartan (COZAAR) tablet 50 mg    senna-docusate (PERICOLACE) 8.6-50 mg per tablet 1 Tablet    [Held by provider] tamsulosin (FLOMAX) capsule 0.4 mg    furosemide (LASIX) tablet 20 mg      MAR reviewed and pertinent medications noted or modified as needed   Current Facility-Administered Medications   Medication    dextrose 5 % - 0.45% NaCl infusion    vancomycin (VANCOCIN) 1,000 mg in 0.9% sodium chloride 250 mL ([id number])    VANCOMYCIN INFORMATION NOTE    aspirin chewable tablet 81 mg    atorvastatin (LIPITOR) tablet 40 mg    escitalopram oxalate (LEXAPRO) tablet 20 mg    gabapentin (NEURONTIN) capsule 100 mg    melatonin tablet 10 mg    baclofen (LIORESAL) tablet 5 mg    hydrOXYzine HCL (ATARAX) tablet 25 mg    polyethylene glycol (MIRALAX) packet 17 g    traMADoL (ULTRAM) tablet 50 mg    doxazosin (CARDURA) tablet 1 mg    potassium chloride (KLOR-CON) packet for solution 20 mEq    pantoprazole (PROTONIX) granules for oral suspension 40 mg    sodium chloride (NS) flush 5-40 mL    sodium chloride (NS) flush 5-40 mL    acetaminophen (TYLENOL) tablet 650 mg    Or    acetaminophen (TYLENOL) suppository 650 mg    ondansetron (ZOFRAN ODT) tablet 4 mg    Or    ondansetron (ZOFRAN) injection 4 mg    enoxaparin (LOVENOX) injection 40 mg    glucose chewable tablet 16 g    glucagon (GLUCAGEN) injection 1 mg    dextrose 10% infusion 0-250 mL    insulin lispro (HUMALOG) injection    ARIPiprazole (ABILIFY) tablet 2 mg    carvediloL (COREG) tablet 12.5 mg    clopidogreL (PLAVIX) tablet 75 mg    losartan (COZAAR) tablet 50 mg    senna-docusate (PERICOLACE) 8.6-50 mg per tablet 1 Tablet    [Held by provider] tamsulosin (FLOMAX) capsule 0.4 mg    furosemide (LASIX) tablet 20 mg      PMH:  has a past medical history of Cirrhosis ([facility] [address] Diabetes ([facility] [address] Hypertension, and Stroke ([facility] [address] PSH:   has a past surgical history that includes hx back surgery; hx gi; and ir insert non tunl cvc over 5 yrs (11/9/2022). FHX: family history includes Heart Disease in his father. SHX:  reports that he has quit smoking. He has never used smokeless tobacco. He reports that he does not currently use alcohol. He reports that he does not currently use drugs. ROS:Review of systems not obtained due to patient factors. Hemodynamics:    CO:    CI:    CVP:    SVR:   PAP Systolic:    PAP Diastolic:    PVR:    SW38:        Ventilator Settings:      Mode Rate TV Press PEEP FiO2 PIP Min. Vent                              Vital Signs: Telemetry:    normal sinus rhythm Intake/Output:   Visit Vitals  BP (!) 149/79 (BP 1 Location: Left upper arm, BP Patient Position:  At rest)   Pulse 84   Temp 99.4 °F (37.4 °C)   Resp 18   Ht 5' 9\" (1.753 m)   Wt 62.5 kg (137 lb 12.6 oz)   SpO2 93%   BMI 20.35 kg/m²       Temp (24hrs), Av °F (37.2 °C), Min:98.5 °F (36.9 °C), Max:99.4 °F (37.4 °C)        O2 Device: None (Room air) O2 Flow Rate (L/min): 2 l/min       Wt Readings from Last 4 Encounters:   11/10/22 62.5 kg (137 lb 12.6 oz)   22 84.8 kg (187 lb)   22 84.8 kg (187 lb)   10/28/20 99.8 kg (220 lb)          Intake/Output Summary (Last 24 hours) at 11/15/2022 0908  Last data filed at 11/15/2022 0644  Gross per 24 hour   Intake 420 ml   Output 1000 ml   Net -580 ml         Last shift:      No intake/output data recorded. Last 3 shifts:  1901 - 11/15 0700  In: 420   Out: 1350 [Urine:1350]       Physical Exam:     General: He is on room air  HEENT: NCAT, poor dentition, lips and mucosa dry  Eyes: anicteric; conjunctiva clear  Neck: no nodes, trach midline; no accessory MM use.   Chest: no deformity,   Cardiac: R regular; no murmur;   Lungs: distant breath sounds; wheezes  Abd: soft, NT, hypoactive BS  Ext: Lower extremity wound bandage in place  : NO kennedy, clear urine  Neuro: Unresponsive  Psych-unable to assess  Skin: warm, dry, no cyanosis;   Pulses: 1-2+ Bilateral pedal, radial  Capillary: brisk; pale      DATA:    MAR reviewed and pertinent medications noted or modified as needed  MEDS:   Current Facility-Administered Medications   Medication    dextrose 5 % - 0.45% NaCl infusion    vancomycin (VANCOCIN) 1,000 mg in 0.9% sodium chloride 250 mL ([id number])    VANCOMYCIN INFORMATION NOTE    aspirin chewable tablet 81 mg    atorvastatin (LIPITOR) tablet 40 mg    escitalopram oxalate (LEXAPRO) tablet 20 mg    gabapentin (NEURONTIN) capsule 100 mg    melatonin tablet 10 mg    baclofen (LIORESAL) tablet 5 mg    hydrOXYzine HCL (ATARAX) tablet 25 mg    polyethylene glycol (MIRALAX) packet 17 g    traMADoL (ULTRAM) tablet 50 mg    doxazosin (CARDURA) tablet 1 mg    potassium chloride (KLOR-CON) packet for solution 20 mEq    pantoprazole (PROTONIX) granules for oral suspension 40 mg    sodium chloride (NS) flush 5-40 mL    sodium chloride (NS) flush 5-40 mL    acetaminophen (TYLENOL) tablet 650 mg    Or    acetaminophen (TYLENOL) suppository 650 mg    ondansetron (ZOFRAN ODT) tablet 4 mg    Or    ondansetron (ZOFRAN) injection 4 mg    enoxaparin (LOVENOX) injection 40 mg    glucose chewable tablet 16 g    glucagon (GLUCAGEN) injection 1 mg    dextrose 10% infusion 0-250 mL    insulin lispro (HUMALOG) injection    ARIPiprazole (ABILIFY) tablet 2 mg    carvediloL (COREG) tablet 12.5 mg    clopidogreL (PLAVIX) tablet 75 mg    losartan (COZAAR) tablet 50 mg    senna-docusate (PERICOLACE) 8.6-50 mg per tablet 1 Tablet    [Held by provider] tamsulosin (FLOMAX) capsule 0.4 mg    furosemide (LASIX) tablet 20 mg        Labs:    Recent Labs     11/15/22  0544 11/14/22  0758   WBC 5.5 4.9   HGB 10.4* 10.1*   * 96*       Recent Labs     11/15/22  0544 11/14/22  0758 11/13/22  0343   * 149* 150*   K 3.8 3.6 3.5   * 119* 120*   CO2 27 26 26   * 173* 201*   BUN 11 12 12   CREA 0.57* 0.50* 0.56*   CA 7.8* 7.4* 7.3*       No results for input(s): PH, PCO2, PO2, HCO3, FIO2 in the last 72 hours. No results for input(s): CPK, CKNDX, TROIQ in the last 72 hours.     No lab exists for component: CPKMB    No results found for: BNPP, BNP   Lab Results   Component Value Date/Time    Culture result:  11/09/2022 02:10 AM     Heavy * methicillin resistant staphylococcus aureus *    Culture result: Heavy Diphtheroids 11/09/2022 02:10 AM    Culture result: No growth 6 days 11/08/2022 01:25 PM     Lab Results   Component Value Date/Time    TSH 1.67 07/30/2022 07:11 AM        Imaging:    Results from Hospital Encounter encounter on 11/08/22    XR CHEST PORT    Narrative  INDICATION: NG tube placement after pulling out approx 4cm    EXAMINATION:  AP CHEST, PORTABLE    COMPARISON: Earlier today    FINDINGS: Single AP portable view of the chest demonstrates nasogastric tube has  been retracted slightly with tip over the proximal stomach and sidehole or the  gastroesophageal junction. The cardiomediastinal silhouette is unchanged. Patchy  bilateral airspace disease. Impression  Interval retraction of nasogastric tube with sidehole over the gastroesophageal  junction. Results from [place] encounter on 11/08/22    CT FOOT RT W CONT    Narrative  EXAM: CT FOOT RT W CONT    INDICATION: Right foot swelling and abscess. Evaluate for osteomyelitis. Hypertension, diabetes, and cirrhosis. COMPARISON: Right foot views on 11/10/2022    CONTRAST: 100 mL of [id number]. TECHNIQUE: Helical CT of the right foot during uneventful rapid bolus  intravenous contrast administration. Coronal and Sagittal reformats were  generated. Images reviewed in soft tissue and bone windows. CT dose reduction  was achieved through the use of a standardized protocol tailored for this  examination and automatic exposure control for dose modulation. FINDINGS: Bones: Mild osteopenia. No fracture or osteomyelitis. Joint fluid: Physiologic. Articulations: no evidence of septic arthritis. 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 Tendons: No full-thickness tendon tear. Muscles: Mild diffuse atrophy. Soft tissue mass: None. No fluid collection. Impression  1. No abscess or osteomyelitis. 2. No fracture. 11/10 patient is barely responsive now on room air off pressors getting IV fluid chest x-ray shows congestive changes IV fluid has been decreased, replace potassium, WBC much improved  11/11 Patient is out of ICU, responding slightly, he is on room air. PCO2 30. WBC improving, continues to decrease.    11/12 On room air, condition remains same open eyes but does not follow any commands, replace potassium  11/13 Room air, no O2 in hospital. He opened his eyes, tried to respond slightly by denying SOB, but unable to communicate clearly. NG tube in place. 11/14 Pt is seen resting, he is not currently on any O2, SpO2 94%. NG tube in place. 11/15 Pt is awake feeling well, more responsive today. He denies SOB. He is still on room air. Pt still has NG tube in place.

## 2022-11-15 NOTE — PROGRESS NOTES
IMPRESSION:   Acute hypoxic respiratory failure resolved  Severe sepsis   Metabolic encephalopathy  Probably urinary tract infection  Leukocytosis improved  Carotid artery disease history of CVA  Hypokalemia      RECOMMENDATIONS/PLAN:   72-year-old male nursing home resident admitted with hypotension and unresponsiveness  Hemodynamically stable  Panculture IV antibiotics, ABG shows respiratory alkalosis  Need vascular surgery consult for possible need endarterectomy when more stable  Previous 2D echo showed ejection fraction 60 to 65%  Chest x-ray shows fluid overload congestive changes   Replace potassium     [x] High complexity decision making was performed  [x] See my orders for details  HPI  72-year-old nursing home resident came in because of unresponsiveness he had a history of stroke in the past patient was hypotensive hypoxic rapid response was called he was admitted to the floor initially received IV fluid hemodynamically unstable started on vasopressors Levophed transferred to ICU White count was elevated he was put on oxygen 4 L nasal cannula unable to get any started the patient he has a right foot wound and multiple wounds of the lower extremities dressing in place. He has carotid artery stenosis only supposed to go for surgery but unable to get cardiac clearance because of nuclear stress test he has 90% stenosis of left ICA and 50% stenosis of right ICA. So now he is admitted to ICU and critical care consult was called  PMH:  has a past medical history of Cirrhosis ([facility] [place] [address] Diabetes ([place] [address] Hypertension, and Stroke ([facility] [place] [address] PSH:   has a past surgical history that includes hx back surgery; hx gi; and ir insert non tunl cvc over 5 yrs (11/9/2022). FHX: family history includes Heart Disease in his father. SHX:  reports that he has quit smoking. He has never used smokeless tobacco. He reports that he does not currently use alcohol. He reports that he does not currently use drugs.     ALL: No Known Allergies     MEDS:   [x] Reviewed - As Below   [] Not reviewed    Current Facility-Administered Medications   Medication    dextrose 5 % - 0.45% NaCl infusion    [START ON 11/17/2022] Vancomycin - reminder to draw level 11/17 @ 0400 please    vancomycin (VANCOCIN) 1,000 mg in 0.9% sodium chloride 250 mL ([id number])    VANCOMYCIN INFORMATION NOTE    aspirin chewable tablet 81 mg    atorvastatin (LIPITOR) tablet 40 mg    escitalopram oxalate (LEXAPRO) tablet 20 mg    gabapentin (NEURONTIN) capsule 100 mg    melatonin tablet 10 mg    baclofen (LIORESAL) tablet 5 mg    hydrOXYzine HCL (ATARAX) tablet 25 mg    polyethylene glycol (MIRALAX) packet 17 g    traMADoL (ULTRAM) tablet 50 mg    doxazosin (CARDURA) tablet 1 mg    potassium chloride (KLOR-CON) packet for solution 20 mEq    pantoprazole (PROTONIX) granules for oral suspension 40 mg    sodium chloride (NS) flush 5-40 mL    sodium chloride (NS) flush 5-40 mL    acetaminophen (TYLENOL) tablet 650 mg    Or    acetaminophen (TYLENOL) suppository 650 mg    ondansetron (ZOFRAN ODT) tablet 4 mg    Or    ondansetron (ZOFRAN) injection 4 mg    enoxaparin (LOVENOX) injection 40 mg    glucose chewable tablet 16 g    glucagon (GLUCAGEN) injection 1 mg    dextrose 10% infusion 0-250 mL    insulin lispro (HUMALOG) injection    ARIPiprazole (ABILIFY) tablet 2 mg    carvediloL (COREG) tablet 12.5 mg    clopidogreL (PLAVIX) tablet 75 mg    losartan (COZAAR) tablet 50 mg    senna-docusate (PERICOLACE) 8.6-50 mg per tablet 1 Tablet    [Held by provider] tamsulosin (FLOMAX) capsule 0.4 mg    furosemide (LASIX) tablet 20 mg      MAR reviewed and pertinent medications noted or modified as needed   Current Facility-Administered Medications   Medication    dextrose 5 % - 0.45% NaCl infusion    [START ON 11/17/2022] Vancomycin - reminder to draw level 11/17 @ 0400 please    vancomycin (VANCOCIN) 1,000 mg in 0.9% sodium chloride 250 mL ([id number])    VANCOMYCIN INFORMATION NOTE    aspirin chewable tablet 81 mg    atorvastatin (LIPITOR) tablet 40 mg    escitalopram oxalate (LEXAPRO) tablet 20 mg    gabapentin (NEURONTIN) capsule 100 mg    melatonin tablet 10 mg    baclofen (LIORESAL) tablet 5 mg    hydrOXYzine HCL (ATARAX) tablet 25 mg    polyethylene glycol (MIRALAX) packet 17 g    traMADoL (ULTRAM) tablet 50 mg    doxazosin (CARDURA) tablet 1 mg    potassium chloride (KLOR-CON) packet for solution 20 mEq    pantoprazole (PROTONIX) granules for oral suspension 40 mg    sodium chloride (NS) flush 5-40 mL    sodium chloride (NS) flush 5-40 mL    acetaminophen (TYLENOL) tablet 650 mg    Or    acetaminophen (TYLENOL) suppository 650 mg    ondansetron (ZOFRAN ODT) tablet 4 mg    Or    ondansetron (ZOFRAN) injection 4 mg    enoxaparin (LOVENOX) injection 40 mg    glucose chewable tablet 16 g    glucagon (GLUCAGEN) injection 1 mg    dextrose 10% infusion 0-250 mL    insulin lispro (HUMALOG) injection    ARIPiprazole (ABILIFY) tablet 2 mg    carvediloL (COREG) tablet 12.5 mg    clopidogreL (PLAVIX) tablet 75 mg    losartan (COZAAR) tablet 50 mg    senna-docusate (PERICOLACE) 8.6-50 mg per tablet 1 Tablet    [Held by provider] tamsulosin (FLOMAX) capsule 0.4 mg    furosemide (LASIX) tablet 20 mg      PMH:  has a past medical history of Cirrhosis ([facility] [address] Diabetes ([facility] [address] Hypertension, and Stroke ([facility] [address] PSH:   has a past surgical history that includes hx back surgery; hx gi; and ir insert non tunl cvc over 5 yrs (11/9/2022). FHX: family history includes Heart Disease in his father. SHX:  reports that he has quit smoking. He has never used smokeless tobacco. He reports that he does not currently use alcohol. He reports that he does not currently use drugs. ROS:Review of systems not obtained due to patient factors. Hemodynamics:    CO:    CI:    CVP:    SVR:   PAP Systolic:    PAP Diastolic:    PVR:    OJ69:        Ventilator Settings:      Mode Rate TV Press PEEP FiO2 PIP Min.  Vent Vital Signs: Telemetry:    normal sinus rhythm Intake/Output:   Visit Vitals  BP (!) 149/79 (BP 1 Location: Left upper arm, BP Patient Position: At rest)   Pulse 84   Temp 99.4 °F (37.4 °C)   Resp 18   Ht 5' 9\" (1.753 m)   Wt 62.5 kg (137 lb 12.6 oz)   SpO2 93%   BMI 20.35 kg/m²       Temp (24hrs), Av °F (37.2 °C), Min:98.5 °F (36.9 °C), Max:99.4 °F (37.4 °C)        O2 Device: None (Room air) O2 Flow Rate (L/min): 2 l/min       Wt Readings from Last 4 Encounters:   11/10/22 62.5 kg (137 lb 12.6 oz)   22 84.8 kg (187 lb)   22 84.8 kg (187 lb)   10/28/20 99.8 kg (220 lb)          Intake/Output Summary (Last 24 hours) at 11/15/2022 0945  Last data filed at 11/15/2022 0644  Gross per 24 hour   Intake 420 ml   Output 1000 ml   Net -580 ml         Last shift:      No intake/output data recorded. Last 3 shifts:  1901 - 11/15 0700  In: 420   Out: 1350 [Urine:1350]       Physical Exam:     General: He is on room air  HEENT: NCAT, poor dentition, lips and mucosa dry  Eyes: anicteric; conjunctiva clear  Neck: no nodes, trach midline; no accessory MM use.   Chest: no deformity,   Cardiac: R regular; no murmur;   Lungs: distant breath sounds; wheezes  Abd: soft, NT, hypoactive BS  Ext: Lower extremity wound bandage in place  : NO kennedy, clear urine  Neuro: Unresponsive  Psych-unable to assess  Skin: warm, dry, no cyanosis;   Pulses: 1-2+ Bilateral pedal, radial  Capillary: brisk; pale      DATA:    MAR reviewed and pertinent medications noted or modified as needed  MEDS:   Current Facility-Administered Medications   Medication    dextrose 5 % - 0.45% NaCl infusion    [START ON 2022] Vancomycin - reminder to draw level  @ 0400 please    vancomycin (VANCOCIN) 1,000 mg in 0.9% sodium chloride 250 mL ([id number])    VANCOMYCIN INFORMATION NOTE    aspirin chewable tablet 81 mg    atorvastatin (LIPITOR) tablet 40 mg    escitalopram oxalate (LEXAPRO) tablet 20 mg    gabapentin (NEURONTIN) capsule 100 mg    melatonin tablet 10 mg    baclofen (LIORESAL) tablet 5 mg    hydrOXYzine HCL (ATARAX) tablet 25 mg    polyethylene glycol (MIRALAX) packet 17 g    traMADoL (ULTRAM) tablet 50 mg    doxazosin (CARDURA) tablet 1 mg    potassium chloride (KLOR-CON) packet for solution 20 mEq    pantoprazole (PROTONIX) granules for oral suspension 40 mg    sodium chloride (NS) flush 5-40 mL    sodium chloride (NS) flush 5-40 mL    acetaminophen (TYLENOL) tablet 650 mg    Or    acetaminophen (TYLENOL) suppository 650 mg    ondansetron (ZOFRAN ODT) tablet 4 mg    Or    ondansetron (ZOFRAN) injection 4 mg    enoxaparin (LOVENOX) injection 40 mg    glucose chewable tablet 16 g    glucagon (GLUCAGEN) injection 1 mg    dextrose 10% infusion 0-250 mL    insulin lispro (HUMALOG) injection    ARIPiprazole (ABILIFY) tablet 2 mg    carvediloL (COREG) tablet 12.5 mg    clopidogreL (PLAVIX) tablet 75 mg    losartan (COZAAR) tablet 50 mg    senna-docusate (PERICOLACE) 8.6-50 mg per tablet 1 Tablet    [Held by provider] tamsulosin (FLOMAX) capsule 0.4 mg    furosemide (LASIX) tablet 20 mg        Labs:    Recent Labs     11/15/22  0544 11/14/22  0758   WBC 5.5 4.9   HGB 10.4* 10.1*   * 96*       Recent Labs     11/15/22  0544 11/14/22  0758 11/13/22  0343   * 149* 150*   K 3.8 3.6 3.5   * 119* 120*   CO2 27 26 26   * 173* 201*   BUN 11 12 12   CREA 0.57* 0.50* 0.56*   CA 7.8* 7.4* 7.3*       No results for input(s): PH, PCO2, PO2, HCO3, FIO2 in the last 72 hours. No results for input(s): CPK, CKNDX, TROIQ in the last 72 hours.     No lab exists for component: CPKMB    No results found for: BNPP, BNP   Lab Results   Component Value Date/Time    Culture result:  11/09/2022 02:10 AM     Heavy * methicillin resistant staphylococcus aureus *    Culture result: Heavy Diphtheroids 11/09/2022 02:10 AM    Culture result: No growth 6 days 11/08/2022 01:25 PM     Lab Results   Component Value Date/Time    TSH 1.67 07/30/2022 07:11 AM        Imaging:    Results from Hospital Encounter encounter on 11/08/22    XR CHEST PORT    Narrative  INDICATION: NG tube placement after pulling out approx 4cm    EXAMINATION:  AP CHEST, PORTABLE    COMPARISON: Earlier today    FINDINGS: Single AP portable view of the chest demonstrates nasogastric tube has  been retracted slightly with tip over the proximal stomach and sidehole or the  gastroesophageal junction. The cardiomediastinal silhouette is unchanged. Patchy  bilateral airspace disease. Impression  Interval retraction of nasogastric tube with sidehole over the gastroesophageal  junction. Results from [place] encounter on 11/08/22    CT FOOT RT W CONT    Narrative  EXAM: CT FOOT RT W CONT    INDICATION: Right foot swelling and abscess. Evaluate for osteomyelitis. Hypertension, diabetes, and cirrhosis. COMPARISON: Right foot views on 11/10/2022    CONTRAST: 100 mL of [id number]. TECHNIQUE: Helical CT of the right foot during uneventful rapid bolus  intravenous contrast administration. Coronal and Sagittal reformats were  generated. Images reviewed in soft tissue and bone windows. CT dose reduction  was achieved through the use of a standardized protocol tailored for this  examination and automatic exposure control for dose modulation. FINDINGS: Bones: Mild osteopenia. No fracture or osteomyelitis. Joint fluid: Physiologic. Articulations: no evidence of septic arthritis. 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 Tendons: No full-thickness tendon tear. Muscles: Mild diffuse atrophy. Soft tissue mass: None. No fluid collection. Impression  1. No abscess or osteomyelitis. 2. No fracture.       11/10 patient is barely responsive now on room air off pressors getting IV fluid chest x-ray shows congestive changes IV fluid has been decreased, replace potassium, WBC much improved  11/11 Patient is out of ICU, responding slightly, he is on room air. PCO2 30. WBC improving, continues to decrease. 11/12 On room air, condition remains same open eyes but does not follow any commands, replace potassium  11/13 Room air, no O2 in hospital. He opened his eyes, tried to respond slightly by denying SOB, but unable to communicate clearly. NG tube in place. 11/14 Pt is seen resting, he is not currently on any O2, SpO2 94%. NG tube in place. 11/15 Pt is awake feeling well, more responsive today. He denies SOB. He is still on room air. Pt still has NG tube in place.

## 2022-11-15 NOTE — PROGRESS NOTES
Hospitalist Progress Note    Subjective:   Daily Progress Note: 11/15/2022 2:05 PM    Hospital Course: Cathi Pennington is a 55-year-old male with a PMHx of stroke, hypertension, DM, and cirrhosis who presents to hospital from nursing home with weakness and hypotension x1 day. Associated with poor responsiveness. Found hypotensive on arrival to the ED, with metabolic encephalopathy, improved with intravenous fluid bolus. Found to have urinary infection, started on IV ceftriaxone, and admitted for further management. On 11/9/22 transferred to ICU overnight for poor responsiveness and for septic shock. Managed with fluids and levophed  Duplex shows right common femoral artery and proximal superficial femoral artery occluded and and popliteal artery patent and with patent distal anterior tibial artery and posterior tibial artery. There must be collateralization noted from the occluded femoral artery to popliteal artery. Also noted left  mid to distal superficial artery occluded, with patent distal anterior tibial artery and posterior tibial artery on the left side. Patient recently was admitted on 7/29/2022 to 8/2/22 following presentation to the ED for transient facial droop. Due to history of strokes, suspected TIA versus CVA. MRI 07/30/2022 showed acute ischemic stroke of the right parietal area. Patient has carotid artery stenosis and supposed to go for surgery but unable to get cardiac clearance because of nuclear stress test he has 90% stenosis of left ICA and 50% stenosis of right ICA. Outpatient vascular follow-up for carotid stent per vascular surgery, Dr. Ihsan Narayan. Neurology consult for confusion. Repeat MRI brain pending. Patient has had NG tube nutrition since admission. Speech therapy working with patient. GI consult to evaluate for PEG placement. Subjective:    Patient seen and examined at bedside. He is alert, but remains confused. NGT in place.      Current Facility-Administered Medications Medication Dose Route Frequency    dextrose 5 % - 0.45% NaCl infusion  75 mL/hr IntraVENous CONTINUOUS    [START ON 11/17/2022] Vancomycin - reminder to draw level 11/17 @ 0400 please   Other Rx Dosing/Monitoring    vancomycin (VANCOCIN) 1,000 mg in 0.9% sodium chloride 250 mL ([id number])  1,000 mg IntraVENous Q12H    VANCOMYCIN INFORMATION NOTE   Other Rx Dosing/Monitoring    aspirin chewable tablet 81 mg  81 mg Per NG tube DAILY    atorvastatin (LIPITOR) tablet 40 mg  40 mg Per NG tube DAILY    escitalopram oxalate (LEXAPRO) tablet 20 mg  20 mg Per NG tube DAILY    gabapentin (NEURONTIN) capsule 100 mg  100 mg Per NG tube QHS    melatonin tablet 10 mg  10 mg Per NG tube QHS    baclofen (LIORESAL) tablet 5 mg  5 mg Per NG tube BID PRN    hydrOXYzine HCL (ATARAX) tablet 25 mg  25 mg Per NG tube QHS PRN    polyethylene glycol (MIRALAX) packet 17 g  17 g Per NG tube DAILY PRN    traMADoL (ULTRAM) tablet 50 mg  50 mg Per NG tube Q6H PRN    doxazosin (CARDURA) tablet 1 mg  1 mg Per NG tube QHS    potassium chloride (KLOR-CON) packet for solution 20 mEq  20 mEq Per NG tube BID    pantoprazole (PROTONIX) granules for oral suspension 40 mg  40 mg Per NG tube DAILY    sodium chloride (NS) flush 5-40 mL  5-40 mL IntraVENous Q8H    sodium chloride (NS) flush 5-40 mL  5-40 mL IntraVENous PRN    acetaminophen (TYLENOL) tablet 650 mg  650 mg Oral Q6H PRN    Or    acetaminophen (TYLENOL) suppository 650 mg  650 mg Rectal Q6H PRN    ondansetron (ZOFRAN ODT) tablet 4 mg  4 mg Oral Q8H PRN    Or    ondansetron (ZOFRAN) injection 4 mg  4 mg IntraVENous Q6H PRN    enoxaparin (LOVENOX) injection 40 mg  40 mg SubCUTAneous DAILY    glucose chewable tablet 16 g  4 Tablet Oral PRN    glucagon (GLUCAGEN) injection 1 mg  1 mg IntraMUSCular PRN    dextrose 10% infusion 0-250 mL  0-250 mL IntraVENous PRN    insulin lispro (HUMALOG) injection   SubCUTAneous AC&HS    ARIPiprazole (ABILIFY) tablet 2 mg  2 mg Oral DAILY    carvediloL (COREG) tablet 12.5 mg  12.5 mg Oral BID    clopidogreL (PLAVIX) tablet 75 mg  75 mg Oral DAILY    losartan (COZAAR) tablet 50 mg  50 mg Oral DAILY    senna-docusate (PERICOLACE) 8.6-50 mg per tablet 1 Tablet  1 Tablet Oral DAILY    [Held by provider] tamsulosin (FLOMAX) capsule 0.4 mg  0.4 mg Oral QHS    furosemide (LASIX) tablet 20 mg  20 mg Oral DAILY        Review of Systems  Constitutional: No fevers, No chills, No sweats, No fatigue, No Weakness  Eyes: No redness  Ears, nose, mouth, throat, and face: No nasal congestion, No sore throat, No voice change  Respiratory: No Shortness of Breath, No cough, No wheezing  Cardiovascular: No chest pain, No palpitations, No extremity edema  Gastrointestinal: No nausea, No vomiting, No diarrhea, No abdominal pain  Genitourinary: No frequency, No dysuria, No hematuria  Integument/breast: No skin lesion(s)   Neurological: No Confusion, No headaches, No dizziness      Objective:     Visit Vitals  BP (!) 149/79 (BP 1 Location: Left upper arm, BP Patient Position: At rest)   Pulse 84   Temp 99.4 °F (37.4 °C)   Resp 18   Ht 5' 9\" (1.753 m)   Wt 62.5 kg (137 lb 12.6 oz)   SpO2 93%   BMI 20.35 kg/m²    O2 Flow Rate (L/min): 2 l/min O2 Device: None (Room air)    Temp (24hrs), Av °F (37.2 °C), Min:98.5 °F (36.9 °C), Max:99.4 °F (37.4 °C)      11/15 0701 - 11/15 1900  In: -   Out: 2   1901 - 11/15 07  In: 420   Out: 1350 [Urine:1350]    PHYSICAL EXAM:  Constitutional: Ill-appearing male. No acute distress  Skin: Extremities and face reveal no rashes. HEENT: Sclerae anicteric. Extra-occular muscles are intact. NGT. No oral ulcers. The neck is supple and no masses. Cardiovascular: Regular rate and rhythm. +S1/S2. NO murmur or gallop. Respiratory:  Clear breath sounds bilaterally with no wheezes, rales, or rhonchi. GI: Abdomen nondistended, soft, and nontender. Normal active bowel sounds. Rectal: Deferred   Musculoskeletal: No pitting edema of the lower legs.  Able to move all ext  Neurological:  Patient is alert, disoriented. Generalized weakness.  Cranial nerves II-XII grossly intact  Psychiatric: Unable to assess       Data Review    Recent Results (from the past 24 hour(s))   GLUCOSE, POC    Collection Time: 11/14/22  4:31 PM   Result Value Ref Range    Glucose (POC) 89 65 - 100 mg/dL    Performed by Nataly Galdamez    GLUCOSE, POC    Collection Time: 11/14/22 11:30 PM   Result Value Ref Range    Glucose (POC) 102 (H) 65 - 100 mg/dL    Performed by Savannah Vidal    CBC W/O DIFF    Collection Time: 11/15/22  5:44 AM   Result Value Ref Range    WBC 5.5 4.1 - 11.1 K/uL    RBC 3.62 (L) 4.10 - 5.70 M/uL    HGB 10.4 (L) 12.1 - 17.0 g/dL    HCT 31.3 (L) 36.6 - 50.3 %    MCV 86.5 80.0 - 99.0 FL    MCH 28.7 26.0 - 34.0 PG    MCHC 33.2 30.0 - 36.5 g/dL    RDW 16.4 (H) 11.5 - 14.5 %    PLATELET 219 (L) 000 - 400 K/uL    MPV 11.4 8.9 - 12.9 FL    NRBC 0.0 0.0  WBC    ABSOLUTE NRBC 0.00 0.00 - 7.96 K/uL   METABOLIC PANEL, BASIC    Collection Time: 11/15/22  5:44 AM   Result Value Ref Range    Sodium 150 (H) 136 - 145 mmol/L    Potassium 3.8 3.5 - 5.1 mmol/L    Chloride 117 (H) 97 - 108 mmol/L    CO2 27 21 - 32 mmol/L    Anion gap 6 5 - 15 mmol/L    Glucose 150 (H) 65 - 100 mg/dL    BUN 11 6 - 20 mg/dL    Creatinine 0.57 (L) 0.70 - 1.30 mg/dL    BUN/Creatinine ratio 19 12 - 20      eGFR >60 >60 ml/min/1.73m2    Calcium 7.8 (L) 8.5 - 10.1 mg/dL   VANCOMYCIN, RANDOM    Collection Time: 11/15/22  5:44 AM   Result Value Ref Range    Vancomycin, random 11.0 ug/mL   GLUCOSE, POC    Collection Time: 11/15/22  6:17 AM   Result Value Ref Range    Glucose (POC) 180 (H) 65 - 100 mg/dL    Performed by Andrey Rosas, POC    Collection Time: 11/15/22  7:55 AM   Result Value Ref Range    Glucose (POC) 144 (H) 65 - 100 mg/dL    Performed by Nataly Galdamez    GLUCOSE, POC    Collection Time: 11/15/22 11:23 AM   Result Value Ref Range    Glucose (POC) 204 (H) 65 - 100 mg/dL    Performed by Rivera Daniels        XR CHEST PORT   Final Result   Interval retraction of nasogastric tube with sidehole over the gastroesophageal   junction. XR CHEST PORT   Final Result   Interval replacement of nasogastric tube, with tip over the gastric fundus. XR CHEST PORT   Final Result   No significant change. XR CHEST PORT   Final Result   1. NG tube terminates in the gastric fundus, with possible kink in the side   hole. Correlate with function. 2.  New patchy consolidation in the right midlung is concerning for pneumonia. Grossly unchanged patchy retrocardiac opacities. Stable trace right pleural   effusion. XR CHEST PORT   Final Result   Nasogastric tube tip advanced overlying the gastric body. XR CHEST PORT   Final Result   Nasogastric tube tip over the gastric fundus. XR CHEST PORT   Final Result   Increased mild interstitial pulmonary edema versus pneumonia. Enteric tube extends into the gastric antrum or first portion of the duodenum. Consider retraction 8-10 cm if the intention is gastric luminal termination. CT FOOT RT W CONT   Final Result      1. No abscess or osteomyelitis. 2. No fracture. DUPLEX LOWER EXT ARTERY BILAT   Final Result      XR FOOT RT MIN 3 V   Final Result   No osseous erosion or soft tissue gas. .      XR CHEST PORT   Final Result      Mild interstitial opacities which may represent mild pulmonary edema unchanged         XR CHEST PORT   Final Result   Addendum (preliminary) 1 of 1   Addendum: NG tube is in appropriate position tip extends below the    diaphragm. Right IJ catheter. No pneumothorax on the right. Final   1. Status post right IJ catheter placement without evidence of complication. IR INSERT NON TUNL CVC OVER 5 YRS   Final Result   Technically successful ultrasound guided placement of a right internal jugular   vein temporary central venous catheter. A post procedure chest x-ray is   pending.       CT HEAD WO CONT   Final Result   No acute process or change compared to the prior exam.            XR CHEST PORT   Final Result   No acute process. IR US GUIDED VASCULAR ACCESS    (Results Pending)   MRI BRAIN WO CONT    (Results Pending)       Active Problems:    UTI (urinary tract infection) (11/8/2022)      Assessment/Plan:     1. Septic shock s/t UTI and right great toe ulcer  - Complicated with hypotension and metabolic encephalopathy. - Urine cultures negative  - Blood cultures negative  - MRSA isolated from Cx of left great toe   - Antibiotics per ID. De-escalate antibiotic coverage to Zosyn, continue on Vanc for MRSA coverage, d/c meropenem. 2. Hypertension  - Continue home medications    3. Carotid artery disease  4. Hx of recent CVA  - Following with [facility] cardiology outpatient and Dr Moreno Sorensen for anticipated carotid stent. - Acute ischemia in the right posterior periventricular white matter involving the  right parietal lobe on MRI 07/2022  - CT head 11/09 negative for acute findings   - Recent echocardiogram reviewed  - 90% stenosis of left ICA and 50% stenosis of right ICA. O/p vascular f/u. - Neuro consult  - Repeat MRI brain pending       5. Chronic foot wounds   - Antibiotics per ID  - Wound care inpatient and outpatient  - Duplex shows right common femoral artery and proximal superficial femoral artery occluded and and popliteal artery patent and with patent distal anterior tibial artery and posterior tibial artery. There must be collateralization noted from the occluded femoral artery to popliteal artery. Also noted left  mid to distal superficial artery occluded, with patent distal anterior tibial artery and posterior tibial artery on the left side. - Vascular surgery, Dr. Moreno Soresnen, following    6.  Dysphagia  - NGT nutrition  - Speech therapy following   - GI consult to evaluate for PEG placement       DVT Prophylaxis: Lovenox  Code Status: Full  POA:    Discharge Barriers:   - ID clearance    - MRI brain   - GI eval for PEG    Care Plan discussed with: patient and nursing     Total time spent with patient: >35 minutes.

## 2022-11-15 NOTE — PROGRESS NOTES
Problem: Dysphagia (Adult)  Goal: *Acute Goals and Plan of Care (Insert Text)  Description: Speech Therapy Goals  Initiated 11/12/2022  -Patient stated goal: \"I want to eat. \"  -Patient will participate in modified barium swallow study within 5 day(s). [ ] Not met  [ ]  MET   [ x] Progressing  [ ] Discontinue  -Patient will tolerate PO trials with SLP only without signs/symptoms of aspiration given minimal cues within 5 day(s). [ ] Not met  [ ]  MET   [x ] Progressing  [ ] Discontinue  -Patient will demonstrate understanding of swallow safety precautions and aspiration precautions, diet recs with minimal cues within 5 day(s). [ ] Not met  [ ]  MET   [x ] Progressing  [ ] Discontinue   Outcome: Progressing Towards Goal   SPEECH [address] TREATMENT  Patient: Brian Garcia (70 y.o. male)  Date: 11/15/2022  Diagnosis: UTI (urinary tract infection) [N39.0] <principal problem not specified>      Precautions: aspiration      ASSESSMENT:  Pt seen for follow-up. Pt is alert and responsive at start of session; however, alertness wanes as session progresses. Pt with reclined positioning due to difficulty repositioning. Upon clinician entry, pt's NG tube was noted to be somewhat dislodged. NG TF placed on hold due to concerns for placement and audible congestion, and nsg obtained to assess. Pt with wet vocal quality and thick, blood-tinged mucous noted to suction. Minimal trials of mod thick and puree via tsp provided. Pt requires max prompts (verbal and tactile) to maintain alertness and initiate pharyngeal phase of swallow. Once initiated, pharyngeal phase is functional to palpation with mild weakness. Wet vocal quality clears on 80% of trials following swallow. Vocal adduction tasks x 10, tongue base retraction x 5. Pt demonstrates difficulty maintaining alertness to complete. Pt in bilateral mitts at this time. PLAN:  Recommendations and Planned Interventions:   At this time, pt's oropharyngeal sw function is negatively impacted by AMS and significant fatigue. Cont to recommend NPO with alternate source of nutrition as medically appropriate. Concerns for NG tube placement are present. Recommend cont SLP tx for dysphagia and PO trials. GI has been consulted for possible PEG placement. Patient continues to benefit from skilled intervention to address the above impairments. Continue treatment per established plan of care. Frequency/Duration: Patient will be followed by speech-language pathology 5 times a week to address goals. Discharge Recommendations:  cont SLP tx at this time     SUBJECTIVE:   Patient alert, agreeable, fatigued. OBJECTIVE:     CXR Results  (Last 48 hours)                 11/15/22 0209  XR CHEST PORT Final result    Impression:  Interval retraction of nasogastric tube with sidehole over the gastroesophageal   junction. Narrative:  INDICATION: NG tube placement after pulling out approx 4cm       EXAMINATION:  AP CHEST, PORTABLE       COMPARISON: Earlier today       FINDINGS: Single AP portable view of the chest demonstrates nasogastric tube has   been retracted slightly with tip over the proximal stomach and sidehole or the   gastroesophageal junction. The cardiomediastinal silhouette is unchanged. Patchy   bilateral airspace disease. 11/15/22 0039  XR CHEST PORT Final result    Impression:  Interval replacement of nasogastric tube, with tip over the gastric fundus. Narrative:  INDICATION: check NG tube placement (newly reinserted)       EXAMINATION:  AP CHEST, PORTABLE       COMPARISON: 11/14/2022       FINDINGS: Single AP portable view of the chest demonstrates interval placement   of nasogastric tube, with tip over the gastric fundus. Previous kinking has   resolved. The cardiomediastinal silhouette is unchanged. Bilateral airspace   disease.            11/14/22 2300  XR CHEST PORT Final result    Impression:  No significant change. Narrative:  INDICATION: check NGtube placement       EXAMINATION:  AP CHEST, PORTABLE       COMPARISON: Earlier today       FINDINGS: Single AP portable view of the chest demonstrates unchanged   nasogastric tube tip position of the gastric fundus with slight kinking at the   sidehole. The cardiomediastinal silhouette is unchanged. Bibasilar airspace   disease, unchanged. 11/14/22 1732  XR CHEST PORT Final result    Impression:  1. NG tube terminates in the gastric fundus, with possible kink in the side   hole. Correlate with function. 2.  New patchy consolidation in the right midlung is concerning for pneumonia. Grossly unchanged patchy retrocardiac opacities. Stable trace right pleural   effusion. Narrative:  EXAM:  XR CHEST PORT       INDICATION: NG TUBE PLACEMENT       COMPARISON: Chest radiograph 11/12/2022       TECHNIQUE: Semiupright portable chest AP view       FINDINGS:        NG tube terminates in the gastric fundus, with possible kink in the side hole. Median sternotomy wires and coronary vascularization. Cardiomediastinal silhouette within normal limits. New patchy consolidation in   the right midlung is concerning for pneumonia. Grossly unchanged patchy   retrocardiac opacities. Stable trace right pleural effusion. No definite   pneumothorax. Cognitive and Communication Status:  Neurologic State: Alert, Confused  Orientation Level: Oriented to person, Disoriented to place, Disoriented to situation, Disoriented to time  Cognition: No command following    Pain:  Pain Scale 1: Adult Nonverbal Pain Scale          After treatment:   Patient left in no apparent distress in bed, Call bell within reach, and Nursing notified    COMMUNICATION/EDUCATION:   Patient was educated regarding his deficit(s) of dysphagia, swallow safety precautions, diet recs and POC. He demonstrated Guarded understanding as evidenced by AMS, fatigue.     The patient's plan of care including recommendations, planned interventions, and recommended diet changes were discussed with: Physician and PA .      Isma Lara M.S. CCC-SLP  Time Calculation: 23 mins

## 2022-11-15 NOTE — ANESTHESIA PREPROCEDURE EVALUATION
Relevant Problems   NEUROLOGY   (+) CVA (cerebral vascular accident) ([facility] [address]   (+) TIA (transient ischemic attack)      CARDIOVASCULAR   (+) CAD (coronary artery disease)   (+) NSTEMI (non-ST elevated myocardial infarction) [facility])       Anesthetic History   No history of anesthetic complications            Review of Systems / Medical History  Patient summary reviewed, nursing notes reviewed and pertinent labs reviewed    Pulmonary                Comments: FORMER SMOKER. Neuro/Psych       CVA  TIA    Comments: BILATERAL CAROTID STENOSIS. RIGHT 50%  LEFT 90% Cardiovascular    Hypertension  Valvular problems/murmurs: mitral insufficiency        Past MI and CAD      Comments: Left Ventricle: Normal left ventricular systolic function with a visually estimated EF of 50 - 55%. Left ventricle size is normal. Mild septal thickening. Normal wall motion. Normal diastolic function.   Right Ventricle: Right ventricle is mildly dilated. Normal wall thickness.   Mitral Valve: Mild regurgitation.   Left Atrium: Left atrium is dilated. EKG (11-11-22) Normal sinus rhythm   Possible Left atrial enlargement   ST & T wave abnormality, consider lateral ischemia   Abnormal ECG   When compared with ECG of 12-AUG-2022 11:12,   ST now depressed in Inferior leads   Nonspecific T wave abnormality now evident in Inferior leads   T wave inversion less evident in Lateral leads   QT has lengthened. GI/Hepatic/Renal         Renal disease: CRI  Liver disease (CIRRHOSIS. )    Comments: DYSPHAGIA.   UTI Endo/Other    Diabetes    Anemia     Other Findings   Comments: PLAVIX: LAST DOSE ON 11-15-22 IN AM.        Past Medical History:   Diagnosis Date    Cirrhosis ([facility] [address]     Diabetes ([facility] [address]     Hypertension     Stroke [facility])        Past Surgical History:   Procedure Laterality Date    HX BACK SURGERY      HX GI      IR INSERT NON TUNL CVC OVER 5 YRS  11/9/2022       Current Outpatient Medications   Medication Instructions    ARIPiprazole (ABILIFY) 2 mg, Oral, DAILY    aspirin delayed-release 81 mg, Oral, DAILY    atorvastatin (LIPITOR) 40 mg, Oral, DAILY    baclofen 5 mg, Oral, 2 TIMES DAILY    carvediloL (COREG) 12.5 mg, Oral, 2 TIMES DAILY    clopidogreL (PLAVIX) 75 mg, Oral, DAILY    escitalopram oxalate (LEXAPRO) 20 mg, Oral, DAILY    furosemide (LASIX) 40 mg, Oral, DAILY    gabapentin (NEURONTIN) 100 mg, Oral, EVERY BEDTIME    hydrOXYzine HCL (ATARAX) 25 mg, Oral, BEDTIME PRN    insulin lispro (HUMALOG) 100 unit/mL injection INITIATE INSULIN CORRECTIVE PROTOCOL:<BR>Normal Insulin Sensitivity <BR>For Blood Sugar (mg/dL) of:   <BR>Less than 150 =   0 units         <BR>150 -199 =   2 units<BR>200 -249 =   4 units<BR>250 -299 =   6 units<BR>300 -349 =   8 units<BR>350 and above = 10 units and Call Physician<BR> <BR>Initiate Hypoglycemia protocol if blood glucose is <70 mg/dL<BR>Fast Acting - Administer Immediately - or within 15 minutes of start of meal, if mealtime coverage.     losartan (COZAAR) 100 mg, Oral, DAILY    melatonin 10 mg, Oral, EVERY BEDTIME    metFORMIN (GLUCOPHAGE) 1,000 mg, Oral, 2 TIMES DAILY WITH MEALS    omeprazole (PRILOSEC) 20 mg, Oral, DAILY    potassium chloride (K-DUR, KLOR-CON M20) 20 mEq tablet 20 mEq, Oral, 2 TIMES DAILY    senna-docusate (PERICOLACE) 8.6-50 mg per tablet 1 Tablet, Oral, DAILY    tamsulosin (FLOMAX) 0.4 mg, Oral, EVERY BEDTIME    traMADoL (ULTRAM) 50 mg, Oral, EVERY 6 HOURS AS NEEDED       Current Facility-Administered Medications   Medication Dose Route Frequency    dextrose 5 % - 0.45% NaCl infusion  75 mL/hr IntraVENous CONTINUOUS    [START ON 11/17/2022] Vancomycin - reminder to draw level 11/17 @ 0400 please   Other Rx Dosing/Monitoring    hydrALAZINE (APRESOLINE) 20 mg/mL injection 20 mg  20 mg IntraVENous Q6H PRN    vancomycin (VANCOCIN) 1,000 mg in 0.9% sodium chloride 250 mL ([id number])  1,000 mg IntraVENous Q12H    VANCOMYCIN INFORMATION NOTE   Other Rx Dosing/Monitoring  aspirin chewable tablet 81 mg  81 mg Per NG tube DAILY    atorvastatin (LIPITOR) tablet 40 mg  40 mg Per NG tube DAILY    escitalopram oxalate (LEXAPRO) tablet 20 mg  20 mg Per NG tube DAILY    gabapentin (NEURONTIN) capsule 100 mg  100 mg Per NG tube QHS    melatonin tablet 10 mg  10 mg Per NG tube QHS    baclofen (LIORESAL) tablet 5 mg  5 mg Per NG tube BID PRN    hydrOXYzine HCL (ATARAX) tablet 25 mg  25 mg Per NG tube QHS PRN    polyethylene glycol (MIRALAX) packet 17 g  17 g Per NG tube DAILY PRN    traMADoL (ULTRAM) tablet 50 mg  50 mg Per NG tube Q6H PRN    doxazosin (CARDURA) tablet 1 mg  1 mg Per NG tube QHS    potassium chloride (KLOR-CON) packet for solution 20 mEq  20 mEq Per NG tube BID    pantoprazole (PROTONIX) granules for oral suspension 40 mg  40 mg Per NG tube DAILY    sodium chloride (NS) flush 5-40 mL  5-40 mL IntraVENous Q8H    sodium chloride (NS) flush 5-40 mL  5-40 mL IntraVENous PRN    acetaminophen (TYLENOL) tablet 650 mg  650 mg Oral Q6H PRN    Or    acetaminophen (TYLENOL) suppository 650 mg  650 mg Rectal Q6H PRN    ondansetron (ZOFRAN ODT) tablet 4 mg  4 mg Oral Q8H PRN    Or    ondansetron (ZOFRAN) injection 4 mg  4 mg IntraVENous Q6H PRN    enoxaparin (LOVENOX) injection 40 mg  40 mg SubCUTAneous DAILY    glucose chewable tablet 16 g  4 Tablet Oral PRN    glucagon (GLUCAGEN) injection 1 mg  1 mg IntraMUSCular PRN    dextrose 10% infusion 0-250 mL  0-250 mL IntraVENous PRN    insulin lispro (HUMALOG) injection   SubCUTAneous AC&HS    ARIPiprazole (ABILIFY) tablet 2 mg  2 mg Oral DAILY    carvediloL (COREG) tablet 12.5 mg  12.5 mg Oral BID    clopidogreL (PLAVIX) tablet 75 mg  75 mg Oral DAILY    losartan (COZAAR) tablet 50 mg  50 mg Oral DAILY    senna-docusate (PERICOLACE) 8.6-50 mg per tablet 1 Tablet  1 Tablet Oral DAILY    [Held by provider] tamsulosin (FLOMAX) capsule 0.4 mg  0.4 mg Oral QHS    furosemide (LASIX) tablet 20 mg  20 mg Oral DAILY Patient Vitals for the past 24 hrs:   Temp Pulse Resp BP SpO2   11/16/22 0839 36.6 °C (97.9 °F) 88 18 (!) 147/90 94 %   11/16/22 0145 36.7 °C (98 °F) 65 18 (!) 119/55 92 %   11/15/22 2036 36.9 °C (98.5 °F) 79 18 134/61 92 %   11/15/22 1759 -- 79 -- (!) 134/55 --   11/15/22 1511 37.1 °C (98.8 °F) 77 18 (!) 161/72 91 %       Lab Results   Component Value Date/Time    WBC 4.5 11/16/2022 03:52 AM    HGB 9.3 (L) 11/16/2022 03:52 AM    HCT 28.8 (L) 11/16/2022 03:52 AM    PLATELET 998 (L) 07/46/2993 03:52 AM    MCV 88.3 11/16/2022 03:52 AM     Lab Results   Component Value Date/Time    Sodium 150 (H) 11/16/2022 03:52 AM    Potassium 3.6 11/16/2022 03:52 AM    Chloride 118 (H) 11/16/2022 03:52 AM    CO2 22 11/16/2022 03:52 AM    Anion gap 10 11/16/2022 03:52 AM    Glucose 174 (H) 11/16/2022 03:52 AM    BUN 12 11/16/2022 03:52 AM    Creatinine 0.50 (L) 11/16/2022 03:52 AM    BUN/Creatinine ratio 24 (H) 11/16/2022 03:52 AM    GFR est AA 56 (L) 08/12/2022 11:20 AM    GFR est non-AA 46 (L) 08/12/2022 11:20 AM    Calcium 7.6 (L) 11/16/2022 03:52 AM     No results found for: APTT, PTP, INR, INREXT  Lab Results   Component Value Date/Time    Glucose 174 (H) 11/16/2022 03:52 AM    Glucose (POC) 210 (H) 11/16/2022 08:36 AM         Physical Exam    Airway    TM Distance: 4 - 6 cm        Comments: Unknown  Cardiovascular    Rhythm: regular  Rate: normal         Dental         Pulmonary      Decreased breath sounds           Abdominal  GI exam deferred      Comments: Pt. Does not follow commands.   Other Findings            Anesthetic Plan    ASA: 3  Anesthesia type: MAC    Monitoring Plan: Continuous noninvasive hemodynamic monitoring      Induction: Intravenous  Anesthetic plan and risks discussed with: Patient

## 2022-11-15 NOTE — CONSULTS
NEUROLOGY CONSULT    Name Shawn Da Silva Age 70 y.o. MRN [id number]  1950     Referring Physician: Philipp Martinez MD      Chief Complaint:  Less responsive     Mr. Corinne Hidalgo is a 70year old male with past medical history of stroke, diabetes, hypertension, resides in a nursing facility. Patient recently was admitted on 2022 to 22 following presentation to the ED for transient facial droop. Due to history of strokes, suspected TIA versus CVA. MRI showed acute ischemic stroke of the right parietal area. Bilateral carotid ultrasounds revealed 50% stenosis of right ICA, 90% stenosis of left ICA. Vascular surgery consulted and will need carotid endarterectomy. Was later discharged to skilled nursing facility. On 22, he presented to hospital from nursing home with weakness and hypotension for one day. Associated with poor responsiveness. Found hypotensive on arrival to the ED, with metabolic encephalopathy, improved with intravenous fluid bolus. Found to have urinary infection, started on antibiotic, and I have been asked to admit to hospital for further stabilization of his condition. Neurology was consulted due to low responsiveness. At the time of my evaluation, Patient was awake, alert, and oriented to self, year. Confused to place and events. He tried to answer every quesitions but he was dysarthric and sometimes it is hard to understand him. He followed minimal commands in the right upper and lower extremity. Opened and closed eyes on commands. Flaccid in the left upper but moves toes in the lower spontaneously. Don't know the patient baseline and it's very unlikely it has another stroke. 1) CVA: Patient was very alert this morning and followed minimal commands. His weakness in the left extremity may be residual symptoms from his right parietal stroke. Will order brain MRI to rule out any acute stroke. If MRI shows any stroke, will continue with the rest of the stroke work up.  Continue Aspirin 81 mg and Atorvastatin 40 mg daily. Will continue to monitor patient. Please don't hesitate to reach out for any suggestions or concerns.   2) Stoke  3) Hypertension  4) Diabetes  5) Cirrhosis    Thanks for the consult,  Fer Dean  Nurse Practitioner    Collaborating Physician:  Dr. Amaris Walsh and Plan    No Known Allergies        Current Facility-Administered Medications   Medication Dose Route Frequency    dextrose 5 % - 0.45% NaCl infusion  75 mL/hr IntraVENous CONTINUOUS    [START ON 11/17/2022] Vancomycin - reminder to draw level 11/17 @ 0400 please   Other Rx Dosing/Monitoring    vancomycin (VANCOCIN) 1,000 mg in 0.9% sodium chloride 250 mL ([id number])  1,000 mg IntraVENous Q12H    VANCOMYCIN INFORMATION NOTE   Other Rx Dosing/Monitoring    aspirin chewable tablet 81 mg  81 mg Per NG tube DAILY    atorvastatin (LIPITOR) tablet 40 mg  40 mg Per NG tube DAILY    escitalopram oxalate (LEXAPRO) tablet 20 mg  20 mg Per NG tube DAILY    gabapentin (NEURONTIN) capsule 100 mg  100 mg Per NG tube QHS    melatonin tablet 10 mg  10 mg Per NG tube QHS    baclofen (LIORESAL) tablet 5 mg  5 mg Per NG tube BID PRN    hydrOXYzine HCL (ATARAX) tablet 25 mg  25 mg Per NG tube QHS PRN    polyethylene glycol (MIRALAX) packet 17 g  17 g Per NG tube DAILY PRN    traMADoL (ULTRAM) tablet 50 mg  50 mg Per NG tube Q6H PRN    doxazosin (CARDURA) tablet 1 mg  1 mg Per NG tube QHS    potassium chloride (KLOR-CON) packet for solution 20 mEq  20 mEq Per NG tube BID    pantoprazole (PROTONIX) granules for oral suspension 40 mg  40 mg Per NG tube DAILY    sodium chloride (NS) flush 5-40 mL  5-40 mL IntraVENous Q8H    sodium chloride (NS) flush 5-40 mL  5-40 mL IntraVENous PRN    acetaminophen (TYLENOL) tablet 650 mg  650 mg Oral Q6H PRN    Or    acetaminophen (TYLENOL) suppository 650 mg  650 mg Rectal Q6H PRN    ondansetron (ZOFRAN ODT) tablet 4 mg  4 mg Oral Q8H PRN    Or    ondansetron (ZOFRAN) injection 4 mg 4 mg IntraVENous Q6H PRN    enoxaparin (LOVENOX) injection 40 mg  40 mg SubCUTAneous DAILY    glucose chewable tablet 16 g  4 Tablet Oral PRN    glucagon (GLUCAGEN) injection 1 mg  1 mg IntraMUSCular PRN    dextrose 10% infusion 0-250 mL  0-250 mL IntraVENous PRN    insulin lispro (HUMALOG) injection   SubCUTAneous AC&HS    ARIPiprazole (ABILIFY) tablet 2 mg  2 mg Oral DAILY    carvediloL (COREG) tablet 12.5 mg  12.5 mg Oral BID    clopidogreL (PLAVIX) tablet 75 mg  75 mg Oral DAILY    losartan (COZAAR) tablet 50 mg  50 mg Oral DAILY    senna-docusate (PERICOLACE) 8.6-50 mg per tablet 1 Tablet  1 Tablet Oral DAILY    [Held by provider] tamsulosin (FLOMAX) capsule 0.4 mg  0.4 mg Oral QHS    furosemide (LASIX) tablet 20 mg  20 mg Oral DAILY       Past Medical History:   Diagnosis Date    Cirrhosis ([facility] [address]     Diabetes ([facility]     Hypertension     Stroke ([facility]         Social History     Tobacco Use    Smoking status: Former    Smokeless tobacco: Never   Vaping Use    Vaping Use: Never used   Substance Use Topics    Alcohol use: Not Currently    Drug use: Not Currently            Exam  Visit Vitals  BP (!) 149/79 (BP 1 Location: Left upper arm, BP Patient Position: At rest)   Pulse 84   Temp 99.4 °F (37.4 °C)   Resp 18   Ht 5' 9\" (1.753 m)   Wt 62.5 kg (137 lb 12.6 oz)   SpO2 93%   BMI 20.35 kg/m²         General: Well developed, well nourished. Patient in no distress   Head: Normocephalic, atraumatic, anicteric sclera   Neck Normal ROM, No thyromegally   Lungs:     Cardiac:    Abd:    Ext: No pedal edema   Skin: Supple no rash     NeurologicExam:  Mental Status: Alert and oriented to person and year. Confused to place, time, and events. Speech: Dysarthria. Cranial Nerves:   Pupils are equal round and reactive to light and accommodation, extraocular movements are intact and full, visual fields are intact by confrontation, no nystagmus noted, Unable to assess face symmetry.  sensation in face is intact and symmetric, hearing is intact and symmetric, tongue and uvula are in midline with normal movements, palate is elevating equally, shoulder shrug is intact and symmetric. Motor:  Moved right upper and lower ext on commands. Weakness in the left upper and lower extremity. Flaccid in the left upper. Reflexes:   Deep tendon reflexes 2+/4 and symmetric. Sensory:   Symmetric and intact    Gait:  Deferred. Tremor:   No tremor noted. Cerebellar:  Unable to assess coordination intact for finger-nose-finger due to patient's condition. Neurovascular: No carotid bruits.  No JVD      Lab Review  Lab Results   Component Value Date/Time    WBC 5.5 11/15/2022 05:44 AM    HCT 31.3 (L) 11/15/2022 05:44 AM    HGB 10.4 (L) 11/15/2022 05:44 AM    PLATELET 090 (L) 20/18/5666 05:44 AM     Lab Results   Component Value Date/Time    Sodium 150 (H) 11/15/2022 05:44 AM    Potassium 3.8 11/15/2022 05:44 AM    Chloride 117 (H) 11/15/2022 05:44 AM    CO2 27 11/15/2022 05:44 AM    Glucose 150 (H) 11/15/2022 05:44 AM    BUN 11 11/15/2022 05:44 AM    Creatinine 0.57 (L) 11/15/2022 05:44 AM    Calcium 7.8 (L) 11/15/2022 05:44 AM     No results found for: B12LT, FOL, RBCF  Lab Results   Component Value Date/Time    LDL, calculated 32.6 08/01/2022 07:14 AM     Lab Results   Component Value Date/Time    Hemoglobin A1c 6.2 (H) 11/09/2022 02:10 AM     No components found for: TROPQUANT  No results found for: KRISTEN

## 2022-11-15 NOTE — CONSULTS
Consult    Patient: Sarahi Muhammad MRN: [id number]  SSN: [id number]    YOB: 1950  Age: 70 y.o. Sex: male      Subjective: Sarahi Muhammad is a 70 y.o. male who is being seen for PEG tube placement, dysphagia,. History of medical records  Patient had CVA, not able to give a history. Will admit to the hospital for the TIA versus CVA,presentation to the ED for transient facial droop 11:30 AM.   MRI showed acute ischemic stroke of the right parietal area. Bilateral carotid ultrasounds revealed 50% stenosis of right ICA, 90% stenosis of left ICA. Urine culture positive for gram-negative rods Proteus Mirabella's and sensitive to ceftriaxone and cefazolin. Patient was initially in ICU, now on medical unit, patient followed by neurology, difficult swallow, has been  wasfollowed by speech therapy, or NG tube since admission. GI consultation placed for PEG tube placement. Fever chest no nausea vomiting, phage antibiotic treatment septic shock, blood cultures negative.   Past Medical History:   Diagnosis Date    Cirrhosis ([facility] [address]     Diabetes ([facility] [address]     Hypertension     Stroke [facility])      Past Surgical History:   Procedure Laterality Date    HX BACK SURGERY      HX GI      IR INSERT NON TUNL CVC OVER 5 YRS  11/9/2022      Family History   Problem Relation Age of Onset    Heart Disease Father      Social History     Tobacco Use    Smoking status: Former    Smokeless tobacco: Never   Substance Use Topics    Alcohol use: Not Currently      Current Facility-Administered Medications   Medication Dose Route Frequency Provider Last Rate Last Admin    dextrose 5 % - 0.45% NaCl infusion  75 mL/hr IntraVENous CONTINUOUS Soraya Hernandez PA-C 75 mL/hr at 11/15/22 0907 75 mL/hr at 11/15/22 0907    [START ON 11/17/2022] Vancomycin - reminder to draw level 11/17 @ 0400 please   Other Rx Dosing/Monitoring Alba Thakur MD        vancomycin (VANCOCIN) 1,000 mg in 0.9% sodium chloride 250 mL ([id number]) 1,000 mg IntraVENous Q12H Bea Boo  mL/hr at 11/15/22 0619 1,000 mg at 11/15/22 9136    VANCOMYCIN INFORMATION NOTE   Other Rx Dosing/Monitoring Bea Boo MD        aspirin chewable tablet 81 mg  81 mg Per NG tube DAILY Jaison Ortega MD   81 mg at 11/15/22 2231    atorvastatin (LIPITOR) tablet 40 mg  40 mg Per NG tube DAILY Jaison Ortega MD   40 mg at 11/15/22 1288    escitalopram oxalate (LEXAPRO) tablet 20 mg  20 mg Per NG tube DAILY Jaison Ortega MD   20 mg at 11/15/22 0926    gabapentin (NEURONTIN) capsule 100 mg  100 mg Per NG tube QHS Jas Anderson MD   100 mg at 11/15/22 3965    melatonin tablet 10 mg  10 mg Per NG tube QHS Jaison Ortega MD   10 mg at 11/13/22 2146    baclofen (LIORESAL) tablet 5 mg  5 mg Per NG tube BID PRN Jaison Ortega MD   5 mg at 11/13/22 1322    hydrOXYzine HCL (ATARAX) tablet 25 mg  25 mg Per NG tube QHS PRN Jaison Ortega MD   25 mg at 11/13/22 0118    polyethylene glycol (MIRALAX) packet 17 g  17 g Per NG tube DAILY PRN Jaison Ortega MD        traMADoL Janina Points) tablet 50 mg  50 mg Per NG tube Q6H PRN Jaison Ortega MD   50 mg at 11/14/22 1215    doxazosin (CARDURA) tablet 1 mg  1 mg Per NG tube QHS Jaison Ortega MD   1 mg at 11/15/22 0336    potassium chloride (KLOR-CON) packet for solution 20 mEq  20 mEq Per NG tube BID Jaison Ortega MD   20 mEq at 11/15/22 0907    pantoprazole (PROTONIX) granules for oral suspension 40 mg  40 mg Per NG tube DAILY Jaison Ortega MD   40 mg at 11/15/22 0907    sodium chloride (NS) flush 5-40 mL  5-40 mL IntraVENous Q8H Jaison Ortgea MD   10 mL at 11/15/22 8845    sodium chloride (NS) flush 5-40 mL  5-40 mL IntraVENous PRN Jaison Ortega MD   10 mL at 11/14/22 1720    acetaminophen (TYLENOL) tablet 650 mg  650 mg Oral Q6H PRN Jaison Ortega MD   650 mg at 11/13/22 1319    Or    acetaminophen (TYLENOL) suppository 650 mg  650 mg Rectal Q6H PRN Jaison Ortega MD        ondansetron (ZOFRAN ODT) tablet 4 mg  4 mg Oral Q8H PRN Seema Mireles MD        Or    ondansetron [facility]) injection 4 mg  4 mg IntraVENous Q6H PRN Jas Esquivel MD        enoxaparin (LOVENOX) injection 40 mg  40 mg SubCUTAneous DAILY Jas Esquivel MD   40 mg at 11/15/22 5888    glucose chewable tablet 16 g  4 Tablet Oral PRN Seema Mireles MD        glucagon [facility]) injection 1 mg  1 mg IntraMUSCular PRN Seema Mirlees MD        dextrose 10% infusion 0-250 mL  0-250 mL IntraVENous PRN Seema Mireles MD        insulin lispro (HUMALOG) injection   SubCUTAneous AC&HS Seema Mireles MD   2 Units at 11/15/22 2511    ARIPiprazole (ABILIFY) tablet 2 mg  2 mg Oral DAILY Seema Mireles MD   2 mg at 11/15/22 5709    carvediloL (COREG) tablet 12.5 mg  12.5 mg Oral BID Seema Mireles MD   12.5 mg at 11/15/22 1445    clopidogreL (PLAVIX) tablet 75 mg  75 mg Oral DAILY Seema Mireles MD   75 mg at 11/15/22 3722    losartan (COZAAR) tablet 50 mg  50 mg Oral DAILY Seema Mireles MD   50 mg at 11/15/22 4693    senna-docusate (PERICOLACE) 8.6-50 mg per tablet 1 Tablet  1 Tablet Oral DAILY Seema Mireles MD   1 Tablet at 11/14/22 1011    [Held by provider] tamsulosin (FLOMAX) capsule 0.4 mg  0.4 mg Oral QHS Jas Esquivel MD        furosemide (LASIX) tablet 20 mg  20 mg Oral DAILY Seema Mireles MD   20 mg at 11/15/22 0907        No Known Allergies    Review of Systems:  Review of Systems   Unable to perform ROS: Medical condition      Objective:     Vitals:    11/15/22 0017 11/15/22 0127 11/15/22 0543 11/15/22 0904   BP: (!) 173/80  112/62 (!) 149/79   Pulse: 84 75 74 84   Resp:    18   Temp:    99.4 °F (37.4 °C)   SpO2:    93%   Weight:       Height:            Physical Exam:  Physical Exam  Constitutional:       Appearance: He is ill-appearing. HENT:      Head: Atraumatic. Mouth/Throat:      Mouth: Mucous membranes are dry. Eyes:      General: No scleral icterus. Cardiovascular:      Rate and Rhythm: Rhythm irregular. Heart sounds: Normal heart sounds. Pulmonary:      Breath sounds: Normal breath sounds. Abdominal:      General: Bowel sounds are normal.      Tenderness: There is no rebound. Musculoskeletal:      Cervical back: Neck supple. Skin:     General: Skin is warm.         Recent Results (from the past 24 hour(s))   GLUCOSE, POC    Collection Time: 11/14/22 12:21 PM   Result Value Ref Range    Glucose (POC) 145 (H) 65 - 100 mg/dL    Performed by Poli Castro    GLUCOSE, POC    Collection Time: 11/14/22  4:31 PM   Result Value Ref Range    Glucose (POC) 89 65 - 100 mg/dL    Performed by Dori Rene    GLUCOSE, POC    Collection Time: 11/14/22 11:30 PM   Result Value Ref Range    Glucose (POC) 102 (H) 65 - 100 mg/dL    Performed by Briana Izquierdo    CBC W/O DIFF    Collection Time: 11/15/22  5:44 AM   Result Value Ref Range    WBC 5.5 4.1 - 11.1 K/uL    RBC 3.62 (L) 4.10 - 5.70 M/uL    HGB 10.4 (L) 12.1 - 17.0 g/dL    HCT 31.3 (L) 36.6 - 50.3 %    MCV 86.5 80.0 - 99.0 FL    MCH 28.7 26.0 - 34.0 PG    MCHC 33.2 30.0 - 36.5 g/dL    RDW 16.4 (H) 11.5 - 14.5 %    PLATELET 786 (L) 155 - 400 K/uL    MPV 11.4 8.9 - 12.9 FL    NRBC 0.0 0.0  WBC    ABSOLUTE NRBC 0.00 0.00 - 4.46 K/uL   METABOLIC PANEL, BASIC    Collection Time: 11/15/22  5:44 AM   Result Value Ref Range    Sodium 150 (H) 136 - 145 mmol/L    Potassium 3.8 3.5 - 5.1 mmol/L    Chloride 117 (H) 97 - 108 mmol/L    CO2 27 21 - 32 mmol/L    Anion gap 6 5 - 15 mmol/L    Glucose 150 (H) 65 - 100 mg/dL    BUN 11 6 - 20 mg/dL    Creatinine 0.57 (L) 0.70 - 1.30 mg/dL    BUN/Creatinine ratio 19 12 - 20      eGFR >60 >60 ml/min/1.73m2    Calcium 7.8 (L) 8.5 - 10.1 mg/dL   VANCOMYCIN, RANDOM    Collection Time: 11/15/22  5:44 AM   Result Value Ref Range    Vancomycin, random 11.0 ug/mL   GLUCOSE, POC    Collection Time: 11/15/22  6:17 AM   Result Value Ref Range    Glucose (POC) 180 (H) 65 - 100 mg/dL    Performed by [address] [place] [address], POC    Collection Time: 11/15/22  7:55 AM   Result Value Ref Range    Glucose (POC) 144 (H) 65 - 100 mg/dL    Performed by Tawny Lopez    GLUCOSE, POC    Collection Time: 11/15/22 11:23 AM   Result Value Ref Range    Glucose (POC) 204 (H) 65 - 100 mg/dL    Performed by Rivera Daniels         XR CHEST PORT   Final Result   Interval retraction of nasogastric tube with sidehole over the gastroesophageal   junction. XR CHEST PORT   Final Result   Interval replacement of nasogastric tube, with tip over the gastric fundus. XR CHEST PORT   Final Result   No significant change. XR CHEST PORT   Final Result   1. NG tube terminates in the gastric fundus, with possible kink in the side   hole. Correlate with function. 2.  New patchy consolidation in the right midlung is concerning for pneumonia. Grossly unchanged patchy retrocardiac opacities. Stable trace right pleural   effusion. XR CHEST PORT   Final Result   Nasogastric tube tip advanced overlying the gastric body. XR CHEST PORT   Final Result   Nasogastric tube tip over the gastric fundus. XR CHEST PORT   Final Result   Increased mild interstitial pulmonary edema versus pneumonia. Enteric tube extends into the gastric antrum or first portion of the duodenum. Consider retraction 8-10 cm if the intention is gastric luminal termination. CT FOOT RT W CONT   Final Result      1. No abscess or osteomyelitis. 2. No fracture. DUPLEX LOWER EXT ARTERY BILAT   Final Result      XR FOOT RT MIN 3 V   Final Result   No osseous erosion or soft tissue gas. .      XR CHEST PORT   Final Result      Mild interstitial opacities which may represent mild pulmonary edema unchanged         XR CHEST PORT   Final Result   Addendum (preliminary) 1 of 1   Addendum: NG tube is in appropriate position tip extends below the    diaphragm. Right IJ catheter. No pneumothorax on the right. Final   1.   Status post right IJ catheter placement without evidence of complication. IR INSERT NON TUNL CVC OVER 5 YRS   Final Result   Technically successful ultrasound guided placement of a right internal jugular   vein temporary central venous catheter. A post procedure chest x-ray is   pending. CT HEAD WO CONT   Final Result   No acute process or change compared to the prior exam.            XR CHEST PORT   Final Result   No acute process.       IR Jaylen    (Results Pending)   MRI BRAIN WO CONT    (Results Pending)      Assessment:     Hospital Problems  Never Reviewed            Codes Class Noted POA    UTI (urinary tract infection) ICD-10-CM: N39.0  ICD-9-CM: 599.0  11/8/2022 Unknown         CVA Versus TIA,  Difficult to swallow,  NG tube feeding,    Need long-term GI access for the outpatient care  Plan:   Followed by speech therapy, waiting for the final recommendations  Continue current neurology evaluation PT OT  On the aspirin Plavix  GI prophylaxis PPIs,  Continue on antibiotic vancomycin  Signed By: Yesenia Altman MD     November 15, 2022         Thank you for allowing me to participate in this patients care  Cc Referring Physician   Marquis Ethan MD

## 2022-11-15 NOTE — ROUTINE PROCESS
Informed Dr. Pamela Zuñiga regarding the NG tube placement according to the XR result. I was advised to pull out NG tube 2 cm and advance 4 cm.

## 2022-11-15 NOTE — PROGRESS NOTES
Comprehensive Nutrition Assessment    Type and Reason for Visit: Reassess (Interim, TF.)    Nutrition Recommendations/Plan:   NPO, advance per SLP rec's. Initiate TF via NGT/PEG as Jevity 1.5 Michael at 30 mL/hr, advance to new goal rate of 50 mL/hr. Increase water flushes to 160 mL Q4H via NGT/PEG. Provides 1800 kcal(96%), 77 gm PRO(101%), 1872 mL Fluids (100%). Continue to monitor and document TF rate/flushes, tolerances, BMs in I/Os. Malnutrition Assessment:  Malnutrition Status:  Insufficient data (11/10/22 1356)    Context:  Acute illness       Nutrition Assessment:    Admitted for UTI. Pt reported w/ improved alertness; however, not appropriate for PO diet during visit(unable to stay awake during RD visit). RD rec's NPO, SLP consult when PO diet appropriate. Continue IVF+D5 in interim. (11/10) RD consulted for TF rec's. NGT placed. (11/15) Transferred to medical floor. TF running and tolerated at goal rate. Noted plans to convert to PEG. RD to provide regimen above. Labs: Na 150, Cl 117, , Ca 7.8, H/H 10.4/31. 3. Meds: D5+1/2 NS @ 75 mL/hr, PPI, KCl, losartan, lasix, pericolace, vancomycin, cardura, lipitor, coreg. Nutrition Related Findings:    NFPE reattempt inappropriate today. No N/V/D/C reported per RN. NGT in place w/ TF as sole nutrition source; SLP PO trials continue. GRV=20 mL x24 hrs. Last BM 11/15. No edema. Wound Type: Deep tissue injury, Diabetic ulcer, Multiple, Stage I, Pressure injury (DTI- Sacrum; St I- R. Heel; Multiple DM ulcers.)    Current Nutrition Intake & Therapies:  Average Meal Intake: NPO  Average Supplement Intake: NPO  DIET NPO  ADULT TUBE FEEDING Nasogastric; Standard without Fiber; Delivery Method: Continuous; Continuous Initial Rate (mL/hr): 20; Continuous Advance Tube Feeding: Yes; Advancement Volume (mL/hr): 10; Advancement Frequency: Q 8 hours; Continuous Goal Rate . ..     Anthropometric Measures:  Height: 5' 9\" (175.3 cm)  Ideal Body Weight (IBW): 160 lbs (73 kg)  Current Body Wt:  62.8 kg (138 lb 7.2 oz), 86.5 % IBW. Bed scale  Current BMI (kg/m2): 20.4  Usual Body Weight: 84.4 kg (186 lb) (? per chart review 3 and 5 months ago.)  % Weight Change (Calculated): -25.6  Weight Adjustment: No adjustment  BMI Category: Underweight (BMI less than 22) age over 72    Estimated Daily Nutrient Needs:  Energy Requirements Based On: Kcal/kg  Weight Used for Energy Requirements: Current  Energy (kcal/day): 7683-1294 kcal/d  (30-35 kcal/kg)  Weight Used for Protein Requirements: Current  Protein (g/day): 76 gm/d  Method Used for Fluid Requirements: 1 ml/kcal  Fluid (ml/day): 2355-1944 mL/d    Nutrition Diagnosis:   Inadequate oral intake related to cognitive or neurological impairment, swallowing difficulty as evidenced by NPO or clear liquid status due to medical condition, nutrition support-enteral nutrition, weight loss    Nutrition Interventions:   Food and/or Nutrient Delivery: Continue NPO, Continue tube feeding  Nutrition Education/Counseling: Education not appropriate  Coordination of Nutrition Care: Continue to monitor while inpatient, Speech therapy  Plan of Care discussed with: RN    Goals:  Previous Goal Met: Goal(s) achieved  Goals: Meet at least 75% of estimated needs, Tolerate nutrition support at goal rate, by next RD assessment, other (specify)  Specify Other Goals: Initate means to meet ~50% of EENs within 2-3 days. Wt maintenance of +/- 0.5 kg x7 days. Nutrition Monitoring and Evaluation:   Behavioral-Environmental Outcomes: None identified  Food/Nutrient Intake Outcomes: Enteral nutrition intake/tolerance, Diet advancement/tolerance, IVF intake  Physical Signs/Symptoms Outcomes: Biochemical data, GI status, Weight, Hemodynamic status, Chewing or swallowing    Discharge Planning: Too soon to determine    Rafat Perales RD  Contact: [phone]. [phone].

## 2022-11-15 NOTE — PROGRESS NOTES
Problem: Falls - Risk of  Goal: *Absence of Falls  Description: Document Hammond Fall Risk and appropriate interventions in the flowsheet.   Outcome: Progressing Towards Goal  Note: Fall Risk Interventions:       Mentation Interventions: Bed/chair exit alarm, More frequent rounding, Room close to nurse's station    Medication Interventions: Bed/chair exit alarm    Elimination Interventions: Bed/chair exit alarm, Call light in reach              Problem: Patient Education: Go to Patient Education Activity  Goal: Patient/Family Education  Outcome: Progressing Towards Goal     Problem: Discharge Planning  Goal: *Discharge to safe environment  Outcome: Progressing Towards Goal  Goal: *Knowledge of medication management  Outcome: Progressing Towards Goal  Goal: *Knowledge of discharge instructions  Outcome: Progressing Towards Goal     Problem: Patient Education: Go to Patient Education Activity  Goal: Patient/Family Education  Outcome: Progressing Towards Goal     Problem: Sepsis: Day of Diagnosis  Goal: Off Pathway (Use only if patient is Off Pathway)  Outcome: Progressing Towards Goal  Goal: *Fluid resuscitation  Outcome: Progressing Towards Goal  Goal: *Paired blood cultures prior to first dose of antibiotic  Outcome: Progressing Towards Goal  Goal: *First dose of  appropriate antibiotic within 3 hours of arrival to ED, within 1 hour of arrival to ICU  Outcome: Progressing Towards Goal  Goal: *Lactic acid with first set of blood cultures  Outcome: Progressing Towards Goal  Goal: *Pneumococcal immunization (if eligible)  Outcome: Progressing Towards Goal  Goal: *Influenza immunization (if eligible)  Outcome: Progressing Towards Goal  Goal: Activity/Safety  Outcome: Progressing Towards Goal  Goal: Consults, if ordered  Outcome: Progressing Towards Goal  Goal: Diagnostic Test/Procedures  Outcome: Progressing Towards Goal  Goal: Nutrition/Diet  Outcome: Progressing Towards Goal  Goal: Discharge Planning  Outcome: Progressing Towards Goal  Goal: Medications  Outcome: Progressing Towards Goal  Goal: Respiratory  Outcome: Progressing Towards Goal  Goal: Treatments/Interventions/Procedures  Outcome: Progressing Towards Goal  Goal: Psychosocial  Outcome: Progressing Towards Goal     Problem: Sepsis: Day 2  Goal: Off Pathway (Use only if patient is Off Pathway)  Outcome: Progressing Towards Goal  Goal: *Central Venous Pressure maintained at 8-12 mm Hg  Outcome: Progressing Towards Goal  Goal: *Hemodynamically stable  Outcome: Progressing Towards Goal  Goal: *Tolerating diet  Outcome: Progressing Towards Goal  Goal: Activity/Safety  Outcome: Progressing Towards Goal  Goal: Consults, if ordered  Outcome: Progressing Towards Goal  Goal: Diagnostic Test/Procedures  Outcome: Progressing Towards Goal  Goal: Nutrition/Diet  Outcome: Progressing Towards Goal  Goal: Discharge Planning  Outcome: Progressing Towards Goal  Goal: Medications  Outcome: Progressing Towards Goal  Goal: Respiratory  Outcome: Progressing Towards Goal  Goal: Treatments/Interventions/Procedures  Outcome: Progressing Towards Goal  Goal: Psychosocial  Outcome: Progressing Towards Goal     Problem: Sepsis: Day 3  Goal: Off Pathway (Use only if patient is Off Pathway)  Outcome: Progressing Towards Goal  Goal: *Central Venous Pressure maintained at 8-12 mm Hg  Outcome: Progressing Towards Goal  Goal: *Oxygen saturation within defined limits  Outcome: Progressing Towards Goal  Goal: *Vital sign stability  Outcome: Progressing Towards Goal  Goal: *Tolerating diet  Outcome: Progressing Towards Goal  Goal: *Demonstrates progressive activity  Outcome: Progressing Towards Goal  Goal: Activity/Safety  Outcome: Progressing Towards Goal  Goal: Consults, if ordered  Outcome: Progressing Towards Goal  Goal: Diagnostic Test/Procedures  Outcome: Progressing Towards Goal  Goal: Nutrition/Diet  Outcome: Progressing Towards Goal  Goal: Discharge Planning  Outcome: Progressing Towards Goal  Goal: Medications  Outcome: Progressing Towards Goal  Goal: Respiratory  Outcome: Progressing Towards Goal  Goal: Treatments/Interventions/Procedures  Outcome: Progressing Towards Goal  Goal: Psychosocial  Outcome: Progressing Towards Goal     Problem: Sepsis: Day 4  Goal: Off Pathway (Use only if patient is Off Pathway)  Outcome: Progressing Towards Goal  Goal: Activity/Safety  Outcome: Progressing Towards Goal  Goal: Consults, if ordered  Outcome: Progressing Towards Goal  Goal: Diagnostic Test/Procedures  Outcome: Progressing Towards Goal  Goal: Nutrition/Diet  Outcome: Progressing Towards Goal  Goal: Discharge Planning  Outcome: Progressing Towards Goal  Goal: Medications  Outcome: Progressing Towards Goal  Goal: Respiratory  Outcome: Progressing Towards Goal  Goal: Treatments/Interventions/Procedures  Outcome: Progressing Towards Goal  Goal: Psychosocial  Outcome: Progressing Towards Goal  Goal: *Oxygen saturation within defined limits  Outcome: Progressing Towards Goal  Goal: *Hemodynamically stable  Outcome: Progressing Towards Goal  Goal: *Vital signs within defined limits  Outcome: Progressing Towards Goal  Goal: *Tolerating diet  Outcome: Progressing Towards Goal  Goal: *Demonstrates progressive activity  Outcome: Progressing Towards Goal  Goal: *Fluid volume maintenance  Outcome: Progressing Towards Goal     Problem: Sepsis: Day 5  Goal: Off Pathway (Use only if patient is Off Pathway)  Outcome: Progressing Towards Goal  Goal: *Oxygen saturation within defined limits  Outcome: Progressing Towards Goal  Goal: *Vital signs within defined limits  Outcome: Progressing Towards Goal  Goal: *Tolerating diet  Outcome: Progressing Towards Goal  Goal: *Demonstrates progressive activity  Outcome: Progressing Towards Goal  Goal: *Discharge plan identified  Outcome: Progressing Towards Goal  Goal: Activity/Safety  Outcome: Progressing Towards Goal  Goal: Consults, if ordered  Outcome: Progressing Towards Goal  Goal: Diagnostic Test/Procedures  Outcome: Progressing Towards Goal  Goal: Nutrition/Diet  Outcome: Progressing Towards Goal  Goal: Discharge Planning  Outcome: Progressing Towards Goal  Goal: Medications  Outcome: Progressing Towards Goal  Goal: Respiratory  Outcome: Progressing Towards Goal  Goal: Treatments/Interventions/Procedures  Outcome: Progressing Towards Goal  Goal: Psychosocial  Outcome: Progressing Towards Goal

## 2022-11-16 ENCOUNTER — APPOINTMENT (OUTPATIENT)
Dept: MRI IMAGING | Age: 72
DRG: 871 | End: 2022-11-16
Payer: MEDICARE

## 2022-11-16 ENCOUNTER — ANESTHESIA (OUTPATIENT)
Dept: ENDOSCOPY | Age: 72
DRG: 871 | End: 2022-11-16
Payer: MEDICARE

## 2022-11-16 LAB
ALBUMIN SERPL-MCNC: 1.8 G/DL (ref 3.5–5)
ALBUMIN/GLOB SERPL: 0.7 {RATIO} (ref 1.1–2.2)
ALP SERPL-CCNC: 61 U/L (ref 45–117)
ALT SERPL-CCNC: 86 U/L (ref 12–78)
ANION GAP SERPL CALC-SCNC: 10 MMOL/L (ref 5–15)
AST SERPL W P-5'-P-CCNC: 87 U/L (ref 15–37)
BILIRUB DIRECT SERPL-MCNC: 0.1 MG/DL (ref 0–0.2)
BILIRUB SERPL-MCNC: 0.5 MG/DL (ref 0.2–1)
BUN SERPL-MCNC: 12 MG/DL (ref 6–20)
BUN/CREAT SERPL: 24 (ref 12–20)
CA-I BLD-MCNC: 7.6 MG/DL (ref 8.5–10.1)
CHLORIDE SERPL-SCNC: 118 MMOL/L (ref 97–108)
CO2 SERPL-SCNC: 22 MMOL/L (ref 21–32)
CREAT SERPL-MCNC: 0.5 MG/DL (ref 0.7–1.3)
CRP SERPL-MCNC: 1.64 MG/DL (ref 0–0.6)
ERYTHROCYTE [DISTWIDTH] IN BLOOD BY AUTOMATED COUNT: 16.5 % (ref 11.5–14.5)
GLOBULIN SER CALC-MCNC: 2.7 G/DL (ref 2–4)
GLUCOSE BLD STRIP.AUTO-MCNC: 120 MG/DL (ref 65–100)
GLUCOSE BLD STRIP.AUTO-MCNC: 201 MG/DL (ref 65–100)
GLUCOSE BLD STRIP.AUTO-MCNC: 210 MG/DL (ref 65–100)
GLUCOSE SERPL-MCNC: 174 MG/DL (ref 65–100)
HCT VFR BLD AUTO: 28.8 % (ref 36.6–50.3)
HGB BLD-MCNC: 9.3 G/DL (ref 12.1–17)
MCH RBC QN AUTO: 28.5 PG (ref 26–34)
MCHC RBC AUTO-ENTMCNC: 32.3 G/DL (ref 30–36.5)
MCV RBC AUTO: 88.3 FL (ref 80–99)
NRBC # BLD: 0 K/UL (ref 0–0.01)
NRBC BLD-RTO: 0 PER 100 WBC
PERFORMED BY, TECHID: ABNORMAL
PLATELET # BLD AUTO: 109 K/UL (ref 150–400)
PMV BLD AUTO: 11.4 FL (ref 8.9–12.9)
POTASSIUM SERPL-SCNC: 3.6 MMOL/L (ref 3.5–5.1)
PROCALCITONIN SERPL-MCNC: 1.23 NG/ML
PROT SERPL-MCNC: 4.5 G/DL (ref 6.4–8.2)
RBC # BLD AUTO: 3.26 M/UL (ref 4.1–5.7)
SODIUM SERPL-SCNC: 150 MMOL/L (ref 136–145)
WBC # BLD AUTO: 4.5 K/UL (ref 4.1–11.1)

## 2022-11-16 PROCEDURE — 36415 COLL VENOUS BLD VENIPUNCTURE: CPT

## 2022-11-16 PROCEDURE — 74011250637 HC RX REV CODE- 250/637: Performed by: STUDENT IN AN ORGANIZED HEALTH CARE EDUCATION/TRAINING PROGRAM

## 2022-11-16 PROCEDURE — 85027 COMPLETE CBC AUTOMATED: CPT

## 2022-11-16 PROCEDURE — 80076 HEPATIC FUNCTION PANEL: CPT

## 2022-11-16 PROCEDURE — 74011250636 HC RX REV CODE- 250/636: Performed by: STUDENT IN AN ORGANIZED HEALTH CARE EDUCATION/TRAINING PROGRAM

## 2022-11-16 PROCEDURE — 74011250637 HC RX REV CODE- 250/637: Performed by: INTERNAL MEDICINE

## 2022-11-16 PROCEDURE — 74011636637 HC RX REV CODE- 636/637: Performed by: INTERNAL MEDICINE

## 2022-11-16 PROCEDURE — 74011250636 HC RX REV CODE- 250/636: Performed by: HOSPITALIST

## 2022-11-16 PROCEDURE — 70551 MRI BRAIN STEM W/O DYE: CPT

## 2022-11-16 PROCEDURE — 74011000250 HC RX REV CODE- 250: Performed by: INTERNAL MEDICINE

## 2022-11-16 PROCEDURE — 84145 PROCALCITONIN (PCT): CPT

## 2022-11-16 PROCEDURE — 65270000029 HC RM PRIVATE

## 2022-11-16 PROCEDURE — 92526 ORAL FUNCTION THERAPY: CPT

## 2022-11-16 PROCEDURE — 74011000250 HC RX REV CODE- 250: Performed by: STUDENT IN AN ORGANIZED HEALTH CARE EDUCATION/TRAINING PROGRAM

## 2022-11-16 PROCEDURE — 80048 BASIC METABOLIC PNL TOTAL CA: CPT

## 2022-11-16 PROCEDURE — 82962 GLUCOSE BLOOD TEST: CPT

## 2022-11-16 PROCEDURE — 99232 SBSQ HOSP IP/OBS MODERATE 35: CPT | Performed by: INTERNAL MEDICINE

## 2022-11-16 PROCEDURE — 86140 C-REACTIVE PROTEIN: CPT

## 2022-11-16 PROCEDURE — 74011250636 HC RX REV CODE- 250/636: Performed by: INTERNAL MEDICINE

## 2022-11-16 RX ORDER — HYDROXYZINE 50 MG/ML
50 INJECTION, SOLUTION INTRAMUSCULAR
Status: COMPLETED | OUTPATIENT
Start: 2022-11-16 | End: 2022-11-16

## 2022-11-16 RX ORDER — ALPRAZOLAM 0.5 MG/1
1 TABLET ORAL
Status: COMPLETED | OUTPATIENT
Start: 2022-11-16 | End: 2022-11-16

## 2022-11-16 RX ADMIN — POTASSIUM CHLORIDE 20 MEQ: 1.5 FOR SOLUTION ORAL at 21:34

## 2022-11-16 RX ADMIN — POTASSIUM CHLORIDE 20 MEQ: 1.5 FOR SOLUTION ORAL at 12:00

## 2022-11-16 RX ADMIN — PANTOPRAZOLE SODIUM 40 MG: 40 GRANULE, DELAYED RELEASE ORAL at 11:59

## 2022-11-16 RX ADMIN — ESCITALOPRAM OXALATE 20 MG: 10 TABLET ORAL at 11:59

## 2022-11-16 RX ADMIN — MELATONIN TAB 5 MG 10 MG: 5 TAB at 21:34

## 2022-11-16 RX ADMIN — HYDROXYZINE HYDROCHLORIDE 50 MG: 50 INJECTION, SOLUTION INTRAMUSCULAR at 08:43

## 2022-11-16 RX ADMIN — SODIUM CHLORIDE, PRESERVATIVE FREE 10 ML: 5 INJECTION INTRAVENOUS at 05:41

## 2022-11-16 RX ADMIN — DEXTROSE AND SODIUM CHLORIDE 75 ML/HR: 5; 450 INJECTION, SOLUTION INTRAVENOUS at 19:35

## 2022-11-16 RX ADMIN — FUROSEMIDE 20 MG: 40 TABLET ORAL at 11:59

## 2022-11-16 RX ADMIN — ARIPIPRAZOLE 2 MG: 2 TABLET ORAL at 11:51

## 2022-11-16 RX ADMIN — CARVEDILOL 12.5 MG: 12.5 TABLET, FILM COATED ORAL at 21:34

## 2022-11-16 RX ADMIN — CLOPIDOGREL BISULFATE 75 MG: 75 TABLET, FILM COATED ORAL at 11:58

## 2022-11-16 RX ADMIN — ASPIRIN 81 MG 81 MG: 81 TABLET ORAL at 11:52

## 2022-11-16 RX ADMIN — DOXAZOSIN 1 MG: 2 TABLET ORAL at 21:34

## 2022-11-16 RX ADMIN — HYDROXYZINE HYDROCHLORIDE 25 MG: 25 TABLET, FILM COATED ORAL at 21:34

## 2022-11-16 RX ADMIN — ALPRAZOLAM 1 MG: 0.5 TABLET ORAL at 15:30

## 2022-11-16 RX ADMIN — INSULIN LISPRO 3 UNITS: 100 INJECTION, SOLUTION INTRAVENOUS; SUBCUTANEOUS at 12:00

## 2022-11-16 RX ADMIN — INSULIN LISPRO 3 UNITS: 100 INJECTION, SOLUTION INTRAVENOUS; SUBCUTANEOUS at 08:52

## 2022-11-16 RX ADMIN — ATORVASTATIN CALCIUM 40 MG: 40 TABLET, FILM COATED ORAL at 11:57

## 2022-11-16 RX ADMIN — SODIUM CHLORIDE, PRESERVATIVE FREE 10 ML: 5 INJECTION INTRAVENOUS at 14:13

## 2022-11-16 RX ADMIN — DEXTROSE AND SODIUM CHLORIDE 75 ML/HR: 5; 450 INJECTION, SOLUTION INTRAVENOUS at 05:40

## 2022-11-16 RX ADMIN — ENOXAPARIN SODIUM 40 MG: 100 INJECTION SUBCUTANEOUS at 11:58

## 2022-11-16 RX ADMIN — CARVEDILOL 12.5 MG: 12.5 TABLET, FILM COATED ORAL at 11:57

## 2022-11-16 RX ADMIN — GABAPENTIN 100 MG: 100 CAPSULE ORAL at 21:34

## 2022-11-16 RX ADMIN — VANCOMYCIN HYDROCHLORIDE 1000 MG: 1 INJECTION, POWDER, LYOPHILIZED, FOR SOLUTION INTRAVENOUS at 05:36

## 2022-11-16 NOTE — PROGRESS NOTES
Progress Note    Patient: Ryan Sharpe MRN: [id number]  SSN: [id number]    YOB: 1950  Age: 70 y.o.   Sex: male      Admit Date: 11/8/2022    LOS: 8 days     Subjective:   Examined,   confused, NG tube in place,  Past Medical History:   Diagnosis Date    Cirrhosis ([facility]     Diabetes ([facility]     Hypertension     Stroke ([facility]         Current Facility-Administered Medications:     dextrose 5 % - 0.45% NaCl infusion, 75 mL/hr, IntraVENous, CONTINUOUS, Hawa Mckinley PA-C, Last Rate: 75 mL/hr at 11/16/22 0540, 75 mL/hr at 11/16/22 0540    hydrALAZINE (APRESOLINE) 20 mg/mL injection 20 mg, 20 mg, IntraVENous, Q6H PRN, Hawa Mckinley PA-C, 20 mg at 11/15/22 1618    aspirin chewable tablet 81 mg, 81 mg, Per NG tube, DAILY, Gabriela Deng MD, 81 mg at 11/16/22 1152    atorvastatin (LIPITOR) tablet 40 mg, 40 mg, Per NG tube, DAILY, Gabriela Deng MD, 40 mg at 11/16/22 1157    escitalopram oxalate (LEXAPRO) tablet 20 mg, 20 mg, Per NG tube, DAILY, Gabriela Deng MD, 20 mg at 11/16/22 1159    gabapentin (NEURONTIN) capsule 100 mg, 100 mg, Per NG tube, QHS, Gabriela Deng MD, 100 mg at 11/15/22 2233    melatonin tablet 10 mg, 10 mg, Per NG tube, QHS, Jas Deng MD, 10 mg at 11/15/22 2233    baclofen (LIORESAL) tablet 5 mg, 5 mg, Per NG tube, BID PRN, Steven Welsh MD, 5 mg at 11/13/22 1322    hydrOXYzine HCL (ATARAX) tablet 25 mg, 25 mg, Per NG tube, QHS PRN, Steven Welsh MD, 25 mg at 11/13/22 0118    polyethylene glycol (MIRALAX) packet 17 g, 17 g, Per NG tube, DAILY PRN, Steven Welsh MD    traMADoL [facility]) tablet 50 mg, 50 mg, Per NG tube, Q6H PRN, Steven Welsh MD, 50 mg at 11/15/22 2233    doxazosin (CARDURA) tablet 1 mg, 1 mg, Per NG tube, QHS, Jas Deng MD, 1 mg at 11/15/22 2233    potassium chloride (KLOR-CON) packet for solution 20 mEq, 20 mEq, Per NG tube, BID, Steven Welsh MD, 20 mEq at 11/16/22 1200    pantoprazole (PROTONIX) granules for oral suspension 40 mg, 40 mg, Per NG tube, DAILY, Jas Allen MD, 40 mg at 11/16/22 1159    sodium chloride (NS) flush 5-40 mL, 5-40 mL, IntraVENous, Q8H, Jas Allen MD, 10 mL at 11/16/22 1413    sodium chloride (NS) flush 5-40 mL, 5-40 mL, IntraVENous, PRN, Kerri Jenkins MD, 10 mL at 11/14/22 1720    acetaminophen (TYLENOL) tablet 650 mg, 650 mg, Oral, Q6H PRN, 650 mg at 11/13/22 1319 **OR** acetaminophen (TYLENOL) suppository 650 mg, 650 mg, Rectal, Q6H PRN, Jas Allen MD    ondansetron (ZOFRAN ODT) tablet 4 mg, 4 mg, Oral, Q8H PRN **OR** ondansetron (ZOFRAN) injection 4 mg, 4 mg, IntraVENous, Q6H PRN, Jas Allen MD    enoxaparin (LOVENOX) injection 40 mg, 40 mg, SubCUTAneous, DAILY, Jas Allen MD, 40 mg at 11/16/22 1158    glucose chewable tablet 16 g, 4 Tablet, Oral, PRN, Jas Allen MD    glucagon [facility]) injection 1 mg, 1 mg, IntraMUSCular, PRN, Jas Allen MD    dextrose 10% infusion 0-250 mL, 0-250 mL, IntraVENous, PRN, Kerri Jenkins MD    insulin lispro (HUMALOG) injection, , SubCUTAneous, AC&HS, Kerri Jenkins MD, 3 Units at 11/16/22 1200    ARIPiprazole (ABILIFY) tablet 2 mg, 2 mg, Oral, DAILY, Jas Allen MD, 2 mg at 11/16/22 1151    carvediloL (COREG) tablet 12.5 mg, 12.5 mg, Oral, BID, Jas Allen MD, 12.5 mg at 11/16/22 1157    clopidogreL (PLAVIX) tablet 75 mg, 75 mg, Oral, DAILY, Jas Allen MD, 75 mg at 11/16/22 1158    losartan (COZAAR) tablet 50 mg, 50 mg, Oral, DAILY, Jas Allen MD, 50 mg at 11/15/22 6418    senna-docusate (PERICOLACE) 8.6-50 mg per tablet 1 Tablet, 1 Tablet, Oral, DAILY, Kerri Jenkins MD, 1 Tablet at 11/14/22 1011    [Held by provider] tamsulosin (FLOMAX) capsule 0.4 mg, 0.4 mg, Oral, QHS, Jas Allen MD    furosemide (LASIX) tablet 20 mg, 20 mg, Oral, DAILY, Kerri Jenkins MD, 20 mg at 11/16/22 1159    Objective:     Vitals:    11/15/22 1759 11/15/22 2036 11/16/22 0145 11/16/22 0839   BP: (!) 134/55 134/61 (!) 119/55 (!) 147/90 Pulse: 79 79 65 88   Resp:  18 18 18   Temp:  98.5 °F (36.9 °C) 98 °F (36.7 °C) 97.9 °F (36.6 °C)   SpO2:  92% 92% 94%   Weight:       Height:            Intake and Output:  Current Shift: 11/16 0701 - 11/16 1900  In: 900 [I.V.:900]  Out: 600 [Urine:600]  Last three shifts: 11/14 1901 - 11/16 0700  In: 1020   Out: 1302 [Urine:1300]    Physical Exam:   Physical Exam  Constitutional:       Appearance: He is ill-appearing. HENT:      Head: Atraumatic. Mouth/Throat:      Mouth: Mucous membranes are dry. Eyes:      Extraocular Movements: Extraocular movements intact. Cardiovascular:      Rate and Rhythm: Normal rate. Heart sounds: Normal heart sounds. Pulmonary:      Breath sounds: Normal breath sounds. Abdominal:      General: Abdomen is flat. Bowel sounds are normal.      Palpations: Abdomen is soft. Musculoskeletal:         General: Normal range of motion. Cervical back: Neck supple. Skin:     General: Skin is warm. Neurological:      General: No focal deficit present. Mental Status: Mental status is at baseline.         Lab/Data Review:  Recent Results (from the past 24 hour(s))   GLUCOSE, POC    Collection Time: 11/15/22  4:12 PM   Result Value Ref Range    Glucose (POC) 219 (H) 65 - 100 mg/dL    Performed by Vishal Daniels, POC    Collection Time: 11/15/22 10:11 PM   Result Value Ref Range    Glucose (POC) 212 (H) 65 - 100 mg/dL    Performed by Didi Berg    CBC W/O DIFF    Collection Time: 11/16/22  3:52 AM   Result Value Ref Range    WBC 4.5 4.1 - 11.1 K/uL    RBC 3.26 (L) 4.10 - 5.70 M/uL    HGB 9.3 (L) 12.1 - 17.0 g/dL    HCT 28.8 (L) 36.6 - 50.3 %    MCV 88.3 80.0 - 99.0 FL    MCH 28.5 26.0 - 34.0 PG    MCHC 32.3 30.0 - 36.5 g/dL    RDW 16.5 (H) 11.5 - 14.5 %    PLATELET 985 (L) 954 - 400 K/uL    MPV 11.4 8.9 - 12.9 FL    NRBC 0.0 0.0  WBC    ABSOLUTE NRBC 0.00 0.00 - 8.22 K/uL   METABOLIC PANEL, BASIC    Collection Time: 11/16/22  3:52 AM Result Value Ref Range    Sodium 150 (H) 136 - 145 mmol/L    Potassium 3.6 3.5 - 5.1 mmol/L    Chloride 118 (H) 97 - 108 mmol/L    CO2 22 21 - 32 mmol/L    Anion gap 10 5 - 15 mmol/L    Glucose 174 (H) 65 - 100 mg/dL    BUN 12 6 - 20 mg/dL    Creatinine 0.50 (L) 0.70 - 1.30 mg/dL    BUN/Creatinine ratio 24 (H) 12 - 20      eGFR >60 >60 ml/min/1.73m2    Calcium 7.6 (L) 8.5 - 10.1 mg/dL   HEPATIC FUNCTION PANEL    Collection Time: 11/16/22  3:52 AM   Result Value Ref Range    Protein, total 4.5 (L) 6.4 - 8.2 g/dL    Albumin 1.8 (L) 3.5 - 5.0 g/dL    Globulin 2.7 2.0 - 4.0 g/dL    A-G Ratio 0.7 (L) 1.1 - 2.2      Bilirubin, total 0.5 0.2 - 1.0 mg/dL    Bilirubin, direct 0.1 0.0 - 0.2 mg/dL    Alk. phosphatase 61 45 - 117 U/L    AST (SGOT) 87 (H) 15 - 37 U/L    ALT (SGPT) 86 (H) 12 - 78 U/L   GLUCOSE, POC    Collection Time: 11/16/22  8:36 AM   Result Value Ref Range    Glucose (POC) 210 (H) 65 - 100 mg/dL    Performed by Serene Waseca    PROCALCITONIN    Collection Time: 11/16/22 10:18 AM   Result Value Ref Range    Procalcitonin 1.23 (H) 0 ng/mL   C REACTIVE PROTEIN, QT    Collection Time: 11/16/22 10:18 AM   Result Value Ref Range    C-Reactive protein 1.64 (H) 0.00 - 0.60 mg/dL   GLUCOSE, POC    Collection Time: 11/16/22 11:42 AM   Result Value Ref Range    Glucose (POC) 201 (H) 65 - 100 mg/dL    Performed by Serene Waseca         XR CHEST PORT   Final Result   Interval retraction of nasogastric tube with sidehole over the gastroesophageal   junction. XR CHEST PORT   Final Result   Interval replacement of nasogastric tube, with tip over the gastric fundus. XR CHEST PORT   Final Result   No significant change. XR CHEST PORT   Final Result   1. NG tube terminates in the gastric fundus, with possible kink in the side   hole. Correlate with function. 2.  New patchy consolidation in the right midlung is concerning for pneumonia. Grossly unchanged patchy retrocardiac opacities.  Stable trace right pleural   effusion. XR CHEST PORT   Final Result   Nasogastric tube tip advanced overlying the gastric body. XR CHEST PORT   Final Result   Nasogastric tube tip over the gastric fundus. XR CHEST PORT   Final Result   Increased mild interstitial pulmonary edema versus pneumonia. Enteric tube extends into the gastric antrum or first portion of the duodenum. Consider retraction 8-10 cm if the intention is gastric luminal termination. CT FOOT RT W CONT   Final Result      1. No abscess or osteomyelitis. 2. No fracture. DUPLEX LOWER EXT ARTERY BILAT   Final Result      XR FOOT RT MIN 3 V   Final Result   No osseous erosion or soft tissue gas. .      XR CHEST PORT   Final Result      Mild interstitial opacities which may represent mild pulmonary edema unchanged         XR CHEST PORT   Final Result   Addendum (preliminary) 1 of 1   Addendum: NG tube is in appropriate position tip extends below the    diaphragm. Right IJ catheter. No pneumothorax on the right. Final   1. Status post right IJ catheter placement without evidence of complication. IR INSERT NON TUNL CVC OVER 5 YRS   Final Result   Technically successful ultrasound guided placement of a right internal jugular   vein temporary central venous catheter. A post procedure chest x-ray is   pending. CT HEAD WO CONT   Final Result   No acute process or change compared to the prior exam.            XR CHEST PORT   Final Result   No acute process.       IR [place]    (Results Pending)   MRI BRAIN WO CONT    (Results Pending)   XR SWALLOW FUNC VIDEO    (Results Pending)        Assessment:     Active Problems:    UTI (urinary tract infection) (11/8/2022)      CVA Versus TIA,  Difficult to swallow,  NG tube feeding,     Need long-term GI access for the outpatient care  Plan:   Followed by speech therapy, waiting for the final recommendations  Continue current neurology evaluation PT OT  On the aspirin,  Plavix was on hold since two days ago, he is high risk for thrombotic episode if we will withhold antiplatelet drug for too long     GI prophylaxis PPIs,  Continue on antibiotic vancomycin  Hold Tube feeding overnight    Schedule EGD /PEG  for tube placement in the morning, indication/ risk/options  discussed with patient 's POA,  Plan:       Signed By: Edgar Pisano MD     November 16, 2022        Thank you for allowing me to participate in this patients care  Cc Referring Physician   Gwyn Russell MD

## 2022-11-16 NOTE — PROGRESS NOTES
IMPRESSION:   Acute hypoxic respiratory failure resolved  Severe sepsis   Metabolic encephalopathy  Probably urinary tract infection  Leukocytosis improved  Carotid artery disease history of CVA  Hypokalemia      RECOMMENDATIONS/PLAN:   70-year-old male nursing home resident admitted with hypotension and unresponsiveness  Hemodynamically stable  Panculture IV antibiotics, ABG shows respiratory alkalosis  Need vascular surgery consult for possible need endarterectomy when more stable  Previous 2D echo showed ejection fraction 60 to 65%  Chest x-ray shows fluid overload congestive changes   Replace potassium     [x] High complexity decision making was performed  [x] See my orders for details  HPI  70-year-old nursing home resident came in because of unresponsiveness he had a history of stroke in the past patient was hypotensive hypoxic rapid response was called he was admitted to the floor initially received IV fluid hemodynamically unstable started on vasopressors Levophed transferred to ICU White count was elevated he was put on oxygen 4 L nasal cannula unable to get any started the patient he has a right foot wound and multiple wounds of the lower extremities dressing in place. He has carotid artery stenosis only supposed to go for surgery but unable to get cardiac clearance because of nuclear stress test he has 90% stenosis of left ICA and 50% stenosis of right ICA. So now he is admitted to ICU and critical care consult was called  PMH:  has a past medical history of Cirrhosis ([facility] Diabetes ([place] [address] Hypertension, and Stroke ([facility] [address] [facility] PSH:   has a past surgical history that includes hx back surgery; hx gi; and ir insert non tunl cvc over 5 yrs (11/9/2022). FHX: family history includes Heart Disease in his father. SHX:  reports that he has quit smoking. He has never used smokeless tobacco. He reports that he does not currently use alcohol. He reports that he does not currently use drugs.     ALL: No Known Allergies     MEDS:   [x] Reviewed - As Below   [] Not reviewed    Current Facility-Administered Medications   Medication    dextrose 5 % - 0.45% NaCl infusion    [START ON 11/17/2022] Vancomycin - reminder to draw level 11/17 @ 0400 please    hydrALAZINE (APRESOLINE) 20 mg/mL injection 20 mg    vancomycin (VANCOCIN) 1,000 mg in 0.9% sodium chloride 250 mL ([id number])    VANCOMYCIN INFORMATION NOTE    aspirin chewable tablet 81 mg    atorvastatin (LIPITOR) tablet 40 mg    escitalopram oxalate (LEXAPRO) tablet 20 mg    gabapentin (NEURONTIN) capsule 100 mg    melatonin tablet 10 mg    baclofen (LIORESAL) tablet 5 mg    hydrOXYzine HCL (ATARAX) tablet 25 mg    polyethylene glycol (MIRALAX) packet 17 g    traMADoL (ULTRAM) tablet 50 mg    doxazosin (CARDURA) tablet 1 mg    potassium chloride (KLOR-CON) packet for solution 20 mEq    pantoprazole (PROTONIX) granules for oral suspension 40 mg    sodium chloride (NS) flush 5-40 mL    sodium chloride (NS) flush 5-40 mL    acetaminophen (TYLENOL) tablet 650 mg    Or    acetaminophen (TYLENOL) suppository 650 mg    ondansetron (ZOFRAN ODT) tablet 4 mg    Or    ondansetron (ZOFRAN) injection 4 mg    enoxaparin (LOVENOX) injection 40 mg    glucose chewable tablet 16 g    glucagon (GLUCAGEN) injection 1 mg    dextrose 10% infusion 0-250 mL    insulin lispro (HUMALOG) injection    ARIPiprazole (ABILIFY) tablet 2 mg    carvediloL (COREG) tablet 12.5 mg    clopidogreL (PLAVIX) tablet 75 mg    losartan (COZAAR) tablet 50 mg    senna-docusate (PERICOLACE) 8.6-50 mg per tablet 1 Tablet    [Held by provider] tamsulosin (FLOMAX) capsule 0.4 mg    furosemide (LASIX) tablet 20 mg      MAR reviewed and pertinent medications noted or modified as needed   Current Facility-Administered Medications   Medication    dextrose 5 % - 0.45% NaCl infusion    [START ON 11/17/2022] Vancomycin - reminder to draw level 11/17 @ 0400 please    hydrALAZINE (APRESOLINE) 20 mg/mL injection 20 mg    vancomycin (VANCOCIN) 1,000 mg in 0.9% sodium chloride 250 mL ([id number])    VANCOMYCIN INFORMATION NOTE    aspirin chewable tablet 81 mg    atorvastatin (LIPITOR) tablet 40 mg    escitalopram oxalate (LEXAPRO) tablet 20 mg    gabapentin (NEURONTIN) capsule 100 mg    melatonin tablet 10 mg    baclofen (LIORESAL) tablet 5 mg    hydrOXYzine HCL (ATARAX) tablet 25 mg    polyethylene glycol (MIRALAX) packet 17 g    traMADoL (ULTRAM) tablet 50 mg    doxazosin (CARDURA) tablet 1 mg    potassium chloride (KLOR-CON) packet for solution 20 mEq    pantoprazole (PROTONIX) granules for oral suspension 40 mg    sodium chloride (NS) flush 5-40 mL    sodium chloride (NS) flush 5-40 mL    acetaminophen (TYLENOL) tablet 650 mg    Or    acetaminophen (TYLENOL) suppository 650 mg    ondansetron (ZOFRAN ODT) tablet 4 mg    Or    ondansetron (ZOFRAN) injection 4 mg    enoxaparin (LOVENOX) injection 40 mg    glucose chewable tablet 16 g    glucagon (GLUCAGEN) injection 1 mg    dextrose 10% infusion 0-250 mL    insulin lispro (HUMALOG) injection    ARIPiprazole (ABILIFY) tablet 2 mg    carvediloL (COREG) tablet 12.5 mg    clopidogreL (PLAVIX) tablet 75 mg    losartan (COZAAR) tablet 50 mg    senna-docusate (PERICOLACE) 8.6-50 mg per tablet 1 Tablet    [Held by provider] tamsulosin (FLOMAX) capsule 0.4 mg    furosemide (LASIX) tablet 20 mg      PMH:  has a past medical history of Cirrhosis ([facility] [address] Diabetes ([facility] [address] [facility] Hypertension, and Stroke ([facility] [address] PSH:   has a past surgical history that includes hx back surgery; hx gi; and ir insert non tunl cvc over 5 yrs (11/9/2022). FHX: family history includes Heart Disease in his father. SHX:  reports that he has quit smoking. He has never used smokeless tobacco. He reports that he does not currently use alcohol. He reports that he does not currently use drugs. ROS:Review of systems not obtained due to patient factors.       Hemodynamics:    CO:    CI:    CVP:    SVR:   PAP Systolic:    PAP Diastolic:    PVR: SV02:        Ventilator Settings:      Mode Rate TV Press PEEP FiO2 PIP Min. Vent                              Vital Signs: Telemetry:    normal sinus rhythm Intake/Output:   Visit Vitals  BP (!) 147/90 (BP 1 Location: Left lower arm, BP Patient Position: Lying)   Pulse 88   Temp 97.9 °F (36.6 °C)   Resp 18   Ht 5' 9\" (1.753 m)   Wt 62.5 kg (137 lb 12.6 oz)   SpO2 94%   BMI 20.35 kg/m²       Temp (24hrs), Av.3 °F (36.8 °C), Min:97.9 °F (36.6 °C), Max:98.8 °F (37.1 °C)        O2 Device: None (Room air) O2 Flow Rate (L/min): 2 l/min       Wt Readings from Last 4 Encounters:   11/10/22 62.5 kg (137 lb 12.6 oz)   22 84.8 kg (187 lb)   22 84.8 kg (187 lb)   10/28/20 99.8 kg (220 lb)          Intake/Output Summary (Last 24 hours) at 2022 0947  Last data filed at 2022 0630  Gross per 24 hour   Intake 1620 ml   Output 302 ml   Net 1318 ml         Last shift:       07 -  1900  In: 900 [I.V.:900]  Out: -   Last 3 shifts:  1901 -  0700  In: 1020   Out: 0088 [Urine:1300]       Physical Exam:     General: He is on room air  HEENT: NCAT, poor dentition, lips and mucosa dry  Eyes: anicteric; conjunctiva clear  Neck: no nodes, trach midline; no accessory MM use.   Chest: no deformity,   Cardiac: R regular; no murmur;   Lungs: distant breath sounds; wheezes  Abd: soft, NT, hypoactive BS  Ext: Lower extremity wound bandage in place  : NO kennedy, clear urine  Neuro: Unresponsive  Psych-unable to assess  Skin: warm, dry, no cyanosis;   Pulses: 1-2+ Bilateral pedal, radial  Capillary: brisk; pale      DATA:    MAR reviewed and pertinent medications noted or modified as needed  MEDS:   Current Facility-Administered Medications   Medication    dextrose 5 % - 0.45% NaCl infusion    [START ON 2022] Vancomycin - reminder to draw level  @ 0400 please    hydrALAZINE (APRESOLINE) 20 mg/mL injection 20 mg    vancomycin (VANCOCIN) 1,000 mg in 0.9% sodium chloride 250 mL ([id number]) VANCOMYCIN INFORMATION NOTE    aspirin chewable tablet 81 mg    atorvastatin (LIPITOR) tablet 40 mg    escitalopram oxalate (LEXAPRO) tablet 20 mg    gabapentin (NEURONTIN) capsule 100 mg    melatonin tablet 10 mg    baclofen (LIORESAL) tablet 5 mg    hydrOXYzine HCL (ATARAX) tablet 25 mg    polyethylene glycol (MIRALAX) packet 17 g    traMADoL (ULTRAM) tablet 50 mg    doxazosin (CARDURA) tablet 1 mg    potassium chloride (KLOR-CON) packet for solution 20 mEq    pantoprazole (PROTONIX) granules for oral suspension 40 mg    sodium chloride (NS) flush 5-40 mL    sodium chloride (NS) flush 5-40 mL    acetaminophen (TYLENOL) tablet 650 mg    Or    acetaminophen (TYLENOL) suppository 650 mg    ondansetron (ZOFRAN ODT) tablet 4 mg    Or    ondansetron (ZOFRAN) injection 4 mg    enoxaparin (LOVENOX) injection 40 mg    glucose chewable tablet 16 g    glucagon (GLUCAGEN) injection 1 mg    dextrose 10% infusion 0-250 mL    insulin lispro (HUMALOG) injection    ARIPiprazole (ABILIFY) tablet 2 mg    carvediloL (COREG) tablet 12.5 mg    clopidogreL (PLAVIX) tablet 75 mg    losartan (COZAAR) tablet 50 mg    senna-docusate (PERICOLACE) 8.6-50 mg per tablet 1 Tablet    [Held by provider] tamsulosin (FLOMAX) capsule 0.4 mg    furosemide (LASIX) tablet 20 mg        Labs:    Recent Labs     11/16/22  0352 11/15/22  0544 11/14/22  0758   WBC 4.5 5.5 4.9   HGB 9.3* 10.4* 10.1*   * 125* 96*       Recent Labs     11/16/22  0352 11/15/22  0544 11/14/22  0758   * 150* 149*   K 3.6 3.8 3.6   * 117* 119*   CO2 22 27 26   * 150* 173*   BUN 12 11 12   CREA 0.50* 0.57* 0.50*   CA 7.6* 7.8* 7.4*   ALB 1.8*  --   --    ALT 86*  --   --        No results for input(s): PH, PCO2, PO2, HCO3, FIO2 in the last 72 hours. No results for input(s): CPK, CKNDX, TROIQ in the last 72 hours.     No lab exists for component: CPKMB    No results found for: BNPP, BNP   Lab Results   Component Value Date/Time    Culture result: 11/09/2022 02:10 AM     Heavy * methicillin resistant staphylococcus aureus *    Culture result: Heavy Diphtheroids 11/09/2022 02:10 AM    Culture result: No growth 6 days 11/08/2022 01:25 PM     Lab Results   Component Value Date/Time    TSH 1.67 07/30/2022 07:11 AM        Imaging:    Results from Hospital Encounter encounter on 11/08/22    XR CHEST PORT    Narrative  INDICATION: NG tube placement after pulling out approx 4cm    EXAMINATION:  AP CHEST, PORTABLE    COMPARISON: Earlier today    FINDINGS: Single AP portable view of the chest demonstrates nasogastric tube has  been retracted slightly with tip over the proximal stomach and sidehole or the  gastroesophageal junction. The cardiomediastinal silhouette is unchanged. Patchy  bilateral airspace disease. Impression  Interval retraction of nasogastric tube with sidehole over the gastroesophageal  junction. Results from [place] encounter on 11/08/22    CT FOOT RT W CONT    Narrative  EXAM: CT FOOT RT W CONT    INDICATION: Right foot swelling and abscess. Evaluate for osteomyelitis. Hypertension, diabetes, and cirrhosis. COMPARISON: Right foot views on 11/10/2022    CONTRAST: 100 mL of [id number]. TECHNIQUE: Helical CT of the right foot during uneventful rapid bolus  intravenous contrast administration. Coronal and Sagittal reformats were  generated. Images reviewed in soft tissue and bone windows. CT dose reduction  was achieved through the use of a standardized protocol tailored for this  examination and automatic exposure control for dose modulation. FINDINGS: Bones: Mild osteopenia. No fracture or osteomyelitis. Joint fluid: Physiologic. Articulations: no evidence of septic arthritis. 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 Tendons: No full-thickness tendon tear. Muscles: Mild diffuse atrophy. Soft tissue mass: None. No fluid collection. Impression  1. No abscess or osteomyelitis.   2. No fracture. 11/10 patient is barely responsive now on room air off pressors getting IV fluid chest x-ray shows congestive changes IV fluid has been decreased, replace potassium, WBC much improved  11/11 Patient is out of ICU, responding slightly, he is on room air. PCO2 30. WBC improving, continues to decrease. 11/12 On room air, condition remains same open eyes but does not follow any commands, replace potassium  11/13 Room air, no O2 in hospital. He opened his eyes, tried to respond slightly by denying SOB, but unable to communicate clearly. NG tube in place. 11/14 Pt is seen resting, he is not currently on any O2, SpO2 94%. NG tube in place. 11/15 Pt is awake feeling well, more responsive today. He denies SOB. He is still on room air. Pt still has NG tube in place.    11/16 condition same on room air pleasantly confused

## 2022-11-16 NOTE — PROGRESS NOTES
SPEECH LANGUAGE PATHOLOGY DYSPHAGIA TREATMENT  Patient: Nati Capellan (66 y.o. male)  Date: 11/16/2022  Diagnosis: UTI (urinary tract infection) [N39.0] <principal problem not specified>  Procedure(s) (LRB):  PERCUTANEOUS ENDOSCOPIC GASTROSTOMY TUBE INSERTION (N/A) * Surgery Date in Future *  Precautions: aspiration      ASSESSMENT:  Patient seen at bedside. NG in place. Possible PEG placement meenu? Patient alerts to name and request water. Administered moderately thick liquids via tsp x4. Patient w/ appropriate bolus acceptance from spoon, labial closure and liquid pull. Mild delay in the initiation of swallow. HLE and protraction reduced to digital palpation w/ delay recoil. No overt s/sx of aspiration noted. Administered puree ( applesauce ) to trial. Again, bolus acceptance is adequate. Swallow delay appreciated. Pharyngeal response reduced. Patient w/ delayed onset of minimal vocal wetness. Patient unable to clear and cough on command. Patient continues to be an aspiration risk due to dysphagia, impaired alertness and cognition and reduced respiratory drive. Due to fluctuating alertness and severity of dysphagia, agree to continue w/ PEG placement. Patient does appear appropriate for MBS at this time due to improved alertness and bolus acceptance. Will plan for [facility] Friday, 11/18. If PEG placement is delayed, will re-schedule for meenu 11/17. PLAN:  Recommendations and Planned Interventions:  NPO. NG for TF. ?PEG placement 11/17/22. MBS to follow if able to maintain alertness. Aspiration precautions. Patient continues to benefit from skilled intervention to address the above impairments. Continue treatment per established plan of care. Frequency/Duration: Patient will be followed by speech-language pathology 5 times a week to address goals. Discharge Recommendations: To Be Determined     SUBJECTIVE:   Patient seen at bedside. He alerts to name.      OBJECTIVE:     CXR Results  (Last 50 hours)                 11/15/22 0209  XR CHEST PORT Final result    Impression:  Interval retraction of nasogastric tube with sidehole over the gastroesophageal   junction. Narrative:  INDICATION: NG tube placement after pulling out approx 4cm       EXAMINATION:  AP CHEST, PORTABLE       COMPARISON: Earlier today       FINDINGS: Single AP portable view of the chest demonstrates nasogastric tube has   been retracted slightly with tip over the proximal stomach and sidehole or the   gastroesophageal junction. The cardiomediastinal silhouette is unchanged. Patchy   bilateral airspace disease. 11/15/22 0039  XR CHEST PORT Final result    Impression:  Interval replacement of nasogastric tube, with tip over the gastric fundus. Narrative:  INDICATION: check NG tube placement (newly reinserted)       EXAMINATION:  AP CHEST, PORTABLE       COMPARISON: 11/14/2022       FINDINGS: Single AP portable view of the chest demonstrates interval placement   of nasogastric tube, with tip over the gastric fundus. Previous kinking has   resolved. The cardiomediastinal silhouette is unchanged. Bilateral airspace   disease. 11/14/22 2300  XR CHEST PORT Final result    Impression:  No significant change. Narrative:  INDICATION: check NGtube placement       EXAMINATION:  AP CHEST, PORTABLE       COMPARISON: Earlier today       FINDINGS: Single AP portable view of the chest demonstrates unchanged   nasogastric tube tip position of the gastric fundus with slight kinking at the   sidehole. The cardiomediastinal silhouette is unchanged. Bibasilar airspace   disease, unchanged. 11/14/22 1732  XR CHEST PORT Final result    Impression:  1. NG tube terminates in the gastric fundus, with possible kink in the side   hole. Correlate with function. 2.  New patchy consolidation in the right midlung is concerning for pneumonia. Grossly unchanged patchy retrocardiac opacities.  Stable trace right pleural effusion. Narrative:  EXAM:  XR CHEST PORT       INDICATION: NG TUBE PLACEMENT       COMPARISON: Chest radiograph 11/12/2022       TECHNIQUE: Semiupright portable chest AP view       FINDINGS:        NG tube terminates in the gastric fundus, with possible kink in the side hole. Median sternotomy wires and coronary vascularization. Cardiomediastinal silhouette within normal limits. New patchy consolidation in   the right midlung is concerning for pneumonia. Grossly unchanged patchy   retrocardiac opacities. Stable trace right pleural effusion. No definite   pneumothorax. CT Results  (Last 48 hours)      None           Cognitive and Communication Status:  Neurologic State: Alert, Confused  Orientation Level: Oriented to person, Disoriented to place, Disoriented to situation, Disoriented to time  Cognition: Impaired decision making        After treatment:   Patient left in no apparent distress in bed, Call bell within reach, and Nursing notified    COMMUNICATION/EDUCATION:   Patient was educated regarding his deficit(s) of dysphagia, swallow safety precautions, diet recs and POC. He demonstrated Guarded understanding as evidenced by impaired cognition. The patient's plan of care including recommendations, planned interventions, and recommended diet changes were discussed with: PhysicianTomás Swartz SLP M.S. [facility]-SLP  Time Calculation: 14 mins           Problem: Dysphagia (Adult)  Goal: *Acute Goals and Plan of Care (Insert Text)  Description: Speech Therapy Goals  Initiated 11/12/2022  -Patient stated goal: \"I want to eat. \"  -Patient will participate in modified barium swallow study within 5 day(s). [ ] Not met  [ ]  MET   [ x] Progressing  [ ] Discontinue  -Patient will tolerate PO trials with SLP only without signs/symptoms of aspiration given minimal cues within 5 day(s).          [ ] Not met  [ ]  MET   [x ] Progressing  [ ] Discontinue  -Patient will demonstrate understanding of swallow safety precautions and aspiration precautions, diet recs with minimal cues within 5 day(s).         [ ] Not met  [ ]  MET   [x ] Progressing  [ ] Discontinue   Outcome: Progressing Towards Goal

## 2022-11-16 NOTE — PROGRESS NOTES
Vancomycin Dosing Consult  Drea Viera is a 70 y.o. male with severe sepsis due to UTI and toe ulcer. Pharmacy was consulted by Dr. Elijah Moreno to dose and monitor Vancomycin. Today is day 8.     Antibiotic regimen: Vancomycin monotherapy    Temp (24hrs), Av.3 °F (36.8 °C), Min:97.9 °F (36.6 °C), Max:98.8 °F (37.1 °C)    Recent Labs     22  0352 11/15/22  0544 22  0758   WBC 4.5 5.5 4.9     Recent Labs     22  0352 11/15/22  0544 22  0758 22  0343 22  0529 22  0328 11/10/22  0735   CREA 0.50* 0.57* 0.50* 0.56* 0.48* 0.44* 0.68*   BUN 12 11 12 12 10 11 16     Recent Labs     22  0328 22  0210   CRP 5.67* 4.02*     Recent Labs     22  0328   PCT 49.13*     Est CrCl: 85.6 ml/min  Concomitant nephrotoxic drugs: Loop diuretics    Cultures:    Blood: ngtd (final)   Urine: 20,000 colonies/mL, Mixed urogential Nataly isolated (final)   Wound: Heavy MRSA, Heavy Diphtheroids (final)    MRSA Swab: Detected     Target range: AUC/TELMA 400-600    Recent level history:  Date/Time Dose & Interval Measured Level (mcg/mL) Associated AUC/TELMA   11/10 0340 2,000 mg IV x 1 dose 7.7 N/A    1258 1,000 mg IV x 1 dose 14.5 413    1322 1,000 mg IV q12h 15 438   11/15 0544 1,000 mg IV q12h 11.0 494     Assessment/Plan:   Afebrile, WBC WNL, CRP and procal remain elevated ()  Continue vancomycin 1,000 mg IV every 12 hours for a projected AUC/TELMA ratio of 436  Vancomycin level scheduled for  at 0400  Antimicrobial stop date TBD

## 2022-11-16 NOTE — PROGRESS NOTES
Infectious Disease Progress Note           Subjective:   More alert and following commands today, denies new complaints, no acute events since last seen   Objective:   Physical Exam:     Visit Vitals  BP (!) 147/90 (BP 1 Location: Left lower arm, BP Patient Position: Lying)   Pulse 88   Temp 97.9 °F (36.6 °C)   Resp 18   Ht 5' 9\" (1.753 m)   Wt 137 lb 12.6 oz (62.5 kg)   SpO2 94%   BMI 20.35 kg/m²    O2 Flow Rate (L/min): 2 l/min O2 Device: None (Room air)    Temp (24hrs), Av.1 °F (36.7 °C), Min:97.9 °F (36.6 °C), Max:98.5 °F (36.9 °C)    701 - 1900  In: 900 [I.V.:900]  Out: 600 [Urine:600]   1901 -  0700  In: 1020   Out: 1302 [Urine:1300]    General: NAD, alert, following commands   HEENT: VANGIE, Moist mucosa   Lungs:Decreased at the bases, no wheeze/rhonchi   Heart: S1S2+, RRR, no murmur  Abdo: Soft, NT, ND, +BS   Exts:Right great toe ulcer w dry eschar   Skin: b/l leg ulcers, stable ulcerations on b/l feet     Data Review:       Recent Days:  Recent Labs     22  0352 11/15/22  0544 22  0758   WBC 4.5 5.5 4.9   HGB 9.3* 10.4* 10.1*   HCT 28.8* 31.3* 30.5*   * 125* 96*       Recent Labs     22  0352 11/15/22  0544 22  0758   BUN 12 11 12   CREA 0.50* 0.57* 0.50*         Lab Results   Component Value Date/Time    C-Reactive protein 1.64 (H) 2022 10:18 AM        Microbiology     Results       Procedure Component Value Units Date/Time    MRSA SCREEN - PCR (NASAL) [[id number]]  (Abnormal) Collected: 22    Order Status: Completed Specimen: Swab Updated: 11/09/22 0512     MRSA by PCR, Nasal DETECTED        Comment: Results verified, phoned to and read back by [email or web address]       CULTURE, [facility] STAIN [[id number]]  (Susceptibility) Collected: 22 0210    Order Status: Completed Specimen: Wound from Toe Updated: 22 1327     Special Requests: No Special Requests        GRAM STAIN Occasional WBCs seen 2+ Gram positive cocci in pairs chains and clusters           Culture result:       Heavy * methicillin resistant staphylococcus aureus *            Heavy Diphtheroids       Susceptibility        Staphylococcus aureus Methcillin Resistant      TELMA      Ciprofloxacin ($) Resistant      Clindamycin ($) Resistant      Daptomycin ($$$$$) Susceptible      Doxycycline ($$) Intermediate      Erythromycin ($$$$) Resistant      Gentamicin ($) Susceptible      Inducible Clindamycin  See below  [1]       Levofloxacin ($) Resistant      Linezolid ($$$$$) Susceptible      Oxacillin Resistant      Rifampin ($$$$) Susceptible  [2]       Tetracycline Resistant      Trimeth/Sulfa Susceptible      Vancomycin ($) Susceptible                   [1]  HIDE     [2]  Rifampin is not to be used for mono-therapy. CULTURE, BLOOD, PAIRED [[id number]] Collected: 11/08/22 1325    Order Status: Completed Specimen: Blood Updated: 11/14/22 0121     Special Requests: No Special Requests        Culture result: No growth 6 days       CULTURE, URINE [[id number]] Collected: 11/08/22 1115    Order Status: Completed Specimen: Urine Updated: 11/10/22 1506     Special Requests: No Special Requests        [place] Count 20,000        [place] Count colonies/ml        Culture result:       Mixed urogenital kristen isolated                     Diagnostics   CXR Results  (Last 48 hours)                 11/15/22 0209  XR CHEST PORT Final result    Impression:  Interval retraction of nasogastric tube with sidehole over the gastroesophageal   junction. Narrative:  INDICATION: NG tube placement after pulling out approx 4cm       EXAMINATION:  AP CHEST, PORTABLE       COMPARISON: Earlier today       FINDINGS: Single AP portable view of the chest demonstrates nasogastric tube has   been retracted slightly with tip over the proximal stomach and sidehole or the   gastroesophageal junction. The cardiomediastinal silhouette is unchanged.  Patchy bilateral airspace disease. 11/15/22 0039  XR CHEST PORT Final result    Impression:  Interval replacement of nasogastric tube, with tip over the gastric fundus. Narrative:  INDICATION: check NG tube placement (newly reinserted)       EXAMINATION:  AP CHEST, PORTABLE       COMPARISON: 11/14/2022       FINDINGS: Single AP portable view of the chest demonstrates interval placement   of nasogastric tube, with tip over the gastric fundus. Previous kinking has   resolved. The cardiomediastinal silhouette is unchanged. Bilateral airspace   disease. 11/14/22 2300  XR CHEST PORT Final result    Impression:  No significant change. Narrative:  INDICATION: check NGtube placement       EXAMINATION:  AP CHEST, PORTABLE       COMPARISON: Earlier today       FINDINGS: Single AP portable view of the chest demonstrates unchanged   nasogastric tube tip position of the gastric fundus with slight kinking at the   sidehole. The cardiomediastinal silhouette is unchanged. Bibasilar airspace   disease, unchanged. 11/14/22 1732  XR CHEST PORT Final result    Impression:  1. NG tube terminates in the gastric fundus, with possible kink in the side   hole. Correlate with function. 2.  New patchy consolidation in the right midlung is concerning for pneumonia. Grossly unchanged patchy retrocardiac opacities. Stable trace right pleural   effusion. Narrative:  EXAM:  XR CHEST PORT       INDICATION: NG TUBE PLACEMENT       COMPARISON: Chest radiograph 11/12/2022       TECHNIQUE: Semiupright portable chest AP view       FINDINGS:        NG tube terminates in the gastric fundus, with possible kink in the side hole. Median sternotomy wires and coronary vascularization. Cardiomediastinal silhouette within normal limits. New patchy consolidation in   the right midlung is concerning for pneumonia. Grossly unchanged patchy   retrocardiac opacities. Stable trace right pleural effusion.  No definite   pneumothorax. Assessment/Plan     Right great toe infection  MRSA isolated from Cx of right great toe         Multiple superficial ulcerations involving b/l legs         No abscess or osteomyelitis on CT (11/11)         Dry eschar involving right great toe, no drainage         Completed 7 days of MRSA coverage, will d/c vanc and monitor off antibiotics         Continue routine wound care as is already being done     2. UTI, abnormal UA, Mixed kristen isolated from urine Cx (11/08)    3. Recent CVA w Acute ischemia in the right posterior periventricular white matter involving the  right parietal lobe on MRI 07/2022. Repeat MRI brain ordered, will follow results     4. AMS, more alert and following commands today.  At risk for aspitation         Joe Holbrook MD    11/16/2022

## 2022-11-16 NOTE — PROGRESS NOTES
Problem: Falls - Risk of  Goal: *Absence of Falls  Description: Document Mariya Skill Fall Risk and appropriate interventions in the flowsheet.   Outcome: Progressing Towards Goal  Note: Fall Risk Interventions:       Mentation Interventions: Bed/chair exit alarm    Medication Interventions: Bed/chair exit alarm    Elimination Interventions: Bed/chair exit alarm, Call light in reach              Problem: Patient Education: Go to Patient Education Activity  Goal: Patient/Family Education  Outcome: Progressing Towards Goal     Problem: Discharge Planning  Goal: *Discharge to safe environment  Outcome: Progressing Towards Goal  Goal: *Knowledge of medication management  Outcome: Progressing Towards Goal  Goal: *Knowledge of discharge instructions  Outcome: Progressing Towards Goal     Problem: Patient Education: Go to Patient Education Activity  Goal: Patient/Family Education  Outcome: Progressing Towards Goal     Problem: Sepsis: Day of Diagnosis  Goal: Off Pathway (Use only if patient is Off Pathway)  Outcome: Progressing Towards Goal  Goal: *Fluid resuscitation  Outcome: Progressing Towards Goal  Goal: *Paired blood cultures prior to first dose of antibiotic  Outcome: Progressing Towards Goal  Goal: *First dose of  appropriate antibiotic within 3 hours of arrival to ED, within 1 hour of arrival to ICU  Outcome: Progressing Towards Goal  Goal: *Lactic acid with first set of blood cultures  Outcome: Progressing Towards Goal  Goal: *Pneumococcal immunization (if eligible)  Outcome: Progressing Towards Goal  Goal: *Influenza immunization (if eligible)  Outcome: Progressing Towards Goal  Goal: Activity/Safety  Outcome: Progressing Towards Goal  Goal: Consults, if ordered  Outcome: Progressing Towards Goal  Goal: Diagnostic Test/Procedures  Outcome: Progressing Towards Goal  Goal: Nutrition/Diet  Outcome: Progressing Towards Goal  Goal: Discharge Planning  Outcome: Progressing Towards Goal  Goal: Medications  Outcome: Progressing Towards Goal  Goal: Respiratory  Outcome: Progressing Towards Goal  Goal: Treatments/Interventions/Procedures  Outcome: Progressing Towards Goal  Goal: Psychosocial  Outcome: Progressing Towards Goal     Problem: Sepsis: Day 2  Goal: Off Pathway (Use only if patient is Off Pathway)  Outcome: Progressing Towards Goal  Goal: *Central Venous Pressure maintained at 8-12 mm Hg  Outcome: Progressing Towards Goal  Goal: *Hemodynamically stable  Outcome: Progressing Towards Goal  Goal: *Tolerating diet  Outcome: Progressing Towards Goal  Goal: Activity/Safety  Outcome: Progressing Towards Goal  Goal: Consults, if ordered  Outcome: Progressing Towards Goal  Goal: Diagnostic Test/Procedures  Outcome: Progressing Towards Goal  Goal: Nutrition/Diet  Outcome: Progressing Towards Goal  Goal: Discharge Planning  Outcome: Progressing Towards Goal  Goal: Medications  Outcome: Progressing Towards Goal  Goal: Respiratory  Outcome: Progressing Towards Goal  Goal: Treatments/Interventions/Procedures  Outcome: Progressing Towards Goal  Goal: Psychosocial  Outcome: Progressing Towards Goal     Problem: Sepsis: Day 3  Goal: Off Pathway (Use only if patient is Off Pathway)  Outcome: Progressing Towards Goal  Goal: *Central Venous Pressure maintained at 8-12 mm Hg  Outcome: Progressing Towards Goal  Goal: *Oxygen saturation within defined limits  Outcome: Progressing Towards Goal  Goal: *Vital sign stability  Outcome: Progressing Towards Goal  Goal: *Tolerating diet  Outcome: Progressing Towards Goal  Goal: *Demonstrates progressive activity  Outcome: Progressing Towards Goal  Goal: Activity/Safety  Outcome: Progressing Towards Goal  Goal: Consults, if ordered  Outcome: Progressing Towards Goal  Goal: Diagnostic Test/Procedures  Outcome: Progressing Towards Goal  Goal: Nutrition/Diet  Outcome: Progressing Towards Goal  Goal: Discharge Planning  Outcome: Progressing Towards Goal  Goal: Medications  Outcome: Progressing Towards Goal  Goal: Respiratory  Outcome: Progressing Towards Goal  Goal: Treatments/Interventions/Procedures  Outcome: Progressing Towards Goal  Goal: Psychosocial  Outcome: Progressing Towards Goal     Problem: Sepsis: Day 4  Goal: Off Pathway (Use only if patient is Off Pathway)  Outcome: Progressing Towards Goal  Goal: Activity/Safety  Outcome: Progressing Towards Goal  Goal: Consults, if ordered  Outcome: Progressing Towards Goal  Goal: Diagnostic Test/Procedures  Outcome: Progressing Towards Goal  Goal: Nutrition/Diet  Outcome: Progressing Towards Goal  Goal: Discharge Planning  Outcome: Progressing Towards Goal  Goal: Medications  Outcome: Progressing Towards Goal  Goal: Respiratory  Outcome: Progressing Towards Goal  Goal: Treatments/Interventions/Procedures  Outcome: Progressing Towards Goal  Goal: Psychosocial  Outcome: Progressing Towards Goal  Goal: *Oxygen saturation within defined limits  Outcome: Progressing Towards Goal  Goal: *Hemodynamically stable  Outcome: Progressing Towards Goal  Goal: *Vital signs within defined limits  Outcome: Progressing Towards Goal  Goal: *Tolerating diet  Outcome: Progressing Towards Goal  Goal: *Demonstrates progressive activity  Outcome: Progressing Towards Goal  Goal: *Fluid volume maintenance  Outcome: Progressing Towards Goal     Problem: Sepsis: Day 5  Goal: Off Pathway (Use only if patient is Off Pathway)  Outcome: Progressing Towards Goal  Goal: *Oxygen saturation within defined limits  Outcome: Progressing Towards Goal  Goal: *Vital signs within defined limits  Outcome: Progressing Towards Goal  Goal: *Tolerating diet  Outcome: Progressing Towards Goal  Goal: *Demonstrates progressive activity  Outcome: Progressing Towards Goal  Goal: *Discharge plan identified  Outcome: Progressing Towards Goal  Goal: Activity/Safety  Outcome: Progressing Towards Goal  Goal: Consults, if ordered  Outcome: Progressing Towards Goal  Goal: Diagnostic Test/Procedures  Outcome: Progressing Towards Goal  Goal: Nutrition/Diet  Outcome: Progressing Towards Goal  Goal: Discharge Planning  Outcome: Progressing Towards Goal  Goal: Medications  Outcome: Progressing Towards Goal  Goal: Respiratory  Outcome: Progressing Towards Goal  Goal: Treatments/Interventions/Procedures  Outcome: Progressing Towards Goal  Goal: Psychosocial  Outcome: Progressing Towards Goal

## 2022-11-16 NOTE — PROGRESS NOTES
NPO after midnight/HOLD TF for peg placement tomorrow 11/17/2022.    @ 1150 ENDO to confirm patient will have surgery tomorrow 11/17/2022. All orders resumed. @1600 Off unit for MRI. Pre medicated with Xanax 1mg via NGT per MD orders prior to testing. @ 1897 return to unit post MRI, no acute distress noted.

## 2022-11-16 NOTE — PROGRESS NOTES
Chart reviewed. Patient is a LTC resident at [facility] and will return upon DC. Updated therapy notes uploaded in [address] CM will continue to follow for medical clearance.

## 2022-11-16 NOTE — PROGRESS NOTES
Hospitalist Progress Note    Subjective:   Daily Progress Note: 11/16/2022 2:05 PM    Hospital Course: Asad Jeffries is a 66-year-old male with a PMHx of stroke, hypertension, DM, and cirrhosis who presents to hospital from nursing home with weakness and hypotension x1 day. Associated with poor responsiveness. Found hypotensive on arrival to the ED, with metabolic encephalopathy, improved with intravenous fluid bolus. Found to have urinary infection, started on IV ceftriaxone, and admitted for further management. On 11/9/22 transferred to ICU overnight for poor responsiveness and for septic shock. Managed with fluids and levophed  Duplex shows right common femoral artery and proximal superficial femoral artery occluded and and popliteal artery patent and with patent distal anterior tibial artery and posterior tibial artery. There must be collateralization noted from the occluded femoral artery to popliteal artery. Also noted left  mid to distal superficial artery occluded, with patent distal anterior tibial artery and posterior tibial artery on the left side. Patient recently was admitted on 7/29/2022 to 8/2/22 following presentation to the ED for transient facial droop. Due to history of strokes, suspected TIA versus CVA. MRI 07/30/2022 showed acute ischemic stroke of the right parietal area. Patient has carotid artery stenosis and supposed to go for surgery but unable to get cardiac clearance because of nuclear stress test he has 90% stenosis of left ICA and 50% stenosis of right ICA. Outpatient vascular follow-up for carotid stent per vascular surgery, Dr. Oniel Spring. Neurology consult for confusion. Repeat MRI brain pending. Patient has had NG tube nutrition since admission. Speech therapy working with patient. GI consult to evaluate for PEG placement. Subjective:    Patient seen and examined at bedside. He is alert, but is confused and yelling out. Per nursing, did not have good night of sleep.      Current Facility-Administered Medications   Medication Dose Route Frequency    dextrose 5 % - 0.45% NaCl infusion  75 mL/hr IntraVENous CONTINUOUS    [START ON 11/17/2022] Vancomycin - reminder to draw level 11/17 @ 0400 please   Other Rx Dosing/Monitoring    hydrALAZINE (APRESOLINE) 20 mg/mL injection 20 mg  20 mg IntraVENous Q6H PRN    vancomycin (VANCOCIN) 1,000 mg in 0.9% sodium chloride 250 mL ([id number])  1,000 mg IntraVENous Q12H    VANCOMYCIN INFORMATION NOTE   Other Rx Dosing/Monitoring    aspirin chewable tablet 81 mg  81 mg Per NG tube DAILY    atorvastatin (LIPITOR) tablet 40 mg  40 mg Per NG tube DAILY    escitalopram oxalate (LEXAPRO) tablet 20 mg  20 mg Per NG tube DAILY    gabapentin (NEURONTIN) capsule 100 mg  100 mg Per NG tube QHS    melatonin tablet 10 mg  10 mg Per NG tube QHS    baclofen (LIORESAL) tablet 5 mg  5 mg Per NG tube BID PRN    hydrOXYzine HCL (ATARAX) tablet 25 mg  25 mg Per NG tube QHS PRN    polyethylene glycol (MIRALAX) packet 17 g  17 g Per NG tube DAILY PRN    traMADoL (ULTRAM) tablet 50 mg  50 mg Per NG tube Q6H PRN    doxazosin (CARDURA) tablet 1 mg  1 mg Per NG tube QHS    potassium chloride (KLOR-CON) packet for solution 20 mEq  20 mEq Per NG tube BID    pantoprazole (PROTONIX) granules for oral suspension 40 mg  40 mg Per NG tube DAILY    sodium chloride (NS) flush 5-40 mL  5-40 mL IntraVENous Q8H    sodium chloride (NS) flush 5-40 mL  5-40 mL IntraVENous PRN    acetaminophen (TYLENOL) tablet 650 mg  650 mg Oral Q6H PRN    Or    acetaminophen (TYLENOL) suppository 650 mg  650 mg Rectal Q6H PRN    ondansetron (ZOFRAN ODT) tablet 4 mg  4 mg Oral Q8H PRN    Or    ondansetron (ZOFRAN) injection 4 mg  4 mg IntraVENous Q6H PRN    enoxaparin (LOVENOX) injection 40 mg  40 mg SubCUTAneous DAILY    glucose chewable tablet 16 g  4 Tablet Oral PRN    glucagon (GLUCAGEN) injection 1 mg  1 mg IntraMUSCular PRN    dextrose 10% infusion 0-250 mL  0-250 mL IntraVENous PRN    insulin lispro (HUMALOG) injection   SubCUTAneous AC&HS    ARIPiprazole (ABILIFY) tablet 2 mg  2 mg Oral DAILY    carvediloL (COREG) tablet 12.5 mg  12.5 mg Oral BID    clopidogreL (PLAVIX) tablet 75 mg  75 mg Oral DAILY    losartan (COZAAR) tablet 50 mg  50 mg Oral DAILY    senna-docusate (PERICOLACE) 8.6-50 mg per tablet 1 Tablet  1 Tablet Oral DAILY    [Held by provider] tamsulosin (FLOMAX) capsule 0.4 mg  0.4 mg Oral QHS    furosemide (LASIX) tablet 20 mg  20 mg Oral DAILY        Review of Systems  Constitutional: No fevers, No chills, No sweats, No fatigue, No Weakness  Eyes: No redness  Ears, nose, mouth, throat, and face: No nasal congestion, No sore throat, No voice change  Respiratory: No Shortness of Breath, No cough, No wheezing  Cardiovascular: No chest pain, No palpitations, No extremity edema  Gastrointestinal: No nausea, No vomiting, No diarrhea, No abdominal pain  Genitourinary: No frequency, No dysuria, No hematuria  Integument/breast: No skin lesion(s)   Neurological: No Confusion, No headaches, No dizziness      Objective:     Visit Vitals  BP (!) 147/90 (BP 1 Location: Left lower arm, BP Patient Position: Lying)   Pulse 88   Temp 97.9 °F (36.6 °C)   Resp 18   Ht 5' 9\" (1.753 m)   Wt 62.5 kg (137 lb 12.6 oz)   SpO2 94%   BMI 20.35 kg/m²    O2 Flow Rate (L/min): 2 l/min O2 Device: None (Room air)    Temp (24hrs), Av.3 °F (36.8 °C), Min:97.9 °F (36.6 °C), Max:98.8 °F (37.1 °C)      701 - 1900  In: 900 [I.V.:900]  Out: -   1901 -  0700  In: 1020   Out: 1302 [Urine:1300]    PHYSICAL EXAM:  Constitutional: Ill-appearing male. No acute distress  Skin: Extremities and face reveal no rashes. HEENT: Sclerae anicteric. Extra-occular muscles are intact. NGT. No oral ulcers. The neck is supple and no masses. Cardiovascular: Regular rate and rhythm. +S1/S2. NO murmur or gallop. Respiratory:  Clear breath sounds bilaterally with no wheezes, rales, or rhonchi.    GI: Abdomen nondistended, soft, and nontender. Normal active bowel sounds. Rectal: Deferred   Musculoskeletal: No pitting edema of the lower legs. Able to move all ext  Neurological:  Patient is alert, disoriented. Generalized weakness.  Cranial nerves II-XII grossly intact  Psychiatric: Unable to assess       Data Review    Recent Results (from the past 24 hour(s))   GLUCOSE, POC    Collection Time: 11/15/22 11:23 AM   Result Value Ref Range    Glucose (POC) 204 (H) 65 - 100 mg/dL    Performed by Hema Daniels    GLUCOSE, POC    Collection Time: 11/15/22  4:12 PM   Result Value Ref Range    Glucose (POC) 219 (H) 65 - 100 mg/dL    Performed by Vishal Daniels, POC    Collection Time: 11/15/22 10:11 PM   Result Value Ref Range    Glucose (POC) 212 (H) 65 - 100 mg/dL    Performed by Sol Gomez    CBC W/O DIFF    Collection Time: 11/16/22  3:52 AM   Result Value Ref Range    WBC 4.5 4.1 - 11.1 K/uL    RBC 3.26 (L) 4.10 - 5.70 M/uL    HGB 9.3 (L) 12.1 - 17.0 g/dL    HCT 28.8 (L) 36.6 - 50.3 %    MCV 88.3 80.0 - 99.0 FL    MCH 28.5 26.0 - 34.0 PG    MCHC 32.3 30.0 - 36.5 g/dL    RDW 16.5 (H) 11.5 - 14.5 %    PLATELET 382 (L) 819 - 400 K/uL    MPV 11.4 8.9 - 12.9 FL    NRBC 0.0 0.0  WBC    ABSOLUTE NRBC 0.00 0.00 - 9.18 K/uL   METABOLIC PANEL, BASIC    Collection Time: 11/16/22  3:52 AM   Result Value Ref Range    Sodium 150 (H) 136 - 145 mmol/L    Potassium 3.6 3.5 - 5.1 mmol/L    Chloride 118 (H) 97 - 108 mmol/L    CO2 22 21 - 32 mmol/L    Anion gap 10 5 - 15 mmol/L    Glucose 174 (H) 65 - 100 mg/dL    BUN 12 6 - 20 mg/dL    Creatinine 0.50 (L) 0.70 - 1.30 mg/dL    BUN/Creatinine ratio 24 (H) 12 - 20      eGFR >60 >60 ml/min/1.73m2    Calcium 7.6 (L) 8.5 - 10.1 mg/dL   HEPATIC FUNCTION PANEL    Collection Time: 11/16/22  3:52 AM   Result Value Ref Range    Protein, total 4.5 (L) 6.4 - 8.2 g/dL    Albumin 1.8 (L) 3.5 - 5.0 g/dL    Globulin 2.7 2.0 - 4.0 g/dL    A-G Ratio 0.7 (L) 1.1 - 2.2      Bilirubin, total 0.5 0.2 - 1.0 mg/dL    Bilirubin, direct 0.1 0.0 - 0.2 mg/dL    Alk. phosphatase 61 45 - 117 U/L    AST (SGOT) 87 (H) 15 - 37 U/L    ALT (SGPT) 86 (H) 12 - 78 U/L   GLUCOSE, POC    Collection Time: 11/16/22  8:36 AM   Result Value Ref Range    Glucose (POC) 210 (H) 65 - 100 mg/dL    Performed by [facility]        XR CHEST PORT   Final Result   Interval retraction of nasogastric tube with sidehole over the gastroesophageal   junction. XR CHEST PORT   Final Result   Interval replacement of nasogastric tube, with tip over the gastric fundus. XR CHEST PORT   Final Result   No significant change. XR CHEST PORT   Final Result   1. NG tube terminates in the gastric fundus, with possible kink in the side   hole. Correlate with function. 2.  New patchy consolidation in the right midlung is concerning for pneumonia. Grossly unchanged patchy retrocardiac opacities. Stable trace right pleural   effusion. XR CHEST PORT   Final Result   Nasogastric tube tip advanced overlying the gastric body. XR CHEST PORT   Final Result   Nasogastric tube tip over the gastric fundus. XR CHEST PORT   Final Result   Increased mild interstitial pulmonary edema versus pneumonia. Enteric tube extends into the gastric antrum or first portion of the duodenum. Consider retraction 8-10 cm if the intention is gastric luminal termination. CT FOOT RT W CONT   Final Result      1. No abscess or osteomyelitis. 2. No fracture. DUPLEX LOWER EXT ARTERY BILAT   Final Result      XR FOOT RT MIN 3 V   Final Result   No osseous erosion or soft tissue gas. .      XR CHEST PORT   Final Result      Mild interstitial opacities which may represent mild pulmonary edema unchanged         XR CHEST PORT   Final Result   Addendum (preliminary) 1 of 1   Addendum: NG tube is in appropriate position tip extends below the    diaphragm. Right IJ catheter. No pneumothorax on the right. Final   1.   Status post right IJ catheter placement without evidence of complication. IR INSERT NON TUNL CVC OVER 5 YRS   Final Result   Technically successful ultrasound guided placement of a right internal jugular   vein temporary central venous catheter. A post procedure chest x-ray is   pending. CT HEAD WO CONT   Final Result   No acute process or change compared to the prior exam.            XR CHEST PORT   Final Result   No acute process. IR US GUIDED VASCULAR ACCESS    (Results Pending)   MRI BRAIN WO CONT    (Results Pending)       Active Problems:    UTI (urinary tract infection) (11/8/2022)      Assessment/Plan:     1. Septic shock s/t UTI and right great toe ulcer  - Complicated with hypotension and metabolic encephalopathy. - Urine cultures negative  - Blood cultures negative  - MRSA isolated from Cx of left great toe   - Antibiotics per ID. De-escalate antibiotic coverage to Zosyn, continue on Vanc for MRSA coverage, d/c meropenem. 2. Hypertension  - Continue home medications    3. Carotid artery disease  4. Hx of recent CVA  - Following with [facility] cardiology outpatient and Dr Keira Nicolas for anticipated carotid stent. - Acute ischemia in the right posterior periventricular white matter involving the  right parietal lobe on MRI 07/2022  - CT head 11/09 negative for acute findings   - Recent echocardiogram reviewed  - 90% stenosis of left ICA and 50% stenosis of right ICA. O/p vascular f/u. - Neuro consult  - Repeat MRI brain pending       5. Chronic foot wounds   - Antibiotics per ID  - Wound care inpatient and outpatient  - Duplex shows right common femoral artery and proximal superficial femoral artery occluded and and popliteal artery patent and with patent distal anterior tibial artery and posterior tibial artery. There must be collateralization noted from the occluded femoral artery to popliteal artery.  Also noted left  mid to distal superficial artery occluded, with patent distal anterior tibial artery and posterior tibial artery on the left side. - Vascular surgery, Dr. Ovalle Him, following    6. Dysphagia  - NGT nutrition  - Speech therapy following   - GI consult to evaluate for PEG placement      DVT Prophylaxis: Lovenox  Code Status: Full  POA:    Discharge Barriers:   - ID clearance    - MRI brain   - Tentatively going for PEG placement today    Care Plan discussed with: patient and nursing. Tried calling son, Jerel Leonrad, and left voicemail to call back when available. Total time spent with patient: >35 minutes.

## 2022-11-16 NOTE — PROGRESS NOTES
MRI screening form completed with son over the phone. MD paged for critical PTT level, no call back.  Night RN updated

## 2022-11-16 NOTE — PROGRESS NOTES
Infectious Disease Progress Note           Subjective:   Stable, remains confused, no acute events since last seen   Objective:   Physical Exam:     Visit Vitals  BP (!) 134/55 (BP 1 Location: Left upper arm, BP Patient Position: Semi fowlers)   Pulse 79   Temp 98.8 °F (37.1 °C)   Resp 18   Ht 5' 9\" (1.753 m)   Wt 137 lb 12.6 oz (62.5 kg)   SpO2 91%   BMI 20.35 kg/m²    O2 Flow Rate (L/min): 2 l/min O2 Device: None (Room air)    Temp (24hrs), Av.9 °F (37.2 °C), Min:98.5 °F (36.9 °C), Max:99.4 °F (37.4 °C)    No intake/output data recorded.     0701 - 11/15 1900  In: 420   Out: 1302 [Urine:1300]    General: NAD, alert, unresponsive   HEENT: VANGIE, Moist mucosa   Lungs:Decreased at the bases, no wheeze/rhonchi   Heart: S1S2+, RRR, no murmur  Abdo: Soft, NT, ND, +BS   Exts:Right great toe ulcer, multiple superficial ulcers on b/l legs   Skin: b/l leg ulcers, stable ulcerations on b/l feet     Data Review:       Recent Days:  Recent Labs     11/15/22  0544 22  0758   WBC 5.5 4.9   HGB 10.4* 10.1*   HCT 31.3* 30.5*   * 96*       Recent Labs     11/15/22  0544 22  0758 22  0343   BUN 11 12 12   CREA 0.57* 0.50* 0.56*         Lab Results   Component Value Date/Time    C-Reactive protein 5.67 (H) 2022 03:28 AM        Microbiology     Results       Procedure Component Value Units Date/Time    MRSA SCREEN - PCR (NASAL) [[id number]]  (Abnormal) Collected: 22 023    Order Status: Completed Specimen: Swab Updated: 22     MRSA by PCR, Nasal DETECTED        Comment: Results verified, [email or web address], [address] [[id number]]  (Susceptibility) Collected: 22 0210    Order Status: Completed Specimen: Wound from Toe Updated: 22 1327     Special Requests: No Special Requests        GRAM STAIN Occasional WBCs seen               2+ Gram positive cocci in pairs chains and clusters           Culture result:       Heavy * methicillin resistant staphylococcus aureus *            Heavy Diphtheroids       Susceptibility        Staphylococcus aureus Methcillin Resistant      TELMA      Ciprofloxacin ($) Resistant      Clindamycin ($) Resistant      Daptomycin ($$$$$) Susceptible      Doxycycline ($$) Intermediate      Erythromycin ($$$$) Resistant      Gentamicin ($) Susceptible      Inducible Clindamycin  See below  [1]       Levofloxacin ($) Resistant      Linezolid ($$$$$) Susceptible      Oxacillin Resistant      Rifampin ($$$$) Susceptible  [2]       Tetracycline Resistant      Trimeth/Sulfa Susceptible      Vancomycin ($) Susceptible                   [1]  HIDE     [2]  Rifampin is not to be used for mono-therapy. CULTURE, BLOOD, PAIRED [[id number]] Collected: 11/08/22 1325    Order Status: Completed Specimen: Blood Updated: 11/14/22 0121     Special Requests: No Special Requests        Culture result: No growth 6 days       CULTURE, URINE [[id number]] Collected: 11/08/22 1115    Order Status: Completed Specimen: Urine Updated: 11/10/22 1506     Special Requests: No Special Requests        Maljamar Count 20,000        Maljamar Count colonies/ml        Culture result:       Mixed urogenital kristen isolated                     Diagnostics   CXR Results  (Last 48 hours)                 11/15/22 0209  XR CHEST PORT Final result    Impression:  Interval retraction of nasogastric tube with sidehole over the gastroesophageal   junction. Narrative:  INDICATION: NG tube placement after pulling out approx 4cm       EXAMINATION:  AP CHEST, PORTABLE       COMPARISON: Earlier today       FINDINGS: Single AP portable view of the chest demonstrates nasogastric tube has   been retracted slightly with tip over the proximal stomach and sidehole or the   gastroesophageal junction. The cardiomediastinal silhouette is unchanged. Patchy   bilateral airspace disease.            11/15/22 0039  XR CHEST PORT Final result Impression:  Interval replacement of nasogastric tube, with tip over the gastric fundus. Narrative:  INDICATION: check NG tube placement (newly reinserted)       EXAMINATION:  AP CHEST, PORTABLE       COMPARISON: 11/14/2022       FINDINGS: Single AP portable view of the chest demonstrates interval placement   of nasogastric tube, with tip over the gastric fundus. Previous kinking has   resolved. The cardiomediastinal silhouette is unchanged. Bilateral airspace   disease. 11/14/22 2300  XR CHEST PORT Final result    Impression:  No significant change. Narrative:  INDICATION: check NGtube placement       EXAMINATION:  AP CHEST, PORTABLE       COMPARISON: Earlier today       FINDINGS: Single AP portable view of the chest demonstrates unchanged   nasogastric tube tip position of the gastric fundus with slight kinking at the   sidehole. The cardiomediastinal silhouette is unchanged. Bibasilar airspace   disease, unchanged. 11/14/22 1732  XR CHEST PORT Final result    Impression:  1. NG tube terminates in the gastric fundus, with possible kink in the side   hole. Correlate with function. 2.  New patchy consolidation in the right midlung is concerning for pneumonia. Grossly unchanged patchy retrocardiac opacities. Stable trace right pleural   effusion. Narrative:  EXAM:  XR CHEST PORT       INDICATION: NG TUBE PLACEMENT       COMPARISON: Chest radiograph 11/12/2022       TECHNIQUE: Semiupright portable chest AP view       FINDINGS:        NG tube terminates in the gastric fundus, with possible kink in the side hole. Median sternotomy wires and coronary vascularization. Cardiomediastinal silhouette within normal limits. New patchy consolidation in   the right midlung is concerning for pneumonia. Grossly unchanged patchy   retrocardiac opacities. Stable trace right pleural effusion. No definite   pneumothorax.                               Assessment/Plan     Right great toe infection  MRSA isolated from left great toe Cx        Multiple superficial ulcerations involving b/l legs         No abscess or osteomyelitis on CT (11/11)         Remains afebrile w a normal WBC on routine labs         Will leave on Vanc for now, will consider oral MRSA coverage after examining wound on 11/16        Continue routine wound care     2. UTI, abnormal UA, Mixed kristen isolated from urine Cx (11/08)    3. Recent CVA w Acute ischemia in the right posterior periventricular white matter involving the  right parietal lobe on MRI 07/2022. Repeat MRI brain ordered, will follow results     4.  AMS, remains confused, Intermittent yelling, baseline mentation dane Rodrigez MD    11/15/2022

## 2022-11-17 ENCOUNTER — APPOINTMENT (OUTPATIENT)
Dept: GENERAL RADIOLOGY | Age: 72
DRG: 871 | End: 2022-11-17
Attending: INTERNAL MEDICINE
Payer: MEDICARE

## 2022-11-17 LAB
ANION GAP SERPL CALC-SCNC: 6 MMOL/L (ref 5–15)
ANION GAP SERPL CALC-SCNC: 6 MMOL/L (ref 5–15)
BUN SERPL-MCNC: 10 MG/DL (ref 6–20)
BUN SERPL-MCNC: 12 MG/DL (ref 6–20)
BUN/CREAT SERPL: 12 (ref 12–20)
BUN/CREAT SERPL: 17 (ref 12–20)
CA-I BLD-MCNC: 6.5 MG/DL (ref 8.5–10.1)
CA-I BLD-MCNC: 8.2 MG/DL (ref 8.5–10.1)
CHLORIDE SERPL-SCNC: 107 MMOL/L (ref 97–108)
CHLORIDE SERPL-SCNC: 110 MMOL/L (ref 97–108)
CO2 SERPL-SCNC: 22 MMOL/L (ref 21–32)
CO2 SERPL-SCNC: 28 MMOL/L (ref 21–32)
CREAT SERPL-MCNC: 0.69 MG/DL (ref 0.7–1.3)
CREAT SERPL-MCNC: 0.81 MG/DL (ref 0.7–1.3)
ERYTHROCYTE [DISTWIDTH] IN BLOOD BY AUTOMATED COUNT: 17.2 % (ref 11.5–14.5)
GLUCOSE BLD STRIP.AUTO-MCNC: 108 MG/DL (ref 65–100)
GLUCOSE BLD STRIP.AUTO-MCNC: 113 MG/DL (ref 65–100)
GLUCOSE BLD STRIP.AUTO-MCNC: 127 MG/DL (ref 65–100)
GLUCOSE BLD STRIP.AUTO-MCNC: 132 MG/DL (ref 65–100)
GLUCOSE BLD STRIP.AUTO-MCNC: 174 MG/DL (ref 65–100)
GLUCOSE BLD STRIP.AUTO-MCNC: 205 MG/DL (ref 65–100)
GLUCOSE BLD STRIP.AUTO-MCNC: 99 MG/DL (ref 65–100)
GLUCOSE SERPL-MCNC: 775 MG/DL (ref 65–100)
GLUCOSE SERPL-MCNC: 78 MG/DL (ref 65–100)
HCT VFR BLD AUTO: 31.6 % (ref 36.6–50.3)
HGB BLD-MCNC: 9.6 G/DL (ref 12.1–17)
MCH RBC QN AUTO: 29 PG (ref 26–34)
MCHC RBC AUTO-ENTMCNC: 30.4 G/DL (ref 30–36.5)
MCV RBC AUTO: 95.5 FL (ref 80–99)
NRBC # BLD: 0 K/UL (ref 0–0.01)
NRBC BLD-RTO: 0 PER 100 WBC
PERFORMED BY, TECHID: ABNORMAL
PERFORMED BY, TECHID: NORMAL
PLATELET # BLD AUTO: 86 K/UL (ref 150–400)
PMV BLD AUTO: 12 FL (ref 8.9–12.9)
POTASSIUM SERPL-SCNC: 3.2 MMOL/L (ref 3.5–5.1)
POTASSIUM SERPL-SCNC: 3.3 MMOL/L (ref 3.5–5.1)
RBC # BLD AUTO: 3.31 M/UL (ref 4.1–5.7)
SODIUM SERPL-SCNC: 135 MMOL/L (ref 136–145)
SODIUM SERPL-SCNC: 144 MMOL/L (ref 136–145)
WBC # BLD AUTO: 7.1 K/UL (ref 4.1–11.1)

## 2022-11-17 PROCEDURE — 82962 GLUCOSE BLOOD TEST: CPT

## 2022-11-17 PROCEDURE — 65270000029 HC RM PRIVATE

## 2022-11-17 PROCEDURE — 74011000258 HC RX REV CODE- 258: Performed by: INTERNAL MEDICINE

## 2022-11-17 PROCEDURE — 99232 SBSQ HOSP IP/OBS MODERATE 35: CPT | Performed by: PODIATRIST

## 2022-11-17 PROCEDURE — 74011636637 HC RX REV CODE- 636/637: Performed by: INTERNAL MEDICINE

## 2022-11-17 PROCEDURE — 80048 BASIC METABOLIC PNL TOTAL CA: CPT

## 2022-11-17 PROCEDURE — 85027 COMPLETE CBC AUTOMATED: CPT

## 2022-11-17 PROCEDURE — 74011000250 HC RX REV CODE- 250: Performed by: INTERNAL MEDICINE

## 2022-11-17 PROCEDURE — 71045 X-RAY EXAM CHEST 1 VIEW: CPT

## 2022-11-17 PROCEDURE — 74011250636 HC RX REV CODE- 250/636: Performed by: INTERNAL MEDICINE

## 2022-11-17 PROCEDURE — 36415 COLL VENOUS BLD VENIPUNCTURE: CPT

## 2022-11-17 PROCEDURE — 99232 SBSQ HOSP IP/OBS MODERATE 35: CPT | Performed by: INTERNAL MEDICINE

## 2022-11-17 PROCEDURE — 74011250637 HC RX REV CODE- 250/637: Performed by: INTERNAL MEDICINE

## 2022-11-17 RX ORDER — OXCARBAZEPINE 150 MG/1
150 TABLET, FILM COATED ORAL
Status: CANCELLED | OUTPATIENT
Start: 2022-11-17

## 2022-11-17 RX ORDER — BALSAM PERU/CASTOR OIL
OINTMENT (GRAM) TOPICAL 2 TIMES DAILY
Status: DISCONTINUED | OUTPATIENT
Start: 2022-11-17 | End: 2022-11-27 | Stop reason: HOSPADM

## 2022-11-17 RX ADMIN — LOSARTAN POTASSIUM 50 MG: 50 TABLET, FILM COATED ORAL at 09:04

## 2022-11-17 RX ADMIN — ESCITALOPRAM OXALATE 20 MG: 10 TABLET ORAL at 09:04

## 2022-11-17 RX ADMIN — PANTOPRAZOLE SODIUM 40 MG: 40 GRANULE, DELAYED RELEASE ORAL at 09:04

## 2022-11-17 RX ADMIN — INSULIN LISPRO 3 UNITS: 100 INJECTION, SOLUTION INTRAVENOUS; SUBCUTANEOUS at 17:31

## 2022-11-17 RX ADMIN — FUROSEMIDE 20 MG: 40 TABLET ORAL at 09:04

## 2022-11-17 RX ADMIN — PIPERACILLIN SODIUM AND TAZOBACTAM SODIUM 3.38 G: 3; .375 INJECTION, POWDER, LYOPHILIZED, FOR SOLUTION INTRAVENOUS at 23:53

## 2022-11-17 RX ADMIN — PIPERACILLIN AND TAZOBACTAM 4.5 G: 4; .5 INJECTION, POWDER, FOR SOLUTION INTRAVENOUS at 18:05

## 2022-11-17 RX ADMIN — SENNOSIDES AND DOCUSATE SODIUM 1 TABLET: 50; 8.6 TABLET ORAL at 09:04

## 2022-11-17 RX ADMIN — SODIUM CHLORIDE, PRESERVATIVE FREE 10 ML: 5 INJECTION INTRAVENOUS at 13:59

## 2022-11-17 RX ADMIN — CASTOR OIL AND BALSAM, PERU: 788; 87 OINTMENT TOPICAL at 10:58

## 2022-11-17 RX ADMIN — ARIPIPRAZOLE 2 MG: 2 TABLET ORAL at 09:04

## 2022-11-17 RX ADMIN — CASTOR OIL AND BALSAM, PERU: 788; 87 OINTMENT TOPICAL at 23:28

## 2022-11-17 RX ADMIN — DOXAZOSIN 1 MG: 2 TABLET ORAL at 23:26

## 2022-11-17 RX ADMIN — ATORVASTATIN CALCIUM 40 MG: 40 TABLET, FILM COATED ORAL at 09:04

## 2022-11-17 RX ADMIN — POTASSIUM CHLORIDE 20 MEQ: 1.5 FOR SOLUTION ORAL at 09:04

## 2022-11-17 RX ADMIN — ASPIRIN 81 MG 81 MG: 81 TABLET ORAL at 09:04

## 2022-11-17 RX ADMIN — SODIUM CHLORIDE, PRESERVATIVE FREE 10 ML: 5 INJECTION INTRAVENOUS at 23:30

## 2022-11-17 RX ADMIN — SODIUM CHLORIDE, PRESERVATIVE FREE 10 ML: 5 INJECTION INTRAVENOUS at 05:22

## 2022-11-17 RX ADMIN — ACETAMINOPHEN 650 MG: 325 TABLET ORAL at 03:14

## 2022-11-17 RX ADMIN — CARVEDILOL 12.5 MG: 12.5 TABLET, FILM COATED ORAL at 23:26

## 2022-11-17 RX ADMIN — GABAPENTIN 100 MG: 100 CAPSULE ORAL at 23:26

## 2022-11-17 RX ADMIN — MELATONIN TAB 5 MG 10 MG: 5 TAB at 23:27

## 2022-11-17 RX ADMIN — CARVEDILOL 12.5 MG: 12.5 TABLET, FILM COATED ORAL at 09:04

## 2022-11-17 RX ADMIN — POTASSIUM CHLORIDE 20 MEQ: 1.5 FOR SOLUTION ORAL at 23:30

## 2022-11-17 NOTE — PROGRESS NOTES
Per chart review and discussion with Endo nsg, pt is NPO for PEG placement this date. Per most recent SLP recs (11-), agree with PEG placement and will r/s MBS for 11/18/2022 if pt appropriate to participate. Will hold tx this date and cont to follow.

## 2022-11-17 NOTE — PROGRESS NOTES
Infectious Disease Progress Note           Subjective:   Pt seen and examined at bedside. NGT remains in place, plans for peg tube placement. Fever spike of 100.6F, CXR concerning for aspiration PNA. No acute events since last seen   Objective:   Physical Exam:     Visit Vitals  BP (!) 116/59 (BP 1 Location: Left upper arm, BP Patient Position: At rest)   Pulse 74   Temp 98.5 °F (36.9 °C)   Resp 18   Ht 5' 9\" (1.753 m)   Wt 137 lb 12.6 oz (62.5 kg)   SpO2 90%   BMI 20.35 kg/m²    O2 Flow Rate (L/min): 2 l/min O2 Device: None (Room air)    Temp (24hrs), Av.6 °F (37.6 °C), Min:98.5 °F (36.9 °C), Max:100.6 °F (38.1 °C)    No intake/output data recorded.    11/15 1901 -  0700  In: 3105 [I.V.:2385]  Out: 1050 [Urine:1050]    General: NAD, alert, following commands   HEENT: VANGIE, Moist mucosa, NGT in place   Lungs:Decreased at the bases, no wheeze/rhonchi   Heart: S1S2+, RRR, no murmur  Abdo: Soft, NT, ND, +BS   Exts:Right great toe ulcer w dry eschar   Skin: b/l leg ulcers, stable ulcerations on b/l feet     Data Review:       Recent Days:  Recent Labs     22  1522 22  0352 11/15/22  0544   WBC 7.1 4.5 5.5   HGB 9.6* 9.3* 10.4*   HCT 31.6* 28.8* 31.3*   PLT 86* 109* 125*       Recent Labs     22  1522 22  0352 11/15/22  0544   BUN 10 12 11   CREA 0.81 0.50* 0.57*         Lab Results   Component Value Date/Time    C-Reactive protein 1.64 (H) 2022 10:18 AM        Microbiology     Results       Procedure Component Value Units Date/Time    MRSA SCREEN - PCR (NASAL) [[id number]]  (Abnormal) Collected: 22 0231    Order Status: Completed Specimen: Swab Updated: 22 0512     MRSA by PCR, Nasal DETECTED        Comment: [email or web address] and read back by Braeden [[id number]]  (Susceptibility) Collected: 22 0210    Order Status: Completed Specimen: Wound from Toe Updated: 22 1327     Special Requests: No Special Requests        GRAM STAIN Occasional WBCs seen               2+ Gram positive cocci in pairs chains and clusters           Culture result:       Heavy * methicillin resistant staphylococcus aureus *            Heavy Diphtheroids       Susceptibility        Staphylococcus aureus Methcillin Resistant      TELMA      Ciprofloxacin ($) Resistant      Clindamycin ($) Resistant      Daptomycin ($$$$$) Susceptible      Doxycycline ($$) Intermediate      Erythromycin ($$$$) Resistant      Gentamicin ($) Susceptible      Inducible Clindamycin  See below  [1]       Levofloxacin ($) Resistant      Linezolid ($$$$$) Susceptible      Oxacillin Resistant      Rifampin ($$$$) Susceptible  [2]       Tetracycline Resistant      Trimeth/Sulfa Susceptible      Vancomycin ($) Susceptible                   [1]  HIDE     [2]  Rifampin is not to be used for mono-therapy. CULTURE, BLOOD, PAIRED [[id number]] Collected: 11/08/22 1325    Order Status: Completed Specimen: Blood Updated: 11/14/22 0121     Special Requests: No Special Requests        Culture result: No growth 6 days       CULTURE, URINE [[id number]] Collected: 11/08/22 1115    Order Status: Completed Specimen: Urine Updated: 11/10/22 1506     Special Requests: No Special Requests        [place] Count 20,000        [place] Count colonies/ml        Culture result:       Mixed urogenital kristen isolated                     Diagnostics   CXR Results  (Last 48 hours)                 11/17/22 1323  XR CHEST PORT Final result    Impression:  1. No significant change in basilar predominant opacities. These are   nonspecific but can be seen with aspiration pneumonitis/pneumonia in the   appropriate setting. 2.  Enteric tube tip projects just past the gastroesophageal junction with   proximal sidehole in the distal esophagus. Consider advancing.         Narrative:  EXAM:  XR CHEST PORT       INDICATION: Dysphagia/fever, eval for PNA       COMPARISON: 11/15/2022. TECHNIQUE: Single frontal view of the chest.       FINDINGS: No significant change in patchy bilateral opacities, worse in the lung   bases and the right midlung. No evidence of an effusion or pneumothorax. Enteric   tube tip projects over the very proximal stomach, just past the gastroesophageal   junction with the sidehole in the distal esophagus. Status post median sternotomy. Assessment/Plan     SIRS, fever spike of 100.6F, WBC WNLs         Suspected aspiration on CXR (11/17)         Will resume Zosyn x 7 days. Routine labs in the morning     Right great toe infection  MRSA isolated from Cx of right great toe         No abscess or osteomyelitis on CT (11/11)         Stable dry eschar and 7 days of vanc        Continue routine wound care      3. UTI, abnormal UA, Mixed kristen isolated from urine Cx (11/08)    4. CVA w Small to moderate sized acute infarction in the right occipital lobe with  minimal/small infarction in the right temporal lobe on MRI head (11/16))       Right posterior periventricular white matter involving the  right parietal lobe On MRI in 07/2022      It appears CVA has progressed on f/u MRI head     5.  Dysphagia, NGT in place, plans for peg tube placement     Dispo: Poor long term prognosis         Lauren Portillo MD    11/17/2022

## 2022-11-17 NOTE — PROGRESS NOTES
@1630 blood sugar obtained via POC/fingerstick. Noted , patient to be covered appropriately. @ 1700 Lab notified charge nurse Levy Acuña of critical glucose of 775. Communicated to primary RN. @ 1710 repeat POC/finger stick obtained noted   @ 1715 HIMANSHU SHAH notified of POC fingerstick/Glucose results before obtained from blood draw and repeat after receiving critical result. New orders received for Repeat BMP, nurse to implement.

## 2022-11-17 NOTE — PROGRESS NOTES
Progress Note    Patient: Chasity García MRN: [id number]  SSN: [id number]    YOB: 1950  Age: 70 y.o.   Sex: male      Admit Date: 11/8/2022    LOS: 9 days     Subjective:   Examined,   confused, NG tube in place,  Past Medical History:   Diagnosis Date    Cirrhosis ([facility] [address]     Diabetes ([facility]     Hypertension     Stroke ([facility]         Current Facility-Administered Medications:     balsam peru-castor oiL (VENELEX) ointment, , Topical, BID, Gabriela Wheeler MD, Given at 11/17/22 1058    dextrose 5 % - 0.45% NaCl infusion, 75 mL/hr, IntraVENous, CONTINUOUS, Narayan Espinoza PA-C, Last Rate: 75 mL/hr at 11/16/22 1935, 75 mL/hr at 11/16/22 1935    hydrALAZINE (APRESOLINE) 20 mg/mL injection 20 mg, 20 mg, IntraVENous, Q6H PRN, Narayan Espinoza PA-C, 20 mg at 11/15/22 1618    aspirin chewable tablet 81 mg, 81 mg, Per NG tube, DAILY, Gabriela Wheeler MD, 81 mg at 11/17/22 0904    atorvastatin (LIPITOR) tablet 40 mg, 40 mg, Per NG tube, DAILY, Gabriela Wheeler MD, 40 mg at 11/17/22 0904    escitalopram oxalate (LEXAPRO) tablet 20 mg, 20 mg, Per NG tube, DAILY, Jas Wheeler MD, 20 mg at 11/17/22 5510    gabapentin (NEURONTIN) capsule 100 mg, 100 mg, Per NG tube, QHS, Gabriela Wheeler MD, 100 mg at 11/16/22 2134    melatonin tablet 10 mg, 10 mg, Per NG tube, QHS, Jas Wheeler MD, 10 mg at 11/16/22 2134    baclofen (LIORESAL) tablet 5 mg, 5 mg, Per NG tube, BID PRN, Nehemias Guaman MD, 5 mg at 11/13/22 1322    hydrOXYzine HCL (ATARAX) tablet 25 mg, 25 mg, Per NG tube, QHS PRN, Nehemias Guaman MD, 25 mg at 11/16/22 2134    polyethylene glycol (MIRALAX) packet 17 g, 17 g, Per NG tube, DAILY PRN, Nehemias Guaman MD    traMADoL Viky Iglesias) tablet 50 mg, 50 mg, Per NG tube, Q6H PRN, Nehemias Guaman MD, 50 mg at 11/15/22 2233    doxazosin (CARDURA) tablet 1 mg, 1 mg, Per NG tube, QHS, Jas Wheeler MD, 1 mg at 11/16/22 2134    potassium chloride (KLOR-CON) packet for solution 20 mEq, 20 mEq, Per NG tube, BID, Gloria Alexandre MD, 20 mEq at 11/17/22 2634    pantoprazole (PROTONIX) granules for oral suspension 40 mg, 40 mg, Per NG tube, Theta Thuy, Jas Santiago MD, 40 mg at 11/17/22 0904    sodium chloride (NS) flush 5-40 mL, 5-40 mL, IntraVENous, Q8H, Gabriela Borrero MD, 10 mL at 11/17/22 0522    sodium chloride (NS) flush 5-40 mL, 5-40 mL, IntraVENous, PRN, Gloria Alexandre MD, 10 mL at 11/14/22 1720    acetaminophen (TYLENOL) tablet 650 mg, 650 mg, Oral, Q6H PRN, 650 mg at 11/17/22 0314 **OR** acetaminophen (TYLENOL) suppository 650 mg, 650 mg, Rectal, Q6H PRN, Jas Borrero MD    ondansetron (ZOFRAN ODT) tablet 4 mg, 4 mg, Oral, Q8H PRN **OR** ondansetron (ZOFRAN) injection 4 mg, 4 mg, IntraVENous, Q6H PRN, Jas Borrero MD    [facility] [place] by provider] enoxaparin (LOVENOX) injection 40 mg, 40 mg, SubCUTAneous, DAILY, Jas Borrero MD, 40 mg at 11/16/22 1158    glucose chewable tablet 16 g, 4 Tablet, Oral, PRN, Jas Borrero MD    glucagon [facility]) injection 1 mg, 1 mg, IntraMUSCular, PRN, Jas Borrero MD    dextrose 10% infusion 0-250 mL, 0-250 mL, IntraVENous, PRN, Gloria Alexandre MD    insulin lispro (HUMALOG) injection, , SubCUTAneous, AC&HS, Gloria Alexandre MD, 3 Units at 11/16/22 1200    ARIPiprazole (ABILIFY) tablet 2 mg, 2 mg, Oral, DAILY, Jas Borrero MD, 2 mg at 11/17/22 3409    carvediloL (COREG) tablet 12.5 mg, 12.5 mg, Oral, BID, Gloria Alexandre MD, 12.5 mg at 11/17/22 5301    [Held by provider] clopidogreL (PLAVIX) tablet 75 mg, 75 mg, Oral, DAILY, Jas Borrero MD, 75 mg at 11/16/22 1158    losartan (COZAAR) tablet 50 mg, 50 mg, Oral, DAILY, Jas Borrero MD, 50 mg at 11/17/22 8417    senna-docusate (PERICOLACE) 8.6-50 mg per tablet 1 Tablet, 1 Tablet, Oral, DAILY, Gloria Alexandre MD, 1 Tablet at 11/17/22 0743    [Held by provider] tamsulosin (FLOMAX) capsule 0.4 mg, 0.4 mg, Oral, QHS, Jas Borrero MD    furosemide (LASIX) tablet 20 mg, 20 mg, Oral, DAILY, aJs Borrero MD, 20 mg at 11/17/22 0567    Objective:     Vitals:    11/16/22 1933 11/16/22 2340 11/17/22 0308 11/17/22 0538   BP: (!) 142/71  (!) 118/54    Pulse: 88 67 83    Resp: 18  18    Temp: 99.3 °F (37.4 °C)  (!) 100.6 °F (38.1 °C) 99.8 °F (37.7 °C)   SpO2: 92%  95%    Weight:       Height:            Intake and Output:  Current Shift: No intake/output data recorded. Last three shifts: 11/15 1901 - 11/17 0700  In: 3105 [I.V.:2385]  Out: 1050 [Urine:1050]    Physical Exam:   Physical Exam  Constitutional:       Appearance: He is ill-appearing. HENT:      Head: Atraumatic. Mouth/Throat:      Mouth: Mucous membranes are dry. Eyes:      Extraocular Movements: Extraocular movements intact. Cardiovascular:      Rate and Rhythm: Normal rate. Heart sounds: Normal heart sounds. Pulmonary:      Breath sounds: Normal breath sounds. Abdominal:      General: Abdomen is flat. Bowel sounds are normal.      Palpations: Abdomen is soft. Musculoskeletal:         General: Normal range of motion. Cervical back: Neck supple. Skin:     General: Skin is warm. Neurological:      General: No focal deficit present. Mental Status: Mental status is at baseline.         Lab/Data Review:  Recent Results (from the past 24 hour(s))   GLUCOSE, POC    Collection Time: 11/16/22  9:45 PM   Result Value Ref Range    Glucose (POC) 120 (H) 65 - 100 mg/dL    Performed by [address], POC    Collection Time: 11/17/22  1:06 AM   Result Value Ref Range    Glucose (POC) 99 65 - 100 mg/dL    Performed by [address], POC    Collection Time: 11/17/22  6:24 AM   Result Value Ref Range    Glucose (POC) 108 (H) 65 - 100 mg/dL    Performed by [address], POC    Collection Time: 11/17/22  8:36 AM   Result Value Ref Range    Glucose (POC) 127 (H) 65 - 100 mg/dL    Performed by Josefa Stokes    GLUCOSE, POC    Collection Time: 11/17/22 11:23 AM   Result Value Ref Range    Glucose (POC) 132 (H) 65 - 100 mg/dL    Performed by Didi Berg         MRI BRAIN WO CONT   Final Result   Small to moderate sized acute infarction in the right occipital lobe with   minimal/small infarction in the right temporal lobe. Severe cerebral atrophy. There is no intracranial mass, hemorrhage. There are numerous scattered chronic infarcts. No additional acute intracranial process is demonstrated. XR CHEST PORT   Final Result   Interval retraction of nasogastric tube with sidehole over the gastroesophageal   junction. XR CHEST PORT   Final Result   Interval replacement of nasogastric tube, with tip over the gastric fundus. XR CHEST PORT   Final Result   No significant change. XR CHEST PORT   Final Result   1. NG tube terminates in the gastric fundus, with possible kink in the side   hole. Correlate with function. 2.  New patchy consolidation in the right midlung is concerning for pneumonia. Grossly unchanged patchy retrocardiac opacities. Stable trace right pleural   effusion. XR CHEST PORT   Final Result   Nasogastric tube tip advanced overlying the gastric body. XR CHEST PORT   Final Result   Nasogastric tube tip over the gastric fundus. XR CHEST PORT   Final Result   Increased mild interstitial pulmonary edema versus pneumonia. Enteric tube extends into the gastric antrum or first portion of the duodenum. Consider retraction 8-10 cm if the intention is gastric luminal termination. CT FOOT RT W CONT   Final Result      1. No abscess or osteomyelitis. 2. No fracture. DUPLEX LOWER EXT ARTERY BILAT   Final Result      XR FOOT RT MIN 3 V   Final Result   No osseous erosion or soft tissue gas. .      XR CHEST PORT   Final Result      Mild interstitial opacities which may represent mild pulmonary edema unchanged         XR CHEST PORT   Final Result   Addendum (preliminary) 1 of 1   Addendum: NG tube is in appropriate position tip extends below the diaphragm. Right IJ catheter. No pneumothorax on the right. Final   1. Status post right IJ catheter placement without evidence of complication. IR INSERT NON TUNL CVC OVER 5 YRS   Final Result   Technically successful ultrasound guided placement of a right internal jugular   vein temporary central venous catheter. A post procedure chest x-ray is   pending. CT HEAD WO CONT   Final Result   No acute process or change compared to the prior exam.            XR CHEST PORT   Final Result   No acute process.       IR [place]    (Results Pending)   XR SWALLOW FUNC VIDEO    (Results Pending)   XR CHEST PORT    (Results Pending)        Assessment:     Active Problems:    UTI (urinary tract infection) (11/8/2022)    CVA Versus TIA,  Difficult to swallow,  NG tube feeding,     Need long-term GI access for the outpatient care  Plan:   Followed by speech therapy, waiting for the final recommendations  Continue current neurology evaluation PT OT  On the aspirin,  Plavix was on hold but he had yesterday am , he is high risk for thrombotic episode if we will withhold antiplatelet drug for too long   But need to hold at less two days   Please denote restart on Plavix  today'      GI prophylaxis PPIs,  Continue on antibiotic vancomycin  Hold Tube feeding overnight    ReSchedule EGD /PEG  for tube placement in the morning, indication/ risk/options  discussed with patient 's POA,  Plan:       Signed By: Gayatri Iglesias MD     November 17, 2022        Thank you for allowing me to participate in this patients care  Cc Referring Physician   Kendell Yates MD

## 2022-11-17 NOTE — PROGRESS NOTES
Remain NPO for Peg placement today. Tube feed turned off per MD orders by ESA RN. Monitoring. @4258 Received call from Primary GI Dr. Tucker Avila, resume TF as surgery is canceled today. Make NPO after MN for peg placement tomorrow 11/18/2022. HOLD Plavix and Lovenox for now.

## 2022-11-17 NOTE — PROGRESS NOTES
Comprehensive Nutrition Assessment    Type and Reason for Visit: Reassess (Goal, TF.)    Nutrition Recommendations/Plan:   NPO, advance per SLP rec's. Continue TF via NGT/PEG as Jevity 1.5 Michael at goal rate of 50 mL/hr. Continue water flushes to 160 mL Q4H via NGT/PEG. Provides 1800 kcal(96%), 77 gm PRO(101%), 1872 mL Fluids (100%). Continue to monitor and document TF rate/flushes, tolerances, BMs in I/Os. Malnutrition Assessment:  Malnutrition Status:  Insufficient data (11/10/22 1356)    Context:  Acute illness       Nutrition Assessment:    Admitted for UTI. Pt reported w/ improved alertness; however, not appropriate for PO diet during visit(unable to stay awake during RD visit). RD rec's NPO, SLP consult when PO diet appropriate. Continue IVF+D5 in interim. (11/10) RD consulted for TF rec's. NGT placed. (11/15) Transferred to medical floor. TF running and tolerated at goal rate. Noted plans to convert to PEG. RD to provide regimen above. (11/17) TF held for rescheduled again for PEG placement; now rescheduled to tomorrow. TF tolerated at goal rate as Jevity 1.5 Michael. Continue current regimen when PEG placed. Labs: Cl 118, , ALT 86, Na 150, H/H 9.3/28. 8. Meds: D5+1/2 NS @ 75 mL/hr, KCl, PPI, losartan, lasix, cardura, lipitor, tylenol, pericolace. Nutrition Related Findings:    Pt on isolation precautions. No N/V/D/C per RN. NPO, NGT w/ TF held for PEG placement during visit; likely to be restarted since procedure rescheduled. Last BM 11/15. Edema: +3 pitting BUE. Wound Type: Deep tissue injury, Diabetic ulcer, Multiple, Stage I, Pressure injury (DTI- Sacrum; St I- R.  Heel; Multiple DM ulcers.)    Current Nutrition Intake & Therapies:  Average Meal Intake: NPO  Average Supplement Intake: NPO  ADULT TUBE FEEDING Nasogastric; Standard without Fiber; Delivery Method: Continuous; Continuous Initial Rate (mL/hr): 20; Continuous Advance Tube Feeding: Yes; Advancement Volume (mL/hr): 10; Advancement Frequency: Q 8 hours; Continuous Goal Rate . .. DIET NPO    Anthropometric Measures:  Height: 5' 9\" (175.3 cm)  Ideal Body Weight (IBW): 160 lbs (73 kg)  Current Body Wt:  62.8 kg (138 lb 7.2 oz), 86.5 % IBW. Bed scale  Current BMI (kg/m2): 20.4  Usual Body Weight: 84.4 kg (186 lb) (? per chart review 3 and 5 months ago.)  % Weight Change (Calculated): -25.6  Weight Adjustment: No adjustment  BMI Category: Underweight (BMI less than 22) age over 72    Estimated Daily Nutrient Needs:  Energy Requirements Based On: Kcal/kg  Weight Used for Energy Requirements: Current  Energy (kcal/day): 3200-8225 kcal/d  (30-35 kcal/kg)  Weight Used for Protein Requirements: Current  Protein (g/day): 76 gm/d  Method Used for Fluid Requirements: 1 ml/kcal  Fluid (ml/day): 3414-9530 mL/d    Nutrition Diagnosis:   Inadequate oral intake related to cognitive or neurological impairment, swallowing difficulty as evidenced by NPO or clear liquid status due to medical condition, nutrition support-enteral nutrition, weight loss    Nutrition Interventions:   Food and/or Nutrient Delivery: Continue NPO, Start tube feeding  Nutrition Education/Counseling: No recommendations at this time  Coordination of Nutrition Care: Continue to monitor while inpatient, Speech therapy  Plan of Care discussed with: RN    Goals:  Previous Goal Met: Goal(s) achieved  Goals: Meet at least 75% of estimated needs, Tolerate nutrition support at goal rate, by next RD assessment, other (specify)  Specify Other Goals: Wt maintenance of +/- 0.5 kg x7 days. Nutrition Monitoring and Evaluation:   Behavioral-Environmental Outcomes: None identified  Food/Nutrient Intake Outcomes: Enteral nutrition intake/tolerance, Diet advancement/tolerance, IVF intake  Physical Signs/Symptoms Outcomes: Biochemical data, Hemodynamic status, Weight, Fluid status or edema, GI status    Discharge Planning:     Too soon to determine    Christine Mcneil RD  Contact: ext. [phone].

## 2022-11-17 NOTE — WOUND CARE
IP WOUND CONSULT    [address] [facility] RECORD NUMBER:  [id number]  AGE: 70 y.o. GENDER: male  : 1950  TODAY'S DATE:  2022  [facility]; hospital units: [address] [place] [address]     [x] Follow-up   [] New Consult          PAST MEDICAL HISTORY    Past Medical History:   Diagnosis Date    Cirrhosis ([facility]     Diabetes ([facility]     Hypertension     Stroke ([facility] [address]         ALLERGIES    No Known Allergies       Orientation: Not oriented     Continent: incontinent  barrier: Primo Fit    Kermit 12    Nutritionist Consulted:   Nutrition Status:    Support Surface: Marrie Cranker Max with low air loss/microclimate    Contributing Factors: anticoagulation therapy, edema, diabetes, chronic pressure, decreased mobility, shear force, incontinence of stool, incontinence of urine, and malnutrition      Assessment     Patient laying in bed, grunting when moved, not answering questions with eyes closed. Scabs to bilateral lower legs are stable and intact, will change dressing order to leave open to air. Still unclear etiology on how they appeared may be due to previous falls. Blister to right heel has re-absorbed, no open areas noted at this time has areas of blanchable and non-blanchable erythema. Will change order to apply Venelex ointment to heel and cover with non-adherent foam pad and secure with rolled gauze. sDTI's to right medial and lateral foot are intact, no changes noted at this time and no open areas noted at this time. Will change wound care order to apply Venelex ointment and cover with non-adherent foam pad and secure with rolled gauze. Offloading boots to be worn at all times. Diabetic wound to right great toe is now covered with dry eschar that is stable and intact. Podiatry has evaluated and recommend daily dressing changes of betadine soaked gauze, dry dressing and rolled gauze. sDTI to coccyx is beginning to open, wound bed covered with slough some purple/maroon coloring still present.  Alecia-wound is blanchable. Area cleansed with NS, thin layer of therahoney applied to wound bed and a thin layer of zinc paste applied to carrie-wound and covered with sacral foam dressing. Wound Toe (Comment  which one) Anterior;Right purulent drainage. Large wound on R big toe. (Active)   Wound Image   11/17/22 0934   Wound Etiology Diabetic 11/17/22 0934   Dressing Status Clean;Dry; Intact 11/17/22 0934   Cleansed Cleansed with saline 11/15/22 1254   Dressing/Treatment Open to air 11/17/22 0934   Wound Length (cm) 5 cm 11/09/22 1029   Wound Width (cm) 5 cm 11/09/22 1029   Wound Depth (cm) 0.1 cm 11/09/22 1029   Wound Surface Area (cm^2) 25 cm^2 11/09/22 1029   Wound Volume (cm^3) 2.5 cm^3 11/09/22 1029   Wound Assessment Eschar dry 11/17/22 0934   Drainage Amount None 11/17/22 0934   Drainage Description Thick 11/15/22 1254   Wound Odor None 11/17/22 0934   Carrie-Wound/Incision Assessment Intact 11/17/22 0934   Edges Defined edges 11/17/22 0934   Number of days: 9       Wound Sacrum Posterior DTI/Stage 2 (Active)   Wound Image   11/17/22 0939   Wound Etiology Pressure Unstageable 11/17/22 0939   Dressing Status New dressing applied 11/17/22 0939   Cleansed Cleansed with saline 11/17/22 0939   Dressing/Treatment Honey gel/honey paste; Foam 11/17/22 0939   Wound Length (cm) 3.3 cm 11/17/22 0939   Wound Width (cm) 2 cm 11/17/22 0939   Wound Depth (cm) 0.2 cm 11/17/22 0939   Wound Surface Area (cm^2) 6.6 cm^2 11/17/22 0939   Change in Wound Size % -915.38 11/17/22 0939   Wound Volume (cm^3) 1.32 cm^3 11/17/22 0939   Wound Healing % -915 11/17/22 0939   Wound Assessment Pink/red;Slough; Non-blanchable erythema;Purple/maroon 11/17/22 0939   Drainage Amount Scant 11/17/22 0939   Drainage Description Serosanguinous 11/17/22 0939   Wound Odor None 11/17/22 0939   Carrie-Wound/Incision Assessment Blanchable erythema 11/17/22 0939   Edges Defined edges 11/17/22 0939   Number of days: 9       Wound Leg lower Anterior; Left Abrasions and Scabs (Active)   Wound Image   11/09/22 1026   Dressing Status Clean; Intact;Dry 11/15/22 1254   Cleansed Cleansed with saline 11/15/22 1254   Dressing/Treatment Honey gel/honey paste;ABD pad;Roll gauze 11/09/22 1026   Wound Assessment Dry;Slough; Other (Comment) 11/15/22 1254   Drainage Amount Scant 11/15/22 1254   Drainage Description Yellow 11/15/22 1254   Wound Odor None 11/15/22 1254   Alecia-Wound/Incision Assessment Blanchable erythema 11/15/22 1254   Number of days: 9       Wound Leg lower Anterior;Right abrasions and scabs (Active)   Wound Image   11/09/22 1024   Dressing Status New dressing applied 11/15/22 1254   Cleansed Cleansed with saline 11/15/22 1254   Dressing/Treatment Honey gel/honey paste 11/15/22 [address]; Other (Comment) 11/15/22 1254   Drainage Amount Scant 11/15/22 1254   Drainage Description Yellow 11/15/22 1254   Wound Odor None 11/15/22 1254   Alecia-Wound/Incision Assessment Blanchable erythema 11/15/22 1254   Number of days: 9       Wound Thigh Anterior;Right;Medial (Active)   Wound Image   11/17/22 0941   Wound Etiology Other (Comment) 11/17/22 0941   Dressing Status New dressing applied 11/17/22 0941   Cleansed Cleansed with saline 11/17/22 0941   Dressing/Treatment Honey gel/honey paste; Foam 11/17/22 [address]; Devitalized tissue 11/17/22 0941   Drainage Amount Scant 11/17/22 0941   Drainage Description Serosanguinous 11/17/22 0941   Wound Odor None 11/17/22 0941   Alecia-Wound/Incision Assessment Blanchable erythema 11/17/22 0941   Edges Flat/open edges 11/17/22 0941   Number of days: 8       Wound Thigh Anterior;Left;Medial (Active)   Wound Image   11/17/22 0940   Wound Etiology Other (Comment) 11/17/22 0940   Dressing Status New dressing applied 11/17/22 0940   Cleansed Cleansed with saline 11/17/22 0940   Dressing/Treatment Honey gel/honey paste; Foam 11/17/22 0940   Wound Assessment [facility] 11/17/22 0921   Drainage Amount Scant 11/17/22 0974   Drainage Description Serosanguinous 11/17/22 0940   Wound Odor None 11/17/22 0940   Alecia-Wound/Incision Assessment Blanchable erythema 11/17/22 0940   Edges Defined edges 11/17/22 0940   Number of days: 8       Wound Toe (Comment  which one) Anterior;Right 5th toe (Active)   Wound Image   11/09/22 1031   Dressing Status New dressing applied 11/15/22 1254   Cleansed Cleansed with saline 11/15/22 1254   Dressing/Treatment Honey gel/honey paste 11/15/22 1254   Wound Assessment Dusky;Eschar dry 11/15/22 1254   Drainage Amount None 11/15/22 1254   Wound Odor None 11/15/22 1254   Number of days: 8       Wound Heel Right Intact blister (Active)   Wound Image   11/17/22 0938   Wound Etiology Pressure Stage 1 11/17/22 0938   Dressing Status New dressing applied 11/15/22 1254   Cleansed Cleansed with saline 11/15/22 1254   Dressing/Treatment Pharmaceutical agent (see MAR); Foam 11/17/22 0938   Offloading for Diabetic Foot Ulcers Offloading boot 11/17/22 0938   Wound Assessment Erythema;Non-blanchable erythema 11/17/22 0938   Drainage Amount None 11/17/22 0938   Wound Odor None 11/17/22 0938   Alecia-Wound/Incision Assessment Non-Blanchable erythema 11/17/22 0938   Number of days: 8       Wound Foot Anterior;Right;Medial (Active)   Wound Image   11/17/22 0936   Wound Etiology Deep Tissue/Injury 11/17/22 0936   Dressing/Treatment Pharmaceutical agent (see MAR) 11/17/22 0936   Offloading for Diabetic Foot Ulcers Offloading boot 11/17/22 0936   Wound Assessment Purple/maroon;Non-blanchable erythema 11/17/22 0936   Drainage Amount None 11/17/22 0936   Wound Odor None 11/17/22 0936   Alecia-Wound/Incision Assessment Intact 11/17/22 0936   Number of days: 8       Wound Foot Anterior;Right;Lateral (Active)   Wound Image   11/17/22 0937   Wound Etiology Deep Tissue/Injury 11/17/22 0937   Dressing Status Clean 11/17/22 0937   Dressing/Treatment Pharmaceutical agent (see MAR) 11/17/22 0937   Wound Assessment Purple/maroon;Non-blanchable erythema 11/17/22 0937   Drainage Amount None 11/17/22 0937   Wound Odor None 11/17/22 0937   Alecia-Wound/Incision Assessment Intact 11/17/22 0937   Number of days: 8                Skin Care & Pressure Relief Recommendations  Speciality bed Centrella Max low air loss bed  Minimize layers of linen  Pads under patient to optimize support surface  Turn/reposition approximately every 2 hours  Pillow wedges  Manage incontinence   Promote continence; Skin Protective lotion/cream to buttocks and sacrum daily and as needed with incontinence care  Offload heels pillows  Offloading boots      Discharge Wound Care Needs:    Teaching completed with:   [] Patient           [] Family member       [] Caregiver          [] Nursing  [] Other    Patient/Caregiver Teaching:  Level of patient/caregiver understanding able to:   [] Indicates understanding       [] Needs reinforcement  [] Unsuccessful      [] Verbal Understanding  [] Demonstrated understanding       [] No evidence of learning  [] Refused teaching         [] N/A       Tye Clark, RN, BSN  [facility] [place]   11/17/22  9:42 AM

## 2022-11-17 NOTE — PROGRESS NOTES
Hospitalist Progress Note    Subjective:   Daily Progress Note: 11/17/2022 2:05 PM    Hospital Course: Cathi Pennington is a 14-year-old male with a PMHx of stroke, hypertension, DM, and cirrhosis who presents to hospital from nursing home with weakness and hypotension x1 day. Associated with poor responsiveness. Found hypotensive on arrival to the ED, with metabolic encephalopathy, improved with intravenous fluid bolus. Found to have urinary infection, started on IV ceftriaxone, and admitted for further management. On 11/9/22 transferred to ICU overnight for poor responsiveness and for septic shock. Managed with fluids and levophed  Duplex shows right common femoral artery and proximal superficial femoral artery occluded and and popliteal artery patent and with patent distal anterior tibial artery and posterior tibial artery. There must be collateralization noted from the occluded femoral artery to popliteal artery. Also noted left  mid to distal superficial artery occluded, with patent distal anterior tibial artery and posterior tibial artery on the left side. Patient recently was admitted on 7/29/2022 to 8/2/22 following presentation to the ED for transient facial droop. Due to history of strokes, suspected TIA versus CVA. MRI 07/30/2022 showed acute ischemic stroke of the right parietal area. Patient has carotid artery stenosis and supposed to go for surgery but unable to get cardiac clearance because of nuclear stress test he has 90% stenosis of left ICA and 50% stenosis of right ICA. Outpatient vascular follow-up for carotid stent per vascular surgery, Dr. Ihsan Narayan. Neurology consult for confusion. Repeat MRI brain 11/16/22 showed small to moderate sized acute infarction in the right occipital lobe with minimal/small infarction in the right temporal lobe. Severe cerebral atrophy. Continue dual-antiplatelet therapy and Plavix. Patient has had NG tube nutrition since admission. Speech therapy working with patient.  GI consult to evaluate for PEG placement. Scheduled for PEG on 11/17 and MBS on 11/18. Subjective:    Patient seen and examined at bedside. He is alert, but remains confused.      Current Facility-Administered Medications   Medication Dose Route Frequency    balsam peru-castor oiL (VENELEX) ointment   Topical BID    dextrose 5 % - 0.45% NaCl infusion  75 mL/hr IntraVENous CONTINUOUS    hydrALAZINE (APRESOLINE) 20 mg/mL injection 20 mg  20 mg IntraVENous Q6H PRN    aspirin chewable tablet 81 mg  81 mg Per NG tube DAILY    atorvastatin (LIPITOR) tablet 40 mg  40 mg Per NG tube DAILY    escitalopram oxalate (LEXAPRO) tablet 20 mg  20 mg Per NG tube DAILY    gabapentin (NEURONTIN) capsule 100 mg  100 mg Per NG tube QHS    melatonin tablet 10 mg  10 mg Per NG tube QHS    baclofen (LIORESAL) tablet 5 mg  5 mg Per NG tube BID PRN    hydrOXYzine HCL (ATARAX) tablet 25 mg  25 mg Per NG tube QHS PRN    polyethylene glycol (MIRALAX) packet 17 g  17 g Per NG tube DAILY PRN    traMADoL (ULTRAM) tablet 50 mg  50 mg Per NG tube Q6H PRN    doxazosin (CARDURA) tablet 1 mg  1 mg Per NG tube QHS    potassium chloride (KLOR-CON) packet for solution 20 mEq  20 mEq Per NG tube BID    pantoprazole (PROTONIX) granules for oral suspension 40 mg  40 mg Per NG tube DAILY    sodium chloride (NS) flush 5-40 mL  5-40 mL IntraVENous Q8H    sodium chloride (NS) flush 5-40 mL  5-40 mL IntraVENous PRN    acetaminophen (TYLENOL) tablet 650 mg  650 mg Oral Q6H PRN    Or    acetaminophen (TYLENOL) suppository 650 mg  650 mg Rectal Q6H PRN    ondansetron (ZOFRAN ODT) tablet 4 mg  4 mg Oral Q8H PRN    Or    ondansetron (ZOFRAN) injection 4 mg  4 mg IntraVENous Q6H PRN    [Held by provider] enoxaparin (LOVENOX) injection 40 mg  40 mg SubCUTAneous DAILY    glucose chewable tablet 16 g  4 Tablet Oral PRN    glucagon (GLUCAGEN) injection 1 mg  1 mg IntraMUSCular PRN    dextrose 10% infusion 0-250 mL  0-250 mL IntraVENous PRN    insulin lispro (HUMALOG) injection   SubCUTAneous AC&HS    ARIPiprazole (ABILIFY) tablet 2 mg  2 mg Oral DAILY    carvediloL (COREG) tablet 12.5 mg  12.5 mg Oral BID    [Held by provider] clopidogreL (PLAVIX) tablet 75 mg  75 mg Oral DAILY    losartan (COZAAR) tablet 50 mg  50 mg Oral DAILY    senna-docusate (PERICOLACE) 8.6-50 mg per tablet 1 Tablet  1 Tablet Oral DAILY    [Held by provider] tamsulosin (FLOMAX) capsule 0.4 mg  0.4 mg Oral QHS    furosemide (LASIX) tablet 20 mg  20 mg Oral DAILY        Review of Systems  Constitutional: No fevers, No chills, No sweats, No fatigue, No Weakness  Eyes: No redness  Ears, nose, mouth, throat, and face: No nasal congestion, No sore throat, No voice change  Respiratory: No Shortness of Breath, No cough, No wheezing  Cardiovascular: No chest pain, No palpitations, No extremity edema  Gastrointestinal: No nausea, No vomiting, No diarrhea, No abdominal pain  Genitourinary: No frequency, No dysuria, No hematuria  Integument/breast: No skin lesion(s)   Neurological: No Confusion, No headaches, No dizziness      Objective:     Visit Vitals  BP (!) 118/54 (BP 1 Location: Left upper arm, BP Patient Position: At rest)   Pulse 83   Temp 99.8 °F (37.7 °C)   Resp 18   Ht 5' 9\" (1.753 m)   Wt 62.5 kg (137 lb 12.6 oz)   SpO2 95%   BMI 20.35 kg/m²    O2 Flow Rate (L/min): 2 l/min O2 Device: None (Room air)    Temp (24hrs), Av.9 °F (37.7 °C), Min:99.3 °F (37.4 °C), Max:100.6 °F (38.1 °C)      No intake/output data recorded. 11/15 1901 -  0700  In: 3105 [I.V.:2385]  Out: 1050 [Urine:1050]    PHYSICAL EXAM:  Constitutional: Ill-appearing male. No acute distress  Skin: Extremities and face reveal no rashes. HEENT: Sclerae anicteric. Extra-occular muscles are intact. NGT. No oral ulcers. The neck is supple and no masses. Cardiovascular: Regular rate and rhythm. +S1/S2. NO murmur or gallop. Respiratory:  Clear breath sounds bilaterally with no wheezes, rales, or rhonchi.    GI: Abdomen nondistended, soft, and nontender. Normal active bowel sounds. Rectal: Deferred   Musculoskeletal: No pitting edema of the lower legs. Able to move all ext  Neurological:  Patient is alert, disoriented. Generalized weakness. Cranial nerves II-XII grossly intact  Psychiatric: Unable to assess       Data Review    Recent Results (from the past 24 hour(s))   GLUCOSE, POC    Collection Time: 11/16/22  9:45 PM   Result Value Ref Range    Glucose (POC) 120 (H) 65 - 100 mg/dL    Performed by [address], POC    Collection Time: 11/17/22  1:06 AM   Result Value Ref Range    Glucose (POC) 99 65 - 100 mg/dL    Performed by [address], POC    Collection Time: 11/17/22  6:24 AM   Result Value Ref Range    Glucose (POC) 108 (H) 65 - 100 mg/dL    Performed by [address], POC    Collection Time: 11/17/22  8:36 AM   Result Value Ref Range    Glucose (POC) 127 (H) 65 - 100 mg/dL    Performed by Alfornia Band    GLUCOSE, POC    Collection Time: 11/17/22 11:23 AM   Result Value Ref Range    Glucose (POC) 132 (H) 65 - 100 mg/dL    Performed by Alfornia Band        MRI BRAIN WO CONT   Final Result   Small to moderate sized acute infarction in the right occipital lobe with   minimal/small infarction in the right temporal lobe. Severe cerebral atrophy. There is no intracranial mass, hemorrhage. There are numerous scattered chronic infarcts. No additional acute intracranial process is demonstrated. XR CHEST PORT   Final Result   Interval retraction of nasogastric tube with sidehole over the gastroesophageal   junction. XR CHEST PORT   Final Result   Interval replacement of nasogastric tube, with tip over the gastric fundus. XR CHEST PORT   Final Result   No significant change. XR CHEST PORT   Final Result   1. NG tube terminates in the gastric fundus, with possible kink in the side   hole. Correlate with function.    2.  New patchy consolidation in the right midlung is concerning for pneumonia. Grossly unchanged patchy retrocardiac opacities. Stable trace right pleural   effusion. XR CHEST PORT   Final Result   Nasogastric tube tip advanced overlying the gastric body. XR CHEST PORT   Final Result   Nasogastric tube tip over the gastric fundus. XR CHEST PORT   Final Result   Increased mild interstitial pulmonary edema versus pneumonia. Enteric tube extends into the gastric antrum or first portion of the duodenum. Consider retraction 8-10 cm if the intention is gastric luminal termination. CT FOOT RT W CONT   Final Result      1. No abscess or osteomyelitis. 2. No fracture. DUPLEX LOWER EXT ARTERY BILAT   Final Result      XR FOOT RT MIN 3 V   Final Result   No osseous erosion or soft tissue gas. .      XR CHEST PORT   Final Result      Mild interstitial opacities which may represent mild pulmonary edema unchanged         XR CHEST PORT   Final Result   Addendum (preliminary) 1 of 1   Addendum: NG tube is in appropriate position tip extends below the    diaphragm. Right IJ catheter. No pneumothorax on the right. Final   1. Status post right IJ catheter placement without evidence of complication. IR INSERT NON TUNL CVC OVER 5 YRS   Final Result   Technically successful ultrasound guided placement of a right internal jugular   vein temporary central venous catheter. A post procedure chest x-ray is   pending. CT HEAD WO CONT   Final Result   No acute process or change compared to the prior exam.            XR CHEST PORT   Final Result   No acute process. IR US GUIDED VASCULAR ACCESS    (Results Pending)   XR SWALLOW FUNC VIDEO    (Results Pending)       Active Problems:    UTI (urinary tract infection) (11/8/2022)      Assessment/Plan:     1. Septic shock s/t UTI and right great toe ulcer  - Complicated with hypotension and metabolic encephalopathy.   - Urine cultures negative  - Blood cultures negative  - MRSA isolated from Cx of right great toe   - Antibiotics per ID. S/p Zosyn, Vanc for MRSA coverage, and meropenem. 2. Hypertension  - Continue home medications    3. Carotid artery disease  4. Hx of recent CVA  - Following with [facility] cardiology outpatient and Dr. Linette Elizabeth for anticipated carotid stent. - Acute ischemia in the right posterior periventricular white matter involving the right parietal lobe on MRI 07/2022  - CT head 11/09 negative for acute findings   - Recent echocardiogram reviewed  - 90% stenosis of left ICA and 50% stenosis of right ICA. O/p vascular f/u. - Neuro consult  - Repeat MRI brain 11/16/22 showed small to moderate sized acute infarction in the right occipital lobe with minimal/small infarction in the right temporal lobe. Severe cerebral atrophy. - Continue dual-antiplatelet therapy and Plavix  - Vascular surgery, Dr. Linette Elizabeth, following  - Will need carotid stent once patient recovered     5. Chronic foot wounds   - Antibiotics per ID  - Wound care inpatient and outpatient  - Duplex shows right common femoral artery and proximal superficial femoral artery occluded and and popliteal artery patent and with patent distal anterior tibial artery and posterior tibial artery. There must be collateralization noted from the occluded femoral artery to popliteal artery. Also noted left  mid to distal superficial artery occluded, with patent distal anterior tibial artery and posterior tibial artery on the left side. - Vascular surgery, Dr. Linette Elizabeth, following    6. Dysphagia  - NGT nutrition  - Speech therapy following   - GI consult to place PEG today  - MBS with speech on 11/18      DVT Prophylaxis: Lovenox  Code Status: Full  POA:    Discharge Barriers:   - PEG placement   - MBS tomorrow   - Outpatient follow-up to schedule carotid stenting     Care Plan discussed with: patient and nursing. Total time spent with patient: >35 minutes.

## 2022-11-17 NOTE — PROGRESS NOTES
IMPRESSION:   Acute hypoxic respiratory failure resolved  Probably urinary tract infection  Leukocytosis improved  Carotid artery disease history of CVA  Hypokalemia      RECOMMENDATIONS/PLAN:   55-year-old male nursing home resident admitted with hypotension and unresponsiveness now he is alert awake agitated  Hemodynamically stable  Previous 2D echo showed ejection fraction 60 to 65%  Chest x-ray shows fluid overload congestive changes   Potassium corrected     [x] High complexity decision making was performed  [x] See my orders for details  HPI  55-year-old nursing home resident came in because of unresponsiveness he had a history of stroke in the past patient was hypotensive hypoxic rapid response was called he was admitted to the floor initially received IV fluid hemodynamically unstable started on vasopressors Levophed transferred to ICU White count was elevated he was put on oxygen 4 L nasal cannula unable to get any started the patient he has a right foot wound and multiple wounds of the lower extremities dressing in place. He has carotid artery stenosis only supposed to go for surgery but unable to get cardiac clearance because of nuclear stress test he has 90% stenosis of left ICA and 50% stenosis of right ICA. So now he is admitted to ICU and critical care consult was called  PMH:  has a past medical history of Cirrhosis ([facility] [address] Diabetes ([facility] [address] Hypertension, and Stroke ([facility] [address] PSH:   has a past surgical history that includes hx back surgery; hx gi; and ir insert non tunl cvc over 5 yrs (11/9/2022). FHX: family history includes Heart Disease in his father. SHX:  reports that he has quit smoking. He has never used smokeless tobacco. He reports that he does not currently use alcohol. He reports that he does not currently use drugs.     ALL: No Known Allergies     MEDS:   [x] Reviewed - As Below   [] Not reviewed    Current Facility-Administered Medications   Medication    dextrose 5 % - 0.45% NaCl infusion    hydrALAZINE (APRESOLINE) 20 mg/mL injection 20 mg    aspirin chewable tablet 81 mg    atorvastatin (LIPITOR) tablet 40 mg    escitalopram oxalate (LEXAPRO) tablet 20 mg    gabapentin (NEURONTIN) capsule 100 mg    melatonin tablet 10 mg    baclofen (LIORESAL) tablet 5 mg    hydrOXYzine HCL (ATARAX) tablet 25 mg    polyethylene glycol (MIRALAX) packet 17 g    traMADoL (ULTRAM) tablet 50 mg    doxazosin (CARDURA) tablet 1 mg    potassium chloride (KLOR-CON) packet for solution 20 mEq    pantoprazole (PROTONIX) granules for oral suspension 40 mg    sodium chloride (NS) flush 5-40 mL    sodium chloride (NS) flush 5-40 mL    acetaminophen (TYLENOL) tablet 650 mg    Or    acetaminophen (TYLENOL) suppository 650 mg    ondansetron (ZOFRAN ODT) tablet 4 mg    Or    ondansetron (ZOFRAN) injection 4 mg    [Held by provider] enoxaparin (LOVENOX) injection 40 mg    glucose chewable tablet 16 g    glucagon (GLUCAGEN) injection 1 mg    dextrose 10% infusion 0-250 mL    insulin lispro (HUMALOG) injection    ARIPiprazole (ABILIFY) tablet 2 mg    carvediloL (COREG) tablet 12.5 mg    [Held by provider] clopidogreL (PLAVIX) tablet 75 mg    losartan (COZAAR) tablet 50 mg    senna-docusate (PERICOLACE) 8.6-50 mg per tablet 1 Tablet    [Held by provider] tamsulosin (FLOMAX) capsule 0.4 mg    furosemide (LASIX) tablet 20 mg      MAR reviewed and pertinent medications noted or modified as needed   Current Facility-Administered Medications   Medication    dextrose 5 % - 0.45% NaCl infusion    hydrALAZINE (APRESOLINE) 20 mg/mL injection 20 mg    aspirin chewable tablet 81 mg    atorvastatin (LIPITOR) tablet 40 mg    escitalopram oxalate (LEXAPRO) tablet 20 mg    gabapentin (NEURONTIN) capsule 100 mg    melatonin tablet 10 mg    baclofen (LIORESAL) tablet 5 mg    hydrOXYzine HCL (ATARAX) tablet 25 mg    polyethylene glycol (MIRALAX) packet 17 g    traMADoL (ULTRAM) tablet 50 mg    doxazosin (CARDURA) tablet 1 mg    potassium chloride (KLOR-CON) packet for solution 20 mEq    pantoprazole (PROTONIX) granules for oral suspension 40 mg    sodium chloride (NS) flush 5-40 mL    sodium chloride (NS) flush 5-40 mL    acetaminophen (TYLENOL) tablet 650 mg    Or    acetaminophen (TYLENOL) suppository 650 mg    ondansetron (ZOFRAN ODT) tablet 4 mg    Or    ondansetron (ZOFRAN) injection 4 mg    [Held by provider] enoxaparin (LOVENOX) injection 40 mg    glucose chewable tablet 16 g    glucagon (GLUCAGEN) injection 1 mg    dextrose 10% infusion 0-250 mL    insulin lispro (HUMALOG) injection    ARIPiprazole (ABILIFY) tablet 2 mg    carvediloL (COREG) tablet 12.5 mg    [Held by provider] clopidogreL (PLAVIX) tablet 75 mg    losartan (COZAAR) tablet 50 mg    senna-docusate (PERICOLACE) 8.6-50 mg per tablet 1 Tablet    [Held by provider] tamsulosin (FLOMAX) capsule 0.4 mg    furosemide (LASIX) tablet 20 mg      PMH:  has a past medical history of Cirrhosis ([facility] [address] Diabetes ([facility] [address] Hypertension, and Stroke ([facility] [address] PSH:   has a past surgical history that includes hx back surgery; hx gi; and ir insert non tunl cvc over 5 yrs (2022). FHX: family history includes Heart Disease in his father. SHX:  reports that he has quit smoking. He has never used smokeless tobacco. He reports that he does not currently use alcohol. He reports that he does not currently use drugs. ROS:Review of systems not obtained due to patient factors. Hemodynamics:    CO:    CI:    CVP:    SVR:   PAP Systolic:    PAP Diastolic:    PVR:    WA38:        Ventilator Settings:      Mode Rate TV Press PEEP FiO2 PIP Min.  Vent                              Vital Signs: Telemetry:    normal sinus rhythm Intake/Output:   Visit Vitals  BP (!) 118/54 (BP 1 Location: Left upper arm, BP Patient Position: At rest)   Pulse 83   Temp 99.8 °F (37.7 °C)   Resp 18   Ht 5' 9\" (1.753 m)   Wt 62.5 kg (137 lb 12.6 oz)   SpO2 95%   BMI 20.35 kg/m²       Temp (24hrs), Av.9 °F (37.7 °C), Min:99.3 °F (37.4 °C), Max:100.6 °F (38.1 °C)        O2 Device: None (Room air) O2 Flow Rate (L/min): 2 l/min       Wt Readings from Last 4 Encounters:   11/10/22 62.5 kg (137 lb 12.6 oz)   08/12/22 84.8 kg (187 lb)   07/29/22 84.8 kg (187 lb)   10/28/20 99.8 kg (220 lb)          Intake/Output Summary (Last 24 hours) at 11/17/2022 0900  Last data filed at 11/17/2022 0318  Gross per 24 hour   Intake 1485 ml   Output 1050 ml   Net 435 ml         Last shift:      No intake/output data recorded. Last 3 shifts: 11/15 1901 - 11/17 0700  In: 3105 [I.V.:2385]  Out: 1050 [Urine:1050]       Physical Exam:     General: He is on room air  HEENT: NCAT, poor dentition, lips and mucosa dry  Eyes: anicteric; conjunctiva clear  Neck: no nodes, trach midline; no accessory MM use.   Chest: no deformity,   Cardiac: R regular; no murmur;   Lungs: distant breath sounds; wheezes  Abd: soft, NT, hypoactive BS  Ext: Lower extremity wound bandage in place  : NO kennedy, clear urine  Neuro: Unresponsive  Psych-unable to assess  Skin: warm, dry, no cyanosis;   Pulses: 1-2+ Bilateral pedal, radial  Capillary: brisk; pale      DATA:    MAR reviewed and pertinent medications noted or modified as needed  MEDS:   Current Facility-Administered Medications   Medication    dextrose 5 % - 0.45% NaCl infusion    hydrALAZINE (APRESOLINE) 20 mg/mL injection 20 mg    aspirin chewable tablet 81 mg    atorvastatin (LIPITOR) tablet 40 mg    escitalopram oxalate (LEXAPRO) tablet 20 mg    gabapentin (NEURONTIN) capsule 100 mg    melatonin tablet 10 mg    baclofen (LIORESAL) tablet 5 mg    hydrOXYzine HCL (ATARAX) tablet 25 mg    polyethylene glycol (MIRALAX) packet 17 g    traMADoL (ULTRAM) tablet 50 mg    doxazosin (CARDURA) tablet 1 mg    potassium chloride (KLOR-CON) packet for solution 20 mEq    pantoprazole (PROTONIX) granules for oral suspension 40 mg    sodium chloride (NS) flush 5-40 mL    sodium chloride (NS) flush 5-40 mL    acetaminophen (TYLENOL) tablet 650 mg    Or    acetaminophen (TYLENOL) suppository 650 mg    ondansetron (ZOFRAN ODT) tablet 4 mg    Or    ondansetron (ZOFRAN) injection 4 mg    [Held by provider] enoxaparin (LOVENOX) injection 40 mg    glucose chewable tablet 16 g    glucagon (GLUCAGEN) injection 1 mg    dextrose 10% infusion 0-250 mL    insulin lispro (HUMALOG) injection    ARIPiprazole (ABILIFY) tablet 2 mg    carvediloL (COREG) tablet 12.5 mg    [Held by provider] clopidogreL (PLAVIX) tablet 75 mg    losartan (COZAAR) tablet 50 mg    senna-docusate (PERICOLACE) 8.6-50 mg per tablet 1 Tablet    [Held by provider] tamsulosin (FLOMAX) capsule 0.4 mg    furosemide (LASIX) tablet 20 mg        Labs:    Recent Labs     11/16/22  0352 11/15/22  0544   WBC 4.5 5.5   HGB 9.3* 10.4*   * 125*       Recent Labs     11/16/22  0352 11/15/22  0544   * 150*   K 3.6 3.8   * 117*   CO2 22 27   * 150*   BUN 12 11   CREA 0.50* 0.57*   CA 7.6* 7.8*   ALB 1.8*  --    ALT 86*  --        No results for input(s): PH, PCO2, PO2, HCO3, FIO2 in the last 72 hours. No results for input(s): CPK, CKNDX, TROIQ in the last 72 hours. No lab exists for component: CPKMB    No results found for: BNPP, BNP   Lab Results   Component Value Date/Time    Culture result:  11/09/2022 02:10 AM     Heavy * methicillin resistant staphylococcus aureus *    Culture result: Heavy Diphtheroids 11/09/2022 02:10 AM    Culture result: No growth 6 days 11/08/2022 01:25 PM     Lab Results   Component Value Date/Time    TSH 1.67 07/30/2022 07:11 AM        Imaging:    Results from [place] encounter on 11/08/22    XR CHEST PORT    Narrative  INDICATION: NG tube placement after pulling out approx 4cm    EXAMINATION:  AP CHEST, PORTABLE    COMPARISON: Earlier today    FINDINGS: Single AP portable view of the chest demonstrates nasogastric tube has  been retracted slightly with tip over the proximal stomach and sidehole or the  gastroesophageal junction. The cardiomediastinal silhouette is unchanged. Patchy  bilateral airspace disease. Impression  Interval retraction of nasogastric tube with sidehole over the gastroesophageal  junction. Results from [place] encounter on 11/08/22    CT FOOT RT W CONT    Narrative  EXAM: CT FOOT RT W CONT    INDICATION: Right foot swelling and abscess. Evaluate for osteomyelitis. Hypertension, diabetes, and cirrhosis. COMPARISON: Right foot views on 11/10/2022    CONTRAST: 100 mL of Isovue-370. TECHNIQUE: Helical CT of the right foot during uneventful rapid bolus  intravenous contrast administration. Coronal and Sagittal reformats were  generated. Images reviewed in soft tissue and bone windows. CT dose reduction  was achieved through the use of a standardized protocol tailored for this  examination and automatic exposure control for dose modulation. FINDINGS: Bones: Mild osteopenia. No fracture or osteomyelitis. Joint fluid: Physiologic. Articulations: no evidence of septic arthritis. 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 Tendons: No full-thickness tendon tear. Muscles: Mild diffuse atrophy. Soft tissue mass: None. No fluid collection. Impression  1. No abscess or osteomyelitis. 2. No fracture. 11/10 patient is barely responsive now on room air off pressors getting IV fluid chest x-ray shows congestive changes IV fluid has been decreased, replace potassium, WBC much improved  11/11 Patient is out of ICU, responding slightly, he is on room air. PCO2 30. WBC improving, continues to decrease. 11/12 On room air, condition remains same open eyes but does not follow any commands, replace potassium  11/13 Room air, no O2 in hospital. He opened his eyes, tried to respond slightly by denying SOB, but unable to communicate clearly. NG tube in place. 11/14 Pt is seen resting, he is not currently on any O2, SpO2 94%. NG tube in place.   11/15 Pt is awake feeling well, more responsive today. He denies SOB. He is still on room air. Pt still has NG tube in place.    11/16 condition same on room air pleasantly confused

## 2022-11-17 NOTE — PROGRESS NOTES
Problem: Falls - Risk of  Goal: *Absence of Falls  Description: Document Breann Wilburn Fall Risk and appropriate interventions in the flowsheet.   Outcome: Progressing Towards Goal  Note: Fall Risk Interventions:       Mentation Interventions: Bed/chair exit alarm, Increase mobility, More frequent rounding, Reorient patient, Room close to nurse's station    Medication Interventions: Bed/chair exit alarm, Teach patient to arise slowly    Elimination Interventions: Call light in reach, Bed/chair exit alarm              Problem: Patient Education: Go to Patient Education Activity  Goal: Patient/Family Education  Outcome: Progressing Towards Goal     Problem: Discharge Planning  Goal: *Discharge to safe environment  Outcome: Progressing Towards Goal  Goal: *Knowledge of medication management  Outcome: Progressing Towards Goal  Goal: *Knowledge of discharge instructions  Outcome: Progressing Towards Goal     Problem: Patient Education: Go to Patient Education Activity  Goal: Patient/Family Education  Outcome: Progressing Towards Goal     Problem: Sepsis: Day of Diagnosis  Goal: Off Pathway (Use only if patient is Off Pathway)  Outcome: Progressing Towards Goal  Goal: *Fluid resuscitation  Outcome: Progressing Towards Goal  Goal: *Paired blood cultures prior to first dose of antibiotic  Outcome: Progressing Towards Goal  Goal: *First dose of  appropriate antibiotic within 3 hours of arrival to ED, within 1 hour of arrival to ICU  Outcome: Progressing Towards Goal  Goal: *Lactic acid with first set of blood cultures  Outcome: Progressing Towards Goal  Goal: *Pneumococcal immunization (if eligible)  Outcome: Progressing Towards Goal  Goal: *Influenza immunization (if eligible)  Outcome: Progressing Towards Goal  Goal: Activity/Safety  Outcome: Progressing Towards Goal  Goal: Consults, if ordered  Outcome: Progressing Towards Goal  Goal: Diagnostic Test/Procedures  Outcome: Progressing Towards Goal  Goal: Nutrition/Diet  Outcome: Progressing Towards Goal  Goal: Discharge Planning  Outcome: Progressing Towards Goal  Goal: Medications  Outcome: Progressing Towards Goal  Goal: Respiratory  Outcome: Progressing Towards Goal  Goal: Treatments/Interventions/Procedures  Outcome: Progressing Towards Goal  Goal: Psychosocial  Outcome: Progressing Towards Goal     Problem: Sepsis: Day 2  Goal: Off Pathway (Use only if patient is Off Pathway)  Outcome: Progressing Towards Goal  Goal: *Central Venous Pressure maintained at 8-12 mm Hg  Outcome: Progressing Towards Goal  Goal: *Hemodynamically stable  Outcome: Progressing Towards Goal  Goal: *Tolerating diet  Outcome: Progressing Towards Goal  Goal: Activity/Safety  Outcome: Progressing Towards Goal  Goal: Consults, if ordered  Outcome: Progressing Towards Goal  Goal: Diagnostic Test/Procedures  Outcome: Progressing Towards Goal  Goal: Nutrition/Diet  Outcome: Progressing Towards Goal  Goal: Discharge Planning  Outcome: Progressing Towards Goal  Goal: Medications  Outcome: Progressing Towards Goal  Goal: Respiratory  Outcome: Progressing Towards Goal  Goal: Treatments/Interventions/Procedures  Outcome: Progressing Towards Goal  Goal: Psychosocial  Outcome: Progressing Towards Goal     Problem: Sepsis: Day 3  Goal: Off Pathway (Use only if patient is Off Pathway)  Outcome: Progressing Towards Goal  Goal: *Central Venous Pressure maintained at 8-12 mm Hg  Outcome: Progressing Towards Goal  Goal: *Oxygen saturation within defined limits  Outcome: Progressing Towards Goal  Goal: *Vital sign stability  Outcome: Progressing Towards Goal  Goal: *Tolerating diet  Outcome: Progressing Towards Goal  Goal: *Demonstrates progressive activity  Outcome: Progressing Towards Goal  Goal: Activity/Safety  Outcome: Progressing Towards Goal  Goal: Consults, if ordered  Outcome: Progressing Towards Goal  Goal: Diagnostic Test/Procedures  Outcome: Progressing Towards Goal  Goal: Nutrition/Diet  Outcome: Progressing Towards Goal  Goal: Discharge Planning  Outcome: Progressing Towards Goal  Goal: Medications  Outcome: Progressing Towards Goal  Goal: Respiratory  Outcome: Progressing Towards Goal  Goal: Treatments/Interventions/Procedures  Outcome: Progressing Towards Goal  Goal: Psychosocial  Outcome: Progressing Towards Goal     Problem: Sepsis: Day 4  Goal: Off Pathway (Use only if patient is Off Pathway)  Outcome: Progressing Towards Goal  Goal: Activity/Safety  Outcome: Progressing Towards Goal  Goal: Consults, if ordered  Outcome: Progressing Towards Goal  Goal: Diagnostic Test/Procedures  Outcome: Progressing Towards Goal  Goal: Nutrition/Diet  Outcome: Progressing Towards Goal  Goal: Discharge Planning  Outcome: Progressing Towards Goal  Goal: Medications  Outcome: Progressing Towards Goal  Goal: Respiratory  Outcome: Progressing Towards Goal  Goal: Treatments/Interventions/Procedures  Outcome: Progressing Towards Goal  Goal: Psychosocial  Outcome: Progressing Towards Goal  Goal: *Oxygen saturation within defined limits  Outcome: Progressing Towards Goal  Goal: *Hemodynamically stable  Outcome: Progressing Towards Goal  Goal: *Vital signs within defined limits  Outcome: Progressing Towards Goal  Goal: *Tolerating diet  Outcome: Progressing Towards Goal  Goal: *Demonstrates progressive activity  Outcome: Progressing Towards Goal  Goal: *Fluid volume maintenance  Outcome: Progressing Towards Goal     Problem: Sepsis: Day 5  Goal: Off Pathway (Use only if patient is Off Pathway)  Outcome: Progressing Towards Goal  Goal: *Oxygen saturation within defined limits  Outcome: Progressing Towards Goal  Goal: *Vital signs within defined limits  Outcome: Progressing Towards Goal  Goal: *Tolerating diet  Outcome: Progressing Towards Goal  Goal: *Demonstrates progressive activity  Outcome: Progressing Towards Goal  Goal: *Discharge plan identified  Outcome: Progressing Towards Goal  Goal: Activity/Safety  Outcome: Progressing Towards Goal  Goal: Consults, if ordered  Outcome: Progressing Towards Goal  Goal: Diagnostic Test/Procedures  Outcome: Progressing Towards Goal  Goal: Nutrition/Diet  Outcome: Progressing Towards Goal  Goal: Discharge Planning  Outcome: Progressing Towards Goal  Goal: Medications  Outcome: Progressing Towards Goal  Goal: Respiratory  Outcome: Progressing Towards Goal  Goal: Treatments/Interventions/Procedures  Outcome: Progressing Towards Goal  Goal: Psychosocial  Outcome: Progressing Towards Goal

## 2022-11-17 NOTE — PROGRESS NOTES
[facility]    Progress Note    Date:2022       Room:[facility]  Patient Name:Stanislaw Stephens     Date of Birth:     Age:71 y.o. Subjective:     Patient is a 70 y.o.  male who is being seen for right foot ulceration. Patient is not alert but he is in no acute distress. Unable to obtain history. Objective:     Vitals Last 24 Hours:  TEMPERATURE:  Temp  Av.9 °F (37.7 °C)  Min: 99.3 °F (37.4 °C)  Max: 100.6 °F (38.1 °C)  RESPIRATIONS RANGE: Resp  Av  Min: 18  Max: 18  PULSE OXIMETRY RANGE: SpO2  Av.5 %  Min: 92 %  Max: 95 %  PULSE RANGE: Pulse  Av.3  Min: 67  Max: 88  BLOOD PRESSURE RANGE: Systolic (57SFF), XUJ:357 , Min:118 , CFQ:574        Diastolic (64OBM), RXX:97, Min:54, Max:71        I/O (24Hr): Intake/Output Summary (Last 24 hours) at 2022 1418  Last data filed at 2022 0318  Gross per 24 hour   Intake 1485 ml   Output 450 ml   Net 1035 ml     Physical Exam:    GEN: Pt is in NAD. Dressing noted to B/L LE. No family noted at [facility]  DERM: Ulcer noted to the dorsal aspect of the right hallux. No fluctuance noted. Wound base right hallux is 100% eschar. Eschar noted to the right fifth toe. VASC: Pedal pulses (DP/PT) diminished to B/L LE. CFT<3sec to all digits of B/L LE. No pedal hair growth noted to the level of the digits for B/L LE. Skin temp is warm to cool from proximal to distal for B/L LE. Neg homans/brain signs to B/L LE. No varicosities or telangectasias noted to B/L LE.  NEURO: Protective and epicritic sensations absent intact to B/L LE  MSK: (-) POP, No gross deformities. Good muscle tone and bulk noted to B/L LE.   Labs/Imaging/Diagnostics:     Labs:  CBC:  Recent Labs     11/16/22  0352 11/15/22  0544   WBC 4.5 5.5   RBC 3.26* 3.62*   HGB 9.3* 10.4*   HCT 28.8* 31.3*   MCV 88.3 86.5   RDW 16.5* 16.4*   * 125*     CHEMISTRIES:  Recent Labs     11/16/22  0352 11/15/22  0544   * 150*   K 3.6 3.8   * 117*   CO2 22 27   BUN 12 11   CA 7.6* 7.8*   PT/INR:  No results for input(s): INR, INREXT, INREXT, INREXT in the last 72 hours. No lab exists for component: PROTIME  APTT:No results for input(s): APTT in the last 72 hours. LIVER PROFILE:  Recent Labs     11/16/22  0352   AST 87*   ALT 86*     Lab Results   Component Value Date/Time    ALT (SGPT) 86 (H) 11/16/2022 03:52 AM    AST (SGOT) 87 (H) 11/16/2022 03:52 AM    Alk. phosphatase 61 11/16/2022 03:52 AM    Bilirubin, direct 0.1 11/16/2022 03:52 AM    Bilirubin, total 0.5 11/16/2022 03:52 AM       Imaging Last 24 Hours:  MRI BRAIN WO CONT    Result Date: 11/16/2022  CLINICAL HISTORY: Vision changes. INDICATION: Vision changes, AMS. COMPARISON: 11/8/2022 TECHNIQUE: MR examination of the brain includes axial and sagittal T1, coronal T2, axial T2, axial FLAIR, axial gradient echo, axial DWI. CONTRAST: None FINDINGS: There are is small chronic lentiform infarctions in the left and right basal ganglia. Moderate sized chronic infarction of the right parietal lobe. Small chronic infarcts in the periventricular white matter of the frontal lobes bilaterally. There is sulcal and ventricular prominence. This is extensive. There is sphenoid sinus disease. Confluent periventricular and scattered foci of increased T2 signal intensity in the corona radiata and suggest embolic. There is a moderate sized acute infarction in the right occipital lobe and a small subcortical infarction in the right temporal lobe. There is no superimposed hemorrhage, midline shift or mass effect. The brain architecture is normal. There is no evidence of midline shift or mass-effect. There are no extra-axial fluid collections. There is no Chiari or syrinx. The pituitary and infundibulum are grossly unremarkable. There is no skull base mass. Cerebellopontine angles are grossly unremarkable. The major intracranial vascular flow-voids are unremarkable. The cavernous sinuses are symmetric.  Optic chiasm and infundibulum grossly unremarkable. Orbits are grossly symmetric. Dural venous sinuses are grossly patent. The mastoid air cells are well pneumatized and clear. Small to moderate sized acute infarction in the right occipital lobe with minimal/small infarction in the right temporal lobe. Severe cerebral atrophy. There is no intracranial mass, hemorrhage. There are numerous scattered chronic infarcts. No additional acute intracranial process is demonstrated. Assessment:   Ulcer right hallux  Diabetes mellitus       Plan:   - Patient was seen and evaluated at bedside  - Current labs personally reviewed and findings dicussed with patient  - Continue Wound care: Clean ulceration with normal saline. Apply Betadine soaked gauze 4 x 4 gauze, Kerlix and secure with tape.   Daily dressing changes  -Eschar noted to be stable with no signs of infection  -We will continue to follow patient    Marce Finn DPM, [facility], [address] [place] [address]  O: [phone]  F: [phone]    Electronically signed by Marce Finn DPM on 11/17/2022 at 8:38 AM

## 2022-11-17 NOTE — PROGRESS NOTES
Neurology Progress Note    Patient ID:  Marilee Anderson  [id number]  53 y.o.  1950    Subjective: At the time of my evaluation this morning, Mr. Re Ellsworth was sleeping and appeared a little drowsy. He answered his name correctly and followed simple commands. Remained flaccid in the left upper but moved bilateral lower extremity and right upper. He was NPO due to PEG tube placement. Mr. Re Ellsworth is a 70year old male with past medical history of stroke, diabetes, hypertension, resides in a nursing facility. Patient recently was admitted on 7/29/2022 to 8/2/22 following presentation to the ED for transient facial droop. Due to history of strokes, suspected TIA versus CVA. MRI showed acute ischemic stroke of the right parietal area. Bilateral carotid ultrasounds revealed 50% stenosis of right ICA, 90% stenosis of left ICA. Vascular surgery consulted and will need carotid endarterectomy. Was later discharged to skilled nursing facility. On 11/8/22, he presented to hospital from nursing home with weakness and hypotension for one day. Associated with poor responsiveness. Found hypotensive on arrival to the ED, with metabolic encephalopathy, improved with intravenous fluid bolus. Found to have urinary infection, started on antibiotic, and I have been asked to admit to hospital for further stabilization of his condition. Neurology was consulted due to low responsiveness.     Current Facility-Administered Medications   Medication Dose Route Frequency    dextrose 5 % - 0.45% NaCl infusion  75 mL/hr IntraVENous CONTINUOUS    hydrALAZINE (APRESOLINE) 20 mg/mL injection 20 mg  20 mg IntraVENous Q6H PRN    aspirin chewable tablet 81 mg  81 mg Per NG tube DAILY    atorvastatin (LIPITOR) tablet 40 mg  40 mg Per NG tube DAILY    escitalopram oxalate (LEXAPRO) tablet 20 mg  20 mg Per NG tube DAILY    gabapentin (NEURONTIN) capsule 100 mg  100 mg Per NG tube QHS    melatonin tablet 10 mg  10 mg Per NG tube QHS    baclofen (LIORESAL) tablet 5 mg  5 mg Per NG tube BID PRN    hydrOXYzine HCL (ATARAX) tablet 25 mg  25 mg Per NG tube QHS PRN    polyethylene glycol (MIRALAX) packet 17 g  17 g Per NG tube DAILY PRN    traMADoL (ULTRAM) tablet 50 mg  50 mg Per NG tube Q6H PRN    doxazosin (CARDURA) tablet 1 mg  1 mg Per NG tube QHS    potassium chloride (KLOR-CON) packet for solution 20 mEq  20 mEq Per NG tube BID    pantoprazole (PROTONIX) granules for oral suspension 40 mg  40 mg Per NG tube DAILY    sodium chloride (NS) flush 5-40 mL  5-40 mL IntraVENous Q8H    sodium chloride (NS) flush 5-40 mL  5-40 mL IntraVENous PRN    acetaminophen (TYLENOL) tablet 650 mg  650 mg Oral Q6H PRN    Or    acetaminophen (TYLENOL) suppository 650 mg  650 mg Rectal Q6H PRN    ondansetron (ZOFRAN ODT) tablet 4 mg  4 mg Oral Q8H PRN    Or    ondansetron (ZOFRAN) injection 4 mg  4 mg IntraVENous Q6H PRN    [Held by provider] enoxaparin (LOVENOX) injection 40 mg  40 mg SubCUTAneous DAILY    glucose chewable tablet 16 g  4 Tablet Oral PRN    glucagon (GLUCAGEN) injection 1 mg  1 mg IntraMUSCular PRN    dextrose 10% infusion 0-250 mL  0-250 mL IntraVENous PRN    insulin lispro (HUMALOG) injection   SubCUTAneous AC&HS    ARIPiprazole (ABILIFY) tablet 2 mg  2 mg Oral DAILY    carvediloL (COREG) tablet 12.5 mg  12.5 mg Oral BID    [Held by provider] clopidogreL (PLAVIX) tablet 75 mg  75 mg Oral DAILY    losartan (COZAAR) tablet 50 mg  50 mg Oral DAILY    senna-docusate (PERICOLACE) 8.6-50 mg per tablet 1 Tablet  1 Tablet Oral DAILY    [Held by provider] tamsulosin (FLOMAX) capsule 0.4 mg  0.4 mg Oral QHS    furosemide (LASIX) tablet 20 mg  20 mg Oral DAILY          Objective:     Patient Vitals for the past 8 hrs:   BP Temp Pulse Resp SpO2   11/17/22 0538 -- 99.8 °F (37.7 °C) -- -- --   11/17/22 0308 (!) 118/54 (!) 100.6 °F (38.1 °C) 83 18 95 %       No intake/output data recorded.   11/15 1901 - 11/17 0700  In: 3105 [I.V.:2385]  Out: 1050 [Urine:1050]    Lab Review Recent Results (from the past 24 hour(s))   PROCALCITONIN    Collection Time: 11/16/22 10:18 AM   Result Value Ref Range    Procalcitonin 1.23 (H) 0 ng/mL   C REACTIVE PROTEIN, QT    Collection Time: 11/16/22 10:18 AM   Result Value Ref Range    C-Reactive protein 1.64 (H) 0.00 - 0.60 mg/dL   GLUCOSE, POC    Collection Time: 11/16/22 11:42 AM   Result Value Ref Range    Glucose (POC) 201 (H) 65 - 100 mg/dL    Performed by Elease Larari    GLUCOSE, POC    Collection Time: 11/16/22  9:45 PM   Result Value Ref Range    Glucose (POC) 120 (H) 65 - 100 mg/dL    Performed by [address], POC    Collection Time: 11/17/22  1:06 AM   Result Value Ref Range    Glucose (POC) 99 65 - 100 mg/dL    Performed by [address], POC    Collection Time: 11/17/22  6:24 AM   Result Value Ref Range    Glucose (POC) 108 (H) 65 - 100 mg/dL    Performed by [address], POC    Collection Time: 11/17/22  8:36 AM   Result Value Ref Range    Glucose (POC) 127 (H) 65 - 100 mg/dL    Performed by Elease Lark        NEUROLOGICAL EXAM:    Appearance: The patient is well developed, well nourished,  and is in no acute distress. Mental Status: Oriented to place. Appeared drowsy today. Mood and affect appropriate. Cranial Nerves:   Intact visual fields. VANGIE, EOM's full, no nystagmus, no ptosis. Facial sensation is normal. Facial movement is symmetric. Hearing is normal bilaterally. Palate is midline with normal sternocleidomastoid and trapezius muscles are normal. Tongue is midline. Motor:  Moved right upper and lower ext on commands. Weakness in the left upper and lower extremity. Flaccid in the left upper. rength in upper and lower proximal and distal muscles. Normal bulk and tone. Reflexes:   Deep tendon reflexes 2+/4 and symmetrical.   Sensory:   Symmetric and intact   Gait:  Deferred. Tremor:   No tremor noted.    Cerebellar:  Unable to assess coordination intact for finger-nose-finger due to patient's condition. Neurovascular:  Normal heart sounds and regular rhythm, peripheral pulses intact, and no carotid bruits. Assessment:  1) CVA: Mr. Rivera Le is a 70year old male with acute infarction in the right occipital and temporal lobe . He remained dysarthric and flaccid in the right upper extremity.  - Head CT 11/8/22 showed no acute process or change compared to prior exam.  - Brain MRI 11/16/22 showed small to moderate sized acute infarction in the right occipital lobe with minimal/small infarction in the right temporal lobe. Severe cerebral atrophy. There was no intracranial mass, hemorrhage. Numerous scattered chronic infarcts.  - Duplex US 7/30/22 showed left proximal ICA with irregular mixed plaque with high-grade stenosis, per criteria greater than 70%. - Echo showed EF 50--55%  - CTA head and neck 7/30/22 showed severe cervical and cerebral atherosclerotic vasculopathy. Greater than 90% stenosis in the proximal ICA on the left with strings of the left proximal ICA. Moderate chronic microvascular ischemic change and cerebral atrophy. Numerous  scattered chronic small remote lacunar infarctions right and left basal ganglia, right and left frontal lobes, left francine. 2) History of old infarcts  3) Hypertension  4) Diabetes  5) Cirrhosis     Active Problems:    UTI (urinary tract infection) (11/8/2022)        Plan:  - Greater than 90% stenosis in the proximal ICA on the left with strings of the left proximal ICA noted on 7/30/22 and vascular surgery recommended carotid endarterectomy once patient recovered. There were no records showing patient got procedure done, will like to pursue this and get it done if not completed since that could be the cause of his strokes.   - Continue aspirin 81 mg, atorvastatin 40 mg, and plavix 75 mg .  - PT/OT/Speech     Thanks for the consult,    Signed:  Bree Bundy, [address] [facility]  11/17/2022  8:57 AM     Collaborating Physician:  Dr. Alfred Williamson Patrick Keith

## 2022-11-18 ENCOUNTER — APPOINTMENT (OUTPATIENT)
Dept: ENDOSCOPY | Age: 72
DRG: 871 | End: 2022-11-18
Attending: INTERNAL MEDICINE
Payer: MEDICARE

## 2022-11-18 LAB
ANION GAP SERPL CALC-SCNC: 9 MMOL/L (ref 5–15)
BUN SERPL-MCNC: 10 MG/DL (ref 6–20)
BUN/CREAT SERPL: 20 (ref 12–20)
CA-I BLD-MCNC: 7.8 MG/DL (ref 8.5–10.1)
CHLORIDE SERPL-SCNC: 113 MMOL/L (ref 97–108)
CO2 SERPL-SCNC: 23 MMOL/L (ref 21–32)
CREAT SERPL-MCNC: 0.5 MG/DL (ref 0.7–1.3)
GLUCOSE BLD STRIP.AUTO-MCNC: 116 MG/DL (ref 65–100)
GLUCOSE BLD STRIP.AUTO-MCNC: 124 MG/DL (ref 65–100)
GLUCOSE BLD STRIP.AUTO-MCNC: 130 MG/DL (ref 65–100)
GLUCOSE BLD STRIP.AUTO-MCNC: 98 MG/DL (ref 65–100)
GLUCOSE SERPL-MCNC: 116 MG/DL (ref 65–100)
PERFORMED BY, TECHID: ABNORMAL
PERFORMED BY, TECHID: NORMAL
POTASSIUM SERPL-SCNC: 3.5 MMOL/L (ref 3.5–5.1)
SODIUM SERPL-SCNC: 145 MMOL/L (ref 136–145)

## 2022-11-18 PROCEDURE — 36415 COLL VENOUS BLD VENIPUNCTURE: CPT

## 2022-11-18 PROCEDURE — 99232 SBSQ HOSP IP/OBS MODERATE 35: CPT | Performed by: INTERNAL MEDICINE

## 2022-11-18 PROCEDURE — 74011250636 HC RX REV CODE- 250/636: Performed by: INTERNAL MEDICINE

## 2022-11-18 PROCEDURE — 74011000250 HC RX REV CODE- 250: Performed by: INTERNAL MEDICINE

## 2022-11-18 PROCEDURE — 65270000029 HC RM PRIVATE

## 2022-11-18 PROCEDURE — 77030005122 HC CATH GASTMY PEG BSC -B: Performed by: INTERNAL MEDICINE

## 2022-11-18 PROCEDURE — 80048 BASIC METABOLIC PNL TOTAL CA: CPT

## 2022-11-18 PROCEDURE — 0DH68UZ INSERTION OF FEEDING DEVICE INTO STOMACH, VIA NATURAL OR ARTIFICIAL OPENING ENDOSCOPIC: ICD-10-PCS | Performed by: INTERNAL MEDICINE

## 2022-11-18 PROCEDURE — 74011250637 HC RX REV CODE- 250/637: Performed by: INTERNAL MEDICINE

## 2022-11-18 PROCEDURE — 2709999900 HC NON-CHARGEABLE SUPPLY: Performed by: INTERNAL MEDICINE

## 2022-11-18 PROCEDURE — 99232 SBSQ HOSP IP/OBS MODERATE 35: CPT | Performed by: PODIATRIST

## 2022-11-18 PROCEDURE — 74011250636 HC RX REV CODE- 250/636: Performed by: REGISTERED NURSE

## 2022-11-18 PROCEDURE — 76060000032 HC ANESTHESIA 0.5 TO 1 HR: Performed by: INTERNAL MEDICINE

## 2022-11-18 PROCEDURE — 74011000258 HC RX REV CODE- 258: Performed by: INTERNAL MEDICINE

## 2022-11-18 PROCEDURE — 74011000250 HC RX REV CODE- 250: Performed by: STUDENT IN AN ORGANIZED HEALTH CARE EDUCATION/TRAINING PROGRAM

## 2022-11-18 PROCEDURE — 76040000007: Performed by: INTERNAL MEDICINE

## 2022-11-18 PROCEDURE — 82962 GLUCOSE BLOOD TEST: CPT

## 2022-11-18 PROCEDURE — 74011000250 HC RX REV CODE- 250: Performed by: REGISTERED NURSE

## 2022-11-18 PROCEDURE — 3E0G76Z INTRODUCTION OF NUTRITIONAL SUBSTANCE INTO UPPER GI, VIA NATURAL OR ARTIFICIAL OPENING: ICD-10-PCS | Performed by: INTERNAL MEDICINE

## 2022-11-18 RX ORDER — IPRATROPIUM BROMIDE AND ALBUTEROL SULFATE 2.5; .5 MG/3ML; MG/3ML
3 SOLUTION RESPIRATORY (INHALATION)
Status: DISCONTINUED | OUTPATIENT
Start: 2022-11-18 | End: 2022-11-27 | Stop reason: HOSPADM

## 2022-11-18 RX ORDER — PROPOFOL 10 MG/ML
INJECTION, EMULSION INTRAVENOUS AS NEEDED
Status: DISCONTINUED | OUTPATIENT
Start: 2022-11-18 | End: 2022-11-18 | Stop reason: HOSPADM

## 2022-11-18 RX ORDER — SODIUM CHLORIDE, SODIUM LACTATE, POTASSIUM CHLORIDE, CALCIUM CHLORIDE 600; 310; 30; 20 MG/100ML; MG/100ML; MG/100ML; MG/100ML
INJECTION, SOLUTION INTRAVENOUS
Status: DISCONTINUED | OUTPATIENT
Start: 2022-11-18 | End: 2022-11-18 | Stop reason: HOSPADM

## 2022-11-18 RX ADMIN — CASTOR OIL AND BALSAM, PERU: 788; 87 OINTMENT TOPICAL at 23:19

## 2022-11-18 RX ADMIN — SODIUM CHLORIDE, PRESERVATIVE FREE 10 ML: 5 INJECTION INTRAVENOUS at 05:45

## 2022-11-18 RX ADMIN — LOSARTAN POTASSIUM 50 MG: 50 TABLET, FILM COATED ORAL at 10:13

## 2022-11-18 RX ADMIN — SODIUM CHLORIDE, PRESERVATIVE FREE 10 ML: 5 INJECTION INTRAVENOUS at 23:22

## 2022-11-18 RX ADMIN — CARVEDILOL 12.5 MG: 12.5 TABLET, FILM COATED ORAL at 23:18

## 2022-11-18 RX ADMIN — ATORVASTATIN CALCIUM 40 MG: 40 TABLET, FILM COATED ORAL at 10:12

## 2022-11-18 RX ADMIN — SENNOSIDES AND DOCUSATE SODIUM 1 TABLET: 50; 8.6 TABLET ORAL at 10:12

## 2022-11-18 RX ADMIN — ARIPIPRAZOLE 2 MG: 2 TABLET ORAL at 10:12

## 2022-11-18 RX ADMIN — GABAPENTIN 100 MG: 100 CAPSULE ORAL at 23:18

## 2022-11-18 RX ADMIN — PIPERACILLIN SODIUM AND TAZOBACTAM SODIUM 3.38 G: 3; .375 INJECTION, POWDER, LYOPHILIZED, FOR SOLUTION INTRAVENOUS at 16:41

## 2022-11-18 RX ADMIN — PANTOPRAZOLE SODIUM 40 MG: 40 GRANULE, DELAYED RELEASE ORAL at 10:13

## 2022-11-18 RX ADMIN — ESCITALOPRAM OXALATE 20 MG: 10 TABLET ORAL at 10:12

## 2022-11-18 RX ADMIN — POTASSIUM CHLORIDE 20 MEQ: 1.5 FOR SOLUTION ORAL at 10:13

## 2022-11-18 RX ADMIN — DOXAZOSIN 1 MG: 2 TABLET ORAL at 23:18

## 2022-11-18 RX ADMIN — SODIUM CHLORIDE, POTASSIUM CHLORIDE, SODIUM LACTATE AND CALCIUM CHLORIDE: 600; 310; 30; 20 INJECTION, SOLUTION INTRAVENOUS at 14:49

## 2022-11-18 RX ADMIN — PROPOFOL 20 MG: 10 INJECTION, EMULSION INTRAVENOUS at 14:58

## 2022-11-18 RX ADMIN — PIPERACILLIN SODIUM AND TAZOBACTAM SODIUM 3.38 G: 3; .375 INJECTION, POWDER, LYOPHILIZED, FOR SOLUTION INTRAVENOUS at 09:08

## 2022-11-18 RX ADMIN — PHENYLEPHRINE HYDROCHLORIDE 100 MCG: 10 INJECTION INTRAVENOUS at 14:55

## 2022-11-18 RX ADMIN — FUROSEMIDE 20 MG: 40 TABLET ORAL at 10:12

## 2022-11-18 RX ADMIN — DEXTROSE AND SODIUM CHLORIDE 75 ML/HR: 5; 450 INJECTION, SOLUTION INTRAVENOUS at 13:09

## 2022-11-18 RX ADMIN — CASTOR OIL AND BALSAM, PERU: 788; 87 OINTMENT TOPICAL at 10:16

## 2022-11-18 RX ADMIN — PROPOFOL 40 MG: 10 INJECTION, EMULSION INTRAVENOUS at 14:49

## 2022-11-18 RX ADMIN — MELATONIN TAB 5 MG 10 MG: 5 TAB at 23:18

## 2022-11-18 RX ADMIN — POTASSIUM CHLORIDE 20 MEQ: 1.5 FOR SOLUTION ORAL at 23:17

## 2022-11-18 RX ADMIN — CARVEDILOL 12.5 MG: 12.5 TABLET, FILM COATED ORAL at 10:12

## 2022-11-18 NOTE — PROGRESS NOTES
Neurology Progress Note    Patient ID:  Reji Villalobos  [id number]  69 y.o.  1950    Subjective: At the time of my evaluation this morning, Mr. Rivera Le was awake and alert. He answered his name correctly and followed simple commands. Remained flaccid and swollen in the left upper but moved bilateral lower extremity and right upper. He remained NPO due to PEG tube placement. Mr. Rivera Le is a 70year old male with past medical history of stroke, diabetes, hypertension, resides in a nursing facility. Patient recently was admitted on 7/29/2022 to 8/2/22 following presentation to the ED for transient facial droop. Due to history of strokes, suspected TIA versus CVA. MRI showed acute ischemic stroke of the right parietal area. Bilateral carotid ultrasounds revealed 50% stenosis of right ICA, 90% stenosis of left ICA. Vascular surgery consulted and will need carotid endarterectomy. Was later discharged to skilled nursing facility. On 11/8/22, he presented to hospital from nursing home with weakness and hypotension for one day. Associated with poor responsiveness. Found hypotensive on arrival to the ED, with metabolic encephalopathy, improved with intravenous fluid bolus. Found to have urinary infection, started on antibiotic, and I have been asked to admit to hospital for further stabilization of his condition. Neurology was consulted due to low responsiveness.     Current Facility-Administered Medications   Medication Dose Route Frequency    balsam peru-castor oiL (VENELEX) ointment   Topical BID    piperacillin-tazobactam (ZOSYN) 3.375 g in 0.9% sodium chloride (MBP/ADV) 100 mL MBP  3.375 g IntraVENous Q8H    dextrose 5 % - 0.45% NaCl infusion  75 mL/hr IntraVENous CONTINUOUS    hydrALAZINE (APRESOLINE) 20 mg/mL injection 20 mg  20 mg IntraVENous Q6H PRN    aspirin chewable tablet 81 mg  81 mg Per NG tube DAILY    atorvastatin (LIPITOR) tablet 40 mg  40 mg Per NG tube DAILY    escitalopram oxalate (LEXAPRO) tablet 20 mg  20 mg Per NG tube DAILY    gabapentin (NEURONTIN) capsule 100 mg  100 mg Per NG tube QHS    melatonin tablet 10 mg  10 mg Per NG tube QHS    baclofen (LIORESAL) tablet 5 mg  5 mg Per NG tube BID PRN    hydrOXYzine HCL (ATARAX) tablet 25 mg  25 mg Per NG tube QHS PRN    polyethylene glycol (MIRALAX) packet 17 g  17 g Per NG tube DAILY PRN    traMADoL (ULTRAM) tablet 50 mg  50 mg Per NG tube Q6H PRN    doxazosin (CARDURA) tablet 1 mg  1 mg Per NG tube QHS    potassium chloride (KLOR-CON) packet for solution 20 mEq  20 mEq Per NG tube BID    pantoprazole (PROTONIX) granules for oral suspension 40 mg  40 mg Per NG tube DAILY    sodium chloride (NS) flush 5-40 mL  5-40 mL IntraVENous Q8H    sodium chloride (NS) flush 5-40 mL  5-40 mL IntraVENous PRN    acetaminophen (TYLENOL) tablet 650 mg  650 mg Oral Q6H PRN    Or    acetaminophen (TYLENOL) suppository 650 mg  650 mg Rectal Q6H PRN    ondansetron (ZOFRAN ODT) tablet 4 mg  4 mg Oral Q8H PRN    Or    ondansetron (ZOFRAN) injection 4 mg  4 mg IntraVENous Q6H PRN    [Held by provider] enoxaparin (LOVENOX) injection 40 mg  40 mg SubCUTAneous DAILY    glucose chewable tablet 16 g  4 Tablet Oral PRN    glucagon (GLUCAGEN) injection 1 mg  1 mg IntraMUSCular PRN    dextrose 10% infusion 0-250 mL  0-250 mL IntraVENous PRN    insulin lispro (HUMALOG) injection   SubCUTAneous AC&HS    ARIPiprazole (ABILIFY) tablet 2 mg  2 mg Oral DAILY    carvediloL (COREG) tablet 12.5 mg  12.5 mg Oral BID    [Held by provider] clopidogreL (PLAVIX) tablet 75 mg  75 mg Oral DAILY    losartan (COZAAR) tablet 50 mg  50 mg Oral DAILY    senna-docusate (PERICOLACE) 8.6-50 mg per tablet 1 Tablet  1 Tablet Oral DAILY    [Held by provider] tamsulosin (FLOMAX) capsule 0.4 mg  0.4 mg Oral QHS    furosemide (LASIX) tablet 20 mg  20 mg Oral DAILY          Objective:     Patient Vitals for the past 8 hrs:   BP Temp Pulse Resp SpO2   11/18/22 0822 133/62 98.5 °F (36.9 °C) 74 18 98 %   11/18/22 0220 129/87 99.2 °F (37.3 °C) 67 18 91 %       No intake/output data recorded.   11/16 1901 - 11/18 0700  In: 4250 [I.V.:4100]  Out: 1047 [Urine:1650]    Lab Review   Recent Results (from the past 24 hour(s))   GLUCOSE, POC    Collection Time: 11/17/22 11:23 AM   Result Value Ref Range    Glucose (POC) 132 (H) 65 - 100 mg/dL    Performed by [facility]    METABOLIC PANEL, BASIC    Collection Time: 11/17/22  3:22 PM   Result Value Ref Range    Sodium 135 (L) 136 - 145 mmol/L    Potassium 3.2 (L) 3.5 - 5.1 mmol/L    Chloride 107 97 - 108 mmol/L    CO2 22 21 - 32 mmol/L    Anion gap 6 5 - 15 mmol/L    Glucose 775 (HH) 65 - 100 mg/dL    BUN 10 6 - 20 mg/dL    Creatinine 0.81 0.70 - 1.30 mg/dL    BUN/Creatinine ratio 12 12 - 20      eGFR >60 >60 ml/min/1.73m2    Calcium 6.5 (L) 8.5 - 10.1 mg/dL   CBC W/O DIFF    Collection Time: 11/17/22  3:22 PM   Result Value Ref Range    WBC 7.1 4.1 - 11.1 K/uL    RBC 3.31 (L) 4.10 - 5.70 M/uL    HGB 9.6 (L) 12.1 - 17.0 g/dL    HCT 31.6 (L) 36.6 - 50.3 %    MCV 95.5 80.0 - 99.0 FL    MCH 29.0 26.0 - 34.0 PG    MCHC 30.4 30.0 - 36.5 g/dL    RDW 17.2 (H) 11.5 - 14.5 %    PLATELET 86 (L) 376 - 400 K/uL    MPV 12.0 8.9 - 12.9 FL    NRBC 0.0 0.0  WBC    ABSOLUTE NRBC 0.00 0.00 - 0.01 K/uL   GLUCOSE, POC    Collection Time: 11/17/22  4:52 PM   Result Value Ref Range    Glucose (POC) 174 (H) 65 - 100 mg/dL    Performed by [facility]    GLUCOSE, POC    Collection Time: 11/17/22  5:04 PM   Result Value Ref Range    Glucose (POC) 205 (H) 65 - 100 mg/dL    Performed by Era Palacios    GLUCOSE, POC    Collection Time: 11/17/22  9:12 PM   Result Value Ref Range    Glucose (POC) 113 (H) 65 - 100 mg/dL    Performed by Toni Sloan    METABOLIC PANEL, BASIC    Collection Time: 11/17/22 10:00 PM   Result Value Ref Range    Sodium 144 136 - 145 mmol/L    Potassium 3.3 (L) 3.5 - 5.1 mmol/L    Chloride 110 (H) 97 - 108 mmol/L    CO2 28 21 - 32 mmol/L    Anion gap 6 5 - 15 mmol/L Glucose 78 65 - 100 mg/dL    BUN 12 6 - 20 mg/dL    Creatinine 0.69 (L) 0.70 - 1.30 mg/dL    BUN/Creatinine ratio 17 12 - 20      eGFR >60 >60 ml/min/1.73m2    Calcium 8.2 (L) 8.5 - 10.1 mg/dL   GLUCOSE, POC    Collection Time: 11/18/22  6:58 AM   Result Value Ref Range    Glucose (POC) 98 65 - 100 mg/dL    Performed by Sarabjit العلي        NEUROLOGICAL EXAM:    Appearance: The patient is well developed, well nourished,  and is in no acute distress. Mental Status: Oriented to place. Awake and alert. Mood and affect appropriate. Cranial Nerves:   Intact visual fields. VANGIE, EOM's full, no nystagmus, no ptosis. Facial sensation is normal. Facial movement is symmetric. Hearing is normal bilaterally. Palate is midline with normal sternocleidomastoid and trapezius muscles are normal. Tongue is midline. Motor:  Moved right upper and lower ext on commands. Weakness in the left upper and lower extremity. Flaccid in the left upper. rength in upper and lower proximal and distal muscles. Normal bulk and tone. Reflexes:   Deep tendon reflexes 2+/4 and symmetrical.   Sensory:   Symmetric and intact   Gait:  Deferred. Tremor:   No tremor noted. Cerebellar:  Unable to assess coordination intact for finger-nose-finger due to patient's condition. Neurovascular:  Normal heart sounds and regular rhythm, peripheral pulses intact, and no carotid bruits. Assessment:  1) CVA: Mr. Oz Anaya is a 70year old male with acute infarction in the right occipital and temporal lobe . He remained dysarthric and flaccid in the right upper extremity.  - Head CT 11/8/22 showed no acute process or change compared to prior exam.  - Brain MRI 11/16/22 showed small to moderate sized acute infarction in the right occipital lobe with minimal/small infarction in the right temporal lobe. Severe cerebral atrophy. There was no intracranial mass, hemorrhage.  Numerous scattered chronic infarcts.  - Duplex US 7/30/22 showed left proximal ICA with irregular mixed plaque with high-grade stenosis, per criteria greater than 70%. - Echo showed EF 50--55%  - CTA head and neck 7/30/22 showed severe cervical and cerebral atherosclerotic vasculopathy. Greater than 90% stenosis in the proximal ICA on the left with strings of the left proximal ICA. Moderate chronic microvascular ischemic change and cerebral atrophy. Numerous  scattered chronic small remote lacunar infarctions right and left basal ganglia, right and left frontal lobes, left francine. 2) History of old infarcts  3) Hypertension  4) Diabetes  5) Cirrhosis     Active Problems:    UTI (urinary tract infection) (11/8/2022)      Plan:  - Greater than 90% stenosis in the proximal ICA on the left with strings of the left proximal ICA noted on 7/30/22 and vascular surgery recommended carotid endarterectomy once patient recovered. There were no records showing patient got procedure done, will like to pursue this and get it done if not completed since that could be the cause of his strokes. - Will consult vascular surgery for 90% stenosis. Continue aspirin 81 mg, atorvastatin 40 mg, and plavix 75 mg .  - PT/OT/Speech  - Will sign off from a neurological standpoint. Please don't hesitate to reach out for any suggestions or concerns.      Thanks for the consult,    Signed:  Marcel Puckett  11/18/2022  8:57 AM     Collaborating Physician:  Dr. Luis Arteaga

## 2022-11-18 NOTE — PROGRESS NOTES
CM reviewed chart. Patient is pending an NG tube and follow up [facility] per physician. CM has updated [address] Patient can return to [address] when medically clear. Patient will not need authorization. CM will continue to follow.

## 2022-11-18 NOTE — PERIOP NOTES
Patient presented to Endo with O2 sats in low 80s. Crackles and rhonchi present in all lung fields. Patient immediately placed on non-rebreather mask and anesthesiologist notified. Patient O2 sats climbed to 98% with mask. Anesthesiologist reassessed patient and deemed patient OK for procedure.

## 2022-11-18 NOTE — CONSULTS
[address] [place] [address]    Name:  Mariposa Miller  MR#:  [id number]  :  1950  ACCOUNT #:  [de-identified]  DATE OF SERVICE:  2022    PSYCHIATRIC CONSULTATION    DATE OF CONSULT:  2022  DATE SEEN:  2022 and 2022    REASON FOR CONSULTATION:  Psychosis, agitation. Saw the patient. Chart reviewed. HISTORY OF PRESENT ILLNESS:  This is a 75-year-old  male patient, drowsy, closed eyes, no eye contact, did not respond to questions asked, rambling, disorganized. He has multiple issues, a transient facial droop, history of stroke, that is a TIA versus CVA. The patient admitted for possible TIA. MRI was ordered. Showed acute ischemic stroke of the right parietal area, bilateral CVA. CT was negative. He had vascular studies bilaterally. Carotid ultrasound revealed 50% stenosis of right ICA and 90% stenosis of left ICA. Urine culture positive for Gram-negative rods, Proteus mirabilis, and sensitive to ceftriaxone and cefazolin. Four weeks ago, he had a stroke, there is a cardiac issue. The patient currently on  1. Aripiprazole 20 mg.  2.  Aspirin. 3.  Atorvastatin 40 mg.  4.  Balsam of [facility]. 5.  Carvedilol 12.5 mg twice a day. 6.  Clopidogrel. 7.  Lexapro 20 mg daily. 8.  Furosemide. 9.  Gabapentin 100 mg per NG tube at bedtime. 10.  Insulin. 11.  Losartan. 12.  Melatonin 10 mg - NG tube. 13.  Pantoprazole. 14.  Potassium chloride. 15.  Senna. 16.  Sodium chloride. 17.  Tylenol p.r.n. 18.  Hydroxyzine 25 mg per NG tube at bedtime p.r.n.  19.  Polyethylene glycol. 20.  Tramadol. ALLERGIES:  NO KNOWN ALLERGIES TO MEDICATIONS. MENTAL STATUS EXAM:  In bed, perplexed, confused, talking, did not make much sense, no attention, not able to respond to the questions asked, but you can see he is perplexed, could not carry on a conversation. Insight poor. Judgment is poor.     DIAGNOSES:  Cognitive impairment secondary to multiple strokes, cerebellar atrophy. Medical, metabolic, infectious issues. Confused with psychosis. PLAN:  Underlying medical conditions, metabolic/infectious issues. Continue Lexapro, gabapentin, melatonin and Abilify. Add a low-dose mood stabilizer such as Trileptal 150 mg daily to reduce intensity of his agitation and restlessness. He has p.r.n. hydroxyzine.       MD ALESSANDRA [id number]  D:  11/17/2022 15:51  T:  11/17/2022 20:15  JOB #:  [id number]

## 2022-11-18 NOTE — PROGRESS NOTES
Problem: Falls - Risk of  Goal: *Absence of Falls  Description: Document Sofia Fothergill Fall Risk and appropriate interventions in the flowsheet.   Outcome: Progressing Towards Goal  Note: Fall Risk Interventions:       Mentation Interventions: Bed/chair exit alarm, Increase mobility, More frequent rounding, Reorient patient, Room close to nurse's station    Medication Interventions: Bed/chair exit alarm, Teach patient to arise slowly    Elimination Interventions: Call light in reach, Bed/chair exit alarm

## 2022-11-18 NOTE — PROGRESS NOTES
Bedside shift change report given to KYRA Perla 16   (oncoming nurse) by   Elaina Lusnford RN (offgoing nurse). Report included the following information SBAR.

## 2022-11-18 NOTE — PROGRESS NOTES
[facility]      Progress Note    Date:2022       Room:[facility]  Patient Name:Stanislaw Stephens     Date of Birth:     Age:71 y.o. Subjective    Subjective   Pt seen at [facility]. Resting comfortably. Per nursing, no acute overnight events. Pt pending PEG tube placement    Review of Systems  Unable to accurately obtain due to current medical condition    Objective         Vitals Last 24 Hours:  TEMPERATURE:  Temp  Av.5 °F (36.9 °C)  Min: 97.9 °F (36.6 °C)  Max: 99.2 °F (37.3 °C)  RESPIRATIONS RANGE: Resp  Av.3  Min: 18  Max: 19  PULSE OXIMETRY RANGE: SpO2  Av %  Min: 90 %  Max: 98 %  PULSE RANGE: Pulse  Av.8  Min: 67  Max: 76  BLOOD PRESSURE RANGE: Systolic (38BAT), JHZ:350 , Min:116 , ECX:359   ; Diastolic (46IQQ), EV:93, Min:59, Max:87    I/O (24Hr): Intake/Output Summary (Last 24 hours) at 2022 1240  Last data filed at 2022 0526  Gross per 24 hour   Intake 2765 ml   Output 1200 ml   Net 1565 ml     Objective  GEN: Pt is in NAD. Dressing noted to B/L LE. Bunny boots noted to B/L LE in proper placement. No family noted at [facility]  DERM: Ulcer noted to the dorsal aspect of the right hallux. No fluctuance noted. Wound base right hallux is 100% eschar. Eschar noted to the right fifth toe. VASC: Pedal pulses (DP/PT) diminished to B/L LE. CFT<3sec to all digits of B/L LE. No pedal hair growth noted to the level of the digits for B/L LE. Skin temp is warm to cool from proximal to distal for B/L LE. Neg homans/brain signs to B/L LE. No varicosities or telangectasias noted to B/L LE.  NEURO: Protective and epicritic sensations absent intact to B/L LE  MSK: (-) POP, No gross deformities. Decreased muscle tone and bulk noted to B/L LE.     Labs/Imaging/Diagnostics    Labs:  CBC:  Recent Labs     22  1522 22  0352   WBC 7.1 4.5   RBC 3.31* 3.26*   HGB 9.6* 9.3*   HCT 31.6* 28.8*   MCV 95.5 88.3   RDW 17.2* 16.5*   PLT 86* 109* CHEMISTRIES:  Recent Labs     11/17/22  2200 11/17/22  1522 11/16/22  0352    135* 150*   K 3.3* 3.2* 3.6   * 107 118*   CO2 28 22 22   BUN 12 10 12   CA 8.2* 6.5* 7.6*   PT/INR:No results for input(s): INR, INREXT in the last 72 hours. No lab exists for component: PROTIME  APTT:No results for input(s): APTT in the last 72 hours. LIVER PROFILE:  Recent Labs     11/16/22 0352   AST 87*   ALT 86*     Lab Results   Component Value Date/Time    ALT (SGPT) 86 (H) 11/16/2022 03:52 AM    AST (SGOT) 87 (H) 11/16/2022 03:52 AM    Alk. phosphatase 61 11/16/2022 03:52 AM    Bilirubin, direct 0.1 11/16/2022 03:52 AM    Bilirubin, total 0.5 11/16/2022 03:52 AM       Imaging Last 24 Hours:  XR CHEST PORT    Result Date: 11/17/2022  INDICATION: NG tube placement confirmation post advancement of NG tube by 10 cm EXAMINATION:  AP CHEST, PORTABLE COMPARISON: November 17, 2022 FINDINGS: Single AP portable view of the chest demonstrates nasogastric tube is present, extending to the left upper quadrant and advanced since prior study with sidehole now over the gastroesophageal junction. Tip not included on the study. The cardiomediastinal silhouette is unchanged. Patchy bilateral airspace disease. Interval advancement of nasogastric tube, although tip not included on this study. Evaluation of nasogastric tube position is typically better performed with KUB. XR CHEST PORT    Result Date: 11/17/2022  EXAM:  XR CHEST PORT INDICATION: Dysphagia/fever, eval for PNA COMPARISON: 11/15/2022. TECHNIQUE: Single frontal view of the chest. FINDINGS: No significant change in patchy bilateral opacities, worse in the lung bases and the right midlung. No evidence of an effusion or pneumothorax. Enteric tube tip projects over the very proximal stomach, just past the gastroesophageal junction with the sidehole in the distal esophagus. Status post median sternotomy. 1.  No significant change in basilar predominant opacities. These are nonspecific but can be seen with aspiration pneumonitis/pneumonia in the appropriate setting. 2.  Enteric tube tip projects just past the gastroesophageal junction with proximal sidehole in the distal esophagus. Consider advancing. Assessment//Plan   Active Problems:    UTI (urinary tract infection) (11/8/2022)      Assessment & Plan    Right hallux ulcer  DM T2  PAD      Pt seen and evaluated at [facility]  - Current labs and diagnostics personally reviewed  - Cont current wound care (betadine WTD daily) with strict offloading while in bed with bunny boots  - Abx per ID  - From podiatry standpoint, pt stable for DC with outpatient follow up in our office  - We will continue to follow patient and update recs as needed while pt still in house          Jah DE LA CRUZ  [address] Gustavusdora Varma DPM, [facility], [address] [place] [address] and Foot Surgery  [address] [facility], [address] [place] [address]  O: [phone]  F: [phone]    * [employer] available 24/7      Electronically signed by Oneyda Barroso DPM on 11/18/2022 at 12:40 PM

## 2022-11-18 NOTE — PROGRESS NOTES
IMPRESSION:   Acute hypoxic respiratory failure resolved  Probably urinary tract infection  Leukocytosis improved  Carotid artery disease history of CVA  Hypokalemia      RECOMMENDATIONS/PLAN:   70-year-old male nursing home resident admitted with hypotension and unresponsiveness now he is alert awake agitated  Hemodynamically stable  Previous 2D echo showed ejection fraction 60 to 65%  Chest x-ray shows fluid overload congestive changes   Potassium corrected     [x] High complexity decision making was performed  [x] See my orders for details  HPI  70-year-old nursing home resident came in because of unresponsiveness he had a history of stroke in the past patient was hypotensive hypoxic rapid response was called he was admitted to the floor initially received IV fluid hemodynamically unstable started on vasopressors Levophed transferred to ICU White count was elevated he was put on oxygen 4 L nasal cannula unable to get any started the patient he has a right foot wound and multiple wounds of the lower extremities dressing in place. He has carotid artery stenosis only supposed to go for surgery but unable to get cardiac clearance because of nuclear stress test he has 90% stenosis of left ICA and 50% stenosis of right ICA. So now he is admitted to ICU and critical care consult was called  PMH:  has a past medical history of Cirrhosis ([facility] [address] Diabetes ([facility] [address] Hypertension, and Stroke ([facility] [address] PSH:   has a past surgical history that includes hx back surgery; hx gi; and ir insert non tunl cvc over 5 yrs (11/9/2022). FHX: family history includes Heart Disease in his father. SHX:  reports that he has quit smoking. He has never used smokeless tobacco. He reports that he does not currently use alcohol. He reports that he does not currently use drugs.     ALL: No Known Allergies     MEDS:   [x] Reviewed - As Below   [] Not reviewed    Current Facility-Administered Medications   Medication    balsam peru-castor oiL (VENELEX) ointment    piperacillin-tazobactam (ZOSYN) 3.375 g in 0.9% sodium chloride (MBP/ADV) 100 mL MBP    dextrose 5 % - 0.45% NaCl infusion    hydrALAZINE (APRESOLINE) 20 mg/mL injection 20 mg    aspirin chewable tablet 81 mg    atorvastatin (LIPITOR) tablet 40 mg    escitalopram oxalate (LEXAPRO) tablet 20 mg    gabapentin (NEURONTIN) capsule 100 mg    melatonin tablet 10 mg    baclofen (LIORESAL) tablet 5 mg    hydrOXYzine HCL (ATARAX) tablet 25 mg    polyethylene glycol (MIRALAX) packet 17 g    traMADoL (ULTRAM) tablet 50 mg    doxazosin (CARDURA) tablet 1 mg    potassium chloride (KLOR-CON) packet for solution 20 mEq    pantoprazole (PROTONIX) granules for oral suspension 40 mg    sodium chloride (NS) flush 5-40 mL    sodium chloride (NS) flush 5-40 mL    acetaminophen (TYLENOL) tablet 650 mg    Or    acetaminophen (TYLENOL) suppository 650 mg    ondansetron (ZOFRAN ODT) tablet 4 mg    Or    ondansetron (ZOFRAN) injection 4 mg    [Held by provider] enoxaparin (LOVENOX) injection 40 mg    glucose chewable tablet 16 g    glucagon (GLUCAGEN) injection 1 mg    dextrose 10% infusion 0-250 mL    insulin lispro (HUMALOG) injection    ARIPiprazole (ABILIFY) tablet 2 mg    carvediloL (COREG) tablet 12.5 mg    [Held by provider] clopidogreL (PLAVIX) tablet 75 mg    losartan (COZAAR) tablet 50 mg    senna-docusate (PERICOLACE) 8.6-50 mg per tablet 1 Tablet    [Held by provider] tamsulosin (FLOMAX) capsule 0.4 mg    furosemide (LASIX) tablet 20 mg      MAR reviewed and pertinent medications noted or modified as needed   Current Facility-Administered Medications   Medication    balsam peru-castor oiL (VENELEX) ointment    piperacillin-tazobactam (ZOSYN) 3.375 g in 0.9% sodium chloride (MBP/ADV) 100 mL MBP    dextrose 5 % - 0.45% NaCl infusion    hydrALAZINE (APRESOLINE) 20 mg/mL injection 20 mg    aspirin chewable tablet 81 mg    atorvastatin (LIPITOR) tablet 40 mg    escitalopram oxalate (LEXAPRO) tablet 20 mg gabapentin (NEURONTIN) capsule 100 mg    melatonin tablet 10 mg    baclofen (LIORESAL) tablet 5 mg    hydrOXYzine HCL (ATARAX) tablet 25 mg    polyethylene glycol (MIRALAX) packet 17 g    traMADoL (ULTRAM) tablet 50 mg    doxazosin (CARDURA) tablet 1 mg    potassium chloride (KLOR-CON) packet for solution 20 mEq    pantoprazole (PROTONIX) granules for oral suspension 40 mg    sodium chloride (NS) flush 5-40 mL    sodium chloride (NS) flush 5-40 mL    acetaminophen (TYLENOL) tablet 650 mg    Or    acetaminophen (TYLENOL) suppository 650 mg    ondansetron (ZOFRAN ODT) tablet 4 mg    Or    ondansetron (ZOFRAN) injection 4 mg    [Held by provider] enoxaparin (LOVENOX) injection 40 mg    glucose chewable tablet 16 g    glucagon (GLUCAGEN) injection 1 mg    dextrose 10% infusion 0-250 mL    insulin lispro (HUMALOG) injection    ARIPiprazole (ABILIFY) tablet 2 mg    carvediloL (COREG) tablet 12.5 mg    [Held by provider] clopidogreL (PLAVIX) tablet 75 mg    losartan (COZAAR) tablet 50 mg    senna-docusate (PERICOLACE) 8.6-50 mg per tablet 1 Tablet    [Held by provider] tamsulosin (FLOMAX) capsule 0.4 mg    furosemide (LASIX) tablet 20 mg      PMH:  has a past medical history of Cirrhosis ([facility] [address] Diabetes ([facility] [address] Hypertension, and Stroke ([facility] [address] PSH:   has a past surgical history that includes hx back surgery; hx gi; and ir insert non tunl cvc over 5 yrs (11/9/2022). FHX: family history includes Heart Disease in his father. SHX:  reports that he has quit smoking. He has never used smokeless tobacco. He reports that he does not currently use alcohol. He reports that he does not currently use drugs. ROS:Review of systems not obtained due to patient factors. Hemodynamics:    CO:    CI:    CVP:    SVR:   PAP Systolic:    PAP Diastolic:    PVR:    MD63:        Ventilator Settings:      Mode Rate TV Press PEEP FiO2 PIP Min.  Vent                              Vital Signs: Telemetry:    normal sinus rhythm Intake/Output: Visit Vitals  /62 (BP 1 Location: Left upper arm, BP Patient Position: At rest)   Pulse 74   Temp 98.5 °F (36.9 °C)   Resp 18   Ht 5' 9\" (1.753 m)   Wt 62.5 kg (137 lb 12.6 oz)   SpO2 98%   BMI 20.35 kg/m²       Temp (24hrs), Av.5 °F (36.9 °C), Min:97.9 °F (36.6 °C), Max:99.2 °F (37.3 °C)        O2 Device: None (Room air) O2 Flow Rate (L/min): 2 l/min       Wt Readings from Last 4 Encounters:   11/10/22 62.5 kg (137 lb 12.6 oz)   22 84.8 kg (187 lb)   22 84.8 kg (187 lb)   10/28/20 99.8 kg (220 lb)          Intake/Output Summary (Last 24 hours) at 2022 1047  Last data filed at 2022 0526  Gross per 24 hour   Intake 2765 ml   Output 1200 ml   Net 1565 ml         Last shift:      No intake/output data recorded. Last 3 shifts:  1901 -  0700  In: 4250 [I.V.:4100]  Out: 9920 [Urine:1650]       Physical Exam:     General: He is on room air  HEENT: NCAT, poor dentition, lips and mucosa dry  Eyes: anicteric; conjunctiva clear  Neck: no nodes, trach midline; no accessory MM use.   Chest: no deformity,   Cardiac: R regular; no murmur;   Lungs: distant breath sounds; wheezes  Abd: soft, NT, hypoactive BS  Ext: Lower extremity wound bandage in place  : NO kennedy, clear urine  Neuro: Unresponsive  Psych-unable to assess  Skin: warm, dry, no cyanosis;   Pulses: 1-2+ Bilateral pedal, radial  Capillary: brisk; pale      DATA:    MAR reviewed and pertinent medications noted or modified as needed  MEDS:   Current Facility-Administered Medications   Medication    balsam peru-castor oiL (VENELEX) ointment    piperacillin-tazobactam (ZOSYN) 3.375 g in 0.9% sodium chloride (MBP/ADV) 100 mL MBP    dextrose 5 % - 0.45% NaCl infusion    hydrALAZINE (APRESOLINE) 20 mg/mL injection 20 mg    aspirin chewable tablet 81 mg    atorvastatin (LIPITOR) tablet 40 mg    escitalopram oxalate (LEXAPRO) tablet 20 mg    gabapentin (NEURONTIN) capsule 100 mg    melatonin tablet 10 mg    baclofen (LIORESAL) tablet 5 mg    hydrOXYzine HCL (ATARAX) tablet 25 mg    polyethylene glycol (MIRALAX) packet 17 g    traMADoL (ULTRAM) tablet 50 mg    doxazosin (CARDURA) tablet 1 mg    potassium chloride (KLOR-CON) packet for solution 20 mEq    pantoprazole (PROTONIX) granules for oral suspension 40 mg    sodium chloride (NS) flush 5-40 mL    sodium chloride (NS) flush 5-40 mL    acetaminophen (TYLENOL) tablet 650 mg    Or    acetaminophen (TYLENOL) suppository 650 mg    ondansetron (ZOFRAN ODT) tablet 4 mg    Or    ondansetron (ZOFRAN) injection 4 mg    [Held by provider] enoxaparin (LOVENOX) injection 40 mg    glucose chewable tablet 16 g    glucagon (GLUCAGEN) injection 1 mg    dextrose 10% infusion 0-250 mL    insulin lispro (HUMALOG) injection    ARIPiprazole (ABILIFY) tablet 2 mg    carvediloL (COREG) tablet 12.5 mg    [Held by provider] clopidogreL (PLAVIX) tablet 75 mg    losartan (COZAAR) tablet 50 mg    senna-docusate (PERICOLACE) 8.6-50 mg per tablet 1 Tablet    [Held by provider] tamsulosin (FLOMAX) capsule 0.4 mg    furosemide (LASIX) tablet 20 mg        Labs:    Recent Labs     11/17/22  1522 11/16/22  0352   WBC 7.1 4.5   HGB 9.6* 9.3*   PLT 86* 109*       Recent Labs     11/17/22  2200 11/17/22  1522 11/16/22  0352    135* 150*   K 3.3* 3.2* 3.6   * 107 118*   CO2 28 22 22   GLU 78 775* 174*   BUN 12 10 12   CREA 0.69* 0.81 0.50*   CA 8.2* 6.5* 7.6*   ALB  --   --  1.8*   ALT  --   --  86*       No results for input(s): PH, PCO2, PO2, HCO3, FIO2 in the last 72 hours. No results for input(s): CPK, CKNDX, TROIQ in the last 72 hours.     No lab exists for component: CPKMB    No results found for: BNPP, BNP   Lab Results   Component Value Date/Time    Culture result:  11/09/2022 02:10 AM     Heavy * methicillin resistant staphylococcus aureus *    Culture result: Heavy Diphtheroids 11/09/2022 02:10 AM    Culture result: No growth 6 days 11/08/2022 01:25 PM     Lab Results   Component Value Date/Time TSH 1.67 07/30/2022 07:11 AM        Imaging:    Results from Hospital Encounter encounter on 11/08/22    XR CHEST PORT    Narrative  INDICATION: NG tube placement confirmation post advancement of NG tube by 10 cm    EXAMINATION:  AP CHEST, PORTABLE    COMPARISON: November 17, 2022    FINDINGS: Single AP portable view of the chest demonstrates nasogastric tube is  present, extending to the left upper quadrant and advanced since prior study  with sidehole now over the gastroesophageal junction. Tip not included on the  study. The cardiomediastinal silhouette is unchanged. Patchy bilateral airspace  disease. Impression  Interval advancement of nasogastric tube, although tip not included on this  study. Evaluation of nasogastric tube position is typically better performed  with KUB. Results from [place] encounter on 11/08/22    CT FOOT RT W CONT    Narrative  EXAM: CT FOOT RT W CONT    INDICATION: Right foot swelling and abscess. Evaluate for osteomyelitis. Hypertension, diabetes, and cirrhosis. COMPARISON: Right foot views on 11/10/2022    CONTRAST: 100 mL of Isovue-370. TECHNIQUE: Helical CT of the right foot during uneventful rapid bolus  intravenous contrast administration. Coronal and Sagittal reformats were  generated. Images reviewed in soft tissue and bone windows. CT dose reduction  was achieved through the use of a standardized protocol tailored for this  examination and automatic exposure control for dose modulation. FINDINGS: Bones: Mild osteopenia. No fracture or osteomyelitis. Joint fluid: Physiologic. Articulations: no evidence of septic arthritis. 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 Tendons: No full-thickness tendon tear. Muscles: Mild diffuse atrophy. Soft tissue mass: None. No fluid collection. Impression  1. No abscess or osteomyelitis. 2. No fracture.       11/10 patient is barely responsive now on room air off pressors getting IV fluid chest x-ray shows congestive changes IV fluid has been decreased, replace potassium, WBC much improved  11/11 Patient is out of ICU, responding slightly, he is on room air. PCO2 30. WBC improving, continues to decrease. 11/12 On room air, condition remains same open eyes but does not follow any commands, replace potassium  11/13 Room air, no O2 in hospital. He opened his eyes, tried to respond slightly by denying SOB, but unable to communicate clearly. NG tube in place. 11/14 Pt is seen resting, he is not currently on any O2, SpO2 94%. NG tube in place. 11/15 Pt is awake feeling well, more responsive today. He denies SOB. He is still on room air. Pt still has NG tube in place.    11/16 condition same on room air pleasantly confused

## 2022-11-18 NOTE — PROGRESS NOTES
Infectious Disease Progress Note           Subjective:   Alert and following commands, no acute events since last seen. Scheduled for peg tube placement today per RN   Objective:   Physical Exam:     Visit Vitals  /62 (BP 1 Location: Left upper arm, BP Patient Position: At rest)   Pulse 74   Temp 98.5 °F (36.9 °C)   Resp 18   Ht 5' 9\" (1.753 m)   Wt 137 lb 12.6 oz (62.5 kg)   SpO2 98%   BMI 20.35 kg/m²    O2 Flow Rate (L/min): 2 l/min O2 Device: None (Room air)    Temp (24hrs), Av.5 °F (36.9 °C), Min:97.9 °F (36.6 °C), Max:99.2 °F (37.3 °C)    No intake/output data recorded.     1901 -  0700  In: 4250 [I.V.:4100]  Out: 1650 [Urine:1650]    General: NAD, alert, following commands   HEENT: VANGIE, Moist mucosa, NGT in place   Lungs:Decreased at the bases, no wheeze/rhonchi   Heart: S1S2+, RRR, no murmur  Abdo: Soft, NT, ND, +BS   Exts:Right great toe ulcer w dry eschar   Skin: b/l leg ulcers, stable ulcerations on b/l feet     Data Review:       Recent Days:  Recent Labs     22  1522 22  0352   WBC 7.1 4.5   HGB 9.6* 9.3*   HCT 31.6* 28.8*   PLT 86* 109*       Recent Labs     22  1044 22  2200 22  1522   BUN 10 12 10   CREA 0.50* 0.69* 0.81       Lab Results   Component Value Date/Time    C-Reactive protein 1.64 (H) 2022 10:18 AM        Microbiology     Results       Procedure Component Value Units Date/Time    MRSA SCREEN - PCR (NASAL) [[id number]]  (Abnormal) Collected: 22 0231    Order Status: Completed Specimen: Swab Updated: 22     MRSA by PCR, Nasal DETECTED        Comment: [email or web address] and read back by Braeden [[id number]]  (Susceptibility) Collected: 22    Order Status: Completed Specimen: Wound from Toe Updated: 22 1327     Special Requests: No Special Requests        GRAM STAIN Occasional WBCs seen               2+ Gram positive cocci in pairs chains and clusters           Culture result:       Heavy * methicillin resistant staphylococcus aureus *            Heavy Diphtheroids       Susceptibility        Staphylococcus aureus Methcillin Resistant      TELMA      Ciprofloxacin ($) Resistant      Clindamycin ($) Resistant      Daptomycin ($$$$$) Susceptible      Doxycycline ($$) Intermediate      Erythromycin ($$$$) Resistant      Gentamicin ($) Susceptible      Inducible Clindamycin  See below  [1]       Levofloxacin ($) Resistant      Linezolid ($$$$$) Susceptible      Oxacillin Resistant      Rifampin ($$$$) Susceptible  [2]       Tetracycline Resistant      Trimeth/Sulfa Susceptible      Vancomycin ($) Susceptible                   [1]  HIDE     [2]  Rifampin is not to be used for mono-therapy. CULTURE, BLOOD, PAIRED [[id number]] Collected: 11/08/22 1325    Order Status: Completed Specimen: Blood Updated: 11/14/22 0121     Special Requests: No Special Requests        Culture result: No growth 6 days       CULTURE, URINE [[id number]] Collected: 11/08/22 1115    Order Status: Completed Specimen: Urine Updated: 11/10/22 1506     Special Requests: No Special Requests        Elizabeth Count 20,000        Elizabeth Count colonies/ml        Culture result:       Mixed urogenital kristen isolated                     Diagnostics   CXR Results  (Last 48 hours)                 11/17/22 2123  XR CHEST PORT Final result    Impression:  Interval advancement of nasogastric tube, although tip not included on this   study. Evaluation of nasogastric tube position is typically better performed   with KUB.        Narrative:  INDICATION: NG tube placement confirmation post advancement of NG tube by 10 cm       EXAMINATION:  AP CHEST, PORTABLE       COMPARISON: November 17, 2022       FINDINGS: Single AP portable view of the chest demonstrates nasogastric tube is   present, extending to the left upper quadrant and advanced since prior study   with sidehole now over the gastroesophageal junction. Tip not included on the   study. The cardiomediastinal silhouette is unchanged. Patchy bilateral airspace   disease. 11/17/22 1323  XR CHEST PORT Final result    Impression:  1. No significant change in basilar predominant opacities. These are   nonspecific but can be seen with aspiration pneumonitis/pneumonia in the   appropriate setting. 2.  Enteric tube tip projects just past the gastroesophageal junction with   proximal sidehole in the distal esophagus. Consider advancing. Narrative:  EXAM:  XR CHEST PORT       INDICATION: Dysphagia/fever, eval for PNA       COMPARISON: 11/15/2022. TECHNIQUE: Single frontal view of the chest.       FINDINGS: No significant change in patchy bilateral opacities, worse in the lung   bases and the right midlung. No evidence of an effusion or pneumothorax. Enteric   tube tip projects over the very proximal stomach, just past the gastroesophageal   junction with the sidehole in the distal esophagus. Status post median sternotomy. Assessment/Plan     SIRS, due to suspected aspiration PNA         Patchy infiltrates on CXR, on RA, clear lungs on exam         Remains afebrile w a normal WBC on most recent labs         On day # 2 of empiric Zosyn. Routine labs in the morning     Right great toe infection  MRSA isolated from Cx of right great toe         No abscess or osteomyelitis on CT (11/11)         Suspected DTI involving left lateral foot on exam         Healing ulcers on b/l legs. S/p 7 days of Vanc     3. UTI, abnormal UA, Mixed kristen isolated from urine Cx (11/08)    4. CVA w Small to moderate sized acute infarction in the right occipital lobe with  minimal/small infarction in the right temporal lobe on MRI head (11/16))       Right posterior periventricular white matter involving the  right parietal lobe On MRI in 07/2022    5.  Dysphagia, NGT in place, Scheduled for peg tube placement today     Alba Valeria Bruce MD    11/18/2022

## 2022-11-18 NOTE — PROGRESS NOTES
Chart reviewed and spoke with nsg. PEG tube placement delayed until this date. Pt is NPO for PEG placement this date. Will cont to hold MBS at this time and cont SLP tx when pt able to participate.

## 2022-11-18 NOTE — ANESTHESIA POSTPROCEDURE EVALUATION
Procedure(s):  PERCUTANEOUS ENDOSCOPIC GASTROSTOMY TUBE INSERTION  ESOPHAGOGASTRODUODENOSCOPY (EGD). MAC    Anesthesia Post Evaluation      Multimodal analgesia: multimodal analgesia not used between 6 hours prior to anesthesia start to PACU discharge  Patient location during evaluation: bedside (Endoscopy suite)  Patient participation: complete - patient cannot participate  Level of consciousness: sleepy but conscious  Pain score: 0  Pain management: adequate  Airway patency: patent  Anesthetic complications: no  Cardiovascular status: acceptable  Respiratory status: acceptable and nasal cannula  Hydration status: acceptable  Comments: This patient remained on the stretcher. The patient was handed off to the endoscopy nursing team.  All questions regarding pre-, intra-, and postoperative care were answered.   Post anesthesia nausea and vomiting:  none      INITIAL Post-op Vital signs:   Vitals Value Taken Time   BP 97/55 11/18/22 1459   Temp 36 °C (96.8 °F) 11/18/22 1459   Pulse 61 11/18/22 1459   Resp 18 11/18/22 1459   SpO2 93 % 11/18/22 1459

## 2022-11-18 NOTE — ROUTINE PROCESS
Called Dr. Kenneth Baez regarding chest XR result recommending advancement of NG tube. As per Dr. Kenneth Baez, NG tube needs to be advanced by 10 cm. NG tube advanced by this NOD and YAJAIRA Sumner.

## 2022-11-18 NOTE — PROGRESS NOTES
Problem: Falls - Risk of  Goal: *Absence of Falls  Description: Document Cherylle Cockayne Fall Risk and appropriate interventions in the flowsheet.   Outcome: Progressing Towards Goal  Note: Fall Risk Interventions:       Mentation Interventions: Adequate sleep, hydration, pain control, Bed/chair exit alarm, Door open when patient unattended, More frequent rounding, Reorient patient    Medication Interventions: Bed/chair exit alarm, Patient to call before getting OOB    Elimination Interventions: Bed/chair exit alarm, Toileting schedule/hourly rounds

## 2022-11-18 NOTE — PROGRESS NOTES
Hospitalist Progress Note    Subjective:   Daily Progress Note: 11/18/2022 9:21 AM    Patient resting comfortably. Patient is disoriented but pleasant.     Current Facility-Administered Medications   Medication Dose Route Frequency    balsam peru-castor oiL (VENELEX) ointment   Topical BID    piperacillin-tazobactam (ZOSYN) 3.375 g in 0.9% sodium chloride (MBP/ADV) 100 mL MBP  3.375 g IntraVENous Q8H    dextrose 5 % - 0.45% NaCl infusion  75 mL/hr IntraVENous CONTINUOUS    hydrALAZINE (APRESOLINE) 20 mg/mL injection 20 mg  20 mg IntraVENous Q6H PRN    aspirin chewable tablet 81 mg  81 mg Per NG tube DAILY    atorvastatin (LIPITOR) tablet 40 mg  40 mg Per NG tube DAILY    escitalopram oxalate (LEXAPRO) tablet 20 mg  20 mg Per NG tube DAILY    gabapentin (NEURONTIN) capsule 100 mg  100 mg Per NG tube QHS    melatonin tablet 10 mg  10 mg Per NG tube QHS    baclofen (LIORESAL) tablet 5 mg  5 mg Per NG tube BID PRN    hydrOXYzine HCL (ATARAX) tablet 25 mg  25 mg Per NG tube QHS PRN    polyethylene glycol (MIRALAX) packet 17 g  17 g Per NG tube DAILY PRN    traMADoL (ULTRAM) tablet 50 mg  50 mg Per NG tube Q6H PRN    doxazosin (CARDURA) tablet 1 mg  1 mg Per NG tube QHS    potassium chloride (KLOR-CON) packet for solution 20 mEq  20 mEq Per NG tube BID    pantoprazole (PROTONIX) granules for oral suspension 40 mg  40 mg Per NG tube DAILY    sodium chloride (NS) flush 5-40 mL  5-40 mL IntraVENous Q8H    sodium chloride (NS) flush 5-40 mL  5-40 mL IntraVENous PRN    acetaminophen (TYLENOL) tablet 650 mg  650 mg Oral Q6H PRN    Or    acetaminophen (TYLENOL) suppository 650 mg  650 mg Rectal Q6H PRN    ondansetron (ZOFRAN ODT) tablet 4 mg  4 mg Oral Q8H PRN    Or    ondansetron (ZOFRAN) injection 4 mg  4 mg IntraVENous Q6H PRN    [Held by provider] enoxaparin (LOVENOX) injection 40 mg  40 mg SubCUTAneous DAILY    glucose chewable tablet 16 g  4 Tablet Oral PRN    glucagon (GLUCAGEN) injection 1 mg  1 mg IntraMUSCular PRN dextrose 10% infusion 0-250 mL  0-250 mL IntraVENous PRN    insulin lispro (HUMALOG) injection   SubCUTAneous AC&HS    ARIPiprazole (ABILIFY) tablet 2 mg  2 mg Oral DAILY    carvediloL (COREG) tablet 12.5 mg  12.5 mg Oral BID    [Held by provider] clopidogreL (PLAVIX) tablet 75 mg  75 mg Oral DAILY    losartan (COZAAR) tablet 50 mg  50 mg Oral DAILY    senna-docusate (PERICOLACE) 8.6-50 mg per tablet 1 Tablet  1 Tablet Oral DAILY    [Held by provider] tamsulosin (FLOMAX) capsule 0.4 mg  0.4 mg Oral QHS    furosemide (LASIX) tablet 20 mg  20 mg Oral DAILY        Review of Systems  Review of Systems   Unable to perform ROS: Acuity of condition          Objective:     Visit Vitals  /62 (BP 1 Location: Left upper arm, BP Patient Position: At rest)   Pulse 74   Temp 98.5 °F (36.9 °C)   Resp 18   Ht 5' 9\" (1.753 m)   Wt 62.5 kg (137 lb 12.6 oz)   SpO2 98%   BMI 20.35 kg/m²    O2 Flow Rate (L/min): 2 l/min O2 Device: None (Room air)    Temp (24hrs), Av.5 °F (36.9 °C), Min:97.9 °F (36.6 °C), Max:99.2 °F (37.3 °C)      No intake/output data recorded.  1901 -  0700  In: 4250 [I.V.:4100]  Out: 1650 [Urine:1650]    XR CHEST PORT   Final Result   Interval advancement of nasogastric tube, although tip not included on this   study. Evaluation of nasogastric tube position is typically better performed   with KUB. XR CHEST PORT   Final Result   1. No significant change in basilar predominant opacities. These are   nonspecific but can be seen with aspiration pneumonitis/pneumonia in the   appropriate setting. 2.  Enteric tube tip projects just past the gastroesophageal junction with   proximal sidehole in the distal esophagus. Consider advancing. MRI BRAIN WO CONT   Final Result   Small to moderate sized acute infarction in the right occipital lobe with   minimal/small infarction in the right temporal lobe. Severe cerebral atrophy. There is no intracranial mass, hemorrhage.    There are numerous scattered chronic infarcts. No additional acute intracranial process is demonstrated. XR CHEST PORT   Final Result   Interval retraction of nasogastric tube with sidehole over the gastroesophageal   junction. XR CHEST PORT   Final Result   Interval replacement of nasogastric tube, with tip over the gastric fundus. XR CHEST PORT   Final Result   No significant change. XR CHEST PORT   Final Result   1. NG tube terminates in the gastric fundus, with possible kink in the side   hole. Correlate with function. 2.  New patchy consolidation in the right midlung is concerning for pneumonia. Grossly unchanged patchy retrocardiac opacities. Stable trace right pleural   effusion. XR CHEST PORT   Final Result   Nasogastric tube tip advanced overlying the gastric body. XR CHEST PORT   Final Result   Nasogastric tube tip over the gastric fundus. XR CHEST PORT   Final Result   Increased mild interstitial pulmonary edema versus pneumonia. Enteric tube extends into the gastric antrum or first portion of the duodenum. Consider retraction 8-10 cm if the intention is gastric luminal termination. CT FOOT RT W CONT   Final Result      1. No abscess or osteomyelitis. 2. No fracture. DUPLEX LOWER EXT ARTERY BILAT   Final Result      XR FOOT RT MIN 3 V   Final Result   No osseous erosion or soft tissue gas. .      XR CHEST PORT   Final Result      Mild interstitial opacities which may represent mild pulmonary edema unchanged         XR CHEST PORT   Final Result   Addendum (preliminary) 1 of 1   Addendum: NG tube is in appropriate position tip extends below the    diaphragm. Right IJ catheter. No pneumothorax on the right. Final   1. Status post right IJ catheter placement without evidence of complication.        IR INSERT NON TUNL CVC OVER 5 YRS   Final Result   Technically successful ultrasound guided placement of a right internal jugular   vein temporary central venous catheter. A post procedure chest x-ray is   pending. CT HEAD WO CONT   Final Result   No acute process or change compared to the prior exam.            XR CHEST PORT   Final Result   No acute process. IR US GUIDED VASCULAR ACCESS    (Results Pending)   XR SWALLOW FUNC VIDEO    (Results Pending)        PHYSICAL EXAM:    Physical Exam  Vitals and nursing note reviewed. Constitutional:       Appearance: He is ill-appearing. HENT:      Head: Normocephalic and atraumatic. Nose:      Comments: NG tube in place  Cardiovascular:      Rate and Rhythm: Normal rate and regular rhythm. Pulmonary:      Effort: No respiratory distress. Breath sounds: Rhonchi present. Comments: Rhonchi heard upper right and left lobes  Abdominal:      General: Bowel sounds are normal. There is no distension. Palpations: Abdomen is soft. Tenderness: There is no abdominal tenderness. Musculoskeletal:      Right lower leg: No edema. Left lower leg: No edema. Comments: Bunny boots noted to B/L LE in proper placement   Skin:     General: Skin is warm. Capillary Refill: Capillary refill takes less than 2 seconds. Comments: Scabs seen throughout lower extremities   Neurological:      Mental Status: He is alert. He is disoriented.    Psychiatric:      Comments: Unable to assess        Data Review    Recent Results (from the past 24 hour(s))   GLUCOSE, POC    Collection Time: 11/17/22 11:23 AM   Result Value Ref Range    Glucose (POC) 132 (H) 65 - 100 mg/dL    Performed by Lyudmila Guerra    METABOLIC PANEL, BASIC    Collection Time: 11/17/22  3:22 PM   Result Value Ref Range    Sodium 135 (L) 136 - 145 mmol/L    Potassium 3.2 (L) 3.5 - 5.1 mmol/L    Chloride 107 97 - 108 mmol/L    CO2 22 21 - 32 mmol/L    Anion gap 6 5 - 15 mmol/L    Glucose 775 (HH) 65 - 100 mg/dL    BUN 10 6 - 20 mg/dL    Creatinine 0.81 0.70 - 1.30 mg/dL    BUN/Creatinine ratio 12 12 - 20      eGFR >60 >60 ml/min/1.73m2    Calcium 6.5 (L) 8.5 - 10.1 mg/dL   CBC W/O DIFF    Collection Time: 11/17/22  3:22 PM   Result Value Ref Range    WBC 7.1 4.1 - 11.1 K/uL    RBC 3.31 (L) 4.10 - 5.70 M/uL    HGB 9.6 (L) 12.1 - 17.0 g/dL    HCT 31.6 (L) 36.6 - 50.3 %    MCV 95.5 80.0 - 99.0 FL    MCH 29.0 26.0 - 34.0 PG    MCHC 30.4 30.0 - 36.5 g/dL    RDW 17.2 (H) 11.5 - 14.5 %    PLATELET 86 (L) 441 - 400 K/uL    MPV 12.0 8.9 - 12.9 FL    NRBC 0.0 0.0  WBC    ABSOLUTE NRBC 0.00 0.00 - 0.01 K/uL   GLUCOSE, POC    Collection Time: 11/17/22  4:52 PM   Result Value Ref Range    Glucose (POC) 174 (H) 65 - 100 mg/dL    Performed by [employer]    GLUCOSE, POC    Collection Time: 11/17/22  5:04 PM   Result Value Ref Range    Glucose (POC) 205 (H) 65 - 100 mg/dL    Performed by [employer]    GLUCOSE, POC    Collection Time: 11/17/22  9:12 PM   Result Value Ref Range    Glucose (POC) 113 (H) 65 - 100 mg/dL    Performed by Edgar Brambilanegro    METABOLIC PANEL, BASIC    Collection Time: 11/17/22 10:00 PM   Result Value Ref Range    Sodium 144 136 - 145 mmol/L    Potassium 3.3 (L) 3.5 - 5.1 mmol/L    Chloride 110 (H) 97 - 108 mmol/L    CO2 28 21 - 32 mmol/L    Anion gap 6 5 - 15 mmol/L    Glucose 78 65 - 100 mg/dL    BUN 12 6 - 20 mg/dL    Creatinine 0.69 (L) 0.70 - 1.30 mg/dL    BUN/Creatinine ratio 17 12 - 20      eGFR >60 >60 ml/min/1.73m2    Calcium 8.2 (L) 8.5 - 10.1 mg/dL   GLUCOSE, POC    Collection Time: 11/18/22  6:58 AM   Result Value Ref Range    Glucose (POC) 98 65 - 100 mg/dL    Performed by Chapis Montenegro         Assessment/Plan:     Active Problems:    UTI (urinary tract infection) (11/8/2022)        Hospital Course: Jessica Yanez is a 27-year-old male with a PMH of stroke, hypertension, DM, and cirrhosis who presented from nursing home with weakness and hypotension x1 day. Of note recent admission for TIA evaluated and recommended for carotid endarterectomy. Found hypotensive on arrival to the ED.  Initial labs significant for WBC of 15.8, creatinine of 1.46, lactic acid of 2.4, and glucose of 134. UA consistent with urinary tract infection. CT of the head with no acute process. Patient admitted for further work-up. Patient started on IV fluids and ceftriaxone. Vascular surgery, cardiology, and podiatry consulted. KRISTI with duplex shows right common femoral artery and proximal superficial femoral artery occluded with collateralization to popliteal artery, left mid to distal superficial artery occluded, with patent distal anterior/posterior tibial artery, popliteal artery patent and with patent distal anterior tibial artery and posterior tibial artery. On 11/9 transferred to ICU overnight for poor responsiveness and for septic shock. CT of the head negative for acute findings. Patient started on meropenem, vancomycin, IV fluids, and Levophed. Pulmonology and ID consulted. ECHO showed EF of 50-55% with mild MV R and dilated left atrium. XR right foot with generalized osseous demineralization without fracture or dislocation, tissue gas or osseous erosive. CT of right foot with no abscess or osteomyelitis. Patient has carotid artery stenosis and supposed to go for surgery but unable to get cardiac clearance because of nuclear stress test he has 90% stenosis of left ICA and 50% stenosis of right ICA. Urine culture grew mixed kristen. Neurology and psychiatry consulted for confusion. MRI 7/2022 showed acute ischemic stroke of the right parietal area. Repeat MRI brain 11/16/22 showed small to moderate sized acute infarction in the right occipital lobe with minimal/small infarction in the right temporal lobe and severe cerebral atrophy. Continue DAPT and atorvastatin. Patient has had NG tube for nutrition since admission. Speech therapy working with patient. GI consult to evaluate for PEG placement. on 11/15 fever spike of 100.6F. 11/17 CXR with no significant change in basilar predominant opacities. Started on zosyn.  PEG placed on 11/18 and MBS on 11/21.      SIRS, suspected aspiration PNA  Tmax 100.6F, WBC WNLs, CXR with no significant change in basilar predominant opacities   Continue on Zosyn #2/7  ID following    Severe sepsis with septic shock - resolved  2/2 UTI and right great toe ulcer  S/p vancomycin and meropenem  11/8 blood: no growth    Great right toe infection  11/9 wound: MRSA  XR of right foot without fracture, dislocation, soft tissue gas, or osseous erosion  CT of right foot with no abscess or osteomyelitis  S/p vancomycin for 7 days  Continue routine wound care  ID and podiatry following    Metabolic encephalopathy - improving    Severe protein calorie malnutrition  Status post PEG tube  GI following    Dysphagia  SLP following and recommending MBS on Monday    Acute respiratory failure with hypoxia - resolved  On room air    Urinary tract infection  11/8 urine: mixed kristen    Hypertension  Continue on carvedilol, furosemide, and losartan    Carotid stenosis   History of CVA  Repeat MRI brain 11/16/22 showed small to moderate sized acute infarction in the right occipital lobe with minimal/small infarction in the right temporal lobe and severe cerebral atrophy  Continue on DAPT and atorvastatin  Vascular and neurology following    PVD  KRISTI with duplex shows right common femoral artery and proximal superficial femoral artery occluded with collateralization to popliteal artery, left mid to distal superficial artery occluded, with patent distal anterior/posterior tibial artery, popliteal artery patent and with patent distal anterior tibial artery and posterior tibial artery    DVT Prophylaxis: SCDs  GI Prophylaxis: protonix  Discharge and disposition barriers: MBS, tolerating PEG, 48hrs  Wound care: betadine WTD daily with strict offloading while in bed with bunny boots    Jyoti Ponce, (son) [phone] - updated on patient condition    Care Plan discussed with: Patient/Family, Nurse, and     Total time spent with patient: 35 minutes.

## 2022-11-19 LAB
ANION GAP SERPL CALC-SCNC: 6 MMOL/L (ref 5–15)
BUN SERPL-MCNC: 12 MG/DL (ref 6–20)
BUN/CREAT SERPL: 21 (ref 12–20)
CA-I BLD-MCNC: 7.6 MG/DL (ref 8.5–10.1)
CHLORIDE SERPL-SCNC: 112 MMOL/L (ref 97–108)
CO2 SERPL-SCNC: 24 MMOL/L (ref 21–32)
CREAT SERPL-MCNC: 0.58 MG/DL (ref 0.7–1.3)
ERYTHROCYTE [DISTWIDTH] IN BLOOD BY AUTOMATED COUNT: 17 % (ref 11.5–14.5)
GLUCOSE BLD STRIP.AUTO-MCNC: 129 MG/DL (ref 65–100)
GLUCOSE BLD STRIP.AUTO-MCNC: 166 MG/DL (ref 65–100)
GLUCOSE BLD STRIP.AUTO-MCNC: 169 MG/DL (ref 65–100)
GLUCOSE BLD STRIP.AUTO-MCNC: 171 MG/DL (ref 65–100)
GLUCOSE BLD STRIP.AUTO-MCNC: 177 MG/DL (ref 65–100)
GLUCOSE SERPL-MCNC: 188 MG/DL (ref 65–100)
HCT VFR BLD AUTO: 28.3 % (ref 36.6–50.3)
HGB BLD-MCNC: 9.2 G/DL (ref 12.1–17)
MCH RBC QN AUTO: 28.6 PG (ref 26–34)
MCHC RBC AUTO-ENTMCNC: 32.5 G/DL (ref 30–36.5)
MCV RBC AUTO: 87.9 FL (ref 80–99)
NRBC # BLD: 0 K/UL (ref 0–0.01)
NRBC BLD-RTO: 0 PER 100 WBC
PERFORMED BY, TECHID: ABNORMAL
PLATELET # BLD AUTO: 208 K/UL (ref 150–400)
PMV BLD AUTO: 10.5 FL (ref 8.9–12.9)
POTASSIUM SERPL-SCNC: 3.6 MMOL/L (ref 3.5–5.1)
RBC # BLD AUTO: 3.22 M/UL (ref 4.1–5.7)
SODIUM SERPL-SCNC: 142 MMOL/L (ref 136–145)
WBC # BLD AUTO: 6.1 K/UL (ref 4.1–11.1)

## 2022-11-19 PROCEDURE — 94640 AIRWAY INHALATION TREATMENT: CPT

## 2022-11-19 PROCEDURE — 85027 COMPLETE CBC AUTOMATED: CPT

## 2022-11-19 PROCEDURE — 74011000250 HC RX REV CODE- 250: Performed by: STUDENT IN AN ORGANIZED HEALTH CARE EDUCATION/TRAINING PROGRAM

## 2022-11-19 PROCEDURE — 74011250636 HC RX REV CODE- 250/636: Performed by: INTERNAL MEDICINE

## 2022-11-19 PROCEDURE — 82962 GLUCOSE BLOOD TEST: CPT

## 2022-11-19 PROCEDURE — 92526 ORAL FUNCTION THERAPY: CPT

## 2022-11-19 PROCEDURE — 65270000029 HC RM PRIVATE

## 2022-11-19 PROCEDURE — 74011000258 HC RX REV CODE- 258: Performed by: INTERNAL MEDICINE

## 2022-11-19 PROCEDURE — 74011636637 HC RX REV CODE- 636/637: Performed by: INTERNAL MEDICINE

## 2022-11-19 PROCEDURE — 74011000250 HC RX REV CODE- 250: Performed by: INTERNAL MEDICINE

## 2022-11-19 PROCEDURE — 80048 BASIC METABOLIC PNL TOTAL CA: CPT

## 2022-11-19 PROCEDURE — 36415 COLL VENOUS BLD VENIPUNCTURE: CPT

## 2022-11-19 PROCEDURE — 74011250637 HC RX REV CODE- 250/637: Performed by: STUDENT IN AN ORGANIZED HEALTH CARE EDUCATION/TRAINING PROGRAM

## 2022-11-19 PROCEDURE — 74011250637 HC RX REV CODE- 250/637: Performed by: INTERNAL MEDICINE

## 2022-11-19 PROCEDURE — 99232 SBSQ HOSP IP/OBS MODERATE 35: CPT | Performed by: INTERNAL MEDICINE

## 2022-11-19 RX ORDER — FUROSEMIDE 40 MG/1
20 TABLET ORAL DAILY
Status: DISCONTINUED | OUTPATIENT
Start: 2022-11-20 | End: 2022-11-20

## 2022-11-19 RX ORDER — PANTOPRAZOLE SODIUM 40 MG/1
40 GRANULE, DELAYED RELEASE ORAL DAILY
Status: DISCONTINUED | OUTPATIENT
Start: 2022-11-20 | End: 2022-11-27 | Stop reason: HOSPADM

## 2022-11-19 RX ORDER — CARVEDILOL 12.5 MG/1
12.5 TABLET ORAL 2 TIMES DAILY
Status: DISCONTINUED | OUTPATIENT
Start: 2022-11-19 | End: 2022-11-27 | Stop reason: HOSPADM

## 2022-11-19 RX ORDER — CLOPIDOGREL BISULFATE 75 MG/1
150 TABLET ORAL ONCE
Status: COMPLETED | OUTPATIENT
Start: 2022-11-19 | End: 2022-11-19

## 2022-11-19 RX ORDER — AMOXICILLIN 250 MG
1 CAPSULE ORAL DAILY
Status: DISCONTINUED | OUTPATIENT
Start: 2022-11-20 | End: 2022-11-27 | Stop reason: HOSPADM

## 2022-11-19 RX ORDER — CLOPIDOGREL BISULFATE 75 MG/1
150 TABLET ORAL DAILY
Status: DISCONTINUED | OUTPATIENT
Start: 2022-11-19 | End: 2022-11-19 | Stop reason: SDUPTHER

## 2022-11-19 RX ORDER — GUAIFENESIN 100 MG/5ML
81 LIQUID (ML) ORAL DAILY
Status: DISCONTINUED | OUTPATIENT
Start: 2022-11-20 | End: 2022-11-27 | Stop reason: HOSPADM

## 2022-11-19 RX ORDER — CHOLECALCIFEROL (VITAMIN D3) 125 MCG
10 CAPSULE ORAL
Status: DISCONTINUED | OUTPATIENT
Start: 2022-11-19 | End: 2022-11-27 | Stop reason: HOSPADM

## 2022-11-19 RX ORDER — IPRATROPIUM BROMIDE AND ALBUTEROL SULFATE 2.5; .5 MG/3ML; MG/3ML
3 SOLUTION RESPIRATORY (INHALATION)
Status: DISCONTINUED | OUTPATIENT
Start: 2022-11-19 | End: 2022-11-27

## 2022-11-19 RX ORDER — ATORVASTATIN CALCIUM 40 MG/1
40 TABLET, FILM COATED ORAL
Status: DISCONTINUED | OUTPATIENT
Start: 2022-11-20 | End: 2022-11-19

## 2022-11-19 RX ORDER — DOXAZOSIN 2 MG/1
1 TABLET ORAL
Status: DISCONTINUED | OUTPATIENT
Start: 2022-11-19 | End: 2022-11-27 | Stop reason: HOSPADM

## 2022-11-19 RX ORDER — CLOPIDOGREL BISULFATE 75 MG/1
75 TABLET ORAL DAILY
Status: DISCONTINUED | OUTPATIENT
Start: 2022-11-20 | End: 2022-11-27 | Stop reason: HOSPADM

## 2022-11-19 RX ORDER — ATORVASTATIN CALCIUM 40 MG/1
40 TABLET, FILM COATED ORAL DAILY
Status: DISCONTINUED | OUTPATIENT
Start: 2022-11-20 | End: 2022-11-27 | Stop reason: HOSPADM

## 2022-11-19 RX ORDER — GABAPENTIN 100 MG/1
100 CAPSULE ORAL
Status: DISCONTINUED | OUTPATIENT
Start: 2022-11-19 | End: 2022-11-27 | Stop reason: HOSPADM

## 2022-11-19 RX ORDER — ARIPIPRAZOLE 2 MG/1
2 TABLET ORAL DAILY
Status: DISCONTINUED | OUTPATIENT
Start: 2022-11-20 | End: 2022-11-27 | Stop reason: HOSPADM

## 2022-11-19 RX ORDER — POTASSIUM CHLORIDE 1.5 G/1.77G
20 POWDER, FOR SOLUTION ORAL 2 TIMES DAILY
Status: DISCONTINUED | OUTPATIENT
Start: 2022-11-19 | End: 2022-11-27 | Stop reason: HOSPADM

## 2022-11-19 RX ORDER — LOSARTAN POTASSIUM 50 MG/1
50 TABLET ORAL DAILY
Status: DISCONTINUED | OUTPATIENT
Start: 2022-11-20 | End: 2022-11-27 | Stop reason: HOSPADM

## 2022-11-19 RX ORDER — ESCITALOPRAM OXALATE 10 MG/1
20 TABLET ORAL DAILY
Status: DISCONTINUED | OUTPATIENT
Start: 2022-11-20 | End: 2022-11-27 | Stop reason: HOSPADM

## 2022-11-19 RX ADMIN — ARIPIPRAZOLE 2 MG: 2 TABLET ORAL at 10:00

## 2022-11-19 RX ADMIN — CASTOR OIL AND BALSAM, PERU: 788; 87 OINTMENT TOPICAL at 21:02

## 2022-11-19 RX ADMIN — GABAPENTIN 100 MG: 100 CAPSULE ORAL at 21:02

## 2022-11-19 RX ADMIN — CARVEDILOL 12.5 MG: 12.5 TABLET, FILM COATED ORAL at 10:00

## 2022-11-19 RX ADMIN — ATORVASTATIN CALCIUM 40 MG: 40 TABLET, FILM COATED ORAL at 09:58

## 2022-11-19 RX ADMIN — POTASSIUM CHLORIDE 20 MEQ: 1.5 FOR SOLUTION ORAL at 10:00

## 2022-11-19 RX ADMIN — TRAMADOL HYDROCHLORIDE 50 MG: 50 TABLET, COATED ORAL at 20:46

## 2022-11-19 RX ADMIN — CASTOR OIL AND BALSAM, PERU: 788; 87 OINTMENT TOPICAL at 08:20

## 2022-11-19 RX ADMIN — ASPIRIN 81 MG 81 MG: 81 TABLET ORAL at 09:58

## 2022-11-19 RX ADMIN — PIPERACILLIN SODIUM AND TAZOBACTAM SODIUM 3.38 G: 3; .375 INJECTION, POWDER, LYOPHILIZED, FOR SOLUTION INTRAVENOUS at 08:19

## 2022-11-19 RX ADMIN — SENNOSIDES AND DOCUSATE SODIUM 1 TABLET: 50; 8.6 TABLET ORAL at 09:58

## 2022-11-19 RX ADMIN — DEXTROSE AND SODIUM CHLORIDE 75 ML/HR: 5; 450 INJECTION, SOLUTION INTRAVENOUS at 19:00

## 2022-11-19 RX ADMIN — MELATONIN TAB 5 MG 10 MG: 5 TAB at 21:02

## 2022-11-19 RX ADMIN — PIPERACILLIN SODIUM AND TAZOBACTAM SODIUM 3.38 G: 3; .375 INJECTION, POWDER, LYOPHILIZED, FOR SOLUTION INTRAVENOUS at 17:49

## 2022-11-19 RX ADMIN — INSULIN LISPRO 2 UNITS: 100 INJECTION, SOLUTION INTRAVENOUS; SUBCUTANEOUS at 16:30

## 2022-11-19 RX ADMIN — ESCITALOPRAM OXALATE 20 MG: 10 TABLET ORAL at 09:59

## 2022-11-19 RX ADMIN — SODIUM CHLORIDE, PRESERVATIVE FREE 10 ML: 5 INJECTION INTRAVENOUS at 05:59

## 2022-11-19 RX ADMIN — PIPERACILLIN SODIUM AND TAZOBACTAM SODIUM 3.38 G: 3; .375 INJECTION, POWDER, LYOPHILIZED, FOR SOLUTION INTRAVENOUS at 00:27

## 2022-11-19 RX ADMIN — IPRATROPIUM BROMIDE AND ALBUTEROL SULFATE 3 ML: 2.5; .5 SOLUTION RESPIRATORY (INHALATION) at 22:03

## 2022-11-19 RX ADMIN — IPRATROPIUM BROMIDE AND ALBUTEROL SULFATE 3 ML: 2.5; .5 SOLUTION RESPIRATORY (INHALATION) at 13:54

## 2022-11-19 RX ADMIN — INSULIN LISPRO 2 UNITS: 100 INJECTION, SOLUTION INTRAVENOUS; SUBCUTANEOUS at 08:19

## 2022-11-19 RX ADMIN — POTASSIUM CHLORIDE 20 MEQ: 1.5 POWDER, FOR SOLUTION ORAL at 22:35

## 2022-11-19 RX ADMIN — CLOPIDOGREL BISULFATE 150 MG: 75 TABLET ORAL at 17:50

## 2022-11-19 RX ADMIN — DEXTROSE AND SODIUM CHLORIDE 75 ML/HR: 5; 450 INJECTION, SOLUTION INTRAVENOUS at 06:48

## 2022-11-19 RX ADMIN — SODIUM CHLORIDE, PRESERVATIVE FREE 10 ML: 5 INJECTION INTRAVENOUS at 22:19

## 2022-11-19 RX ADMIN — TRAMADOL HYDROCHLORIDE 50 MG: 50 TABLET, COATED ORAL at 14:55

## 2022-11-19 RX ADMIN — INSULIN LISPRO 2 UNITS: 100 INJECTION, SOLUTION INTRAVENOUS; SUBCUTANEOUS at 12:44

## 2022-11-19 RX ADMIN — FUROSEMIDE 20 MG: 40 TABLET ORAL at 09:58

## 2022-11-19 RX ADMIN — CARVEDILOL 12.5 MG: 12.5 TABLET, FILM COATED ORAL at 20:48

## 2022-11-19 RX ADMIN — PANTOPRAZOLE SODIUM 40 MG: 40 GRANULE, DELAYED RELEASE ORAL at 10:00

## 2022-11-19 RX ADMIN — DOXAZOSIN 1 MG: 2 TABLET ORAL at 21:02

## 2022-11-19 NOTE — PROGRESS NOTES
Problem: Falls - Risk of  Goal: *Absence of Falls  Description: Document Yeny Ground Fall Risk and appropriate interventions in the flowsheet. Outcome: Progressing Towards Goal  Note: Fall Risk Interventions:       Mentation Interventions: Bed/chair exit alarm, Door open when patient unattended, More frequent rounding, Room close to nurse's station    Medication Interventions: Bed/chair exit alarm    Elimination Interventions: Bed/chair exit alarm, Call light in reach, Toileting schedule/hourly rounds              Problem: Falls - Risk of  Goal: *Absence of Falls  Description: Document Yeny Ground Fall Risk and appropriate interventions in the flowsheet.   Outcome: Progressing Towards Goal  Note: Fall Risk Interventions:       Mentation Interventions: Bed/chair exit alarm, Door open when patient unattended, More frequent rounding, Room close to nurse's station    Medication Interventions: Bed/chair exit alarm    Elimination Interventions: Bed/chair exit alarm, Call light in reach, Toileting schedule/hourly rounds

## 2022-11-19 NOTE — PROGRESS NOTES
Bedside shift change report given to Brandon RN   (oncoming nurse) by Bradley Palmer RN   (offgoing nurse). Report included the following information SBAR.

## 2022-11-19 NOTE — PROGRESS NOTES
Hospitalist Progress Note    Subjective:   Daily Progress Note: 11/19/2022 9:21 AM    Patient is more talkative but still incoherent.      Current Facility-Administered Medications   Medication Dose Route Frequency    albuterol-ipratropium (DUO-NEB) 2.5 MG-0.5 MG/3 ML  3 mL Nebulization Q6H PRN    balsam peru-castor oiL (VENELEX) ointment   Topical BID    piperacillin-tazobactam (ZOSYN) 3.375 g in 0.9% sodium chloride (MBP/ADV) 100 mL MBP  3.375 g IntraVENous Q8H    dextrose 5 % - 0.45% NaCl infusion  75 mL/hr IntraVENous CONTINUOUS    hydrALAZINE (APRESOLINE) 20 mg/mL injection 20 mg  20 mg IntraVENous Q6H PRN    aspirin chewable tablet 81 mg  81 mg Per NG tube DAILY    atorvastatin (LIPITOR) tablet 40 mg  40 mg Per NG tube DAILY    escitalopram oxalate (LEXAPRO) tablet 20 mg  20 mg Per NG tube DAILY    gabapentin (NEURONTIN) capsule 100 mg  100 mg Per NG tube QHS    melatonin tablet 10 mg  10 mg Per NG tube QHS    baclofen (LIORESAL) tablet 5 mg  5 mg Per NG tube BID PRN    hydrOXYzine HCL (ATARAX) tablet 25 mg  25 mg Per NG tube QHS PRN    polyethylene glycol (MIRALAX) packet 17 g  17 g Per NG tube DAILY PRN    traMADoL (ULTRAM) tablet 50 mg  50 mg Per NG tube Q6H PRN    doxazosin (CARDURA) tablet 1 mg  1 mg Per NG tube QHS    potassium chloride (KLOR-CON) packet for solution 20 mEq  20 mEq Per NG tube BID    pantoprazole (PROTONIX) granules for oral suspension 40 mg  40 mg Per NG tube DAILY    sodium chloride (NS) flush 5-40 mL  5-40 mL IntraVENous Q8H    sodium chloride (NS) flush 5-40 mL  5-40 mL IntraVENous PRN    acetaminophen (TYLENOL) tablet 650 mg  650 mg Oral Q6H PRN    Or    acetaminophen (TYLENOL) suppository 650 mg  650 mg Rectal Q6H PRN    ondansetron (ZOFRAN ODT) tablet 4 mg  4 mg Oral Q8H PRN    Or    ondansetron (ZOFRAN) injection 4 mg  4 mg IntraVENous Q6H PRN    [Held by provider] enoxaparin (LOVENOX) injection 40 mg  40 mg SubCUTAneous DAILY    glucose chewable tablet 16 g  4 Tablet Oral PRN glucagon (GLUCAGEN) injection 1 mg  1 mg IntraMUSCular PRN    dextrose 10% infusion 0-250 mL  0-250 mL IntraVENous PRN    insulin lispro (HUMALOG) injection   SubCUTAneous AC&HS    ARIPiprazole (ABILIFY) tablet 2 mg  2 mg Oral DAILY    carvediloL (COREG) tablet 12.5 mg  12.5 mg Oral BID    [Held by provider] clopidogreL (PLAVIX) tablet 75 mg  75 mg Oral DAILY    losartan (COZAAR) tablet 50 mg  50 mg Oral DAILY    senna-docusate (PERICOLACE) 8.6-50 mg per tablet 1 Tablet  1 Tablet Oral DAILY    [Held by provider] tamsulosin (FLOMAX) capsule 0.4 mg  0.4 mg Oral QHS    furosemide (LASIX) tablet 20 mg  20 mg Oral DAILY        Review of Systems  Review of Systems   Unable to perform ROS: Acuity of condition          Objective:     Visit Vitals  /62 (BP 1 Location: Left upper arm, BP Patient Position: At rest)   Pulse 71   Temp 98.6 °F (37 °C)   Resp 20   Ht 5' 9\" (1.753 m)   Wt 62.5 kg (137 lb 12.6 oz)   SpO2 96%   BMI 20.35 kg/m²    O2 Flow Rate (L/min): 10 l/min O2 Device: None (Room air)    Temp (24hrs), Av.8 °F (36.6 °C), Min:96.8 °F (36 °C), Max:98.6 °F (37 °C)      701 - 1900  In: -   Out: 600 [Urine:600]  1901 -  07  In: 6635 [I.V.:3965]  Out: 850 [Urine:850]    XR CHEST PORT   Final Result   Interval advancement of nasogastric tube, although tip not included on this   study. Evaluation of nasogastric tube position is typically better performed   with KUB. XR CHEST PORT   Final Result   1. No significant change in basilar predominant opacities. These are   nonspecific but can be seen with aspiration pneumonitis/pneumonia in the   appropriate setting. 2.  Enteric tube tip projects just past the gastroesophageal junction with   proximal sidehole in the distal esophagus. Consider advancing. MRI BRAIN WO CONT   Final Result   Small to moderate sized acute infarction in the right occipital lobe with   minimal/small infarction in the right temporal lobe.       Severe cerebral atrophy. There is no intracranial mass, hemorrhage. There are numerous scattered chronic infarcts. No additional acute intracranial process is demonstrated. XR CHEST PORT   Final Result   Interval retraction of nasogastric tube with sidehole over the gastroesophageal   junction. XR CHEST PORT   Final Result   Interval replacement of nasogastric tube, with tip over the gastric fundus. XR CHEST PORT   Final Result   No significant change. XR CHEST PORT   Final Result   1. NG tube terminates in the gastric fundus, with possible kink in the side   hole. Correlate with function. 2.  New patchy consolidation in the right midlung is concerning for pneumonia. Grossly unchanged patchy retrocardiac opacities. Stable trace right pleural   effusion. XR CHEST PORT   Final Result   Nasogastric tube tip advanced overlying the gastric body. XR CHEST PORT   Final Result   Nasogastric tube tip over the gastric fundus. XR CHEST PORT   Final Result   Increased mild interstitial pulmonary edema versus pneumonia. Enteric tube extends into the gastric antrum or first portion of the duodenum. Consider retraction 8-10 cm if the intention is gastric luminal termination. CT FOOT RT W CONT   Final Result      1. No abscess or osteomyelitis. 2. No fracture. DUPLEX LOWER EXT ARTERY BILAT   Final Result      XR FOOT RT MIN 3 V   Final Result   No osseous erosion or soft tissue gas. .      XR CHEST PORT   Final Result      Mild interstitial opacities which may represent mild pulmonary edema unchanged         XR CHEST PORT   Final Result   Addendum (preliminary) 1 of 1   Addendum: NG tube is in appropriate position tip extends below the    diaphragm. Right IJ catheter. No pneumothorax on the right. Final   1. Status post right IJ catheter placement without evidence of complication.        IR INSERT NON TUNL CVC OVER 5 YRS   Final Result   Technically successful ultrasound guided placement of a right internal jugular   vein temporary central venous catheter. A post procedure chest x-ray is   pending. CT HEAD WO CONT   Final Result   No acute process or change compared to the prior exam.            XR CHEST PORT   Final Result   No acute process. IR US GUIDED VASCULAR ACCESS    (Results Pending)   XR SWALLOW FUNC VIDEO    (Results Pending)        PHYSICAL EXAM:    Physical Exam  Vitals and nursing note reviewed. Constitutional:       Appearance: He is ill-appearing. HENT:      Head: Normocephalic and atraumatic. Cardiovascular:      Rate and Rhythm: Normal rate and regular rhythm. Pulmonary:      Effort: No respiratory distress. Breath sounds: Rhonchi present. Comments: Rhonchi heard upper right and left lobes  Abdominal:      General: Bowel sounds are normal. There is no distension. Palpations: Abdomen is soft. Tenderness: There is no abdominal tenderness. Comments: PEG tube in place, non tender to palpation   Musculoskeletal:      Right lower leg: No edema. Left lower leg: No edema. Comments: Bunny boots noted to B/L LE in proper placement   Skin:     General: Skin is warm. Capillary Refill: Capillary refill takes less than 2 seconds. Comments: Scabs seen throughout lower extremities   Neurological:      Mental Status: He is alert. He is disoriented.    Psychiatric:      Comments: Unable to assess - more talkative still nonsensical        Data Review    Recent Results (from the past 24 hour(s))   METABOLIC PANEL, BASIC    Collection Time: 11/18/22 10:44 AM   Result Value Ref Range    Sodium 145 136 - 145 mmol/L    Potassium 3.5 3.5 - 5.1 mmol/L    Chloride 113 (H) 97 - 108 mmol/L    CO2 23 21 - 32 mmol/L    Anion gap 9 5 - 15 mmol/L    Glucose 116 (H) 65 - 100 mg/dL    BUN 10 6 - 20 mg/dL    Creatinine 0.50 (L) 0.70 - 1.30 mg/dL    BUN/Creatinine ratio 20 12 - 20      eGFR >60 >60 ml/min/1.73m2 Calcium 7.8 (L) 8.5 - 10.1 mg/dL   GLUCOSE, POC    Collection Time: 11/18/22 12:30 PM   Result Value Ref Range    Glucose (POC) 130 (H) 65 - 100 mg/dL    Performed by Evette Padgett    GLUCOSE, POC    Collection Time: 11/18/22  5:25 PM   Result Value Ref Range    Glucose (POC) 124 (H) 65 - 100 mg/dL    Performed by Evette Padgett    GLUCOSE, POC    Collection Time: 11/18/22  8:36 PM   Result Value Ref Range    Glucose (POC) 116 (H) 65 - 100 mg/dL    Performed by Moody Cain    GLUCOSE, POC    Collection Time: 11/19/22  6:05 AM   Result Value Ref Range    Glucose (POC) 171 (H) 65 - 100 mg/dL    Performed by Mckenna Mendoza         Assessment/Plan:     Active Problems:    UTI (urinary tract infection) (11/8/2022)      Hospital Course: Remigio Burch is a 72-year-old male with a PMH of stroke, hypertension, DM, and cirrhosis who presented from nursing home with weakness and hypotension x1 day. Of note recent admission for TIA evaluated and recommended for carotid endarterectomy. Found hypotensive on arrival to the ED. Initial labs significant for WBC of 15.8, creatinine of 1.46, lactic acid of 2.4, and glucose of 134. UA consistent with urinary tract infection. CT of the head with no acute process. Patient admitted for further work-up. Patient started on IV fluids and ceftriaxone. Vascular surgery, cardiology, and podiatry consulted. KRISTI with duplex shows right common femoral artery and proximal superficial femoral artery occluded with collateralization to popliteal artery, left mid to distal superficial artery occluded, with patent distal anterior/posterior tibial artery, popliteal artery patent and with patent distal anterior tibial artery and posterior tibial artery. On 11/9 transferred to ICU overnight for poor responsiveness and for septic shock. CT of the head negative for acute findings. Patient started on meropenem, vancomycin, IV fluids, and Levophed. Pulmonology and ID consulted.  ECHO showed EF of 50-55% with mild MV R and dilated left atrium. XR right foot with generalized osseous demineralization without fracture or dislocation, tissue gas or osseous erosive. CT of right foot with no abscess or osteomyelitis. Patient has carotid artery stenosis and supposed to go for surgery but unable to get cardiac clearance because of nuclear stress test he has 90% stenosis of left ICA and 50% stenosis of right ICA. Urine culture grew mixed kristen. Neurology and psychiatry consulted for confusion. MRI 7/2022 showed acute ischemic stroke of the right parietal area. Repeat MRI brain 11/16/22 showed small to moderate sized acute infarction in the right occipital lobe with minimal/small infarction in the right temporal lobe and severe cerebral atrophy. Continue DAPT and atorvastatin. Patient has had NG tube for nutrition since admission. Speech therapy working with patient. GI consult to evaluate for PEG placement. on 11/15 fever spike of 100.6F. 11/17 CXR with no significant change in basilar predominant opacities. Started on zosyn. PEG placed on 11/18 and MBS on 11/21.      SIRS, suspected aspiration PNA  Tmax 100.6F, WBC WNLs, CXR with no significant change in basilar predominant opacities   Continue on Zosyn #3/7  ID following    Severe sepsis with septic shock - resolved  2/2 UTI and right great toe ulcer  S/p vancomycin and meropenem  11/8 blood: no growth    Great right toe infection  11/9 wound: MRSA  XR of right foot without fracture, dislocation, soft tissue gas, or osseous erosion  CT of right foot with no abscess or osteomyelitis  S/p vancomycin for 7 days  Continue routine wound care  ID and podiatry following    Metabolic encephalopathy - improving    Severe protein calorie malnutrition  Status post PEG tube  GI following    Dysphagia  SLP following and recommending MBS on Monday    Acute respiratory failure with hypoxia - resolved  On room air    Urinary tract infection  11/8 urine: mixed kristen    Hypertension  Continue on carvedilol, furosemide, and losartan    Carotid stenosis   History of CVA  Repeat MRI brain 11/16/22 showed small to moderate sized acute infarction in the right occipital lobe with minimal/small infarction in the right temporal lobe and severe cerebral atrophy  Continue on DAPT and atorvastatin  Vascular and neurology following    PVD  KRISTI with duplex shows right common femoral artery and proximal superficial femoral artery occluded with collateralization to popliteal artery, left mid to distal superficial artery occluded, with patent distal anterior/posterior tibial artery, popliteal artery patent and with patent distal anterior tibial artery and posterior tibial artery    DVT Prophylaxis: SCDs  GI Prophylaxis: protonix  Discharge and disposition barriers: MBS, tolerating PEG, 48hrs  Wound care: betadine WTD daily with strict offloading while in bed with bunny boots    Ashley Baldwin, (son) [phone] - updated on patient condition    Care Plan discussed with: Patient/Family, Nurse, and     Total time spent with patient: 35 minutes.

## 2022-11-19 NOTE — PROGRESS NOTES
Dysphagia Treatment Narrative. Patient: Meg Worthington (75 y.o. male)  Date: 11/19/2022  Diagnosis: UTI (urinary tract infection) [N39.0] <principal problem not specified>  Procedure(s) (LRB):  PERCUTANEOUS ENDOSCOPIC GASTROSTOMY TUBE INSERTION (N/A)  ESOPHAGOGASTRODUODENOSCOPY (EGD) (N/A) 1 Day Post-Op  Precautions:  Aspiration/GERD    ASSESSMENT :  Patient repositioned upright, yelling throughout tx session and impulsive. Prior to po trials patient with wet audible congestion that intermittently clears with cueing to cough and with clinician suctioning patient. Dry mucosa present in oral cavity and lips. Clinician administering the following:  Liquids: Moderately thick via spoon x5: wet vocal quality with all po trials and patient self elicits cough and clears x1; otherwise clinician cues patient for cough and throat clearing to clear wet vocal quality. Patient is not appropriate for po intake and per RN, patient tolerating tube feeding via PEG. Patient will benefit from skilled intervention to address the above impairments. Patients rehabilitation potential is considered to be Guarded     PLAN :  Recommendations and Planned Interventions:  NPO with alternate source of nutrient intake  MBS when medically appropriate, continue to HOLD MBS  ST to follow  Frequency/Duration: Patient will be followed by speech-language pathology 5 times a week to address goals. Discharge Recommendations: To Be Determined     SUBJECTIVE:   Patient reports \"I want water. \"    OBJECTIVE:     Past Medical History:   Diagnosis Date    Cirrhosis ([address]     Diabetes ([address]     Hypertension     Stroke ([address]        CXR Results  (Last 48 hours)                 11/17/22 2123  XR CHEST PORT Final result    Impression:  Interval advancement of nasogastric tube, although tip not included on this   study. Evaluation of nasogastric tube position is typically better performed   with KUB.        Narrative:  INDICATION: NG tube placement confirmation post advancement of NG tube by 10 cm       EXAMINATION:  AP CHEST, PORTABLE       COMPARISON: November 17, 2022       FINDINGS: Single AP portable view of the chest demonstrates nasogastric tube is   present, extending to the left upper quadrant and advanced since prior study   with sidehole now over the gastroesophageal junction. Tip not included on the   study. The cardiomediastinal silhouette is unchanged. Patchy bilateral airspace   disease. 11/17/22 1323  XR CHEST PORT Final result    Impression:  1. No significant change in basilar predominant opacities. These are   nonspecific but can be seen with aspiration pneumonitis/pneumonia in the   appropriate setting. 2.  Enteric tube tip projects just past the gastroesophageal junction with   proximal sidehole in the distal esophagus. Consider advancing. Narrative:  EXAM:  XR CHEST PORT       INDICATION: Dysphagia/fever, eval for PNA       COMPARISON: 11/15/2022. TECHNIQUE: Single frontal view of the chest.       FINDINGS: No significant change in patchy bilateral opacities, worse in the lung   bases and the right midlung. No evidence of an effusion or pneumothorax. Enteric   tube tip projects over the very proximal stomach, just past the gastroesophageal   junction with the sidehole in the distal esophagus. Status post median sternotomy. Current Diet:  DIET ADULT TUBE FEEDING  DIET NPO     Cognitive and Communication Status:  Neurologic State: Confused  Orientation Level: Oriented to person  Cognition: Decreased attention/concentration, Decreased command following, Impaired decision making, Impulsive, Poor safety awareness    Dysphagia Treatment:  Oral Assessment:  Oral Assessment  Labial: No impairment  Oral Hygiene: dry mucosa  Lingual: Incoordinated;Decreased strength  P.O.  Trials:  Patient Position: Upright in bed  Vocal quality prior to P.O.: Wet  Consistency Presented: Honey thick liquid  How Presented: SLP-fed/presented;Spoon     Bolus Acceptance: No impairment  Bolus Formation/Control: Impaired  Type of Impairment: Incomplete  Propulsion: Discoordination;Delayed (# of seconds)  Oral Residue: None  Initiation of Swallow: Delayed (# of seconds)  Laryngeal Elevation: Decreased  Aspiration Signs/Symptoms: Change vocal quality  Pharyngeal Phase Characteristics: Altered vocal quality    Oral Phase Severity: Mild-moderate  Pharyngeal Phase Severity : Moderate    Pain:  0/10    After treatment:   Patient left in no apparent distress in bed and Nursing notified    COMMUNICATION/EDUCATION:   Patient was educated regarding his deficit(s) of dysphagia and POC, though mentation appears altered and requires additional training and education. The patient's plan of care including recommendations, planned interventions, and recommended diet changes were discussed with: Registered nurse. Patient is unable to participate in goal setting and plan of care.     Thank you for this referral.  Tomasa Bauman M.S., CCC-SLP  Time Calculation: 10 mins   Problem: Dysphagia (Pediatrics)  Goal: *Acute Goals and Plan of Care  Outcome: Progressing Towards Goal     Problem: Patient Education: Go to Patient Education Activity  Goal: Patient/Family Education  Outcome: Progressing Towards Goal

## 2022-11-19 NOTE — PROGRESS NOTES
Progress Note    Patient: Eufemia Webber MRN: [id number]  SSN: [id number]    YOB: 1950  Age: 70 y.o.   Sex: male      Admit Date: 11/8/2022    LOS: 11 days     Subjective:   Examined,   confused,   S/p peg placement  Past Medical History:   Diagnosis Date    Cirrhosis ([facility]     Diabetes ([facility]     Hypertension     Stroke ([facility]         Current Facility-Administered Medications:     albuterol-ipratropium (DUO-NEB) 2.5 MG-0.5 MG/3 ML, 3 mL, Nebulization, Q6H RT, Ivonne Don PA, 3 mL at 11/19/22 1354    [START ON 11/20/2022] ARIPiprazole (ABILIFY) tablet 2 mg, 2 mg, Per G Tube, DAILY, Ivonne Don PA    [START ON 11/20/2022] aspirin chewable tablet 81 mg, 81 mg, Per G Tube, DAILY, Ivonne Don PA    carvediloL (COREG) tablet 12.5 mg, 12.5 mg, Per G Tube, BID, Ivonne Don PA    doxazosin (CARDURA) tablet 1 mg, 1 mg, Per G Tube, QHS, Ivonne Don PA    [START ON 11/20/2022] escitalopram oxalate (LEXAPRO) tablet 20 mg, 20 mg, Per G Tube, DAILY, Ivonne Don PA    [START ON 11/20/2022] furosemide (LASIX) tablet 20 mg, 20 mg, Per G Tube, DAILY, Ivonne Don PA    gabapentin (NEURONTIN) capsule 100 mg, 100 mg, Per G Tube, QHS, Ivonne Don PA    [START ON 11/20/2022] losartan (COZAAR) tablet 50 mg, 50 mg, Per G Tube, DAILY, Ivonne Don PA    melatonin tablet 10 mg, 10 mg, Per G Tube, QHS, Ivonne Don [place]    [ETUTF ON 11/20/2022] pantoprazole (PROTONIX) granules for oral suspension 40 mg, 40 mg, Per G Tube, DAILY, Ivonne Don PA    potassium chloride (KLOR-CON) packet for solution 20 mEq, 20 mEq, Per G Tube, BID, Ivonne Don PA    [START ON 11/20/2022] senna-docusate (PERICOLACE) 8.6-50 mg per tablet 1 Tablet, 1 Tablet, Per G Tube, DAILY, Ivonne Don PA    clopidogreL (PLAVIX) tablet 150 mg, 150 mg, PEG Tube, DAILY, Ivonne Don [place]    [START ON 11/20/2022] clopidogreL (PLAVIX) tablet 75 mg, 75 mg, PEG Tube, DAILY, Ivonne Don [place]    [IDYNW ON 11/20/2022] atorvastatin (LIPITOR) tablet 40 mg, 40 mg, Per G Tube, DAILY, Ivonne Don PA    albuterol-ipratropium (DUO-NEB) 2.5 MG-0.5 MG/3 ML, 3 mL, Nebulization, Q6H PRN, Ivonne Don PA    balsam peru-castor oiL (VENELEX) ointment, , Topical, BID, Denver Jones MD, Given at 11/19/22 0820    [COMPLETED] piperacillin-tazobactam (ZOSYN) 4.5 g in 0.9% sodium chloride (MBP/ADV) 100 mL MBP, 4.5 g, IntraVENous, ONCE, Last Rate: 200 mL/hr at 11/17/22 1805, 4.5 g at 11/17/22 1805 **FOLLOWED BY** piperacillin-tazobactam (ZOSYN) 3.375 g in 0.9% sodium chloride (MBP/ADV) 100 mL MBP, 3.375 g, IntraVENous, Q8H, Alba Thakur MD, Last Rate: 25 mL/hr at 11/19/22 0819, 3.375 g at 11/19/22 0819    dextrose 5 % - 0.45% NaCl infusion, 75 mL/hr, IntraVENous, CONTINUOUS, Fred Thompson PA-C, Last Rate: 75 mL/hr at 11/19/22 0648, 75 mL/hr at 11/19/22 0648    hydrALAZINE (APRESOLINE) 20 mg/mL injection 20 mg, 20 mg, IntraVENous, Q6H PRN, Fred Thompson PA-C, 20 mg at 11/15/22 1618    baclofen (LIORESAL) tablet 5 mg, 5 mg, Per NG tube, BID PRN, Amelia Spare, Mir Rodena Phoenix, MD, 5 mg at 11/13/22 1322    polyethylene glycol (MIRALAX) packet 17 g, 17 g, Per NG tube, DAILY PRN, Amelia Spare, Mir Rodena Phoenix, MD    traMADoL Sonia Heading) tablet 50 mg, 50 mg, Per NG tube, Q6H PRN, Dia Mchugh MD, 50 mg at 11/15/22 2233    sodium chloride (NS) flush 5-40 mL, 5-40 mL, IntraVENous, Q8H, Amelia Spare, Mir Rodena Phoenix, MD, 10 mL at 11/19/22 0559    sodium chloride (NS) flush 5-40 mL, 5-40 mL, IntraVENous, PRN, Dia Mchugh MD, 10 mL at 11/14/22 1720    acetaminophen (TYLENOL) tablet 650 mg, 650 mg, Oral, Q6H PRN, 650 mg at 11/17/22 0314 **OR** acetaminophen (TYLENOL) suppository 650 mg, 650 mg, Rectal, Q6H PRN, Amelia Spare, Mir Rodena Phoenix, MD    ondansetron (ZOFRAN ODT) tablet 4 mg, 4 mg, Oral, Q8H PRN **OR** ondansetron (ZOFRAN) injection 4 mg, 4 mg, IntraVENous, Q6H PRN, Dia Mchugh MD    [facility] [place] by provider] enoxaparin (LOVENOX) injection 40 mg, 40 mg, SubCUTAneous, DAILY, Jas Arevalo MD, 40 mg at 11/16/22 1158    glucose chewable tablet 16 g, 4 Tablet, Oral, PRN, Jas Arevalo MD    glucagon [facility]) injection 1 mg, 1 mg, IntraMUSCular, PRN, Jas Arevalo MD    dextrose 10% infusion 0-250 mL, 0-250 mL, IntraVENous, PRN, Jas Arevalo MD    insulin lispro (HUMALOG) injection, , SubCUTAneous, AC&HS, Bren Maldonado MD, 2 Units at 11/19/22 1244    Objective:     Vitals:    11/18/22 2015 11/19/22 0300 11/19/22 0903 11/19/22 1355   BP: (!) 153/85 126/62 117/61    Pulse: 76 71 64    Resp: 19 20 20    Temp: 97.6 °F (36.4 °C) 98.6 °F (37 °C) 98.2 °F (36.8 °C)    SpO2: 94% 96% 92% 92%   Weight:       Height:            Intake and Output:  Current Shift: 11/19 0701 - 11/19 1900  In: -   Out: 600 [Urine:600]  Last three shifts: 11/17 1901 - 11/19 0700  In: 8312 [I.V.:3965]  Out: 850 [Urine:850]    Physical Exam:   Physical Exam  Constitutional:       Appearance: He is ill-appearing. HENT:      Head: Atraumatic. Mouth/Throat:      Mouth: Mucous membranes are dry. Eyes:      Extraocular Movements: Extraocular movements intact. Cardiovascular:      Rate and Rhythm: Normal rate. Heart sounds: Normal heart sounds. Pulmonary:      Breath sounds: Normal breath sounds. Abdominal:      General: Abdomen is flat. Bowel sounds are normal.      Palpations: Abdomen is soft. Comments: Peg site no bleeding   Musculoskeletal:         General: Normal range of motion. Cervical back: Neck supple. Skin:     General: Skin is warm. Neurological:      General: No focal deficit present. Mental Status: Mental status is at baseline.         Lab/Data Review:  Recent Results (from the past 24 hour(s))   GLUCOSE, POC    Collection Time: 11/18/22  5:25 PM   Result Value Ref Range    Glucose (POC) 124 (H) 65 - 100 mg/dL    Performed by Leopold Bel, POC    Collection Time: 11/18/22  8:36 PM Result Value Ref Range    Glucose (POC) 116 (H) 65 - 100 mg/dL    Performed by Yaneth Rocha    GLUCOSE, POC    Collection Time: 11/19/22  6:05 AM   Result Value Ref Range    Glucose (POC) 171 (H) 65 - 100 mg/dL    Performed by Amado Aguirre    GLUCOSE, POC    Collection Time: 11/19/22  9:10 AM   Result Value Ref Range    Glucose (POC) 129 (H) 65 - 100 mg/dL    Performed by Noemi Laith    GLUCOSE, POC    Collection Time: 11/19/22 11:36 AM   Result Value Ref Range    Glucose (POC) 169 (H) 65 - 100 mg/dL    Performed by [employer]    METABOLIC PANEL, BASIC    Collection Time: 11/19/22 12:42 PM   Result Value Ref Range    Sodium 142 136 - 145 mmol/L    Potassium 3.6 3.5 - 5.1 mmol/L    Chloride 112 (H) 97 - 108 mmol/L    CO2 24 21 - 32 mmol/L    Anion gap 6 5 - 15 mmol/L    Glucose 188 (H) 65 - 100 mg/dL    BUN 12 6 - 20 mg/dL    Creatinine 0.58 (L) 0.70 - 1.30 mg/dL    BUN/Creatinine ratio 21 (H) 12 - 20      eGFR >60 >60 ml/min/1.73m2    Calcium 7.6 (L) 8.5 - 10.1 mg/dL   CBC W/O DIFF    Collection Time: 11/19/22 12:42 PM   Result Value Ref Range    WBC 6.1 4.1 - 11.1 K/uL    RBC 3.22 (L) 4.10 - 5.70 M/uL    HGB 9.2 (L) 12.1 - 17.0 g/dL    HCT 28.3 (L) 36.6 - 50.3 %    MCV 87.9 80.0 - 99.0 FL    MCH 28.6 26.0 - 34.0 PG    MCHC 32.5 30.0 - 36.5 g/dL    RDW 17.0 (H) 11.5 - 14.5 %    PLATELET 888 417 - 545 K/uL    MPV 10.5 8.9 - 12.9 FL    NRBC 0.0 0.0  WBC    ABSOLUTE NRBC 0.00 0.00 - 0.01 K/uL        XR CHEST PORT   Final Result   Interval advancement of nasogastric tube, although tip not included on this   study. Evaluation of nasogastric tube position is typically better performed   with KUB. XR CHEST PORT   Final Result   1. No significant change in basilar predominant opacities. These are   nonspecific but can be seen with aspiration pneumonitis/pneumonia in the   appropriate setting.    2.  Enteric tube tip projects just past the gastroesophageal junction with   proximal sidehole in the distal esophagus. Consider advancing. MRI BRAIN WO CONT   Final Result   Small to moderate sized acute infarction in the right occipital lobe with   minimal/small infarction in the right temporal lobe. Severe cerebral atrophy. There is no intracranial mass, hemorrhage. There are numerous scattered chronic infarcts. No additional acute intracranial process is demonstrated. XR CHEST PORT   Final Result   Interval retraction of nasogastric tube with sidehole over the gastroesophageal   junction. XR CHEST PORT   Final Result   Interval replacement of nasogastric tube, with tip over the gastric fundus. XR CHEST PORT   Final Result   No significant change. XR CHEST PORT   Final Result   1. NG tube terminates in the gastric fundus, with possible kink in the side   hole. Correlate with function. 2.  New patchy consolidation in the right midlung is concerning for pneumonia. Grossly unchanged patchy retrocardiac opacities. Stable trace right pleural   effusion. XR CHEST PORT   Final Result   Nasogastric tube tip advanced overlying the gastric body. XR CHEST PORT   Final Result   Nasogastric tube tip over the gastric fundus. XR CHEST PORT   Final Result   Increased mild interstitial pulmonary edema versus pneumonia. Enteric tube extends into the gastric antrum or first portion of the duodenum. Consider retraction 8-10 cm if the intention is gastric luminal termination. CT FOOT RT W CONT   Final Result      1. No abscess or osteomyelitis. 2. No fracture. DUPLEX LOWER EXT ARTERY BILAT   Final Result      XR FOOT RT MIN 3 V   Final Result   No osseous erosion or soft tissue gas. .      XR CHEST PORT   Final Result      Mild interstitial opacities which may represent mild pulmonary edema unchanged         XR CHEST PORT   Final Result   Addendum (preliminary) 1 of 1   Addendum: NG tube is in appropriate position tip extends below the    diaphragm. Right IJ catheter. No pneumothorax on the right. Final   1. Status post right IJ catheter placement without evidence of complication. IR INSERT NON TUNL CVC OVER 5 YRS   Final Result   Technically successful ultrasound guided placement of a right internal jugular   vein temporary central venous catheter. A post procedure chest x-ray is   pending. CT HEAD WO CONT   Final Result   No acute process or change compared to the prior exam.            XR CHEST PORT   Final Result   No acute process.       IR Jaylen    (Results Pending)   XR SWALLOW FUNC VIDEO    (Results Pending)        Assessment:     Active Problems:    UTI (urinary tract infection) (11/8/2022)    CVA Versus TIA,  Difficult to swallow,  NG tube feeding,     Need long-term GI access for the outpatient care  Plan:   Followed by speech therapy, waiting for the final recommendations  Continue current neurology evaluation PT OT  On the aspirin,   restart on Plavix  today'  Continue on peg feeding     Sign off         Signed By: Yg Spear MD     November 19, 2022        Thank you for allowing me to participate in this patients care  Cc Referring Physician   David Vazquez MD

## 2022-11-19 NOTE — PROGRESS NOTES
Infectious Disease Progress Note           Subjective:   Stable, remains confused, s/p peg tube placement, no acute events since last seen, afebrile, WBC WNLs   Objective:   Physical Exam:     Visit Vitals  /60 (BP 1 Location: Left upper arm, BP Patient Position: At rest)   Pulse 68   Temp 98.5 °F (36.9 °C)   Resp 20   Ht 5' 9\" (1.753 m)   Wt 137 lb 12.6 oz (62.5 kg)   SpO2 92%   BMI 20.35 kg/m²    O2 Flow Rate (L/min): 10 l/min O2 Device: None (Room air)    Temp (24hrs), Av.2 °F (36.8 °C), Min:97.6 °F (36.4 °C), Max:98.6 °F (37 °C)    701 - 1900  In: 160   Out: 600 [Urine:600]   1901 -  07  In: 4865 [I.V.:3965]  Out: 850 [Urine:850]    General: NAD, alert, confused   HEENT: VANGIE, Moist mucosa  Lungs:Decreased at the bases, no wheeze/rhonchi   Heart: S1S2+, RRR, no murmur  Abdo: Soft, NT, ND, +BS, + peg tube   Exts:Right great toe ulcer w dry eschar, left medial thigh ulcer, diffuse LUE swelling   Skin: b/l leg ulcers, stable ulcerations on b/l feet     Data Review:       Recent Days:  Recent Labs     22  1242 22  1522   WBC 6.1 7.1   HGB 9.2* 9.6*   HCT 28.3* 31.6*    86*       Recent Labs     22  1242 22  1044 22  2200   BUN 12 10 12   CREA 0.58* 0.50* 0.69*       Lab Results   Component Value Date/Time    C-Reactive protein 1.64 (H) 2022 10:18 AM        Microbiology     Results       Procedure Component Value Units Date/Time    MRSA SCREEN - PCR (NASAL) [[id number]]  (Abnormal) Collected: 22 0231    Order Status: Completed Specimen: Swab Updated: 22 0512     MRSA by PCR, Nasal DETECTED        Comment: Results verified, phoned to and read back by [email or web address]       CULTURE, Cassie Schreiberis STAIN [[id number]]  (Susceptibility) Collected: 22 0210    Order Status: Completed Specimen: Wound from Toe Updated: 22 1327     Special Requests: No Special Requests        GRAM STAIN Occasional WBCs seen               2+ Gram positive cocci in pairs chains and clusters           Culture result:       Heavy * methicillin resistant staphylococcus aureus *            Heavy Diphtheroids       Susceptibility        Staphylococcus aureus Methcillin Resistant      TELMA      Ciprofloxacin ($) Resistant      Clindamycin ($) Resistant      Daptomycin ($$$$$) Susceptible      Doxycycline ($$) Intermediate      Erythromycin ($$$$) Resistant      Gentamicin ($) Susceptible      Inducible Clindamycin  See below  [1]       Levofloxacin ($) Resistant      Linezolid ($$$$$) Susceptible      Oxacillin Resistant      Rifampin ($$$$) Susceptible  [2]       Tetracycline Resistant      Trimeth/Sulfa Susceptible      Vancomycin ($) Susceptible                   [1]  HIDE     [2]  Rifampin is not to be used for mono-therapy. CULTURE, BLOOD, PAIRED [[id number]] Collected: 11/08/22 1325    Order Status: Completed Specimen: Blood Updated: 11/14/22 0121     Special Requests: No Special Requests        Culture result: No growth 6 days       CULTURE, URINE [[id number]] Collected: 11/08/22 1115    Order Status: Completed Specimen: Urine Updated: 11/10/22 1506     Special Requests: No Special Requests        [place] Count 20,000        [place] Count colonies/ml        Culture result:       Mixed urogenital kristen isolated                     Diagnostics   CXR Results  (Last 48 hours)                 11/17/22 2123  XR CHEST PORT Final result    Impression:  Interval advancement of nasogastric tube, although tip not included on this   study. Evaluation of nasogastric tube position is typically better performed   with KUB.        Narrative:  INDICATION: NG tube placement confirmation post advancement of NG tube by 10 cm       EXAMINATION:  AP CHEST, PORTABLE       COMPARISON: November 17, 2022       FINDINGS: Single AP portable view of the chest demonstrates nasogastric tube is   present, extending to the left upper quadrant and advanced since prior study   with sidehole now over the gastroesophageal junction. Tip not included on the   study. The cardiomediastinal silhouette is unchanged. Patchy bilateral airspace   disease. Assessment/Plan     Suspected aspiration PNA. Patchy infiltrates on CXR        Rhonchi in upper airway, on RA, WBC WNLs         On day # 3 of Zosyn, will repeat CXR in the morning         Frequent suctioning to prevent recurrent PNA         Labs not ordered for AM as WBC has been normal on routine labs     Right great toe infection  MRSA isolated from Cx of right great toe         No abscess or osteomyelitis on CT (11/11)         Suspected DTI involving left lateral foot and left medial thigh ulcer        Continue routine wound care     3. UTI, abnormal UA, Mixed kristen isolated from urine Cx (11/08)    4. CVA w Small to moderate sized acute infarction in the right occipital lobe with  minimal/small infarction in the right temporal lobe on MRI head (11/16))       Confused, though responds appropriately to questions     5. Dysphagia, s/p peg tube placement on 11/18.      6. Diffuse LUE swelling, from infiltrated IV access which has been discontinued       Recommend elevation to help decrease swelling     Poor long term prognosis, remains a full code     Claudette Kaiser MD    11/19/2022

## 2022-11-20 ENCOUNTER — APPOINTMENT (OUTPATIENT)
Dept: GENERAL RADIOLOGY | Age: 72
DRG: 871 | End: 2022-11-20
Attending: INTERNAL MEDICINE
Payer: MEDICARE

## 2022-11-20 PROBLEM — J96.01 ACUTE RESPIRATORY FAILURE WITH HYPOXIA (HCC): Status: ACTIVE | Noted: 2022-01-01

## 2022-11-20 PROBLEM — R13.10 DYSPHAGIA: Status: ACTIVE | Noted: 2022-11-20

## 2022-11-20 PROBLEM — A41.9 SEPTIC SHOCK (HCC): Status: ACTIVE | Noted: 2022-01-01

## 2022-11-20 PROBLEM — G93.41 METABOLIC ENCEPHALOPATHY: Status: ACTIVE | Noted: 2022-11-20

## 2022-11-20 PROBLEM — B95.62 INFECTION OF RIGHT GREAT TOE DUE TO METHICILLIN RESISTANT STAPHYLOCOCCUS AUREUS (MRSA): Status: ACTIVE | Noted: 2022-11-20

## 2022-11-20 PROBLEM — L08.9 INFECTION OF RIGHT GREAT TOE DUE TO METHICILLIN RESISTANT STAPHYLOCOCCUS AUREUS (MRSA): Status: ACTIVE | Noted: 2022-01-01

## 2022-11-20 PROBLEM — I73.9 PAD (PERIPHERAL ARTERY DISEASE) (HCC): Status: ACTIVE | Noted: 2022-11-20

## 2022-11-20 PROBLEM — E43 SEVERE PROTEIN-CALORIE MALNUTRITION (HCC): Status: ACTIVE | Noted: 2022-11-20

## 2022-11-20 PROBLEM — R65.21 SEPTIC SHOCK (HCC): Status: ACTIVE | Noted: 2022-01-01

## 2022-11-20 LAB
ANION GAP SERPL CALC-SCNC: 10 MMOL/L (ref 5–15)
BUN SERPL-MCNC: 10 MG/DL (ref 6–20)
BUN/CREAT SERPL: 20 (ref 12–20)
CA-I BLD-MCNC: 7.6 MG/DL (ref 8.5–10.1)
CHLORIDE SERPL-SCNC: 109 MMOL/L (ref 97–108)
CO2 SERPL-SCNC: 22 MMOL/L (ref 21–32)
CREAT SERPL-MCNC: 0.51 MG/DL (ref 0.7–1.3)
ERYTHROCYTE [DISTWIDTH] IN BLOOD BY AUTOMATED COUNT: 17 % (ref 11.5–14.5)
GLUCOSE BLD STRIP.AUTO-MCNC: 126 MG/DL (ref 65–100)
GLUCOSE BLD STRIP.AUTO-MCNC: 134 MG/DL (ref 65–100)
GLUCOSE BLD STRIP.AUTO-MCNC: 159 MG/DL (ref 65–100)
GLUCOSE BLD STRIP.AUTO-MCNC: 179 MG/DL (ref 65–100)
GLUCOSE SERPL-MCNC: 139 MG/DL (ref 65–100)
HCT VFR BLD AUTO: 29.8 % (ref 36.6–50.3)
HGB BLD-MCNC: 9.5 G/DL (ref 12.1–17)
MCH RBC QN AUTO: 28.6 PG (ref 26–34)
MCHC RBC AUTO-ENTMCNC: 31.9 G/DL (ref 30–36.5)
MCV RBC AUTO: 89.8 FL (ref 80–99)
NRBC # BLD: 0 K/UL (ref 0–0.01)
NRBC BLD-RTO: 0 PER 100 WBC
PERFORMED BY, TECHID: ABNORMAL
PLATELET # BLD AUTO: 232 K/UL (ref 150–400)
PMV BLD AUTO: 10.3 FL (ref 8.9–12.9)
POTASSIUM SERPL-SCNC: 3.6 MMOL/L (ref 3.5–5.1)
RBC # BLD AUTO: 3.32 M/UL (ref 4.1–5.7)
SODIUM SERPL-SCNC: 141 MMOL/L (ref 136–145)
WBC # BLD AUTO: 6.7 K/UL (ref 4.1–11.1)

## 2022-11-20 PROCEDURE — 74011250636 HC RX REV CODE- 250/636: Performed by: INTERNAL MEDICINE

## 2022-11-20 PROCEDURE — 77010033678 HC OXYGEN DAILY

## 2022-11-20 PROCEDURE — 74011000250 HC RX REV CODE- 250: Performed by: STUDENT IN AN ORGANIZED HEALTH CARE EDUCATION/TRAINING PROGRAM

## 2022-11-20 PROCEDURE — 36415 COLL VENOUS BLD VENIPUNCTURE: CPT

## 2022-11-20 PROCEDURE — 85027 COMPLETE CBC AUTOMATED: CPT

## 2022-11-20 PROCEDURE — 74011250637 HC RX REV CODE- 250/637: Performed by: INTERNAL MEDICINE

## 2022-11-20 PROCEDURE — 80048 BASIC METABOLIC PNL TOTAL CA: CPT

## 2022-11-20 PROCEDURE — 94761 N-INVAS EAR/PLS OXIMETRY MLT: CPT

## 2022-11-20 PROCEDURE — 94640 AIRWAY INHALATION TREATMENT: CPT

## 2022-11-20 PROCEDURE — 74011000258 HC RX REV CODE- 258: Performed by: INTERNAL MEDICINE

## 2022-11-20 PROCEDURE — 71045 X-RAY EXAM CHEST 1 VIEW: CPT

## 2022-11-20 PROCEDURE — 74011250637 HC RX REV CODE- 250/637: Performed by: STUDENT IN AN ORGANIZED HEALTH CARE EDUCATION/TRAINING PROGRAM

## 2022-11-20 PROCEDURE — 82962 GLUCOSE BLOOD TEST: CPT

## 2022-11-20 PROCEDURE — 99232 SBSQ HOSP IP/OBS MODERATE 35: CPT | Performed by: INTERNAL MEDICINE

## 2022-11-20 PROCEDURE — 74011636637 HC RX REV CODE- 636/637: Performed by: INTERNAL MEDICINE

## 2022-11-20 PROCEDURE — 74011000250 HC RX REV CODE- 250: Performed by: INTERNAL MEDICINE

## 2022-11-20 PROCEDURE — 65270000029 HC RM PRIVATE

## 2022-11-20 RX ORDER — FUROSEMIDE 10 MG/ML
40 INJECTION INTRAMUSCULAR; INTRAVENOUS EVERY 6 HOURS
Status: DISPENSED | OUTPATIENT
Start: 2022-11-20 | End: 2022-11-21

## 2022-11-20 RX ADMIN — ATORVASTATIN CALCIUM 40 MG: 40 TABLET, FILM COATED ORAL at 09:38

## 2022-11-20 RX ADMIN — CASTOR OIL AND BALSAM, PERU: 788; 87 OINTMENT TOPICAL at 09:36

## 2022-11-20 RX ADMIN — FUROSEMIDE 40 MG: 10 INJECTION, SOLUTION INTRAMUSCULAR; INTRAVENOUS at 16:41

## 2022-11-20 RX ADMIN — INSULIN LISPRO 2 UNITS: 100 INJECTION, SOLUTION INTRAVENOUS; SUBCUTANEOUS at 09:36

## 2022-11-20 RX ADMIN — GABAPENTIN 100 MG: 100 CAPSULE ORAL at 21:06

## 2022-11-20 RX ADMIN — TRAMADOL HYDROCHLORIDE 50 MG: 50 TABLET, COATED ORAL at 21:06

## 2022-11-20 RX ADMIN — IPRATROPIUM BROMIDE AND ALBUTEROL SULFATE 3 ML: 2.5; .5 SOLUTION RESPIRATORY (INHALATION) at 20:18

## 2022-11-20 RX ADMIN — MELATONIN TAB 5 MG 10 MG: 5 TAB at 21:06

## 2022-11-20 RX ADMIN — INSULIN LISPRO 2 UNITS: 100 INJECTION, SOLUTION INTRAVENOUS; SUBCUTANEOUS at 16:30

## 2022-11-20 RX ADMIN — PIPERACILLIN SODIUM AND TAZOBACTAM SODIUM 3.38 G: 3; .375 INJECTION, POWDER, LYOPHILIZED, FOR SOLUTION INTRAVENOUS at 15:49

## 2022-11-20 RX ADMIN — ASPIRIN 81 MG 81 MG: 81 TABLET ORAL at 09:38

## 2022-11-20 RX ADMIN — IPRATROPIUM BROMIDE AND ALBUTEROL SULFATE 3 ML: 2.5; .5 SOLUTION RESPIRATORY (INHALATION) at 02:22

## 2022-11-20 RX ADMIN — SODIUM CHLORIDE, PRESERVATIVE FREE 10 ML: 5 INJECTION INTRAVENOUS at 21:08

## 2022-11-20 RX ADMIN — SODIUM CHLORIDE, PRESERVATIVE FREE 10 ML: 5 INJECTION INTRAVENOUS at 13:16

## 2022-11-20 RX ADMIN — DEXTROSE AND SODIUM CHLORIDE 75 ML/HR: 5; 450 INJECTION, SOLUTION INTRAVENOUS at 05:59

## 2022-11-20 RX ADMIN — IPRATROPIUM BROMIDE AND ALBUTEROL SULFATE 3 ML: 2.5; .5 SOLUTION RESPIRATORY (INHALATION) at 15:00

## 2022-11-20 RX ADMIN — SENNOSIDES AND DOCUSATE SODIUM 1 TABLET: 50; 8.6 TABLET ORAL at 09:38

## 2022-11-20 RX ADMIN — PIPERACILLIN SODIUM AND TAZOBACTAM SODIUM 3.38 G: 3; .375 INJECTION, POWDER, LYOPHILIZED, FOR SOLUTION INTRAVENOUS at 00:59

## 2022-11-20 RX ADMIN — TRAMADOL HYDROCHLORIDE 50 MG: 50 TABLET, COATED ORAL at 05:59

## 2022-11-20 RX ADMIN — CLOPIDOGREL BISULFATE 75 MG: 75 TABLET, FILM COATED ORAL at 09:39

## 2022-11-20 RX ADMIN — IPRATROPIUM BROMIDE AND ALBUTEROL SULFATE 3 ML: 2.5; .5 SOLUTION RESPIRATORY (INHALATION) at 08:41

## 2022-11-20 RX ADMIN — POTASSIUM CHLORIDE 20 MEQ: 1.5 POWDER, FOR SOLUTION ORAL at 09:38

## 2022-11-20 RX ADMIN — POTASSIUM CHLORIDE 20 MEQ: 1.5 POWDER, FOR SOLUTION ORAL at 21:05

## 2022-11-20 RX ADMIN — DOXAZOSIN 1 MG: 2 TABLET ORAL at 21:06

## 2022-11-20 RX ADMIN — CARVEDILOL 12.5 MG: 12.5 TABLET, FILM COATED ORAL at 09:38

## 2022-11-20 RX ADMIN — FUROSEMIDE 20 MG: 40 TABLET ORAL at 09:39

## 2022-11-20 RX ADMIN — PANTOPRAZOLE SODIUM 40 MG: 40 GRANULE, DELAYED RELEASE ORAL at 09:37

## 2022-11-20 RX ADMIN — ESCITALOPRAM OXALATE 20 MG: 10 TABLET ORAL at 09:38

## 2022-11-20 RX ADMIN — CASTOR OIL AND BALSAM, PERU: 788; 87 OINTMENT TOPICAL at 21:07

## 2022-11-20 RX ADMIN — LOSARTAN POTASSIUM 50 MG: 50 TABLET, FILM COATED ORAL at 09:38

## 2022-11-20 RX ADMIN — SODIUM CHLORIDE, PRESERVATIVE FREE 10 ML: 5 INJECTION INTRAVENOUS at 06:01

## 2022-11-20 RX ADMIN — BACLOFEN 5 MG: 10 TABLET ORAL at 21:06

## 2022-11-20 RX ADMIN — CARVEDILOL 12.5 MG: 12.5 TABLET, FILM COATED ORAL at 21:05

## 2022-11-20 RX ADMIN — PIPERACILLIN SODIUM AND TAZOBACTAM SODIUM 3.38 G: 3; .375 INJECTION, POWDER, LYOPHILIZED, FOR SOLUTION INTRAVENOUS at 09:36

## 2022-11-20 RX ADMIN — ARIPIPRAZOLE 2 MG: 2 TABLET ORAL at 09:38

## 2022-11-20 NOTE — PROGRESS NOTES
Problem: Falls - Risk of  Goal: *Absence of Falls  Description: Document Mariya Skill Fall Risk and appropriate interventions in the flowsheet.   Outcome: Progressing Towards Goal  Note: Fall Risk Interventions:       Mentation Interventions: Adequate sleep, hydration, pain control    Medication Interventions: Bed/chair exit alarm    Elimination Interventions: Bed/chair exit alarm

## 2022-11-20 NOTE — PROGRESS NOTES
Problem: Falls - Risk of  Goal: *Absence of Falls  Description: Document Mitchel Young Fall Risk and appropriate interventions in the flowsheet.   Outcome: Progressing Towards Goal  Note: Fall Risk Interventions:       Mentation Interventions: Adequate sleep, hydration, pain control, Bed/chair exit alarm, Door open when patient unattended, More frequent rounding, Reorient patient, Room close to nurse's station    Medication Interventions: Bed/chair exit alarm    Elimination Interventions: Bed/chair exit alarm

## 2022-11-20 NOTE — PROGRESS NOTES
Infectious Disease Progress Note           Subjective:   Remains stable, denies new complaints, no acute events since last seen. Remains confused   Objective:   Physical Exam:     Visit Vitals  BP (!) 131/59   Pulse 76   Temp 97.8 °F (36.6 °C)   Resp 16   Ht 5' 9\" (1.753 m)   Wt 137 lb 12.6 oz (62.5 kg)   SpO2 93%   BMI 20.35 kg/m²    O2 Flow Rate (L/min): 3 l/min O2 Device: Nasal cannula    Temp (24hrs), Av.1 °F (36.7 °C), Min:97.7 °F (36.5 °C), Max:98.5 °F (36.9 °C)    No intake/output data recorded.     1901 -  0700  In: 4715 [I.V.:1845]  Out: 1000 [Urine:1000]    General: NAD, alert, confused   HEENT: VANGIE, Moist mucosa  Lungs:Decreased at the bases, no wheeze/rhonchi   Heart: S1S2+, RRR, no murmur  Abdo: Soft, NT, ND, +BS, + peg tube   Exts:Right great toe ulcer w dry eschar, left medial thigh ulcer, diffuse LUE swelling   Skin: b/l leg ulcers, stable ulcerations on b/l feet     Data Review:       Recent Days:  Recent Labs     22  1134 22  1242 22  1522   WBC 6.7 6.1 7.1   HGB 9.5* 9.2* 9.6*   HCT 29.8* 28.3* 31.6*    208 86*       Recent Labs     22  1134 22  1242 22  1044   BUN 10 12 10   CREA 0.51* 0.58* 0.50*       Lab Results   Component Value Date/Time    C-Reactive protein 1.64 (H) 2022 10:18 AM        Microbiology     Results       Procedure Component Value Units Date/Time    MRSA SCREEN - PCR (NASAL) [[id number]]  (Abnormal) Collected: 22 0231    Order Status: Completed Specimen: Swab Updated: 22     MRSA by PCR, Nasal DETECTED        Comment: Results verified, phoned to and read back by [email or web address]       CULTURE, Alee Pope [[id number]]  (Susceptibility) Collected: 22 0210    Order Status: Completed Specimen: Wound from Toe Updated: 22 1327     Special Requests: No Special Requests        GRAM STAIN Occasional WBCs seen               2+ Gram positive cocci in pairs chains and clusters           Culture result:       Heavy * methicillin resistant staphylococcus aureus *            Heavy Diphtheroids       Susceptibility        Staphylococcus aureus Methcillin Resistant      TELMA      Ciprofloxacin ($) Resistant      Clindamycin ($) Resistant      Daptomycin ($$$$$) Susceptible      Doxycycline ($$) Intermediate      Erythromycin ($$$$) Resistant      Gentamicin ($) Susceptible      Inducible Clindamycin  See below  [1]       Levofloxacin ($) Resistant      Linezolid ($$$$$) Susceptible      Oxacillin Resistant      Rifampin ($$$$) Susceptible  [2]       Tetracycline Resistant      Trimeth/Sulfa Susceptible      Vancomycin ($) Susceptible                   [1]  HIDE     [2]  Rifampin is not to be used for mono-therapy. CULTURE, BLOOD, PAIRED [[id number]] Collected: 11/08/22 1325    Order Status: Completed Specimen: Blood Updated: 11/14/22 0121     Special Requests: No Special Requests        Culture result: No growth 6 days       CULTURE, URINE [[id number]] Collected: 11/08/22 1115    Order Status: Completed Specimen: Urine Updated: 11/10/22 1506     Special Requests: No Special Requests        [place] Count 20,000        [place] Count colonies/ml        Culture result:       Mixed urogenital kristen isolated                     Diagnostics   CXR Results  (Last 48 hours)                 11/20/22 0802  XR CHEST PORT Final result    Impression:  Worsening diffuse airspace disease with areas of increasing consolidation. Narrative:  EXAM: XR CHEST PORT       HISTORY: PNA. COMPARISON: 11/17/2022       FINDINGS: Portable AP. The patient is status post median sternotomy. Monitoring   leads overlie the patient. The nasogastric tube is been removed. There are   increasing patchy opacities throughout both lungs. Areas of consolidation are   increased in the right mid chest and medial right lower lobe. Increasing   consolidation is also seen in the retrocardiac left lower lobe. No pleural   effusion or pneumothorax. Assessment/Plan     Suspected aspiration PNA. \"Worsening diffuse airspace disease with areas of increasing consolidation\"  on CXR (11/20)         No increased O2 requirements, maintaining O2 sats on 3L. Persistent Rhonchi in upper airways         WBC WNLs afebrile and hemodynamically stable         On day # 4 of Zosyn. Routine labs in the morning     Right great toe infection  MRSA isolated from Cx of right great toe         No abscess or osteomyelitis on CT of right foot (11/11)         Suspected DTI involving left lateral foot and left medial thigh ulcer        Continue routine wound care as is already being done     3. UTI, abnormal UA, Mixed kristen isolated from urine Cx (11/08)    4. CVA w Small to moderate sized acute infarction in the right occipital lobe with  minimal/small infarction in the right temporal lobe on MRI head (11/16))       Remains confused, weak, follows some commands     5. Dysphagia, s/p peg tube placement on 11/18.      6. Diffuse LUE swelling, decreased w elevation, will monitor for superinfection     Cindi Salinas MD    11/20/2022

## 2022-11-20 NOTE — PROGRESS NOTES
Hospitalist Progress Note    Subjective:   Daily Progress Note: 11/20/2022 9:21 AM    Patient has fluctuating mentation but appears to be able to follow some commands. Overall prognosis is poor.     Current Facility-Administered Medications   Medication Dose Route Frequency    albuterol-ipratropium (DUO-NEB) 2.5 MG-0.5 MG/3 ML  3 mL Nebulization Q6H RT    ARIPiprazole (ABILIFY) tablet 2 mg  2 mg Per G Tube DAILY    aspirin chewable tablet 81 mg  81 mg Per G Tube DAILY    carvediloL (COREG) tablet 12.5 mg  12.5 mg Per G Tube BID    doxazosin (CARDURA) tablet 1 mg  1 mg Per G Tube QHS    escitalopram oxalate (LEXAPRO) tablet 20 mg  20 mg Per G Tube DAILY    furosemide (LASIX) tablet 20 mg  20 mg Per G Tube DAILY    gabapentin (NEURONTIN) capsule 100 mg  100 mg Per G Tube QHS    losartan (COZAAR) tablet 50 mg  50 mg Per G Tube DAILY    melatonin tablet 10 mg  10 mg Per G Tube QHS    pantoprazole (PROTONIX) granules for oral suspension 40 mg  40 mg Per G Tube DAILY    potassium chloride (KLOR-CON) packet for solution 20 mEq  20 mEq Per G Tube BID    senna-docusate (PERICOLACE) 8.6-50 mg per tablet 1 Tablet  1 Tablet Per G Tube DAILY    clopidogreL (PLAVIX) tablet 75 mg  75 mg PEG Tube DAILY    atorvastatin (LIPITOR) tablet 40 mg  40 mg Per G Tube DAILY    albuterol-ipratropium (DUO-NEB) 2.5 MG-0.5 MG/3 ML  3 mL Nebulization Q6H PRN    balsam peru-castor oiL (VENELEX) ointment   Topical BID    piperacillin-tazobactam (ZOSYN) 3.375 g in 0.9% sodium chloride (MBP/ADV) 100 mL MBP  3.375 g IntraVENous Q8H    dextrose 5 % - 0.45% NaCl infusion  75 mL/hr IntraVENous CONTINUOUS    hydrALAZINE (APRESOLINE) 20 mg/mL injection 20 mg  20 mg IntraVENous Q6H PRN    baclofen (LIORESAL) tablet 5 mg  5 mg Per NG tube BID PRN    polyethylene glycol (MIRALAX) packet 17 g  17 g Per NG tube DAILY PRN    traMADoL (ULTRAM) tablet 50 mg  50 mg Per NG tube Q6H PRN    sodium chloride (NS) flush 5-40 mL  5-40 mL IntraVENous Q8H    sodium chloride (NS) flush 5-40 mL  5-40 mL IntraVENous PRN    acetaminophen (TYLENOL) tablet 650 mg  650 mg Oral Q6H PRN    Or    acetaminophen (TYLENOL) suppository 650 mg  650 mg Rectal Q6H PRN    ondansetron (ZOFRAN ODT) tablet 4 mg  4 mg Oral Q8H PRN    Or    ondansetron (ZOFRAN) injection 4 mg  4 mg IntraVENous Q6H PRN    [Held by provider] enoxaparin (LOVENOX) injection 40 mg  40 mg SubCUTAneous DAILY    glucose chewable tablet 16 g  4 Tablet Oral PRN    glucagon (GLUCAGEN) injection 1 mg  1 mg IntraMUSCular PRN    dextrose 10% infusion 0-250 mL  0-250 mL IntraVENous PRN    insulin lispro (HUMALOG) injection   SubCUTAneous AC&HS        Review of Systems  Review of Systems   Unable to perform ROS: Acuity of condition          Objective:     Visit Vitals  BP (!) 131/59   Pulse 76   Temp 97.8 °F (36.6 °C)   Resp 16   Ht 5' 9\" (1.753 m)   Wt 62.5 kg (137 lb 12.6 oz)   SpO2 93%   BMI 20.35 kg/m²    O2 Flow Rate (L/min): 3 l/min O2 Device: Nasal cannula    Temp (24hrs), Av.1 °F (36.7 °C), Min:97.7 °F (36.5 °C), Max:98.5 °F (36.9 °C)      No intake/output data recorded.  1901 -  0700  In: 4715 [I.V.:1845]  Out: 1000 [Urine:1000]    XR CHEST PORT   Final Result   Worsening diffuse airspace disease with areas of increasing consolidation. XR CHEST PORT   Final Result   Interval advancement of nasogastric tube, although tip not included on this   study. Evaluation of nasogastric tube position is typically better performed   with KUB. XR CHEST PORT   Final Result   1. No significant change in basilar predominant opacities. These are   nonspecific but can be seen with aspiration pneumonitis/pneumonia in the   appropriate setting. 2.  Enteric tube tip projects just past the gastroesophageal junction with   proximal sidehole in the distal esophagus. Consider advancing.        MRI BRAIN WO CONT   Final Result   Small to moderate sized acute infarction in the right occipital lobe with   minimal/small infarction in the right temporal lobe. Severe cerebral atrophy. There is no intracranial mass, hemorrhage. There are numerous scattered chronic infarcts. No additional acute intracranial process is demonstrated. XR CHEST PORT   Final Result   Interval retraction of nasogastric tube with sidehole over the gastroesophageal   junction. XR CHEST PORT   Final Result   Interval replacement of nasogastric tube, with tip over the gastric fundus. XR CHEST PORT   Final Result   No significant change. XR CHEST PORT   Final Result   1. NG tube terminates in the gastric fundus, with possible kink in the side   hole. Correlate with function. 2.  New patchy consolidation in the right midlung is concerning for pneumonia. Grossly unchanged patchy retrocardiac opacities. Stable trace right pleural   effusion. XR CHEST PORT   Final Result   Nasogastric tube tip advanced overlying the gastric body. XR CHEST PORT   Final Result   Nasogastric tube tip over the gastric fundus. XR CHEST PORT   Final Result   Increased mild interstitial pulmonary edema versus pneumonia. Enteric tube extends into the gastric antrum or first portion of the duodenum. Consider retraction 8-10 cm if the intention is gastric luminal termination. CT FOOT RT W CONT   Final Result      1. No abscess or osteomyelitis. 2. No fracture. DUPLEX LOWER EXT ARTERY BILAT   Final Result      XR FOOT RT MIN 3 V   Final Result   No osseous erosion or soft tissue gas. .      XR CHEST PORT   Final Result      Mild interstitial opacities which may represent mild pulmonary edema unchanged         XR CHEST PORT   Final Result   Addendum (preliminary) 1 of 1   Addendum: NG tube is in appropriate position tip extends below the    diaphragm. Right IJ catheter. No pneumothorax on the right. Final   1. Status post right IJ catheter placement without evidence of complication.        IR INSERT NON TUNL CVC OVER 5 YRS   Final Result   Technically successful ultrasound guided placement of a right internal jugular   vein temporary central venous catheter. A post procedure chest x-ray is   pending. CT HEAD WO CONT   Final Result   No acute process or change compared to the prior exam.            XR CHEST PORT   Final Result   No acute process. IR US GUIDED VASCULAR ACCESS    (Results Pending)   XR SWALLOW FUNC VIDEO    (Results Pending)        PHYSICAL EXAM:    Physical Exam  Vitals and nursing note reviewed. Constitutional:       Appearance: He is ill-appearing. HENT:      Head: Normocephalic and atraumatic. Cardiovascular:      Rate and Rhythm: Normal rate and regular rhythm. Pulmonary:      Effort: No respiratory distress. Breath sounds: Rhonchi present. Comments: Rhonchi heard upper right and left lobes  Abdominal:      General: Bowel sounds are normal. There is no distension. Palpations: Abdomen is soft. Tenderness: There is no abdominal tenderness. Comments: PEG tube in place, non tender to palpation   Musculoskeletal:      Right lower leg: No edema. Left lower leg: No edema. Comments: Bunny boots noted to B/L LE in proper placement   Skin:     General: Skin is warm. Capillary Refill: Capillary refill takes less than 2 seconds. Comments: Scabs seen throughout lower extremities   Neurological:      Mental Status: He is alert. He is disoriented.    Psychiatric:      Comments: Unable to assess - more talkative still nonsensical        Data Review    Recent Results (from the past 24 hour(s))   METABOLIC PANEL, BASIC    Collection Time: 11/19/22 12:42 PM   Result Value Ref Range    Sodium 142 136 - 145 mmol/L    Potassium 3.6 3.5 - 5.1 mmol/L    Chloride 112 (H) 97 - 108 mmol/L    CO2 24 21 - 32 mmol/L    Anion gap 6 5 - 15 mmol/L    Glucose 188 (H) 65 - 100 mg/dL    BUN 12 6 - 20 mg/dL    Creatinine 0.58 (L) 0.70 - 1.30 mg/dL    BUN/Creatinine ratio 21 (H) 12 - 20      eGFR >60 >60 ml/min/1.73m2    Calcium 7.6 (L) 8.5 - 10.1 mg/dL   CBC W/O DIFF    Collection Time: 11/19/22 12:42 PM   Result Value Ref Range    WBC 6.1 4.1 - 11.1 K/uL    RBC 3.22 (L) 4.10 - 5.70 M/uL    HGB 9.2 (L) 12.1 - 17.0 g/dL    HCT 28.3 (L) 36.6 - 50.3 %    MCV 87.9 80.0 - 99.0 FL    MCH 28.6 26.0 - 34.0 PG    MCHC 32.5 30.0 - 36.5 g/dL    RDW 17.0 (H) 11.5 - 14.5 %    PLATELET 581 666 - 588 K/uL    MPV 10.5 8.9 - 12.9 FL    NRBC 0.0 0.0  WBC    ABSOLUTE NRBC 0.00 0.00 - 0.01 K/uL   GLUCOSE, POC    Collection Time: 11/19/22  4:59 PM   Result Value Ref Range    Glucose (POC) 177 (H) 65 - 100 mg/dL    Performed by Lennox Sorensen    GLUCOSE, POC    Collection Time: 11/19/22  8:32 PM   Result Value Ref Range    Glucose (POC) 166 (H) 65 - 100 mg/dL    Performed by Cory Farmer    GLUCOSE, POC    Collection Time: 11/20/22  8:37 AM   Result Value Ref Range    Glucose (POC) 159 (H) 65 - 100 mg/dL    Performed by Jae Purvis    METABOLIC PANEL, BASIC    Collection Time: 11/20/22 11:34 AM   Result Value Ref Range    Sodium 141 136 - 145 mmol/L    Potassium 3.6 3.5 - 5.1 mmol/L    Chloride 109 (H) 97 - 108 mmol/L    CO2 22 21 - 32 mmol/L    Anion gap 10 5 - 15 mmol/L    Glucose 139 (H) 65 - 100 mg/dL    BUN 10 6 - 20 mg/dL    Creatinine 0.51 (L) 0.70 - 1.30 mg/dL    BUN/Creatinine ratio 20 12 - 20      eGFR >60 >60 ml/min/1.73m2    Calcium 7.6 (L) 8.5 - 10.1 mg/dL   CBC W/O DIFF    Collection Time: 11/20/22 11:34 AM   Result Value Ref Range    WBC 6.7 4.1 - 11.1 K/uL    RBC 3.32 (L) 4.10 - 5.70 M/uL    HGB 9.5 (L) 12.1 - 17.0 g/dL    HCT 29.8 (L) 36.6 - 50.3 %    MCV 89.8 80.0 - 99.0 FL    MCH 28.6 26.0 - 34.0 PG    MCHC 31.9 30.0 - 36.5 g/dL    RDW 17.0 (H) 11.5 - 14.5 %    PLATELET 535 917 - 120 K/uL    MPV 10.3 8.9 - 12.9 FL    NRBC 0.0 0.0  WBC    ABSOLUTE NRBC 0.00 0.00 - 0.01 K/uL   GLUCOSE, POC    Collection Time: 11/20/22 11:50 AM   Result Value Ref Range    Glucose (POC) 134 (H) 65 - 100 mg/dL    Performed by Ronal Johnson         Assessment/Plan:     Active Problems:    CVA (cerebral vascular accident) ([address] (7/31/2022)      Carotid stenosis (8/2/2022)      UTI (urinary tract infection) (11/8/2022)      Septic shock ([address] (11/20/2022)      Infection of right great toe due to methicillin resistant Staphylococcus aureus (MRSA) (63/71/7651)      Metabolic encephalopathy (10/19/3704)      Severe protein-calorie malnutrition ([address] (11/20/2022)      Dysphagia (11/20/2022)      Acute respiratory failure with hypoxia ([address] (11/20/2022)      PAD (peripheral artery disease) ([address] (11/20/2022)      Hospital Course: Laverle Carrel is a 60-year-old male with a PMH of stroke, hypertension, DM, and cirrhosis who presented from nursing home with weakness and hypotension x1 day. Of note recent admission for TIA evaluated and recommended for carotid endarterectomy. Found hypotensive on arrival to the ED. Initial labs significant for WBC of 15.8, creatinine of 1.46, lactic acid of 2.4, and glucose of 134. UA consistent with urinary tract infection. CT of the head with no acute process. Patient admitted for further work-up. Patient started on IV fluids and ceftriaxone. Vascular surgery, cardiology, and podiatry consulted. KRISTI with duplex shows right common femoral artery and proximal superficial femoral artery occluded with collateralization to popliteal artery, left mid to distal superficial artery occluded, with patent distal anterior/posterior tibial artery, popliteal artery patent and with patent distal anterior tibial artery and posterior tibial artery. On 11/9 transferred to ICU overnight for poor responsiveness and for septic shock. CT of the head negative for acute findings. Patient started on meropenem, vancomycin, IV fluids, and Levophed. Pulmonology and ID consulted. ECHO showed EF of 50-55% with mild MV R and dilated left atrium.  XR right foot with generalized osseous demineralization without fracture or dislocation, tissue gas or osseous erosive. CT of right foot with no abscess or osteomyelitis. Patient has carotid artery stenosis and supposed to go for surgery but unable to get cardiac clearance because of nuclear stress test he has 90% stenosis of left ICA and 50% stenosis of right ICA. Urine culture grew mixed kristen. Neurology and psychiatry consulted for confusion. MRI 7/2022 showed acute ischemic stroke of the right parietal area. Repeat MRI brain 11/16/22 showed small to moderate sized acute infarction in the right occipital lobe with minimal/small infarction in the right temporal lobe and severe cerebral atrophy. Continue DAPT and atorvastatin. Patient has had NG tube for nutrition since admission. Speech therapy working with patient. GI consult to evaluate for PEG placement. on 11/15 fever spike of 100.6F. 11/17 CXR with no significant change in basilar predominant opacities. Started on zosyn. PEG placed on 11/18 and MBS on 11/21.      SIRS, suspected aspiration PNA  Tmax 100.6F, WBC WNLs, CXR with no significant change in basilar predominant opacities   Continue on Zosyn #3/7  ID following    Severe sepsis with septic shock - resolved  2/2 UTI and right great toe ulcer  S/p vancomycin and meropenem  11/8 blood: no growth    Great right toe infection  11/9 wound: MRSA  XR of right foot without fracture, dislocation, soft tissue gas, or osseous erosion  CT of right foot with no abscess or osteomyelitis  S/p vancomycin for 7 days  Continue routine wound care  ID and podiatry following    Metabolic encephalopathy - improving    Severe protein calorie malnutrition  Status post PEG tube  GI following    Dysphagia  SLP following and recommending MBS on Monday    Acute respiratory failure with hypoxia - resolved  On room air    Urinary tract infection  11/8 urine: mixed kristen    Hypertension  Continue on carvedilol, furosemide, and losartan    CVA  Carotid stenosis   History of old infarcts  Repeat MRI brain 11/16/22 showed small to moderate sized acute infarction in the right occipital lobe with minimal/small infarction in the right temporal lobe and severe cerebral atrophy  Continue on DAPT and atorvastatin  Vascular and neurology following    Multiple chronic wounds  PAD  Poor arterial flow and nutritional status complicates wound healing  KRISTI with duplex shows right common femoral artery and proximal superficial femoral artery occluded with collateralization to popliteal artery, left mid to distal superficial artery occluded, with patent distal anterior/posterior tibial and popliteal arteries   Vascular was following outpatient for endarterectomy possibly with intervention for this? DVT Prophylaxis: SCDs  GI Prophylaxis: protonix  Discharge and disposition barriers: MBS, tolerating PEG, 24-48hrs  Wound care: betadine WTD daily with strict offloading while in bed with bunny boots    Mar [place], (son) [phone] - updated on patient condition    Care Plan discussed with: Patient/Family, Nurse, and     Total time spent with patient: 35 minutes.

## 2022-11-20 NOTE — PROGRESS NOTES
IMPRESSION:   Acute hypoxic respiratory failure had resolved now back on nasal oxygen   Diffuse pulmonary infiltrates questionable pulmonary edema versus chronic aspiration  Leukocytosis resolved  Carotid artery disease history of CVA  Hypokalemia      RECOMMENDATIONS/PLAN:   66-year-old male nursing home resident admitted with hypotension and unresponsiveness now he is alert awake agitated  Worsening chest x-ray and hypoxia currently receiving IV fluids we will give 2 doses Lasix and hold IV fluids check BNP in a.m.  11/10 Echo ejection fraction 50% IVC dilated left atrium 5 cm RVSP 59  Pulmonary hypertension with probable cor pulmonale  Chest x-ray shows fluid overload congestive changes   Potassium corrected     [x] High complexity decision making was performed  [x] See my orders for details  HPI  66-year-old nursing home resident came in because of unresponsiveness he had a history of stroke in the past patient was hypotensive hypoxic rapid response was called he was admitted to the floor initially received IV fluid hemodynamically unstable started on vasopressors Levophed transferred to ICU White count was elevated he was put on oxygen 4 L nasal cannula unable to get any started the patient he has a right foot wound and multiple wounds of the lower extremities dressing in place. He has carotid artery stenosis only supposed to go for surgery but unable to get cardiac clearance because of nuclear stress test he has 90% stenosis of left ICA and 50% stenosis of right ICA. So now he is admitted to ICU and critical care consult was called  PMH:  has a past medical history of Cirrhosis ([facility] [address] Diabetes ([facility] [address] Hypertension, and Stroke ([facility] [address] PSH:   has a past surgical history that includes hx back surgery; hx gi; and ir insert non tunl cvc over 5 yrs (11/9/2022). FHX: family history includes Heart Disease in his father. SHX:  reports that he has quit smoking.  He has never used smokeless tobacco. He reports that he does not currently use alcohol. He reports that he does not currently use drugs.     ALL: No Known Allergies     MEDS:   [x] Reviewed - As Below   [] Not reviewed    Current Facility-Administered Medications   Medication    furosemide (LASIX) injection 40 mg    albuterol-ipratropium (DUO-NEB) 2.5 MG-0.5 MG/3 ML    ARIPiprazole (ABILIFY) tablet 2 mg    aspirin chewable tablet 81 mg    carvediloL (COREG) tablet 12.5 mg    doxazosin (CARDURA) tablet 1 mg    escitalopram oxalate (LEXAPRO) tablet 20 mg    gabapentin (NEURONTIN) capsule 100 mg    losartan (COZAAR) tablet 50 mg    melatonin tablet 10 mg    pantoprazole (PROTONIX) granules for oral suspension 40 mg    potassium chloride (KLOR-CON) packet for solution 20 mEq    senna-docusate (PERICOLACE) 8.6-50 mg per tablet 1 Tablet    clopidogreL (PLAVIX) tablet 75 mg    atorvastatin (LIPITOR) tablet 40 mg    albuterol-ipratropium (DUO-NEB) 2.5 MG-0.5 MG/3 ML    balsam peru-castor oiL (VENELEX) ointment    piperacillin-tazobactam (ZOSYN) 3.375 g in 0.9% sodium chloride (MBP/ADV) 100 mL MBP    hydrALAZINE (APRESOLINE) 20 mg/mL injection 20 mg    baclofen (LIORESAL) tablet 5 mg    polyethylene glycol (MIRALAX) packet 17 g    traMADoL (ULTRAM) tablet 50 mg    sodium chloride (NS) flush 5-40 mL    sodium chloride (NS) flush 5-40 mL    acetaminophen (TYLENOL) tablet 650 mg    Or    acetaminophen (TYLENOL) suppository 650 mg    ondansetron (ZOFRAN ODT) tablet 4 mg    Or    ondansetron (ZOFRAN) injection 4 mg    [Held by provider] enoxaparin (LOVENOX) injection 40 mg    glucose chewable tablet 16 g    glucagon (GLUCAGEN) injection 1 mg    dextrose 10% infusion 0-250 mL    insulin lispro (HUMALOG) injection      MAR reviewed and pertinent medications noted or modified as needed   Current Facility-Administered Medications   Medication    furosemide (LASIX) injection 40 mg    albuterol-ipratropium (DUO-NEB) 2.5 MG-0.5 MG/3 ML    ARIPiprazole (ABILIFY) tablet 2 mg aspirin chewable tablet 81 mg    carvediloL (COREG) tablet 12.5 mg    doxazosin (CARDURA) tablet 1 mg    escitalopram oxalate (LEXAPRO) tablet 20 mg    gabapentin (NEURONTIN) capsule 100 mg    losartan (COZAAR) tablet 50 mg    melatonin tablet 10 mg    pantoprazole (PROTONIX) granules for oral suspension 40 mg    potassium chloride (KLOR-CON) packet for solution 20 mEq    senna-docusate (PERICOLACE) 8.6-50 mg per tablet 1 Tablet    clopidogreL (PLAVIX) tablet 75 mg    atorvastatin (LIPITOR) tablet 40 mg    albuterol-ipratropium (DUO-NEB) 2.5 MG-0.5 MG/3 ML    balsam peru-castor oiL (VENELEX) ointment    piperacillin-tazobactam (ZOSYN) 3.375 g in 0.9% sodium chloride (MBP/ADV) 100 mL MBP    hydrALAZINE (APRESOLINE) 20 mg/mL injection 20 mg    baclofen (LIORESAL) tablet 5 mg    polyethylene glycol (MIRALAX) packet 17 g    traMADoL (ULTRAM) tablet 50 mg    sodium chloride (NS) flush 5-40 mL    sodium chloride (NS) flush 5-40 mL    acetaminophen (TYLENOL) tablet 650 mg    Or    acetaminophen (TYLENOL) suppository 650 mg    ondansetron (ZOFRAN ODT) tablet 4 mg    Or    ondansetron (ZOFRAN) injection 4 mg    [Held by provider] enoxaparin (LOVENOX) injection 40 mg    glucose chewable tablet 16 g    glucagon (GLUCAGEN) injection 1 mg    dextrose 10% infusion 0-250 mL    insulin lispro (HUMALOG) injection      PMH:  has a past medical history of Cirrhosis ([facility] [address] Diabetes ([facility] [address] Hypertension, and Stroke ([facility] [address] PSH:   has a past surgical history that includes hx back surgery; hx gi; and ir insert non tunl cvc over 5 yrs (11/9/2022). FHX: family history includes Heart Disease in his father. SHX:  reports that he has quit smoking. He has never used smokeless tobacco. He reports that he does not currently use alcohol. He reports that he does not currently use drugs. ROS:Review of systems not obtained due to patient factors.       Hemodynamics:    CO:    CI:    CVP:    SVR:   PAP Systolic:    PAP Diastolic:    PVR: SV02:        Ventilator Settings:      Mode Rate TV Press PEEP FiO2 PIP Min. Vent                              Vital Signs: Telemetry:    normal sinus rhythm Intake/Output:   Visit Vitals  /64   Pulse 73   Temp 97.9 °F (36.6 °C)   Resp 14   Ht 5' 9\" (1.753 m)   Wt 62.5 kg (137 lb 12.6 oz)   SpO2 93%   BMI 20.35 kg/m²       Temp (24hrs), Av °F (36.7 °C), Min:97.7 °F (36.5 °C), Max:98.5 °F (36.9 °C)        O2 Device: Nasal cannula O2 Flow Rate (L/min): 3 l/min       Wt Readings from Last 4 Encounters:   11/10/22 62.5 kg (137 lb 12.6 oz)   22 84.8 kg (187 lb)   22 84.8 kg (187 lb)   10/28/20 99.8 kg (220 lb)          Intake/Output Summary (Last 24 hours) at 2022 1656  Last data filed at 2022 1550  Gross per 24 hour   Intake 4533 ml   Output 1000 ml   Net 3533 ml         Last shift:       0701 - 1900  In: 5343 [I.V.:775]  Out: 600 [Urine:600]  Last 3 shifts: 1901 -  0700  In: 8109 [I.V.:1845]  Out: 1000 [Urine:1000]       Physical Exam:     General: Awake confused does not follow commands  HEENT: NCAT, poor dentition,   Eyes: anicteric; conjunctiva clear extraocular movements intact  Neck: no nodes, trach midline; no accessory MM use.   Neck veins visible  Chest: no deformity,   Cardiac: Regular rate and rhythm  Lungs: Shallow but clear anteriorly with lateral rales no wheezing  Abd: soft, NT, hypoactive BS  Ext: Lower extremity wound bandage in place  : clear urine  Neuro: Awake mumbles follows no commands does move his extremities  Psych-unable to assess  Skin: warm, dry, no cyanosis;   Pulses: Brachial and radial pulses intact  Capillary: Normal capillary refill      DATA:    MAR reviewed and pertinent medications noted or modified as needed  MEDS:   Current Facility-Administered Medications   Medication    furosemide (LASIX) injection 40 mg    albuterol-ipratropium (DUO-NEB) 2.5 MG-0.5 MG/3 ML    ARIPiprazole (ABILIFY) tablet 2 mg    aspirin chewable tablet 81 mg    carvediloL (COREG) tablet 12.5 mg    doxazosin (CARDURA) tablet 1 mg    escitalopram oxalate (LEXAPRO) tablet 20 mg    gabapentin (NEURONTIN) capsule 100 mg    losartan (COZAAR) tablet 50 mg    melatonin tablet 10 mg    pantoprazole (PROTONIX) granules for oral suspension 40 mg    potassium chloride (KLOR-CON) packet for solution 20 mEq    senna-docusate (PERICOLACE) 8.6-50 mg per tablet 1 Tablet    clopidogreL (PLAVIX) tablet 75 mg    atorvastatin (LIPITOR) tablet 40 mg    albuterol-ipratropium (DUO-NEB) 2.5 MG-0.5 MG/3 ML    balsam peru-castor oiL (VENELEX) ointment    piperacillin-tazobactam (ZOSYN) 3.375 g in 0.9% sodium chloride (MBP/ADV) 100 mL MBP    hydrALAZINE (APRESOLINE) 20 mg/mL injection 20 mg    baclofen (LIORESAL) tablet 5 mg    polyethylene glycol (MIRALAX) packet 17 g    traMADoL (ULTRAM) tablet 50 mg    sodium chloride (NS) flush 5-40 mL    sodium chloride (NS) flush 5-40 mL    acetaminophen (TYLENOL) tablet 650 mg    Or    acetaminophen (TYLENOL) suppository 650 mg    ondansetron (ZOFRAN ODT) tablet 4 mg    Or    ondansetron (ZOFRAN) injection 4 mg    [Held by provider] enoxaparin (LOVENOX) injection 40 mg    glucose chewable tablet 16 g    glucagon (GLUCAGEN) injection 1 mg    dextrose 10% infusion 0-250 mL    insulin lispro (HUMALOG) injection        Labs:    Recent Labs     11/20/22  1134 11/19/22  1242   WBC 6.7 6.1   HGB 9.5* 9.2*    208       Recent Labs     11/20/22  1134 11/19/22  1242 11/18/22  1044    142 145   K 3.6 3.6 3.5   * 112* 113*   CO2 22 24 23   * 188* 116*   BUN 10 12 10   CREA 0.51* 0.58* 0.50*   CA 7.6* 7.6* 7.8*       11/10/2022 Echo ejection fraction 50% IVC dilated left atrium 5 cm RVSP 59  Lab Results   Component Value Date/Time    Culture result:  11/09/2022 02:10 AM     Heavy * methicillin resistant staphylococcus aureus *    Culture result: Heavy Diphtheroids 11/09/2022 02:10 AM    Culture result: No growth 6 days 11/08/2022 01:25 PM Lab Results   Component Value Date/Time    TSH 1.67 07/30/2022 07:11 AM        Imaging:    Results from Hospital Encounter encounter on 11/08/22    XR CHEST PORT    Narrative  EXAM: XR CHEST PORT    HISTORY: PNA. COMPARISON: 11/17/2022    FINDINGS: Portable AP. The patient is status post median sternotomy. Monitoring  leads overlie the patient. The nasogastric tube is been removed. There are  increasing patchy opacities throughout both lungs. Areas of consolidation are  increased in the right mid chest and medial right lower lobe. Increasing  consolidation is also seen in the retrocardiac left lower lobe. No pleural  effusion or pneumothorax. Impression  Worsening diffuse airspace disease with areas of increasing consolidation. Results from [place] encounter on 11/08/22    CT FOOT RT W CONT    Narrative  EXAM: CT FOOT RT W CONT    INDICATION: Right foot swelling and abscess. Evaluate for osteomyelitis. Hypertension, diabetes, and cirrhosis. COMPARISON: Right foot views on 11/10/2022    CONTRAST: 100 mL of Isovue-370. TECHNIQUE: Helical CT of the right foot during uneventful rapid bolus  intravenous contrast administration. Coronal and Sagittal reformats were  generated. Images reviewed in soft tissue and bone windows. CT dose reduction  was achieved through the use of a standardized protocol tailored for this  examination and automatic exposure control for dose modulation. FINDINGS: Bones: Mild osteopenia. No fracture or osteomyelitis. Joint fluid: Physiologic. Articulations: no evidence of septic arthritis. 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 Tendons: No full-thickness tendon tear. Muscles: Mild diffuse atrophy. Soft tissue mass: None. No fluid collection. Impression  1. No abscess or osteomyelitis. 2. No fracture.       11/10 patient is barely responsive now on room air off pressors getting IV fluid chest x-ray shows congestive changes IV fluid has been decreased, replace potassium, WBC much improved  11/11 Patient is out of ICU, responding slightly, he is on room air. PCO2 30. WBC improving, continues to decrease. 11/12 On room air, condition remains same open eyes but does not follow any commands, replace potassium  11/13 Room air, no O2 in hospital. He opened his eyes, tried to respond slightly by denying SOB, but unable to communicate clearly. NG tube in place. 11/14 Pt is seen resting, he is not currently on any O2, SpO2 94%. NG tube in place. 11/15 Pt is awake feeling well, more responsive today. He denies SOB. He is still on room air. Pt still has NG tube in place. 11/16 condition same on room air pleasantly confused  11/20 back on oxygen chest x-ray shows worsening congestion and infiltrates. WBC count is normal with no fever tends to rule out aspiration pneumonia has been receiving D5 half at 60 an hour probably fluid overload from cor pulmonale we will discontinue IV fluids give 2 doses Lasix tonight and check labs and BNP in a.m.   Dereck Catherine MD

## 2022-11-21 ENCOUNTER — APPOINTMENT (OUTPATIENT)
Dept: GENERAL RADIOLOGY | Age: 72
DRG: 871 | End: 2022-11-21
Attending: STUDENT IN AN ORGANIZED HEALTH CARE EDUCATION/TRAINING PROGRAM
Payer: MEDICARE

## 2022-11-21 LAB
ALBUMIN SERPL-MCNC: 1.8 G/DL (ref 3.5–5)
ANION GAP SERPL CALC-SCNC: 6 MMOL/L (ref 5–15)
BNP SERPL-MCNC: 4308 PG/ML
BUN SERPL-MCNC: 11 MG/DL (ref 6–20)
BUN/CREAT SERPL: 20 (ref 12–20)
CA-I BLD-MCNC: 7.9 MG/DL (ref 8.5–10.1)
CHLORIDE SERPL-SCNC: 108 MMOL/L (ref 97–108)
CO2 SERPL-SCNC: 25 MMOL/L (ref 21–32)
CREAT SERPL-MCNC: 0.54 MG/DL (ref 0.7–1.3)
ERYTHROCYTE [DISTWIDTH] IN BLOOD BY AUTOMATED COUNT: 17.1 % (ref 11.5–14.5)
GLUCOSE BLD STRIP.AUTO-MCNC: 132 MG/DL (ref 65–100)
GLUCOSE BLD STRIP.AUTO-MCNC: 134 MG/DL (ref 65–100)
GLUCOSE BLD STRIP.AUTO-MCNC: 143 MG/DL (ref 65–100)
GLUCOSE BLD STRIP.AUTO-MCNC: 181 MG/DL (ref 65–100)
GLUCOSE SERPL-MCNC: 183 MG/DL (ref 65–100)
HCT VFR BLD AUTO: 26.9 % (ref 36.6–50.3)
HGB BLD-MCNC: 9 G/DL (ref 12.1–17)
MCH RBC QN AUTO: 28.8 PG (ref 26–34)
MCHC RBC AUTO-ENTMCNC: 33.5 G/DL (ref 30–36.5)
MCV RBC AUTO: 86.2 FL (ref 80–99)
NRBC # BLD: 0 K/UL (ref 0–0.01)
NRBC BLD-RTO: 0 PER 100 WBC
PERFORMED BY, TECHID: ABNORMAL
PHOSPHATE SERPL-MCNC: 2.7 MG/DL (ref 2.6–4.7)
PLATELET # BLD AUTO: 253 K/UL (ref 150–400)
PMV BLD AUTO: 10.4 FL (ref 8.9–12.9)
POTASSIUM SERPL-SCNC: 3.4 MMOL/L (ref 3.5–5.1)
RBC # BLD AUTO: 3.12 M/UL (ref 4.1–5.7)
SODIUM SERPL-SCNC: 139 MMOL/L (ref 136–145)
WBC # BLD AUTO: 5.8 K/UL (ref 4.1–11.1)

## 2022-11-21 PROCEDURE — 74230 X-RAY XM SWLNG FUNCJ C+: CPT

## 2022-11-21 PROCEDURE — 74011636637 HC RX REV CODE- 636/637: Performed by: INTERNAL MEDICINE

## 2022-11-21 PROCEDURE — 80069 RENAL FUNCTION PANEL: CPT

## 2022-11-21 PROCEDURE — 74011000250 HC RX REV CODE- 250: Performed by: STUDENT IN AN ORGANIZED HEALTH CARE EDUCATION/TRAINING PROGRAM

## 2022-11-21 PROCEDURE — 92611 MOTION FLUOROSCOPY/SWALLOW: CPT

## 2022-11-21 PROCEDURE — 92526 ORAL FUNCTION THERAPY: CPT

## 2022-11-21 PROCEDURE — 74011250636 HC RX REV CODE- 250/636: Performed by: INTERNAL MEDICINE

## 2022-11-21 PROCEDURE — 74011000258 HC RX REV CODE- 258: Performed by: INTERNAL MEDICINE

## 2022-11-21 PROCEDURE — 74011000250 HC RX REV CODE- 250: Performed by: INTERNAL MEDICINE

## 2022-11-21 PROCEDURE — 83880 ASSAY OF NATRIURETIC PEPTIDE: CPT

## 2022-11-21 PROCEDURE — 94640 AIRWAY INHALATION TREATMENT: CPT

## 2022-11-21 PROCEDURE — 74011250637 HC RX REV CODE- 250/637: Performed by: STUDENT IN AN ORGANIZED HEALTH CARE EDUCATION/TRAINING PROGRAM

## 2022-11-21 PROCEDURE — 94761 N-INVAS EAR/PLS OXIMETRY MLT: CPT

## 2022-11-21 PROCEDURE — 85027 COMPLETE CBC AUTOMATED: CPT

## 2022-11-21 PROCEDURE — 77010033678 HC OXYGEN DAILY

## 2022-11-21 PROCEDURE — 65270000029 HC RM PRIVATE

## 2022-11-21 PROCEDURE — 82962 GLUCOSE BLOOD TEST: CPT

## 2022-11-21 PROCEDURE — 99232 SBSQ HOSP IP/OBS MODERATE 35: CPT | Performed by: INTERNAL MEDICINE

## 2022-11-21 PROCEDURE — 74011250637 HC RX REV CODE- 250/637: Performed by: INTERNAL MEDICINE

## 2022-11-21 PROCEDURE — 36415 COLL VENOUS BLD VENIPUNCTURE: CPT

## 2022-11-21 RX ORDER — FUROSEMIDE 10 MG/ML
40 INJECTION INTRAMUSCULAR; INTRAVENOUS ONCE
Status: COMPLETED | OUTPATIENT
Start: 2022-11-21 | End: 2022-11-21

## 2022-11-21 RX ADMIN — PIPERACILLIN SODIUM AND TAZOBACTAM SODIUM 3.38 G: 3; .375 INJECTION, POWDER, LYOPHILIZED, FOR SOLUTION INTRAVENOUS at 00:07

## 2022-11-21 RX ADMIN — PIPERACILLIN SODIUM AND TAZOBACTAM SODIUM 3.38 G: 3; .375 INJECTION, POWDER, LYOPHILIZED, FOR SOLUTION INTRAVENOUS at 11:40

## 2022-11-21 RX ADMIN — IPRATROPIUM BROMIDE AND ALBUTEROL SULFATE 3 ML: 2.5; .5 SOLUTION RESPIRATORY (INHALATION) at 01:40

## 2022-11-21 RX ADMIN — PIPERACILLIN SODIUM AND TAZOBACTAM SODIUM 3.38 G: 3; .375 INJECTION, POWDER, LYOPHILIZED, FOR SOLUTION INTRAVENOUS at 22:56

## 2022-11-21 RX ADMIN — IPRATROPIUM BROMIDE AND ALBUTEROL SULFATE 3 ML: 2.5; .5 SOLUTION RESPIRATORY (INHALATION) at 13:54

## 2022-11-21 RX ADMIN — PANTOPRAZOLE SODIUM 40 MG: 40 GRANULE, DELAYED RELEASE ORAL at 11:02

## 2022-11-21 RX ADMIN — TRAMADOL HYDROCHLORIDE 50 MG: 50 TABLET, COATED ORAL at 23:00

## 2022-11-21 RX ADMIN — ESCITALOPRAM OXALATE 20 MG: 10 TABLET ORAL at 11:04

## 2022-11-21 RX ADMIN — TRAMADOL HYDROCHLORIDE 50 MG: 50 TABLET, COATED ORAL at 15:20

## 2022-11-21 RX ADMIN — INSULIN LISPRO 2 UNITS: 100 INJECTION, SOLUTION INTRAVENOUS; SUBCUTANEOUS at 17:17

## 2022-11-21 RX ADMIN — ATORVASTATIN CALCIUM 40 MG: 40 TABLET, FILM COATED ORAL at 11:04

## 2022-11-21 RX ADMIN — IPRATROPIUM BROMIDE AND ALBUTEROL SULFATE 3 ML: 2.5; .5 SOLUTION RESPIRATORY (INHALATION) at 19:59

## 2022-11-21 RX ADMIN — CARVEDILOL 12.5 MG: 12.5 TABLET, FILM COATED ORAL at 10:58

## 2022-11-21 RX ADMIN — CASTOR OIL AND BALSAM, PERU: 788; 87 OINTMENT TOPICAL at 11:09

## 2022-11-21 RX ADMIN — BARIUM SULFATE 5 ML: 400 PASTE ORAL at 10:53

## 2022-11-21 RX ADMIN — ASPIRIN 81 MG 81 MG: 81 TABLET ORAL at 10:57

## 2022-11-21 RX ADMIN — MELATONIN TAB 5 MG 10 MG: 5 TAB at 22:49

## 2022-11-21 RX ADMIN — SODIUM CHLORIDE, PRESERVATIVE FREE 10 ML: 5 INJECTION INTRAVENOUS at 22:56

## 2022-11-21 RX ADMIN — BACLOFEN 5 MG: 10 TABLET ORAL at 11:18

## 2022-11-21 RX ADMIN — POTASSIUM CHLORIDE 20 MEQ: 1.5 POWDER, FOR SOLUTION ORAL at 11:20

## 2022-11-21 RX ADMIN — IPRATROPIUM BROMIDE AND ALBUTEROL SULFATE 3 ML: 2.5; .5 SOLUTION RESPIRATORY (INHALATION) at 07:26

## 2022-11-21 RX ADMIN — ARIPIPRAZOLE 2 MG: 2 TABLET ORAL at 11:02

## 2022-11-21 RX ADMIN — SENNOSIDES AND DOCUSATE SODIUM 1 TABLET: 50; 8.6 TABLET ORAL at 11:19

## 2022-11-21 RX ADMIN — INSULIN LISPRO 2 UNITS: 100 INJECTION, SOLUTION INTRAVENOUS; SUBCUTANEOUS at 10:55

## 2022-11-21 RX ADMIN — LOSARTAN POTASSIUM 50 MG: 50 TABLET, FILM COATED ORAL at 10:56

## 2022-11-21 RX ADMIN — FUROSEMIDE 40 MG: 10 INJECTION, SOLUTION INTRAMUSCULAR; INTRAVENOUS at 11:25

## 2022-11-21 RX ADMIN — POTASSIUM CHLORIDE 20 MEQ: 1.5 POWDER, FOR SOLUTION ORAL at 22:49

## 2022-11-21 RX ADMIN — GABAPENTIN 100 MG: 100 CAPSULE ORAL at 22:49

## 2022-11-21 RX ADMIN — CARVEDILOL 12.5 MG: 12.5 TABLET, FILM COATED ORAL at 22:49

## 2022-11-21 RX ADMIN — CLOPIDOGREL BISULFATE 75 MG: 75 TABLET, FILM COATED ORAL at 11:02

## 2022-11-21 RX ADMIN — PIPERACILLIN SODIUM AND TAZOBACTAM SODIUM 3.38 G: 3; .375 INJECTION, POWDER, LYOPHILIZED, FOR SOLUTION INTRAVENOUS at 15:16

## 2022-11-21 RX ADMIN — DOXAZOSIN 1 MG: 2 TABLET ORAL at 22:49

## 2022-11-21 RX ADMIN — BARIUM SULFATE 10 ML: 400 SUSPENSION ORAL at 10:51

## 2022-11-21 RX ADMIN — SODIUM CHLORIDE, PRESERVATIVE FREE 10 ML: 5 INJECTION INTRAVENOUS at 14:00

## 2022-11-21 RX ADMIN — CASTOR OIL AND BALSAM, PERU: 788; 87 OINTMENT TOPICAL at 22:50

## 2022-11-21 RX ADMIN — SODIUM CHLORIDE, PRESERVATIVE FREE 10 ML: 5 INJECTION INTRAVENOUS at 05:41

## 2022-11-21 NOTE — PROGRESS NOTES
Modified Barium Swallow Evaluation    Patient: Ryan Sharpe (79 y.o. male)  Date: 11/21/2022  Primary Diagnosis: UTI (urinary tract infection) [N39.0]  Procedure(s) (LRB):  PERCUTANEOUS ENDOSCOPIC GASTROSTOMY TUBE INSERTION (N/A)  ESOPHAGOGASTRODUODENOSCOPY (EGD) (N/A) 3 Days Post-Op   Precautions: aspiration       ASSESSMENT :  Based on the objective data described below, the patient presents with moderate-severe pharyngeal dysphagia. Patient w/ penetration of moderately thick liquids and aspiration of pudding. Swallow study terminated due to aspiration risk and severity of dysphagia. Oropharyngeal suction provided s/p study. Moderately thick and pudding thick administered only. Oral phase c/b adequate bolus acceptance from spoon. Rapid A-P transit, intermittent reduction in bolus control. Premature spillage over BOT to the level of the pyriforms w/ initial moderately thick trial. Swallow initiated w/ delay. Reduced tongue base retraction. Pharyngeal phase c/b hyolaryngeal excursion and protraction. Epiglottis slow to invert. Reduced pharyngeal constriction and respiratory drive for bolus clearance. Pharyngeal residue and barium coated secretions present after the swallow. W/ multiple swallows, some of this pharyngeal residue clears, however patient needs verbal cues to swallow and clear. There is penetration w/ moderately thick liquids that does not clear laryngeal vestibule and continues to travel to the level of the vocal folds during repeated trials. There is aspiration of pudding trial after the swallow s/t pyriform residue. Patient does elicit cough response. No further trials administered. UES decreased duration and relaxation. Esophageal difficulty to view. Patient will benefit from skilled intervention to address the above impairments. Patients rehabilitation potential is considered to be Fair     PLAN :  Recommendations and Planned Interventions:  Continue NPO. TF via PEG. Aspiration precautions for PEG feedings. Oropharyngeal suction as needed. Patient would continue to benefit from dysphagia tx if able to tolerate. Frequency/Duration: Patient will be followed by speech-language pathology 5 times a week to address goals. Discharge Recommendations: Skilled Nursing Facility     SUBJECTIVE:   Patient alert for MBS. He continues to [employer]. OBJECTIVE:     Past Medical History:   Diagnosis Date    Cirrhosis ([facility] [address]     Diabetes ([facility] [address]     Hypertension     Stroke [facility])      Past Surgical History:   Procedure Laterality Date    HX BACK SURGERY      HX GI      IR INSERT NON TUNL CVC OVER 5 YRS  11/9/2022        CXR Results  (Last 48 hours)                 11/20/22 0802  XR CHEST PORT Final result    Impression:  Worsening diffuse airspace disease with areas of increasing consolidation. Narrative:  EXAM: XR CHEST PORT       HISTORY: PNA. COMPARISON: 11/17/2022       FINDINGS: Portable AP. The patient is status post median sternotomy. Monitoring   leads overlie the patient. The nasogastric tube is been removed. There are   increasing patchy opacities throughout both lungs. Areas of consolidation are   increased in the right mid chest and medial right lower lobe. Increasing   consolidation is also seen in the retrocardiac left lower lobe. No pleural   effusion or pneumothorax. Video Flouroscopic Procedures  [x] Lateral View   [] A-P View [] Scanned to level of Sternum    [] Seated at 90 deg.   [] Other:    Presentation:   [x] Spoon   [] Cup   [] Straw   [] Syringe   [] Consecutive Swallows  [] Other:    Consistencies:   [] Ba+ liquid   [] Ba+ liquid (nectar)   [x] Ba+ liquid (honey)     [x] Ba+ pudding   [] Ba+ crunched cookie   [] Ba+ cookie   [] Other:     Testing Discontinued: [] Due to:    Treatment Techniques Attempted     [] Head Turn: [] Right [] Left     [] Head Tilt: [] Right [] Left     [] Chin Down:  [] Thermal Sensitization:  [] Supraglottic Swallow:  [] Mendelson's Maneuver:  [] Other:    Results  Dysphagia Present:    [x] Yes  [] No    Ratings of Dysphagia:    [] Mild  [] Moderate [x] Severe    Stages of Breakdown:   [] Oral      [x] Oral Preparatory [x] Pharyngeal   [] Esophageal    Aspiration: [x] Yes    [] No  [] At Risk  [] Trace (<10%) [x] Significant (>10%):     %  [] Penetration: Level of:   Cough: [] Yes      [] No    Consistency Aspirated:  [] Thin Liquid   [] Nectar   [] Honey   [x] Pureed     [] Mech-soft  [] Solid    Motility Problems with:  [] Lip Closure:   [] Sucking:   [] Mastication:   [] Bolus Formation:   [x] Bolus Control:  [x] A-P Transport:  [x] Posterior Tongue Elevation:  [x] Swallow Response (delayed):  [] Velopharyngeal Closure:  [x] Laryngeal Elevation:  [] Laryngeal Adduction:  [] Cricopharyngeal Relaxation:  [] Esophageal Peristalsis:  [] Reflux:  [x] Other: Pharyngeal residue    Timing of Aspiration:  [] Before Swallow:  [] During Swallow:  [x] After Swallow:    Transit Time Delay:  [] >1 Second  Oral  [] >1 Second Pharyngeal  [] >20 Second Esophageal     Residuals:  [] Buccal Cavity   [x] Velum/posterior pharyngeal wall  [x] Valleculae  [x] Pyriforms    Pain:    8-point Penetration-Aspiration Scale Score:7  Clinical Judgement  COMMUNICATION/EDUCATION:   Patient educated to [facility] results. his   demonstrated Guarded understanding as evidenced by impaired cognition. The patients plan of care including findings from [facility], recommendations, planned interventions, and recommended diet changes were discussed with: Physician. Patient is unable to participate in goal setting and plan of care. Thank you for this referral.   Chelsi Cross M.S. CCC-SLP     Problem: Dysphagia (Adult)  Goal: *Acute Goals and Plan of Care (Insert Text)  Description: Speech Therapy Goals  Initiated 11/12/2022  -Patient stated goal: \"I want to eat. \"  -Patient will participate in modified barium swallow study within 5 day(s).          [ ] Not met [ ]  MET   [ x] Progressing  [ ] Discontinue  -Patient will tolerate PO trials with SLP only without signs/symptoms of aspiration given minimal cues within 5 day(s). [ ] Not met  [ ]  MET   [x ] Progressing  [ ] Discontinue  -Patient will demonstrate understanding of swallow safety precautions and aspiration precautions, diet recs with minimal cues within 5 day(s).         [ ] Not met  [ ]  MET   [x ] Progressing  [ ] Discontinue   11/21/2022 1022 by Car Lima, SLP  Outcome: Progressing Towards Goal  11/21/2022 1012 by Car Lima, SLP  Outcome: Progressing Towards Goal

## 2022-11-21 NOTE — PROGRESS NOTES
Spiritual Care Assessment/Progress Note  [facility]      NAME: Remigio Burch      MRN: [id number]  AGE: 70 y.o. SEX: male  Islam Affiliation: No preference   Language: English     11/21/2022     Total Time (in minutes): 10     Spiritual Assessment begun in [address] through conversation with:         [x]Patient        [] Family    [] Friend(s)        Reason for Consult: Initial visit     Spiritual beliefs: (Please include comment if needed)     [] Identifies with a anamika tradition:         [] Supported by a anamika community:            [] Claims no spiritual orientation:           [] Seeking spiritual identity:                [] Adheres to an individual form of spirituality:           [x] Not able to assess:                           Identified resources for coping:      [] Prayer                               [] Music                  [] Guided Imagery     [] Family/friends                 [] Pet visits     [] Devotional reading                         [x] Unknown     [] Other:                                            Interventions offered during this visit: (See comments for more details)    Patient Interventions: Initial visit, Other (comment) (Silent support)           Plan of Care:     [] Support spiritual and/or cultural needs    [] Support AMD and/or advance care planning process      [] Support grieving process   [] Coordinate Rites and/or Rituals    [] Coordination with community clergy   [] No spiritual needs identified at this time   [] Detailed Plan of Care below (See Comments)  [] Make referral to Music Therapy  [] Make referral to Pet Therapy     [] Make referral to Addiction services  [] Make referral to [facility]  [] Make referral to Spiritual Care Partner  [] No future visits requested        [x] Contact Spiritual Care for further referrals     Comments:  Visit attempted for patient in the [facility] unit for initial assessment.   Staff were providing care for the patient during the visit. There were no family members. Provided silent support. Contact chaplains for further spiritual care and support.  Kaycee City, M.Div.    can be reached by calling the  at [facility]  [phone]

## 2022-11-21 NOTE — PROGRESS NOTES
Hospitalist Progress Note    Subjective:   Daily Progress Note: 11/21/2022 9:21 AM    Patient less alert today. Responds to questions but falls asleep quickly after.     Current Facility-Administered Medications   Medication Dose Route Frequency    furosemide (LASIX) injection 40 mg  40 mg IntraVENous ONCE    albuterol-ipratropium (DUO-NEB) 2.5 MG-0.5 MG/3 ML  3 mL Nebulization Q6H RT    ARIPiprazole (ABILIFY) tablet 2 mg  2 mg Per G Tube DAILY    aspirin chewable tablet 81 mg  81 mg Per G Tube DAILY    carvediloL (COREG) tablet 12.5 mg  12.5 mg Per G Tube BID    doxazosin (CARDURA) tablet 1 mg  1 mg Per G Tube QHS    escitalopram oxalate (LEXAPRO) tablet 20 mg  20 mg Per G Tube DAILY    gabapentin (NEURONTIN) capsule 100 mg  100 mg Per G Tube QHS    losartan (COZAAR) tablet 50 mg  50 mg Per G Tube DAILY    melatonin tablet 10 mg  10 mg Per G Tube QHS    pantoprazole (PROTONIX) granules for oral suspension 40 mg  40 mg Per G Tube DAILY    potassium chloride (KLOR-CON) packet for solution 20 mEq  20 mEq Per G Tube BID    senna-docusate (PERICOLACE) 8.6-50 mg per tablet 1 Tablet  1 Tablet Per G Tube DAILY    clopidogreL (PLAVIX) tablet 75 mg  75 mg PEG Tube DAILY    atorvastatin (LIPITOR) tablet 40 mg  40 mg Per G Tube DAILY    albuterol-ipratropium (DUO-NEB) 2.5 MG-0.5 MG/3 ML  3 mL Nebulization Q6H PRN    balsam peru-castor oiL (VENELEX) ointment   Topical BID    piperacillin-tazobactam (ZOSYN) 3.375 g in 0.9% sodium chloride (MBP/ADV) 100 mL MBP  3.375 g IntraVENous Q8H    hydrALAZINE (APRESOLINE) 20 mg/mL injection 20 mg  20 mg IntraVENous Q6H PRN    baclofen (LIORESAL) tablet 5 mg  5 mg Per NG tube BID PRN    polyethylene glycol (MIRALAX) packet 17 g  17 g Per NG tube DAILY PRN    traMADoL (ULTRAM) tablet 50 mg  50 mg Per NG tube Q6H PRN    sodium chloride (NS) flush 5-40 mL  5-40 mL IntraVENous Q8H    sodium chloride (NS) flush 5-40 mL  5-40 mL IntraVENous PRN    acetaminophen (TYLENOL) tablet 650 mg  650 mg Oral Q6H PRN    Or    acetaminophen (TYLENOL) suppository 650 mg  650 mg Rectal Q6H PRN    ondansetron (ZOFRAN ODT) tablet 4 mg  4 mg Oral Q8H PRN    Or    ondansetron (ZOFRAN) injection 4 mg  4 mg IntraVENous Q6H PRN    [Held by provider] enoxaparin (LOVENOX) injection 40 mg  40 mg SubCUTAneous DAILY    glucose chewable tablet 16 g  4 Tablet Oral PRN    glucagon (GLUCAGEN) injection 1 mg  1 mg IntraMUSCular PRN    dextrose 10% infusion 0-250 mL  0-250 mL IntraVENous PRN    insulin lispro (HUMALOG) injection   SubCUTAneous AC&HS        Review of Systems  Review of Systems   Unable to perform ROS: Acuity of condition          Objective:     Visit Vitals  /60 (BP 1 Location: Left upper arm)   Pulse 86   Temp 98.2 °F (36.8 °C)   Resp 18   Ht 5' 9\" (1.753 m)   Wt 62.5 kg (137 lb 12.6 oz)   SpO2 90%   BMI 20.35 kg/m²    O2 Flow Rate (L/min): 4 l/min O2 Device: Nasal cannula    Temp (24hrs), Av.3 °F (36.8 °C), Min:97.5 °F (36.4 °C), Max:99.5 °F (37.5 °C)      No intake/output data recorded.  1901 -  0700  In: 2988 [I.V.:875]  Out: 2200 [Urine:2200]    XR CHEST PORT   Final Result   Worsening diffuse airspace disease with areas of increasing consolidation. XR CHEST PORT   Final Result   Interval advancement of nasogastric tube, although tip not included on this   study. Evaluation of nasogastric tube position is typically better performed   with KUB. XR CHEST PORT   Final Result   1. No significant change in basilar predominant opacities. These are   nonspecific but can be seen with aspiration pneumonitis/pneumonia in the   appropriate setting. 2.  Enteric tube tip projects just past the gastroesophageal junction with   proximal sidehole in the distal esophagus. Consider advancing. MRI BRAIN WO CONT   Final Result   Small to moderate sized acute infarction in the right occipital lobe with   minimal/small infarction in the right temporal lobe. Severe cerebral atrophy.    There is no intracranial mass, hemorrhage. There are numerous scattered chronic infarcts. No additional acute intracranial process is demonstrated. XR CHEST PORT   Final Result   Interval retraction of nasogastric tube with sidehole over the gastroesophageal   junction. XR CHEST PORT   Final Result   Interval replacement of nasogastric tube, with tip over the gastric fundus. XR CHEST PORT   Final Result   No significant change. XR CHEST PORT   Final Result   1. NG tube terminates in the gastric fundus, with possible kink in the side   hole. Correlate with function. 2.  New patchy consolidation in the right midlung is concerning for pneumonia. Grossly unchanged patchy retrocardiac opacities. Stable trace right pleural   effusion. XR CHEST PORT   Final Result   Nasogastric tube tip advanced overlying the gastric body. XR CHEST PORT   Final Result   Nasogastric tube tip over the gastric fundus. XR CHEST PORT   Final Result   Increased mild interstitial pulmonary edema versus pneumonia. Enteric tube extends into the gastric antrum or first portion of the duodenum. Consider retraction 8-10 cm if the intention is gastric luminal termination. CT FOOT RT W CONT   Final Result      1. No abscess or osteomyelitis. 2. No fracture. DUPLEX LOWER EXT ARTERY BILAT   Final Result      XR FOOT RT MIN 3 V   Final Result   No osseous erosion or soft tissue gas. .      XR CHEST PORT   Final Result      Mild interstitial opacities which may represent mild pulmonary edema unchanged         XR CHEST PORT   Final Result   Addendum (preliminary) 1 of 1   Addendum: NG tube is in appropriate position tip extends below the    diaphragm. Right IJ catheter. No pneumothorax on the right. Final   1. Status post right IJ catheter placement without evidence of complication.        IR INSERT NON TUNL CVC OVER 5 YRS   Final Result   Technically successful ultrasound guided placement of a right internal jugular   vein temporary central venous catheter. A post procedure chest x-ray is   pending. CT HEAD WO CONT   Final Result   No acute process or change compared to the prior exam.            XR CHEST PORT   Final Result   No acute process. IR US GUIDED VASCULAR ACCESS    (Results Pending)   XR SWALLOW FUNC VIDEO    (Results Pending)        PHYSICAL EXAM:    Physical Exam  Vitals and nursing note reviewed. Constitutional:       Appearance: He is ill-appearing. HENT:      Head: Normocephalic and atraumatic. Cardiovascular:      Rate and Rhythm: Normal rate and regular rhythm. Pulmonary:      Effort: No respiratory distress. Breath sounds: Rhonchi present. Comments: Rhonchi heard upper right and left lobes  Abdominal:      General: Bowel sounds are normal. There is no distension. Palpations: Abdomen is soft. Tenderness: There is no abdominal tenderness. Comments: PEG tube in place, non tender to palpation   Musculoskeletal:      Right lower leg: No edema. Left lower leg: No edema. Comments: Bunny boots noted to B/L LE in proper placement   Skin:     General: Skin is warm. Capillary Refill: Capillary refill takes less than 2 seconds. Comments: Scabs seen throughout lower extremities   Neurological:      Mental Status: He is alert. He is disoriented.    Psychiatric:      Comments: Unable to assess - more talkative still nonsensical        Data Review    Recent Results (from the past 24 hour(s))   METABOLIC PANEL, BASIC    Collection Time: 11/20/22 11:34 AM   Result Value Ref Range    Sodium 141 136 - 145 mmol/L    Potassium 3.6 3.5 - 5.1 mmol/L    Chloride 109 (H) 97 - 108 mmol/L    CO2 22 21 - 32 mmol/L    Anion gap 10 5 - 15 mmol/L    Glucose 139 (H) 65 - 100 mg/dL    BUN 10 6 - 20 mg/dL    Creatinine 0.51 (L) 0.70 - 1.30 mg/dL    BUN/Creatinine ratio 20 12 - 20      eGFR >60 >60 ml/min/1.73m2    Calcium 7.6 (L) 8.5 - 10.1 mg/dL   CBC W/O DIFF    Collection Time: 11/20/22 11:34 AM   Result Value Ref Range    WBC 6.7 4.1 - 11.1 K/uL    RBC 3.32 (L) 4.10 - 5.70 M/uL    HGB 9.5 (L) 12.1 - 17.0 g/dL    HCT 29.8 (L) 36.6 - 50.3 %    MCV 89.8 80.0 - 99.0 FL    MCH 28.6 26.0 - 34.0 PG    MCHC 31.9 30.0 - 36.5 g/dL    RDW 17.0 (H) 11.5 - 14.5 %    PLATELET 429 860 - 929 K/uL    MPV 10.3 8.9 - 12.9 FL    NRBC 0.0 0.0  WBC    ABSOLUTE NRBC 0.00 0.00 - 0.01 K/uL   GLUCOSE, POC    Collection Time: 11/20/22 11:50 AM   Result Value Ref Range    Glucose (POC) 134 (H) 65 - 100 mg/dL    Performed by German Locke, POC    Collection Time: 11/20/22  4:33 PM   Result Value Ref Range    Glucose (POC) 179 (H) 65 - 100 mg/dL    Performed by German Locke, POC    Collection Time: 11/20/22  8:38 PM   Result Value Ref Range    Glucose (POC) 126 (H) 65 - 100 mg/dL    Performed by Bakari Rose, POC    Collection Time: 11/21/22  8:30 AM   Result Value Ref Range    Glucose (POC) 181 (H) 65 - 100 mg/dL    Performed by Dann Vallecillo         Assessment/Plan:     Active Problems:    CVA (cerebral vascular accident) ([facility] [address] (7/31/2022)      Carotid stenosis (8/2/2022)      UTI (urinary tract infection) (11/8/2022)      Septic shock ([facility] [address] (11/20/2022)      Infection of right great toe due to methicillin resistant Staphylococcus aureus (MRSA) (59/58/7218)      Metabolic encephalopathy (22/98/8225)      Severe protein-calorie malnutrition ([facility] [address] (11/20/2022)      Dysphagia (11/20/2022)      Acute respiratory failure with hypoxia ([facility] [address] (11/20/2022)      PAD (peripheral artery disease) ([facility] [address] (11/20/2022)      Hospital Course: Buster Lombardo is a 77-year-old male with a PMH of stroke, hypertension, DM, and cirrhosis who presented from nursing home with weakness and hypotension x1 day. Of note recent admission for TIA evaluated and recommended for carotid endarterectomy. Found hypotensive on arrival to the ED.  Initial labs significant for WBC of 15.8, creatinine of 1.46, lactic acid of 2.4, and glucose of 134. UA consistent with urinary tract infection. CT of the head with no acute process. Patient admitted for further work-up. Patient started on IV fluids and ceftriaxone. Vascular surgery, cardiology, and podiatry consulted. KRISTI with duplex shows right common femoral artery and proximal superficial femoral artery occluded with collateralization to popliteal artery, left mid to distal superficial artery occluded, with patent distal anterior/posterior tibial artery, popliteal artery patent and with patent distal anterior tibial artery and posterior tibial artery. On 11/9 transferred to ICU overnight for poor responsiveness and for septic shock. CT of the head negative for acute findings. Patient started on meropenem, vancomycin, IV fluids, and Levophed. Pulmonology and ID consulted. ECHO showed EF of 50-55% with mild MV R and dilated left atrium. XR right foot with generalized osseous demineralization without fracture or dislocation, tissue gas or osseous erosive. CT of right foot with no abscess or osteomyelitis. Patient has carotid artery stenosis and supposed to go for surgery but unable to get cardiac clearance because of nuclear stress test he has 90% stenosis of left ICA and 50% stenosis of right ICA. Urine culture grew mixed kristen. Neurology and psychiatry consulted for confusion. MRI 7/2022 showed acute ischemic stroke of the right parietal area. Repeat MRI brain 11/16/22 showed small to moderate sized acute infarction in the right occipital lobe with minimal/small infarction in the right temporal lobe and severe cerebral atrophy. Continue DAPT and atorvastatin. Patient has had NG tube for nutrition since admission. Speech therapy working with patient. GI consult to evaluate for PEG placement. on 11/15 fever spike of 100.6F. 11/17 CXR with no significant change in basilar predominant opacities. Started on zosyn. PEG placed on 11/18. Patient tolerating PEG tube feedings. MBS positive for penetration on 11/21. SLP recommending NPO. Hospice consulted. SIRS, suspected aspiration PNA  Tmax 100.6F, WBC WNLs, CXR with no significant change in basilar predominant opacities   Continue on Zosyn #5/7  ID following    Severe sepsis with septic shock - resolved  2/2 UTI and right great toe ulcer  S/p vancomycin and meropenem  11/8 blood: no growth    Great right toe infection  11/9 wound: MRSA  XR of right foot without fracture, dislocation, soft tissue gas, or osseous erosion  CT of right foot with no abscess or osteomyelitis  S/p vancomycin for 7 days  Continue routine wound care  ID and podiatry following    Metabolic encephalopathy - improving    Severe protein calorie malnutrition  Status post PEG tube  GI following    Dysphagia  SLP recommending npo  MBS positive for penetration/aspiration    Acute respiratory failure with hypoxia - resolved  On room air    Urinary tract infection  11/8 urine: mixed kristen    Hypertension  Continue on carvedilol, furosemide, and losartan    CVA  Carotid stenosis   History of old infarcts  Repeat MRI brain 11/16/22 showed small to moderate sized acute infarction in the right occipital lobe with minimal/small infarction in the right temporal lobe and severe cerebral atrophy  Continue on DAPT and atorvastatin  Vascular and neurology following    Multiple chronic wounds  PAD  Poor arterial flow and nutritional status complicates wound healing  KRISTI with duplex shows right common femoral artery and proximal superficial femoral artery occluded with collateralization to popliteal artery, left mid to distal superficial artery occluded, with patent distal anterior/posterior tibial and popliteal arteries   Vascular was following outpatient for endarterectomy possibly with intervention for this?     DVT Prophylaxis: SCDs  GI Prophylaxis: protonix  Discharge and disposition barriers: hospice, 24hr  Wound care: betadine WTD daily with strict offloading while in bed with bunny boots    Anne Roberts, (son) [phone] - did not answer    Care Plan discussed with: Patient/Family, Nurse, and     Total time spent with patient: 35 minutes.

## 2022-11-21 NOTE — PROGRESS NOTES
Infectious Disease Progress Note           Subjective:   Clinically stable, no acute events since last seen, remains confused, staff deny acute events   Objective:   Physical Exam:     Visit Vitals  /65 (BP 1 Location: Left lower arm)   Pulse 68   Temp 97.6 °F (36.4 °C)   Resp 18   Ht 5' 9\" (1.753 m)   Wt 137 lb 12.6 oz (62.5 kg)   SpO2 93%   BMI 20.35 kg/m²    O2 Flow Rate (L/min): 4 l/min O2 Device: Nasal cannula    Temp (24hrs), Av.2 °F (36.8 °C), Min:97.5 °F (36.4 °C), Max:99.5 °F (37.5 °C)    No intake/output data recorded.     1901 -  0700  In: 2988 [I.V.:875]  Out: 2200 [Urine:2200]    General: NAD, alert, confused   HEENT: VANGIE, Moist mucosa  Lungs:Decreased at the bases, no wheeze/rhonchi   Heart: S1S2+, RRR, no murmur  Abdo: Soft, NT, ND, +BS, + peg tube   Exts:Right great toe ulcer w dry eschar, left medial thigh ulcer, diffuse LUE swelling   Skin: b/l leg ulcers, stable ulcerations on b/l feet     Data Review:       Recent Days:  Recent Labs     22  0901 22  1134 22  1242   WBC 5.8 6.7 6.1   HGB 9.0* 9.5* 9.2*   HCT 26.9* 29.8* 28.3*    232 208       Recent Labs     22  0901 22  1134 22  1242   BUN 11 10 12   CREA 0.54* 0.51* 0.58*       Lab Results   Component Value Date/Time    C-Reactive protein 1.64 (H) 2022 10:18 AM        Microbiology     Results       Procedure Component Value Units Date/Time    MRSA SCREEN - PCR (NASAL) [[id number]]  (Abnormal) Collected: 22 0231    Order Status: Completed Specimen: Swab Updated: 22 0512     MRSA by PCR, Nasal DETECTED        Comment: Results verified, phoned to and read back by [email or web address]       CULTURE, [address] [[id number]]  (Susceptibility) Collected: 22 0210    Order Status: Completed Specimen: Wound from Toe Updated: 22 132     Special Requests: No Special Requests        GRAM STAIN Occasional WBCs seen               2+ Gram positive cocci in pairs chains and clusters           Culture result:       Heavy * methicillin resistant staphylococcus aureus *            Heavy Diphtheroids       Susceptibility        Staphylococcus aureus Methcillin Resistant      TELMA      Ciprofloxacin ($) Resistant      Clindamycin ($) Resistant      Daptomycin ($$$$$) Susceptible      Doxycycline ($$) Intermediate      Erythromycin ($$$$) Resistant      Gentamicin ($) Susceptible      Inducible Clindamycin  See below  [1]       Levofloxacin ($) Resistant      Linezolid ($$$$$) Susceptible      Oxacillin Resistant      Rifampin ($$$$) Susceptible  [2]       Tetracycline Resistant      Trimeth/Sulfa Susceptible      Vancomycin ($) Susceptible                   [1]  HIDE     [2]  Rifampin is not to be used for mono-therapy. CULTURE, BLOOD, PAIRED [[id number]] Collected: 11/08/22 1325    Order Status: Completed Specimen: Blood Updated: 11/14/22 0121     Special Requests: No Special Requests        Culture result: No growth 6 days       CULTURE, URINE [[id number]] Collected: 11/08/22 1115    Order Status: Completed Specimen: Urine Updated: 11/10/22 1506     Special Requests: No Special Requests        [place] Count 20,000        [place] Count colonies/ml        Culture result:       Mixed urogenital kristen isolated                     Diagnostics   CXR Results  (Last 48 hours)                 11/20/22 0802  XR CHEST PORT Final result    Impression:  Worsening diffuse airspace disease with areas of increasing consolidation. Narrative:  EXAM: XR CHEST PORT       HISTORY: PNA. COMPARISON: 11/17/2022       FINDINGS: Portable AP. The patient is status post median sternotomy. Monitoring   leads overlie the patient. The nasogastric tube is been removed. There are   increasing patchy opacities throughout both lungs. Areas of consolidation are   increased in the right mid chest and medial right lower lobe.  Increasing   consolidation is also seen in the retrocardiac left lower lobe. No pleural   effusion or pneumothorax. Assessment/Plan     Suspected aspiration PNA. Worsening infiltrates on CXR (11/20)         Decreased rhonchi in upper airways, weak cough. Needs frequent suctioning          Afebrile w a normal WBC on routine labs         On day # 5/7 of Zosyn. Routine labs and CXR in AM     Right great toe infection  MRSA isolated from Cx of right great toe         No abscess or osteomyelitis on CT of right foot (11/11)         S/p 7 days of Vanc for MRSA coverage         Dry eschar on right great toe, no drainage, continue routine wound care     3. CVA w Small to moderate sized acute infarction in the right occipital lobe with  minimal/small infarction in the right temporal lobe on MRI head (11/16))       Follows commands, no new focal deficits     5. Dysphagia, S/p peg placement 11/18, on tube feeds     6.  Diffuse LUE swelling, still w sig swelling, continue elevation     Lorene Saleem MD    11/21/2022

## 2022-11-21 NOTE — PROGRESS NOTES
SPEECH LANGUAGE PATHOLOGY DYSPHAGIA TREATMENT  Patient: Jaya Cain (9 y.o. male)  Date: 11/21/2022  Diagnosis: UTI (urinary tract infection) [N39.0] <principal problem not specified>  Procedure(s) (LRB):  PERCUTANEOUS ENDOSCOPIC GASTROSTOMY TUBE INSERTION (N/A)  ESOPHAGOGASTRODUODENOSCOPY (EGD) (N/A) 3 Days Post-Op  Precautions: aspiration      ASSESSMENT:  Patient seen at bedside prior to [facility] to determine appropriateness. Patient alert. Lennie, \"water. \" PEG was placed, 3 days post-op. He is able to follow simple oral motor commands. YES/NO responses are not reliable. He is echolalic at times. O2 saturations 84% on 4L. Bumped up to 5 1/2 and saturations increased to 88-91%. Nsg notified. Oropharyngeal suctioned administered prior to p.o. trials. Oral cavity dry and clear. Some thick white debris suctioned from oropharyngeal cavity. Administered moderately thick H20 via tsp. Patient w/ adequate oral bolus acceptance and oral phase. Swallow initiated w/ delay. HLE and protraction reduced to digital palpation. No immediate overt s/sx of aspiration noted w/ x3 trials. Delayed onset of vocal wetness present. Patient unable to to cough/clear on command. Oropharyngeal suction provided. Patient w/ improved alertness to date and appropriate for MBS. Continue NPO w/ TF via PEG. PLAN:  Recommendations and Planned Interventions:  NPO. TF via PEG. Aspiration precautions w/ PEG. MBS to be completed. Patient continues to benefit from skilled intervention to address the above impairments. Continue treatment per established plan of care. Frequency/Duration: Patient will be followed by speech-language pathology 5 times a week to address goals. Discharge Recommendations: To Be Determined     SUBJECTIVE:   Patient alert and seen at bedside. \"Water. \"     OBJECTIVE:     CXR Results  (Last 48 hours)                 11/20/22 0802  XR CHEST PORT Final result    Impression:  Worsening diffuse airspace disease with areas of increasing consolidation. Narrative:  EXAM: XR CHEST PORT       HISTORY: PNA. COMPARISON: 11/17/2022       FINDINGS: Portable AP. The patient is status post median sternotomy. Monitoring   leads overlie the patient. The nasogastric tube is been removed. There are   increasing patchy opacities throughout both lungs. Areas of consolidation are   increased in the right mid chest and medial right lower lobe. Increasing   consolidation is also seen in the retrocardiac left lower lobe. No pleural   effusion or pneumothorax. CT Results  (Last 48 hours)      None           Cognitive and Communication Status:  Neurologic State: Alert, Confused  Orientation Level: Oriented to person  Cognition: Poor safety awareness         After treatment:   Patient left in no apparent distress in bed, Call bell within reach, and Nursing notified    COMMUNICATION/EDUCATION:   Patient was educated regarding his deficit(s) of dysphagia, swallow safety precautions, diet recs and POC. He demonstrated Fair understanding as evidenced by impaired cognition. The patient's plan of care including recommendations, planned interventions, and recommended diet changes were discussed with: Registered nurse and Physician. KAVEH Gonzalez M.S. CCC-SLP  Time Calculation: 16 mins           Problem: Dysphagia (Adult)  Goal: *Acute Goals and Plan of Care (Insert Text)  Description: Speech Therapy Goals  Initiated 11/12/2022  -Patient stated goal: \"I want to eat. \"  -Patient will participate in modified barium swallow study within 5 day(s). [ ] Not met  [ ]  MET   [ x] Progressing  [ ] Discontinue  -Patient will tolerate PO trials with SLP only without signs/symptoms of aspiration given minimal cues within 5 day(s).          [ ] Not met  [ ]  MET   [x ] Progressing  [ ] Discontinue  -Patient will demonstrate understanding of swallow safety precautions and aspiration precautions, diet recs with minimal cues within 5 day(s).         [ ] Not met  [ ]  MET   [x ] Progressing  [ ] Discontinue   Outcome: Progressing Towards Goal

## 2022-11-21 NOTE — PROGRESS NOTES
IMPRESSION:   Acute hypoxic respiratory failure had resolved now back on nasal oxygen   Diffuse pulmonary infiltrates questionable pulmonary edema versus chronic aspiration  Leukocytosis resolved  Carotid artery disease history of CVA  Hypokalemia      RECOMMENDATIONS/PLAN:   27-year-old male nursing home resident admitted with hypotension and unresponsiveness now he is alert awake agitated  Worsening chest x-ray and hypoxia currently receiving IV fluids we will give 2 doses Lasix and hold IV IV fluid BNP 7000  11/10 Echo ejection fraction 50% IVC dilated left atrium 5 cm RVSP 59  Pulmonary hypertension with probable cor pulmonale  Chest x-ray shows fluid overload congestive changes   Potassium corrected     [x] High complexity decision making was performed  [x] See my orders for details  HPI  27-year-old nursing home resident came in because of unresponsiveness he had a history of stroke in the past patient was hypotensive hypoxic rapid response was called he was admitted to the floor initially received IV fluid hemodynamically unstable started on vasopressors Levophed transferred to ICU White count was elevated he was put on oxygen 4 L nasal cannula unable to get any started the patient he has a right foot wound and multiple wounds of the lower extremities dressing in place. He has carotid artery stenosis only supposed to go for surgery but unable to get cardiac clearance because of nuclear stress test he has 90% stenosis of left ICA and 50% stenosis of right ICA. So now he is admitted to ICU and critical care consult was called  PMH:  has a past medical history of Cirrhosis ([facility] [address] Diabetes ([facility] [address] Hypertension, and Stroke ([facility] [address] PSH:   has a past surgical history that includes hx back surgery; hx gi; and ir insert non tunl cvc over 5 yrs (11/9/2022). FHX: family history includes Heart Disease in his father. SHX:  reports that he has quit smoking.  He has never used smokeless tobacco. He reports that he does not currently use alcohol. He reports that he does not currently use drugs.     ALL: No Known Allergies     MEDS:   [x] Reviewed - As Below   [] Not reviewed    Current Facility-Administered Medications   Medication    furosemide (LASIX) injection 40 mg    albuterol-ipratropium (DUO-NEB) 2.5 MG-0.5 MG/3 ML    ARIPiprazole (ABILIFY) tablet 2 mg    aspirin chewable tablet 81 mg    carvediloL (COREG) tablet 12.5 mg    doxazosin (CARDURA) tablet 1 mg    escitalopram oxalate (LEXAPRO) tablet 20 mg    gabapentin (NEURONTIN) capsule 100 mg    losartan (COZAAR) tablet 50 mg    melatonin tablet 10 mg    pantoprazole (PROTONIX) granules for oral suspension 40 mg    potassium chloride (KLOR-CON) packet for solution 20 mEq    senna-docusate (PERICOLACE) 8.6-50 mg per tablet 1 Tablet    clopidogreL (PLAVIX) tablet 75 mg    atorvastatin (LIPITOR) tablet 40 mg    albuterol-ipratropium (DUO-NEB) 2.5 MG-0.5 MG/3 ML    balsam peru-castor oiL (VENELEX) ointment    piperacillin-tazobactam (ZOSYN) 3.375 g in 0.9% sodium chloride (MBP/ADV) 100 mL MBP    hydrALAZINE (APRESOLINE) 20 mg/mL injection 20 mg    baclofen (LIORESAL) tablet 5 mg    polyethylene glycol (MIRALAX) packet 17 g    traMADoL (ULTRAM) tablet 50 mg    sodium chloride (NS) flush 5-40 mL    sodium chloride (NS) flush 5-40 mL    acetaminophen (TYLENOL) tablet 650 mg    Or    acetaminophen (TYLENOL) suppository 650 mg    ondansetron (ZOFRAN ODT) tablet 4 mg    Or    ondansetron (ZOFRAN) injection 4 mg    [Held by provider] enoxaparin (LOVENOX) injection 40 mg    glucose chewable tablet 16 g    glucagon (GLUCAGEN) injection 1 mg    dextrose 10% infusion 0-250 mL    insulin lispro (HUMALOG) injection      MAR reviewed and pertinent medications noted or modified as needed   Current Facility-Administered Medications   Medication    furosemide (LASIX) injection 40 mg    albuterol-ipratropium (DUO-NEB) 2.5 MG-0.5 MG/3 ML    ARIPiprazole (ABILIFY) tablet 2 mg    aspirin chewable tablet 81 mg    carvediloL (COREG) tablet 12.5 mg    doxazosin (CARDURA) tablet 1 mg    escitalopram oxalate (LEXAPRO) tablet 20 mg    gabapentin (NEURONTIN) capsule 100 mg    losartan (COZAAR) tablet 50 mg    melatonin tablet 10 mg    pantoprazole (PROTONIX) granules for oral suspension 40 mg    potassium chloride (KLOR-CON) packet for solution 20 mEq    senna-docusate (PERICOLACE) 8.6-50 mg per tablet 1 Tablet    clopidogreL (PLAVIX) tablet 75 mg    atorvastatin (LIPITOR) tablet 40 mg    albuterol-ipratropium (DUO-NEB) 2.5 MG-0.5 MG/3 ML    balsam peru-castor oiL (VENELEX) ointment    piperacillin-tazobactam (ZOSYN) 3.375 g in 0.9% sodium chloride (MBP/ADV) 100 mL MBP    hydrALAZINE (APRESOLINE) 20 mg/mL injection 20 mg    baclofen (LIORESAL) tablet 5 mg    polyethylene glycol (MIRALAX) packet 17 g    traMADoL (ULTRAM) tablet 50 mg    sodium chloride (NS) flush 5-40 mL    sodium chloride (NS) flush 5-40 mL    acetaminophen (TYLENOL) tablet 650 mg    Or    acetaminophen (TYLENOL) suppository 650 mg    ondansetron (ZOFRAN ODT) tablet 4 mg    Or    ondansetron (ZOFRAN) injection 4 mg    [Held by provider] enoxaparin (LOVENOX) injection 40 mg    glucose chewable tablet 16 g    glucagon (GLUCAGEN) injection 1 mg    dextrose 10% infusion 0-250 mL    insulin lispro (HUMALOG) injection      PMH:  has a past medical history of Cirrhosis ([facility] [address] Diabetes ([facility] [address] Hypertension, and Stroke ([facility] [address] PSH:   has a past surgical history that includes hx back surgery; hx gi; and ir insert non tunl cvc over 5 yrs (11/9/2022). FHX: family history includes Heart Disease in his father. SHX:  reports that he has quit smoking. He has never used smokeless tobacco. He reports that he does not currently use alcohol. He reports that he does not currently use drugs. ROS:Review of systems not obtained due to patient factors.       Hemodynamics:    CO:    CI:    CVP:    SVR:   PAP Systolic:    PAP Diastolic:    PVR:    EG94: Ventilator Settings:      Mode Rate TV Press PEEP FiO2 PIP Min. Vent               34 %              Vital Signs: Telemetry:    normal sinus rhythm Intake/Output:   Visit Vitals  /60 (BP 1 Location: Left upper arm)   Pulse 86   Temp 98.2 °F (36.8 °C)   Resp 18   Ht 5' 9\" (1.753 m)   Wt 62.5 kg (137 lb 12.6 oz)   SpO2 90%   BMI 20.35 kg/m²       Temp (24hrs), Av.3 °F (36.8 °C), Min:97.5 °F (36.4 °C), Max:99.5 °F (37.5 °C)        O2 Device: Nasal cannula O2 Flow Rate (L/min): 4 l/min       Wt Readings from Last 4 Encounters:   11/10/22 62.5 kg (137 lb 12.6 oz)   22 84.8 kg (187 lb)   22 84.8 kg (187 lb)   10/28/20 99.8 kg (220 lb)          Intake/Output Summary (Last 24 hours) at 2022 0938  Last data filed at 2022 0223  Gross per 24 hour   Intake 1748 ml   Output 2200 ml   Net -452 ml         Last shift:      No intake/output data recorded. Last 3 shifts:  1901 -  0700  In: 2988 [I.V.:875]  Out: 2200 [Urine:2200]       Physical Exam:     General: Awake confused does not follow commands  HEENT: NCAT, poor dentition,   Eyes: anicteric; conjunctiva clear extraocular movements intact  Neck: no nodes, trach midline; no accessory MM use.   Neck veins visible  Chest: no deformity,   Cardiac: Regular rate and rhythm  Lungs: Shallow but clear anteriorly with lateral rales no wheezing  Abd: soft, NT, hypoactive BS  Ext: Lower extremity wound bandage in place  : clear urine  Neuro: Awake mumbles follows no commands does move his extremities  Psych-unable to assess  Skin: warm, dry, no cyanosis;   Pulses: Brachial and radial pulses intact  Capillary: Normal capillary refill      DATA:    MAR reviewed and pertinent medications noted or modified as needed  MEDS:   Current Facility-Administered Medications   Medication    furosemide (LASIX) injection 40 mg    albuterol-ipratropium (DUO-NEB) 2.5 MG-0.5 MG/3 ML    ARIPiprazole (ABILIFY) tablet 2 mg    aspirin chewable tablet 81 mg carvediloL (COREG) tablet 12.5 mg    doxazosin (CARDURA) tablet 1 mg    escitalopram oxalate (LEXAPRO) tablet 20 mg    gabapentin (NEURONTIN) capsule 100 mg    losartan (COZAAR) tablet 50 mg    melatonin tablet 10 mg    pantoprazole (PROTONIX) granules for oral suspension 40 mg    potassium chloride (KLOR-CON) packet for solution 20 mEq    senna-docusate (PERICOLACE) 8.6-50 mg per tablet 1 Tablet    clopidogreL (PLAVIX) tablet 75 mg    atorvastatin (LIPITOR) tablet 40 mg    albuterol-ipratropium (DUO-NEB) 2.5 MG-0.5 MG/3 ML    balsam peru-castor oiL (VENELEX) ointment    piperacillin-tazobactam (ZOSYN) 3.375 g in 0.9% sodium chloride (MBP/ADV) 100 mL MBP    hydrALAZINE (APRESOLINE) 20 mg/mL injection 20 mg    baclofen (LIORESAL) tablet 5 mg    polyethylene glycol (MIRALAX) packet 17 g    traMADoL (ULTRAM) tablet 50 mg    sodium chloride (NS) flush 5-40 mL    sodium chloride (NS) flush 5-40 mL    acetaminophen (TYLENOL) tablet 650 mg    Or    acetaminophen (TYLENOL) suppository 650 mg    ondansetron (ZOFRAN ODT) tablet 4 mg    Or    ondansetron (ZOFRAN) injection 4 mg    [Held by provider] enoxaparin (LOVENOX) injection 40 mg    glucose chewable tablet 16 g    glucagon (GLUCAGEN) injection 1 mg    dextrose 10% infusion 0-250 mL    insulin lispro (HUMALOG) injection        Labs:    Recent Labs     11/20/22  1134 11/19/22  1242   WBC 6.7 6.1   HGB 9.5* 9.2*    208       Recent Labs     11/20/22  1134 11/19/22  1242 11/18/22  1044    142 145   K 3.6 3.6 3.5   * 112* 113*   CO2 22 24 23   * 188* 116*   BUN 10 12 10   CREA 0.51* 0.58* 0.50*   CA 7.6* 7.6* 7.8*       11/10/2022 Echo ejection fraction 50% IVC dilated left atrium 5 cm RVSP 59  Lab Results   Component Value Date/Time    Culture result:  11/09/2022 02:10 AM     Heavy * methicillin resistant staphylococcus aureus *    Culture result: Heavy Diphtheroids 11/09/2022 02:10 AM    Culture result: No growth 6 days 11/08/2022 01:25 PM     Lab Results   Component Value Date/Time    TSH 1.67 07/30/2022 07:11 AM        Imaging:    Results from Hospital Encounter encounter on 11/08/22    XR CHEST PORT    Narrative  EXAM: XR CHEST PORT    HISTORY: PNA. COMPARISON: 11/17/2022    FINDINGS: Portable AP. The patient is status post median sternotomy. Monitoring  leads overlie the patient. The nasogastric tube is been removed. There are  increasing patchy opacities throughout both lungs. Areas of consolidation are  increased in the right mid chest and medial right lower lobe. Increasing  consolidation is also seen in the retrocardiac left lower lobe. No pleural  effusion or pneumothorax. Impression  Worsening diffuse airspace disease with areas of increasing consolidation. Results from [place] encounter on 11/08/22    CT FOOT RT W CONT    Narrative  EXAM: CT FOOT RT W CONT    INDICATION: Right foot swelling and abscess. Evaluate for osteomyelitis. Hypertension, diabetes, and cirrhosis. COMPARISON: Right foot views on 11/10/2022    CONTRAST: 100 mL of Isovue-[id number]. TECHNIQUE: Helical CT of the right foot during uneventful rapid bolus  intravenous contrast administration. Coronal and Sagittal reformats were  generated. Images reviewed in soft tissue and bone windows. CT dose reduction  was achieved through the use of a standardized protocol tailored for this  examination and automatic exposure control for dose modulation. FINDINGS: Bones: Mild osteopenia. No fracture or osteomyelitis. Joint fluid: Physiologic. Articulations: no evidence of septic arthritis. 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 Tendons: No full-thickness tendon tear. Muscles: Mild diffuse atrophy. Soft tissue mass: None. No fluid collection. Impression  1. No abscess or osteomyelitis. 2. No fracture.       11/10 patient is barely responsive now on room air off pressors getting IV fluid chest x-ray shows congestive changes IV fluid has been decreased, replace potassium, WBC much improved  11/11 Patient is out of ICU, responding slightly, he is on room air. PCO2 30. WBC improving, continues to decrease. 11/12 On room air, condition remains same open eyes but does not follow any commands, replace potassium  11/13 Room air, no O2 in hospital. He opened his eyes, tried to respond slightly by denying SOB, but unable to communicate clearly. NG tube in place. 11/14 Pt is seen resting, he is not currently on any O2, SpO2 94%. NG tube in place. 11/15 Pt is awake feeling well, more responsive today. He denies SOB. He is still on room air. Pt still has NG tube in place. 11/16 condition same on room air pleasantly confused  11/20 back on oxygen chest x-ray shows worsening congestion and infiltrates.   WBC count is normal with no fever tends to rule out aspiration pneumonia has been receiving D5 half at 60 an hour probably fluid overload from cor pulmonale we will discontinue IV fluids give 2 doses Lasix tonight and check labs and BNP in a.m.  11/21 on oxygen 2 L nasal cannula we will give extra Lasix

## 2022-11-21 NOTE — PROGRESS NOTES
Pt comes from [facility], will send updated medicals. Pt will D/C back to [facility] when he is medically stable. 1:42 PM    Received an order for hospice. Called Pt son, he stated that Pt was suppose to see Dr. Bessie Cristobal, Vascular Surgeon for the past three months. Pt son stated that he would like for Pt to have stents put in, Pt son stated, Larry Viera, anything to extend his life\"     Pt son stated that he would like for Dr. Bessie Cristobal to call him and discuss his options. Pt son stated that he does not want hospice at this time.

## 2022-11-21 NOTE — PROGRESS NOTES
Problem: Falls - Risk of  Goal: *Absence of Falls  Description: Document Gage Woodard Fall Risk and appropriate interventions in the flowsheet.   Outcome: Progressing Towards Goal  Note: Fall Risk Interventions:       Mentation Interventions: Bed/chair exit alarm, More frequent rounding, Reorient patient, Room close to nurse's station    Medication Interventions: Bed/chair exit alarm    Elimination Interventions: Bed/chair exit alarm, Call light in reach              Problem: Patient Education: Go to Patient Education Activity  Goal: Patient/Family Education  Outcome: Progressing Towards Goal     Problem: Discharge Planning  Goal: *Discharge to safe environment  Outcome: Progressing Towards Goal  Goal: *Knowledge of medication management  Outcome: Progressing Towards Goal  Goal: *Knowledge of discharge instructions  Outcome: Progressing Towards Goal     Problem: Patient Education: Go to Patient Education Activity  Goal: Patient/Family Education  Outcome: Progressing Towards Goal     Problem: Sepsis: Day of Diagnosis  Goal: Off Pathway (Use only if patient is Off Pathway)  Outcome: Progressing Towards Goal  Goal: *Fluid resuscitation  Outcome: Progressing Towards Goal  Goal: *Paired blood cultures prior to first dose of antibiotic  Outcome: Progressing Towards Goal  Goal: *First dose of  appropriate antibiotic within 3 hours of arrival to ED, within 1 hour of arrival to ICU  Outcome: Progressing Towards Goal  Goal: *Lactic acid with first set of blood cultures  Outcome: Progressing Towards Goal  Goal: *Pneumococcal immunization (if eligible)  Outcome: Progressing Towards Goal  Goal: *Influenza immunization (if eligible)  Outcome: Progressing Towards Goal  Goal: Activity/Safety  Outcome: Progressing Towards Goal  Goal: Consults, if ordered  Outcome: Progressing Towards Goal  Goal: Diagnostic Test/Procedures  Outcome: Progressing Towards Goal  Goal: Nutrition/Diet  Outcome: Progressing Towards Goal  Goal: Discharge Planning  Outcome: Progressing Towards Goal  Goal: Medications  Outcome: Progressing Towards Goal  Goal: Respiratory  Outcome: Progressing Towards Goal  Goal: Treatments/Interventions/Procedures  Outcome: Progressing Towards Goal  Goal: Psychosocial  Outcome: Progressing Towards Goal     Problem: Sepsis: Day 2  Goal: Off Pathway (Use only if patient is Off Pathway)  Outcome: Progressing Towards Goal  Goal: *Central Venous Pressure maintained at 8-12 mm Hg  Outcome: Progressing Towards Goal  Goal: *Hemodynamically stable  Outcome: Progressing Towards Goal  Goal: *Tolerating diet  Outcome: Progressing Towards Goal  Goal: Activity/Safety  Outcome: Progressing Towards Goal  Goal: Consults, if ordered  Outcome: Progressing Towards Goal  Goal: Diagnostic Test/Procedures  Outcome: Progressing Towards Goal  Goal: Nutrition/Diet  Outcome: Progressing Towards Goal  Goal: Discharge Planning  Outcome: Progressing Towards Goal  Goal: Medications  Outcome: Progressing Towards Goal  Goal: Respiratory  Outcome: Progressing Towards Goal  Goal: Treatments/Interventions/Procedures  Outcome: Progressing Towards Goal  Goal: Psychosocial  Outcome: Progressing Towards Goal     Problem: Sepsis: Day 3  Goal: Off Pathway (Use only if patient is Off Pathway)  Outcome: Progressing Towards Goal  Goal: *Central Venous Pressure maintained at 8-12 mm Hg  Outcome: Progressing Towards Goal  Goal: *Oxygen saturation within defined limits  Outcome: Progressing Towards Goal  Goal: *Vital sign stability  Outcome: Progressing Towards Goal  Goal: *Tolerating diet  Outcome: Progressing Towards Goal  Goal: *Demonstrates progressive activity  Outcome: Progressing Towards Goal  Goal: Activity/Safety  Outcome: Progressing Towards Goal  Goal: Consults, if ordered  Outcome: Progressing Towards Goal  Goal: Diagnostic Test/Procedures  Outcome: Progressing Towards Goal  Goal: Nutrition/Diet  Outcome: Progressing Towards Goal  Goal: Discharge Planning  Outcome: Progressing Towards Goal  Goal: Medications  Outcome: Progressing Towards Goal  Goal: Respiratory  Outcome: Progressing Towards Goal  Goal: Treatments/Interventions/Procedures  Outcome: Progressing Towards Goal  Goal: Psychosocial  Outcome: Progressing Towards Goal     Problem: Sepsis: Day 4  Goal: Off Pathway (Use only if patient is Off Pathway)  Outcome: Progressing Towards Goal  Goal: Activity/Safety  Outcome: Progressing Towards Goal  Goal: Consults, if ordered  Outcome: Progressing Towards Goal  Goal: Diagnostic Test/Procedures  Outcome: Progressing Towards Goal  Goal: Nutrition/Diet  Outcome: Progressing Towards Goal  Goal: Discharge Planning  Outcome: Progressing Towards Goal  Goal: Medications  Outcome: Progressing Towards Goal  Goal: Respiratory  Outcome: Progressing Towards Goal  Goal: Treatments/Interventions/Procedures  Outcome: Progressing Towards Goal  Goal: Psychosocial  Outcome: Progressing Towards Goal  Goal: *Oxygen saturation within defined limits  Outcome: Progressing Towards Goal  Goal: *Hemodynamically stable  Outcome: Progressing Towards Goal  Goal: *Vital signs within defined limits  Outcome: Progressing Towards Goal  Goal: *Tolerating diet  Outcome: Progressing Towards Goal  Goal: *Demonstrates progressive activity  Outcome: Progressing Towards Goal  Goal: *Fluid volume maintenance  Outcome: Progressing Towards Goal     Problem: Sepsis: Day 5  Goal: Off Pathway (Use only if patient is Off Pathway)  Outcome: Progressing Towards Goal  Goal: *Oxygen saturation within defined limits  Outcome: Progressing Towards Goal  Goal: *Vital signs within defined limits  Outcome: Progressing Towards Goal  Goal: *Tolerating diet  Outcome: Progressing Towards Goal  Goal: *Demonstrates progressive activity  Outcome: Progressing Towards Goal  Goal: *Discharge plan identified  Outcome: Progressing Towards Goal  Goal: Activity/Safety  Outcome: Progressing Towards Goal  Goal: Consults, if ordered  Outcome: Progressing Towards Goal  Goal: Diagnostic Test/Procedures  Outcome: Progressing Towards Goal  Goal: Nutrition/Diet  Outcome: Progressing Towards Goal  Goal: Discharge Planning  Outcome: Progressing Towards Goal  Goal: Medications  Outcome: Progressing Towards Goal  Goal: Respiratory  Outcome: Progressing Towards Goal  Goal: Treatments/Interventions/Procedures  Outcome: Progressing Towards Goal  Goal: Psychosocial  Outcome: Progressing Towards Goal

## 2022-11-22 ENCOUNTER — APPOINTMENT (OUTPATIENT)
Dept: GENERAL RADIOLOGY | Age: 72
DRG: 871 | End: 2022-11-22
Attending: INTERNAL MEDICINE
Payer: MEDICARE

## 2022-11-22 LAB
ARTERIAL PATENCY WRIST A: YES
BASE EXCESS BLDA CALC-SCNC: 5.5 MMOL/L (ref 0–3)
BDY SITE: ABNORMAL
BODY TEMPERATURE: 98.6
COHGB MFR BLD: 0.1 % (ref 1–2)
FIO2 ON VENT: 50 %
GAS FLOW.O2 O2 DELIVERY SYS: 15 L/MIN
GLUCOSE BLD STRIP.AUTO-MCNC: 130 MG/DL (ref 65–100)
GLUCOSE BLD STRIP.AUTO-MCNC: 147 MG/DL (ref 65–100)
GLUCOSE BLD STRIP.AUTO-MCNC: 154 MG/DL (ref 65–100)
GLUCOSE BLD STRIP.AUTO-MCNC: 169 MG/DL (ref 65–100)
HCO3 BLDA-SCNC: 28 MMOL/L (ref 22–26)
METHGB MFR BLD: 0.2 % (ref 0–1.4)
OXYHGB MFR BLD: 94.1 % (ref 95–99)
PCO2 BLDA: 35 MMHG (ref 35–45)
PERFORMED BY, TECHID: ABNORMAL
PH BLDA: 7.53 [PH] (ref 7.35–7.45)
PO2 BLDA: 74 MMHG (ref 80–100)
SAO2 % BLD: 94 % (ref 95–99)
SAO2% DEVICE SAO2% SENSOR NAME: ABNORMAL
SPECIMEN SITE: ABNORMAL

## 2022-11-22 PROCEDURE — 65270000029 HC RM PRIVATE

## 2022-11-22 PROCEDURE — 82803 BLOOD GASES ANY COMBINATION: CPT

## 2022-11-22 PROCEDURE — 74011000250 HC RX REV CODE- 250: Performed by: INTERNAL MEDICINE

## 2022-11-22 PROCEDURE — 74011250637 HC RX REV CODE- 250/637: Performed by: INTERNAL MEDICINE

## 2022-11-22 PROCEDURE — 71045 X-RAY EXAM CHEST 1 VIEW: CPT

## 2022-11-22 PROCEDURE — 74011000250 HC RX REV CODE- 250: Performed by: STUDENT IN AN ORGANIZED HEALTH CARE EDUCATION/TRAINING PROGRAM

## 2022-11-22 PROCEDURE — 74011250637 HC RX REV CODE- 250/637: Performed by: STUDENT IN AN ORGANIZED HEALTH CARE EDUCATION/TRAINING PROGRAM

## 2022-11-22 PROCEDURE — 74011250636 HC RX REV CODE- 250/636: Performed by: INTERNAL MEDICINE

## 2022-11-22 PROCEDURE — 74011250636 HC RX REV CODE- 250/636: Performed by: STUDENT IN AN ORGANIZED HEALTH CARE EDUCATION/TRAINING PROGRAM

## 2022-11-22 PROCEDURE — 94640 AIRWAY INHALATION TREATMENT: CPT

## 2022-11-22 PROCEDURE — 99232 SBSQ HOSP IP/OBS MODERATE 35: CPT | Performed by: INTERNAL MEDICINE

## 2022-11-22 PROCEDURE — 82962 GLUCOSE BLOOD TEST: CPT

## 2022-11-22 PROCEDURE — 36600 WITHDRAWAL OF ARTERIAL BLOOD: CPT

## 2022-11-22 PROCEDURE — 77010033678 HC OXYGEN DAILY

## 2022-11-22 PROCEDURE — 74011000258 HC RX REV CODE- 258: Performed by: INTERNAL MEDICINE

## 2022-11-22 PROCEDURE — 74011636637 HC RX REV CODE- 636/637: Performed by: INTERNAL MEDICINE

## 2022-11-22 RX ORDER — DEXAMETHASONE 4 MG/1
4 TABLET ORAL EVERY 12 HOURS
Status: DISCONTINUED | OUTPATIENT
Start: 2022-11-22 | End: 2022-11-23

## 2022-11-22 RX ORDER — FUROSEMIDE 10 MG/ML
40 INJECTION INTRAMUSCULAR; INTRAVENOUS ONCE
Status: COMPLETED | OUTPATIENT
Start: 2022-11-22 | End: 2022-11-22

## 2022-11-22 RX ADMIN — PANTOPRAZOLE SODIUM 40 MG: 40 GRANULE, DELAYED RELEASE ORAL at 09:52

## 2022-11-22 RX ADMIN — IPRATROPIUM BROMIDE AND ALBUTEROL SULFATE 3 ML: 2.5; .5 SOLUTION RESPIRATORY (INHALATION) at 20:42

## 2022-11-22 RX ADMIN — CASTOR OIL AND BALSAM, PERU: 788; 87 OINTMENT TOPICAL at 09:00

## 2022-11-22 RX ADMIN — FUROSEMIDE 40 MG: 10 INJECTION, SOLUTION INTRAMUSCULAR; INTRAVENOUS at 09:54

## 2022-11-22 RX ADMIN — PIPERACILLIN SODIUM AND TAZOBACTAM SODIUM 3.38 G: 3; .375 INJECTION, POWDER, LYOPHILIZED, FOR SOLUTION INTRAVENOUS at 06:41

## 2022-11-22 RX ADMIN — GABAPENTIN 100 MG: 100 CAPSULE ORAL at 20:39

## 2022-11-22 RX ADMIN — ESCITALOPRAM OXALATE 20 MG: 10 TABLET ORAL at 09:53

## 2022-11-22 RX ADMIN — CARVEDILOL 12.5 MG: 12.5 TABLET, FILM COATED ORAL at 20:39

## 2022-11-22 RX ADMIN — MELATONIN TAB 5 MG 10 MG: 5 TAB at 20:39

## 2022-11-22 RX ADMIN — ENOXAPARIN SODIUM 40 MG: 100 INJECTION SUBCUTANEOUS at 09:55

## 2022-11-22 RX ADMIN — LOSARTAN POTASSIUM 50 MG: 50 TABLET, FILM COATED ORAL at 09:37

## 2022-11-22 RX ADMIN — INSULIN LISPRO 2 UNITS: 100 INJECTION, SOLUTION INTRAVENOUS; SUBCUTANEOUS at 12:51

## 2022-11-22 RX ADMIN — SODIUM CHLORIDE, PRESERVATIVE FREE 10 ML: 5 INJECTION INTRAVENOUS at 06:36

## 2022-11-22 RX ADMIN — TRAMADOL HYDROCHLORIDE 50 MG: 50 TABLET, COATED ORAL at 20:39

## 2022-11-22 RX ADMIN — IPRATROPIUM BROMIDE AND ALBUTEROL SULFATE 3 ML: 2.5; .5 SOLUTION RESPIRATORY (INHALATION) at 13:10

## 2022-11-22 RX ADMIN — CASTOR OIL AND BALSAM, PERU: 788; 87 OINTMENT TOPICAL at 20:40

## 2022-11-22 RX ADMIN — POTASSIUM CHLORIDE 20 MEQ: 1.5 POWDER, FOR SOLUTION ORAL at 20:39

## 2022-11-22 RX ADMIN — PIPERACILLIN SODIUM AND TAZOBACTAM SODIUM 3.38 G: 3; .375 INJECTION, POWDER, LYOPHILIZED, FOR SOLUTION INTRAVENOUS at 16:40

## 2022-11-22 RX ADMIN — CARVEDILOL 12.5 MG: 12.5 TABLET, FILM COATED ORAL at 09:53

## 2022-11-22 RX ADMIN — IPRATROPIUM BROMIDE AND ALBUTEROL SULFATE 3 ML: 2.5; .5 SOLUTION RESPIRATORY (INHALATION) at 07:27

## 2022-11-22 RX ADMIN — DEXAMETHASONE 4 MG: 4 TABLET ORAL at 20:39

## 2022-11-22 RX ADMIN — INSULIN LISPRO 2 UNITS: 100 INJECTION, SOLUTION INTRAVENOUS; SUBCUTANEOUS at 06:40

## 2022-11-22 RX ADMIN — ATORVASTATIN CALCIUM 40 MG: 40 TABLET, FILM COATED ORAL at 09:53

## 2022-11-22 RX ADMIN — ARIPIPRAZOLE 2 MG: 2 TABLET ORAL at 09:37

## 2022-11-22 RX ADMIN — CLOPIDOGREL BISULFATE 75 MG: 75 TABLET, FILM COATED ORAL at 09:52

## 2022-11-22 RX ADMIN — IPRATROPIUM BROMIDE AND ALBUTEROL SULFATE 3 ML: 2.5; .5 SOLUTION RESPIRATORY (INHALATION) at 01:48

## 2022-11-22 RX ADMIN — POTASSIUM CHLORIDE 20 MEQ: 1.5 POWDER, FOR SOLUTION ORAL at 09:53

## 2022-11-22 RX ADMIN — SODIUM CHLORIDE, PRESERVATIVE FREE 10 ML: 5 INJECTION INTRAVENOUS at 15:50

## 2022-11-22 RX ADMIN — DOXAZOSIN 1 MG: 2 TABLET ORAL at 20:39

## 2022-11-22 RX ADMIN — PIPERACILLIN SODIUM AND TAZOBACTAM SODIUM 3.38 G: 3; .375 INJECTION, POWDER, LYOPHILIZED, FOR SOLUTION INTRAVENOUS at 23:19

## 2022-11-22 RX ADMIN — ASPIRIN 81 MG 81 MG: 81 TABLET ORAL at 09:37

## 2022-11-22 RX ADMIN — SODIUM CHLORIDE, PRESERVATIVE FREE 10 ML: 5 INJECTION INTRAVENOUS at 20:39

## 2022-11-22 RX ADMIN — SENNOSIDES AND DOCUSATE SODIUM 1 TABLET: 50; 8.6 TABLET ORAL at 09:38

## 2022-11-22 NOTE — PROGRESS NOTES
Hospitalist Progress Note    Subjective:   Daily Progress Note: 11/22/2022 1:13 PM    Hospital Course: Trupti Lacy is a 77-year-old male with a PMH of stroke, hypertension, DM, and cirrhosis who presented from nursing home with weakness and hypotension x1 day. Of note recent admission for TIA evaluated and recommended for carotid endarterectomy. Found hypotensive on arrival to the ED. Initial labs significant for WBC of 15.8, creatinine of 1.46, lactic acid of 2.4, and glucose of 134. UA consistent with urinary tract infection. CT of the head with no acute process. Patient admitted for further work-up. Patient started on IV fluids and ceftriaxone. Vascular surgery, cardiology, and podiatry consulted. KRISTI with duplex shows right common femoral artery and proximal superficial femoral artery occluded with collateralization to popliteal artery, left mid to distal superficial artery occluded, with patent distal anterior/posterior tibial artery, popliteal artery patent and with patent distal anterior tibial artery and posterior tibial artery. On 11/9 transferred to ICU overnight for poor responsiveness and for septic shock. CT of the head negative for acute findings. Patient started on meropenem, vancomycin, IV fluids, and Levophed. Pulmonology and ID consulted. ECHO showed EF of 50-55% with mild MV R and dilated left atrium. XR right foot with generalized osseous demineralization without fracture or dislocation, tissue gas or osseous erosive. CT of right foot with no abscess or osteomyelitis. Patient has carotid artery stenosis and supposed to go for surgery but unable to get cardiac clearance because of nuclear stress test he has 90% stenosis of left ICA and 50% stenosis of right ICA. Urine culture grew mixed kristen. Neurology and psychiatry consulted for confusion. MRI 7/2022 showed acute ischemic stroke of the right parietal area.  Repeat MRI brain 11/16/22 showed small to moderate sized acute infarction in the right occipital lobe with minimal/small infarction in the right temporal lobe and severe cerebral atrophy. Continue DAPT and atorvastatin. Patient has had NG tube for nutrition since admission. Speech therapy working with patient. GI consult to evaluate for PEG placement. on 11/15 fever spike of 100.6F. 11/17 CXR with no significant change in basilar predominant opacities. Started on zosyn. PEG placed on 11/18. Patient tolerating PEG tube feedings. MBS positive for penetration on 11/21. SLP recommending NPO. Hospice consulted. Son declining hospice at this time. Oxygen requirements increasing, placed on venti-mask. Subjective:    Patient seen and examined at bedside. Ill-appearing, lethargic. On venti-mask.      Current Facility-Administered Medications   Medication Dose Route Frequency    albuterol-ipratropium (DUO-NEB) 2.5 MG-0.5 MG/3 ML  3 mL Nebulization Q6H RT    ARIPiprazole (ABILIFY) tablet 2 mg  2 mg Per G Tube DAILY    aspirin chewable tablet 81 mg  81 mg Per G Tube DAILY    carvediloL (COREG) tablet 12.5 mg  12.5 mg Per G Tube BID    doxazosin (CARDURA) tablet 1 mg  1 mg Per G Tube QHS    escitalopram oxalate (LEXAPRO) tablet 20 mg  20 mg Per G Tube DAILY    gabapentin (NEURONTIN) capsule 100 mg  100 mg Per G Tube QHS    losartan (COZAAR) tablet 50 mg  50 mg Per G Tube DAILY    melatonin tablet 10 mg  10 mg Per G Tube QHS    pantoprazole (PROTONIX) granules for oral suspension 40 mg  40 mg Per G Tube DAILY    potassium chloride (KLOR-CON) packet for solution 20 mEq  20 mEq Per G Tube BID    senna-docusate (PERICOLACE) 8.6-50 mg per tablet 1 Tablet  1 Tablet Per G Tube DAILY    clopidogreL (PLAVIX) tablet 75 mg  75 mg PEG Tube DAILY    atorvastatin (LIPITOR) tablet 40 mg  40 mg Per G Tube DAILY    albuterol-ipratropium (DUO-NEB) 2.5 MG-0.5 MG/3 ML  3 mL Nebulization Q6H PRN    balsam peru-castor oiL (VENELEX) ointment   Topical BID    piperacillin-tazobactam (ZOSYN) 3.375 g in 0.9% sodium chloride (MBP/ADV) 100 mL MBP  3.375 g IntraVENous Q8H    hydrALAZINE (APRESOLINE) 20 mg/mL injection 20 mg  20 mg IntraVENous Q6H PRN    baclofen (LIORESAL) tablet 5 mg  5 mg Per NG tube BID PRN    polyethylene glycol (MIRALAX) packet 17 g  17 g Per NG tube DAILY PRN    traMADoL (ULTRAM) tablet 50 mg  50 mg Per NG tube Q6H PRN    sodium chloride (NS) flush 5-40 mL  5-40 mL IntraVENous Q8H    sodium chloride (NS) flush 5-40 mL  5-40 mL IntraVENous PRN    acetaminophen (TYLENOL) tablet 650 mg  650 mg Oral Q6H PRN    Or    acetaminophen (TYLENOL) suppository 650 mg  650 mg Rectal Q6H PRN    ondansetron (ZOFRAN ODT) tablet 4 mg  4 mg Oral Q8H PRN    Or    ondansetron (ZOFRAN) injection 4 mg  4 mg IntraVENous Q6H PRN    enoxaparin (LOVENOX) injection 40 mg  40 mg SubCUTAneous DAILY    glucose chewable tablet 16 g  4 Tablet Oral PRN    glucagon (GLUCAGEN) injection 1 mg  1 mg IntraMUSCular PRN    dextrose 10% infusion 0-250 mL  0-250 mL IntraVENous PRN    insulin lispro (HUMALOG) injection   SubCUTAneous AC&HS        Review of Systems  Unable to perform ROS: Acuity of condition       Objective:     Visit Vitals  BP (!) 128/58 (BP 1 Location: Left upper arm, BP Patient Position: At rest)   Pulse 77   Temp 98.3 °F (36.8 °C)   Resp 19   Ht 5' 9\" (1.753 m)   Wt 62.5 kg (137 lb 12.6 oz)   SpO2 96%   BMI 20.35 kg/m²    O2 Flow Rate (L/min): 15 l/min O2 Device: Ventimask    Temp (24hrs), Av.1 °F (36.7 °C), Min:97.8 °F (36.6 °C), Max:98.3 °F (36.8 °C)      No intake/output data recorded.  1901 -  0700  In: 1150 [I.V.:100]  Out: 700 [Urine:700]    PHYSICAL EXAM:  Constitutional:  ill-appearing, lethargic male. In mild distress. Skin: Generalized pallor. Scabs seen throughout lower extremities. HEENT: Sclerae anicteric. PERRL. No oral ulcers. The neck is supple and no masses. Cardiovascular: Regular rate and rhythm. +S1/S2. No murmur or gallop. Respiratory:  Decreased air entry at bases with coarse rhonchi bilaterally. On venti-mask. GI:  PEG tube in place. Abdomen nondistended, soft, and nontender. Normal active bowel sounds. Rectal: Deferred   Musculoskeletal: No pitting edema of the lower legs. Able to move all ext  Neurological:  Patient is alert, disoriented. Psychiatric: Unable to assess       Data Review    Recent Results (from the past 24 hour(s))   GLUCOSE, POC    Collection Time: 11/21/22  4:45 PM   Result Value Ref Range    Glucose (POC) 143 (H) 65 - 100 mg/dL    Performed by Vishal Daniels, POC    Collection Time: 11/21/22  8:43 PM   Result Value Ref Range    Glucose (POC) 134 (H) 65 - 100 mg/dL    Performed by Rigoberto Kava    GLUCOSE, POC    Collection Time: 11/22/22  6:34 AM   Result Value Ref Range    Glucose (POC) 169 (H) 65 - 100 mg/dL    Performed by rateGeniusva    GLUCOSE, POC    Collection Time: 11/22/22 11:52 AM   Result Value Ref Range    Glucose (POC) 147 (H) 65 - 100 mg/dL    Performed by Luige Braeden 10   Final Result   Penetration as described above. XR CHEST PORT   Final Result   Worsening diffuse airspace disease with areas of increasing consolidation. XR CHEST PORT   Final Result   Interval advancement of nasogastric tube, although tip not included on this   study. Evaluation of nasogastric tube position is typically better performed   with KUB. XR CHEST PORT   Final Result   1. No significant change in basilar predominant opacities. These are   nonspecific but can be seen with aspiration pneumonitis/pneumonia in the   appropriate setting. 2.  Enteric tube tip projects just past the gastroesophageal junction with   proximal sidehole in the distal esophagus. Consider advancing. MRI BRAIN WO CONT   Final Result   Small to moderate sized acute infarction in the right occipital lobe with   minimal/small infarction in the right temporal lobe. Severe cerebral atrophy. There is no intracranial mass, hemorrhage.    There are numerous scattered chronic infarcts. No additional acute intracranial process is demonstrated. XR CHEST PORT   Final Result   Interval retraction of nasogastric tube with sidehole over the gastroesophageal   junction. XR CHEST PORT   Final Result   Interval replacement of nasogastric tube, with tip over the gastric fundus. XR CHEST PORT   Final Result   No significant change. XR CHEST PORT   Final Result   1. NG tube terminates in the gastric fundus, with possible kink in the side   hole. Correlate with function. 2.  New patchy consolidation in the right midlung is concerning for pneumonia. Grossly unchanged patchy retrocardiac opacities. Stable trace right pleural   effusion. XR CHEST PORT   Final Result   Nasogastric tube tip advanced overlying the gastric body. XR CHEST PORT   Final Result   Nasogastric tube tip over the gastric fundus. XR CHEST PORT   Final Result   Increased mild interstitial pulmonary edema versus pneumonia. Enteric tube extends into the gastric antrum or first portion of the duodenum. Consider retraction 8-10 cm if the intention is gastric luminal termination. CT FOOT RT W CONT   Final Result      1. No abscess or osteomyelitis. 2. No fracture. DUPLEX LOWER EXT ARTERY BILAT   Final Result      XR FOOT RT MIN 3 V   Final Result   No osseous erosion or soft tissue gas. .      XR CHEST PORT   Final Result      Mild interstitial opacities which may represent mild pulmonary edema unchanged         XR CHEST PORT   Final Result   Addendum (preliminary) 1 of 1   Addendum: NG tube is in appropriate position tip extends below the    diaphragm. Right IJ catheter. No pneumothorax on the right. Final   1. Status post right IJ catheter placement without evidence of complication.        IR INSERT NON TUNL CVC OVER 5 YRS   Final Result   Technically successful ultrasound guided placement of a right internal jugular   vein temporary central venous catheter. A post procedure chest x-ray is   pending. CT HEAD WO CONT   Final Result   No acute process or change compared to the prior exam.            XR CHEST PORT   Final Result   No acute process.       IR Jaylen    (Results Pending)   XR CHEST PORT    (Results Pending)       Active Problems:    CVA (cerebral vascular accident) ([address] (7/31/2022)      Carotid stenosis (8/2/2022)      UTI (urinary tract infection) (11/8/2022)      Septic shock ([address] (11/20/2022)      Infection of right great toe due to methicillin resistant Staphylococcus aureus (MRSA) (08/00/8743)      Metabolic encephalopathy (27/17/4131)      Severe protein-calorie malnutrition ([address] (11/20/2022)      Dysphagia (11/20/2022)      Acute respiratory failure with hypoxia ([address] (11/20/2022)      PAD (peripheral artery disease) ([address] (11/20/2022)        Assessment/Plan:     SIRS, suspected aspiration PNA  Tmax 100.6F, WBC WNLs, CXR with no significant change in basilar predominant opacities   Continue on Zosyn #6/7  ID following     Severe sepsis with septic shock - resolved  2/2 UTI and right great toe ulcer  S/p vancomycin and meropenem  11/8 blood: no growth     Great right toe infection  11/9 wound: MRSA  XR of right foot without fracture, dislocation, soft tissue gas, or osseous erosion  CT of right foot with no abscess or osteomyelitis  S/p vancomycin for 7 days  Continue routine wound care  ID and podiatry following    Metabolic encephalopathy - improving     Severe protein calorie malnutrition  Status post PEG tube  GI following    Dysphagia  SLP recommending npo  MBS positive for penetration/aspiration     Acute respiratory failure with hypoxia   Aspiration pneumonia  CXR (11/20) shows worsening diffuse airspace disease with areas of increasing consolidation  Currently on venti-mask  Blood gas pending  Will give IV furosemide 40 mg x 1  On IV Zosyn  Pulmonary following    Urinary tract infection  11/8 urine: mixed kristen     Hypertension  Continue on carvedilol, furosemide, and losartan    CVA  Carotid stenosis   History of old infarcts  Repeat MRI brain 11/16/22 showed small to moderate sized acute infarction in the right occipital lobe with minimal/small infarction in the right temporal lobe and severe cerebral atrophy  Continue on DAPT and atorvastatin  Vascular and neurology following     Multiple chronic wounds  PAD  Poor arterial flow and nutritional status complicates wound healing  KRISTI with duplex shows right common femoral artery and proximal superficial femoral artery occluded with collateralization to popliteal artery, left mid to distal superficial artery occluded, with patent distal anterior/posterior tibial and popliteal arteries   Vascular was following outpatient for endarterectomy possibly with intervention for this? DVT Prophylaxis: Lovenox   GI Prophylaxis: Protonix  Code Status: FULL CODE  POA:    Discharge Barriers:   - Respiratory status     Care Plan discussed with: patient and nursing. Son declined hospice at this time. Spoke to son about code status and end of life goals. He would like to keep patient FULL CODE. Total time spent with patient: >35 minutes.

## 2022-11-22 NOTE — PROGRESS NOTES
Bedside shift change report given to [address] [place] [address] (oncoming nurse) by Nicky Stratton (offgoing nurse). Report included the following information SBAR, Kardex, MAR, and Recent Results.

## 2022-11-22 NOTE — PROGRESS NOTES
Infectious Disease Progress Note           Subjective:   Remains stable, no change in clinical status since last seen.  Denies new complaints   Objective:   Physical Exam:     Visit Vitals  BP (!) 128/58 (BP 1 Location: Left upper arm, BP Patient Position: At rest)   Pulse 77   Temp 98.3 °F (36.8 °C)   Resp 19   Ht 5' 9\" (1.753 m)   Wt 137 lb 12.6 oz (62.5 kg)   SpO2 96%   BMI 20.35 kg/m²    O2 Flow Rate (L/min): 15 l/min O2 Device: Ventimask    Temp (24hrs), Av.1 °F (36.7 °C), Min:97.8 °F (36.6 °C), Max:98.3 °F (36.8 °C)    701 - 1900  In: -   Out: 6016 [[id number]]   1901 - 700  In: 1150 [I.V.:100]  Out: 700 [Urine:700]    General: NAD, alert, confused   HEENT: VANGIE, Moist mucosa  Lungs:Decreased at the bases, no wheeze/rhonchi   Heart: S1S2+, RRR, no murmur  Abdo: Soft, NT, ND, +BS, + peg tube   Exts:Right great toe ulcer w dry eschar, left medial thigh ulcer, diffuse LUE swelling   Skin: b/l leg ulcers, stable ulcerations on b/l feet     Data Review:       Recent Days:  Recent Labs     22  0901 22  1134   WBC 5.8 6.7   HGB 9.0* 9.5*   HCT 26.9* 29.8*    232       Recent Labs     22  0901 22  1134   BUN 11 10   CREA 0.54* 0.51*       Lab Results   Component Value Date/Time    C-Reactive protein 1.64 (H) 2022 10:18 AM        Microbiology     Results       Procedure Component Value Units Date/Time    MRSA SCREEN - PCR (NASAL) [[id number]]  (Abnormal) Collected: 22 0231    Order Status: Completed Specimen: Swab Updated: 22     MRSA by PCR, Nasal DETECTED        Comment: Results verified, phoned to and read back by [email or web address]       CULTURE, Stellasa Grijalva STAIN [[id number]]  (Susceptibility) Collected: 22 0210    Order Status: Completed Specimen: Wound from Toe Updated: 22 1327     Special Requests: No Special Requests        GRAM STAIN Occasional WBCs seen               2+ Gram positive cocci in pairs chains and clusters           Culture result:       Heavy * methicillin resistant staphylococcus aureus *            Heavy Diphtheroids       Susceptibility        Staphylococcus aureus Methcillin Resistant      TELMA      Ciprofloxacin ($) Resistant      Clindamycin ($) Resistant      Daptomycin ($$$$$) Susceptible      Doxycycline ($$) Intermediate      Erythromycin ($$$$) Resistant      Gentamicin ($) Susceptible      Inducible Clindamycin  See below  [1]       Levofloxacin ($) Resistant      Linezolid ($$$$$) Susceptible      Oxacillin Resistant      Rifampin ($$$$) Susceptible  [2]       Tetracycline Resistant      Trimeth/Sulfa Susceptible      Vancomycin ($) Susceptible                   [1]  HIDE     [2]  Rifampin is not to be used for mono-therapy. Diagnostics   CXR Results  (Last 48 hours)                 11/22/22 1219  XR CHEST PORT Final result    Impression:  1. Patchy diffuse bilateral airspace disease and possible trace effusions,   unchanged. Narrative:  EXAM:  XR CHEST PORT       INDICATION: chf       COMPARISON: 11/20/2020. TECHNIQUE: Frontal and lateral views of the chest       FINDINGS: Patchy diffuse bilateral airspace disease. Possible trace effusions. No visualized pneumothorax. Unchanged cardiomediastinal silhouette status post   median sternotomy. Assessment/Plan     Suspected aspiration PNA. Worsening infiltrates on CXR (11/20)         B/l patchy infiltrates on CXR, trace effusions (11/22)         Hypoxic, increased O2 requirements, current on Ventimask         On day # 6/7 of Zosyn. Will test for COVID 19, add decadron         Closely monitor respiratory status     Right great toe infection  MRSA isolated from Cx of right great toe         No abscess or osteomyelitis on CT of right foot (11/11)         Continue routine wound care, monitor for superinfection     3.  CVA w Small to moderate sized acute infarction in the right occipital lobe with  minimal/small infarction in the right temporal lobe on MRI head (11/16))     5. Dysphagia, S/p peg placement 11/18, on tube feeds     6.  Diffuse LUE swelling, continue elevation     Mis Ren MD    11/22/2022

## 2022-11-22 NOTE — PROGRESS NOTES
7691: Chart reviewed. Discharge Dispo: [facility] when medically stable. CM will continue to follow patient and recs of medical team.    3359: Updates attached.

## 2022-11-22 NOTE — PROGRESS NOTES
IMPRESSION:   Acute hypoxic respiratory failure had resolved now back on nasal oxygen   Diffuse pulmonary infiltrates questionable pulmonary edema versus chronic aspiration  Leukocytosis resolved  Carotid artery disease history of CVA  Hypokalemia      RECOMMENDATIONS/PLAN:   70-year-old male nursing home resident admitted with hypotension and unresponsiveness now he is alert awake agitated now he is on 50% Ventimask  Worsening chest x-ray and hypoxia currently receiving IV fluids received 2 doses Lasix and hold IV fluid BNP 7000  11/10 Echo ejection fraction 50% IVC dilated left atrium 5 cm RVSP 59  Pulmonary hypertension with probable cor pulmonale  Chest x-ray shows fluid overload congestive changes   Potassium corrected     [x] High complexity decision making was performed  [x] See my orders for details  HPI  70-year-old nursing home resident came in because of unresponsiveness he had a history of stroke in the past patient was hypotensive hypoxic rapid response was called he was admitted to the floor initially received IV fluid hemodynamically unstable started on vasopressors Levophed transferred to ICU White count was elevated he was put on oxygen 4 L nasal cannula unable to get any started the patient he has a right foot wound and multiple wounds of the lower extremities dressing in place. He has carotid artery stenosis only supposed to go for surgery but unable to get cardiac clearance because of nuclear stress test he has 90% stenosis of left ICA and 50% stenosis of right ICA. So now he is admitted to ICU and critical care consult was called  PMH:  has a past medical history of Cirrhosis ([facility] [address] Diabetes ([facility] [address] Hypertension, and Stroke ([facility] [address] PSH:   has a past surgical history that includes hx back surgery; hx gi; and ir insert non tunl cvc over 5 yrs (11/9/2022). FHX: family history includes Heart Disease in his father. SHX:  reports that he has quit smoking.  He has never used smokeless tobacco. He reports that he does not currently use alcohol. He reports that he does not currently use drugs.     ALL: No Known Allergies     MEDS:   [x] Reviewed - As Below   [] Not reviewed    Current Facility-Administered Medications   Medication    albuterol-ipratropium (DUO-NEB) 2.5 MG-0.5 MG/3 ML    ARIPiprazole (ABILIFY) tablet 2 mg    aspirin chewable tablet 81 mg    carvediloL (COREG) tablet 12.5 mg    doxazosin (CARDURA) tablet 1 mg    escitalopram oxalate (LEXAPRO) tablet 20 mg    gabapentin (NEURONTIN) capsule 100 mg    losartan (COZAAR) tablet 50 mg    melatonin tablet 10 mg    pantoprazole (PROTONIX) granules for oral suspension 40 mg    potassium chloride (KLOR-CON) packet for solution 20 mEq    senna-docusate (PERICOLACE) 8.6-50 mg per tablet 1 Tablet    clopidogreL (PLAVIX) tablet 75 mg    atorvastatin (LIPITOR) tablet 40 mg    albuterol-ipratropium (DUO-NEB) 2.5 MG-0.5 MG/3 ML    balsam peru-castor oiL (VENELEX) ointment    piperacillin-tazobactam (ZOSYN) 3.375 g in 0.9% sodium chloride (MBP/ADV) 100 mL MBP    hydrALAZINE (APRESOLINE) 20 mg/mL injection 20 mg    baclofen (LIORESAL) tablet 5 mg    polyethylene glycol (MIRALAX) packet 17 g    traMADoL (ULTRAM) tablet 50 mg    sodium chloride (NS) flush 5-40 mL    sodium chloride (NS) flush 5-40 mL    acetaminophen (TYLENOL) tablet 650 mg    Or    acetaminophen (TYLENOL) suppository 650 mg    ondansetron (ZOFRAN ODT) tablet 4 mg    Or    ondansetron (ZOFRAN) injection 4 mg    enoxaparin (LOVENOX) injection 40 mg    glucose chewable tablet 16 g    glucagon (GLUCAGEN) injection 1 mg    dextrose 10% infusion 0-250 mL    insulin lispro (HUMALOG) injection      MAR reviewed and pertinent medications noted or modified as needed   Current Facility-Administered Medications   Medication    albuterol-ipratropium (DUO-NEB) 2.5 MG-0.5 MG/3 ML    ARIPiprazole (ABILIFY) tablet 2 mg    aspirin chewable tablet 81 mg    carvediloL (COREG) tablet 12.5 mg    doxazosin (CARDURA) tablet 1 mg    escitalopram oxalate (LEXAPRO) tablet 20 mg    gabapentin (NEURONTIN) capsule 100 mg    losartan (COZAAR) tablet 50 mg    melatonin tablet 10 mg    pantoprazole (PROTONIX) granules for oral suspension 40 mg    potassium chloride (KLOR-CON) packet for solution 20 mEq    senna-docusate (PERICOLACE) 8.6-50 mg per tablet 1 Tablet    clopidogreL (PLAVIX) tablet 75 mg    atorvastatin (LIPITOR) tablet 40 mg    albuterol-ipratropium (DUO-NEB) 2.5 MG-0.5 MG/3 ML    balsam peru-castor oiL (VENELEX) ointment    piperacillin-tazobactam (ZOSYN) 3.375 g in 0.9% sodium chloride (MBP/ADV) 100 mL MBP    hydrALAZINE (APRESOLINE) 20 mg/mL injection 20 mg    baclofen (LIORESAL) tablet 5 mg    polyethylene glycol (MIRALAX) packet 17 g    traMADoL (ULTRAM) tablet 50 mg    sodium chloride (NS) flush 5-40 mL    sodium chloride (NS) flush 5-40 mL    acetaminophen (TYLENOL) tablet 650 mg    Or    acetaminophen (TYLENOL) suppository 650 mg    ondansetron (ZOFRAN ODT) tablet 4 mg    Or    ondansetron (ZOFRAN) injection 4 mg    enoxaparin (LOVENOX) injection 40 mg    glucose chewable tablet 16 g    glucagon (GLUCAGEN) injection 1 mg    dextrose 10% infusion 0-250 mL    insulin lispro (HUMALOG) injection      PMH:  has a past medical history of Cirrhosis ([facility] [address] Diabetes ([facility] [address] Hypertension, and Stroke ([facility] [address] PSH:   has a past surgical history that includes hx back surgery; hx gi; and ir insert non tunl cvc over 5 yrs (11/9/2022). FHX: family history includes Heart Disease in his father. SHX:  reports that he has quit smoking. He has never used smokeless tobacco. He reports that he does not currently use alcohol. He reports that he does not currently use drugs. ROS:Review of systems not obtained due to patient factors. Hemodynamics:    CO:    CI:    CVP:    SVR:   PAP Systolic:    PAP Diastolic:    PVR:    SF77:        Ventilator Settings:      Mode Rate TV Press PEEP FiO2 PIP Min.  Vent               50 % Vital Signs: Telemetry:    normal sinus rhythm Intake/Output:   Visit Vitals  BP (!) 128/58 (BP 1 Location: Left upper arm, BP Patient Position: At rest)   Pulse 77   Temp 98.3 °F (36.8 °C)   Resp 19   Ht 5' 9\" (1.753 m)   Wt 62.5 kg (137 lb 12.6 oz)   SpO2 96%   BMI 20.35 kg/m²       Temp (24hrs), Av °F (36.7 °C), Min:97.6 °F (36.4 °C), Max:98.3 °F (36.8 °C)        O2 Device: Ventimask O2 Flow Rate (L/min): 15 l/min       Wt Readings from Last 4 Encounters:   11/10/22 62.5 kg (137 lb 12.6 oz)   22 84.8 kg (187 lb)   22 84.8 kg (187 lb)   10/28/20 99.8 kg (220 lb)          Intake/Output Summary (Last 24 hours) at 2022 1027  Last data filed at 2022 0445  Gross per 24 hour   Intake 950 ml   Output 200 ml   Net 750 ml         Last shift:      No intake/output data recorded. Last 3 shifts:  1901 -  0700  In: 1150 [I.V.:100]  Out: 700 [Urine:700]       Physical Exam:     General: Awake confused does not follow commands  HEENT: NCAT, poor dentition,   Eyes: anicteric; conjunctiva clear extraocular movements intact  Neck: no nodes, trach midline; no accessory MM use.   Neck veins visible  Chest: no deformity,   Cardiac: Regular rate and rhythm  Lungs: Shallow but clear anteriorly with lateral rales no wheezing  Abd: soft, NT, hypoactive BS  Ext: Lower extremity wound bandage in place  : clear urine  Neuro: Awake mumbles follows no commands does move his extremities  Psych-unable to assess  Skin: warm, dry, no cyanosis;   Pulses: Brachial and radial pulses intact  Capillary: Normal capillary refill      DATA:    MAR reviewed and pertinent medications noted or modified as needed  MEDS:   Current Facility-Administered Medications   Medication    albuterol-ipratropium (DUO-NEB) 2.5 MG-0.5 MG/3 ML    ARIPiprazole (ABILIFY) tablet 2 mg    aspirin chewable tablet 81 mg    carvediloL (COREG) tablet 12.5 mg    doxazosin (CARDURA) tablet 1 mg    escitalopram oxalate (LEXAPRO) tablet 20 mg    gabapentin (NEURONTIN) capsule 100 mg    losartan (COZAAR) tablet 50 mg    melatonin tablet 10 mg    pantoprazole (PROTONIX) granules for oral suspension 40 mg    potassium chloride (KLOR-CON) packet for solution 20 mEq    senna-docusate (PERICOLACE) 8.6-50 mg per tablet 1 Tablet    clopidogreL (PLAVIX) tablet 75 mg    atorvastatin (LIPITOR) tablet 40 mg    albuterol-ipratropium (DUO-NEB) 2.5 MG-0.5 MG/3 ML    balsam peru-castor oiL (VENELEX) ointment    piperacillin-tazobactam (ZOSYN) 3.375 g in 0.9% sodium chloride (MBP/ADV) 100 mL MBP    hydrALAZINE (APRESOLINE) 20 mg/mL injection 20 mg    baclofen (LIORESAL) tablet 5 mg    polyethylene glycol (MIRALAX) packet 17 g    traMADoL (ULTRAM) tablet 50 mg    sodium chloride (NS) flush 5-40 mL    sodium chloride (NS) flush 5-40 mL    acetaminophen (TYLENOL) tablet 650 mg    Or    acetaminophen (TYLENOL) suppository 650 mg    ondansetron (ZOFRAN ODT) tablet 4 mg    Or    ondansetron (ZOFRAN) injection 4 mg    enoxaparin (LOVENOX) injection 40 mg    glucose chewable tablet 16 g    glucagon (GLUCAGEN) injection 1 mg    dextrose 10% infusion 0-250 mL    insulin lispro (HUMALOG) injection        Labs:    Recent Labs     11/21/22  0901 11/20/22  1134 11/19/22  1242   WBC 5.8 6.7 6.1   HGB 9.0* 9.5* 9.2*    232 208       Recent Labs     11/21/22  0901 11/20/22  1134 11/19/22  1242    141 142   K 3.4* 3.6 3.6    109* 112*   CO2 25 22 24   * 139* 188*   BUN 11 10 12   CREA 0.54* 0.51* 0.58*   CA 7.9* 7.6* 7.6*   PHOS 2.7  --   --    ALB 1.8*  --   --        11/10/2022 Echo ejection fraction 50% IVC dilated left atrium 5 cm RVSP 59  Lab Results   Component Value Date/Time    Culture result:  11/09/2022 02:10 AM     Heavy * methicillin resistant staphylococcus aureus *    Culture result: Heavy Diphtheroids 11/09/2022 02:10 AM    Culture result: No growth 6 days 11/08/2022 01:25 PM     Lab Results   Component Value Date/Time    TSH 1.67 07/30/2022 07:11 AM        Imaging:    Results from Hospital Encounter encounter on 11/08/22    XR CHEST PORT    Narrative  EXAM: XR CHEST PORT    HISTORY: PNA. COMPARISON: 11/17/2022    FINDINGS: Portable AP. The patient is status post median sternotomy. Monitoring  leads overlie the patient. The nasogastric tube is been removed. There are  increasing patchy opacities throughout both lungs. Areas of consolidation are  increased in the right mid chest and medial right lower lobe. Increasing  consolidation is also seen in the retrocardiac left lower lobe. No pleural  effusion or pneumothorax. Impression  Worsening diffuse airspace disease with areas of increasing consolidation. Results from [place] encounter on 11/08/22    CT FOOT RT W CONT    Narrative  EXAM: CT FOOT RT W CONT    INDICATION: Right foot swelling and abscess. Evaluate for osteomyelitis. Hypertension, diabetes, and cirrhosis. COMPARISON: Right foot views on 11/10/2022    CONTRAST: 100 mL of Isovue-370. TECHNIQUE: Helical CT of the right foot during uneventful rapid bolus  intravenous contrast administration. Coronal and Sagittal reformats were  generated. Images reviewed in soft tissue and bone windows. CT dose reduction  was achieved through the use of a standardized protocol tailored for this  examination and automatic exposure control for dose modulation. FINDINGS: Bones: Mild osteopenia. No fracture or osteomyelitis. Joint fluid: Physiologic. Articulations: no evidence of septic arthritis. 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 Tendons: No full-thickness tendon tear. Muscles: Mild diffuse atrophy. Soft tissue mass: None. No fluid collection. Impression  1. No abscess or osteomyelitis. 2. No fracture.       11/10 patient is barely responsive now on room air off pressors getting IV fluid chest x-ray shows congestive changes IV fluid has been decreased, replace potassium, WBC much improved  11/11 Patient is out of ICU, responding slightly, he is on room air. PCO2 30. WBC improving, continues to decrease. 11/12 On room air, condition remains same open eyes but does not follow any commands, replace potassium  11/13 Room air, no O2 in hospital. He opened his eyes, tried to respond slightly by denying SOB, but unable to communicate clearly. NG tube in place. 11/14 Pt is seen resting, he is not currently on any O2, SpO2 94%. NG tube in place. 11/15 Pt is awake feeling well, more responsive today. He denies SOB. He is still on room air. Pt still has NG tube in place. 11/16 condition same on room air pleasantly confused  11/20 back on oxygen chest x-ray shows worsening congestion and infiltrates.   WBC count is normal with no fever tends to rule out aspiration pneumonia has been receiving D5 half at 60 an hour probably fluid overload from cor pulmonale we will discontinue IV fluids give 2 doses Lasix tonight and check labs and BNP in a.m.  11/21 on oxygen 2 L nasal cannula we will give extra Lasix  11/22 on 50% Ventimask extra dose of Lasix received chest x-ray we will get arterial blood gases

## 2022-11-22 NOTE — PROGRESS NOTES
Bedside shift change report given to   Loree Nicole (oncoming nurse) by   Carla Ramírez RN (offgoing nurse). Report included the following information SBAR and MAR.

## 2022-11-22 NOTE — PROGRESS NOTES
Bedside shift change report given to [address] [place] [address] (oncoming nurse) by María Dunbar (offgoing nurse). Report included the following information SBAR, Kardex, Intake/Output, MAR, and Recent Results.

## 2022-11-22 NOTE — PROGRESS NOTES
Problem: Falls - Risk of  Goal: *Absence of Falls  Description: Document [place] Fail Fall Risk and appropriate interventions in the flowsheet.   Outcome: Progressing Towards Goal  Note: Fall Risk Interventions:       Mentation Interventions: Bed/chair exit alarm, Door open when patient unattended, More frequent rounding, Reorient patient, Room close to nurse's station    Medication Interventions: Bed/chair exit alarm    Elimination Interventions: Call light in reach

## 2022-11-23 LAB
ANION GAP SERPL CALC-SCNC: 3 MMOL/L (ref 5–15)
BUN SERPL-MCNC: 14 MG/DL (ref 6–20)
BUN/CREAT SERPL: 27 (ref 12–20)
CA-I BLD-MCNC: 7.8 MG/DL (ref 8.5–10.1)
CHLORIDE SERPL-SCNC: 106 MMOL/L (ref 97–108)
CO2 SERPL-SCNC: 29 MMOL/L (ref 21–32)
COVID-19 RAPID TEST, COVR: NOT DETECTED
CREAT SERPL-MCNC: 0.52 MG/DL (ref 0.7–1.3)
ERYTHROCYTE [DISTWIDTH] IN BLOOD BY AUTOMATED COUNT: 16.8 % (ref 11.5–14.5)
GLUCOSE BLD STRIP.AUTO-MCNC: 180 MG/DL (ref 65–100)
GLUCOSE BLD STRIP.AUTO-MCNC: 182 MG/DL (ref 65–100)
GLUCOSE BLD STRIP.AUTO-MCNC: 208 MG/DL (ref 65–100)
GLUCOSE BLD STRIP.AUTO-MCNC: 238 MG/DL (ref 65–100)
GLUCOSE SERPL-MCNC: 185 MG/DL (ref 65–100)
HCT VFR BLD AUTO: 28.8 % (ref 36.6–50.3)
HGB BLD-MCNC: 9.5 G/DL (ref 12.1–17)
MCH RBC QN AUTO: 28.8 PG (ref 26–34)
MCHC RBC AUTO-ENTMCNC: 33 G/DL (ref 30–36.5)
MCV RBC AUTO: 87.3 FL (ref 80–99)
NRBC # BLD: 0 K/UL (ref 0–0.01)
NRBC BLD-RTO: 0 PER 100 WBC
PERFORMED BY, TECHID: ABNORMAL
PLATELET # BLD AUTO: 308 K/UL (ref 150–400)
PMV BLD AUTO: 9.8 FL (ref 8.9–12.9)
POTASSIUM SERPL-SCNC: 4.4 MMOL/L (ref 3.5–5.1)
RBC # BLD AUTO: 3.3 M/UL (ref 4.1–5.7)
SODIUM SERPL-SCNC: 138 MMOL/L (ref 136–145)
WBC # BLD AUTO: 5.8 K/UL (ref 4.1–11.1)

## 2022-11-23 PROCEDURE — 99232 SBSQ HOSP IP/OBS MODERATE 35: CPT | Performed by: PODIATRIST

## 2022-11-23 PROCEDURE — 99232 SBSQ HOSP IP/OBS MODERATE 35: CPT | Performed by: INTERNAL MEDICINE

## 2022-11-23 PROCEDURE — 74011636637 HC RX REV CODE- 636/637: Performed by: INTERNAL MEDICINE

## 2022-11-23 PROCEDURE — 74011250637 HC RX REV CODE- 250/637: Performed by: INTERNAL MEDICINE

## 2022-11-23 PROCEDURE — 65270000029 HC RM PRIVATE

## 2022-11-23 PROCEDURE — 82962 GLUCOSE BLOOD TEST: CPT

## 2022-11-23 PROCEDURE — 74011250636 HC RX REV CODE- 250/636: Performed by: INTERNAL MEDICINE

## 2022-11-23 PROCEDURE — 74011000250 HC RX REV CODE- 250: Performed by: INTERNAL MEDICINE

## 2022-11-23 PROCEDURE — 36415 COLL VENOUS BLD VENIPUNCTURE: CPT

## 2022-11-23 PROCEDURE — 87635 SARS-COV-2 COVID-19 AMP PRB: CPT

## 2022-11-23 PROCEDURE — 77010033678 HC OXYGEN DAILY

## 2022-11-23 PROCEDURE — 80048 BASIC METABOLIC PNL TOTAL CA: CPT

## 2022-11-23 PROCEDURE — 74011000250 HC RX REV CODE- 250: Performed by: STUDENT IN AN ORGANIZED HEALTH CARE EDUCATION/TRAINING PROGRAM

## 2022-11-23 PROCEDURE — 74011250637 HC RX REV CODE- 250/637: Performed by: STUDENT IN AN ORGANIZED HEALTH CARE EDUCATION/TRAINING PROGRAM

## 2022-11-23 PROCEDURE — 85027 COMPLETE CBC AUTOMATED: CPT

## 2022-11-23 PROCEDURE — 94640 AIRWAY INHALATION TREATMENT: CPT

## 2022-11-23 PROCEDURE — 74011000258 HC RX REV CODE- 258: Performed by: INTERNAL MEDICINE

## 2022-11-23 PROCEDURE — 94761 N-INVAS EAR/PLS OXIMETRY MLT: CPT

## 2022-11-23 RX ORDER — HYDROXYZINE PAMOATE 50 MG/1
50 CAPSULE ORAL ONCE
Status: ACTIVE | OUTPATIENT
Start: 2022-11-23 | End: 2022-11-23

## 2022-11-23 RX ORDER — POTASSIUM CHLORIDE 750 MG/1
40 TABLET, FILM COATED, EXTENDED RELEASE ORAL
Status: COMPLETED | OUTPATIENT
Start: 2022-11-23 | End: 2022-11-23

## 2022-11-23 RX ADMIN — IPRATROPIUM BROMIDE AND ALBUTEROL SULFATE 3 ML: 2.5; .5 SOLUTION RESPIRATORY (INHALATION) at 20:44

## 2022-11-23 RX ADMIN — MELATONIN TAB 5 MG 10 MG: 5 TAB at 22:25

## 2022-11-23 RX ADMIN — ATORVASTATIN CALCIUM 40 MG: 40 TABLET, FILM COATED ORAL at 11:28

## 2022-11-23 RX ADMIN — SENNOSIDES AND DOCUSATE SODIUM 1 TABLET: 50; 8.6 TABLET ORAL at 11:29

## 2022-11-23 RX ADMIN — TRAMADOL HYDROCHLORIDE 50 MG: 50 TABLET, COATED ORAL at 03:43

## 2022-11-23 RX ADMIN — POTASSIUM CHLORIDE 40 MEQ: 750 TABLET, FILM COATED, EXTENDED RELEASE ORAL at 11:28

## 2022-11-23 RX ADMIN — CLOPIDOGREL BISULFATE 75 MG: 75 TABLET, FILM COATED ORAL at 11:28

## 2022-11-23 RX ADMIN — IPRATROPIUM BROMIDE AND ALBUTEROL SULFATE 3 ML: 2.5; .5 SOLUTION RESPIRATORY (INHALATION) at 07:47

## 2022-11-23 RX ADMIN — SODIUM CHLORIDE, PRESERVATIVE FREE 10 ML: 5 INJECTION INTRAVENOUS at 22:52

## 2022-11-23 RX ADMIN — INSULIN LISPRO 3 UNITS: 100 INJECTION, SOLUTION INTRAVENOUS; SUBCUTANEOUS at 17:16

## 2022-11-23 RX ADMIN — ASPIRIN 81 MG 81 MG: 81 TABLET ORAL at 11:28

## 2022-11-23 RX ADMIN — PIPERACILLIN SODIUM AND TAZOBACTAM SODIUM 3.38 G: 3; .375 INJECTION, POWDER, LYOPHILIZED, FOR SOLUTION INTRAVENOUS at 11:28

## 2022-11-23 RX ADMIN — CARVEDILOL 12.5 MG: 12.5 TABLET, FILM COATED ORAL at 22:25

## 2022-11-23 RX ADMIN — DOXAZOSIN 1 MG: 2 TABLET ORAL at 22:25

## 2022-11-23 RX ADMIN — IPRATROPIUM BROMIDE AND ALBUTEROL SULFATE 3 ML: 2.5; .5 SOLUTION RESPIRATORY (INHALATION) at 13:39

## 2022-11-23 RX ADMIN — GABAPENTIN 100 MG: 100 CAPSULE ORAL at 22:25

## 2022-11-23 RX ADMIN — PANTOPRAZOLE SODIUM 40 MG: 40 GRANULE, DELAYED RELEASE ORAL at 11:29

## 2022-11-23 RX ADMIN — INSULIN LISPRO 2 UNITS: 100 INJECTION, SOLUTION INTRAVENOUS; SUBCUTANEOUS at 11:49

## 2022-11-23 RX ADMIN — ENOXAPARIN SODIUM 40 MG: 100 INJECTION SUBCUTANEOUS at 11:28

## 2022-11-23 RX ADMIN — PIPERACILLIN SODIUM AND TAZOBACTAM SODIUM 3.38 G: 3; .375 INJECTION, POWDER, LYOPHILIZED, FOR SOLUTION INTRAVENOUS at 16:00

## 2022-11-23 RX ADMIN — ARIPIPRAZOLE 2 MG: 2 TABLET ORAL at 11:28

## 2022-11-23 RX ADMIN — IPRATROPIUM BROMIDE AND ALBUTEROL SULFATE 3 ML: 2.5; .5 SOLUTION RESPIRATORY (INHALATION) at 01:54

## 2022-11-23 RX ADMIN — SODIUM CHLORIDE, PRESERVATIVE FREE 10 ML: 5 INJECTION INTRAVENOUS at 11:51

## 2022-11-23 RX ADMIN — DEXAMETHASONE 4 MG: 4 TABLET ORAL at 11:28

## 2022-11-23 RX ADMIN — CARVEDILOL 12.5 MG: 12.5 TABLET, FILM COATED ORAL at 11:28

## 2022-11-23 RX ADMIN — CASTOR OIL AND BALSAM, PERU: 788; 87 OINTMENT TOPICAL at 22:50

## 2022-11-23 RX ADMIN — POTASSIUM CHLORIDE 20 MEQ: 1.5 POWDER, FOR SOLUTION ORAL at 22:26

## 2022-11-23 RX ADMIN — CASTOR OIL AND BALSAM, PERU: 788; 87 OINTMENT TOPICAL at 11:46

## 2022-11-23 RX ADMIN — LOSARTAN POTASSIUM 50 MG: 50 TABLET, FILM COATED ORAL at 11:29

## 2022-11-23 RX ADMIN — ESCITALOPRAM OXALATE 20 MG: 10 TABLET ORAL at 11:29

## 2022-11-23 NOTE — PROGRESS NOTES
Infectious Disease Progress Note           Subjective:   Pt seen and examined at bedside. Stable, no change in clinical status, remains on ventimask, COVID Ag neg on . No acute events since last seen   Objective:   Physical Exam:     Visit Vitals  BP (!) 116/58 (BP Patient Position: At rest)   Pulse 78   Temp 97.1 °F (36.2 °C)   Resp 22   Ht 5' 9\" (1.753 m)   Wt 137 lb 12.6 oz (62.5 kg)   SpO2 95%   BMI 20.35 kg/m²    O2 Flow Rate (L/min): 15 l/min O2 Device: Ventimask    Temp (24hrs), Av.6 °F (36.4 °C), Min:97.1 °F (36.2 °C), Max:98.5 °F (36.9 °C)    No intake/output data recorded.  190 -  0700  In:    Out: 1950 [Urine:1950]    General: NAD, alert, confused   HEENT: VANGIE, Moist mucosa  Lungs: Clear lungs on exam,  rhonchi   Heart: S1S2+, RRR, no murmur  Abdo: Soft, NT, ND, +BS, + peg tube   Exts: Decreased LUE swelling, stable right great toe ulcer   Skin: b/l leg ulcers, stable ulcerations on b/l feet     Data Review:       Recent Days:  Recent Labs     22  1153 22  0901   WBC 5.8 5.8   HGB 9.5* 9.0*   HCT 28.8* 26.9*    253       Recent Labs     22  1153 22  0901   BUN 14 11   CREA 0.52* 0.54*       Lab Results   Component Value Date/Time    C-Reactive protein 1.64 (H) 2022 10:18 AM        Microbiology     Results       Procedure Component Value Units Date/Time    COVID-19 RAPID TEST [[id number]] Collected: 22    Order Status: Completed Specimen: Nasopharyngeal Updated: 22     COVID-19 rapid test Not Detected        Comment: Rapid Abbott ID Now   The specimen is NEGATIVE for SARS-CoV2, the novel coronavirus associated with COVID-19. A negative result does not rule out COVID-19. This test has been authorized by the FDA under an Emergency Use Authorization (EUA) for use by authorized laboratories.    Fact sheet for Healthcare Providers:  http://www.naomi.maxine/ Fact sheet for Patients: http://www.naomi.maxine/   Methodology: Isothermal Nucleic Acid Amplification                  Diagnostics   CXR Results  (Last 48 hours)                 11/22/22 1219  XR CHEST PORT Final result    Impression:  1. Patchy diffuse bilateral airspace disease and possible trace effusions,   unchanged. Narrative:  EXAM:  XR CHEST PORT       INDICATION: chf       COMPARISON: 11/20/2020. TECHNIQUE: Frontal and lateral views of the chest       FINDINGS: Patchy diffuse bilateral airspace disease. Possible trace effusions. No visualized pneumothorax. Unchanged cardiomediastinal silhouette status post   median sternotomy. Assessment/Plan     Suspected aspiration PNA. Worsening infiltrates on CXR (11/20)         B/l patchy infiltrates on CXR, trace effusions (11/22)         Clear lungs on exam, remains on Ventmask, COVID neg         On day #7 of Zosyn, will d/c Decadron. D/c Zosyn in AM if no reports of worsening infiltrates on CXR         Routine labs and CXR in the morning     Right great toe infection  MRSA isolated from Cx of right great toe         No abscess or osteomyelitis on CT of right foot (11/11)         S/p Vanc, will continue to monitor for acute infection     3. CVA w Small to moderate sized acute infarction in the right occipital lobe with  minimal/small infarction in the right temporal lobe on MRI head (11/16))     5. Dysphagia, S/p peg placement 11/18, on tube feeds, tolerating     6.  Diffuse LUE swelling, some improvement, continue elevation     Dominga Beltrán MD    11/23/2022

## 2022-11-23 NOTE — PROGRESS NOTES
Hospitalist Progress Note    Subjective:   Daily Progress Note: 11/23/2022 1:13 PM    Hospital Course: Carlos Enrique Peng is a 66-year-old male with a PMH of stroke, hypertension, DM, and cirrhosis who presented from nursing home with weakness and hypotension x1 day. Of note recent admission for TIA evaluated and recommended for carotid endarterectomy. Found hypotensive on arrival to the ED. Initial labs significant for WBC of 15.8, creatinine of 1.46, lactic acid of 2.4, and glucose of 134. UA consistent with urinary tract infection. CT of the head with no acute process. Patient admitted for further work-up. Patient started on IV fluids and ceftriaxone. Vascular surgery, cardiology, and podiatry consulted. KRISTI with duplex shows right common femoral artery and proximal superficial femoral artery occluded with collateralization to popliteal artery, left mid to distal superficial artery occluded, with patent distal anterior/posterior tibial artery, popliteal artery patent and with patent distal anterior tibial artery and posterior tibial artery. On 11/9 transferred to ICU overnight for poor responsiveness and for septic shock. CT of the head negative for acute findings. Patient started on meropenem, vancomycin, IV fluids, and Levophed. Pulmonology and ID consulted. ECHO showed EF of 50-55% with mild MV R and dilated left atrium. XR right foot with generalized osseous demineralization without fracture or dislocation, tissue gas or osseous erosive. CT of right foot with no abscess or osteomyelitis. Patient has carotid artery stenosis and supposed to go for surgery but unable to get cardiac clearance because of nuclear stress test he has 90% stenosis of left ICA and 50% stenosis of right ICA. Urine culture grew mixed kristen. Neurology and psychiatry consulted for confusion. MRI 7/2022 showed acute ischemic stroke of the right parietal area.  Repeat MRI brain 11/16/22 showed small to moderate sized acute infarction in the right occipital lobe with minimal/small infarction in the right temporal lobe and severe cerebral atrophy. Continue DAPT and atorvastatin. Patient has had NG tube for nutrition since admission. Speech therapy working with patient. GI consult to evaluate for PEG placement. on 11/15 fever spike of 100.6F. 11/17 CXR with no significant change in basilar predominant opacities. Started on zosyn. PEG placed on 11/18. Patient tolerating PEG tube feedings. MBS positive for penetration on 11/21. SLP recommending NPO. Hospice consulted. Son declining hospice at this time. Oxygen requirements increasing, placed on venti-mask. Subjective:    Patient seen and examined at bedside. Ill-appearing, lethargic. On venti-mask. Unable to respond to questions.     Current Facility-Administered Medications   Medication Dose Route Frequency    hydrOXYzine pamoate (VISTARIL) capsule 50 mg  50 mg Per G Tube ONCE    potassium chloride SR (KLOR-CON 10) tablet 40 mEq  40 mEq Oral NOW    dexAMETHasone (DECADRON) tablet 4 mg  4 mg Oral Q12H    albuterol-ipratropium (DUO-NEB) 2.5 MG-0.5 MG/3 ML  3 mL Nebulization Q6H RT    ARIPiprazole (ABILIFY) tablet 2 mg  2 mg Per G Tube DAILY    aspirin chewable tablet 81 mg  81 mg Per G Tube DAILY    carvediloL (COREG) tablet 12.5 mg  12.5 mg Per G Tube BID    doxazosin (CARDURA) tablet 1 mg  1 mg Per G Tube QHS    escitalopram oxalate (LEXAPRO) tablet 20 mg  20 mg Per G Tube DAILY    gabapentin (NEURONTIN) capsule 100 mg  100 mg Per G Tube QHS    losartan (COZAAR) tablet 50 mg  50 mg Per G Tube DAILY    melatonin tablet 10 mg  10 mg Per G Tube QHS    pantoprazole (PROTONIX) granules for oral suspension 40 mg  40 mg Per G Tube DAILY    potassium chloride (KLOR-CON) packet for solution 20 mEq  20 mEq Per G Tube BID    senna-docusate (PERICOLACE) 8.6-50 mg per tablet 1 Tablet  1 Tablet Per G Tube DAILY    clopidogreL (PLAVIX) tablet 75 mg  75 mg PEG Tube DAILY    atorvastatin (LIPITOR) tablet 40 mg  40 mg Per G Tube DAILY    albuterol-ipratropium (DUO-NEB) 2.5 MG-0.5 MG/3 ML  3 mL Nebulization Q6H PRN    balsam peru-castor oiL (VENELEX) ointment   Topical BID    piperacillin-tazobactam (ZOSYN) 3.375 g in 0.9% sodium chloride (MBP/ADV) 100 mL MBP  3.375 g IntraVENous Q8H    hydrALAZINE (APRESOLINE) 20 mg/mL injection 20 mg  20 mg IntraVENous Q6H PRN    baclofen (LIORESAL) tablet 5 mg  5 mg Per NG tube BID PRN    polyethylene glycol (MIRALAX) packet 17 g  17 g Per NG tube DAILY PRN    traMADoL (ULTRAM) tablet 50 mg  50 mg Per NG tube Q6H PRN    sodium chloride (NS) flush 5-40 mL  5-40 mL IntraVENous Q8H    sodium chloride (NS) flush 5-40 mL  5-40 mL IntraVENous PRN    acetaminophen (TYLENOL) tablet 650 mg  650 mg Oral Q6H PRN    Or    acetaminophen (TYLENOL) suppository 650 mg  650 mg Rectal Q6H PRN    ondansetron (ZOFRAN ODT) tablet 4 mg  4 mg Oral Q8H PRN    Or    ondansetron (ZOFRAN) injection 4 mg  4 mg IntraVENous Q6H PRN    enoxaparin (LOVENOX) injection 40 mg  40 mg SubCUTAneous DAILY    glucose chewable tablet 16 g  4 Tablet Oral PRN    glucagon (GLUCAGEN) injection 1 mg  1 mg IntraMUSCular PRN    dextrose 10% infusion 0-250 mL  0-250 mL IntraVENous PRN    insulin lispro (HUMALOG) injection   SubCUTAneous AC&HS        Review of Systems  Unable to perform ROS: Acuity of condition       Objective:     Visit Vitals  BP (!) 116/58 (BP Patient Position: At rest)   Pulse 78   Temp 97.1 °F (36.2 °C)   Resp 22   Ht 5' 9\" (1.753 m)   Wt 62.5 kg (137 lb 12.6 oz)   SpO2 95%   BMI 20.35 kg/m²    O2 Flow Rate (L/min): 15 l/min O2 Device: Ventimask    Temp (24hrs), Av.6 °F (36.4 °C), Min:97.1 °F (36.2 °C), Max:98.5 °F (36.9 °C)      No intake/output data recorded. 1901 -  0700  In:    Out:  [Urine:1950]    PHYSICAL EXAM:  Constitutional:  ill-appearing, lethargic male. In mild distress. Skin: Generalized pallor. Scabs seen throughout lower extremities. HEENT: Sclerae anicteric. PERRL.  No oral ulcers. The neck is supple and no masses. Cardiovascular: Regular rate and rhythm. +S1/S2. No murmur or gallop. Respiratory:  Decreased air entry at bases with coarse rhonchi bilaterally. On venti-mask. GI:  PEG tube in place. Abdomen nondistended, soft, and nontender. Normal active bowel sounds. Rectal: Deferred   Musculoskeletal: No pitting edema of the lower legs. Able to move all ext  Neurological:  Patient is disoriented. Psychiatric: Unable to assess       Data Review    Recent Results (from the past 24 hour(s))   GLUCOSE, POC    Collection Time: 11/22/22 11:52 AM   Result Value Ref Range    Glucose (POC) 147 (H) 65 - 100 mg/dL    Performed by Ardell Plants    BLOOD GAS, ARTERIAL    Collection Time: 11/22/22  2:17 PM   Result Value Ref Range    pH 7.53 (H) 7.35 - 7.45      PCO2 35 35 - 45 mmHg    PO2 74 (L) 80 - 100 mmHg    O2 SATURATION 94 (L) 95 - 99 %    BICARBONATE 28 (H) 22 - 26 mmol/L    BASE EXCESS 5.5 (H) 0 - 3 mmol/L    O2 METHOD VENTURI MASK      O2 FLOW RATE 15.00 L/min    FIO2 50 %    Sample source Arterial      SITE Left Radial      BRITNEY'S TEST YES      Carboxy-Hgb 0.1 (L) 1 - 2 %    Methemoglobin 0.2 0 - 1.4 %    Oxyhemoglobin 94.1 (L) 95 - 99 %    Performed by Oval Span     TEMPERATURE 98.6     GLUCOSE, POC    Collection Time: 11/22/22  4:35 PM   Result Value Ref Range    Glucose (POC) 130 (H) 65 - 100 mg/dL    Performed by Emelia Oliva    GLUCOSE, POC    Collection Time: 11/22/22  7:39 PM   Result Value Ref Range    Glucose (POC) 154 (H) 65 - 100 mg/dL    Performed by Emelia Oliva    COVID-19 RAPID TEST    Collection Time: 11/23/22  2:53 AM   Result Value Ref Range    COVID-19 rapid test Not Detected Not Detected     GLUCOSE, POC    Collection Time: 11/23/22  8:26 AM   Result Value Ref Range    Glucose (POC) 238 (H) 65 - 100 mg/dL    Performed by Nataly Galdamez        XR CHEST PORT   Final Result   1.   Patchy diffuse bilateral airspace disease and possible trace effusions, unchanged. XR SWALLOW FUNC VIDEO   Final Result   Penetration as described above. XR CHEST PORT   Final Result   Worsening diffuse airspace disease with areas of increasing consolidation. XR CHEST PORT   Final Result   Interval advancement of nasogastric tube, although tip not included on this   study. Evaluation of nasogastric tube position is typically better performed   with KUB. XR CHEST PORT   Final Result   1. No significant change in basilar predominant opacities. These are   nonspecific but can be seen with aspiration pneumonitis/pneumonia in the   appropriate setting. 2.  Enteric tube tip projects just past the gastroesophageal junction with   proximal sidehole in the distal esophagus. Consider advancing. MRI BRAIN WO CONT   Final Result   Small to moderate sized acute infarction in the right occipital lobe with   minimal/small infarction in the right temporal lobe. Severe cerebral atrophy. There is no intracranial mass, hemorrhage. There are numerous scattered chronic infarcts. No additional acute intracranial process is demonstrated. XR CHEST PORT   Final Result   Interval retraction of nasogastric tube with sidehole over the gastroesophageal   junction. XR CHEST PORT   Final Result   Interval replacement of nasogastric tube, with tip over the gastric fundus. XR CHEST PORT   Final Result   No significant change. XR CHEST PORT   Final Result   1. NG tube terminates in the gastric fundus, with possible kink in the side   hole. Correlate with function. 2.  New patchy consolidation in the right midlung is concerning for pneumonia. Grossly unchanged patchy retrocardiac opacities. Stable trace right pleural   effusion. XR CHEST PORT   Final Result   Nasogastric tube tip advanced overlying the gastric body. XR CHEST PORT   Final Result   Nasogastric tube tip over the gastric fundus.       XR CHEST PORT   Final Result Increased mild interstitial pulmonary edema versus pneumonia. Enteric tube extends into the gastric antrum or first portion of the duodenum. Consider retraction 8-10 cm if the intention is gastric luminal termination. CT FOOT RT W CONT   Final Result      1. No abscess or osteomyelitis. 2. No fracture. DUPLEX LOWER EXT ARTERY BILAT   Final Result      XR FOOT RT MIN 3 V   Final Result   No osseous erosion or soft tissue gas. .      XR CHEST PORT   Final Result      Mild interstitial opacities which may represent mild pulmonary edema unchanged         XR CHEST PORT   Final Result   Addendum (preliminary) 1 of 1   Addendum: NG tube is in appropriate position tip extends below the    diaphragm. Right IJ catheter. No pneumothorax on the right. Final   1. Status post right IJ catheter placement without evidence of complication. IR INSERT NON TUNL CVC OVER 5 YRS   Final Result   Technically successful ultrasound guided placement of a right internal jugular   vein temporary central venous catheter. A post procedure chest x-ray is   pending. CT HEAD WO CONT   Final Result   No acute process or change compared to the prior exam.            XR CHEST PORT   Final Result   No acute process.       IR Jaylen    (Results Pending)       Active Problems:    CVA (cerebral vascular accident) ([address] (7/31/2022)      Carotid stenosis (8/2/2022)      UTI (urinary tract infection) (11/8/2022)      Septic shock ([address] (11/20/2022)      Infection of right great toe due to methicillin resistant Staphylococcus aureus (MRSA) (37/99/3315)      Metabolic encephalopathy (65/86/3223)      Severe protein-calorie malnutrition ([address] (11/20/2022)      Dysphagia (11/20/2022)      Acute respiratory failure with hypoxia ([address] (11/20/2022)      PAD (peripheral artery disease) ([address] (11/20/2022)      Assessment/Plan:     SIRS, suspected aspiration PNA  Tmax 100.6F, WBC WNLs, CXR with no significant change in basilar predominant opacities   Continue on Zosyn #7/7  ID following     Severe sepsis with septic shock - resolved  2/2 UTI and right great toe ulcer  S/p vancomycin and meropenem  11/8 blood: no growth     Great right toe infection  11/9 wound: MRSA  XR of right foot without fracture, dislocation, soft tissue gas, or osseous erosion  CT of right foot with no abscess or osteomyelitis  S/p vancomycin for 7 days  Continue routine wound care  ID and podiatry following    Metabolic encephalopathy - improving     Severe protein calorie malnutrition  Status post PEG tube  GI following    Dysphagia  SLP recommending npo  MBS positive for penetration/aspiration     Acute respiratory failure with hypoxia   Aspiration pneumonia  CXR (11/20) shows worsening diffuse airspace disease with areas of increasing consolidation  Currently on venti-mask  Blood gas pending  Will give IV furosemide 40 mg x 1  On IV Zosyn  Pulmonary following    Urinary tract infection  11/8 urine: mixed kristen     Hypertension  Continue on carvedilol, furosemide, and losartan    CVA  Carotid stenosis   History of old infarcts  Repeat MRI brain 11/16/22 showed small to moderate sized acute infarction in the right occipital lobe with minimal/small infarction in the right temporal lobe and severe cerebral atrophy  Continue on DAPT and atorvastatin  Vascular and neurology following     Multiple chronic wounds  PAD  Poor arterial flow and nutritional status complicates wound healing  KRISTI with duplex shows right common femoral artery and proximal superficial femoral artery occluded with collateralization to popliteal artery, left mid to distal superficial artery occluded, with patent distal anterior/posterior tibial and popliteal arteries   Vascular was following outpatient for endarterectomy possibly with intervention for this?       DVT Prophylaxis: Lovenox   GI Prophylaxis: Protonix  Code Status: FULL CODE  POA:    Discharge Barriers:   - Respiratory status     Care Plan discussed with: patient and nursing. Son declined hospice at this time. Spoke to son about code status and end of life goals. He would like to keep patient FULL CODE. Strongly encouraged him to speak with his siblings. Overall prognosis poor. Total time spent with patient: >35 minutes.

## 2022-11-23 NOTE — PROGRESS NOTES
[facility]      Progress Note    Date:2022       Room:[facility]  Patient Name:Stanislaw Stephens     Date of Birth:     Age:71 y.o. Subjective    Subjective   Pt seen at [facility]. Resting comfortably. Per nursing, no acute overnight events. Review of Systems  Unable to accurately obtain due to current medical condition    Objective         Vitals Last 24 Hours:  TEMPERATURE:  Temp  Av.6 °F (36.4 °C)  Min: 97.1 °F (36.2 °C)  Max: 98.5 °F (36.9 °C)  RESPIRATIONS RANGE: Resp  Av.7  Min: 20  Max: 22  PULSE OXIMETRY RANGE: SpO2  Av.5 %  Min: 95 %  Max: 97 %  PULSE RANGE: Pulse  Av  Min: 73  Max: 78  BLOOD PRESSURE RANGE: Systolic (64OMI), VTN:307 , Min:116 , SFS:417   ; Diastolic (79IEP), OBQ:31, Min:55, Max:63    I/O (24Hr): Intake/Output Summary (Last 24 hours) at 2022 1011  Last data filed at 2022 0448  Gross per 24 hour   Intake 1050 ml   Output 650 ml   Net 400 ml     Objective  GEN: Pt is in NAD. Dressing noted to B/L LE. Bunny boots noted to B/L LE in proper placement. No family noted at [facility]  DERM: Ulcer noted to the dorsal aspect of the right hallux. No fluctuance noted. Wound base right hallux is 100% eschar. Eschar noted to the right fifth toe. VASC: Pedal pulses (DP/PT) diminished to B/L LE. CFT<3sec to all digits of B/L LE. No pedal hair growth noted to the level of the digits for B/L LE. Skin temp is warm to cool from proximal to distal for B/L LE. Neg homans/brain signs to B/L LE. No varicosities or telangectasias noted to B/L LE.  NEURO: Protective and epicritic sensations absent intact to B/L LE  MSK: (-) POP, No gross deformities. Decreased muscle tone and bulk noted to B/L LE.     Labs/Imaging/Diagnostics    Labs:  CBC:  Recent Labs     22  0901 22  1134   WBC 5.8 6.7   RBC 3.12* 3.32*   HGB 9.0* 9.5*   HCT 26.9* 29.8*   MCV 86.2 89.8   RDW 17.1* 17.0*    232     CHEMISTRIES:  Recent Labs     2201 11/20/22  1134    141   K 3.4* 3.6    109*   CO2 25 22   BUN 11 10   CA 7.9* 7.6*   PHOS 2.7  --    PT/INR:No results for input(s): INR, INREXT in the last 72 hours. No lab exists for component: PROTIME  APTT:No results for input(s): APTT in the last 72 hours. LIVER PROFILE:No results for input(s): AST, ALT in the last 72 hours. No lab exists for component: BILIDIR, BILITOT, ALKPHOS  Lab Results   Component Value Date/Time    ALT (SGPT) 86 (H) 11/16/2022 03:52 AM    AST (SGOT) 87 (H) 11/16/2022 03:52 AM    Alk. phosphatase 61 11/16/2022 03:52 AM    Bilirubin, direct 0.1 11/16/2022 03:52 AM    Bilirubin, total 0.5 11/16/2022 03:52 AM       Imaging Last 24 Hours:  XR CHEST PORT    Result Date: 11/22/2022  EXAM:  XR CHEST PORT INDICATION: chf COMPARISON: 11/20/2020. TECHNIQUE: Frontal and lateral views of the chest FINDINGS: Patchy diffuse bilateral airspace disease. Possible trace effusions. No visualized pneumothorax. Unchanged cardiomediastinal silhouette status post median sternotomy. 1.  Patchy diffuse bilateral airspace disease and possible trace effusions, unchanged.     Assessment//Plan   Active Problems:    CVA (cerebral vascular accident) ([address] (7/31/2022)      Carotid stenosis (8/2/2022)      UTI (urinary tract infection) (11/8/2022)      Septic shock ([address] (11/20/2022)      Infection of right great toe due to methicillin resistant Staphylococcus aureus (MRSA) (37/24/3469)      Metabolic encephalopathy (34/77/8066)      Severe protein-calorie malnutrition ([address] (11/20/2022)      Dysphagia (11/20/2022)      Acute respiratory failure with hypoxia ([address] (11/20/2022)      PAD (peripheral artery disease) ([address] (11/20/2022)      Assessment & Plan    Right hallux ulcer  DM T2  PAD        Pt seen and evaluated at [facility]  - Current labs and diagnostics personally reviewed  - Cont current wound care (betadine WTD daily) with strict offloading while in bed with bunny boots  - Abx per ID  - From podiatry standpoint, pt stable for DC with outpatient follow up in our office  - We will continue to follow patient and update recs as needed while pt still in house              Rachele Avila.  Anya Joseph DPM, [facility], [address] [place] [address] and Foot Surgery  [facility], [address] [place] [address]  O: [phone]  F: [phone]     * [employer] available 24/7    Electronically signed by Rosalie Ortiz DPM on 11/23/2022 at 10:11 AM

## 2022-11-23 NOTE — PROGRESS NOTES
Comprehensive Nutrition Assessment    Type and Reason for Visit: Reassess (Early Goal, TF.)    Nutrition Recommendations/Plan:   NPO, advance per SLP rec's when medically appropriate. Consider Probiotics. Continue TF via PEG as Jevity 1.5 Michael at goal rate of 50 mL/hr. Continue water flushes to 160 mL Q4H via PEG. Provides 1800 kcal(96%), 77 gm PRO(101%), 1872 mL Fluids (100%). Provide Banatrol 1 pkt/day via PEG [45 kcal (98%), 2 gm PRO(104%)]. Continue to monitor and document TF rate/flushes, tolerances, BMs in I/Os. Malnutrition Assessment:  Malnutrition Status: Moderate malnutrition (11/23/22 1212)    Context:  Acute illness     Findings of the 6 clinical characteristics of malnutrition:   Energy Intake:  No significant decrease in energy intake  Weight Loss:  Greater than 7.5% over 3 months     Body Fat Loss:  Unable to assess,     Muscle Mass Loss:  Unable to assess,    Fluid Accumulation:  Mild, Extremities   Strength:  Not performed     Nutrition Assessment:    Admitted for UTI. Pt reported w/ improved alertness; however, not appropriate for PO diet during visit(unable to stay awake during RD visit). RD rec's NPO, SLP consult when PO diet appropriate. Continue IVF+D5 in interim. (11/10) RD consulted for TF rec's. NGT placed. (11/15) Transferred to medical floor. TF running and tolerated at goal rate. Noted plans to convert to PEG. RD to provide regimen above. (11/17) TF held for rescheduled again for PEG placement; now rescheduled to tomorrow. TF tolerated at goal rate as Jevity 1.5 Michael. Continue current regimen when PEG placed. (11/23) TF running at rate of 60 mL/hr during visit. RD spoke w/ RN, to be adjusted to goal rate. Noted +C despite high fiber TF and bowel regimen in place. RD rec'd adding Probiotics and monitoring need for TF adjustment. Labs: POC -238 mg/dL. Meds: PPI, KCl, pericolace, decadron, coreg, lipitor, losartan, cardura, zosyn.     Nutrition Related Findings:    NFPE w/ unsuccessful reattempt at visit. No N/V/D reported. Last BM 11/18, +C despite bowel regimen and high fiber TF. Add probiotics. Edema unimproved: +3 pitting LUE, and +2 pitting RUE edema. Wound Type: Pressure injury, Multiple, Diabetic ulcer, Stage I, Skin tears (DTI- Sacrum; St I- R. Heel; Multiple DM ulcers; Skin tears- L. Buttock and L. cheek.)     Current Nutrition Intake & Therapies:  Average Meal Intake: NPO  Average Supplement Intake: NPO  DIET NPO  ADULT TUBE FEEDING Nasogastric; Standard with Fiber; Delivery Method: Continuous; Continuous Initial Rate (mL/hr): 20; Continuous Advance Tube Feeding: Yes; Advancement Volume (mL/hr): 10; Advancement Frequency: Q 8 hours; Continuous Goal Rate (mL. .. Anthropometric Measures:  Height: 5' 9\" (175.3 cm)  Ideal Body Weight (IBW): 160 lbs (73 kg)  Current Body Wt:  62.8 kg (138 lb 7.2 oz) (11/10), 86.5 % IBW.  Bed scale  Current BMI (kg/m2): 20.4  Usual Body Weight: 84.4 kg (186 lb)  % Weight Change (Calculated): -25.6  Weight Adjustment: No adjustment  BMI Category: Underweight (BMI less than 22) age over 72    Estimated Daily Nutrient Needs:  Energy Requirements Based On: Kcal/kg  Weight Used for Energy Requirements: Current  Energy (kcal/day): 1424-4580 kcal/d  (30-35 kcal/kg)  Weight Used for Protein Requirements: Current  Protein (g/day): 76 gm/d  Method Used for Fluid Requirements: 1 ml/kcal  Fluid (ml/day): 7830-1206 mL/d    Nutrition Diagnosis:   Inadequate oral intake related to cognitive or neurological impairment, swallowing difficulty as evidenced by NPO or clear liquid status due to medical condition, nutrition support-enteral nutrition, weight loss  Moderate malnutrition, In context of acute illness or injury related to swallowing difficulty, cognitive or neurological impairment as evidenced by NPO or clear liquid status due to medical condition, nutrition support-enteral nutrition, localized or generalized fluid accumulation, weight loss 7.5% in 3 months    Nutrition Interventions:   Food and/or Nutrient Delivery: Continue NPO, Continue tube feeding  Nutrition Education/Counseling: No recommendations at this time  Coordination of Nutrition Care: Continue to monitor while inpatient  Plan of Care discussed with: RN    Goals:  Previous Goal Met: Goal(s) achieved  Goals: Meet at least 75% of estimated needs, Tolerate nutrition support at goal rate, within 7 days, other (specify)  Specify Other Goals: Wt maintenance of +/- 0.5 kg x7 days. Nutrition Monitoring and Evaluation:   Behavioral-Environmental Outcomes: None identified  Food/Nutrient Intake Outcomes: Enteral nutrition intake/tolerance  Physical Signs/Symptoms Outcomes: Biochemical data, Constipation, Weight, Fluid status or edema, Skin    Discharge Planning: Too soon to determine    Srini Paul RD  Contact: ext. [phone].

## 2022-11-23 NOTE — PROGRESS NOTES
Problem: Falls - Risk of  Goal: *Absence of Falls  Description: Document Joana Miles Fall Risk and appropriate interventions in the flowsheet.   Outcome: Progressing Towards Goal  Note: Fall Risk Interventions:       Mentation Interventions: Bed/chair exit alarm    Medication Interventions: Bed/chair exit alarm    Elimination Interventions: Call light in reach              Problem: Patient Education: Go to Patient Education Activity  Goal: Patient/Family Education  Outcome: Progressing Towards Goal     Problem: Discharge Planning  Goal: *Discharge to safe environment  Outcome: Progressing Towards Goal  Goal: *Knowledge of medication management  Outcome: Progressing Towards Goal  Goal: *Knowledge of discharge instructions  Outcome: Progressing Towards Goal     Problem: Patient Education: Go to Patient Education Activity  Goal: Patient/Family Education  Outcome: Progressing Towards Goal     Problem: Sepsis: Day of Diagnosis  Goal: Off Pathway (Use only if patient is Off Pathway)  Outcome: Progressing Towards Goal  Goal: *Fluid resuscitation  Outcome: Progressing Towards Goal  Goal: *Paired blood cultures prior to first dose of antibiotic  Outcome: Progressing Towards Goal  Goal: *First dose of  appropriate antibiotic within 3 hours of arrival to ED, within 1 hour of arrival to ICU  Outcome: Progressing Towards Goal  Goal: *Lactic acid with first set of blood cultures  Outcome: Progressing Towards Goal  Goal: *Pneumococcal immunization (if eligible)  Outcome: Progressing Towards Goal  Goal: *Influenza immunization (if eligible)  Outcome: Progressing Towards Goal  Goal: Activity/Safety  Outcome: Progressing Towards Goal  Goal: Consults, if ordered  Outcome: Progressing Towards Goal  Goal: Diagnostic Test/Procedures  Outcome: Progressing Towards Goal  Goal: Nutrition/Diet  Outcome: Progressing Towards Goal  Goal: Discharge Planning  Outcome: Progressing Towards Goal  Goal: Medications  Outcome: Progressing Towards Goal  Goal: Respiratory  Outcome: Progressing Towards Goal  Goal: Treatments/Interventions/Procedures  Outcome: Progressing Towards Goal  Goal: Psychosocial  Outcome: Progressing Towards Goal     Problem: Sepsis: Day 2  Goal: Off Pathway (Use only if patient is Off Pathway)  Outcome: Progressing Towards Goal  Goal: *Central Venous Pressure maintained at 8-12 mm Hg  Outcome: Progressing Towards Goal  Goal: *Hemodynamically stable  Outcome: Progressing Towards Goal  Goal: *Tolerating diet  Outcome: Progressing Towards Goal  Goal: Activity/Safety  Outcome: Progressing Towards Goal  Goal: Consults, if ordered  Outcome: Progressing Towards Goal  Goal: Diagnostic Test/Procedures  Outcome: Progressing Towards Goal  Goal: Nutrition/Diet  Outcome: Progressing Towards Goal  Goal: Discharge Planning  Outcome: Progressing Towards Goal  Goal: Medications  Outcome: Progressing Towards Goal  Goal: Respiratory  Outcome: Progressing Towards Goal  Goal: Treatments/Interventions/Procedures  Outcome: Progressing Towards Goal  Goal: Psychosocial  Outcome: Progressing Towards Goal     Problem: Sepsis: Day 3  Goal: Off Pathway (Use only if patient is Off Pathway)  Outcome: Progressing Towards Goal  Goal: *Central Venous Pressure maintained at 8-12 mm Hg  Outcome: Progressing Towards Goal  Goal: *Oxygen saturation within defined limits  Outcome: Progressing Towards Goal  Goal: *Vital sign stability  Outcome: Progressing Towards Goal  Goal: *Tolerating diet  Outcome: Progressing Towards Goal  Goal: *Demonstrates progressive activity  Outcome: Progressing Towards Goal  Goal: Activity/Safety  Outcome: Progressing Towards Goal  Goal: Consults, if ordered  Outcome: Progressing Towards Goal  Goal: Diagnostic Test/Procedures  Outcome: Progressing Towards Goal  Goal: Nutrition/Diet  Outcome: Progressing Towards Goal  Goal: Discharge Planning  Outcome: Progressing Towards Goal  Goal: Medications  Outcome: Progressing Towards Goal  Goal: Respiratory  Outcome: Progressing Towards Goal  Goal: Treatments/Interventions/Procedures  Outcome: Progressing Towards Goal  Goal: Psychosocial  Outcome: Progressing Towards Goal     Problem: Sepsis: Day 4  Goal: Off Pathway (Use only if patient is Off Pathway)  Outcome: Progressing Towards Goal  Goal: Activity/Safety  Outcome: Progressing Towards Goal  Goal: Consults, if ordered  Outcome: Progressing Towards Goal  Goal: Diagnostic Test/Procedures  Outcome: Progressing Towards Goal  Goal: Nutrition/Diet  Outcome: Progressing Towards Goal  Goal: Discharge Planning  Outcome: Progressing Towards Goal  Goal: Medications  Outcome: Progressing Towards Goal  Goal: Respiratory  Outcome: Progressing Towards Goal  Goal: Treatments/Interventions/Procedures  Outcome: Progressing Towards Goal  Goal: Psychosocial  Outcome: Progressing Towards Goal  Goal: *Oxygen saturation within defined limits  Outcome: Progressing Towards Goal  Goal: *Hemodynamically stable  Outcome: Progressing Towards Goal  Goal: *Vital signs within defined limits  Outcome: Progressing Towards Goal  Goal: *Tolerating diet  Outcome: Progressing Towards Goal  Goal: *Demonstrates progressive activity  Outcome: Progressing Towards Goal  Goal: *Fluid volume maintenance  Outcome: Progressing Towards Goal     Problem: Sepsis: Day 5  Goal: Off Pathway (Use only if patient is Off Pathway)  Outcome: Progressing Towards Goal  Goal: *Oxygen saturation within defined limits  Outcome: Progressing Towards Goal  Goal: *Vital signs within defined limits  Outcome: Progressing Towards Goal  Goal: *Tolerating diet  Outcome: Progressing Towards Goal  Goal: *Demonstrates progressive activity  Outcome: Progressing Towards Goal  Goal: *Discharge plan identified  Outcome: Progressing Towards Goal  Goal: Activity/Safety  Outcome: Progressing Towards Goal  Goal: Consults, if ordered  Outcome: Progressing Towards Goal  Goal: Diagnostic Test/Procedures  Outcome: Progressing Towards Goal  Goal: Nutrition/Diet  Outcome: Progressing Towards Goal  Goal: Discharge Planning  Outcome: Progressing Towards Goal  Goal: Medications  Outcome: Progressing Towards Goal  Goal: Respiratory  Outcome: Progressing Towards Goal  Goal: Treatments/Interventions/Procedures  Outcome: Progressing Towards Goal  Goal: Psychosocial  Outcome: Progressing Towards Goal     Problem: Falls - Risk of  Goal: *Absence of Falls  Description: Document Reese Fall Risk and appropriate interventions in the flowsheet.   Outcome: Progressing Towards Goal  Note: Fall Risk Interventions:       Mentation Interventions: Bed/chair exit alarm    Medication Interventions: Bed/chair exit alarm    Elimination Interventions: Call light in reach

## 2022-11-23 NOTE — PROGRESS NOTES
IMPRESSION:   Acute hypoxic respiratory failure had resolved now back on nasal oxygen   Diffuse pulmonary infiltrates questionable pulmonary edema versus chronic aspiration  Leukocytosis resolved  Carotid artery disease history of CVA  Hypokalemia      RECOMMENDATIONS/PLAN:   20-year-old male nursing home resident admitted with hypotension and unresponsiveness now he is alert awake agitated now he is on 50% Ventimask alert awake talking following command still in distress  Worsening chest x-ray and hypoxia currently receiving IV fluids received 2 doses Lasix and hold IV fluid BNP 7000  11/10 Echo ejection fraction 50% IVC dilated left atrium 5 cm RVSP 59  Pulmonary hypertension with probable cor pulmonale  Chest x-ray shows fluid overload congestive changes   Potassium corrected     [x] High complexity decision making was performed  [x] See my orders for details  HPI  20-year-old nursing home resident came in because of unresponsiveness he had a history of stroke in the past patient was hypotensive hypoxic rapid response was called he was admitted to the floor initially received IV fluid hemodynamically unstable started on vasopressors Levophed transferred to ICU White count was elevated he was put on oxygen 4 L nasal cannula unable to get any started the patient he has a right foot wound and multiple wounds of the lower extremities dressing in place. He has carotid artery stenosis only supposed to go for surgery but unable to get cardiac clearance because of nuclear stress test he has 90% stenosis of left ICA and 50% stenosis of right ICA. So now he is admitted to ICU and critical care consult was called  PMH:  has a past medical history of Cirrhosis ([facility] [address] Diabetes ([facility] [address] Hypertension, and Stroke ([facility] [address] PSH:   has a past surgical history that includes hx back surgery; hx gi; and ir insert non tunl cvc over 5 yrs (11/9/2022). FHX: family history includes Heart Disease in his father.      SHX:  reports that he has quit smoking. He has never used smokeless tobacco. He reports that he does not currently use alcohol. He reports that he does not currently use drugs.     ALL: No Known Allergies     MEDS:   [x] Reviewed - As Below   [] Not reviewed    Current Facility-Administered Medications   Medication    hydrOXYzine pamoate (VISTARIL) capsule 50 mg    dexAMETHasone (DECADRON) tablet 4 mg    albuterol-ipratropium (DUO-NEB) 2.5 MG-0.5 MG/3 ML    ARIPiprazole (ABILIFY) tablet 2 mg    aspirin chewable tablet 81 mg    carvediloL (COREG) tablet 12.5 mg    doxazosin (CARDURA) tablet 1 mg    escitalopram oxalate (LEXAPRO) tablet 20 mg    gabapentin (NEURONTIN) capsule 100 mg    losartan (COZAAR) tablet 50 mg    melatonin tablet 10 mg    pantoprazole (PROTONIX) granules for oral suspension 40 mg    potassium chloride (KLOR-CON) packet for solution 20 mEq    senna-docusate (PERICOLACE) 8.6-50 mg per tablet 1 Tablet    clopidogreL (PLAVIX) tablet 75 mg    atorvastatin (LIPITOR) tablet 40 mg    albuterol-ipratropium (DUO-NEB) 2.5 MG-0.5 MG/3 ML    balsam peru-castor oiL (VENELEX) ointment    piperacillin-tazobactam (ZOSYN) 3.375 g in 0.9% sodium chloride (MBP/ADV) 100 mL MBP    hydrALAZINE (APRESOLINE) 20 mg/mL injection 20 mg    baclofen (LIORESAL) tablet 5 mg    polyethylene glycol (MIRALAX) packet 17 g    traMADoL (ULTRAM) tablet 50 mg    sodium chloride (NS) flush 5-40 mL    sodium chloride (NS) flush 5-40 mL    acetaminophen (TYLENOL) tablet 650 mg    Or    acetaminophen (TYLENOL) suppository 650 mg    ondansetron (ZOFRAN ODT) tablet 4 mg    Or    ondansetron (ZOFRAN) injection 4 mg    enoxaparin (LOVENOX) injection 40 mg    glucose chewable tablet 16 g    glucagon (GLUCAGEN) injection 1 mg    dextrose 10% infusion 0-250 mL    insulin lispro (HUMALOG) injection      MAR reviewed and pertinent medications noted or modified as needed   Current Facility-Administered Medications   Medication    hydrOXYzine pamoate (VISTARIL) capsule 50 mg    dexAMETHasone (DECADRON) tablet 4 mg    albuterol-ipratropium (DUO-NEB) 2.5 MG-0.5 MG/3 ML    ARIPiprazole (ABILIFY) tablet 2 mg    aspirin chewable tablet 81 mg    carvediloL (COREG) tablet 12.5 mg    doxazosin (CARDURA) tablet 1 mg    escitalopram oxalate (LEXAPRO) tablet 20 mg    gabapentin (NEURONTIN) capsule 100 mg    losartan (COZAAR) tablet 50 mg    melatonin tablet 10 mg    pantoprazole (PROTONIX) granules for oral suspension 40 mg    potassium chloride (KLOR-CON) packet for solution 20 mEq    senna-docusate (PERICOLACE) 8.6-50 mg per tablet 1 Tablet    clopidogreL (PLAVIX) tablet 75 mg    atorvastatin (LIPITOR) tablet 40 mg    albuterol-ipratropium (DUO-NEB) 2.5 MG-0.5 MG/3 ML    balsam peru-castor oiL (VENELEX) ointment    piperacillin-tazobactam (ZOSYN) 3.375 g in 0.9% sodium chloride (MBP/ADV) 100 mL MBP    hydrALAZINE (APRESOLINE) 20 mg/mL injection 20 mg    baclofen (LIORESAL) tablet 5 mg    polyethylene glycol (MIRALAX) packet 17 g    traMADoL (ULTRAM) tablet 50 mg    sodium chloride (NS) flush 5-40 mL    sodium chloride (NS) flush 5-40 mL    acetaminophen (TYLENOL) tablet 650 mg    Or    acetaminophen (TYLENOL) suppository 650 mg    ondansetron (ZOFRAN ODT) tablet 4 mg    Or    ondansetron (ZOFRAN) injection 4 mg    enoxaparin (LOVENOX) injection 40 mg    glucose chewable tablet 16 g    glucagon (GLUCAGEN) injection 1 mg    dextrose 10% infusion 0-250 mL    insulin lispro (HUMALOG) injection      PMH:  has a past medical history of Cirrhosis ([address] Diabetes ([address] Hypertension, and Stroke ([facility] [address] PSH:   has a past surgical history that includes hx back surgery; hx gi; and ir insert non tunl cvc over 5 yrs (11/9/2022). FHX: family history includes Heart Disease in his father. SHX:  reports that he has quit smoking. He has never used smokeless tobacco. He reports that he does not currently use alcohol. He reports that he does not currently use drugs.      ROS:Review of systems not obtained due to patient factors. Hemodynamics:    CO:    CI:    CVP:    SVR:   PAP Systolic:    PAP Diastolic:    PVR:    QB57:        Ventilator Settings:      Mode Rate TV Press PEEP FiO2 PIP Min. Vent               50 %              Vital Signs: Telemetry:    normal sinus rhythm Intake/Output:   Visit Vitals  BP (!) 116/58 (BP Patient Position: At rest)   Pulse 78   Temp 97.1 °F (36.2 °C)   Resp 22   Ht 5' 9\" (1.753 m)   Wt 62.5 kg (137 lb 12.6 oz)   SpO2 95%   BMI 20.35 kg/m²       Temp (24hrs), Av.6 °F (36.4 °C), Min:97.1 °F (36.2 °C), Max:98.5 °F (36.9 °C)        O2 Device: Ventimask O2 Flow Rate (L/min): 15 l/min       Wt Readings from Last 4 Encounters:   11/10/22 62.5 kg (137 lb 12.6 oz)   22 84.8 kg (187 lb)   22 84.8 kg (187 lb)   10/28/20 99.8 kg (220 lb)          Intake/Output Summary (Last 24 hours) at 2022 1020  Last data filed at 2022 0448  Gross per 24 hour   Intake 1050 ml   Output 650 ml   Net 400 ml         Last shift:      No intake/output data recorded. Last 3 shifts:  1901 -  0700  In:    Out: 1950 [Urine:1950]       Physical Exam:     General: Awake confused does not follow commands  HEENT: NCAT, poor dentition,   Eyes: anicteric; conjunctiva clear extraocular movements intact  Neck: no nodes, trach midline; no accessory MM use.   Neck veins visible  Chest: no deformity,   Cardiac: Regular rate and rhythm  Lungs: Shallow but clear anteriorly with lateral rales no wheezing  Abd: soft, NT, hypoactive BS  Ext: Lower extremity wound bandage in place  : clear urine  Neuro: Awake mumbles follows no commands does move his extremities  Psych-unable to assess  Skin: warm, dry, no cyanosis;   Pulses: Brachial and radial pulses intact  Capillary: Normal capillary refill      DATA:    MAR reviewed and pertinent medications noted or modified as needed  MEDS:   Current Facility-Administered Medications   Medication    hydrOXYzine pamoate (VISTARIL) capsule 50 mg    dexAMETHasone (DECADRON) tablet 4 mg    albuterol-ipratropium (DUO-NEB) 2.5 MG-0.5 MG/3 ML    ARIPiprazole (ABILIFY) tablet 2 mg    aspirin chewable tablet 81 mg    carvediloL (COREG) tablet 12.5 mg    doxazosin (CARDURA) tablet 1 mg    escitalopram oxalate (LEXAPRO) tablet 20 mg    gabapentin (NEURONTIN) capsule 100 mg    losartan (COZAAR) tablet 50 mg    melatonin tablet 10 mg    pantoprazole (PROTONIX) granules for oral suspension 40 mg    potassium chloride (KLOR-CON) packet for solution 20 mEq    senna-docusate (PERICOLACE) 8.6-50 mg per tablet 1 Tablet    clopidogreL (PLAVIX) tablet 75 mg    atorvastatin (LIPITOR) tablet 40 mg    albuterol-ipratropium (DUO-NEB) 2.5 MG-0.5 MG/3 ML    balsam peru-castor oiL (VENELEX) ointment    piperacillin-tazobactam (ZOSYN) 3.375 g in 0.9% sodium chloride (MBP/ADV) 100 mL MBP    hydrALAZINE (APRESOLINE) 20 mg/mL injection 20 mg    baclofen (LIORESAL) tablet 5 mg    polyethylene glycol (MIRALAX) packet 17 g    traMADoL (ULTRAM) tablet 50 mg    sodium chloride (NS) flush 5-40 mL    sodium chloride (NS) flush 5-40 mL    acetaminophen (TYLENOL) tablet 650 mg    Or    acetaminophen (TYLENOL) suppository 650 mg    ondansetron (ZOFRAN ODT) tablet 4 mg    Or    ondansetron (ZOFRAN) injection 4 mg    enoxaparin (LOVENOX) injection 40 mg    glucose chewable tablet 16 g    glucagon (GLUCAGEN) injection 1 mg    dextrose 10% infusion 0-250 mL    insulin lispro (HUMALOG) injection        Labs:    Recent Labs     11/21/22  0901 11/20/22  1134   WBC 5.8 6.7   HGB 9.0* 9.5*    232       Recent Labs     11/21/22  0901 11/20/22  1134    141   K 3.4* 3.6    109*   CO2 25 22   * 139*   BUN 11 10   CREA 0.54* 0.51*   CA 7.9* 7.6*   PHOS 2.7  --    ALB 1.8*  --        11/10/2022 Echo ejection fraction 50% IVC dilated left atrium 5 cm RVSP 59  Lab Results   Component Value Date/Time    Culture result:  11/09/2022 02:10 AM     Heavy * methicillin resistant staphylococcus aureus *    Culture result: Heavy Diphtheroids 11/09/2022 02:10 AM    Culture result: No growth 6 days 11/08/2022 01:25 PM     Lab Results   Component Value Date/Time    TSH 1.67 07/30/2022 07:11 AM        Imaging:    Results from [place] encounter on 11/08/22    XR CHEST PORT    Narrative  EXAM:  XR CHEST PORT    INDICATION: chf    COMPARISON: 11/20/2020. TECHNIQUE: Frontal and lateral views of the chest    FINDINGS: Patchy diffuse bilateral airspace disease. Possible trace effusions. No visualized pneumothorax. Unchanged cardiomediastinal silhouette status post  median sternotomy. Impression  1. Patchy diffuse bilateral airspace disease and possible trace effusions,  unchanged. Results from [place] encounter on 11/08/22    CT FOOT RT W CONT    Narrative  EXAM: CT FOOT RT W CONT    INDICATION: Right foot swelling and abscess. Evaluate for osteomyelitis. Hypertension, diabetes, and cirrhosis. COMPARISON: Right foot views on 11/10/2022    CONTRAST: 100 mL of Isovue-370. TECHNIQUE: Helical CT of the right foot during uneventful rapid bolus  intravenous contrast administration. Coronal and Sagittal reformats were  generated. Images reviewed in soft tissue and bone windows. CT dose reduction  was achieved through the use of a standardized protocol tailored for this  examination and automatic exposure control for dose modulation. FINDINGS: Bones: Mild osteopenia. No fracture or osteomyelitis. Joint fluid: Physiologic. Articulations: no evidence of septic arthritis. 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 Tendons: No full-thickness tendon tear. Muscles: Mild diffuse atrophy. Soft tissue mass: None. No fluid collection. Impression  1. No abscess or osteomyelitis. 2. No fracture.       11/10 patient is barely responsive now on room air off pressors getting IV fluid chest x-ray shows congestive changes IV fluid has been decreased, replace potassium, WBC much improved  11/11 Patient is out of ICU, responding slightly, he is on room air. PCO2 30. WBC improving, continues to decrease. 11/12 On room air, condition remains same open eyes but does not follow any commands, replace potassium  11/13 Room air, no O2 in hospital. He opened his eyes, tried to respond slightly by denying SOB, but unable to communicate clearly. NG tube in place. 11/14 Pt is seen resting, he is not currently on any O2, SpO2 94%. NG tube in place. 11/15 Pt is awake feeling well, more responsive today. He denies SOB. He is still on room air. Pt still has NG tube in place. 11/16 condition same on room air pleasantly confused  11/20 back on oxygen chest x-ray shows worsening congestion and infiltrates.   WBC count is normal with no fever tends to rule out aspiration pneumonia has been receiving D5 half at 60 an hour probably fluid overload from cor pulmonale we will discontinue IV fluids give 2 doses Lasix tonight and check labs and BNP in a.m.  11/21 on oxygen 2 L nasal cannula we will give extra Lasix  11/22 on 50% Ventimask extra dose of Lasix received chest x-ray we will get arterial blood gases  11/23 on Ventimask alert awake received Lasix replace potassium albumin 1.8 chest x-ray shows bilateral congestive changes pleural effusion

## 2022-11-24 ENCOUNTER — APPOINTMENT (OUTPATIENT)
Dept: GENERAL RADIOLOGY | Age: 72
DRG: 871 | End: 2022-11-24
Attending: INTERNAL MEDICINE
Payer: MEDICARE

## 2022-11-24 LAB
ANION GAP SERPL CALC-SCNC: 5 MMOL/L (ref 5–15)
BUN SERPL-MCNC: 16 MG/DL (ref 6–20)
BUN/CREAT SERPL: 29 (ref 12–20)
CA-I BLD-MCNC: 7.8 MG/DL (ref 8.5–10.1)
CHLORIDE SERPL-SCNC: 107 MMOL/L (ref 97–108)
CO2 SERPL-SCNC: 28 MMOL/L (ref 21–32)
CREAT SERPL-MCNC: 0.55 MG/DL (ref 0.7–1.3)
ERYTHROCYTE [DISTWIDTH] IN BLOOD BY AUTOMATED COUNT: 16.9 % (ref 11.5–14.5)
GLUCOSE BLD STRIP.AUTO-MCNC: 125 MG/DL (ref 65–100)
GLUCOSE BLD STRIP.AUTO-MCNC: 153 MG/DL (ref 65–100)
GLUCOSE BLD STRIP.AUTO-MCNC: 158 MG/DL (ref 65–100)
GLUCOSE BLD STRIP.AUTO-MCNC: 180 MG/DL (ref 65–100)
GLUCOSE SERPL-MCNC: 159 MG/DL (ref 65–100)
HCT VFR BLD AUTO: 29.2 % (ref 36.6–50.3)
HGB BLD-MCNC: 9.4 G/DL (ref 12.1–17)
MCH RBC QN AUTO: 29.1 PG (ref 26–34)
MCHC RBC AUTO-ENTMCNC: 32.2 G/DL (ref 30–36.5)
MCV RBC AUTO: 90.4 FL (ref 80–99)
NRBC # BLD: 0 K/UL (ref 0–0.01)
NRBC BLD-RTO: 0 PER 100 WBC
PERFORMED BY, TECHID: ABNORMAL
PLATELET # BLD AUTO: 303 K/UL (ref 150–400)
PMV BLD AUTO: 9.9 FL (ref 8.9–12.9)
POTASSIUM SERPL-SCNC: 4.1 MMOL/L (ref 3.5–5.1)
RBC # BLD AUTO: 3.23 M/UL (ref 4.1–5.7)
SODIUM SERPL-SCNC: 140 MMOL/L (ref 136–145)
WBC # BLD AUTO: 7.2 K/UL (ref 4.1–11.1)

## 2022-11-24 PROCEDURE — 85027 COMPLETE CBC AUTOMATED: CPT

## 2022-11-24 PROCEDURE — 65270000029 HC RM PRIVATE

## 2022-11-24 PROCEDURE — 74011250637 HC RX REV CODE- 250/637: Performed by: STUDENT IN AN ORGANIZED HEALTH CARE EDUCATION/TRAINING PROGRAM

## 2022-11-24 PROCEDURE — 94761 N-INVAS EAR/PLS OXIMETRY MLT: CPT

## 2022-11-24 PROCEDURE — 74011000250 HC RX REV CODE- 250: Performed by: STUDENT IN AN ORGANIZED HEALTH CARE EDUCATION/TRAINING PROGRAM

## 2022-11-24 PROCEDURE — 77010033678 HC OXYGEN DAILY

## 2022-11-24 PROCEDURE — 74011250636 HC RX REV CODE- 250/636: Performed by: INTERNAL MEDICINE

## 2022-11-24 PROCEDURE — 74011636637 HC RX REV CODE- 636/637: Performed by: INTERNAL MEDICINE

## 2022-11-24 PROCEDURE — 36415 COLL VENOUS BLD VENIPUNCTURE: CPT

## 2022-11-24 PROCEDURE — 94640 AIRWAY INHALATION TREATMENT: CPT

## 2022-11-24 PROCEDURE — 80048 BASIC METABOLIC PNL TOTAL CA: CPT

## 2022-11-24 PROCEDURE — 71045 X-RAY EXAM CHEST 1 VIEW: CPT

## 2022-11-24 PROCEDURE — 99232 SBSQ HOSP IP/OBS MODERATE 35: CPT | Performed by: SURGERY

## 2022-11-24 PROCEDURE — 82962 GLUCOSE BLOOD TEST: CPT

## 2022-11-24 PROCEDURE — 74011000250 HC RX REV CODE- 250: Performed by: INTERNAL MEDICINE

## 2022-11-24 PROCEDURE — 99232 SBSQ HOSP IP/OBS MODERATE 35: CPT | Performed by: INTERNAL MEDICINE

## 2022-11-24 PROCEDURE — 74011250637 HC RX REV CODE- 250/637: Performed by: INTERNAL MEDICINE

## 2022-11-24 PROCEDURE — 74011000258 HC RX REV CODE- 258: Performed by: INTERNAL MEDICINE

## 2022-11-24 PROCEDURE — 97530 THERAPEUTIC ACTIVITIES: CPT

## 2022-11-24 RX ADMIN — SODIUM CHLORIDE, PRESERVATIVE FREE 10 ML: 5 INJECTION INTRAVENOUS at 22:31

## 2022-11-24 RX ADMIN — SENNOSIDES AND DOCUSATE SODIUM 1 TABLET: 50; 8.6 TABLET ORAL at 08:19

## 2022-11-24 RX ADMIN — CASTOR OIL AND BALSAM, PERU: 788; 87 OINTMENT TOPICAL at 22:31

## 2022-11-24 RX ADMIN — GABAPENTIN 100 MG: 100 CAPSULE ORAL at 21:45

## 2022-11-24 RX ADMIN — POTASSIUM CHLORIDE 20 MEQ: 1.5 POWDER, FOR SOLUTION ORAL at 21:46

## 2022-11-24 RX ADMIN — CARVEDILOL 12.5 MG: 12.5 TABLET, FILM COATED ORAL at 21:45

## 2022-11-24 RX ADMIN — ARIPIPRAZOLE 2 MG: 2 TABLET ORAL at 08:20

## 2022-11-24 RX ADMIN — IPRATROPIUM BROMIDE AND ALBUTEROL SULFATE 3 ML: 2.5; .5 SOLUTION RESPIRATORY (INHALATION) at 01:53

## 2022-11-24 RX ADMIN — CASTOR OIL AND BALSAM, PERU: 788; 87 OINTMENT TOPICAL at 08:21

## 2022-11-24 RX ADMIN — BACLOFEN 5 MG: 10 TABLET ORAL at 08:53

## 2022-11-24 RX ADMIN — TRAMADOL HYDROCHLORIDE 50 MG: 50 TABLET, COATED ORAL at 08:20

## 2022-11-24 RX ADMIN — SODIUM CHLORIDE, PRESERVATIVE FREE 10 ML: 5 INJECTION INTRAVENOUS at 05:16

## 2022-11-24 RX ADMIN — ENOXAPARIN SODIUM 40 MG: 100 INJECTION SUBCUTANEOUS at 08:19

## 2022-11-24 RX ADMIN — PIPERACILLIN SODIUM AND TAZOBACTAM SODIUM 3.38 G: 3; .375 INJECTION, POWDER, LYOPHILIZED, FOR SOLUTION INTRAVENOUS at 08:19

## 2022-11-24 RX ADMIN — CLOPIDOGREL BISULFATE 75 MG: 75 TABLET, FILM COATED ORAL at 08:20

## 2022-11-24 RX ADMIN — PIPERACILLIN SODIUM AND TAZOBACTAM SODIUM 3.38 G: 3; .375 INJECTION, POWDER, LYOPHILIZED, FOR SOLUTION INTRAVENOUS at 00:27

## 2022-11-24 RX ADMIN — IPRATROPIUM BROMIDE AND ALBUTEROL SULFATE 3 ML: 2.5; .5 SOLUTION RESPIRATORY (INHALATION) at 08:11

## 2022-11-24 RX ADMIN — BACLOFEN 5 MG: 10 TABLET ORAL at 21:45

## 2022-11-24 RX ADMIN — IPRATROPIUM BROMIDE AND ALBUTEROL SULFATE 3 ML: 2.5; .5 SOLUTION RESPIRATORY (INHALATION) at 12:59

## 2022-11-24 RX ADMIN — SODIUM CHLORIDE, PRESERVATIVE FREE 10 ML: 5 INJECTION INTRAVENOUS at 18:13

## 2022-11-24 RX ADMIN — MELATONIN TAB 5 MG 10 MG: 5 TAB at 21:43

## 2022-11-24 RX ADMIN — ATORVASTATIN CALCIUM 40 MG: 40 TABLET, FILM COATED ORAL at 08:19

## 2022-11-24 RX ADMIN — PIPERACILLIN SODIUM AND TAZOBACTAM SODIUM 3.38 G: 3; .375 INJECTION, POWDER, LYOPHILIZED, FOR SOLUTION INTRAVENOUS at 18:11

## 2022-11-24 RX ADMIN — PANTOPRAZOLE SODIUM 40 MG: 40 GRANULE, DELAYED RELEASE ORAL at 08:19

## 2022-11-24 RX ADMIN — IPRATROPIUM BROMIDE AND ALBUTEROL SULFATE 3 ML: 2.5; .5 SOLUTION RESPIRATORY (INHALATION) at 17:48

## 2022-11-24 RX ADMIN — INSULIN LISPRO 2 UNITS: 100 INJECTION, SOLUTION INTRAVENOUS; SUBCUTANEOUS at 08:21

## 2022-11-24 RX ADMIN — TRAMADOL HYDROCHLORIDE 50 MG: 50 TABLET, COATED ORAL at 18:11

## 2022-11-24 RX ADMIN — ESCITALOPRAM OXALATE 20 MG: 10 TABLET ORAL at 08:20

## 2022-11-24 RX ADMIN — CARVEDILOL 12.5 MG: 12.5 TABLET, FILM COATED ORAL at 08:20

## 2022-11-24 RX ADMIN — LOSARTAN POTASSIUM 50 MG: 50 TABLET, FILM COATED ORAL at 08:19

## 2022-11-24 RX ADMIN — INSULIN LISPRO 2 UNITS: 100 INJECTION, SOLUTION INTRAVENOUS; SUBCUTANEOUS at 18:11

## 2022-11-24 RX ADMIN — POTASSIUM CHLORIDE 20 MEQ: 1.5 POWDER, FOR SOLUTION ORAL at 08:22

## 2022-11-24 RX ADMIN — ASPIRIN 81 MG 81 MG: 81 TABLET ORAL at 08:20

## 2022-11-24 RX ADMIN — DOXAZOSIN 1 MG: 2 TABLET ORAL at 21:43

## 2022-11-24 NOTE — PROGRESS NOTES
Infectious Disease Progress Note           Subjective:   Stable, no acute events since last seen, afebrile hemodynamically stable, remains on ventmask   Objective:   Physical Exam:     Visit Vitals  BP (!) 146/72 (BP Patient Position: Lying)   Pulse 76   Temp 98.3 °F (36.8 °C)   Resp 24   Ht 5' 9\" (1.753 m)   Wt 137 lb 12.6 oz (62.5 kg)   SpO2 95%   BMI 20.35 kg/m²    O2 Flow Rate (L/min): 15 l/min O2 Device: Venturi-mask    Temp (24hrs), Av.6 °F (36.4 °C), Min:97 °F (36.1 °C), Max:98.3 °F (36.8 °C)    No intake/output data recorded.  1901 -  0700  In: 2100   Out: 0565 [Urine:1550]    General: NAD, alert, confused   HEENT: VANGIE, Moist mucosa  Lungs: Clear lungs on exam,  rhonchi   Heart: S1S2+, RRR, no murmur  Abdo: Soft, NT, ND, +BS, + peg tube   Exts: Decreased LUE swelling, stable right great toe ulcer   Skin: b/l leg ulcers, stable ulcerations on b/l feet     Data Review:       Recent Days:  Recent Labs     22  1153   WBC 5.8   HGB 9.5*   HCT 28.8*          Recent Labs     22  1153   BUN 14   CREA 0.52*       Lab Results   Component Value Date/Time    C-Reactive protein 1.64 (H) 2022 10:18 AM        Microbiology     Results       Procedure Component Value Units Date/Time    COVID-19 RAPID TEST [[id number]] Collected: 22    Order Status: Completed Specimen: Nasopharyngeal Updated: 22     COVID-19 rapid test Not Detected        Comment: Rapid Abbott ID Now   The specimen is NEGATIVE for SARS-CoV2, the novel coronavirus associated with COVID-19. A negative result does not rule out COVID-19. This test has been authorized by the FDA under an Emergency Use Authorization (EUA) for use by authorized laboratories.    Fact sheet for Healthcare Providers:  http://www.naomi.maxine/ Fact sheet for Patients: http://www.naomi.maxine/   Methodology: Isothermal Nucleic Acid Amplification Diagnostics   CXR Results  (Last 48 hours)                 11/24/22 0810  XR CHEST PORT Final result    Impression:  1. Increased left lung interstitial opacity, favor pulmonary edema. 2. Increased right upper lobe airspace disease may represent pneumonia or edema. Narrative:  PORTABLE CHEST RADIOGRAPH/S: 11/24/2022 8:10 AM       INDICATION: Pneumonia. COMPARISON: 11/22/2022, 1/20/2022, 1/17/2022, 11/15/2022. TECHNIQUE: Portable frontal upright radiograph/s of the chest.       FINDINGS:    Airspace disease along the minor fissure in the right upper lobe has increased   slightly from 11/17/2022, and may represent pneumonia or edema. Interstitial   opacity in the left lung has increased from 11/17/2022, and probably from   11/20/2022, more consistent with pulmonary edema than pneumonia. The central   airways are patent. No pneumothorax and no large pleural effusion. Post CABG.            11/22/22 1219  XR CHEST PORT Final result    Impression:  1. Patchy diffuse bilateral airspace disease and possible trace effusions,   unchanged. Narrative:  EXAM:  XR CHEST PORT       INDICATION: chf       COMPARISON: 11/20/2020. TECHNIQUE: Frontal and lateral views of the chest       FINDINGS: Patchy diffuse bilateral airspace disease. Possible trace effusions. No visualized pneumothorax. Unchanged cardiomediastinal silhouette status post   median sternotomy. Assessment/Plan     Suspected aspiration PNA. Worsening infiltrates on CXR (11/20)         Increased b/l infiltrates on CXR. ?  Edema Vs infiltrates (11/24)         COVID Ag neg (11/22), remains on Ventimask         Will leave on Zosyn day # 8 since pt still on vent mask        Routine labs in the morning, wean off O2 as tolerated       Right great toe infection  MRSA isolated from Cx of right great toe         No abscess or osteomyelitis on CT of right foot (11/11)         S/p Vanc, will continue to monitor for acute infection     3. CVA w Small to moderate sized acute infarction in the right occipital lobe with  minimal/small infarction in the right temporal lobe on MRI head (11/16))     5. Dysphagia, S/p peg placement 11/18, on tube feeds, tolerating     6.  Diffuse LUE swelling, from IV infiltration, decreased, will continue to  monitor     Brandy Enamorado MD    11/24/2022

## 2022-11-24 NOTE — PROGRESS NOTES
IMPRESSION:   Acute hypoxic respiratory failure had resolved now back on nasal oxygen   Diffuse pulmonary infiltrates questionable pulmonary edema versus chronic aspiration  Leukocytosis resolved  Carotid artery disease history of CVA  Hypokalemia      RECOMMENDATIONS/PLAN:   66-year-old male nursing home resident admitted with hypotension and unresponsiveness now he is alert awake agitated now he is on 50% Ventimask alert awake talking following command still in distress  Worsening chest x-ray and hypoxia currently receiving IV fluids received 2 doses Lasix and hold IV fluid BNP 7000  Continue with the suctioning  11/10 Echo ejection fraction 50% IVC dilated left atrium 5 cm RVSP 59  Pulmonary hypertension with probable cor pulmonale  Chest x-ray shows fluid overload congestive changes   Potassium corrected     [x] High complexity decision making was performed  [x] See my orders for details  HPI  66-year-old nursing home resident came in because of unresponsiveness he had a history of stroke in the past patient was hypotensive hypoxic rapid response was called he was admitted to the floor initially received IV fluid hemodynamically unstable started on vasopressors Levophed transferred to ICU White count was elevated he was put on oxygen 4 L nasal cannula unable to get any started the patient he has a right foot wound and multiple wounds of the lower extremities dressing in place. He has carotid artery stenosis only supposed to go for surgery but unable to get cardiac clearance because of nuclear stress test he has 90% stenosis of left ICA and 50% stenosis of right ICA. So now he is admitted to ICU and critical care consult was called  PMH:  has a past medical history of Cirrhosis ([facility] [address] Diabetes ([facility] [address] Hypertension, and Stroke ([facility] [address] PSH:   has a past surgical history that includes hx back surgery; hx gi; and ir insert non tunl cvc over 5 yrs (11/9/2022).      FHX: family history includes Heart Disease in his father. SHX:  reports that he has quit smoking. He has never used smokeless tobacco. He reports that he does not currently use alcohol. He reports that he does not currently use drugs.     ALL: No Known Allergies     MEDS:   [x] Reviewed - As Below   [] Not reviewed    Current Facility-Administered Medications   Medication    albuterol-ipratropium (DUO-NEB) 2.5 MG-0.5 MG/3 ML    ARIPiprazole (ABILIFY) tablet 2 mg    aspirin chewable tablet 81 mg    carvediloL (COREG) tablet 12.5 mg    doxazosin (CARDURA) tablet 1 mg    escitalopram oxalate (LEXAPRO) tablet 20 mg    gabapentin (NEURONTIN) capsule 100 mg    losartan (COZAAR) tablet 50 mg    melatonin tablet 10 mg    pantoprazole (PROTONIX) granules for oral suspension 40 mg    potassium chloride (KLOR-CON) packet for solution 20 mEq    senna-docusate (PERICOLACE) 8.6-50 mg per tablet 1 Tablet    clopidogreL (PLAVIX) tablet 75 mg    atorvastatin (LIPITOR) tablet 40 mg    albuterol-ipratropium (DUO-NEB) 2.5 MG-0.5 MG/3 ML    balsam peru-castor oiL (VENELEX) ointment    piperacillin-tazobactam (ZOSYN) 3.375 g in 0.9% sodium chloride (MBP/ADV) 100 mL MBP    hydrALAZINE (APRESOLINE) 20 mg/mL injection 20 mg    baclofen (LIORESAL) tablet 5 mg    polyethylene glycol (MIRALAX) packet 17 g    traMADoL (ULTRAM) tablet 50 mg    sodium chloride (NS) flush 5-40 mL    sodium chloride (NS) flush 5-40 mL    acetaminophen (TYLENOL) tablet 650 mg    Or    acetaminophen (TYLENOL) suppository 650 mg    ondansetron (ZOFRAN ODT) tablet 4 mg    Or    ondansetron (ZOFRAN) injection 4 mg    enoxaparin (LOVENOX) injection 40 mg    glucose chewable tablet 16 g    glucagon (GLUCAGEN) injection 1 mg    dextrose 10% infusion 0-250 mL    insulin lispro (HUMALOG) injection      MAR reviewed and pertinent medications noted or modified as needed   Current Facility-Administered Medications   Medication    albuterol-ipratropium (DUO-NEB) 2.5 MG-0.5 MG/3 ML    ARIPiprazole (ABILIFY) tablet 2 mg aspirin chewable tablet 81 mg    carvediloL (COREG) tablet 12.5 mg    doxazosin (CARDURA) tablet 1 mg    escitalopram oxalate (LEXAPRO) tablet 20 mg    gabapentin (NEURONTIN) capsule 100 mg    losartan (COZAAR) tablet 50 mg    melatonin tablet 10 mg    pantoprazole (PROTONIX) granules for oral suspension 40 mg    potassium chloride (KLOR-CON) packet for solution 20 mEq    senna-docusate (PERICOLACE) 8.6-50 mg per tablet 1 Tablet    clopidogreL (PLAVIX) tablet 75 mg    atorvastatin (LIPITOR) tablet 40 mg    albuterol-ipratropium (DUO-NEB) 2.5 MG-0.5 MG/3 ML    balsam peru-castor oiL (VENELEX) ointment    piperacillin-tazobactam (ZOSYN) 3.375 g in 0.9% sodium chloride (MBP/ADV) 100 mL MBP    hydrALAZINE (APRESOLINE) 20 mg/mL injection 20 mg    baclofen (LIORESAL) tablet 5 mg    polyethylene glycol (MIRALAX) packet 17 g    traMADoL (ULTRAM) tablet 50 mg    sodium chloride (NS) flush 5-40 mL    sodium chloride (NS) flush 5-40 mL    acetaminophen (TYLENOL) tablet 650 mg    Or    acetaminophen (TYLENOL) suppository 650 mg    ondansetron (ZOFRAN ODT) tablet 4 mg    Or    ondansetron (ZOFRAN) injection 4 mg    enoxaparin (LOVENOX) injection 40 mg    glucose chewable tablet 16 g    glucagon (GLUCAGEN) injection 1 mg    dextrose 10% infusion 0-250 mL    insulin lispro (HUMALOG) injection      PMH:  has a past medical history of Cirrhosis ([facility] [address] Diabetes ([facility] [address] Hypertension, and Stroke ([facility] [address] PSH:   has a past surgical history that includes hx back surgery; hx gi; and ir insert non tunl cvc over 5 yrs (11/9/2022). FHX: family history includes Heart Disease in his father. SHX:  reports that he has quit smoking. He has never used smokeless tobacco. He reports that he does not currently use alcohol. He reports that he does not currently use drugs. ROS:Review of systems not obtained due to patient factors.       Hemodynamics:    CO:    CI:    CVP:    SVR:   PAP Systolic:    PAP Diastolic:    PVR:    QW64:        Ventilator Settings:      Mode Rate TV Press PEEP FiO2 PIP Min. Vent               50 %              Vital Signs: Telemetry:    normal sinus rhythm Intake/Output:   Visit Vitals  BP (!) 146/72 (BP Patient Position: Lying)   Pulse 76   Temp 98.3 °F (36.8 °C)   Resp 24   Ht 5' 9\" (1.753 m)   Wt 62.5 kg (137 lb 12.6 oz)   SpO2 95%   BMI 20.35 kg/m²       Temp (24hrs), Av.6 °F (36.4 °C), Min:97 °F (36.1 °C), Max:98.3 °F (36.8 °C)        O2 Device: Venturi-mask O2 Flow Rate (L/min): 15 l/min       Wt Readings from Last 4 Encounters:   11/10/22 62.5 kg (137 lb 12.6 oz)   22 84.8 kg (187 lb)   22 84.8 kg (187 lb)   10/28/20 99.8 kg (220 lb)          Intake/Output Summary (Last 24 hours) at 2022 0955  Last data filed at 2022 0611  Gross per 24 hour   Intake 1050 ml   Output 900 ml   Net 150 ml         Last shift:      No intake/output data recorded. Last 3 shifts:  1901 -  0700  In: 2100   Out: 1550 [Urine:1550]       Physical Exam:     General: Awake confused does not follow commands  HEENT: NCAT, poor dentition,   Eyes: anicteric; conjunctiva clear extraocular movements intact  Neck: no nodes, trach midline; no accessory MM use.   Neck veins visible  Chest: no deformity,   Cardiac: Regular rate and rhythm  Lungs: Shallow but clear anteriorly with lateral rales no wheezing  Abd: soft, NT, hypoactive BS  Ext: Lower extremity wound bandage in place  : clear urine  Neuro: Awake mumbles follows no commands does move his extremities  Psych-unable to assess  Skin: warm, dry, no cyanosis;   Pulses: Brachial and radial pulses intact  Capillary: Normal capillary refill      DATA:    MAR reviewed and pertinent medications noted or modified as needed  MEDS:   Current Facility-Administered Medications   Medication    albuterol-ipratropium (DUO-NEB) 2.5 MG-0.5 MG/3 ML    ARIPiprazole (ABILIFY) tablet 2 mg    aspirin chewable tablet 81 mg    carvediloL (COREG) tablet 12.5 mg    doxazosin (CARDURA) tablet 1 mg    escitalopram oxalate (LEXAPRO) tablet 20 mg    gabapentin (NEURONTIN) capsule 100 mg    losartan (COZAAR) tablet 50 mg    melatonin tablet 10 mg    pantoprazole (PROTONIX) granules for oral suspension 40 mg    potassium chloride (KLOR-CON) packet for solution 20 mEq    senna-docusate (PERICOLACE) 8.6-50 mg per tablet 1 Tablet    clopidogreL (PLAVIX) tablet 75 mg    atorvastatin (LIPITOR) tablet 40 mg    albuterol-ipratropium (DUO-NEB) 2.5 MG-0.5 MG/3 ML    balsam peru-castor oiL (VENELEX) ointment    piperacillin-tazobactam (ZOSYN) 3.375 g in 0.9% sodium chloride (MBP/ADV) 100 mL MBP    hydrALAZINE (APRESOLINE) 20 mg/mL injection 20 mg    baclofen (LIORESAL) tablet 5 mg    polyethylene glycol (MIRALAX) packet 17 g    traMADoL (ULTRAM) tablet 50 mg    sodium chloride (NS) flush 5-40 mL    sodium chloride (NS) flush 5-40 mL    acetaminophen (TYLENOL) tablet 650 mg    Or    acetaminophen (TYLENOL) suppository 650 mg    ondansetron (ZOFRAN ODT) tablet 4 mg    Or    ondansetron (ZOFRAN) injection 4 mg    enoxaparin (LOVENOX) injection 40 mg    glucose chewable tablet 16 g    glucagon (GLUCAGEN) injection 1 mg    dextrose 10% infusion 0-250 mL    insulin lispro (HUMALOG) injection        Labs:    Recent Labs     11/23/22  1153   WBC 5.8   HGB 9.5*          Recent Labs     11/23/22  1153      K 4.4      CO2 29   *   BUN 14   CREA 0.52*   CA 7.8*       11/10/2022 Echo ejection fraction 50% IVC dilated left atrium 5 cm RVSP 59  Lab Results   Component Value Date/Time    Culture result:  11/09/2022 02:10 AM     Heavy * methicillin resistant staphylococcus aureus *    Culture result: Heavy Diphtheroids 11/09/2022 02:10 AM    Culture result: No growth 6 days 11/08/2022 01:25 PM     Lab Results   Component Value Date/Time    TSH 1.67 07/30/2022 07:11 AM        Imaging:    Results from Hospital Encounter encounter on 11/08/22    XR CHEST PORT    Narrative  PORTABLE CHEST RADIOGRAPH/S: 11/24/2022 8:10 AM    INDICATION: Pneumonia. COMPARISON: 11/22/2022, 1/20/2022, 1/17/2022, 11/15/2022. TECHNIQUE: Portable frontal upright radiograph/s of the chest.    FINDINGS:  Airspace disease along the minor fissure in the right upper lobe has increased  slightly from 11/17/2022, and may represent pneumonia or edema. Interstitial  opacity in the left lung has increased from 11/17/2022, and probably from  11/20/2022, more consistent with pulmonary edema than pneumonia. The central  airways are patent. No pneumothorax and no large pleural effusion. Post CABG. Impression  1. Increased left lung interstitial opacity, favor pulmonary edema. 2. Increased right upper lobe airspace disease may represent pneumonia or edema. Results from [place] encounter on 11/08/22    CT FOOT RT W CONT    Narrative  EXAM: CT FOOT RT W CONT    INDICATION: Right foot swelling and abscess. Evaluate for osteomyelitis. Hypertension, diabetes, and cirrhosis. COMPARISON: Right foot views on 11/10/2022    CONTRAST: 100 mL of [id number]. TECHNIQUE: Helical CT of the right foot during uneventful rapid bolus  intravenous contrast administration. Coronal and Sagittal reformats were  generated. Images reviewed in soft tissue and bone windows. CT dose reduction  was achieved through the use of a standardized protocol tailored for this  examination and automatic exposure control for dose modulation. FINDINGS: Bones: Mild osteopenia. No fracture or osteomyelitis. Joint fluid: Physiologic. Articulations: no evidence of septic arthritis. 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 Tendons: No full-thickness tendon tear. Muscles: Mild diffuse atrophy. Soft tissue mass: None. No fluid collection. Impression  1. No abscess or osteomyelitis. 2. No fracture.       11/10 patient is barely responsive now on room air off pressors getting IV fluid chest x-ray shows congestive changes IV fluid has been decreased, replace potassium, WBC much improved  11/11 Patient is out of ICU, responding slightly, he is on room air. PCO2 30. WBC improving, continues to decrease. 11/12 On room air, condition remains same open eyes but does not follow any commands, replace potassium  11/13 Room air, no O2 in hospital. He opened his eyes, tried to respond slightly by denying SOB, but unable to communicate clearly. NG tube in place. 11/14 Pt is seen resting, he is not currently on any O2, SpO2 94%. NG tube in place. 11/15 Pt is awake feeling well, more responsive today. He denies SOB. He is still on room air. Pt still has NG tube in place. 11/16 condition same on room air pleasantly confused  11/20 back on oxygen chest x-ray shows worsening congestion and infiltrates.   WBC count is normal with no fever tends to rule out aspiration pneumonia has been receiving D5 half at 60 an hour probably fluid overload from cor pulmonale we will discontinue IV fluids give 2 doses Lasix tonight and check labs and BNP in a.m.  11/21 on oxygen 2 L nasal cannula we will give extra Lasix  11/22 on 50% Ventimask extra dose of Lasix received chest x-ray we will get arterial blood gases  11/23 on Ventimask alert awake received Lasix replace potassium albumin 1.8 chest x-ray shows bilateral congestive changes pleural effusion

## 2022-11-24 NOTE — PROGRESS NOTES
Hospitalist Progress Note    Subjective:   Daily Progress Note: 11/24/2022 1:13 PM    Hospital Course: Rocky Felix is a 40-year-old male with a PMH of stroke, hypertension, DM, and cirrhosis who presented from nursing home with weakness and hypotension x1 day. Of note recent admission for TIA evaluated and recommended for carotid endarterectomy. Found hypotensive on arrival to the ED. Initial labs significant for WBC of 15.8, creatinine of 1.46, lactic acid of 2.4, and glucose of 134. UA consistent with urinary tract infection. CT of the head with no acute process. Patient admitted for further work-up. Patient started on IV fluids and ceftriaxone. Vascular surgery, cardiology, and podiatry consulted. KRISTI with duplex shows right common femoral artery and proximal superficial femoral artery occluded with collateralization to popliteal artery, left mid to distal superficial artery occluded, with patent distal anterior/posterior tibial artery, popliteal artery patent and with patent distal anterior tibial artery and posterior tibial artery. On 11/9 transferred to ICU overnight for poor responsiveness and for septic shock. CT of the head negative for acute findings. Patient started on meropenem, vancomycin, IV fluids, and Levophed. Pulmonology and ID consulted. ECHO showed EF of 50-55% with mild MV R and dilated left atrium. XR right foot with generalized osseous demineralization without fracture or dislocation, tissue gas or osseous erosive. CT of right foot with no abscess or osteomyelitis. Patient has carotid artery stenosis and supposed to go for surgery but unable to get cardiac clearance because of nuclear stress test he has 90% stenosis of left ICA and 50% stenosis of right ICA. Urine culture grew mixed kristen. Neurology and psychiatry consulted for confusion. MRI 7/2022 showed acute ischemic stroke of the right parietal area.  Repeat MRI brain 11/16/22 showed small to moderate sized acute infarction in the right occipital lobe with minimal/small infarction in the right temporal lobe and severe cerebral atrophy. Continue DAPT and atorvastatin. Patient has had NG tube for nutrition since admission. Speech therapy working with patient. GI consult to evaluate for PEG placement. on 11/15 fever spike of 100.6F. 11/17 CXR with no significant change in basilar predominant opacities. Started on zosyn. PEG placed on 11/18. Patient tolerating PEG tube feedings. MBS positive for penetration on 11/21. SLP recommending NPO. Hospice consulted. Son declining hospice at this time. Oxygen requirements increasing, placed on venti-mask. Subjective:    Patient seen and examined at bedside. Ill-appearing. On venti-mask. He is more alert today with more energy. Participating with nurse and PT to change positions in bed. More talkative.     Current Facility-Administered Medications   Medication Dose Route Frequency    albuterol-ipratropium (DUO-NEB) 2.5 MG-0.5 MG/3 ML  3 mL Nebulization Q6H RT    ARIPiprazole (ABILIFY) tablet 2 mg  2 mg Per G Tube DAILY    aspirin chewable tablet 81 mg  81 mg Per G Tube DAILY    carvediloL (COREG) tablet 12.5 mg  12.5 mg Per G Tube BID    doxazosin (CARDURA) tablet 1 mg  1 mg Per G Tube QHS    escitalopram oxalate (LEXAPRO) tablet 20 mg  20 mg Per G Tube DAILY    gabapentin (NEURONTIN) capsule 100 mg  100 mg Per G Tube QHS    losartan (COZAAR) tablet 50 mg  50 mg Per G Tube DAILY    melatonin tablet 10 mg  10 mg Per G Tube QHS    pantoprazole (PROTONIX) granules for oral suspension 40 mg  40 mg Per G Tube DAILY    potassium chloride (KLOR-CON) packet for solution 20 mEq  20 mEq Per G Tube BID    senna-docusate (PERICOLACE) 8.6-50 mg per tablet 1 Tablet  1 Tablet Per G Tube DAILY    clopidogreL (PLAVIX) tablet 75 mg  75 mg PEG Tube DAILY    atorvastatin (LIPITOR) tablet 40 mg  40 mg Per G Tube DAILY    albuterol-ipratropium (DUO-NEB) 2.5 MG-0.5 MG/3 ML  3 mL Nebulization Q6H PRN    balsam peru-castor oiL (VENELEX) ointment   Topical BID    piperacillin-tazobactam (ZOSYN) 3.375 g in 0.9% sodium chloride (MBP/ADV) 100 mL MBP  3.375 g IntraVENous Q8H    hydrALAZINE (APRESOLINE) 20 mg/mL injection 20 mg  20 mg IntraVENous Q6H PRN    baclofen (LIORESAL) tablet 5 mg  5 mg Per NG tube BID PRN    polyethylene glycol (MIRALAX) packet 17 g  17 g Per NG tube DAILY PRN    traMADoL (ULTRAM) tablet 50 mg  50 mg Per NG tube Q6H PRN    sodium chloride (NS) flush 5-40 mL  5-40 mL IntraVENous Q8H    sodium chloride (NS) flush 5-40 mL  5-40 mL IntraVENous PRN    acetaminophen (TYLENOL) tablet 650 mg  650 mg Oral Q6H PRN    Or    acetaminophen (TYLENOL) suppository 650 mg  650 mg Rectal Q6H PRN    ondansetron (ZOFRAN ODT) tablet 4 mg  4 mg Oral Q8H PRN    Or    ondansetron (ZOFRAN) injection 4 mg  4 mg IntraVENous Q6H PRN    enoxaparin (LOVENOX) injection 40 mg  40 mg SubCUTAneous DAILY    glucose chewable tablet 16 g  4 Tablet Oral PRN    glucagon (GLUCAGEN) injection 1 mg  1 mg IntraMUSCular PRN    dextrose 10% infusion 0-250 mL  0-250 mL IntraVENous PRN    insulin lispro (HUMALOG) injection   SubCUTAneous AC&HS        Review of Systems  Unable to perform ROS: Acuity of condition       Objective:     Visit Vitals  BP (!) 146/72 (BP Patient Position: Lying)   Pulse 76   Temp 98.3 °F (36.8 °C)   Resp 24   Ht 5' 9\" (1.753 m)   Wt 62.5 kg (137 lb 12.6 oz)   SpO2 95%   BMI 20.35 kg/m²    O2 Flow Rate (L/min): 15 l/min O2 Device: Venturi-mask    Temp (24hrs), Av.6 °F (36.4 °C), Min:97 °F (36.1 °C), Max:98.3 °F (36.8 °C)      No intake/output data recorded.  1901 -  0700  In: 2100   Out: 1550 [Urine:1550]    PHYSICAL EXAM:  Constitutional:  ill-appearing, lethargic male. In mild distress. Skin: Generalized pallor. Scabs seen throughout lower extremities. HEENT: Sclerae anicteric. PERRL. No oral ulcers. The neck is supple and no masses. Cardiovascular: Regular rate and rhythm. +S1/S2. No murmur or gallop.   Respiratory: Decreased air entry at bases with coarse rhonchi bilaterally. On venti-mask. GI:  PEG tube in place. Abdomen nondistended, soft, and nontender. Normal active bowel sounds. Rectal: Deferred   Musculoskeletal: No pitting edema of the lower legs. Able to move all ext  Neurological:  Patient is disoriented. Psychiatric: Unable to assess       Data Review    Recent Results (from the past 24 hour(s))   GLUCOSE, POC    Collection Time: 11/23/22 11:27 AM   Result Value Ref Range    Glucose (POC) 182 (H) 65 - 100 mg/dL    Performed by Latasha Ho    METABOLIC PANEL, BASIC    Collection Time: 11/23/22 11:53 AM   Result Value Ref Range    Sodium 138 136 - 145 mmol/L    Potassium 4.4 3.5 - 5.1 mmol/L    Chloride 106 97 - 108 mmol/L    CO2 29 21 - 32 mmol/L    Anion gap 3 (L) 5 - 15 mmol/L    Glucose 185 (H) 65 - 100 mg/dL    BUN 14 6 - 20 mg/dL    Creatinine 0.52 (L) 0.70 - 1.30 mg/dL    BUN/Creatinine ratio 27 (H) 12 - 20      eGFR >60 >60 ml/min/1.73m2    Calcium 7.8 (L) 8.5 - 10.1 mg/dL   CBC W/O DIFF    Collection Time: 11/23/22 11:53 AM   Result Value Ref Range    WBC 5.8 4.1 - 11.1 K/uL    RBC 3.30 (L) 4.10 - 5.70 M/uL    HGB 9.5 (L) 12.1 - 17.0 g/dL    HCT 28.8 (L) 36.6 - 50.3 %    MCV 87.3 80.0 - 99.0 FL    MCH 28.8 26.0 - 34.0 PG    MCHC 33.0 30.0 - 36.5 g/dL    RDW 16.8 (H) 11.5 - 14.5 %    PLATELET 120 412 - 388 K/uL    MPV 9.8 8.9 - 12.9 FL    NRBC 0.0 0.0  WBC    ABSOLUTE NRBC 0.00 0.00 - 0.01 K/uL   GLUCOSE, POC    Collection Time: 11/23/22  5:01 PM   Result Value Ref Range    Glucose (POC) 208 (H) 65 - 100 mg/dL    Performed by Nikki Grimaldo    GLUCOSE, POC    Collection Time: 11/23/22 10:47 PM   Result Value Ref Range    Glucose (POC) 180 (H) 65 - 100 mg/dL    Performed by Breanne Baltazar    GLUCOSE, POC    Collection Time: 11/24/22  7:35 AM   Result Value Ref Range    Glucose (POC) 153 (H) 65 - 100 mg/dL    Performed by Latasha DE GUZMAN CHEST PORT   Final Result   1.  Increased left lung interstitial opacity, favor pulmonary edema. 2. Increased right upper lobe airspace disease may represent pneumonia or edema. XR CHEST PORT   Final Result   1. Patchy diffuse bilateral airspace disease and possible trace effusions,   unchanged. XR SWALLOW FUNC VIDEO   Final Result   Penetration as described above. XR CHEST PORT   Final Result   Worsening diffuse airspace disease with areas of increasing consolidation. XR CHEST PORT   Final Result   Interval advancement of nasogastric tube, although tip not included on this   study. Evaluation of nasogastric tube position is typically better performed   with KUB. XR CHEST PORT   Final Result   1. No significant change in basilar predominant opacities. These are   nonspecific but can be seen with aspiration pneumonitis/pneumonia in the   appropriate setting. 2.  Enteric tube tip projects just past the gastroesophageal junction with   proximal sidehole in the distal esophagus. Consider advancing. MRI BRAIN WO CONT   Final Result   Small to moderate sized acute infarction in the right occipital lobe with   minimal/small infarction in the right temporal lobe. Severe cerebral atrophy. There is no intracranial mass, hemorrhage. There are numerous scattered chronic infarcts. No additional acute intracranial process is demonstrated. XR CHEST PORT   Final Result   Interval retraction of nasogastric tube with sidehole over the gastroesophageal   junction. XR CHEST PORT   Final Result   Interval replacement of nasogastric tube, with tip over the gastric fundus. XR CHEST PORT   Final Result   No significant change. XR CHEST PORT   Final Result   1. NG tube terminates in the gastric fundus, with possible kink in the side   hole. Correlate with function. 2.  New patchy consolidation in the right midlung is concerning for pneumonia. Grossly unchanged patchy retrocardiac opacities.  Stable trace right pleural   effusion. XR CHEST PORT   Final Result   Nasogastric tube tip advanced overlying the gastric body. XR CHEST PORT   Final Result   Nasogastric tube tip over the gastric fundus. XR CHEST PORT   Final Result   Increased mild interstitial pulmonary edema versus pneumonia. Enteric tube extends into the gastric antrum or first portion of the duodenum. Consider retraction 8-10 cm if the intention is gastric luminal termination. CT FOOT RT W CONT   Final Result      1. No abscess or osteomyelitis. 2. No fracture. DUPLEX LOWER EXT ARTERY BILAT   Final Result      XR FOOT RT MIN 3 V   Final Result   No osseous erosion or soft tissue gas. .      XR CHEST PORT   Final Result      Mild interstitial opacities which may represent mild pulmonary edema unchanged         XR CHEST PORT   Final Result   Addendum (preliminary) 1 of 1   Addendum: NG tube is in appropriate position tip extends below the    diaphragm. Right IJ catheter. No pneumothorax on the right. Final   1. Status post right IJ catheter placement without evidence of complication. IR INSERT NON TUNL CVC OVER 5 YRS   Final Result   Technically successful ultrasound guided placement of a right internal jugular   vein temporary central venous catheter. A post procedure chest x-ray is   pending. CT HEAD WO CONT   Final Result   No acute process or change compared to the prior exam.            XR CHEST PORT   Final Result   No acute process.       IR Jaylen    (Results Pending)       Active Problems:    CVA (cerebral vascular accident) ([address] (7/31/2022)      Carotid stenosis (8/2/2022)      UTI (urinary tract infection) (11/8/2022)      Septic shock ([address] (11/20/2022)      Infection of right great toe due to methicillin resistant Staphylococcus aureus (MRSA) (01/37/4167)      Metabolic encephalopathy (98/34/3779)      Severe protein-calorie malnutrition ([address] (11/20/2022) Dysphagia (11/20/2022)      Acute respiratory failure with hypoxia ([address] (11/20/2022)      PAD (peripheral artery disease) ([address] (11/20/2022)      Assessment/Plan:     SIRS, suspected aspiration PNA  Tmax 100.6F, WBC WNLs, CXR with no significant change in basilar predominant opacities   Continue on Zosyn #7/7  ID following     Severe sepsis with septic shock - resolved  2/2 UTI and right great toe ulcer  S/p vancomycin and meropenem  11/8 blood: no growth     Great right toe infection  11/9 wound: MRSA  XR of right foot without fracture, dislocation, soft tissue gas, or osseous erosion  CT of right foot with no abscess or osteomyelitis  S/p vancomycin for 7 days  Continue routine wound care  ID and podiatry following    Metabolic encephalopathy - improving     Severe protein calorie malnutrition  Status post PEG tube  GI following    Dysphagia  SLP recommending npo  MBS positive for penetration/aspiration     Acute respiratory failure with hypoxia   Aspiration pneumonia  CXR (11/24) shows increased left lung interstitial opacity and increased RUL airspace disease   Currently on venti-mask  Blood gas shows hypoxemia   On IV Zosyn day #7/7  Pulmonary following    Urinary tract infection  11/8 urine: mixed kristen     Hypertension  Continue on carvedilol, furosemide, and losartan    CVA  Carotid stenosis   History of old infarcts  Repeat MRI brain 11/16/22 showed small to moderate sized acute infarction in the right occipital lobe with minimal/small infarction in the right temporal lobe and severe cerebral atrophy  Continue on DAPT and atorvastatin  Vascular and neurology following     Multiple chronic wounds  PAD  Poor arterial flow and nutritional status complicates wound healing  KRISTI with duplex shows right common femoral artery and proximal superficial femoral artery occluded with collateralization to popliteal artery, left mid to distal superficial artery occluded, with patent distal anterior/posterior tibial and popliteal arteries   Per Dr. Keira Nicolas, follow-up outpatient for carotid stent rather than endarterectomy    DVT Prophylaxis: Lovenox   GI Prophylaxis: Protonix  Code Status: FULL CODE  POA:    Discharge Barriers:   - Respiratory status     Care Plan discussed with: patient and nursing. Son declined hospice at this time. Spoke to son about code status and end of life goals. He would like to keep patient FULL CODE. Strongly encouraged him to speak with his siblings. Overall prognosis poor. Total time spent with patient: >35 minutes.

## 2022-11-24 NOTE — PROGRESS NOTES
Problem: Mobility Impaired (Adult and Pediatric)  Goal: *Acute Goals and Plan of Care (Insert Text)  Description: Patient stated goal: none verbalized  Patient will move from supine to sit and sit to supine , scoot up and down, and roll side to side in bed with moderate assistance  within 7 day(s). -not met  Patient will transfer from bed to chair and chair to bed with moderate assistance  using the least restrictive device within 7 day(s). -not met  Patient will improve static sitting balance to minimal assistance within 1 week(s). -not met    11/24/2022 1101 by Ana Sykes  Outcome: Resolved/Not Met    PHYSICAL THERAPY TREATMENT: WEEKLY REASSESSMENT  Patient: Cathi Pennington (42 y.o. male)  Date: 11/24/2022  Primary Diagnosis: UTI (urinary tract infection) [N39.0]  Procedure(s) (LRB):  PERCUTANEOUS ENDOSCOPIC GASTROSTOMY TUBE INSERTION (N/A)  ESOPHAGOGASTRODUODENOSCOPY (EGD) (N/A) 6 Days Post-Op   Precautions: contact precautions         ASSESSMENT  Patient continues with skilled PT services and is not progressing towards goals. Pt seen for PT reassessment this session. Pt presented from nursing home with weakness and admission for TIA evaluated and recommended for carotid endarterectomy. On 11/9 transferred to ICU overnight for poor responsiveness and for septic shock. CT of the head negative for acute findings. XR right foot with generalized osseous demineralization without fracture or dislocation, tissue gas or osseous erosive. CT of right foot with no abscess or osteomyelitis. Repeat MRI brain 11/16/22 showed small to moderate sized acute infarction in the right occipital lobe with minimal/small infarction in the right temporal lobe and severe cerebral atrophy. PEG placed on 11/18. Pt currently on venti mask upon PT arrival.    At initial PT eval, pt was max Ax2 for bed mob and positioning. Currently, pt presents to PT at the same level.   Nursing present during today's session and pt req total A x2 for positioning in bed and scooting up to [facility]. Pt not able to tolerate attempts at sup to sit or to sit upright in bed. Pt yells out in pain with attempt at mobility. Pt does have contracted R knee, nsg reports pt arrived to hospital with R knee flexion contracture. Pt appears more alert today than described at initial eval and mobility appears to be the same and has not progressed. Pt at this time appears to be functioning at his baseline PLOF. No further acute skilled PT indicated at this time. Recommend nsg continue to provide frequent position changes to prevent skin breakdown. Will d/c PT post tx session. Patient's progression toward goals since last assessment: no progress             PLAN :  Goals have been updated based on progression since last assessment. Patient continues to benefit from skilled intervention to address the above impairments.     Recommendations and Planned Interventions: d/c PT post tx session          Recommendation for discharge: (in order for the patient to meet his/her long term goals)  Long Term Care             SUBJECTIVE:   Patient supine in bed upon PT arrival, nsg present, pt on venti mask    OBJECTIVE DATA SUMMARY:   HISTORY:    Past Medical History:   Diagnosis Date    Cirrhosis ([address]     Diabetes ([facility]     Hypertension     Stroke ([facility]      Past Surgical History:   Procedure Laterality Date    HX BACK SURGERY      HX GI      IR INSERT NON TUNL CVC OVER 5 YRS  11/9/2022       Personal factors and/or comorbidities impacting plan of care:     Home Situation  Home Environment: Long term care  One/Two Story Residence: Other (Comment)  Living Alone: No  Support Systems: Inpatient [facility]  Patient Expects to be Discharged to[de-identified] Long Term Care  Current DME Used/Available at Home: None    EXAMINATION/PRESENTATION/DECISION MAKING:   Critical Behavior:  Neurologic State: Alert, Confused  Orientation Level: Oriented to person  Cognition: Decreased attention/concentration     Hearing: Auditory  Auditory Impairment: None  Skin:  multiple scabs noted B LE      Range Of Motion:     Grossly limited, non functional B LE    Strength:    Unable to formally assess  Grossly 1-/5 B LE       Tone & Sensation:   Intact to LT B LE      Functional Mobility:  Bed Mobility:  Rolling: Total assistance;Assist x2        Scooting: Total assistance;Assist x2  Transfers:  Unable to perform      Ambulation/Gait Training:  Pt non ambulatory     Therapeutic Exercises:   PT attempted PROM B LE, pt unable to tolerate due to inc pain    Functional Measure:  [address] [facility]  How much difficulty does the patient currently have. .. Unable A Lot A Little None   1. Turning over in bed (including adjusting bedclothes, sheets and blankets)? [x] 1   [] 2   [] 3   [] 4   2. Sitting down on and standing up from a chair with arms ( e.g., wheelchair, bedside commode, etc.)   [x] 1   [] 2   [] 3   [] 4   3. Moving from lying on back to sitting on the side of the bed? [x] 1   [] 2   [] 3   [] 4          How much help from another person does the patient currently need. .. Total A Lot A Little None   4. Moving to and from a bed to a chair (including a wheelchair)? [x] 1   [] 2   [] 3   [] 4   5. Need to walk in hospital room? [x] 1   [] 2   [] 3   [] 4   6. Climbing 3-5 steps with a railing? [x] 1   [] 2   [] 3   [] 4   © 2007, Trustees of [facility] [employer], under license to [employer]. All rights reserved     Score: 6/24    Interpretation of Tool:  Represents activities that are increasingly more difficult (i.e. Bed mobility, Transfers, Gait).     Cutoff score 42.9 (18) correlates to a good likelihood of discharging home versus a facility  Raw Score Scale Score Scale Score Standard Error Approximate Degree of Functional Impairment   6 23.55 4.57 100%   7 26.42 4.33 92%   8 28.58 4.04 87%   9 30.55 3.69 81%   10 32.29 3.42 77%   11 33.86 3.22 73%   12 35.33 3.08 69%   13 36.74 2.99 65%   14 38.10 2.95 61%   15 39.45 2.93 58%   16 40.78 2.95 54%   17 42.13 3.03 51%   18 43.63 3.20 47%   19 45.44 3.55 42%   20 47.67 4.06 36%   21 50.25 4.69 29%   22 53.28 5.43 21%   23 56.93 6.22 11%   24 61.14 6.94 0%   [place] Looney Vinoth Calla Stakes, Brenda Aly. [facility] Brochure Andry Spangler -PAC 6-Clicks Functional Assessment Scores Predict [address] Discharge Destination, Physical Therapy, Volume 94, Issue 9, 1 September 2014, Pages 9401-7809, [email or web address]       Pain Rating:  Unable to rate, nsg present and administered pain meds during PT session    Activity Tolerance:   Poor  Please refer to the flowsheet for vital signs taken during this treatment. After treatment patient left in no apparent distress:   Supine in bed, Heels elevated for pressure relief, Bed / chair alarm activated, and Side rails x 3    COMMUNICATION/EDUCATION:   The patients plan of care was discussed with: Registered nurse. Patient is unable to participate in goal setting and plan of care.     Thank you for this referral.  Eden Owen   Time Calculation: 17 mins

## 2022-11-24 NOTE — PROGRESS NOTES
PROGRESS NOTE      Chief Complaints:  Patient appears more coherent. HPI and  Objective: This is vascular follow-up for carotid artery stenosis. Patient appears more coherent today. Patient still requiring 40% Ventimask for hypoxia. Review of Systems:  Unable to assess  EXAM:  Visit Vitals  /61 (BP 1 Location: Left upper arm, BP Patient Position: At rest)   Pulse 69   Temp 97 °F (36.1 °C)   Resp 16   Ht 5' 9\" (1.753 m)   Wt 137 lb 12.6 oz (62.5 kg)   SpO2 99%   BMI 20.35 kg/m²       Patient is awake   Head and neck atraumatic, normocephalic. ENT: No hoarse voice  Cardiac system regular rate rhythm. Pulmonary no audible wheeze  Chest wall excursion normal with respiration cycle  Abdomen is soft not particularly distended. Neurologically nonfocal.  Skin is warm and moist.  Psychosocial: Cooperative. Vascular examination as previously noted no changes.     Recent Results (from the past 24 hour(s))   GLUCOSE, POC    Collection Time: 11/23/22  8:26 AM   Result Value Ref Range    Glucose (POC) 238 (H) 65 - 100 mg/dL    Performed by Marilynn Mo    GLUCOSE, POC    Collection Time: 11/23/22 11:27 AM   Result Value Ref Range    Glucose (POC) 182 (H) 65 - 100 mg/dL    Performed by Marilynn Mo    METABOLIC PANEL, BASIC    Collection Time: 11/23/22 11:53 AM   Result Value Ref Range    Sodium 138 136 - 145 mmol/L    Potassium 4.4 3.5 - 5.1 mmol/L    Chloride 106 97 - 108 mmol/L    CO2 29 21 - 32 mmol/L    Anion gap 3 (L) 5 - 15 mmol/L    Glucose 185 (H) 65 - 100 mg/dL    BUN 14 6 - 20 mg/dL    Creatinine 0.52 (L) 0.70 - 1.30 mg/dL    BUN/Creatinine ratio 27 (H) 12 - 20      eGFR >60 >60 ml/min/1.73m2    Calcium 7.8 (L) 8.5 - 10.1 mg/dL   CBC W/O DIFF    Collection Time: 11/23/22 11:53 AM   Result Value Ref Range    WBC 5.8 4.1 - 11.1 K/uL    RBC 3.30 (L) 4.10 - 5.70 M/uL    HGB 9.5 (L) 12.1 - 17.0 g/dL    HCT 28.8 (L) 36.6 - 50.3 %    MCV 87.3 80.0 - 99.0 FL    MCH 28.8 26.0 - 34.0 PG    MCHC 33.0 30.0 - 36.5 g/dL    RDW 16.8 (H) 11.5 - 14.5 %    PLATELET 901 245 - 957 K/uL    MPV 9.8 8.9 - 12.9 FL    NRBC 0.0 0.0  WBC    ABSOLUTE NRBC 0.00 0.00 - 0.01 K/uL   GLUCOSE, POC    Collection Time: 11/23/22  5:01 PM   Result Value Ref Range    Glucose (POC) 208 (H) 65 - 100 mg/dL    Performed by Danielle Chun    GLUCOSE, POC    Collection Time: 11/23/22 10:47 PM   Result Value Ref Range    Glucose (POC) 180 (H) 65 - 100 mg/dL    Performed by Carlos Conley        ASSESSMENT:   Patient is 70 y.o. with diagnosis of : Active Problems:    CVA (cerebral vascular accident) ([address] (7/31/2022)      Carotid stenosis (8/2/2022)      UTI (urinary tract infection) (11/8/2022)      Septic shock ([address] (11/20/2022)      Infection of right great toe due to methicillin resistant Staphylococcus aureus (MRSA) (34/31/1865)      Metabolic encephalopathy (46/32/4288)      Severe protein-calorie malnutrition ([address] (11/20/2022)      Dysphagia (11/20/2022)      Acute respiratory failure with hypoxia ([address] (11/20/2022)      PAD (peripheral artery disease) ([address] (11/20/2022)        PLAN:                   I left a message to patient's son regarding plan on the carotid intervention. At this time no intervention due to other significant medical comorbid problems including hypoxia. Most likely I will recommend patient to have a carotid stent rather than open endarterectomy due to recent medical event. I will try to reach out again patient's son today.

## 2022-11-25 LAB
ANION GAP SERPL CALC-SCNC: 7 MMOL/L (ref 5–15)
BUN SERPL-MCNC: 15 MG/DL (ref 6–20)
BUN/CREAT SERPL: 28 (ref 12–20)
CA-I BLD-MCNC: 8.1 MG/DL (ref 8.5–10.1)
CHLORIDE SERPL-SCNC: 107 MMOL/L (ref 97–108)
CO2 SERPL-SCNC: 26 MMOL/L (ref 21–32)
CREAT SERPL-MCNC: 0.54 MG/DL (ref 0.7–1.3)
ERYTHROCYTE [DISTWIDTH] IN BLOOD BY AUTOMATED COUNT: 16.9 % (ref 11.5–14.5)
GLUCOSE BLD STRIP.AUTO-MCNC: 129 MG/DL (ref 65–100)
GLUCOSE BLD STRIP.AUTO-MCNC: 140 MG/DL (ref 65–100)
GLUCOSE BLD STRIP.AUTO-MCNC: 162 MG/DL (ref 65–100)
GLUCOSE SERPL-MCNC: 152 MG/DL (ref 65–100)
HCT VFR BLD AUTO: 30.4 % (ref 36.6–50.3)
HGB BLD-MCNC: 9.8 G/DL (ref 12.1–17)
MCH RBC QN AUTO: 29.2 PG (ref 26–34)
MCHC RBC AUTO-ENTMCNC: 32.2 G/DL (ref 30–36.5)
MCV RBC AUTO: 90.5 FL (ref 80–99)
NRBC # BLD: 0 K/UL (ref 0–0.01)
NRBC BLD-RTO: 0 PER 100 WBC
PERFORMED BY, TECHID: ABNORMAL
PLATELET # BLD AUTO: 324 K/UL (ref 150–400)
PMV BLD AUTO: 9.7 FL (ref 8.9–12.9)
POTASSIUM SERPL-SCNC: 4.3 MMOL/L (ref 3.5–5.1)
RBC # BLD AUTO: 3.36 M/UL (ref 4.1–5.7)
SODIUM SERPL-SCNC: 140 MMOL/L (ref 136–145)
WBC # BLD AUTO: 6.8 K/UL (ref 4.1–11.1)

## 2022-11-25 PROCEDURE — 74011636637 HC RX REV CODE- 636/637: Performed by: INTERNAL MEDICINE

## 2022-11-25 PROCEDURE — 94640 AIRWAY INHALATION TREATMENT: CPT

## 2022-11-25 PROCEDURE — 85027 COMPLETE CBC AUTOMATED: CPT

## 2022-11-25 PROCEDURE — 99232 SBSQ HOSP IP/OBS MODERATE 35: CPT | Performed by: INTERNAL MEDICINE

## 2022-11-25 PROCEDURE — 77010033678 HC OXYGEN DAILY

## 2022-11-25 PROCEDURE — 65270000029 HC RM PRIVATE

## 2022-11-25 PROCEDURE — 36415 COLL VENOUS BLD VENIPUNCTURE: CPT

## 2022-11-25 PROCEDURE — 74011000250 HC RX REV CODE- 250: Performed by: INTERNAL MEDICINE

## 2022-11-25 PROCEDURE — 82962 GLUCOSE BLOOD TEST: CPT

## 2022-11-25 PROCEDURE — 94762 N-INVAS EAR/PLS OXIMTRY CONT: CPT

## 2022-11-25 PROCEDURE — 74011000250 HC RX REV CODE- 250: Performed by: STUDENT IN AN ORGANIZED HEALTH CARE EDUCATION/TRAINING PROGRAM

## 2022-11-25 PROCEDURE — 74011250636 HC RX REV CODE- 250/636: Performed by: INTERNAL MEDICINE

## 2022-11-25 PROCEDURE — 74011250637 HC RX REV CODE- 250/637: Performed by: INTERNAL MEDICINE

## 2022-11-25 PROCEDURE — 74011250637 HC RX REV CODE- 250/637: Performed by: STUDENT IN AN ORGANIZED HEALTH CARE EDUCATION/TRAINING PROGRAM

## 2022-11-25 PROCEDURE — 80048 BASIC METABOLIC PNL TOTAL CA: CPT

## 2022-11-25 RX ADMIN — ESCITALOPRAM OXALATE 20 MG: 10 TABLET ORAL at 09:51

## 2022-11-25 RX ADMIN — IPRATROPIUM BROMIDE AND ALBUTEROL SULFATE 3 ML: 2.5; .5 SOLUTION RESPIRATORY (INHALATION) at 20:17

## 2022-11-25 RX ADMIN — IPRATROPIUM BROMIDE AND ALBUTEROL SULFATE 3 ML: 2.5; .5 SOLUTION RESPIRATORY (INHALATION) at 10:54

## 2022-11-25 RX ADMIN — GABAPENTIN 100 MG: 100 CAPSULE ORAL at 21:16

## 2022-11-25 RX ADMIN — POTASSIUM CHLORIDE 20 MEQ: 1.5 POWDER, FOR SOLUTION ORAL at 21:16

## 2022-11-25 RX ADMIN — MELATONIN TAB 5 MG 10 MG: 5 TAB at 21:16

## 2022-11-25 RX ADMIN — ASPIRIN 81 MG 81 MG: 81 TABLET ORAL at 09:51

## 2022-11-25 RX ADMIN — SENNOSIDES AND DOCUSATE SODIUM 1 TABLET: 50; 8.6 TABLET ORAL at 09:51

## 2022-11-25 RX ADMIN — SODIUM CHLORIDE, PRESERVATIVE FREE 10 ML: 5 INJECTION INTRAVENOUS at 05:43

## 2022-11-25 RX ADMIN — CASTOR OIL AND BALSAM, PERU: 788; 87 OINTMENT TOPICAL at 09:00

## 2022-11-25 RX ADMIN — POLYETHYLENE GLYCOL 3350 17 G: 17 POWDER, FOR SOLUTION ORAL at 14:51

## 2022-11-25 RX ADMIN — PANTOPRAZOLE SODIUM 40 MG: 40 GRANULE, DELAYED RELEASE ORAL at 09:51

## 2022-11-25 RX ADMIN — POTASSIUM CHLORIDE 20 MEQ: 1.5 POWDER, FOR SOLUTION ORAL at 09:51

## 2022-11-25 RX ADMIN — IPRATROPIUM BROMIDE AND ALBUTEROL SULFATE 3 ML: 2.5; .5 SOLUTION RESPIRATORY (INHALATION) at 01:07

## 2022-11-25 RX ADMIN — ATORVASTATIN CALCIUM 40 MG: 40 TABLET, FILM COATED ORAL at 09:51

## 2022-11-25 RX ADMIN — ARIPIPRAZOLE 2 MG: 2 TABLET ORAL at 09:51

## 2022-11-25 RX ADMIN — ENOXAPARIN SODIUM 40 MG: 100 INJECTION SUBCUTANEOUS at 09:51

## 2022-11-25 RX ADMIN — TRAMADOL HYDROCHLORIDE 50 MG: 50 TABLET, COATED ORAL at 14:52

## 2022-11-25 RX ADMIN — CARVEDILOL 12.5 MG: 12.5 TABLET, FILM COATED ORAL at 09:51

## 2022-11-25 RX ADMIN — INSULIN LISPRO 2 UNITS: 100 INJECTION, SOLUTION INTRAVENOUS; SUBCUTANEOUS at 16:49

## 2022-11-25 RX ADMIN — TRAMADOL HYDROCHLORIDE 50 MG: 50 TABLET, COATED ORAL at 21:16

## 2022-11-25 RX ADMIN — SODIUM CHLORIDE, PRESERVATIVE FREE 10 ML: 5 INJECTION INTRAVENOUS at 21:27

## 2022-11-25 RX ADMIN — CASTOR OIL AND BALSAM, PERU: 788; 87 OINTMENT TOPICAL at 21:26

## 2022-11-25 RX ADMIN — CLOPIDOGREL BISULFATE 75 MG: 75 TABLET, FILM COATED ORAL at 09:51

## 2022-11-25 RX ADMIN — LOSARTAN POTASSIUM 50 MG: 50 TABLET, FILM COATED ORAL at 09:51

## 2022-11-25 RX ADMIN — IPRATROPIUM BROMIDE AND ALBUTEROL SULFATE 3 ML: 2.5; .5 SOLUTION RESPIRATORY (INHALATION) at 13:52

## 2022-11-25 RX ADMIN — CARVEDILOL 12.5 MG: 12.5 TABLET, FILM COATED ORAL at 21:16

## 2022-11-25 RX ADMIN — SODIUM CHLORIDE, PRESERVATIVE FREE 10 ML: 5 INJECTION INTRAVENOUS at 14:51

## 2022-11-25 RX ADMIN — DOXAZOSIN 1 MG: 2 TABLET ORAL at 22:00

## 2022-11-25 NOTE — PROGRESS NOTES
Infectious Disease Progress Note           Subjective:   Stable, no change in clinical status, denies new complaints, off ventimask, currently on 6L O2   Objective:   Physical Exam:     Visit Vitals  BP (!) 157/64 (BP 1 Location: Right upper arm, BP Patient Position: At rest;Lying)   Pulse 77   Temp 98 °F (36.7 °C)   Resp 18   Ht 5' 9\" (1.753 m)   Wt 137 lb 12.6 oz (62.5 kg)   SpO2 93%   BMI 20.35 kg/m²    O2 Flow Rate (L/min): 6 l/min O2 Device: Nasal cannula    Temp (24hrs), Av.8 °F (36.6 °C), Min:97.3 °F (36.3 °C), Max:98.4 °F (36.9 °C)    No intake/output data recorded.  1901 -  0700  In: 2580   Out: 3838 [Urine:1550]    General: NAD, alert, confused   HEENT: VANGIE, Moist mucosa  Lungs: decreased BS at the bases, no wheeze/rhonchi   Heart: S1S2+, RRR, no murmur  Abdo: Soft, NT, ND, +BS, + peg tube   Exts: Decreased LUE swelling, stable right great toe ulcer   Skin: b/l leg ulcers, stable ulcerations on b/l feet     Data Review:       Recent Days:  Recent Labs     22  0705 22  1456 22  1153   WBC 6.8 7.2 5.8   HGB 9.8* 9.4* 9.5*   HCT 30.4* 29.2* 28.8*    303 308       Recent Labs     22  0705 22  1456 22  1153   BUN 15 16 14   CREA 0.54* 0.55* 0.52*       Lab Results   Component Value Date/Time    C-Reactive protein 1.64 (H) 2022 10:18 AM        Microbiology     Results       Procedure Component Value Units Date/Time    COVID-19 RAPID TEST [[id number]] Collected: 22 0253    Order Status: Completed Specimen: Nasopharyngeal Updated: 22     COVID-19 rapid test Not Detected        Comment: Rapid Abbott ID Now   The specimen is NEGATIVE for SARS-CoV2, the novel coronavirus associated with COVID-19. A negative result does not rule out COVID-19. This test has been authorized by the FDA under an Emergency Use Authorization (EUA) for use by authorized laboratories.    Fact sheet for Healthcare Providers: http://www.naomi.maxine/ Fact sheet for Patients: http://www.naomi.maxine/   Methodology: Isothermal Nucleic Acid Amplification              Diagnostics   CXR Results  (Last 48 hours)                 11/24/22 0810  XR CHEST PORT Final result    Impression:  1. Increased left lung interstitial opacity, favor pulmonary edema. 2. Increased right upper lobe airspace disease may represent pneumonia or edema. Narrative:  PORTABLE CHEST RADIOGRAPH/S: 11/24/2022 8:10 AM       INDICATION: Pneumonia. COMPARISON: 11/22/2022, 1/20/2022, 1/17/2022, 11/15/2022. TECHNIQUE: Portable frontal upright radiograph/s of the chest.       FINDINGS:    Airspace disease along the minor fissure in the right upper lobe has increased   slightly from 11/17/2022, and may represent pneumonia or edema. Interstitial   opacity in the left lung has increased from 11/17/2022, and probably from   11/20/2022, more consistent with pulmonary edema than pneumonia. The central   airways are patent. No pneumothorax and no large pleural effusion. Post CABG. Assessment/Plan     Suspected aspiration PNA. Worsening infiltrates on CXR (11/20)         COVID Ag neg (11/22), off ventimask, currently on 6L O2        Clear lungs on exam, denies productive cough         Completed 7 days of Zosyn. Routine labs in the morning          Right great toe infection  MRSA isolated from Cx of right great toe         No abscess or osteomyelitis on CT of right foot (11/11)         Continue routine wound care     3. CVA w Small to moderate sized acute infarction in the right occipital lobe with  minimal/small infarction in the right temporal lobe on MRI head (11/16))     5. Dysphagia, S/p peg placement 11/18, on tube feeds, tolerating     6.  Diffuse LUE swelling, decreased swelling w arm elevation     John Chow MD    11/25/2022

## 2022-11-25 NOTE — PROGRESS NOTES
IMPRESSION:   Acute hypoxic respiratory failure had resolved now back on nasal oxygen   Diffuse pulmonary infiltrates questionable pulmonary edema versus chronic aspiration  Leukocytosis resolved  Carotid artery disease history of CVA  Hypokalemia      RECOMMENDATIONS/PLAN:   66-year-old male nursing home resident admitted with hypotension and unresponsiveness now he is alert awake agitated now he is on 50% Ventimask alert awake talking following command still in distress  Worsening chest x-ray and hypoxia currently receiving IV fluids received 2 doses Lasix and hold IV fluid BNP 7000  Now he is on nasal cannula  Continue with the suctioning  11/10 Echo ejection fraction 50% IVC dilated left atrium 5 cm RVSP 59  Pulmonary hypertension with probable cor pulmonale  Chest x-ray shows fluid overload congestive changes   Potassium corrected     [x] High complexity decision making was performed  [x] See my orders for details  HPI  66-year-old nursing home resident came in because of unresponsiveness he had a history of stroke in the past patient was hypotensive hypoxic rapid response was called he was admitted to the floor initially received IV fluid hemodynamically unstable started on vasopressors Levophed transferred to ICU White count was elevated he was put on oxygen 4 L nasal cannula unable to get any started the patient he has a right foot wound and multiple wounds of the lower extremities dressing in place. He has carotid artery stenosis only supposed to go for surgery but unable to get cardiac clearance because of nuclear stress test he has 90% stenosis of left ICA and 50% stenosis of right ICA. So now he is admitted to ICU and critical care consult was called  PMH:  has a past medical history of Cirrhosis ([address] Diabetes ([address] Hypertension, and Stroke ([facility] PSH:   has a past surgical history that includes hx back surgery; hx gi; and ir insert non tunl cvc over 5 yrs (11/9/2022).      FHX: family history includes Heart Disease in his father. SHX:  reports that he has quit smoking. He has never used smokeless tobacco. He reports that he does not currently use alcohol. He reports that he does not currently use drugs.     ALL: No Known Allergies     MEDS:   [x] Reviewed - As Below   [] Not reviewed    Current Facility-Administered Medications   Medication    albuterol-ipratropium (DUO-NEB) 2.5 MG-0.5 MG/3 ML    ARIPiprazole (ABILIFY) tablet 2 mg    aspirin chewable tablet 81 mg    carvediloL (COREG) tablet 12.5 mg    doxazosin (CARDURA) tablet 1 mg    escitalopram oxalate (LEXAPRO) tablet 20 mg    gabapentin (NEURONTIN) capsule 100 mg    losartan (COZAAR) tablet 50 mg    melatonin tablet 10 mg    pantoprazole (PROTONIX) granules for oral suspension 40 mg    potassium chloride (KLOR-CON) packet for solution 20 mEq    senna-docusate (PERICOLACE) 8.6-50 mg per tablet 1 Tablet    clopidogreL (PLAVIX) tablet 75 mg    atorvastatin (LIPITOR) tablet 40 mg    albuterol-ipratropium (DUO-NEB) 2.5 MG-0.5 MG/3 ML    balsam peru-castor oiL (VENELEX) ointment    hydrALAZINE (APRESOLINE) 20 mg/mL injection 20 mg    baclofen (LIORESAL) tablet 5 mg    polyethylene glycol (MIRALAX) packet 17 g    traMADoL (ULTRAM) tablet 50 mg    sodium chloride (NS) flush 5-40 mL    sodium chloride (NS) flush 5-40 mL    acetaminophen (TYLENOL) tablet 650 mg    Or    acetaminophen (TYLENOL) suppository 650 mg    ondansetron (ZOFRAN ODT) tablet 4 mg    Or    ondansetron (ZOFRAN) injection 4 mg    enoxaparin (LOVENOX) injection 40 mg    glucose chewable tablet 16 g    glucagon (GLUCAGEN) injection 1 mg    dextrose 10% infusion 0-250 mL    insulin lispro (HUMALOG) injection      MAR reviewed and pertinent medications noted or modified as needed   Current Facility-Administered Medications   Medication    albuterol-ipratropium (DUO-NEB) 2.5 MG-0.5 MG/3 ML    ARIPiprazole (ABILIFY) tablet 2 mg    aspirin chewable tablet 81 mg    carvediloL (COREG) tablet 12.5 mg    doxazosin (CARDURA) tablet 1 mg    escitalopram oxalate (LEXAPRO) tablet 20 mg    gabapentin (NEURONTIN) capsule 100 mg    losartan (COZAAR) tablet 50 mg    melatonin tablet 10 mg    pantoprazole (PROTONIX) granules for oral suspension 40 mg    potassium chloride (KLOR-CON) packet for solution 20 mEq    senna-docusate (PERICOLACE) 8.6-50 mg per tablet 1 Tablet    clopidogreL (PLAVIX) tablet 75 mg    atorvastatin (LIPITOR) tablet 40 mg    albuterol-ipratropium (DUO-NEB) 2.5 MG-0.5 MG/3 ML    balsam peru-castor oiL (VENELEX) ointment    hydrALAZINE (APRESOLINE) 20 mg/mL injection 20 mg    baclofen (LIORESAL) tablet 5 mg    polyethylene glycol (MIRALAX) packet 17 g    traMADoL (ULTRAM) tablet 50 mg    sodium chloride (NS) flush 5-40 mL    sodium chloride (NS) flush 5-40 mL    acetaminophen (TYLENOL) tablet 650 mg    Or    acetaminophen (TYLENOL) suppository 650 mg    ondansetron (ZOFRAN ODT) tablet 4 mg    Or    ondansetron (ZOFRAN) injection 4 mg    enoxaparin (LOVENOX) injection 40 mg    glucose chewable tablet 16 g    glucagon (GLUCAGEN) injection 1 mg    dextrose 10% infusion 0-250 mL    insulin lispro (HUMALOG) injection      PMH:  has a past medical history of Cirrhosis ([facility] Diabetes ([facility] [facility] Hypertension, and Stroke ([facility] [facility] PSH:   has a past surgical history that includes hx back surgery; hx gi; and ir insert non tunl cvc over 5 yrs (11/9/2022). FHX: family history includes Heart Disease in his father. SHX:  reports that he has quit smoking. He has never used smokeless tobacco. He reports that he does not currently use alcohol. He reports that he does not currently use drugs. ROS:Review of systems not obtained due to patient factors. Hemodynamics:    CO:    CI:    CVP:    SVR:   PAP Systolic:    PAP Diastolic:    PVR:    MH33:        Ventilator Settings:      Mode Rate TV Press PEEP FiO2 PIP Min.  Vent               50 %              Vital Signs: Telemetry:    normal sinus rhythm Intake/Output:   Visit Vitals  BP (!) 157/64 (BP 1 Location: Right upper arm, BP Patient Position: At rest;Lying)   Pulse 77   Temp 98 °F (36.7 °C)   Resp 18   Ht 5' 9\" (1.753 m)   Wt 62.5 kg (137 lb 12.6 oz)   SpO2 93%   BMI 20.35 kg/m²       Temp (24hrs), Av.8 °F (36.6 °C), Min:97.3 °F (36.3 °C), Max:98.4 °F (36.9 °C)        O2 Device: Nasal cannula O2 Flow Rate (L/min): 8 l/min       Wt Readings from Last 4 Encounters:   11/10/22 62.5 kg (137 lb 12.6 oz)   22 84.8 kg (187 lb)   22 84.8 kg (187 lb)   10/28/20 99.8 kg (220 lb)          Intake/Output Summary (Last 24 hours) at 2022 1042  Last data filed at 2022 0605  Gross per 24 hour   Intake 1530 ml   Output 650 ml   Net 880 ml         Last shift:      No intake/output data recorded. Last 3 shifts:  1901 -  0700  In: 2580   Out: 1550 [Urine:1550]       Physical Exam:     General: Awake confused does not follow commands  HEENT: NCAT, poor dentition,   Eyes: anicteric; conjunctiva clear extraocular movements intact  Neck: no nodes, trach midline; no accessory MM use.   Neck veins visible  Chest: no deformity,   Cardiac: Regular rate and rhythm  Lungs: Shallow but clear anteriorly with lateral rales no wheezing  Abd: soft, NT, hypoactive BS  Ext: Lower extremity wound bandage in place  : clear urine  Neuro: Awake mumbles follows no commands does move his extremities  Psych-unable to assess  Skin: warm, dry, no cyanosis;   Pulses: Brachial and radial pulses intact  Capillary: Normal capillary refill      DATA:    MAR reviewed and pertinent medications noted or modified as needed  MEDS:   Current Facility-Administered Medications   Medication    albuterol-ipratropium (DUO-NEB) 2.5 MG-0.5 MG/3 ML    ARIPiprazole (ABILIFY) tablet 2 mg    aspirin chewable tablet 81 mg    carvediloL (COREG) tablet 12.5 mg    doxazosin (CARDURA) tablet 1 mg    escitalopram oxalate (LEXAPRO) tablet 20 mg    gabapentin (NEURONTIN) capsule 100 mg losartan (COZAAR) tablet 50 mg    melatonin tablet 10 mg    pantoprazole (PROTONIX) granules for oral suspension 40 mg    potassium chloride (KLOR-CON) packet for solution 20 mEq    senna-docusate (PERICOLACE) 8.6-50 mg per tablet 1 Tablet    clopidogreL (PLAVIX) tablet 75 mg    atorvastatin (LIPITOR) tablet 40 mg    albuterol-ipratropium (DUO-NEB) 2.5 MG-0.5 MG/3 ML    balsam peru-castor oiL (VENELEX) ointment    hydrALAZINE (APRESOLINE) 20 mg/mL injection 20 mg    baclofen (LIORESAL) tablet 5 mg    polyethylene glycol (MIRALAX) packet 17 g    traMADoL (ULTRAM) tablet 50 mg    sodium chloride (NS) flush 5-40 mL    sodium chloride (NS) flush 5-40 mL    acetaminophen (TYLENOL) tablet 650 mg    Or    acetaminophen (TYLENOL) suppository 650 mg    ondansetron (ZOFRAN ODT) tablet 4 mg    Or    ondansetron (ZOFRAN) injection 4 mg    enoxaparin (LOVENOX) injection 40 mg    glucose chewable tablet 16 g    glucagon (GLUCAGEN) injection 1 mg    dextrose 10% infusion 0-250 mL    insulin lispro (HUMALOG) injection        Labs:    Recent Labs     11/25/22  0705 11/24/22  1456 11/23/22  1153   WBC 6.8 7.2 5.8   HGB 9.8* 9.4* 9.5*    303 308       Recent Labs     11/25/22  0705 11/24/22  1456 11/23/22  1153    140 138   K 4.3 4.1 4.4    107 106   CO2 26 28 29   * 159* 185*   BUN 15 16 14   CREA 0.54* 0.55* 0.52*   CA 8.1* 7.8* 7.8*       11/10/2022 Echo ejection fraction 50% IVC dilated left atrium 5 cm RVSP 59  Lab Results   Component Value Date/Time    Culture result:  11/09/2022 02:10 AM     Heavy * methicillin resistant staphylococcus aureus *    Culture result: Heavy Diphtheroids 11/09/2022 02:10 AM    Culture result: No growth 6 days 11/08/2022 01:25 PM     Lab Results   Component Value Date/Time    TSH 1.67 07/30/2022 07:11 AM        Imaging:    Results from Hospital Encounter encounter on 11/08/22    XR CHEST PORT    Narrative  PORTABLE CHEST RADIOGRAPH/S: 11/24/2022 8:10 AM    INDICATION: Pneumonia. COMPARISON: 11/22/2022, 1/20/2022, 1/17/2022, 11/15/2022. TECHNIQUE: Portable frontal upright radiograph/s of the chest.    FINDINGS:  Airspace disease along the minor fissure in the right upper lobe has increased  slightly from 11/17/2022, and may represent pneumonia or edema. Interstitial  opacity in the left lung has increased from 11/17/2022, and probably from  11/20/2022, more consistent with pulmonary edema than pneumonia. The central  airways are patent. No pneumothorax and no large pleural effusion. Post CABG. Impression  1. Increased left lung interstitial opacity, favor pulmonary edema. 2. Increased right upper lobe airspace disease may represent pneumonia or edema. Results from [place] encounter on 11/08/22    CT FOOT RT W CONT    Narrative  EXAM: CT FOOT RT W CONT    INDICATION: Right foot swelling and abscess. Evaluate for osteomyelitis. Hypertension, diabetes, and cirrhosis. COMPARISON: Right foot views on 11/10/2022    CONTRAST: 100 mL of [id number]. TECHNIQUE: Helical CT of the right foot during uneventful rapid bolus  intravenous contrast administration. Coronal and Sagittal reformats were  generated. Images reviewed in soft tissue and bone windows. CT dose reduction  was achieved through the use of a standardized protocol tailored for this  examination and automatic exposure control for dose modulation. FINDINGS: Bones: Mild osteopenia. No fracture or osteomyelitis. Joint fluid: Physiologic. Articulations: no evidence of septic arthritis. 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 Tendons: No full-thickness tendon tear. Muscles: Mild diffuse atrophy. Soft tissue mass: None. No fluid collection. Impression  1. No abscess or osteomyelitis. 2. No fracture.       11/10 patient is barely responsive now on room air off pressors getting IV fluid chest x-ray shows congestive changes IV fluid has been decreased, replace potassium, WBC much improved  11/11 Patient is out of ICU, responding slightly, he is on room air. PCO2 30. WBC improving, continues to decrease. 11/12 On room air, condition remains same open eyes but does not follow any commands, replace potassium  11/13 Room air, no O2 in hospital. He opened his eyes, tried to respond slightly by denying SOB, but unable to communicate clearly. NG tube in place. 11/14 Pt is seen resting, he is not currently on any O2, SpO2 94%. NG tube in place. 11/15 Pt is awake feeling well, more responsive today. He denies SOB. He is still on room air. Pt still has NG tube in place. 11/16 condition same on room air pleasantly confused  11/20 back on oxygen chest x-ray shows worsening congestion and infiltrates.   WBC count is normal with no fever tends to rule out aspiration pneumonia has been receiving D5 half at 60 an hour probably fluid overload from cor pulmonale we will discontinue IV fluids give 2 doses Lasix tonight and check labs and BNP in a.m.  11/21 on oxygen 2 L nasal cannula we will give extra Lasix  11/22 on 50% Ventimask extra dose of Lasix received chest x-ray we will get arterial blood gases  11/23 on Ventimask alert awake received Lasix replace potassium albumin 1.8 chest x-ray shows bilateral congestive changes pleural effusion  11/25 nasal cannula tolerating well

## 2022-11-25 NOTE — PROGRESS NOTES
Spoke with nsg, will cont to hold SLP intervention at this time due to respiratory status and aspiration risk. Will cont to follow with SLP Intervention as indicated and tolerated by pt.   Nsg reports pt is tolerating PEG TF.

## 2022-11-25 NOTE — PROGRESS NOTES
CM reviewed chart and spoke with primary physician. Patient is being weaned from oxygen and physician states he could be ready for discharge tomorrow. CM has updated [address] Patient will return to [facility] when medically ready. Patient is LTC resident and will not need authorization. CM will continue to follow.

## 2022-11-25 NOTE — PROGRESS NOTES
Problem: Falls - Risk of  Goal: *Absence of Falls  Description: Document Sofia Fothergill Fall Risk and appropriate interventions in the flowsheet. Outcome: Progressing Towards Goal  Note: Fall Risk Interventions:  Mobility Interventions: PT Consult for mobility concerns    Mentation Interventions: More frequent rounding, Room close to nurse's station, Toileting rounds    Medication Interventions: Teach patient to arise slowly    Elimination Interventions:  Toileting schedule/hourly rounds, Bed/chair exit alarm              Problem: Patient Education: Go to Patient Education Activity  Goal: Patient/Family Education  Outcome: Progressing Towards Goal     Problem: Discharge Planning  Goal: *Discharge to safe environment  Outcome: Progressing Towards Goal  Goal: *Knowledge of medication management  Outcome: Progressing Towards Goal  Goal: *Knowledge of discharge instructions  Outcome: Progressing Towards Goal     Problem: Patient Education: Go to Patient Education Activity  Goal: Patient/Family Education  Outcome: Progressing Towards Goal     Problem: Sepsis: Day of Diagnosis  Goal: Off Pathway (Use only if patient is Off Pathway)  Outcome: Progressing Towards Goal  Goal: *Fluid resuscitation  Outcome: Progressing Towards Goal  Goal: *Paired blood cultures prior to first dose of antibiotic  Outcome: Progressing Towards Goal  Goal: *First dose of  appropriate antibiotic within 3 hours of arrival to ED, within 1 hour of arrival to ICU  Outcome: Progressing Towards Goal  Goal: *Lactic acid with first set of blood cultures  Outcome: Progressing Towards Goal  Goal: *Pneumococcal immunization (if eligible)  Outcome: Progressing Towards Goal  Goal: *Influenza immunization (if eligible)  Outcome: Progressing Towards Goal  Goal: Activity/Safety  Outcome: Progressing Towards Goal  Goal: Consults, if ordered  Outcome: Progressing Towards Goal  Goal: Diagnostic Test/Procedures  Outcome: Progressing Towards Goal  Goal: Nutrition/Diet  Outcome: Progressing Towards Goal  Goal: Discharge Planning  Outcome: Progressing Towards Goal  Goal: Medications  Outcome: Progressing Towards Goal  Goal: Respiratory  Outcome: Progressing Towards Goal  Goal: Treatments/Interventions/Procedures  Outcome: Progressing Towards Goal  Goal: Psychosocial  Outcome: Progressing Towards Goal     Problem: Sepsis: Day 2  Goal: Off Pathway (Use only if patient is Off Pathway)  Outcome: Progressing Towards Goal  Goal: *Central Venous Pressure maintained at 8-12 mm Hg  Outcome: Progressing Towards Goal  Goal: *Hemodynamically stable  Outcome: Progressing Towards Goal  Goal: *Tolerating diet  Outcome: Progressing Towards Goal  Goal: Activity/Safety  Outcome: Progressing Towards Goal  Goal: Consults, if ordered  Outcome: Progressing Towards Goal  Goal: Diagnostic Test/Procedures  Outcome: Progressing Towards Goal  Goal: Nutrition/Diet  Outcome: Progressing Towards Goal  Goal: Discharge Planning  Outcome: Progressing Towards Goal  Goal: Medications  Outcome: Progressing Towards Goal  Goal: Respiratory  Outcome: Progressing Towards Goal  Goal: Treatments/Interventions/Procedures  Outcome: Progressing Towards Goal  Goal: Psychosocial  Outcome: Progressing Towards Goal     Problem: Sepsis: Day 3  Goal: Off Pathway (Use only if patient is Off Pathway)  Outcome: Progressing Towards Goal  Goal: *Central Venous Pressure maintained at 8-12 mm Hg  Outcome: Progressing Towards Goal  Goal: *Oxygen saturation within defined limits  Outcome: Progressing Towards Goal  Goal: *Vital sign stability  Outcome: Progressing Towards Goal  Goal: *Tolerating diet  Outcome: Progressing Towards Goal  Goal: *Demonstrates progressive activity  Outcome: Progressing Towards Goal  Goal: Activity/Safety  Outcome: Progressing Towards Goal  Goal: Consults, if ordered  Outcome: Progressing Towards Goal  Goal: Diagnostic Test/Procedures  Outcome: Progressing Towards Goal  Goal: Nutrition/Diet  Outcome: Progressing Towards Goal  Goal: Discharge Planning  Outcome: Progressing Towards Goal  Goal: Medications  Outcome: Progressing Towards Goal  Goal: Respiratory  Outcome: Progressing Towards Goal  Goal: Treatments/Interventions/Procedures  Outcome: Progressing Towards Goal  Goal: Psychosocial  Outcome: Progressing Towards Goal     Problem: Sepsis: Day 4  Goal: Off Pathway (Use only if patient is Off Pathway)  Outcome: Progressing Towards Goal  Goal: Activity/Safety  Outcome: Progressing Towards Goal  Goal: Consults, if ordered  Outcome: Progressing Towards Goal  Goal: Diagnostic Test/Procedures  Outcome: Progressing Towards Goal  Goal: Nutrition/Diet  Outcome: Progressing Towards Goal  Goal: Discharge Planning  Outcome: Progressing Towards Goal  Goal: Medications  Outcome: Progressing Towards Goal  Goal: Respiratory  Outcome: Progressing Towards Goal  Goal: Treatments/Interventions/Procedures  Outcome: Progressing Towards Goal  Goal: Psychosocial  Outcome: Progressing Towards Goal  Goal: *Oxygen saturation within defined limits  Outcome: Progressing Towards Goal  Goal: *Hemodynamically stable  Outcome: Progressing Towards Goal  Goal: *Vital signs within defined limits  Outcome: Progressing Towards Goal  Goal: *Tolerating diet  Outcome: Progressing Towards Goal  Goal: *Demonstrates progressive activity  Outcome: Progressing Towards Goal  Goal: *Fluid volume maintenance  Outcome: Progressing Towards Goal     Problem: Sepsis: Day 5  Goal: Off Pathway (Use only if patient is Off Pathway)  Outcome: Progressing Towards Goal  Goal: *Oxygen saturation within defined limits  Outcome: Progressing Towards Goal  Goal: *Vital signs within defined limits  Outcome: Progressing Towards Goal  Goal: *Tolerating diet  Outcome: Progressing Towards Goal  Goal: *Demonstrates progressive activity  Outcome: Progressing Towards Goal  Goal: *Discharge plan identified  Outcome: Progressing Towards Goal  Goal: Activity/Safety  Outcome: Progressing Towards Goal  Goal: Consults, if ordered  Outcome: Progressing Towards Goal  Goal: Diagnostic Test/Procedures  Outcome: Progressing Towards Goal  Goal: Nutrition/Diet  Outcome: Progressing Towards Goal  Goal: Discharge Planning  Outcome: Progressing Towards Goal  Goal: Medications  Outcome: Progressing Towards Goal  Goal: Respiratory  Outcome: Progressing Towards Goal  Goal: Treatments/Interventions/Procedures  Outcome: Progressing Towards Goal  Goal: Psychosocial  Outcome: Progressing Towards Goal

## 2022-11-25 NOTE — PROGRESS NOTES
Hospitalist Progress Note    Subjective:   Daily Progress Note: 11/25/2022 1:13 PM    Hospital Course: Carlos Enrique Peng is a 79-year-old male with a PMH of stroke, hypertension, DM, and cirrhosis who presented from nursing home with weakness and hypotension x1 day. Of note recent admission for TIA evaluated and recommended for carotid endarterectomy. Found hypotensive on arrival to the ED. Initial labs significant for WBC of 15.8, creatinine of 1.46, lactic acid of 2.4, and glucose of 134. UA consistent with urinary tract infection. CT of the head with no acute process. Patient admitted for further work-up. Patient started on IV fluids and ceftriaxone. Vascular surgery, cardiology, and podiatry consulted. KRISTI with duplex shows right common femoral artery and proximal superficial femoral artery occluded with collateralization to popliteal artery, left mid to distal superficial artery occluded, with patent distal anterior/posterior tibial artery, popliteal artery patent and with patent distal anterior tibial artery and posterior tibial artery. On 11/9 transferred to ICU overnight for poor responsiveness and for septic shock. CT of the head negative for acute findings. Patient started on meropenem, vancomycin, IV fluids, and Levophed. Pulmonology and ID consulted. ECHO showed EF of 50-55% with mild MV R and dilated left atrium. XR right foot with generalized osseous demineralization without fracture or dislocation, tissue gas or osseous erosive. CT of right foot with no abscess or osteomyelitis. Patient has carotid artery stenosis and supposed to go for surgery but unable to get cardiac clearance because of nuclear stress test he has 90% stenosis of left ICA and 50% stenosis of right ICA. Urine culture grew mixed kristen. Neurology and psychiatry consulted for confusion. MRI 7/2022 showed acute ischemic stroke of the right parietal area.  Repeat MRI brain 11/16/22 showed small to moderate sized acute infarction in the right occipital lobe with minimal/small infarction in the right temporal lobe and severe cerebral atrophy. Continue DAPT and atorvastatin. Patient has had NG tube for nutrition since admission. Speech therapy working with patient. GI consult to evaluate for PEG placement. on 11/15 fever spike of 100.6F. 11/17 CXR with no significant change in basilar predominant opacities. Started on zosyn. PEG placed on 11/18. Patient tolerating PEG tube feedings. MBS positive for penetration on 11/21. SLP recommending NPO. Hospice consulted. Son declining hospice at this time. Oxygen requirements increasing, placed on venti-mask. Chest x-ray suggestive of aspiration pneumonia. Weaned to 8 L NC. Subjective:    Patient seen and examined at bedside. Alert this morning, confused at baseline. Off venti-mask, on 8 L NC.      Current Facility-Administered Medications   Medication Dose Route Frequency    albuterol-ipratropium (DUO-NEB) 2.5 MG-0.5 MG/3 ML  3 mL Nebulization Q6H RT    ARIPiprazole (ABILIFY) tablet 2 mg  2 mg Per G Tube DAILY    aspirin chewable tablet 81 mg  81 mg Per G Tube DAILY    carvediloL (COREG) tablet 12.5 mg  12.5 mg Per G Tube BID    doxazosin (CARDURA) tablet 1 mg  1 mg Per G Tube QHS    escitalopram oxalate (LEXAPRO) tablet 20 mg  20 mg Per G Tube DAILY    gabapentin (NEURONTIN) capsule 100 mg  100 mg Per G Tube QHS    losartan (COZAAR) tablet 50 mg  50 mg Per G Tube DAILY    melatonin tablet 10 mg  10 mg Per G Tube QHS    pantoprazole (PROTONIX) granules for oral suspension 40 mg  40 mg Per G Tube DAILY    potassium chloride (KLOR-CON) packet for solution 20 mEq  20 mEq Per G Tube BID    senna-docusate (PERICOLACE) 8.6-50 mg per tablet 1 Tablet  1 Tablet Per G Tube DAILY    clopidogreL (PLAVIX) tablet 75 mg  75 mg PEG Tube DAILY    atorvastatin (LIPITOR) tablet 40 mg  40 mg Per G Tube DAILY    albuterol-ipratropium (DUO-NEB) 2.5 MG-0.5 MG/3 ML  3 mL Nebulization Q6H PRN    balsam peru-castor oiL (VENELEX) ointment   Topical BID    hydrALAZINE (APRESOLINE) 20 mg/mL injection 20 mg  20 mg IntraVENous Q6H PRN    baclofen (LIORESAL) tablet 5 mg  5 mg Per NG tube BID PRN    polyethylene glycol (MIRALAX) packet 17 g  17 g Per NG tube DAILY PRN    traMADoL (ULTRAM) tablet 50 mg  50 mg Per NG tube Q6H PRN    sodium chloride (NS) flush 5-40 mL  5-40 mL IntraVENous Q8H    sodium chloride (NS) flush 5-40 mL  5-40 mL IntraVENous PRN    acetaminophen (TYLENOL) tablet 650 mg  650 mg Oral Q6H PRN    Or    acetaminophen (TYLENOL) suppository 650 mg  650 mg Rectal Q6H PRN    ondansetron (ZOFRAN ODT) tablet 4 mg  4 mg Oral Q8H PRN    Or    ondansetron (ZOFRAN) injection 4 mg  4 mg IntraVENous Q6H PRN    enoxaparin (LOVENOX) injection 40 mg  40 mg SubCUTAneous DAILY    glucose chewable tablet 16 g  4 Tablet Oral PRN    glucagon (GLUCAGEN) injection 1 mg  1 mg IntraMUSCular PRN    dextrose 10% infusion 0-250 mL  0-250 mL IntraVENous PRN    insulin lispro (HUMALOG) injection   SubCUTAneous AC&HS        Review of Systems  Unable to perform ROS: Acuity of condition       Objective:     Visit Vitals  BP (!) 157/64 (BP 1 Location: Right upper arm, BP Patient Position: At rest;Lying)   Pulse 77   Temp 98 °F (36.7 °C)   Resp 18   Ht 5' 9\" (1.753 m)   Wt 62.5 kg (137 lb 12.6 oz)   SpO2 93%   BMI 20.35 kg/m²    O2 Flow Rate (L/min): 8 l/min O2 Device: Nasal cannula    Temp (24hrs), Av.8 °F (36.6 °C), Min:97.3 °F (36.3 °C), Max:98.4 °F (36.9 °C)      No intake/output data recorded.  1901 -  0700  In: 2580   Out: 1550 [Urine:1550]    PHYSICAL EXAM:  Constitutional:  ill-appearing, lethargic male. In mild distress. Skin: Generalized pallor. Scabs seen throughout lower extremities. HEENT: Sclerae anicteric. PERRL. No oral ulcers. The neck is supple and no masses. Cardiovascular: Regular rate and rhythm. +S1/S2. No murmur or gallop. Respiratory:  Decreased air entry at bases with coarse rhonchi bilaterally. On venti-mask.   GI: PEG tube in place. Abdomen nondistended, soft, and nontender. Normal active bowel sounds. Rectal: Deferred   Musculoskeletal: No pitting edema of the lower legs. Able to move all ext  Neurological:  Patient is disoriented.   Psychiatric: Unable to assess       Data Review    Recent Results (from the past 24 hour(s))   GLUCOSE, POC    Collection Time: 11/24/22 12:33 PM   Result Value Ref Range    Glucose (POC) 125 (H) 65 - 100 mg/dL    Performed by Latasha Ho    METABOLIC PANEL, BASIC    Collection Time: 11/24/22  2:56 PM   Result Value Ref Range    Sodium 140 136 - 145 mmol/L    Potassium 4.1 3.5 - 5.1 mmol/L    Chloride 107 97 - 108 mmol/L    CO2 28 21 - 32 mmol/L    Anion gap 5 5 - 15 mmol/L    Glucose 159 (H) 65 - 100 mg/dL    BUN 16 6 - 20 mg/dL    Creatinine 0.55 (L) 0.70 - 1.30 mg/dL    BUN/Creatinine ratio 29 (H) 12 - 20      eGFR >60 >60 ml/min/1.73m2    Calcium 7.8 (L) 8.5 - 10.1 mg/dL   CBC W/O DIFF    Collection Time: 11/24/22  2:56 PM   Result Value Ref Range    WBC 7.2 4.1 - 11.1 K/uL    RBC 3.23 (L) 4.10 - 5.70 M/uL    HGB 9.4 (L) 12.1 - 17.0 g/dL    HCT 29.2 (L) 36.6 - 50.3 %    MCV 90.4 80.0 - 99.0 FL    MCH 29.1 26.0 - 34.0 PG    MCHC 32.2 30.0 - 36.5 g/dL    RDW 16.9 (H) 11.5 - 14.5 %    PLATELET 663 733 - 318 K/uL    MPV 9.9 8.9 - 12.9 FL    NRBC 0.0 0.0  WBC    ABSOLUTE NRBC 0.00 0.00 - 0.01 K/uL   GLUCOSE, POC    Collection Time: 11/24/22  5:13 PM   Result Value Ref Range    Glucose (POC) 158 (H) 65 - 100 mg/dL    Performed by Lisbet Iqbal    GLUCOSE, POC    Collection Time: 11/24/22  7:34 PM   Result Value Ref Range    Glucose (POC) 180 (H) 65 - 100 mg/dL    Performed by Guera Shannon    METABOLIC PANEL, BASIC    Collection Time: 11/25/22  7:05 AM   Result Value Ref Range    Sodium 140 136 - 145 mmol/L    Potassium 4.3 3.5 - 5.1 mmol/L    Chloride 107 97 - 108 mmol/L    CO2 26 21 - 32 mmol/L    Anion gap 7 5 - 15 mmol/L    Glucose 152 (H) 65 - 100 mg/dL    BUN 15 6 - 20 mg/dL Creatinine 0.54 (L) 0.70 - 1.30 mg/dL    BUN/Creatinine ratio 28 (H) 12 - 20      eGFR >60 >60 ml/min/1.73m2    Calcium 8.1 (L) 8.5 - 10.1 mg/dL   CBC W/O DIFF    Collection Time: 11/25/22  7:05 AM   Result Value Ref Range    WBC 6.8 4.1 - 11.1 K/uL    RBC 3.36 (L) 4.10 - 5.70 M/uL    HGB 9.8 (L) 12.1 - 17.0 g/dL    HCT 30.4 (L) 36.6 - 50.3 %    MCV 90.5 80.0 - 99.0 FL    MCH 29.2 26.0 - 34.0 PG    MCHC 32.2 30.0 - 36.5 g/dL    RDW 16.9 (H) 11.5 - 14.5 %    PLATELET 798 603 - 020 K/uL    MPV 9.7 8.9 - 12.9 FL    NRBC 0.0 0.0  WBC    ABSOLUTE NRBC 0.00 0.00 - 0.01 K/uL   GLUCOSE, POC    Collection Time: 11/25/22  8:11 AM   Result Value Ref Range    Glucose (POC) 140 (H) 65 - 100 mg/dL    Performed by Fely Joyner        XR CHEST PORT   Final Result   1. Increased left lung interstitial opacity, favor pulmonary edema. 2. Increased right upper lobe airspace disease may represent pneumonia or edema. XR CHEST PORT   Final Result   1. Patchy diffuse bilateral airspace disease and possible trace effusions,   unchanged. XR SWALLOW FUNC VIDEO   Final Result   Penetration as described above. XR CHEST PORT   Final Result   Worsening diffuse airspace disease with areas of increasing consolidation. XR CHEST PORT   Final Result   Interval advancement of nasogastric tube, although tip not included on this   study. Evaluation of nasogastric tube position is typically better performed   with KUB. XR CHEST PORT   Final Result   1. No significant change in basilar predominant opacities. These are   nonspecific but can be seen with aspiration pneumonitis/pneumonia in the   appropriate setting. 2.  Enteric tube tip projects just past the gastroesophageal junction with   proximal sidehole in the distal esophagus. Consider advancing.        MRI BRAIN WO CONT   Final Result   Small to moderate sized acute infarction in the right occipital lobe with   minimal/small infarction in the right temporal lobe.      Severe cerebral atrophy. There is no intracranial mass, hemorrhage. There are numerous scattered chronic infarcts. No additional acute intracranial process is demonstrated. XR CHEST PORT   Final Result   Interval retraction of nasogastric tube with sidehole over the gastroesophageal   junction. XR CHEST PORT   Final Result   Interval replacement of nasogastric tube, with tip over the gastric fundus. XR CHEST PORT   Final Result   No significant change. XR CHEST PORT   Final Result   1. NG tube terminates in the gastric fundus, with possible kink in the side   hole. Correlate with function. 2.  New patchy consolidation in the right midlung is concerning for pneumonia. Grossly unchanged patchy retrocardiac opacities. Stable trace right pleural   effusion. XR CHEST PORT   Final Result   Nasogastric tube tip advanced overlying the gastric body. XR CHEST PORT   Final Result   Nasogastric tube tip over the gastric fundus. XR CHEST PORT   Final Result   Increased mild interstitial pulmonary edema versus pneumonia. Enteric tube extends into the gastric antrum or first portion of the duodenum. Consider retraction 8-10 cm if the intention is gastric luminal termination. CT FOOT RT W CONT   Final Result      1. No abscess or osteomyelitis. 2. No fracture. DUPLEX LOWER EXT ARTERY BILAT   Final Result      XR FOOT RT MIN 3 V   Final Result   No osseous erosion or soft tissue gas. .      XR CHEST PORT   Final Result      Mild interstitial opacities which may represent mild pulmonary edema unchanged         XR CHEST PORT   Final Result   Addendum (preliminary) 1 of 1   Addendum: NG tube is in appropriate position tip extends below the    diaphragm. Right IJ catheter. No pneumothorax on the right. Final   1. Status post right IJ catheter placement without evidence of complication.        IR INSERT NON TUNL CVC OVER 5 YRS   Final Result Technically successful ultrasound guided placement of a right internal jugular   vein temporary central venous catheter. A post procedure chest x-ray is   pending. CT HEAD WO CONT   Final Result   No acute process or change compared to the prior exam.            XR CHEST PORT   Final Result   No acute process.       ROLANDO York    (Results Pending)       Active Problems:    CVA (cerebral vascular accident) ([address] (7/31/2022)      Carotid stenosis (8/2/2022)      UTI (urinary tract infection) (11/8/2022)      Septic shock ([address] (11/20/2022)      Infection of right great toe due to methicillin resistant Staphylococcus aureus (MRSA) (77/50/4688)      Metabolic encephalopathy (09/29/3402)      Severe protein-calorie malnutrition ([address] (11/20/2022)      Dysphagia (11/20/2022)      Acute respiratory failure with hypoxia ([address] (11/20/2022)      PAD (peripheral artery disease) ([address] (11/20/2022)      Assessment/Plan:     SIRS, suspected aspiration PNA  Tmax 100.6F, WBC WNLs  On Zosyn day #9  ID following     Severe sepsis with septic shock - resolved  2/2 UTI and right great toe ulcer  S/p vancomycin and meropenem  11/8 blood: no growth     Great right toe infection  11/9 wound: MRSA  XR of right foot without fracture, dislocation, soft tissue gas, or osseous erosion  CT of right foot with no abscess or osteomyelitis  S/p vancomycin for 7 days  Continue routine wound care  ID and podiatry following    Metabolic encephalopathy - improving     Severe protein calorie malnutrition  Status post PEG tube  GI following    Dysphagia  SLP recommending npo  MBS positive for penetration/aspiration     Acute respiratory failure with hypoxia   Aspiration pneumonia  CXR (11/24) shows increased left lung interstitial opacity and increased RUL airspace disease   Weaned off venti-mask, currently on 8 L NC  On IV Zosyn day #9  Pulmonary following    Urinary tract infection  11/8 urine: mixed kristen Hypertension  Continue on carvedilol, furosemide, and losartan    CVA  Carotid stenosis   History of old infarcts  Repeat MRI brain 11/16/22 showed small to moderate sized acute infarction in the right occipital lobe with minimal/small infarction in the right temporal lobe and severe cerebral atrophy  Continue on DAPT and atorvastatin  Vascular and neurology following     Multiple chronic wounds  PAD  Poor arterial flow and nutritional status complicates wound healing  KRISTI with duplex shows right common femoral artery and proximal superficial femoral artery occluded with collateralization to popliteal artery, left mid to distal superficial artery occluded, with patent distal anterior/posterior tibial and popliteal arteries   Per Dr. Angy Andrade, follow-up outpatient for carotid stent rather than endarterectomy    DVT Prophylaxis: Lovenox   GI Prophylaxis: Protonix  Code Status: FULL CODE  POA:    Discharge Barriers:   - Respiratory status     Care Plan discussed with: patient and nursing. Son declined hospice at this time. Spoke to son about code status and end of life goals. He would like to keep patient FULL CODE. Strongly encouraged him to speak with his siblings. Overall prognosis poor. Total time spent with patient: >35 minutes.

## 2022-11-26 LAB
ANION GAP SERPL CALC-SCNC: 4 MMOL/L (ref 5–15)
BUN SERPL-MCNC: 12 MG/DL (ref 6–20)
BUN/CREAT SERPL: 23 (ref 12–20)
CA-I BLD-MCNC: 8.2 MG/DL (ref 8.5–10.1)
CHLORIDE SERPL-SCNC: 106 MMOL/L (ref 97–108)
CO2 SERPL-SCNC: 27 MMOL/L (ref 21–32)
CREAT SERPL-MCNC: 0.52 MG/DL (ref 0.7–1.3)
GLUCOSE BLD STRIP.AUTO-MCNC: 119 MG/DL (ref 65–100)
GLUCOSE BLD STRIP.AUTO-MCNC: 133 MG/DL (ref 65–100)
GLUCOSE BLD STRIP.AUTO-MCNC: 152 MG/DL (ref 65–100)
GLUCOSE BLD STRIP.AUTO-MCNC: 190 MG/DL (ref 65–100)
GLUCOSE SERPL-MCNC: 135 MG/DL (ref 65–100)
PERFORMED BY, TECHID: ABNORMAL
POTASSIUM SERPL-SCNC: 4 MMOL/L (ref 3.5–5.1)
SODIUM SERPL-SCNC: 137 MMOL/L (ref 136–145)

## 2022-11-26 PROCEDURE — 74011250636 HC RX REV CODE- 250/636: Performed by: INTERNAL MEDICINE

## 2022-11-26 PROCEDURE — 74011000250 HC RX REV CODE- 250: Performed by: INTERNAL MEDICINE

## 2022-11-26 PROCEDURE — 94640 AIRWAY INHALATION TREATMENT: CPT

## 2022-11-26 PROCEDURE — 36415 COLL VENOUS BLD VENIPUNCTURE: CPT

## 2022-11-26 PROCEDURE — 82962 GLUCOSE BLOOD TEST: CPT

## 2022-11-26 PROCEDURE — 77010033678 HC OXYGEN DAILY

## 2022-11-26 PROCEDURE — 94762 N-INVAS EAR/PLS OXIMTRY CONT: CPT

## 2022-11-26 PROCEDURE — 74011250637 HC RX REV CODE- 250/637: Performed by: STUDENT IN AN ORGANIZED HEALTH CARE EDUCATION/TRAINING PROGRAM

## 2022-11-26 PROCEDURE — 80048 BASIC METABOLIC PNL TOTAL CA: CPT

## 2022-11-26 PROCEDURE — 65270000029 HC RM PRIVATE

## 2022-11-26 PROCEDURE — 74011250637 HC RX REV CODE- 250/637: Performed by: INTERNAL MEDICINE

## 2022-11-26 PROCEDURE — 74011000250 HC RX REV CODE- 250: Performed by: STUDENT IN AN ORGANIZED HEALTH CARE EDUCATION/TRAINING PROGRAM

## 2022-11-26 RX ADMIN — CARVEDILOL 12.5 MG: 12.5 TABLET, FILM COATED ORAL at 21:25

## 2022-11-26 RX ADMIN — CARVEDILOL 12.5 MG: 12.5 TABLET, FILM COATED ORAL at 10:34

## 2022-11-26 RX ADMIN — ESCITALOPRAM OXALATE 20 MG: 10 TABLET ORAL at 10:34

## 2022-11-26 RX ADMIN — SENNOSIDES AND DOCUSATE SODIUM 1 TABLET: 50; 8.6 TABLET ORAL at 10:35

## 2022-11-26 RX ADMIN — POTASSIUM CHLORIDE 20 MEQ: 1.5 POWDER, FOR SOLUTION ORAL at 10:34

## 2022-11-26 RX ADMIN — DOXAZOSIN 1 MG: 2 TABLET ORAL at 21:24

## 2022-11-26 RX ADMIN — SODIUM CHLORIDE, PRESERVATIVE FREE 10 ML: 5 INJECTION INTRAVENOUS at 03:53

## 2022-11-26 RX ADMIN — MELATONIN TAB 5 MG 10 MG: 5 TAB at 21:25

## 2022-11-26 RX ADMIN — LOSARTAN POTASSIUM 50 MG: 50 TABLET, FILM COATED ORAL at 10:34

## 2022-11-26 RX ADMIN — IPRATROPIUM BROMIDE AND ALBUTEROL SULFATE 3 ML: 2.5; .5 SOLUTION RESPIRATORY (INHALATION) at 13:32

## 2022-11-26 RX ADMIN — SODIUM CHLORIDE, PRESERVATIVE FREE 10 ML: 5 INJECTION INTRAVENOUS at 21:28

## 2022-11-26 RX ADMIN — CLOPIDOGREL BISULFATE 75 MG: 75 TABLET, FILM COATED ORAL at 10:35

## 2022-11-26 RX ADMIN — ATORVASTATIN CALCIUM 40 MG: 40 TABLET, FILM COATED ORAL at 10:34

## 2022-11-26 RX ADMIN — PANTOPRAZOLE SODIUM 40 MG: 40 GRANULE, DELAYED RELEASE ORAL at 10:35

## 2022-11-26 RX ADMIN — ARIPIPRAZOLE 2 MG: 2 TABLET ORAL at 10:35

## 2022-11-26 RX ADMIN — ENOXAPARIN SODIUM 40 MG: 100 INJECTION SUBCUTANEOUS at 10:35

## 2022-11-26 RX ADMIN — IPRATROPIUM BROMIDE AND ALBUTEROL SULFATE 3 ML: 2.5; .5 SOLUTION RESPIRATORY (INHALATION) at 20:34

## 2022-11-26 RX ADMIN — IPRATROPIUM BROMIDE AND ALBUTEROL SULFATE 3 ML: 2.5; .5 SOLUTION RESPIRATORY (INHALATION) at 01:45

## 2022-11-26 RX ADMIN — TRAMADOL HYDROCHLORIDE 50 MG: 50 TABLET, COATED ORAL at 03:53

## 2022-11-26 RX ADMIN — GABAPENTIN 100 MG: 100 CAPSULE ORAL at 21:25

## 2022-11-26 RX ADMIN — POTASSIUM CHLORIDE 20 MEQ: 1.5 POWDER, FOR SOLUTION ORAL at 21:25

## 2022-11-26 RX ADMIN — ASPIRIN 81 MG 81 MG: 81 TABLET ORAL at 10:34

## 2022-11-26 RX ADMIN — TRAMADOL HYDROCHLORIDE 50 MG: 50 TABLET, COATED ORAL at 23:50

## 2022-11-26 RX ADMIN — CASTOR OIL AND BALSAM, PERU: 788; 87 OINTMENT TOPICAL at 21:35

## 2022-11-26 RX ADMIN — IPRATROPIUM BROMIDE AND ALBUTEROL SULFATE 3 ML: 2.5; .5 SOLUTION RESPIRATORY (INHALATION) at 08:37

## 2022-11-26 RX ADMIN — CASTOR OIL AND BALSAM, PERU: 788; 87 OINTMENT TOPICAL at 09:00

## 2022-11-26 NOTE — PROGRESS NOTES
Hospitalist Progress Note    Subjective:   Daily Progress Note: 11/26/2022 1:13 PM    Hospital Course: Braden Quintana is a 72-year-old male with a PMH of stroke, hypertension, DM, and cirrhosis who presented from nursing home with weakness and hypotension x1 day. Of note recent admission for TIA evaluated and recommended for carotid endarterectomy. Found hypotensive on arrival to the ED. Initial labs significant for WBC of 15.8, creatinine of 1.46, lactic acid of 2.4, and glucose of 134. UA consistent with urinary tract infection. CT of the head with no acute process. Patient admitted for further work-up. Patient started on IV fluids and ceftriaxone. Vascular surgery, cardiology, and podiatry consulted. KRISTI with duplex shows right common femoral artery and proximal superficial femoral artery occluded with collateralization to popliteal artery, left mid to distal superficial artery occluded, with patent distal anterior/posterior tibial artery, popliteal artery patent and with patent distal anterior tibial artery and posterior tibial artery. On 11/9 transferred to ICU overnight for poor responsiveness and for septic shock. CT of the head negative for acute findings. Patient started on meropenem, vancomycin, IV fluids, and Levophed. Pulmonology and ID consulted. ECHO showed EF of 50-55% with mild MV R and dilated left atrium. XR right foot with generalized osseous demineralization without fracture or dislocation, tissue gas or osseous erosive. CT of right foot with no abscess or osteomyelitis. Patient has carotid artery stenosis and supposed to go for surgery but unable to get cardiac clearance because of nuclear stress test he has 90% stenosis of left ICA and 50% stenosis of right ICA. Urine culture grew mixed kristen. Neurology and psychiatry consulted for confusion. MRI 7/2022 showed acute ischemic stroke of the right parietal area.  Repeat MRI brain 11/16/22 showed small to moderate sized acute infarction in the right occipital lobe with minimal/small infarction in the right temporal lobe and severe cerebral atrophy. Continue DAPT and atorvastatin. Patient has had NG tube for nutrition since admission. Speech therapy working with patient. GI consult to evaluate for PEG placement. on 11/15 fever spike of 100.6F. 11/17 CXR with no significant change in basilar predominant opacities. Started on zosyn. PEG placed on 11/18. Patient tolerating PEG tube feedings. MBS positive for penetration on 11/21. SLP recommending NPO. Hospice consulted. Son declining hospice at this time. Oxygen requirements increasing, placed on venti-mask. Chest x-ray suggestive of aspiration pneumonia. Weaned to 6 L NC. Subjective:    Patient seen and examined at bedside. Alert this morning, confused at baseline. Doing well on 6 L NC.      Current Facility-Administered Medications   Medication Dose Route Frequency    albuterol-ipratropium (DUO-NEB) 2.5 MG-0.5 MG/3 ML  3 mL Nebulization Q6H RT    ARIPiprazole (ABILIFY) tablet 2 mg  2 mg Per G Tube DAILY    aspirin chewable tablet 81 mg  81 mg Per G Tube DAILY    carvediloL (COREG) tablet 12.5 mg  12.5 mg Per G Tube BID    doxazosin (CARDURA) tablet 1 mg  1 mg Per G Tube QHS    escitalopram oxalate (LEXAPRO) tablet 20 mg  20 mg Per G Tube DAILY    gabapentin (NEURONTIN) capsule 100 mg  100 mg Per G Tube QHS    losartan (COZAAR) tablet 50 mg  50 mg Per G Tube DAILY    melatonin tablet 10 mg  10 mg Per G Tube QHS    pantoprazole (PROTONIX) granules for oral suspension 40 mg  40 mg Per G Tube DAILY    potassium chloride (KLOR-CON) packet for solution 20 mEq  20 mEq Per G Tube BID    senna-docusate (PERICOLACE) 8.6-50 mg per tablet 1 Tablet  1 Tablet Per G Tube DAILY    clopidogreL (PLAVIX) tablet 75 mg  75 mg PEG Tube DAILY    atorvastatin (LIPITOR) tablet 40 mg  40 mg Per G Tube DAILY    albuterol-ipratropium (DUO-NEB) 2.5 MG-0.5 MG/3 ML  3 mL Nebulization Q6H PRN    balsam peru-castor oiL (VENELEX) ointment   Topical BID    hydrALAZINE (APRESOLINE) 20 mg/mL injection 20 mg  20 mg IntraVENous Q6H PRN    baclofen (LIORESAL) tablet 5 mg  5 mg Per NG tube BID PRN    polyethylene glycol (MIRALAX) packet 17 g  17 g Per NG tube DAILY PRN    traMADoL (ULTRAM) tablet 50 mg  50 mg Per NG tube Q6H PRN    sodium chloride (NS) flush 5-40 mL  5-40 mL IntraVENous Q8H    sodium chloride (NS) flush 5-40 mL  5-40 mL IntraVENous PRN    acetaminophen (TYLENOL) tablet 650 mg  650 mg Oral Q6H PRN    Or    acetaminophen (TYLENOL) suppository 650 mg  650 mg Rectal Q6H PRN    ondansetron (ZOFRAN ODT) tablet 4 mg  4 mg Oral Q8H PRN    Or    ondansetron (ZOFRAN) injection 4 mg  4 mg IntraVENous Q6H PRN    enoxaparin (LOVENOX) injection 40 mg  40 mg SubCUTAneous DAILY    glucose chewable tablet 16 g  4 Tablet Oral PRN    glucagon (GLUCAGEN) injection 1 mg  1 mg IntraMUSCular PRN    dextrose 10% infusion 0-250 mL  0-250 mL IntraVENous PRN    insulin lispro (HUMALOG) injection   SubCUTAneous AC&HS        Review of Systems  Unable to perform ROS: Acuity of condition       Objective:     Visit Vitals  BP (!) 151/69 (BP 1 Location: Right upper arm, BP Patient Position: At rest;Semi fowlers) Comment: Notified RN   Pulse 77   Temp 97.8 °F (36.6 °C)   Resp 20   Ht 5' 9\" (1.753 m)   Wt 62.5 kg (137 lb 12.6 oz)   SpO2 96%   BMI 20.35 kg/m²    O2 Flow Rate (L/min): 4 l/min (mid flow) O2 Device: Nasal cannula    Temp (24hrs), Av.8 °F (36.6 °C), Min:97.6 °F (36.4 °C), Max:98 °F (36.7 °C)      No intake/output data recorded. 1901 -  0700  In: 0   Out:  [Urine:]    PHYSICAL EXAM:  Constitutional:  ill-appearing, lethargic male. In mild distress. Skin: Generalized pallor. Scabs seen throughout lower extremities. HEENT: Sclerae anicteric. PERRL. No oral ulcers. The neck is supple and no masses. Cardiovascular: Regular rate and rhythm. +S1/S2. No murmur or gallop.   Respiratory:  Decreased air entry at bases with coarse, scattered rhonchi bilaterally. On 6 L NC.  GI:  PEG tube in place. Abdomen nondistended, soft, and nontender. Normal active bowel sounds. Rectal: Deferred   Musculoskeletal: No pitting edema of the lower legs. Able to move all ext  Neurological:  Patient is disoriented. Psychiatric: Unable to assess       Data Review    Recent Results (from the past 24 hour(s))   GLUCOSE, POC    Collection Time: 11/25/22 11:27 AM   Result Value Ref Range    Glucose (POC) 129 (H) 65 - 100 mg/dL    Performed by Gianfranco Garcia, POC    Collection Time: 11/25/22  4:41 PM   Result Value Ref Range    Glucose (POC) 162 (H) 65 - 100 mg/dL    Performed by Edwige Lofton    GLUCOSE, POC    Collection Time: 11/26/22  7:31 AM   Result Value Ref Range    Glucose (POC) 190 (H) 65 - 100 mg/dL    Performed by Caty Moreno        XR CHEST PORT   Final Result   1. Increased left lung interstitial opacity, favor pulmonary edema. 2. Increased right upper lobe airspace disease may represent pneumonia or edema. XR CHEST PORT   Final Result   1. Patchy diffuse bilateral airspace disease and possible trace effusions,   unchanged. XR SWALLOW FUNC VIDEO   Final Result   Penetration as described above. XR CHEST PORT   Final Result   Worsening diffuse airspace disease with areas of increasing consolidation. XR CHEST PORT   Final Result   Interval advancement of nasogastric tube, although tip not included on this   study. Evaluation of nasogastric tube position is typically better performed   with KUB. XR CHEST PORT   Final Result   1. No significant change in basilar predominant opacities. These are   nonspecific but can be seen with aspiration pneumonitis/pneumonia in the   appropriate setting. 2.  Enteric tube tip projects just past the gastroesophageal junction with   proximal sidehole in the distal esophagus. Consider advancing.        MRI BRAIN WO CONT   Final Result   Small to moderate sized acute infarction in the right occipital lobe with   minimal/small infarction in the right temporal lobe. Severe cerebral atrophy. There is no intracranial mass, hemorrhage. There are numerous scattered chronic infarcts. No additional acute intracranial process is demonstrated. XR CHEST PORT   Final Result   Interval retraction of nasogastric tube with sidehole over the gastroesophageal   junction. XR CHEST PORT   Final Result   Interval replacement of nasogastric tube, with tip over the gastric fundus. XR CHEST PORT   Final Result   No significant change. XR CHEST PORT   Final Result   1. NG tube terminates in the gastric fundus, with possible kink in the side   hole. Correlate with function. 2.  New patchy consolidation in the right midlung is concerning for pneumonia. Grossly unchanged patchy retrocardiac opacities. Stable trace right pleural   effusion. XR CHEST PORT   Final Result   Nasogastric tube tip advanced overlying the gastric body. XR CHEST PORT   Final Result   Nasogastric tube tip over the gastric fundus. XR CHEST PORT   Final Result   Increased mild interstitial pulmonary edema versus pneumonia. Enteric tube extends into the gastric antrum or first portion of the duodenum. Consider retraction 8-10 cm if the intention is gastric luminal termination. CT FOOT RT W CONT   Final Result      1. No abscess or osteomyelitis. 2. No fracture. DUPLEX LOWER EXT ARTERY BILAT   Final Result      XR FOOT RT MIN 3 V   Final Result   No osseous erosion or soft tissue gas. .      XR CHEST PORT   Final Result      Mild interstitial opacities which may represent mild pulmonary edema unchanged         XR CHEST PORT   Final Result   Addendum (preliminary) 1 of 1   Addendum: NG tube is in appropriate position tip extends below the    diaphragm. Right IJ catheter. No pneumothorax on the right. Final   1.   Status post right IJ catheter placement without evidence of complication. IR INSERT NON TUNL CVC OVER 5 YRS   Final Result   Technically successful ultrasound guided placement of a right internal jugular   vein temporary central venous catheter. A post procedure chest x-ray is   pending. CT HEAD WO CONT   Final Result   No acute process or change compared to the prior exam.            XR CHEST PORT   Final Result   No acute process.       IR [facility]    (Results Pending)       Active Problems:    CVA (cerebral vascular accident) ([address] (7/31/2022)      Carotid stenosis (8/2/2022)      UTI (urinary tract infection) (11/8/2022)      Septic shock ([address] (11/20/2022)      Infection of right great toe due to methicillin resistant Staphylococcus aureus (MRSA) (23/40/1876)      Metabolic encephalopathy (92/95/4896)      Severe protein-calorie malnutrition ([address] (11/20/2022)      Dysphagia (11/20/2022)      Acute respiratory failure with hypoxia ([address] (11/20/2022)      PAD (peripheral artery disease) ([address] (11/20/2022)      Assessment/Plan:     SIRS, suspected aspiration PNA  Tmax 100.6F, WBC WNLs  S/p Zosyn 9 days   ID following     Severe sepsis with septic shock - resolved  2/2 UTI and right great toe ulcer  S/p vancomycin and meropenem  11/8 blood: no growth     Great right toe infection  11/9 wound: MRSA  XR of right foot without fracture, dislocation, soft tissue gas, or osseous erosion  CT of right foot with no abscess or osteomyelitis  S/p vancomycin for 7 days  Continue routine wound care  ID and podiatry following    Metabolic encephalopathy - improving     Severe protein calorie malnutrition  Status post PEG tube  GI following    Dysphagia  SLP recommending npo  MBS positive for penetration/aspiration     Acute respiratory failure with hypoxia   Aspiration pneumonia  CXR (11/24) shows increased left lung interstitial opacity and increased RUL airspace disease   Weaned off venti-mask, currently on 6 L NC  S/p IV Zosyn Pulmonary following    Urinary tract infection  11/8 urine: mixed kristen     Hypertension  Continue on carvedilol, furosemide, and losartan    CVA  Carotid stenosis   History of old infarcts  Repeat MRI brain 11/16/22 showed small to moderate sized acute infarction in the right occipital lobe with minimal/small infarction in the right temporal lobe and severe cerebral atrophy  Continue on DAPT and atorvastatin  Vascular and neurology following     Multiple chronic wounds  PAD  Poor arterial flow and nutritional status complicates wound healing  KRISTI with duplex shows right common femoral artery and proximal superficial femoral artery occluded with collateralization to popliteal artery, left mid to distal superficial artery occluded, with patent distal anterior/posterior tibial and popliteal arteries   Per Dr. Bessie Cristobal, follow-up outpatient for carotid stent rather than endarterectomy    DVT Prophylaxis: Lovenox   GI Prophylaxis: Protonix  Code Status: FULL CODE  POA:    Discharge Barriers:   - Respiratory status    - Continue to wean O2    Care Plan discussed with: patient and nursing. Son declined hospice at this time. Spoke to son about code status and end of life goals. He would like to keep patient FULL CODE. Strongly encouraged him to speak with his siblings. Overall prognosis poor. Total time spent with patient: >35 minutes.

## 2022-11-26 NOTE — PROGRESS NOTES
Problem: Pressure Injury - Risk of  Goal: *Prevention of pressure injury  Description: Document Kermit Scale and appropriate interventions in the flowsheet.   Note: Pressure Injury Interventions:  Sensory Interventions: Float heels    Moisture Interventions: Absorbent underpads    Activity Interventions: Increase time out of bed    Mobility Interventions: HOB 30 degrees or less    Nutrition Interventions: Document food/fluid/supplement intake    Friction and Shear Interventions: Minimize layers

## 2022-11-26 NOTE — PROGRESS NOTES
IMPRESSION:   Acute hypoxic respiratory failure had resolved now back on nasal oxygen   Diffuse pulmonary infiltrates questionable pulmonary edema versus chronic aspiration  Leukocytosis resolved  Carotid artery disease history of CVA  Hypokalemia      RECOMMENDATIONS/PLAN:   61-year-old male nursing home resident admitted with hypotension and unresponsiveness now he is alert awake agitated now he is on 50% Ventimask alert awake talking following command still in distress  Worsening chest x-ray and hypoxia currently receiving IV fluids received 2 doses Lasix and hold IV fluid BNP 7000  Now he is on nasal cannula 6 L nasal cannula we will continue to wean  Continue with the suctioning  11/10 Echo ejection fraction 50% IVC dilated left atrium 5 cm RVSP 59  Pulmonary hypertension with probable cor pulmonale  Chest x-ray shows fluid overload congestive changes   Potassium corrected     [x] High complexity decision making was performed  [x] See my orders for details  HPI  61-year-old nursing home resident came in because of unresponsiveness he had a history of stroke in the past patient was hypotensive hypoxic rapid response was called he was admitted to the floor initially received IV fluid hemodynamically unstable started on vasopressors Levophed transferred to ICU White count was elevated he was put on oxygen 4 L nasal cannula unable to get any started the patient he has a right foot wound and multiple wounds of the lower extremities dressing in place. He has carotid artery stenosis only supposed to go for surgery but unable to get cardiac clearance because of nuclear stress test he has 90% stenosis of left ICA and 50% stenosis of right ICA. So now he is admitted to ICU and critical care consult was called  PMH:  has a past medical history of Cirrhosis ([address] Diabetes ([address] Hypertension, and Stroke ([address]     PSH:   has a past surgical history that includes hx back surgery; hx gi; and ir insert non tunl cvc over 5 yrs (11/9/2022). FHX: family history includes Heart Disease in his father. SHX:  reports that he has quit smoking. He has never used smokeless tobacco. He reports that he does not currently use alcohol. He reports that he does not currently use drugs.     ALL: No Known Allergies     MEDS:   [x] Reviewed - As Below   [] Not reviewed    Current Facility-Administered Medications   Medication    albuterol-ipratropium (DUO-NEB) 2.5 MG-0.5 MG/3 ML    ARIPiprazole (ABILIFY) tablet 2 mg    aspirin chewable tablet 81 mg    carvediloL (COREG) tablet 12.5 mg    doxazosin (CARDURA) tablet 1 mg    escitalopram oxalate (LEXAPRO) tablet 20 mg    gabapentin (NEURONTIN) capsule 100 mg    losartan (COZAAR) tablet 50 mg    melatonin tablet 10 mg    pantoprazole (PROTONIX) granules for oral suspension 40 mg    potassium chloride (KLOR-CON) packet for solution 20 mEq    senna-docusate (PERICOLACE) 8.6-50 mg per tablet 1 Tablet    clopidogreL (PLAVIX) tablet 75 mg    atorvastatin (LIPITOR) tablet 40 mg    albuterol-ipratropium (DUO-NEB) 2.5 MG-0.5 MG/3 ML    balsam peru-castor oiL (VENELEX) ointment    hydrALAZINE (APRESOLINE) 20 mg/mL injection 20 mg    baclofen (LIORESAL) tablet 5 mg    polyethylene glycol (MIRALAX) packet 17 g    traMADoL (ULTRAM) tablet 50 mg    sodium chloride (NS) flush 5-40 mL    sodium chloride (NS) flush 5-40 mL    acetaminophen (TYLENOL) tablet 650 mg    Or    acetaminophen (TYLENOL) suppository 650 mg    ondansetron (ZOFRAN ODT) tablet 4 mg    Or    ondansetron (ZOFRAN) injection 4 mg    enoxaparin (LOVENOX) injection 40 mg    glucose chewable tablet 16 g    glucagon (GLUCAGEN) injection 1 mg    dextrose 10% infusion 0-250 mL    insulin lispro (HUMALOG) injection      MAR reviewed and pertinent medications noted or modified as needed   Current Facility-Administered Medications   Medication    albuterol-ipratropium (DUO-NEB) 2.5 MG-0.5 MG/3 ML    ARIPiprazole (ABILIFY) tablet 2 mg    aspirin chewable tablet 81 mg    carvediloL (COREG) tablet 12.5 mg    doxazosin (CARDURA) tablet 1 mg    escitalopram oxalate (LEXAPRO) tablet 20 mg    gabapentin (NEURONTIN) capsule 100 mg    losartan (COZAAR) tablet 50 mg    melatonin tablet 10 mg    pantoprazole (PROTONIX) granules for oral suspension 40 mg    potassium chloride (KLOR-CON) packet for solution 20 mEq    senna-docusate (PERICOLACE) 8.6-50 mg per tablet 1 Tablet    clopidogreL (PLAVIX) tablet 75 mg    atorvastatin (LIPITOR) tablet 40 mg    albuterol-ipratropium (DUO-NEB) 2.5 MG-0.5 MG/3 ML    balsam peru-castor oiL (VENELEX) ointment    hydrALAZINE (APRESOLINE) 20 mg/mL injection 20 mg    baclofen (LIORESAL) tablet 5 mg    polyethylene glycol (MIRALAX) packet 17 g    traMADoL (ULTRAM) tablet 50 mg    sodium chloride (NS) flush 5-40 mL    sodium chloride (NS) flush 5-40 mL    acetaminophen (TYLENOL) tablet 650 mg    Or    acetaminophen (TYLENOL) suppository 650 mg    ondansetron (ZOFRAN ODT) tablet 4 mg    Or    ondansetron (ZOFRAN) injection 4 mg    enoxaparin (LOVENOX) injection 40 mg    glucose chewable tablet 16 g    glucagon (GLUCAGEN) injection 1 mg    dextrose 10% infusion 0-250 mL    insulin lispro (HUMALOG) injection      PMH:  has a past medical history of Cirrhosis ([facility] [address] Diabetes ([facility] [address] Hypertension, and Stroke ([facility] [address] PSH:   has a past surgical history that includes hx back surgery; hx gi; and ir insert non tunl cvc over 5 yrs (11/9/2022). FHX: family history includes Heart Disease in his father. SHX:  reports that he has quit smoking. He has never used smokeless tobacco. He reports that he does not currently use alcohol. He reports that he does not currently use drugs. ROS:Review of systems not obtained due to patient factors. Hemodynamics:    CO:    CI:    CVP:    SVR:   PAP Systolic:    PAP Diastolic:    PVR:    RN25:        Ventilator Settings:      Mode Rate TV Press PEEP FiO2 PIP Min.  Vent               36 %              Vital Signs: Telemetry:    normal sinus rhythm Intake/Output:   Visit Vitals  BP (!) 151/69 (BP 1 Location: Right upper arm, BP Patient Position: At rest;Semi fowlers) Comment: Notified RN   Pulse 77   Temp 97.8 °F (36.6 °C)   Resp 20   Ht 5' 9\" (1.753 m)   Wt 62.5 kg (137 lb 12.6 oz)   SpO2 96%   BMI 20.35 kg/m²       Temp (24hrs), Av.8 °F (36.6 °C), Min:97.6 °F (36.4 °C), Max:98 °F (36.7 °C)        O2 Device: Nasal cannula O2 Flow Rate (L/min): 4 l/min (mid flow)       Wt Readings from Last 4 Encounters:   11/10/22 62.5 kg (137 lb 12.6 oz)   22 84.8 kg (187 lb)   22 84.8 kg (187 lb)   10/28/20 99.8 kg (220 lb)          Intake/Output Summary (Last 24 hours) at 2022 1103  Last data filed at 2022 0205  Gross per 24 hour   Intake 0 ml   Output 1400 ml   Net -1400 ml         Last shift:      No intake/output data recorded. Last 3 shifts:  1901 -  0700  In: 1530   Out:  [Urine:]       Physical Exam:     General: Awake confused does not follow commands  HEENT: NCAT, poor dentition,   Eyes: anicteric; conjunctiva clear extraocular movements intact  Neck: no nodes, trach midline; no accessory MM use.   Neck veins visible  Chest: no deformity,   Cardiac: Regular rate and rhythm  Lungs: Shallow but clear anteriorly with lateral rales no wheezing  Abd: soft, NT, hypoactive BS  Ext: Lower extremity wound bandage in place  : clear urine  Neuro: Awake mumbles follows no commands does move his extremities  Psych-unable to assess  Skin: warm, dry, no cyanosis;   Pulses: Brachial and radial pulses intact  Capillary: Normal capillary refill      DATA:    MAR reviewed and pertinent medications noted or modified as needed  MEDS:   Current Facility-Administered Medications   Medication    albuterol-ipratropium (DUO-NEB) 2.5 MG-0.5 MG/3 ML    ARIPiprazole (ABILIFY) tablet 2 mg    aspirin chewable tablet 81 mg    carvediloL (COREG) tablet 12.5 mg    doxazosin (CARDURA) tablet 1 mg    escitalopram oxalate (LEXAPRO) tablet 20 mg    gabapentin (NEURONTIN) capsule 100 mg    losartan (COZAAR) tablet 50 mg    melatonin tablet 10 mg    pantoprazole (PROTONIX) granules for oral suspension 40 mg    potassium chloride (KLOR-CON) packet for solution 20 mEq    senna-docusate (PERICOLACE) 8.6-50 mg per tablet 1 Tablet    clopidogreL (PLAVIX) tablet 75 mg    atorvastatin (LIPITOR) tablet 40 mg    albuterol-ipratropium (DUO-NEB) 2.5 MG-0.5 MG/3 ML    balsam peru-castor oiL (VENELEX) ointment    hydrALAZINE (APRESOLINE) 20 mg/mL injection 20 mg    baclofen (LIORESAL) tablet 5 mg    polyethylene glycol (MIRALAX) packet 17 g    traMADoL (ULTRAM) tablet 50 mg    sodium chloride (NS) flush 5-40 mL    sodium chloride (NS) flush 5-40 mL    acetaminophen (TYLENOL) tablet 650 mg    Or    acetaminophen (TYLENOL) suppository 650 mg    ondansetron (ZOFRAN ODT) tablet 4 mg    Or    ondansetron (ZOFRAN) injection 4 mg    enoxaparin (LOVENOX) injection 40 mg    glucose chewable tablet 16 g    glucagon (GLUCAGEN) injection 1 mg    dextrose 10% infusion 0-250 mL    insulin lispro (HUMALOG) injection        Labs:    Recent Labs     11/25/22  0705 11/24/22  1456 11/23/22  1153   WBC 6.8 7.2 5.8   HGB 9.8* 9.4* 9.5*    303 308       Recent Labs     11/25/22  0705 11/24/22  1456 11/23/22  1153    140 138   K 4.3 4.1 4.4    107 106   CO2 26 28 29   * 159* 185*   BUN 15 16 14   CREA 0.54* 0.55* 0.52*   CA 8.1* 7.8* 7.8*       11/10/2022 Echo ejection fraction 50% IVC dilated left atrium 5 cm RVSP 59  Lab Results   Component Value Date/Time    Culture result:  11/09/2022 02:10 AM     Heavy * methicillin resistant staphylococcus aureus *    Culture result: Heavy Diphtheroids 11/09/2022 02:10 AM    Culture result: No growth 6 days 11/08/2022 01:25 PM     Lab Results   Component Value Date/Time    TSH 1.67 07/30/2022 07:11 AM        Imaging:    Results from Hospital Encounter encounter on 11/08/22    XR CHEST PORT    Narrative  PORTABLE CHEST RADIOGRAPH/S: 11/24/2022 8:10 AM    INDICATION: Pneumonia. COMPARISON: 11/22/2022, 1/20/2022, 1/17/2022, 11/15/2022. TECHNIQUE: Portable frontal upright radiograph/s of the chest.    FINDINGS:  Airspace disease along the minor fissure in the right upper lobe has increased  slightly from 11/17/2022, and may represent pneumonia or edema. Interstitial  opacity in the left lung has increased from 11/17/2022, and probably from  11/20/2022, more consistent with pulmonary edema than pneumonia. The central  airways are patent. No pneumothorax and no large pleural effusion. Post CABG. Impression  1. Increased left lung interstitial opacity, favor pulmonary edema. 2. Increased right upper lobe airspace disease may represent pneumonia or edema. Results from [place] encounter on 11/08/22    CT FOOT RT W CONT    Narrative  EXAM: CT FOOT RT W CONT    INDICATION: Right foot swelling and abscess. Evaluate for osteomyelitis. Hypertension, diabetes, and cirrhosis. COMPARISON: Right foot views on 11/10/2022    CONTRAST: 100 mL of [id number]. TECHNIQUE: Helical CT of the right foot during uneventful rapid bolus  intravenous contrast administration. Coronal and Sagittal reformats were  generated. Images reviewed in soft tissue and bone windows. CT dose reduction  was achieved through the use of a standardized protocol tailored for this  examination and automatic exposure control for dose modulation. FINDINGS: Bones: Mild osteopenia. No fracture or osteomyelitis. Joint fluid: Physiologic. Articulations: no evidence of septic arthritis. 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 Tendons: No full-thickness tendon tear. Muscles: Mild diffuse atrophy. Soft tissue mass: None. No fluid collection. Impression  1. No abscess or osteomyelitis. 2. No fracture.       11/10 patient is barely responsive now on room air off pressors getting IV fluid chest x-ray shows congestive changes IV fluid has been decreased, replace potassium, WBC much improved  11/11 Patient is out of ICU, responding slightly, he is on room air. PCO2 30. WBC improving, continues to decrease. 11/12 On room air, condition remains same open eyes but does not follow any commands, replace potassium  11/13 Room air, no O2 in hospital. He opened his eyes, tried to respond slightly by denying SOB, but unable to communicate clearly. NG tube in place. 11/14 Pt is seen resting, he is not currently on any O2, SpO2 94%. NG tube in place. 11/15 Pt is awake feeling well, more responsive today. He denies SOB. He is still on room air. Pt still has NG tube in place. 11/16 condition same on room air pleasantly confused  11/20 back on oxygen chest x-ray shows worsening congestion and infiltrates.   WBC count is normal with no fever tends to rule out aspiration pneumonia has been receiving D5 half at 60 an hour probably fluid overload from cor pulmonale we will discontinue IV fluids give 2 doses Lasix tonight and check labs and BNP in a.m.  11/21 on oxygen 2 L nasal cannula we will give extra Lasix  11/22 on 50% Ventimask extra dose of Lasix received chest x-ray we will get arterial blood gases  11/23 on Ventimask alert awake received Lasix replace potassium albumin 1.8 chest x-ray shows bilateral congestive changes pleural effusion  11/25 nasal cannula tolerating well  11/26 we will try to wean oxygen less than 5 L and most likely can be discharged tomorrow

## 2022-11-26 NOTE — PROGRESS NOTES
Monitor room called and patient's heart rate goes up to 160-170 and then goes back down. PG=359/71 with HR=79. Sat=92%.  Asked monitor tech to send a strip

## 2022-11-27 VITALS
TEMPERATURE: 99.3 F | RESPIRATION RATE: 20 BRPM | WEIGHT: 137.79 LBS | DIASTOLIC BLOOD PRESSURE: 66 MMHG | OXYGEN SATURATION: 92 % | SYSTOLIC BLOOD PRESSURE: 137 MMHG | HEART RATE: 78 BPM | BODY MASS INDEX: 20.41 KG/M2 | HEIGHT: 69 IN

## 2022-11-27 PROBLEM — J69.0 ASPIRATION PNEUMONIA (HCC): Status: ACTIVE | Noted: 2022-11-27

## 2022-11-27 LAB
ANION GAP SERPL CALC-SCNC: 6 MMOL/L (ref 5–15)
BUN SERPL-MCNC: 12 MG/DL (ref 6–20)
BUN/CREAT SERPL: 28 (ref 12–20)
CA-I BLD-MCNC: 8.1 MG/DL (ref 8.5–10.1)
CHLORIDE SERPL-SCNC: 109 MMOL/L (ref 97–108)
CO2 SERPL-SCNC: 24 MMOL/L (ref 21–32)
CREAT SERPL-MCNC: 0.43 MG/DL (ref 0.7–1.3)
ERYTHROCYTE [DISTWIDTH] IN BLOOD BY AUTOMATED COUNT: 17 % (ref 11.5–14.5)
GLUCOSE BLD STRIP.AUTO-MCNC: 156 MG/DL (ref 65–100)
GLUCOSE BLD STRIP.AUTO-MCNC: 158 MG/DL (ref 65–100)
GLUCOSE BLD STRIP.AUTO-MCNC: 158 MG/DL (ref 65–100)
GLUCOSE BLD STRIP.AUTO-MCNC: 84 MG/DL (ref 65–100)
GLUCOSE SERPL-MCNC: 94 MG/DL (ref 65–100)
HCT VFR BLD AUTO: 29.2 % (ref 36.6–50.3)
HGB BLD-MCNC: 9.2 G/DL (ref 12.1–17)
MCH RBC QN AUTO: 28.7 PG (ref 26–34)
MCHC RBC AUTO-ENTMCNC: 31.5 G/DL (ref 30–36.5)
MCV RBC AUTO: 91 FL (ref 80–99)
NRBC # BLD: 0 K/UL (ref 0–0.01)
NRBC BLD-RTO: 0 PER 100 WBC
PERFORMED BY, TECHID: ABNORMAL
PERFORMED BY, TECHID: NORMAL
PLATELET # BLD AUTO: 292 K/UL (ref 150–400)
PMV BLD AUTO: 9.8 FL (ref 8.9–12.9)
POTASSIUM SERPL-SCNC: 4.1 MMOL/L (ref 3.5–5.1)
RBC # BLD AUTO: 3.21 M/UL (ref 4.1–5.7)
SODIUM SERPL-SCNC: 139 MMOL/L (ref 136–145)
WBC # BLD AUTO: 8.2 K/UL (ref 4.1–11.1)

## 2022-11-27 PROCEDURE — 74011000250 HC RX REV CODE- 250: Performed by: STUDENT IN AN ORGANIZED HEALTH CARE EDUCATION/TRAINING PROGRAM

## 2022-11-27 PROCEDURE — 74011250636 HC RX REV CODE- 250/636: Performed by: INTERNAL MEDICINE

## 2022-11-27 PROCEDURE — 77010033678 HC OXYGEN DAILY

## 2022-11-27 PROCEDURE — 36415 COLL VENOUS BLD VENIPUNCTURE: CPT

## 2022-11-27 PROCEDURE — 80048 BASIC METABOLIC PNL TOTAL CA: CPT

## 2022-11-27 PROCEDURE — 74011250637 HC RX REV CODE- 250/637: Performed by: STUDENT IN AN ORGANIZED HEALTH CARE EDUCATION/TRAINING PROGRAM

## 2022-11-27 PROCEDURE — 74011250637 HC RX REV CODE- 250/637: Performed by: INTERNAL MEDICINE

## 2022-11-27 PROCEDURE — 85027 COMPLETE CBC AUTOMATED: CPT

## 2022-11-27 PROCEDURE — 82962 GLUCOSE BLOOD TEST: CPT

## 2022-11-27 PROCEDURE — 74011636637 HC RX REV CODE- 636/637: Performed by: INTERNAL MEDICINE

## 2022-11-27 PROCEDURE — 94640 AIRWAY INHALATION TREATMENT: CPT

## 2022-11-27 PROCEDURE — 94762 N-INVAS EAR/PLS OXIMTRY CONT: CPT

## 2022-11-27 PROCEDURE — 74011000250 HC RX REV CODE- 250: Performed by: INTERNAL MEDICINE

## 2022-11-27 RX ORDER — IPRATROPIUM BROMIDE AND ALBUTEROL SULFATE 2.5; .5 MG/3ML; MG/3ML
3 SOLUTION RESPIRATORY (INHALATION)
Status: DISCONTINUED | OUTPATIENT
Start: 2022-11-27 | End: 2022-11-27 | Stop reason: HOSPADM

## 2022-11-27 RX ADMIN — CASTOR OIL AND BALSAM, PERU: 788; 87 OINTMENT TOPICAL at 09:00

## 2022-11-27 RX ADMIN — SENNOSIDES AND DOCUSATE SODIUM 1 TABLET: 50; 8.6 TABLET ORAL at 08:34

## 2022-11-27 RX ADMIN — INSULIN LISPRO 2 UNITS: 100 INJECTION, SOLUTION INTRAVENOUS; SUBCUTANEOUS at 07:30

## 2022-11-27 RX ADMIN — TRAMADOL HYDROCHLORIDE 50 MG: 50 TABLET, COATED ORAL at 09:37

## 2022-11-27 RX ADMIN — CARVEDILOL 12.5 MG: 12.5 TABLET, FILM COATED ORAL at 08:34

## 2022-11-27 RX ADMIN — ESCITALOPRAM OXALATE 20 MG: 10 TABLET ORAL at 08:34

## 2022-11-27 RX ADMIN — LOSARTAN POTASSIUM 50 MG: 50 TABLET, FILM COATED ORAL at 08:34

## 2022-11-27 RX ADMIN — ENOXAPARIN SODIUM 40 MG: 100 INJECTION SUBCUTANEOUS at 08:34

## 2022-11-27 RX ADMIN — TRAMADOL HYDROCHLORIDE 50 MG: 50 TABLET, COATED ORAL at 15:55

## 2022-11-27 RX ADMIN — ARIPIPRAZOLE 2 MG: 2 TABLET ORAL at 08:34

## 2022-11-27 RX ADMIN — POTASSIUM CHLORIDE 20 MEQ: 1.5 POWDER, FOR SOLUTION ORAL at 08:34

## 2022-11-27 RX ADMIN — IPRATROPIUM BROMIDE AND ALBUTEROL SULFATE 3 ML: 2.5; .5 SOLUTION RESPIRATORY (INHALATION) at 02:17

## 2022-11-27 RX ADMIN — CLOPIDOGREL BISULFATE 75 MG: 75 TABLET, FILM COATED ORAL at 08:34

## 2022-11-27 RX ADMIN — ASPIRIN 81 MG 81 MG: 81 TABLET ORAL at 08:34

## 2022-11-27 RX ADMIN — PANTOPRAZOLE SODIUM 40 MG: 40 GRANULE, DELAYED RELEASE ORAL at 08:34

## 2022-11-27 RX ADMIN — SODIUM CHLORIDE, PRESERVATIVE FREE 10 ML: 5 INJECTION INTRAVENOUS at 15:58

## 2022-11-27 RX ADMIN — IPRATROPIUM BROMIDE AND ALBUTEROL SULFATE 3 ML: 2.5; .5 SOLUTION RESPIRATORY (INHALATION) at 08:39

## 2022-11-27 RX ADMIN — SODIUM CHLORIDE, PRESERVATIVE FREE 10 ML: 5 INJECTION INTRAVENOUS at 04:29

## 2022-11-27 RX ADMIN — ATORVASTATIN CALCIUM 40 MG: 40 TABLET, FILM COATED ORAL at 08:34

## 2022-11-27 NOTE — PROGRESS NOTES
IMPRESSION:   Acute hypoxic respiratory failure had resolved now back on nasal oxygen   Diffuse pulmonary infiltrates questionable pulmonary edema versus chronic aspiration  Leukocytosis resolved  Carotid artery disease history of CVA  Hypokalemia      RECOMMENDATIONS/PLAN:   66-year-old male nursing home resident admitted with hypotension and unresponsiveness now he is alert awake agitated now he is on 50% Ventimask alert awake talking following command still in distress  Worsening chest x-ray and hypoxia currently receiving IV fluids received 2 doses Lasix and hold IV fluid BNP 7000  Now he is on nasal cannula 2 L nasal cannula   Continue with the suctioning  11/10 Echo ejection fraction 50% IVC dilated left atrium 5 cm RVSP 59  Pulmonary hypertension with probable cor pulmonale  Chest x-ray shows fluid overload congestive changes   Potassium corrected     [x] High complexity decision making was performed  [x] See my orders for details  HPI  66-year-old nursing home resident came in because of unresponsiveness he had a history of stroke in the past patient was hypotensive hypoxic rapid response was called he was admitted to the floor initially received IV fluid hemodynamically unstable started on vasopressors Levophed transferred to ICU White count was elevated he was put on oxygen 4 L nasal cannula unable to get any started the patient he has a right foot wound and multiple wounds of the lower extremities dressing in place. He has carotid artery stenosis only supposed to go for surgery but unable to get cardiac clearance because of nuclear stress test he has 90% stenosis of left ICA and 50% stenosis of right ICA. So now he is admitted to ICU and critical care consult was called  PMH:  has a past medical history of Cirrhosis ([address] Diabetes ([address] Hypertension, and Stroke ([address] PSH:   has a past surgical history that includes hx back surgery; hx gi; and ir insert non tunl cvc over 5 yrs (11/9/2022). FHX: family history includes Heart Disease in his father. SHX:  reports that he has quit smoking. He has never used smokeless tobacco. He reports that he does not currently use alcohol. He reports that he does not currently use drugs.     ALL: No Known Allergies     MEDS:   [x] Reviewed - As Below   [] Not reviewed    Current Facility-Administered Medications   Medication    albuterol-ipratropium (DUO-NEB) 2.5 MG-0.5 MG/3 ML    ARIPiprazole (ABILIFY) tablet 2 mg    aspirin chewable tablet 81 mg    carvediloL (COREG) tablet 12.5 mg    doxazosin (CARDURA) tablet 1 mg    escitalopram oxalate (LEXAPRO) tablet 20 mg    gabapentin (NEURONTIN) capsule 100 mg    losartan (COZAAR) tablet 50 mg    melatonin tablet 10 mg    pantoprazole (PROTONIX) granules for oral suspension 40 mg    potassium chloride (KLOR-CON) packet for solution 20 mEq    senna-docusate (PERICOLACE) 8.6-50 mg per tablet 1 Tablet    clopidogreL (PLAVIX) tablet 75 mg    atorvastatin (LIPITOR) tablet 40 mg    albuterol-ipratropium (DUO-NEB) 2.5 MG-0.5 MG/3 ML    balsam peru-castor oiL (VENELEX) ointment    hydrALAZINE (APRESOLINE) 20 mg/mL injection 20 mg    baclofen (LIORESAL) tablet 5 mg    polyethylene glycol (MIRALAX) packet 17 g    traMADoL (ULTRAM) tablet 50 mg    sodium chloride (NS) flush 5-40 mL    sodium chloride (NS) flush 5-40 mL    acetaminophen (TYLENOL) tablet 650 mg    Or    acetaminophen (TYLENOL) suppository 650 mg    ondansetron (ZOFRAN ODT) tablet 4 mg    Or    ondansetron (ZOFRAN) injection 4 mg    enoxaparin (LOVENOX) injection 40 mg    glucose chewable tablet 16 g    glucagon (GLUCAGEN) injection 1 mg    dextrose 10% infusion 0-250 mL    insulin lispro (HUMALOG) injection      MAR reviewed and pertinent medications noted or modified as needed   Current Facility-Administered Medications   Medication    albuterol-ipratropium (DUO-NEB) 2.5 MG-0.5 MG/3 ML    ARIPiprazole (ABILIFY) tablet 2 mg    aspirin chewable tablet 81 mg carvediloL (COREG) tablet 12.5 mg    doxazosin (CARDURA) tablet 1 mg    escitalopram oxalate (LEXAPRO) tablet 20 mg    gabapentin (NEURONTIN) capsule 100 mg    losartan (COZAAR) tablet 50 mg    melatonin tablet 10 mg    pantoprazole (PROTONIX) granules for oral suspension 40 mg    potassium chloride (KLOR-CON) packet for solution 20 mEq    senna-docusate (PERICOLACE) 8.6-50 mg per tablet 1 Tablet    clopidogreL (PLAVIX) tablet 75 mg    atorvastatin (LIPITOR) tablet 40 mg    albuterol-ipratropium (DUO-NEB) 2.5 MG-0.5 MG/3 ML    balsam peru-castor oiL (VENELEX) ointment    hydrALAZINE (APRESOLINE) 20 mg/mL injection 20 mg    baclofen (LIORESAL) tablet 5 mg    polyethylene glycol (MIRALAX) packet 17 g    traMADoL (ULTRAM) tablet 50 mg    sodium chloride (NS) flush 5-40 mL    sodium chloride (NS) flush 5-40 mL    acetaminophen (TYLENOL) tablet 650 mg    Or    acetaminophen (TYLENOL) suppository 650 mg    ondansetron (ZOFRAN ODT) tablet 4 mg    Or    ondansetron (ZOFRAN) injection 4 mg    enoxaparin (LOVENOX) injection 40 mg    glucose chewable tablet 16 g    glucagon (GLUCAGEN) injection 1 mg    dextrose 10% infusion 0-250 mL    insulin lispro (HUMALOG) injection      PMH:  has a past medical history of Cirrhosis ([facility] [address] Diabetes ([facility] [address] Hypertension, and Stroke ([facility] [address] PSH:   has a past surgical history that includes hx back surgery; hx gi; and ir insert non tunl cvc over 5 yrs (11/9/2022). FHX: family history includes Heart Disease in his father. SHX:  reports that he has quit smoking. He has never used smokeless tobacco. He reports that he does not currently use alcohol. He reports that he does not currently use drugs. ROS:Review of systems not obtained due to patient factors. Hemodynamics:    CO:    CI:    CVP:    SVR:   PAP Systolic:    PAP Diastolic:    PVR:    AH64:        Ventilator Settings:      Mode Rate TV Press PEEP FiO2 PIP Min.  Vent               32 %              Vital Signs: Telemetry: normal sinus rhythm Intake/Output:   Visit Vitals  BP (!) 156/75 (BP 1 Location: Left upper arm, BP Patient Position: At rest)   Pulse 89   Temp 98.1 °F (36.7 °C)   Resp 22   Ht 5' 9\" (1.753 m)   Wt 62.5 kg (137 lb 12.6 oz)   SpO2 92%   BMI 20.35 kg/m²       Temp (24hrs), Av.7 °F (36.5 °C), Min:97.3 °F (36.3 °C), Max:98.1 °F (36.7 °C)        O2 Device: Nasal cannula O2 Flow Rate (L/min): 3 l/min       Wt Readings from Last 4 Encounters:   11/10/22 62.5 kg (137 lb 12.6 oz)   22 84.8 kg (187 lb)   22 84.8 kg (187 lb)   10/28/20 99.8 kg (220 lb)          Intake/Output Summary (Last 24 hours) at 2022 0933  Last data filed at 2022  Gross per 24 hour   Intake --   Output 700 ml   Net -700 ml         Last shift:      No intake/output data recorded. Last 3 shifts:  1901 -  0700  In: -   Out: 1100 [Urine:1100]       Physical Exam:     General: Awake confused does not follow commands  HEENT: NCAT, poor dentition,   Eyes: anicteric; conjunctiva clear extraocular movements intact  Neck: no nodes, trach midline; no accessory MM use.   Neck veins visible  Chest: no deformity,   Cardiac: Regular rate and rhythm  Lungs: Shallow but clear anteriorly with lateral rales no wheezing  Abd: soft, NT, hypoactive BS  Ext: Lower extremity wound bandage in place  : clear urine  Neuro: Awake mumbles follows no commands does move his extremities  Psych-unable to assess  Skin: warm, dry, no cyanosis;   Pulses: Brachial and radial pulses intact  Capillary: Normal capillary refill      DATA:    MAR reviewed and pertinent medications noted or modified as needed  MEDS:   Current Facility-Administered Medications   Medication    albuterol-ipratropium (DUO-NEB) 2.5 MG-0.5 MG/3 ML    ARIPiprazole (ABILIFY) tablet 2 mg    aspirin chewable tablet 81 mg    carvediloL (COREG) tablet 12.5 mg    doxazosin (CARDURA) tablet 1 mg    escitalopram oxalate (LEXAPRO) tablet 20 mg    gabapentin (NEURONTIN) capsule 100 mg losartan (COZAAR) tablet 50 mg    melatonin tablet 10 mg    pantoprazole (PROTONIX) granules for oral suspension 40 mg    potassium chloride (KLOR-CON) packet for solution 20 mEq    senna-docusate (PERICOLACE) 8.6-50 mg per tablet 1 Tablet    clopidogreL (PLAVIX) tablet 75 mg    atorvastatin (LIPITOR) tablet 40 mg    albuterol-ipratropium (DUO-NEB) 2.5 MG-0.5 MG/3 ML    balsam peru-castor oiL (VENELEX) ointment    hydrALAZINE (APRESOLINE) 20 mg/mL injection 20 mg    baclofen (LIORESAL) tablet 5 mg    polyethylene glycol (MIRALAX) packet 17 g    traMADoL (ULTRAM) tablet 50 mg    sodium chloride (NS) flush 5-40 mL    sodium chloride (NS) flush 5-40 mL    acetaminophen (TYLENOL) tablet 650 mg    Or    acetaminophen (TYLENOL) suppository 650 mg    ondansetron (ZOFRAN ODT) tablet 4 mg    Or    ondansetron (ZOFRAN) injection 4 mg    enoxaparin (LOVENOX) injection 40 mg    glucose chewable tablet 16 g    glucagon (GLUCAGEN) injection 1 mg    dextrose 10% infusion 0-250 mL    insulin lispro (HUMALOG) injection        Labs:    Recent Labs     11/25/22  0705 11/24/22  1456   WBC 6.8 7.2   HGB 9.8* 9.4*    303       Recent Labs     11/26/22  1319 11/25/22  0705 11/24/22  1456    140 140   K 4.0 4.3 4.1    107 107   CO2 27 26 28   * 152* 159*   BUN 12 15 16   CREA 0.52* 0.54* 0.55*   CA 8.2* 8.1* 7.8*       11/10/2022 Echo ejection fraction 50% IVC dilated left atrium 5 cm RVSP 59  Lab Results   Component Value Date/Time    Culture result:  11/09/2022 02:10 AM     Heavy * methicillin resistant staphylococcus aureus *    Culture result: Heavy Diphtheroids 11/09/2022 02:10 AM    Culture result: No growth 6 days 11/08/2022 01:25 PM     Lab Results   Component Value Date/Time    TSH 1.67 07/30/2022 07:11 AM        Imaging:    Results from Hospital Encounter encounter on 11/08/22    XR CHEST PORT    Narrative  PORTABLE CHEST RADIOGRAPH/S: 11/24/2022 8:10 AM    INDICATION: Pneumonia.     COMPARISON: 11/22/2022, 1/20/2022, 1/17/2022, 11/15/2022. TECHNIQUE: Portable frontal upright radiograph/s of the chest.    FINDINGS:  Airspace disease along the minor fissure in the right upper lobe has increased  slightly from 11/17/2022, and may represent pneumonia or edema. Interstitial  opacity in the left lung has increased from 11/17/2022, and probably from  11/20/2022, more consistent with pulmonary edema than pneumonia. The central  airways are patent. No pneumothorax and no large pleural effusion. Post CABG. Impression  1. Increased left lung interstitial opacity, favor pulmonary edema. 2. Increased right upper lobe airspace disease may represent pneumonia or edema. Results from [place] encounter on 11/08/22    CT FOOT RT W CONT    Narrative  EXAM: CT FOOT RT W CONT    INDICATION: Right foot swelling and abscess. Evaluate for osteomyelitis. Hypertension, diabetes, and cirrhosis. COMPARISON: Right foot views on 11/10/2022    CONTRAST: 100 mL of Isovue-[id number]. TECHNIQUE: Helical CT of the right foot during uneventful rapid bolus  intravenous contrast administration. Coronal and Sagittal reformats were  generated. Images reviewed in soft tissue and bone windows. CT dose reduction  was achieved through the use of a standardized protocol tailored for this  examination and automatic exposure control for dose modulation. FINDINGS: Bones: Mild osteopenia. No fracture or osteomyelitis. Joint fluid: Physiologic. Articulations: no evidence of septic arthritis. 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 Tendons: No full-thickness tendon tear. Muscles: Mild diffuse atrophy. Soft tissue mass: None. No fluid collection. Impression  1. No abscess or osteomyelitis. 2. No fracture.       11/10 patient is barely responsive now on room air off pressors getting IV fluid chest x-ray shows congestive changes IV fluid has been decreased, replace potassium, WBC much improved  11/11 Patient is out of ICU, responding slightly, he is on room air. PCO2 30. WBC improving, continues to decrease. 11/12 On room air, condition remains same open eyes but does not follow any commands, replace potassium  11/13 Room air, no O2 in hospital. He opened his eyes, tried to respond slightly by denying SOB, but unable to communicate clearly. NG tube in place. 11/14 Pt is seen resting, he is not currently on any O2, SpO2 94%. NG tube in place. 11/15 Pt is awake feeling well, more responsive today. He denies SOB. He is still on room air. Pt still has NG tube in place. 11/16 condition same on room air pleasantly confused  11/20 back on oxygen chest x-ray shows worsening congestion and infiltrates.   WBC count is normal with no fever tends to rule out aspiration pneumonia has been receiving D5 half at 60 an hour probably fluid overload from cor pulmonale we will discontinue IV fluids give 2 doses Lasix tonight and check labs and BNP in a.m.  11/21 on oxygen 2 L nasal cannula we will give extra Lasix  11/22 on 50% Ventimask extra dose of Lasix received chest x-ray we will get arterial blood gases  11/23 on Ventimask alert awake received Lasix replace potassium albumin 1.8 chest x-ray shows bilateral congestive changes pleural effusion  11/25 nasal cannula tolerating well  11/26 we will try to wean oxygen less than 5 L and most likely can be discharged tomorrow  11/27 Now he is on 2 L nasal cannula okay to discharge

## 2022-11-27 NOTE — PROGRESS NOTES
Problem: Pressure Injury - Risk of  Goal: *Prevention of pressure injury  Description: Document Kermit Scale and appropriate interventions in the flowsheet.   Outcome: Progressing Towards Goal  Note: Pressure Injury Interventions:  Sensory Interventions: Maintain/enhance activity level    Moisture Interventions: Internal/External fecal devices    Activity Interventions: Pressure redistribution bed/mattress(bed type)    Mobility Interventions: Pressure redistribution bed/mattress (bed type)    Nutrition Interventions: Document food/fluid/supplement intake    Friction and Shear Interventions: Minimize layers

## 2022-11-27 NOTE — DISCHARGE SUMMARY
Hospitalist Discharge Summary     Patient ID:    Asad Jeffries  [id number]  78 y.o.  1950    Admit date: 11/8/2022    Discharge date : 11/27/2022    Final Diagnoses:   Principal Problem:    Septic shock ([address] (11/20/2022)    Active Problems:    CVA (cerebral vascular accident) ([address] (7/31/2022)      Carotid stenosis (8/2/2022)      UTI (urinary tract infection) (11/8/2022)      Infection of right great toe due to methicillin resistant Staphylococcus aureus (MRSA) (69/44/8194)      Metabolic encephalopathy (84/61/6465)      Severe protein-calorie malnutrition ([address] (11/20/2022)      Dysphagia (11/20/2022)      Acute respiratory failure with hypoxia ([address] (11/20/2022)      PAD (peripheral artery disease) ([address] (11/20/2022)      Aspiration pneumonia ([address] (11/27/2022)      Hospital Course: Asad Jeffries is a 28-year-old male with a PMH of stroke, hypertension, DM, and cirrhosis who presented from nursing home with weakness and hypotension x1 day. Of note recent admission for TIA evaluated and recommended for carotid endarterectomy. Found hypotensive on arrival to the ED. Initial labs significant for WBC of 15.8, creatinine of 1.46, lactic acid of 2.4, and glucose of 134. UA consistent with urinary tract infection. CT of the head with no acute process. Patient admitted for further work-up. Patient started on IV fluids and ceftriaxone. Vascular surgery, cardiology, and podiatry consulted. KRISTI with duplex shows right common femoral artery and proximal superficial femoral artery occluded with collateralization to popliteal artery, left mid to distal superficial artery occluded, with patent distal anterior/posterior tibial artery, popliteal artery patent and with patent distal anterior tibial artery and posterior tibial artery. On 11/9 transferred to ICU overnight for poor responsiveness and for septic shock. CT of the head negative for acute findings.  Patient started on meropenem, vancomycin, IV fluids, and Levophed. Pulmonology and ID consulted. ECHO showed EF of 50-55% with mild MV R and dilated left atrium. XR right foot with generalized osseous demineralization without fracture or dislocation, tissue gas or osseous erosive. CT of right foot with no abscess or osteomyelitis. Patient has carotid artery stenosis and supposed to go for surgery but unable to get cardiac clearance because of nuclear stress test he has 90% stenosis of left ICA and 50% stenosis of right ICA. Urine culture grew mixed kristen. Neurology and psychiatry consulted for confusion. MRI 7/2022 showed acute ischemic stroke of the right parietal area. Repeat MRI brain 11/16/22 showed small to moderate sized acute infarction in the right occipital lobe with minimal/small infarction in the right temporal lobe and severe cerebral atrophy. Continue DAPT and atorvastatin. Patient has had NG tube for nutrition since admission. Speech therapy working with patient. GI consult to evaluate for PEG placement. on 11/15 fever spike of 100.6F. 11/17 CXR with no significant change in basilar predominant opacities. Started on zosyn. PEG placed on 11/18. Patient tolerating PEG tube feedings. MBS positive for penetration on 11/21. SLP recommending NPO. Hospice consulted. Son declining hospice at this time. Oxygen requirements increasing, placed on venti-mask. Chest x-ray suggestive of aspiration pneumonia. Continued on IV Zosyn. Weaned to 3 L NC. Cleared for discharge from Pulmonary, ID, GI, Neurology, and Podiatry standpoint.      Inpatient A/P:  SIRS, suspected aspiration PNA  Tmax 100.6F, WBC WNLs  S/p Zosyn 9 days   ID following     Severe sepsis with septic shock - resolved  2/2 UTI and right great toe ulcer  S/p vancomycin and meropenem  11/8 blood: no growth     Great right toe infection  11/9 wound: MRSA  XR of right foot without fracture, dislocation, soft tissue gas, or osseous erosion  CT of right foot with no abscess or osteomyelitis  S/p vancomycin for 7 days  Continue routine wound care  ID and podiatry following    Metabolic encephalopathy - improving     Severe protein calorie malnutrition  Status post PEG tube  GI following    Dysphagia  SLP recommending npo  MBS positive for penetration/aspiration     Acute respiratory failure with hypoxia   Aspiration pneumonia  CXR (11/24) shows increased left lung interstitial opacity and increased RUL airspace disease   Weaned off venti-mask, currently on 3 L NC  S/p IV Zosyn   Pulmonary following    Urinary tract infection  11/8 urine: mixed kristen     Hypertension  Continue on carvedilol, furosemide, and losartan    CVA  Carotid stenosis   History of old infarcts  Repeat MRI brain 11/16/22 showed small to moderate sized acute infarction in the right occipital lobe with minimal/small infarction in the right temporal lobe and severe cerebral atrophy  Continue on DAPT and atorvastatin  Vascular and neurology following  Per Dr. Linette Elizabeth, follow-up outpatient for carotid stent rather than endarterectomy     Multiple chronic wounds  PAD  Poor arterial flow and nutritional status complicates wound healing  KRISTI with duplex shows right common femoral artery and proximal superficial femoral artery occluded with collateralization to popliteal artery, left mid to distal superficial artery occluded, with patent distal anterior/posterior tibial and popliteal arteries       Discharge Medications:   Current Discharge Medication List        CONTINUE these medications which have NOT CHANGED    Details   traMADoL (ULTRAM) 50 mg tablet Take 50 mg by mouth every six (6) hours as needed for Pain.       insulin lispro (HUMALOG) 100 unit/mL injection INITIATE INSULIN CORRECTIVE PROTOCOL:  Normal Insulin Sensitivity   For Blood Sugar (mg/dL) of:     Less than 150 =   0 units           150 -199 =   2 units  200 -249 =   4 units  250 -299 =   6 units  300 -349 =   8 units  350 and above = 10 units and Call Physician Initiate Hypoglycemia protocol if blood glucose is <70 mg/dL  Fast Acting - Administer Immediately - or within 15 minutes of start of meal, if mealtime coverage. Qty: 1 Each, Refills: 1      clopidogreL (PLAVIX) 75 mg tab Take 1 Tablet by mouth in the morning. Qty: 30 Tablet, Refills: 1      ARIPiprazole (ABILIFY) 2 mg tablet Take 2 mg by mouth in the morning. baclofen 5 mg tab Take 5 mg by mouth two (2) times a day. carvediloL (COREG) 12.5 mg tablet Take 12.5 mg by mouth two (2) times a day. Indications: high blood pressure      escitalopram oxalate (LEXAPRO) 20 mg tablet Take 20 mg by mouth in the morning.      gabapentin (NEURONTIN) 100 mg capsule Take 100 mg by mouth nightly. hydrOXYzine HCL (ATARAX) 25 mg tablet Take 25 mg by mouth nightly as needed for Anxiety. furosemide (LASIX) 40 mg tablet Take 40 mg by mouth daily. Indications: visible water retention      losartan (COZAAR) 100 mg tablet Take 100 mg by mouth in the morning. melatonin 5 mg tablet Take 10 mg by mouth nightly. metFORMIN (GLUCOPHAGE) 500 mg tablet Take 1,000 mg by mouth two (2) times daily (with meals). potassium chloride (K-DUR, KLOR-CON M20) 20 mEq tablet Take 20 mEq by mouth two (2) times a day. senna-docusate (PERICOLACE) 8.6-50 mg per tablet Take 1 Tablet by mouth in the morning. tamsulosin (FLOMAX) 0.4 mg capsule Take 0.4 mg by mouth nightly. aspirin delayed-release 81 mg tablet Take 81 mg by mouth in the morning. omeprazole (PRILOSEC) 20 mg capsule Take 20 mg by mouth daily. atorvastatin (LIPITOR) 40 mg tablet Take 40 mg by mouth in the morning. Follow up Care:    1. Ellie Gorman MD in 1-2 weeks.       Follow-up Information       Follow up With Specialties Details Why Contact Info    Martha Leon DPM Podiatry Schedule an appointment as soon as possible for a visit in 3 week(s) Post-hospitalization follow-up [address] [facility] [address]  [phone]      Christiano Barreto MD Lab, Internal Medicine Physician   [address] [facility] Jj Roth MD Pulmonary Disease Schedule an appointment as soon as possible for a visit in 2 week(s) Hospital follow-up acute respiratory failure, aspiration PNA. [address] Sandra Mock MD Surgery Physician, General and Vascular Surgery Schedule an appointment as soon as possible for a visit in 3 week(s) Evaluate for carotid stent. [address] [facility]      Donna Herrmann MD Gastroenterology, Internal Medicine Physician Schedule an appointment as soon as possible for a visit As needed follow-up for PEG tube. [address] [facility] [id number] [phone]      [facility] EMERGENCY DEP Emergency Medicine Follow up As needed, If symptoms worsen [address] [facility]  [phone]              Patient Follow Up Instructions: Activity: Activity as tolerated  Diet:  Continue TF via PEG as Jevity 1.5 Michael at goal rate of 50 mL/hr. Flush with 160 mL H2O Q4H via PEG. Banatrol 1 packet daily. Wound care: Turn and reposition every 2 hours    Condition at Discharge:  Stable  __________________________________________________________________    Disposition  [place]  ____________________________________________________________________    Code Status:  Full Code  ___________________________________________________________________    Discharge Exam:  Patient seen and examined by me on discharge day. Pertinent Findings:    Gen:    Chronically ill-appearing male. Not in distress  Chest: Symmetric chest wall expansion. Decreased air entry bases, improving rhonchi. On 3 L NC.   CVS:   Regular rate and rhythm. +S1/S2. No murmur or gallop. No edema  Abd:  Soft, not distended, not tender. Active bowel sounds.    Neuro:  Alert, disoriented at baseline. Unsteady gait. CN II-XII grossly intact. Psych: Unable to evaluate     CONSULTATIONS: Cardiology, Pulmonary/Intensive care, ID, GI, Neurology, Psychiatry, and Vascular Surgery    Significant Diagnostic Studies:   Recent Results (from the past 24 hour(s))   GLUCOSE, POC    Collection Time: 11/26/22 11:06 AM   Result Value Ref Range    Glucose (POC) 133 (H) 65 - 100 mg/dL    Performed by [address] [place] [address], BASIC    Collection Time: 11/26/22  1:19 PM   Result Value Ref Range    Sodium 137 136 - 145 mmol/L    Potassium 4.0 3.5 - 5.1 mmol/L    Chloride 106 97 - 108 mmol/L    CO2 27 21 - 32 mmol/L    Anion gap 4 (L) 5 - 15 mmol/L    Glucose 135 (H) 65 - 100 mg/dL    BUN 12 6 - 20 mg/dL    Creatinine 0.52 (L) 0.70 - 1.30 mg/dL    BUN/Creatinine ratio 23 (H) 12 - 20      eGFR >60 >60 ml/min/1.73m2    Calcium 8.2 (L) 8.5 - 10.1 mg/dL   GLUCOSE, POC    Collection Time: 11/26/22  4:19 PM   Result Value Ref Range    Glucose (POC) 119 (H) 65 - 100 mg/dL    Performed by [address] [place] [address], POC    Collection Time: 11/26/22  7:45 PM   Result Value Ref Range    Glucose (POC) 152 (H) 65 - 100 mg/dL    Performed by [address] [place] [address], POC    Collection Time: 11/27/22  7:56 AM   Result Value Ref Range    Glucose (POC) 158 (H) 65 - 100 mg/dL    Performed by Arnol Serrano    GLUCOSE, POC    Collection Time: 11/27/22  7:59 AM   Result Value Ref Range    Glucose (POC) 158 (H) 65 - 100 mg/dL    Performed by Sari Monaco      XR CHEST PORT   Final Result   1. Increased left lung interstitial opacity, favor pulmonary edema. 2. Increased right upper lobe airspace disease may represent pneumonia or edema. XR CHEST PORT   Final Result   1. Patchy diffuse bilateral airspace disease and possible trace effusions,   unchanged. XR SWALLOW FUNC VIDEO   Final Result   Penetration as described above.          XR CHEST PORT   Final Result   Worsening diffuse airspace disease with areas of increasing consolidation. XR CHEST PORT   Final Result   Interval advancement of nasogastric tube, although tip not included on this   study. Evaluation of nasogastric tube position is typically better performed   with KUB. XR CHEST PORT   Final Result   1. No significant change in basilar predominant opacities. These are   nonspecific but can be seen with aspiration pneumonitis/pneumonia in the   appropriate setting. 2.  Enteric tube tip projects just past the gastroesophageal junction with   proximal sidehole in the distal esophagus. Consider advancing. MRI BRAIN WO CONT   Final Result   Small to moderate sized acute infarction in the right occipital lobe with   minimal/small infarction in the right temporal lobe. Severe cerebral atrophy. There is no intracranial mass, hemorrhage. There are numerous scattered chronic infarcts. No additional acute intracranial process is demonstrated. XR CHEST PORT   Final Result   Interval retraction of nasogastric tube with sidehole over the gastroesophageal   junction. XR CHEST PORT   Final Result   Interval replacement of nasogastric tube, with tip over the gastric fundus. XR CHEST PORT   Final Result   No significant change. XR CHEST PORT   Final Result   1. NG tube terminates in the gastric fundus, with possible kink in the side   hole. Correlate with function. 2.  New patchy consolidation in the right midlung is concerning for pneumonia. Grossly unchanged patchy retrocardiac opacities. Stable trace right pleural   effusion. XR CHEST PORT   Final Result   Nasogastric tube tip advanced overlying the gastric body. XR CHEST PORT   Final Result   Nasogastric tube tip over the gastric fundus. XR CHEST PORT   Final Result   Increased mild interstitial pulmonary edema versus pneumonia. Enteric tube extends into the gastric antrum or first portion of the duodenum.    Consider retraction 8-10 cm if the intention is gastric luminal termination. CT FOOT RT W CONT   Final Result      1. No abscess or osteomyelitis. 2. No fracture. DUPLEX LOWER EXT ARTERY BILAT   Final Result      XR FOOT RT MIN 3 V   Final Result   No osseous erosion or soft tissue gas. .      XR CHEST PORT   Final Result      Mild interstitial opacities which may represent mild pulmonary edema unchanged         XR CHEST PORT   Final Result   Addendum (preliminary) 1 of 1   Addendum: NG tube is in appropriate position tip extends below the    diaphragm. Right IJ catheter. No pneumothorax on the right. Final   1. Status post right IJ catheter placement without evidence of complication. IR INSERT NON TUNL CVC OVER 5 YRS   Final Result   Technically successful ultrasound guided placement of a right internal jugular   vein temporary central venous catheter. A post procedure chest x-ray is   pending. CT HEAD WO CONT   Final Result   No acute process or change compared to the prior exam.            XR CHEST PORT   Final Result   No acute process.       IR Jaylen    (Results Pending)           Signed:  Lino Peres PA-C  11/27/2022  10:20 AM

## 2022-11-27 NOTE — PROGRESS NOTES
RN attempted to call report to [place] ([phone]) but no answer when transferred from main desk. Will attempt to call again.

## 2022-11-27 NOTE — PROGRESS NOTES
DC order noted. Chart reviewed. Patient is a LTC resident at [facility]. CM notified [facility] via Sherron San that patient is ready for DC. Awaiting room number. ---------------    3021- Room number received from facility. CM called patients Jordan sarah @ [phone] and left VM.    ---------------    1130- No return call from son. CM called Jordan Claudetteer again and it went to . CM called patients brotherAlfredo @ [phone] to notify of DC back to facility. Alfredo Chamberlain states that CM would need to get final clearance from patients keara Wilkinser before patient can DC. Alfredo Chamberlain took CM contact info and he will attempt to reach Jordan Presser also.    ----------------    1400- No return call. CM called patients Jordan saraher and left 2nd VM.    ----------------    7365- CM received a call back from patients Jordan sarah. CM informed son of DC and of IMM letter. Patient will go to [facility], room 121A and nurse can call report to [phone] [facility] Unit)    Patient will require Medicaid stretcher transport. CM notified  and primary nurse. Medicare pt has received, reviewed, and signed 2nd IM letter informing them of their right to appeal the discharge. Signed copied has been placed on pt bedside chart. Discharge plan of care/case management plan validated with provider discharge order. ---------------    1500- Gi from [facility] asks CM if nurse can send 2 bottles of tube feeding with patient. CM notified primary nurse.

## 2022-11-27 NOTE — PROGRESS NOTES
Pt O2 saturation 93% on 4L NC. Pt can be picked up within 30 minutes-1 hour per [id number] [facility] ambulance.

## 2022-11-27 NOTE — PROGRESS NOTES
Wound Care: R heel/Medial and lateral foot cleansed with saline, applied Venelix,nonadherent foam and wrapped with Gauze. Site: no open areas or drainage noted.

## 2022-11-27 NOTE — PROGRESS NOTES
Pt transported to [facility] via [place] [address] ambulance. IV removed, PEG tube in place and clamped, telemetry box removed.  2 bottles of Jevity 1.5 set with pt per charge RN s request.

## 2022-11-29 NOTE — PROGRESS NOTES
Vancomycin Dosing Consult  Cathi Pennington is a 70 y.o. male with Bloodstream infection and PNA. Pharmacy was consulted by  Uday Sin to dose and monitor Vancomycin. Today is day 1. Antibiotic regimen: Vancomycin + Zosyn    Temp (24hrs), Av.9 °F (38.8 °C), Min:101.9 °F (38.8 °C), Max:101.9 °F (38.8 °C)    Recent Labs     22  1149 22  1139   WBC 14.8* 8.2     Recent Labs     22  1045 22  1139 22  1319 22  0705 22  1456 22  1153 22  0901   CREA 0.55* 0.43* 0.52* 0.54* 0.55* 0.52* 0.54*   BUN 15 12 12 15 16 14 11     Recent Labs     22  1018   CRP 1.64*     Recent Labs     22  1018   PCT 1.23*       Estimated Creatinine Clearance: 85 mL/min (A) (by C-G formula based on SCr of 0.55 mg/dL (L)). ml/min  Concomitant nephrotoxic drugs: None    Results       Procedure Component Value Units Date/Time    INFLUENZA A & B AG (RAPID TEST) [[id number]] Collected: 22    Order Status: Completed Specimen: Nasopharyngeal from Nasal washing Updated: 22     Influenza A Antigen Negative        Influenza B Antigen Negative       COVID-19 RAPID TEST [[id number]] Collected: 22    Order Status: Completed Specimen: Nasopharyngeal Updated: 22     COVID-19 rapid test Not Detected        Comment: Rapid Abbott ID Now   The specimen is NEGATIVE for SARS-CoV2, the novel coronavirus associated with COVID-19. A negative result does not rule out COVID-19. This test has been authorized by the FDA under an Emergency Use Authorization (EUA) for use by authorized laboratories.    Fact sheet for Healthcare Providers:  GrayBug[employer]za Fact sheet for Patients: GrayBug.za   Methodology: Isothermal Nucleic Acid Amplification       CULTURE, BLOOD, PAIRED [[id number]] Collected: 22    Order Status: Sent Specimen: Blood Updated: 22 1057    COVID-19 RAPID TEST [[id number]] Collected: 11/23/22 0253    Order Status: Completed Specimen: Nasopharyngeal Updated: 11/23/22 0424     COVID-19 rapid test Not Detected        Comment: Rapid Abbott ID Now   The specimen is NEGATIVE for SARS-CoV2, the novel coronavirus associated with COVID-19. A negative result does not rule out COVID-19. This test has been authorized by the FDA under an Emergency Use Authorization (EUA) for use by authorized laboratories. Fact sheet for Healthcare Providers:  http://www.naomi.maxine/ Fact sheet for Patients: http://www.naomi.maxine/   Methodology: Isothermal Nucleic Acid Amplification             Cultures:   As above    MRSA Swab: N/A    Target range: AUC/TELMA 400-600    Last Level: No results found for: VANCT   Vancomycin, random   Date/Time Value Ref Range Status   11/15/2022 05:44 AM 11.0 ug/mL Final     Comment:     No reference range has been established. Recent level history (3):  Date/Time Previous Dose & Interval Measured Level (mcg/mL) Associated AUC/TELMA   11/29 at 1128 Pt got 1 gm Vanc. in ED   Gave another 500 mg for total of 1500 mg as LD  Started pt on 1000 mg vanc. Q12h from 11/30/22.  400-600     Ht Readings from Last 1 Encounters:   11/29/22 175.3 cm (69\")     Wt Readings from Last 1 Encounters:   11/29/22 62.1 kg (137 lb)     Ideal body weight: 70.7 kg (155 lb 13.8 oz)        Assessment/Plan:   Pt is with PNA and Bloodstream infection   Pt. Got 1 gm vancomycin in ED, gave 500 mg extra for total of 1500 as LD. Started Pt. On 1000 mg Vancomycin q12h from 11/30/22 at 0100 am.  A Trough level has been ordered on 11/30/22 at 1100.   Antimicrobial stop date TBD

## 2022-11-29 NOTE — MED STUDENT NOTES
*ATTENTION:  This note has been created by a medical student for educational purposes only. Please do not refer to the content of this note for clinical decision-making, billing, or other purposes. Please see attending physicians note to obtain clinical information on this patient. *                                          History & Physical    Primary Care Provider: Momo Bradley MD  Source of Information: Chart review    History of Presenting Illness:   Buster Lombardo is a 70 y.o. male, pmhx DM, HTN, CVA, Cirrhosis, here for shortness of breath and hypoxia. Pt was sent from his nursing home after they found him to have O2 saturations in the 70's on room air, tachypneic, labored breathing. Pt was placed on BiPAP in the ER with improvement of oxygen saturations but he still has eyes closed, does not speak. He does move his right arm around and groan repeatedly during evaluation but does not seem intentional in his movements. Of note, the patient was last admitted in this hospital on 11/8, discharged 2 days ago on 11/27, for sepsis. At time of discharge the patient was awake and alert, though not fully oriented. Review of Systems:  Review of systems not obtained due to patient factors. Past Medical History:   Diagnosis Date    Cirrhosis ([facility] [address]     Diabetes ([facility] [address]     Hypertension     Stroke [facility])       Past Surgical History:   Procedure Laterality Date    HX BACK SURGERY      HX GI      IR INSERT NON TUNL CVC OVER 5 YRS  11/9/2022     Prior to Admission medications    Medication Sig Start Date End Date Taking? Authorizing Provider   traMADoL (ULTRAM) 50 mg tablet Take 50 mg by mouth every six (6) hours as needed for Pain.     Provider, Historical   insulin lispro (HUMALOG) 100 unit/mL injection INITIATE INSULIN CORRECTIVE PROTOCOL:  Normal Insulin Sensitivity   For Blood Sugar (mg/dL) of:     Less than 150 =   0 units           150 -199 =   2 units  200 -249 =   4 units  250 -299 =   6 units  300 -349 = 8 units  350 and above = 10 units and Call Physician     Initiate Hypoglycemia protocol if blood glucose is <70 mg/dL  Fast Acting - Administer Immediately - or within 15 minutes of start of meal, if mealtime coverage. 8/2/22   Roberta Mcneill PA-C   clopidogreL (PLAVIX) 75 mg tab Take 1 Tablet by mouth in the morning. 8/3/22   Roberta Mcneill PA-C   ARIPiprazole (ABILIFY) 2 mg tablet Take 2 mg by mouth in the morning. Provider, Historical   baclofen 5 mg tab Take 5 mg by mouth two (2) times a day. Provider, Historical   carvediloL (COREG) 12.5 mg tablet Take 12.5 mg by mouth two (2) times a day. Indications: high blood pressure    Provider, Historical   escitalopram oxalate (LEXAPRO) 20 mg tablet Take 20 mg by mouth in the morning. Provider, Historical   gabapentin (NEURONTIN) 100 mg capsule Take 100 mg by mouth nightly. Provider, Historical   hydrOXYzine HCL (ATARAX) 25 mg tablet Take 25 mg by mouth nightly as needed for Anxiety. Provider, Historical   furosemide (LASIX) 40 mg tablet Take 40 mg by mouth daily. Indications: visible water retention    Provider, Historical   losartan (COZAAR) 100 mg tablet Take 100 mg by mouth in the morning. Provider, Historical   melatonin 5 mg tablet Take 10 mg by mouth nightly. Provider, Historical   metFORMIN (GLUCOPHAGE) 500 mg tablet Take 1,000 mg by mouth two (2) times daily (with meals). Provider, Historical   potassium chloride (K-DUR, KLOR-CON M20) 20 mEq tablet Take 20 mEq by mouth two (2) times a day. Provider, Historical   senna-docusate (PERICOLACE) 8.6-50 mg per tablet Take 1 Tablet by mouth in the morning. Provider, Historical   tamsulosin (FLOMAX) 0.4 mg capsule Take 0.4 mg by mouth nightly. Provider, Historical   aspirin delayed-release 81 mg tablet Take 81 mg by mouth in the morning. Other, MD Adam   omeprazole (PRILOSEC) 20 mg capsule Take 20 mg by mouth daily.     Rowan, MD Adam   atorvastatin (LIPITOR) 40 mg tablet Take 40 mg by mouth in the morning. Other, MD Adam     No Known Allergies   Family History   Problem Relation Age of Onset    Heart Disease Father         SOCIAL HISTORY:  Patient resides:  Independently    Assisted Living x   SNF    With family care       Smoking history:   None x   Former    Chronic      Alcohol history:   None x   Social    Chronic      Ambulates:   Independently    w/cane    w/walker    w/wc    CODE STATUS:  DNR    Full x   Other      Objective:     Physical Exam:     Visit Vitals  /81   Pulse 92   Temp (!) 101.9 °F (38.8 °C)   Resp (!) 31   Ht 5' 9\" (1.753 m)   Wt 62.1 kg (137 lb)   SpO2 97%   BMI 20.23 kg/m²    O2 Flow Rate (L/min): 15 l/min O2 Device: BIPAP    General:  Eyes closed, not responding to verbal stimuli in meaningful manner, appears ill, in moderate distress. Head:  Normocephalic, without obvious abnormality, atraumatic. Eyes:  Eyes closed and will not open volitionally   Nose: Nares normal. Septum midline. Mucosa normal. No drainage or sinus tenderness. Throat: Lips, mucosa, and tongue normal. Teeth and gums normal.   Neck: Supple, symmetrical, trachea midline, no adenopathy, thyroid: no enlargement/tenderness/nodules, no carotid bruit and no JVD. Back:   Symmetric, no curvature. ROM normal. No CVA tenderness. Lungs:   Diffuse rhonchi, currently on BiPAP   Chest wall:  No tenderness or deformity. Heart:  Tachycardic, regular rhythm   Abdomen:   Soft, non-tender. Bowel sounds normal. No masses,  No organomegaly. Extremities: Scattered wound to the lower extremities, very pale and thin, no cyanosis or edema. Pulses: 2+ and symmetric all extremities. Skin: Pale with scattered abrasions and other chronic-appearing wounds   Neurologic: Unable to assess at this time, pt squirming in bed occasionally         EKG:  Sinus tachycardia with Premature atrial complexes with Abberant conduction, Possible Left atrial enlargement, ST & T wave abnormality.       Data Review:     Recent Days:  Recent Labs     11/29/22  1149 11/27/22  1139   WBC 14.8* 8.2   HGB 9.8* 9.2*   HCT 32.0* 29.2*    292     Recent Labs     11/29/22  1045 11/27/22  1139 11/26/22  1319    139 137   K 3.8 4.1 4.0   * 109* 106   CO2 21 24 27   * 94 135*   BUN 15 12 12   CREA 0.55* 0.43* 0.52*   CA 8.0* 8.1* 8.2*   ALB 2.1*  --   --    TBILI 1.9*  --   --    ALT 48  --   --      Recent Labs     11/29/22  1205   PH 7.51*   PCO2 25*   PO2 151*   HCO3 20*   FIO2 80       24 Hour Results:  Recent Results (from the past 24 hour(s))   EKG, 12 LEAD, INITIAL    Collection Time: 11/29/22 10:26 AM   Result Value Ref Range    Ventricular Rate 102 BPM    Atrial Rate 102 BPM    P-R Interval 144 ms    QRS Duration 90 ms    Q-T Interval 372 ms    QTC Calculation (Bezet) 484 ms    Calculated P Axis 59 degrees    Calculated R Axis 5 degrees    Calculated T Axis 121 degrees    Diagnosis       Sinus tachycardia with Premature atrial complexes with Abberant conduction  Possible Left atrial enlargement  ST & T wave abnormality, consider anterolateral ischemia  Abnormal ECG  When compared with ECG of 08-NOV-2022 10:43,  Abberant conduction is now Present  Vent.  rate has increased BY  42 BPM  Confirmed by Corin Santos MD, Etelvina [facility] ([id number]) on 11/29/2022 12:13:44 PM     TROPONIN-HIGH SENSITIVITY    Collection Time: 11/29/22 10:43 AM   Result Value Ref Range    Troponin-High Sensitivity 111 (H) 0 - 76 ng/L   METABOLIC PANEL, COMPREHENSIVE    Collection Time: 11/29/22 10:45 AM   Result Value Ref Range    Sodium 140 136 - 145 mmol/L    Potassium 3.8 3.5 - 5.1 mmol/L    Chloride 110 (H) 97 - 108 mmol/L    CO2 21 21 - 32 mmol/L    Anion gap 9 5 - 15 mmol/L    Glucose 146 (H) 65 - 100 mg/dL    BUN 15 6 - 20 mg/dL    Creatinine 0.55 (L) 0.70 - 1.30 mg/dL    BUN/Creatinine ratio 27 (H) 12 - 20      eGFR >60 >60 ml/min/1.73m2    Calcium 8.0 (L) 8.5 - 10.1 mg/dL    Bilirubin, total 1.9 (H) 0.2 - 1.0 mg/dL    AST (SGOT) 43 (H) 15 - 37 U/L    ALT (SGPT) 48 12 - 78 U/L    Alk. phosphatase 70 45 - 117 U/L    Protein, total 5.7 (L) 6.4 - 8.2 g/dL    Albumin 2.1 (L) 3.5 - 5.0 g/dL    Globulin 3.6 2.0 - 4.0 g/dL    A-G Ratio 0.6 (L) 1.1 - 2.2     LACTIC ACID    Collection Time: 11/29/22 10:45 AM   Result Value Ref Range    Lactic acid 1.6 0.4 - 2.0 mmol/L   NT-PRO BNP    Collection Time: 11/29/22 10:45 AM   Result Value Ref Range    NT pro-BNP 12,166 (H) <125 pg/mL   CBC WITH AUTOMATED DIFF    Collection Time: 11/29/22 11:49 AM   Result Value Ref Range    WBC 14.8 (H) 4.1 - 11.1 K/uL    RBC 3.40 (L) 4.10 - 5.70 M/uL    HGB 9.8 (L) 12.1 - 17.0 g/dL    HCT 32.0 (L) 36.6 - 50.3 %    MCV 94.1 80.0 - 99.0 FL    MCH 28.8 26.0 - 34.0 PG    MCHC 30.6 30.0 - 36.5 g/dL    RDW 17.4 (H) 11.5 - 14.5 %    PLATELET 178 460 - 651 K/uL    MPV 10.4 8.9 - 12.9 FL    NRBC 0.0 0.0  WBC    ABSOLUTE NRBC 0.00 0.00 - 0.01 K/uL    NEUTROPHILS 92 (H) 32 - 75 %    LYMPHOCYTES 5 (L) 12 - 49 %    MONOCYTES 3 (L) 5 - 13 %    EOSINOPHILS 0 0 - 7 %    BASOPHILS 0 0 - 1 %    IMMATURE GRANULOCYTES 0 0 - 0.5 %    ABS. NEUTROPHILS 13.4 (H) 1.8 - 8.0 K/UL    ABS. LYMPHOCYTES 0.8 0.8 - 3.5 K/UL    ABS. MONOCYTES 0.4 0.0 - 1.0 K/UL    ABS. EOSINOPHILS 0.0 0.0 - 0.4 K/UL    ABS. BASOPHILS 0.0 0.0 - 0.1 K/UL    ABS. IMM.  GRANS. 0.1 (H) 0.00 - 0.04 K/UL    DF AUTOMATED     BLOOD GAS, ARTERIAL    Collection Time: 11/29/22 12:05 PM   Result Value Ref Range    pH 7.51 (H) 7.35 - 7.45      PCO2 25 (L) 35 - 45 mmHg    PO2 151 (H) 80 - 100 mmHg    O2 SATURATION 99 95 - 99 %    BICARBONATE 20 (L) 22 - 26 mmol/L    BASE DEFICIT 1.9 mmol/L    O2 METHOD BiPAP      FIO2 80 %    Tidal volume 480.0      PRESSURE SUPPORT 25      PEEP/CPAP 7.0      Sample source Arterial      SITE Left Radial      BRITNEY'S TEST YES      Carboxy-Hgb 0.1 (L) 1 - 2 %    Methemoglobin 0.3 0 - 1.4 %    Oxyhemoglobin 98.2 95 - 99 %    Performed by Rico Faye     TEMPERATURE 99.6           Imaging: Assessment:     Hypoxic Respiratory Failure  -- Possibly due to infection or cardiac cause  -- Currently on BiPAP, will need ongoing respiratory support until condition improves  -- Pulmonology consulted and following the patient  -- Start Vancomycin and Zosyn empirically for possible PNA and sepsis    CHF  -- No apparent fluid overload at this time but will continue cardiac monitoring while patient is here    Sepsis  -- Will start Zosyn and Vancomycin    Plan:     Plan as above  Will admit the patient to ICU for further management  Attempted to contact the patient's son, no answer as of yet    Signed By: Jackson Paez     November 29, 2022

## 2022-11-29 NOTE — H&P
History & Physical    Primary Care Provider: Shae Wilks MD  Source of Information: Patient self    History of Presenting Illness:   Marilee Anderson is a 70 y.o. male history of DM, HTN, CVA, Cirrhosis, here for shortness of breath and hypoxia. Pt was sent from his nursing home after they found him to have O2 saturations in the 70's on room air, tachypneic, labored breathing. Pt was placed on BiPAP in the ER with improvement of oxygen saturations but he still has eyes closed, does not speak. He does move his right arm around and groan repeatedly during evaluation but does not seem intentional in his movements. Of note, the patient was last admitted in this hospital on 11/8, discharged 2 days ago on 11/27, for sepsis. At time of discharge the patient was awake and alert, though not fully oriented. Review of Systems:  Review of systems not obtained due to patient factors. Past Medical History:   Diagnosis Date    Cirrhosis ([facility] [address]     Diabetes ([facility] [address]     Hypertension     Stroke [facility])       Past Surgical History:   Procedure Laterality Date    HX BACK SURGERY      HX GI      IR INSERT NON TUNL CVC OVER 5 YRS  11/9/2022     Prior to Admission medications    Medication Sig Start Date End Date Taking? Authorizing Provider   traMADoL (ULTRAM) 50 mg tablet Take 50 mg by mouth every six (6) hours as needed for Pain. Provider, Historical   insulin lispro (HUMALOG) 100 unit/mL injection INITIATE INSULIN CORRECTIVE PROTOCOL:  Normal Insulin Sensitivity   For Blood Sugar (mg/dL) of:     Less than 150 =   0 units           150 -199 =   2 units  200 -249 =   4 units  250 -299 =   6 units  300 -349 =   8 units  350 and above = 10 units and Call Physician     Initiate Hypoglycemia protocol if blood glucose is <70 mg/dL  Fast Acting - Administer Immediately - or within 15 minutes of start of meal, if mealtime coverage.  8/2/22   Renuka Mcneill PA-C   clopidogreL (PLAVIX) 75 mg tab Take 1 Tablet by mouth in the morning. 8/3/22   ReasonerBandar PA-C   ARIPiprazole (ABILIFY) 2 mg tablet Take 2 mg by mouth in the morning. Provider, Historical   baclofen 5 mg tab Take 5 mg by mouth two (2) times a day. Provider, Historical   carvediloL (COREG) 12.5 mg tablet Take 12.5 mg by mouth two (2) times a day. Indications: high blood pressure    Provider, Historical   escitalopram oxalate (LEXAPRO) 20 mg tablet Take 20 mg by mouth in the morning. Provider, Historical   gabapentin (NEURONTIN) 100 mg capsule Take 100 mg by mouth nightly. Provider, Historical   hydrOXYzine HCL (ATARAX) 25 mg tablet Take 25 mg by mouth nightly as needed for Anxiety. Provider, Historical   furosemide (LASIX) 40 mg tablet Take 40 mg by mouth daily. Indications: visible water retention    Provider, Historical   losartan (COZAAR) 100 mg tablet Take 100 mg by mouth in the morning. Provider, Historical   melatonin 5 mg tablet Take 10 mg by mouth nightly. Provider, Historical   metFORMIN (GLUCOPHAGE) 500 mg tablet Take 1,000 mg by mouth two (2) times daily (with meals). Provider, Historical   potassium chloride (K-DUR, KLOR-CON M20) 20 mEq tablet Take 20 mEq by mouth two (2) times a day. Provider, Historical   senna-docusate (PERICOLACE) 8.6-50 mg per tablet Take 1 Tablet by mouth in the morning. Provider, Historical   tamsulosin (FLOMAX) 0.4 mg capsule Take 0.4 mg by mouth nightly. Provider, Historical   aspirin delayed-release 81 mg tablet Take 81 mg by mouth in the morning. Rowan, MD Adam   omeprazole (PRILOSEC) 20 mg capsule Take 20 mg by mouth daily. Adam Donahue MD   atorvastatin (LIPITOR) 40 mg tablet Take 40 mg by mouth in the morning.     Adam Donahue MD     No Known Allergies   Family History   Problem Relation Age of Onset    Heart Disease Father         SOCIAL HISTORY:  Patient resides:  Independently    Assisted Living    [facility] z   With family care       Smoking history:   None z Former    Chronic      Alcohol history:   None x   Social    Chronic      Ambulates:   Independently    w/cane    w/walker    w/wc x   CODE STATUS:  DNR    Full x   Other      Objective:     Physical Exam:     Visit Vitals  BP (!) 114/57   Pulse 97   Temp (!) 101.9 °F (38.8 °C)   Resp (!) 36   Ht 5' 9\" (1.753 m)   Wt 62.1 kg (137 lb)   SpO2 97%   BMI 20.23 kg/m²    O2 Flow Rate (L/min): 15 l/min O2 Device: BIPAP    General:  Awake and groans but unable to follow commands   Head:  Normocephalic, without obvious abnormality, atraumatic. Eyes:  Conjunctivae/corneas clear. PERRL, EOMs intact. Nose: Nares normal. Septum midline. Mucosa normal. No drainage or sinus tenderness. Throat: Lips, mucosa, and tongue normal. Teeth and gums normal.   Neck: Supple, symmetrical, trachea midline, no adenopathy, thyroid: no enlargement/tenderness/nodules, no carotid bruit and no JVD. Back:   Symmetric, no curvature. ROM normal. No CVA tenderness. Lungs:   widespread rhonchi bilaterally. Chest wall:  No tenderness or deformity. Heart:  Regular rate and rhythm, S1, S2 normal, no murmur, click, rub or gallop. Abdomen:   Soft, non-tender. Bowel sounds normal. No masses,  No organomegaly. Extremities: Extremities normal, atraumatic, no cyanosis or edema. Pulses: 2+ and symmetric all extremities. Skin: Skin color, texture, turgor normal. No rashes or lesions   Neurologic: CNII-XII intact. No motor or sensory deficits. EKG:  nonspecific ST and T waves changes.       Data Review:     Recent Days:  Recent Labs     11/29/22  1149 11/27/22  1139   WBC 14.8* 8.2   HGB 9.8* 9.2*   HCT 32.0* 29.2*    292     Recent Labs     11/29/22  1045 11/27/22  1139    139   K 3.8 4.1   * 109*   CO2 21 24   * 94   BUN 15 12   CREA 0.55* 0.43*   CA 8.0* 8.1*   ALB 2.1*  --    TBILI 1.9*  --    ALT 48  --      Recent Labs     11/29/22  1205   PH 7.51*   PCO2 25*   PO2 151*   HCO3 20*   FIO2 80       24 Hour Results:  Recent Results (from the past 24 hour(s))   EKG, 12 LEAD, INITIAL    Collection Time: 11/29/22 10:26 AM   Result Value Ref Range    Ventricular Rate 102 BPM    Atrial Rate 102 BPM    P-R Interval 144 ms    QRS Duration 90 ms    Q-T Interval 372 ms    QTC Calculation (Bezet) 484 ms    Calculated P Axis 59 degrees    Calculated R Axis 5 degrees    Calculated T Axis 121 degrees    Diagnosis       Sinus tachycardia with Premature atrial complexes with Abberant conduction  Possible Left atrial enlargement  ST & T wave abnormality, consider anterolateral ischemia  Abnormal ECG  When compared with ECG of 08-NOV-2022 10:43,  Abberant conduction is now Present  Vent. rate has increased BY  42 BPM  Confirmed by Derek Vaca MD, Ian Nieves ([id number]) on 11/29/2022 12:13:44 PM     TROPONIN-HIGH SENSITIVITY    Collection Time: 11/29/22 10:43 AM   Result Value Ref Range    Troponin-High Sensitivity 111 (H) 0 - 76 ng/L   METABOLIC PANEL, COMPREHENSIVE    Collection Time: 11/29/22 10:45 AM   Result Value Ref Range    Sodium 140 136 - 145 mmol/L    Potassium 3.8 3.5 - 5.1 mmol/L    Chloride 110 (H) 97 - 108 mmol/L    CO2 21 21 - 32 mmol/L    Anion gap 9 5 - 15 mmol/L    Glucose 146 (H) 65 - 100 mg/dL    BUN 15 6 - 20 mg/dL    Creatinine 0.55 (L) 0.70 - 1.30 mg/dL    BUN/Creatinine ratio 27 (H) 12 - 20      eGFR >60 >60 ml/min/1.73m2    Calcium 8.0 (L) 8.5 - 10.1 mg/dL    Bilirubin, total 1.9 (H) 0.2 - 1.0 mg/dL    AST (SGOT) 43 (H) 15 - 37 U/L    ALT (SGPT) 48 12 - 78 U/L    Alk.  phosphatase 70 45 - 117 U/L    Protein, total 5.7 (L) 6.4 - 8.2 g/dL    Albumin 2.1 (L) 3.5 - 5.0 g/dL    Globulin 3.6 2.0 - 4.0 g/dL    A-G Ratio 0.6 (L) 1.1 - 2.2     LACTIC ACID    Collection Time: 11/29/22 10:45 AM   Result Value Ref Range    Lactic acid 1.6 0.4 - 2.0 mmol/L   NT-PRO BNP    Collection Time: 11/29/22 10:45 AM   Result Value Ref Range    NT pro-BNP 12,166 (H) <125 pg/mL   CBC WITH AUTOMATED DIFF    Collection Time: 11/29/22 11:49 AM Result Value Ref Range    WBC 14.8 (H) 4.1 - 11.1 K/uL    RBC 3.40 (L) 4.10 - 5.70 M/uL    HGB 9.8 (L) 12.1 - 17.0 g/dL    HCT 32.0 (L) 36.6 - 50.3 %    MCV 94.1 80.0 - 99.0 FL    MCH 28.8 26.0 - 34.0 PG    MCHC 30.6 30.0 - 36.5 g/dL    RDW 17.4 (H) 11.5 - 14.5 %    PLATELET 901 408 - 175 K/uL    MPV 10.4 8.9 - 12.9 FL    NRBC 0.0 0.0  WBC    ABSOLUTE NRBC 0.00 0.00 - 0.01 K/uL    NEUTROPHILS 92 (H) 32 - 75 %    LYMPHOCYTES 5 (L) 12 - 49 %    MONOCYTES 3 (L) 5 - 13 %    EOSINOPHILS 0 0 - 7 %    BASOPHILS 0 0 - 1 %    IMMATURE GRANULOCYTES 0 0 - 0.5 %    ABS. NEUTROPHILS 13.4 (H) 1.8 - 8.0 K/UL    ABS. LYMPHOCYTES 0.8 0.8 - 3.5 K/UL    ABS. MONOCYTES 0.4 0.0 - 1.0 K/UL    ABS. EOSINOPHILS 0.0 0.0 - 0.4 K/UL    ABS. BASOPHILS 0.0 0.0 - 0.1 K/UL    ABS. IMM. GRANS. 0.1 (H) 0.00 - 0.04 K/UL    DF AUTOMATED     BLOOD GAS, ARTERIAL    Collection Time: 11/29/22 12:05 PM   Result Value Ref Range    pH 7.51 (H) 7.35 - 7.45      PCO2 25 (L) 35 - 45 mmHg    PO2 151 (H) 80 - 100 mmHg    O2 SATURATION 99 95 - 99 %    BICARBONATE 20 (L) 22 - 26 mmol/L    BASE DEFICIT 1.9 mmol/L    O2 METHOD BiPAP      FIO2 80 %    Tidal volume 480.0      PRESSURE SUPPORT 25      PEEP/CPAP 7.0      Sample source Arterial      SITE Left Radial      BRITNEY'S TEST YES      Carboxy-Hgb 0.1 (L) 1 - 2 %    Methemoglobin 0.3 0 - 1.4 %    Oxyhemoglobin 98.2 95 - 99 %    Performed by Andreian Faye     TEMPERATURE 99.6     INFLUENZA A & B AG (RAPID TEST)    Collection Time: 11/29/22 12:59 PM   Result Value Ref Range    Influenza A Antigen Negative Negative      Influenza B Antigen Negative Negative     COVID-19 RAPID TEST    Collection Time: 11/29/22 12:59 PM   Result Value Ref Range    COVID-19 rapid test Not Detected Not Detected           Imaging:   XR CHEST PORT   Final Result      Stable bilateral pulmonary infiltrates.                Assessment:     Acute hypoxic respiratory failure    -Suspect aspiration    -Continue BiPAP to maintain O2 saturation above 92%    -Monitor closely in ICU overnight    Probable aspiration pneumonia    -Continue IV Zosyn/vancomycin empirically    -Follow-up blood and sputum cultures    Sepsis    -Secondary to bilateral pneumonia    Generalized debilitation    -Secondary to acute illness and multiple comorbidities    -Doubt if patient is able to ambulate    Coronary artery disease status post CABG    -Stable for now      Plan:   Admit to intensive care unit  Treatment plan as above  Attempted to reach keara Sol x2 to no avail  CODE STATUS full by default  Overall long-term prognosis poor.          Signed By: Miracle Campo MD     November 29, 2022

## 2022-11-29 NOTE — CONSULTS
IMPRESSION:   Acute on chronic hypoxic hypercapnic respiratory failure  Congestive heart failure  Aspiration pneumonia  Sepsis  History of CVA  Fever  Additional workup outlined below  Pt is at high risk of sudden decline and decompensation with life threatening consequenses and continued end organ dysfunction and failure  Pt is critically ill. Time spent with pt and staff actively rendering care, managing pt and coordinating care as stated below;  55 minutes, exclusive of any procedures      RECOMMENDATIONS/PLAN:   ICU monitoring  77-year-old male recently discharged from the hospital came back with hypoxia now is on high flow nasal cannula life support to avoid worsening respiratory acidosis, hypercacarbic or hypoxic respiratory arrest  Intubate and place on vent if NIV fails  He is febrile temperature 102  Chest x-ray still showed bilateral pulmonary infiltrate versus congestion  BNP today is 20518  Troponin is elevated  Agree with Empiric IV antibiotics pending culture results   Follow culture results on Zosyn and vancomycin  CVP monitoring  IV vasopressors for circulatory shock refractory to fluids to maintain SBP> 90  High flow nasal Cannula oxygen as salvage oxygen delivery device to provide high concentration of oxygen to overcome refractory hypoxia;    Patient requiring restraints due to threat of injury to self, interference with medical devices  Transfuse prn to maintain Hgb > 7  Labs to follow electrolytes, renal function and and blood counts  Bronchial hygiene with respiratory therapy techniques, bronchodilators  Pt needs IV fluids with additives and Drug therapy requiring intensive monitoring for toxicity  Prescription drug management with home med reconciliation reviewed  DVT, SUP prophylaxis  Will be available to assist in medical management while in the CCU pending disposition     [x] High complexity decision making was performed  [x] See my orders for details  HPI  77-year-old male recently discharged from the hospital came in because of shortness of breath and dyspnea he was discharged on oxygen but his condition got worse came back to the hospital febrile temperature 102 he was put on BiPAP machine and then now he is on high flow nasal cannula unable to get any started the patient he moans and groans his saturation was in the 70s were admitted his BNP is elevated chest x-ray still shows bilateral infiltrate. PMH:  has a past medical history of Cirrhosis ([facility] [address] Diabetes ([facility] [address] Hypertension, and Stroke ([facility] [address] PSH:   has a past surgical history that includes hx back surgery; hx gi; and ir insert non tunl cvc over 5 yrs (11/9/2022). FHX: family history includes Heart Disease in his father. SHX:  reports that he has quit smoking. He has never used smokeless tobacco. He reports that he does not currently use alcohol. He reports that he does not currently use drugs.     ALL: No Known Allergies     MEDS:   [x] Reviewed - As Below   [] Not reviewed    Current Facility-Administered Medications   Medication    sodium chloride (NS) flush 5-10 mL    sodium chloride 0.9 % bolus infusion 1,863 mL    sodium chloride 0.9 % bolus infusion 1,000 mL    Followed by    sodium chloride 0.9 % bolus infusion 863 mL    sodium chloride (NS) flush 5-40 mL    sodium chloride (NS) flush 5-40 mL    acetaminophen (TYLENOL) tablet 650 mg    Or    acetaminophen (TYLENOL) suppository 650 mg    polyethylene glycol (MIRALAX) packet 17 g    ondansetron (ZOFRAN ODT) tablet 4 mg    Or    ondansetron (ZOFRAN) injection 4 mg    [START ON 11/30/2022] enoxaparin (LOVENOX) injection 40 mg    [START ON 11/30/2022] ARIPiprazole (ABILIFY) tablet 2 mg    [START ON 11/30/2022] aspirin delayed-release tablet 81 mg    [START ON 11/30/2022] atorvastatin (LIPITOR) tablet 40 mg    baclofen tab 5 mg    carvediloL (COREG) tablet 12.5 mg    [START ON 11/30/2022] clopidogreL (PLAVIX) tablet 75 mg    [START ON 11/30/2022] escitalopram oxalate (LEXAPRO) tablet 20 mg    gabapentin (NEURONTIN) capsule 100 mg    hydrOXYzine HCL (ATARAX) tablet 25 mg    [START ON 11/30/2022] losartan (COZAAR) tablet 100 mg    melatonin tablet 10 mg    tamsulosin (FLOMAX) capsule 0.4 mg    traMADoL (ULTRAM) tablet 50 mg    VANCOMYCIN INFORMATION NOTE    vancomycin (VANCOCIN) 500 mg in 0.9% sodium chloride (MBP/ADV) 100 mL MBP    [START ON 11/30/2022] vancomycin (VANCOCIN) 1,000 mg in 0.9% sodium chloride 250 mL ([id number])    [START ON 11/30/2022] Vancomycin trough level to be drawn on 11/30/22  at 1100 am.    piperacillin-tazobactam (ZOSYN) 3.375 g in 0.9% sodium chloride (MBP/ADV) 100 mL MBP     Current Outpatient Medications   Medication Sig    traMADoL (ULTRAM) 50 mg tablet Take 50 mg by mouth every six (6) hours as needed for Pain. insulin lispro (HUMALOG) 100 unit/mL injection INITIATE INSULIN CORRECTIVE PROTOCOL:  Normal Insulin Sensitivity   For Blood Sugar (mg/dL) of:     Less than 150 =   0 units           150 -199 =   2 units  200 -249 =   4 units  250 -299 =   6 units  300 -349 =   8 units  350 and above = 10 units and Call Physician     Initiate Hypoglycemia protocol if blood glucose is <70 mg/dL  Fast Acting - Administer Immediately - or within 15 minutes of start of meal, if mealtime coverage. clopidogreL (PLAVIX) 75 mg tab Take 1 Tablet by mouth in the morning. ARIPiprazole (ABILIFY) 2 mg tablet Take 2 mg by mouth in the morning. baclofen 5 mg tab Take 5 mg by mouth two (2) times a day. carvediloL (COREG) 12.5 mg tablet Take 12.5 mg by mouth two (2) times a day. Indications: high blood pressure    escitalopram oxalate (LEXAPRO) 20 mg tablet Take 20 mg by mouth in the morning.    gabapentin (NEURONTIN) 100 mg capsule Take 100 mg by mouth nightly. hydrOXYzine HCL (ATARAX) 25 mg tablet Take 25 mg by mouth nightly as needed for Anxiety. furosemide (LASIX) 40 mg tablet Take 40 mg by mouth daily.  Indications: visible water retention    losartan (COZAAR) 100 mg tablet Take 100 mg by mouth in the morning. melatonin 5 mg tablet Take 10 mg by mouth nightly. metFORMIN (GLUCOPHAGE) 500 mg tablet Take 1,000 mg by mouth two (2) times daily (with meals). potassium chloride (K-DUR, KLOR-CON M20) 20 mEq tablet Take 20 mEq by mouth two (2) times a day. senna-docusate (PERICOLACE) 8.6-50 mg per tablet Take 1 Tablet by mouth in the morning. tamsulosin (FLOMAX) 0.4 mg capsule Take 0.4 mg by mouth nightly. aspirin delayed-release 81 mg tablet Take 81 mg by mouth in the morning. omeprazole (PRILOSEC) 20 mg capsule Take 20 mg by mouth daily. atorvastatin (LIPITOR) 40 mg tablet Take 40 mg by mouth in the morning.       MAR reviewed and pertinent medications noted or modified as needed   Current Facility-Administered Medications   Medication    sodium chloride (NS) flush 5-10 mL    sodium chloride 0.9 % bolus infusion 1,863 mL    sodium chloride 0.9 % bolus infusion 1,000 mL    Followed by    sodium chloride 0.9 % bolus infusion 863 mL    sodium chloride (NS) flush 5-40 mL    sodium chloride (NS) flush 5-40 mL    acetaminophen (TYLENOL) tablet 650 mg    Or    acetaminophen (TYLENOL) suppository 650 mg    polyethylene glycol (MIRALAX) packet 17 g    ondansetron (ZOFRAN ODT) tablet 4 mg    Or    ondansetron (ZOFRAN) injection 4 mg    [START ON 11/30/2022] enoxaparin (LOVENOX) injection 40 mg    [START ON 11/30/2022] ARIPiprazole (ABILIFY) tablet 2 mg    [START ON 11/30/2022] aspirin delayed-release tablet 81 mg    [START ON 11/30/2022] atorvastatin (LIPITOR) tablet 40 mg    baclofen tab 5 mg    carvediloL (COREG) tablet 12.5 mg    [START ON 11/30/2022] clopidogreL (PLAVIX) tablet 75 mg    [START ON 11/30/2022] escitalopram oxalate (LEXAPRO) tablet 20 mg    gabapentin (NEURONTIN) capsule 100 mg    hydrOXYzine HCL (ATARAX) tablet 25 mg    [START ON 11/30/2022] losartan (COZAAR) tablet 100 mg    melatonin tablet 10 mg    tamsulosin (FLOMAX) capsule 0.4 mg traMADoL (ULTRAM) tablet 50 mg    VANCOMYCIN INFORMATION NOTE    vancomycin (VANCOCIN) 500 mg in 0.9% sodium chloride (MBP/ADV) 100 mL MBP    [START ON 11/30/2022] vancomycin (VANCOCIN) 1,000 mg in 0.9% sodium chloride 250 mL ([id number])    [START ON 11/30/2022] Vancomycin trough level to be drawn on 11/30/22  at 1100 am.    piperacillin-tazobactam (ZOSYN) 3.375 g in 0.9% sodium chloride (MBP/ADV) 100 mL MBP     Current Outpatient Medications   Medication Sig    traMADoL (ULTRAM) 50 mg tablet Take 50 mg by mouth every six (6) hours as needed for Pain. insulin lispro (HUMALOG) 100 unit/mL injection INITIATE INSULIN CORRECTIVE PROTOCOL:  Normal Insulin Sensitivity   For Blood Sugar (mg/dL) of:     Less than 150 =   0 units           150 -199 =   2 units  200 -249 =   4 units  250 -299 =   6 units  300 -349 =   8 units  350 and above = 10 units and Call Physician     Initiate Hypoglycemia protocol if blood glucose is <70 mg/dL  Fast Acting - Administer Immediately - or within 15 minutes of start of meal, if mealtime coverage. clopidogreL (PLAVIX) 75 mg tab Take 1 Tablet by mouth in the morning. ARIPiprazole (ABILIFY) 2 mg tablet Take 2 mg by mouth in the morning. baclofen 5 mg tab Take 5 mg by mouth two (2) times a day. carvediloL (COREG) 12.5 mg tablet Take 12.5 mg by mouth two (2) times a day. Indications: high blood pressure    escitalopram oxalate (LEXAPRO) 20 mg tablet Take 20 mg by mouth in the morning.    gabapentin (NEURONTIN) 100 mg capsule Take 100 mg by mouth nightly. hydrOXYzine HCL (ATARAX) 25 mg tablet Take 25 mg by mouth nightly as needed for Anxiety. furosemide (LASIX) 40 mg tablet Take 40 mg by mouth daily. Indications: visible water retention    losartan (COZAAR) 100 mg tablet Take 100 mg by mouth in the morning. melatonin 5 mg tablet Take 10 mg by mouth nightly. metFORMIN (GLUCOPHAGE) 500 mg tablet Take 1,000 mg by mouth two (2) times daily (with meals).     potassium chloride (K-DUR, KLOR-CON M20) 20 mEq tablet Take 20 mEq by mouth two (2) times a day. senna-docusate (PERICOLACE) 8.6-50 mg per tablet Take 1 Tablet by mouth in the morning. tamsulosin (FLOMAX) 0.4 mg capsule Take 0.4 mg by mouth nightly. aspirin delayed-release 81 mg tablet Take 81 mg by mouth in the morning. omeprazole (PRILOSEC) 20 mg capsule Take 20 mg by mouth daily. atorvastatin (LIPITOR) 40 mg tablet Take 40 mg by mouth in the morning. PMH:  has a past medical history of Cirrhosis ([facility] [address] Diabetes ([facility] [address] Hypertension, and Stroke ([facility] [address] PSH:   has a past surgical history that includes hx back surgery; hx gi; and ir insert non tunl cvc over 5 yrs (2022). FHX: family history includes Heart Disease in his father. SHX:  reports that he has quit smoking. He has never used smokeless tobacco. He reports that he does not currently use alcohol. He reports that he does not currently use drugs. ROS:Review of systems not obtained due to patient factors. Hemodynamics:    CO:    CI:    CVP:    SVR:   PAP Systolic:    PAP Diastolic:    PVR:    WD28:        Ventilator Settings:      Mode Rate TV Press PEEP FiO2 PIP Min.  Vent               80 %     15.5 l/min        Vital Signs: Telemetry:    normal sinus rhythm Intake/Output:   Visit Vitals  BP (!) 132/59   Pulse 87   Temp 99.8 °F (37.7 °C)   Resp 23   Ht 5' 9\" (1.753 m)   Wt 62.1 kg (137 lb)   SpO2 95%   BMI 20.23 kg/m²       Temp (24hrs), Av.9 °F (38.3 °C), Min:99.8 °F (37.7 °C), Max:101.9 °F (38.8 °C)        O2 Device: Hi flow nasal cannula, Heated O2 Flow Rate (L/min): 60 l/min       Wt Readings from Last 4 Encounters:   22 62.1 kg (137 lb)   11/10/22 62.5 kg (137 lb 12.6 oz)   22 84.8 kg (187 lb)   22 84.8 kg (187 lb)          Intake/Output Summary (Last 24 hours) at 2022 1625  Last data filed at 2022 1320  Gross per 24 hour   Intake 339 ml   Output --   Net 339 ml       Last shift:       0701 - 11/29 1900  In: 339 [I.V.:339]  Out: -   Last 3 shifts: No intake/output data recorded. Physical Exam:     General: HFNC  HEENT: NCAT, poor dentition, lips and mucosa dry  Eyes: anicteric; conjunctiva clear  Neck: no nodes,  trach midline; no accessory MM use. Chest: no deformity,   Cardiac: R regular; no murmur;   Lungs: distant breath sounds; no wheezes, bilateral rails  Abd: soft, NT, hypoactive BS  Ext: no edema; no joint swelling;  No clubbing  : NO kennedy, clear urine  Neuro:unable to examine  Psych- unable to assess  Skin: warm, dry, no cyanosis;   Pulses: 1-2+ Bilateral pedal, radial  Capillary: brisk; pale      DATA:    MAR reviewed and pertinent medications noted or modified as needed  MEDS:   Current Facility-Administered Medications   Medication    sodium chloride (NS) flush 5-10 mL    sodium chloride 0.9 % bolus infusion 1,863 mL    sodium chloride 0.9 % bolus infusion 1,000 mL    Followed by    sodium chloride 0.9 % bolus infusion 863 mL    sodium chloride (NS) flush 5-40 mL    sodium chloride (NS) flush 5-40 mL    acetaminophen (TYLENOL) tablet 650 mg    Or    acetaminophen (TYLENOL) suppository 650 mg    polyethylene glycol (MIRALAX) packet 17 g    ondansetron (ZOFRAN ODT) tablet 4 mg    Or    ondansetron (ZOFRAN) injection 4 mg    [START ON 11/30/2022] enoxaparin (LOVENOX) injection 40 mg    [START ON 11/30/2022] ARIPiprazole (ABILIFY) tablet 2 mg    [START ON 11/30/2022] aspirin delayed-release tablet 81 mg    [START ON 11/30/2022] atorvastatin (LIPITOR) tablet 40 mg    baclofen tab 5 mg    carvediloL (COREG) tablet 12.5 mg    [START ON 11/30/2022] clopidogreL (PLAVIX) tablet 75 mg    [START ON 11/30/2022] escitalopram oxalate (LEXAPRO) tablet 20 mg    gabapentin (NEURONTIN) capsule 100 mg    hydrOXYzine HCL (ATARAX) tablet 25 mg    [START ON 11/30/2022] losartan (COZAAR) tablet 100 mg    melatonin tablet 10 mg    tamsulosin (FLOMAX) capsule 0.4 mg    traMADoL (ULTRAM) tablet 50 mg VANCOMYCIN INFORMATION NOTE    vancomycin (VANCOCIN) 500 mg in 0.9% sodium chloride (MBP/ADV) 100 mL MBP    [START ON 11/30/2022] vancomycin (VANCOCIN) 1,000 mg in 0.9% sodium chloride 250 mL ([id number])    [START ON 11/30/2022] Vancomycin trough level to be drawn on 11/30/22  at 1100 am.    piperacillin-tazobactam (ZOSYN) 3.375 g in 0.9% sodium chloride (MBP/ADV) 100 mL MBP     Current Outpatient Medications   Medication Sig    traMADoL (ULTRAM) 50 mg tablet Take 50 mg by mouth every six (6) hours as needed for Pain. insulin lispro (HUMALOG) 100 unit/mL injection INITIATE INSULIN CORRECTIVE PROTOCOL:  Normal Insulin Sensitivity   For Blood Sugar (mg/dL) of:     Less than 150 =   0 units           150 -199 =   2 units  200 -249 =   4 units  250 -299 =   6 units  300 -349 =   8 units  350 and above = 10 units and Call Physician     Initiate Hypoglycemia protocol if blood glucose is <70 mg/dL  Fast Acting - Administer Immediately - or within 15 minutes of start of meal, if mealtime coverage. clopidogreL (PLAVIX) 75 mg tab Take 1 Tablet by mouth in the morning. ARIPiprazole (ABILIFY) 2 mg tablet Take 2 mg by mouth in the morning. baclofen 5 mg tab Take 5 mg by mouth two (2) times a day. carvediloL (COREG) 12.5 mg tablet Take 12.5 mg by mouth two (2) times a day. Indications: high blood pressure    escitalopram oxalate (LEXAPRO) 20 mg tablet Take 20 mg by mouth in the morning.    gabapentin (NEURONTIN) 100 mg capsule Take 100 mg by mouth nightly. hydrOXYzine HCL (ATARAX) 25 mg tablet Take 25 mg by mouth nightly as needed for Anxiety. furosemide (LASIX) 40 mg tablet Take 40 mg by mouth daily. Indications: visible water retention    losartan (COZAAR) 100 mg tablet Take 100 mg by mouth in the morning. melatonin 5 mg tablet Take 10 mg by mouth nightly. metFORMIN (GLUCOPHAGE) 500 mg tablet Take 1,000 mg by mouth two (2) times daily (with meals).     potassium chloride (K-DUR, KLOR-CON M20) 20 mEq tablet Take 20 mEq by mouth two (2) times a day. senna-docusate (PERICOLACE) 8.6-50 mg per tablet Take 1 Tablet by mouth in the morning. tamsulosin (FLOMAX) 0.4 mg capsule Take 0.4 mg by mouth nightly. aspirin delayed-release 81 mg tablet Take 81 mg by mouth in the morning. omeprazole (PRILOSEC) 20 mg capsule Take 20 mg by mouth daily. atorvastatin (LIPITOR) 40 mg tablet Take 40 mg by mouth in the morning. Labs:    Recent Labs     11/29/22  1149 11/27/22  1139   WBC 14.8* 8.2   HGB 9.8* 9.2*    292     Recent Labs     11/29/22  1045 11/27/22  1139    139   K 3.8 4.1   * 109*   CO2 21 24   * 94   BUN 15 12   CREA 0.55* 0.43*   CA 8.0* 8.1*   LAC 1.6  --    ALB 2.1*  --    ALT 48  --      Recent Labs     11/29/22  1205   PH 7.51*   PCO2 25*   PO2 151*   HCO3 20*   FIO2 80     No results for input(s): CPK, CKNDX, TROIQ in the last 72 hours. No lab exists for component: CPKMB  No results found for: BNPP, BNP   Lab Results   Component Value Date/Time    Culture result:  11/09/2022 02:10 AM     Heavy * methicillin resistant staphylococcus aureus *    Culture result: Heavy Diphtheroids 11/09/2022 02:10 AM    Culture result: No growth 6 days 11/08/2022 01:25 PM     Lab Results   Component Value Date/Time    TSH 1.67 07/30/2022 07:11 AM        Imaging:    Results from Hospital Encounter encounter on 11/29/22    XR CHEST PORT    Narrative  EXAM:  XR CHEST PORT    INDICATION: Respiratory distress, tachypnea    COMPARISON: 11/24/2022    TECHNIQUE: portable chest AP view    FINDINGS: Size is stable. The patient is status post median sternotomy. The  pulmonary vasculature is within normal limits. Bilateral pulmonary infiltrates are unchanged compared to the prior exam. The  visualized bones and upper abdomen are age-appropriate. Impression  Stable bilateral pulmonary infiltrates.       Results from Hospital Encounter encounter on 11/08/22    CT FOOT RT W CONT    Narrative  EXAM: CT FOOT RT W CONT    INDICATION: Right foot swelling and abscess. Evaluate for osteomyelitis. Hypertension, diabetes, and cirrhosis. COMPARISON: Right foot views on 11/10/2022    CONTRAST: 100 mL of Isovue-370. TECHNIQUE: Helical CT of the right foot during uneventful rapid bolus  intravenous contrast administration. Coronal and Sagittal reformats were  generated. Images reviewed in soft tissue and bone windows. CT dose reduction  was achieved through the use of a standardized protocol tailored for this  examination and automatic exposure control for dose modulation. FINDINGS: Bones: Mild osteopenia. No fracture or osteomyelitis. Joint fluid: Physiologic. Articulations: no evidence of septic arthritis. 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 Tendons: No full-thickness tendon tear. Muscles: Mild diffuse atrophy. Soft tissue mass: None. No fluid collection. Impression  1. No abscess or osteomyelitis. 2. No fracture. This care involved high complexity decision making which includes independently reviewing the patient's past medical records, current laboratory results, medication profiles that were immediately available to me and actual Xray images at the bedside in order to assess, support vital system function, and to treat this degree of vital organ system failure, and to prevent further life threatening deterioration of the patients condition. I was in direct communication with the nursing staff throughout this time.     Medical Decision Making Today  Reviewed the flowsheet and previous days notes  Reviewed and summarized records or history from previous days note or discussions with staff, family  Parenteral controlled substances - Reviewed/ Adjusted / Jesica Sacks / Started  High Risk Drug therapy requiring intensive monitoring for toxicity: eg steroids, pressors, antibiotics  Review and order of Clinical lab tests  Review and Order of Radiology tests  Review and Order of Medicine tests  Independent visualization of radiologic Images  Reviewed Ventilator / NiPPV  I have personally reviewed the patients ECG / Telemetry  Diagnostic endoscopies with identified risk factors    I have provided total of 55  minutes of critical care time rendering care exclusive of any procedures. During this entire length of time the patient's condition was unstable, unpredictable and critically ill in the CCU/ ICU. I was immediately available to the patient whose care required several interactions with nursing, multidisciplinary team members leading to multiple interventions with fluid resuscitation and medication adjustments to optimize respiratory support, hemodynamic treatment, medication changes based on repeat labs results, reviews, exams and assessments. The reason for providing this level of medical care was due to a critical illness that impaired one or more vital organ systems, such that there was a high probability of sudden or life threatening deterioration in the patient's condition.

## 2022-11-29 NOTE — ED PROVIDER NOTES
EMERGENCY DEPARTMENT HISTORY AND PHYSICAL EXAM      Date: 11/29/2022  Patient Name: Edwin Mayorga    History of Presenting Illness     Chief Complaint   Patient presents with    Respiratory Distress       History Provided By: EMS and Nursing Home/SNF/Rehab Center    HPI: Edwin Mayorga, 70 y.o. male with a past medical history significant  cyst, diabetes, hypertension, stroke, cad, PEG dependent  presents to the ED with cc of shortness of breath. Patient was recently discharged from hospital for sepsis, UTI, today at nursing home patient noted to be tachypneic, saturations in the 70s, EMS was called. On arrival patient tachypneic with rhonchorous breath sounds bilateral.  Placed on nonrebreather with improvement of oxygenation, patient presented to the emergency department tachypneic, no history obtained from patient. Respiratory called patient placed on BiPAP. There are no other complaints, changes, or physical findings at this time.     PCP: Ocie Runner, MD    Current Facility-Administered Medications   Medication Dose Route Frequency Provider Last Rate Last Admin    sodium chloride (NS) flush 5-10 mL  5-10 mL IntraVENous PRN Tam Simon MD        sodium chloride 0.9 % bolus infusion 1,863 mL  30 mL/kg IntraVENous ONCE Zelensky, Foster Angelucci, MD        sodium chloride 0.9 % bolus infusion 1,000 mL  1,000 mL IntraVENous ONCE Tam Simon MD   Held at 11/29/22 1111    Followed by    sodium chloride 0.9 % bolus infusion 863 mL  863 mL IntraVENous ONCE Tam Simon MD   Transfusion Held at 11/29/22 1117    sodium chloride (NS) flush 5-40 mL  5-40 mL IntraVENous Q8H Tsering Hou MD   10 mL at 11/29/22 1455    sodium chloride (NS) flush 5-40 mL  5-40 mL IntraVENous PRN Tsering Hou MD        acetaminophen (TYLENOL) tablet 650 mg  650 mg Oral Q6H PRN Tsering Hou MD        Or    acetaminophen (TYLENOL) suppository 650 mg  650 mg Rectal Q6H PRN Tsering Hou MD        polyethylene glycol (MIRALAX) packet 17 g  17 g Oral DAILY PRN Kamlesh Cardoso MD        ondansetron (ZOFRAN ODT) tablet 4 mg  4 mg Oral Q8H PRN Kamlesh Cardoso MD        Or    ondansetron [facility]) injection 4 mg  4 mg IntraVENous Q6H PRN Kamlesh Cardoso MD        [START ON 11/30/2022] enoxaparin (LOVENOX) injection 40 mg  40 mg SubCUTAneous DAILY Kamlesh Cardoso MD        [START ON 11/30/2022] ARIPiprazole (ABILIFY) tablet 2 mg  2 mg Oral DAILY MD Wilfredo Arthur.Lyell ON 11/30/2022] aspirin delayed-release tablet 81 mg  81 mg Oral DAILY Kamlesh Cardoso MD        [START ON 11/30/2022] atorvastatin (LIPITOR) tablet 40 mg  40 mg Oral DAILY Kamlesh Cardoso MD        baclofen tab 5 mg  5 mg Oral BID Kamlesh Cardoso MD        carvediloL (COREG) tablet 12.5 mg  12.5 mg Oral BID Kamlesh Cardoso MD        [START ON 11/30/2022] clopidogreL (PLAVIX) tablet 75 mg  75 mg Oral DAILY Kamlesh Cardoso MD        [START ON 11/30/2022] escitalopram oxalate (LEXAPRO) tablet 20 mg  20 mg Oral DAILY Kamlesh Cardoso MD        gabapentin (NEURONTIN) capsule 100 mg  100 mg Oral QHS Kamlesh Cardoso MD        hydrOXYzine HCL (ATARAX) tablet 25 mg  25 mg Oral Q6H PRN Kamlesh Cardoso MD        [START ON 11/30/2022] losartan (COZAAR) tablet 100 mg  100 mg Oral DAILY Kamlesh Cardoso MD        melatonin tablet 10 mg  10 mg Oral QHS Kamlesh Cardoso MD        tamsulosin (FLOMAX) capsule 0.4 mg  0.4 mg Oral QHS Kamlesh Carodso MD        traMADoL Allyne Julio) tablet 50 mg  50 mg Oral Q6H PRN Kamlesh Cardoso MD        VANCOMYCIN INFORMATION NOTE   Other Rx Dosing/Monitoring Kamlesh Cardoso MD        vancomycin (VANCOCIN) 500 mg in 0.9% sodium chloride (MBP/ADV) 100 mL MBP  500 mg IntraVENous ONCE Kamlesh Cardoso MD   Held at 11/29/22 1325    [START ON 11/30/2022] vancomycin (VANCOCIN) 1,000 mg in 0.9% sodium chloride 250 mL ([id number])  1,000 mg IntraVENous Q12H Kamlesh Cardoso MD        [START ON 11/30/2022] Vancomycin trough level to be drawn on 11/30/22  at 1100 am.   Other ONCE Shayla Dumont MD        piperacillin-tazobactam (ZOSYN) 3.375 g in 0.9% sodium chloride (MBP/ADV) 100 mL MBP  3.375 g IntraVENous Q8H Shayla Dumont MD         Current Outpatient Medications   Medication Sig Dispense Refill    traMADoL (ULTRAM) 50 mg tablet Take 50 mg by mouth every six (6) hours as needed for Pain. insulin lispro (HUMALOG) 100 unit/mL injection INITIATE INSULIN CORRECTIVE PROTOCOL:  Normal Insulin Sensitivity   For Blood Sugar (mg/dL) of:     Less than 150 =   0 units           150 -199 =   2 units  200 -249 =   4 units  250 -299 =   6 units  300 -349 =   8 units  350 and above = 10 units and Call Physician     Initiate Hypoglycemia protocol if blood glucose is <70 mg/dL  Fast Acting - Administer Immediately - or within 15 minutes of start of meal, if mealtime coverage. 1 Each 1    clopidogreL (PLAVIX) 75 mg tab Take 1 Tablet by mouth in the morning. 30 Tablet 1    ARIPiprazole (ABILIFY) 2 mg tablet Take 2 mg by mouth in the morning. baclofen 5 mg tab Take 5 mg by mouth two (2) times a day. carvediloL (COREG) 12.5 mg tablet Take 12.5 mg by mouth two (2) times a day. Indications: high blood pressure      escitalopram oxalate (LEXAPRO) 20 mg tablet Take 20 mg by mouth in the morning.      gabapentin (NEURONTIN) 100 mg capsule Take 100 mg by mouth nightly. hydrOXYzine HCL (ATARAX) 25 mg tablet Take 25 mg by mouth nightly as needed for Anxiety. furosemide (LASIX) 40 mg tablet Take 40 mg by mouth daily. Indications: visible water retention      losartan (COZAAR) 100 mg tablet Take 100 mg by mouth in the morning. melatonin 5 mg tablet Take 10 mg by mouth nightly. metFORMIN (GLUCOPHAGE) 500 mg tablet Take 1,000 mg by mouth two (2) times daily (with meals). potassium chloride (K-DUR, KLOR-CON M20) 20 mEq tablet Take 20 mEq by mouth two (2) times a day.       senna-docusate (PERICOLACE) 8.6-50 mg per tablet Take 1 Tablet by mouth in the morning. tamsulosin (FLOMAX) 0.4 mg capsule Take 0.4 mg by mouth nightly. aspirin delayed-release 81 mg tablet Take 81 mg by mouth in the morning. omeprazole (PRILOSEC) 20 mg capsule Take 20 mg by mouth daily. atorvastatin (LIPITOR) 40 mg tablet Take 40 mg by mouth in the morning. Past History     Past Medical History:  Past Medical History:   Diagnosis Date    Cirrhosis ([facility]     Diabetes ([facility] [facility]     Hypertension     Stroke [facility])        Past Surgical History:  Past Surgical History:   Procedure Laterality Date    HX BACK SURGERY      HX GI      IR INSERT NON TUNL CVC OVER 5 YRS  11/9/2022       Family History:  Family History   Problem Relation Age of Onset    Heart Disease Father        Social History:  Social History     Tobacco Use    Smoking status: Former    Smokeless tobacco: Never   Vaping Use    Vaping Use: Never used   Substance Use Topics    Alcohol use: Not Currently    Drug use: Not Currently       Allergies:  No Known Allergies      Review of Systems     Review of Systems   Unable to perform ROS: Patient nonverbal     Physical Exam     Physical Exam  Vitals and nursing note reviewed. Constitutional:       General: He is in acute distress. Appearance: Normal appearance. He is normal weight. He is ill-appearing. HENT:      Head: Normocephalic and atraumatic. Nose: Nose normal.      Mouth/Throat:      Mouth: Mucous membranes are dry. Eyes:      Extraocular Movements: Extraocular movements intact. Cardiovascular:      Rate and Rhythm: Regular rhythm. Tachycardia present. Pulses: Normal pulses. Heart sounds: Normal heart sounds. Pulmonary:      Effort: Respiratory distress present. Breath sounds: Rhonchi present. Abdominal:      General: Abdomen is flat. Bowel sounds are normal. There is no distension. Palpations: Abdomen is soft.       Comments: PEG tube mid epigastrium no erythema no drainage   Musculoskeletal:         General: No swelling or tenderness. Normal range of motion. Cervical back: Normal range of motion and neck supple. Skin:     General: Skin is warm and dry. Capillary Refill: Capillary refill takes less than 2 seconds. Findings: Lesion present. Comments: Bilateral lower extremities have multiple excoriated scabbed lesions   Neurological:      Mental Status: He is alert. GCS: GCS eye subscore is 4. GCS verbal subscore is 1. GCS motor subscore is 4. Motor: No weakness. Psychiatric:         Mood and Affect: Mood normal.         Behavior: Behavior normal.       Diagnostic Study Results     Labs -     Recent Results (from the past 12 hour(s))   EKG, 12 LEAD, INITIAL    Collection Time: 11/29/22 10:26 AM   Result Value Ref Range    Ventricular Rate 102 BPM    Atrial Rate 102 BPM    P-R Interval 144 ms    QRS Duration 90 ms    Q-T Interval 372 ms    QTC Calculation (Bezet) 484 ms    Calculated P Axis 59 degrees    Calculated R Axis 5 degrees    Calculated T Axis 121 degrees    Diagnosis       Sinus tachycardia with Premature atrial complexes with Abberant conduction  Possible Left atrial enlargement  ST & T wave abnormality, consider anterolateral ischemia  Abnormal ECG  When compared with ECG of 08-NOV-2022 10:43,  Abberant conduction is now Present  Vent.  rate has increased BY  42 BPM  Confirmed by Jessica Samayoa MD, Estella Resendiz ([id number]) on 11/29/2022 12:13:44 PM     TROPONIN-HIGH SENSITIVITY    Collection Time: 11/29/22 10:43 AM   Result Value Ref Range    Troponin-High Sensitivity 111 (H) 0 - 76 ng/L   METABOLIC PANEL, COMPREHENSIVE    Collection Time: 11/29/22 10:45 AM   Result Value Ref Range    Sodium 140 136 - 145 mmol/L    Potassium 3.8 3.5 - 5.1 mmol/L    Chloride 110 (H) 97 - 108 mmol/L    CO2 21 21 - 32 mmol/L    Anion gap 9 5 - 15 mmol/L    Glucose 146 (H) 65 - 100 mg/dL    BUN 15 6 - 20 mg/dL    Creatinine 0.55 (L) 0.70 - 1.30 mg/dL BUN/Creatinine ratio 27 (H) 12 - 20      eGFR >60 >60 ml/min/1.73m2    Calcium 8.0 (L) 8.5 - 10.1 mg/dL    Bilirubin, total 1.9 (H) 0.2 - 1.0 mg/dL    AST (SGOT) 43 (H) 15 - 37 U/L    ALT (SGPT) 48 12 - 78 U/L    Alk. phosphatase 70 45 - 117 U/L    Protein, total 5.7 (L) 6.4 - 8.2 g/dL    Albumin 2.1 (L) 3.5 - 5.0 g/dL    Globulin 3.6 2.0 - 4.0 g/dL    A-G Ratio 0.6 (L) 1.1 - 2.2     LACTIC ACID    Collection Time: 11/29/22 10:45 AM   Result Value Ref Range    Lactic acid 1.6 0.4 - 2.0 mmol/L   NT-PRO BNP    Collection Time: 11/29/22 10:45 AM   Result Value Ref Range    NT pro-BNP 12,166 (H) <125 pg/mL   CBC WITH AUTOMATED DIFF    Collection Time: 11/29/22 11:49 AM   Result Value Ref Range    WBC 14.8 (H) 4.1 - 11.1 K/uL    RBC 3.40 (L) 4.10 - 5.70 M/uL    HGB 9.8 (L) 12.1 - 17.0 g/dL    HCT 32.0 (L) 36.6 - 50.3 %    MCV 94.1 80.0 - 99.0 FL    MCH 28.8 26.0 - 34.0 PG    MCHC 30.6 30.0 - 36.5 g/dL    RDW 17.4 (H) 11.5 - 14.5 %    PLATELET 648 477 - 292 K/uL    MPV 10.4 8.9 - 12.9 FL    NRBC 0.0 0.0  WBC    ABSOLUTE NRBC 0.00 0.00 - 0.01 K/uL    NEUTROPHILS 92 (H) 32 - 75 %    LYMPHOCYTES 5 (L) 12 - 49 %    MONOCYTES 3 (L) 5 - 13 %    EOSINOPHILS 0 0 - 7 %    BASOPHILS 0 0 - 1 %    IMMATURE GRANULOCYTES 0 0 - 0.5 %    ABS. NEUTROPHILS 13.4 (H) 1.8 - 8.0 K/UL    ABS. LYMPHOCYTES 0.8 0.8 - 3.5 K/UL    ABS. MONOCYTES 0.4 0.0 - 1.0 K/UL    ABS. EOSINOPHILS 0.0 0.0 - 0.4 K/UL    ABS. BASOPHILS 0.0 0.0 - 0.1 K/UL    ABS. IMM.  GRANS. 0.1 (H) 0.00 - 0.04 K/UL    DF AUTOMATED     BLOOD GAS, ARTERIAL    Collection Time: 11/29/22 12:05 PM   Result Value Ref Range    pH 7.51 (H) 7.35 - 7.45      PCO2 25 (L) 35 - 45 mmHg    PO2 151 (H) 80 - 100 mmHg    O2 SATURATION 99 95 - 99 %    BICARBONATE 20 (L) 22 - 26 mmol/L    BASE DEFICIT 1.9 mmol/L    O2 METHOD BiPAP      FIO2 80 %    Tidal volume 480.0      PRESSURE SUPPORT 25      PEEP/CPAP 7.0      Sample source Arterial      SITE Left Radial      BRITNEY'S TEST YES Carboxy-Hgb 0.1 (L) 1 - 2 %    Methemoglobin 0.3 0 - 1.4 %    Oxyhemoglobin 98.2 95 - 99 %    Performed by Hany Faye     TEMPERATURE 99.6     INFLUENZA A & B AG (RAPID TEST)    Collection Time: 11/29/22 12:59 PM   Result Value Ref Range    Influenza A Antigen Negative Negative      Influenza B Antigen Negative Negative     COVID-19 RAPID TEST    Collection Time: 11/29/22 12:59 PM   Result Value Ref Range    COVID-19 rapid test Not Detected Not Detected         Radiologic Studies -   [unfilled]  CT Results  (Last 48 hours)      None          CXR Results  (Last 48 hours)                 11/29/22 1051  XR CHEST PORT Final result    Impression:      Stable bilateral pulmonary infiltrates. Narrative:  EXAM:  XR CHEST PORT       INDICATION: Respiratory distress, tachypnea       COMPARISON: 11/24/2022       TECHNIQUE: portable chest AP view       FINDINGS: Size is stable. The patient is status post median sternotomy. The   pulmonary vasculature is within normal limits. Bilateral pulmonary infiltrates are unchanged compared to the prior exam. The   visualized bones and upper abdomen are age-appropriate. Medical Decision Making and ED Course   I am the first provider for this patient. I reviewed the vital signs, available nursing notes, past medical history, past surgical history, family history and social history. Vital Signs-Reviewed the patient's vital signs.   Patient Vitals for the past 12 hrs:   Temp Pulse Resp BP SpO2   11/29/22 1600 -- 87 23 (!) 132/59 --   11/29/22 1509 -- -- -- -- 95 %   11/29/22 1504 -- 85 (!) 38 121/63 95 %   11/29/22 1433 -- -- -- -- 97 %   11/29/22 1423 99.8 °F (37.7 °C) -- -- -- --   11/29/22 1420 -- 95 20 132/64 97 %   11/29/22 1405 -- 93 (!) 38 139/71 96 %   11/29/22 1350 -- 97 (!) 36 (!) 114/57 97 %   11/29/22 1235 -- 91 23 135/76 93 %   11/29/22 1221 -- 92 (!) 31 104/81 97 %   11/29/22 1220 -- -- -- -- 96 %   11/29/22 1121 -- 99 (!) 31 (!) 132/91 99 %   11/29/22 1037 -- -- -- -- 99 %   11/29/22 1027 (!) 101.9 °F (38.8 °C) 99 (!) 35 138/80 96 %       EKG interpretation: (Preliminary)  Completed at 1026 interpreted by myself at 1028, sinus tach 102, no ST elevations, nonspecific ST segment changes in anterior lateral leads      Records Reviewed: Nursing Notes and Old Medical Records    The patient presents with shortness of breath with a differential diagnosis of pneumonia, sepsis, acute pulmonary edema, congestive heart failure, aspiration event      Provider Notes (Medical Decision Making):     MDM  60-year-old male history of cirrhosis, hypertension, diabetes, stroke, CAD, presents to emergency department for evaluation of shortness of breath. Physical exam shows chronically ill-appearing male, in moderate respiratory distress, tachypneic, saturations 99% on 10 L nonrebreather. Patient's temperature elevated one 1.9, normotensive 138/80 extremities do not show significant fluid overload, suspect more likely infectious process versus acute congestive heart failure. Sepsis work-up initiated,       ED Course:   Initial assessment performed. The patients presenting problems have been discussed, and they are in agreement with the care plan formulated and outlined with them. I have encouraged them to ask questions as they arise throughout their visit. ED Course as of 11/29/22 1622   Tue Nov 29, 2022   1109 Patient has history of CHF and is not hypotensive at this time we will not order full 30 mils per KG IV fluids. Administer 1 L and reassess [PZ]   2366 Patient's lab work resulting, CBC shows leukocytosis of 14,000, additionally BNP markedly elevated at 12,000, lactic acid 1.6, troponin elevated 111. Unclear if this is infectious etiology with cardiac lake or possible primary cardiac etiology. [PZ]   3356 Discussed case with DR. Cota, will admit patient for respiratory distress, pneumonia, chf exacerbation.    [PZ]   [phone] with nursing representative from [address] [place] [address], reports that patient was actually supposed to be transitioning to hospice plan of care tomorrow, patient's son default decision-maker, no listed MPOA, unknown how many living relatives patient actually has. Tahir Henriquez, listed telephone of [phone]. I will attempt, to contact [employer] and obtain DNR order over the phone.   [PZ]   [phone] Patient's oxygenation slowly dropping, patient not maintaining saturations on high flow and does not tolerate BiPAP. Given waning respiratory status feel that intubation is required. As there is no documented DNR/DNI in chart and multiple practitioners been able to reach patient's son, decision made to intubate patient, stabilize respiratory drive, and then goals of care can be addressed with family members. I spoke with admitting hospitalist Dr. Narciso Melendez, agrees with plan. Agrees with myself intubating patient. [PZ]   9761 I reassessed patient, patient's oxygenation actually 95-97 on high flow, blood pressures remained stable, do not feel the patient requires immediate intubation. Patient's son Tahir Henriquez called back, I had extensive goals of care conversation with Tahir Henriquez, patient son was approached by hospice representative, states he was considering placing patient on hospice however it was unsure at this time. At this time does not want to convert patient completely to DNR/DNI. Is reticent to intubate however states if patient suddenly has respiratory compromise will persist agreed to proceed to intubate and then revisit question of goals of care.  [PZ]      ED Course User Index  [PZ] Donald Morales MD         Procedures       Wendy Trinh MD  Procedures               CRITICAL CARE NOTE :  11:14 AM  Amount of Critical Care Time: ___45_(minutes)__    IMPENDING DETERIORATION -Airway, Respiratory, and Cardiovascular  ASSOCIATED RISK FACTORS - Shock, Hypoxia, and Metabolic changes  MANAGEMENT- Bedside Assessment and Supervision of Care  INTERPRETATION -  Xrays, Blood Gases, ECG, and Blood Pressure  INTERVENTIONS - hemodynamic mngmt, vent mngmt, and vascular control  CASE REVIEW - Hospitalist/Intensivist  TREATMENT RESPONSE -Improved  PERFORMED BY - Self    NOTES   :  I have spent critical care time involved in lab review, consultations with specialist, family decision- making, bedside attention and documentation. This time excludes time spent in any separate billed procedures. During this entire length of time I was immediately available to the patient . Jazzy Elliott MD        Disposition       Admitted    Diagnosis     Clinical Impression:   1. Respiratory distress    2. Congestive heart failure, unspecified HF chronicity, unspecified heart failure type ([facility] [facility]    3. Sepsis, due to unspecified organism, unspecified whether acute organ dysfunction present [facility])        Attestations:    Jazzy Elliott MD    Please note that this dictation was completed with [employer], the computer voice recognition software. Quite often unanticipated grammatical, syntax, homophones, and other interpretive errors are inadvertently transcribed by the computer software. Please disregard these errors. Please excuse any errors that have escaped final proofreading. Thank you.

## 2022-11-29 NOTE — ED TRIAGE NOTES
Arrives from [facility] for respiratory distress. Per [facility], room air sats 70%, placed on NRB. Arrives to ED with respiratory distress, tachypnea, high fever, wheezing. EMS states that the patient was getting tube feeding through PEG tube on their arrival to [facility].

## 2022-11-29 NOTE — CONSULTS
I was asked to see the patient in consultation. However he was just seen 2 days ago by Dr. María Oconnell. Will defer consultation to him. Thank you.

## 2022-11-29 NOTE — ED NOTES
Pt continuously pulling off BiPaP. Frequent alarms. Attempted to redirect patient with no success.  Notified Dr. Kimi Loja, advised to place on high flow if no improvement, intubate patient

## 2022-11-30 NOTE — PROGRESS NOTES
According to medical records Pt is from [facility]. Attempted to contact Pt son, Norman Ozuna [phone], left a VM requesting a return call.

## 2022-11-30 NOTE — PROGRESS NOTES
IMPRESSION:   Acute on chronic hypoxic hypercapnic respiratory failure  Congestive heart failure  Aspiration pneumonia  Sepsis  History of CVA  Fever  Additional workup outlined below  Pt is at high risk of sudden decline and decompensation with life threatening consequenses and continued end organ dysfunction and failure  Pt is critically ill. Time spent with pt and staff actively rendering care, managing pt and coordinating care as stated below; 30 minutes, exclusive of any procedures      RECOMMENDATIONS/PLAN:   ICU monitoring  51-year-old male recently discharged from the hospital came back with hypoxia now is on high flow nasal cannula life support to avoid worsening respiratory acidosis, hypercacarbic or hypoxic respiratory arrest at this morning acceptable on 80% FiO2 hyperventilating PCO2 28 PO2 90  Intubate and place on vent if NIV fails  He is febrile temperature 102  Chest x-ray still showed bilateral pulmonary infiltrate versus congestion  BNP today is 68991  Troponin is elevated  Agree with Empiric IV antibiotics pending culture results   Follow culture results on Zosyn and vancomycin  CVP monitoring  IV vasopressors for circulatory shock refractory to fluids to maintain SBP> 90  High flow nasal Cannula oxygen as salvage oxygen delivery device to provide high concentration of oxygen to overcome refractory hypoxia;    Patient requiring restraints due to threat of injury to self, interference with medical devices  Transfuse prn to maintain Hgb > 7  Labs to follow electrolytes, renal function and and blood counts  Bronchial hygiene with respiratory therapy techniques, bronchodilators  Pt needs IV fluids with additives and Drug therapy requiring intensive monitoring for toxicity  Prescription drug management with home med reconciliation reviewed  DVT, SUP prophylaxis  Will be available to assist in medical management while in the CCU pending disposition     [x] High complexity decision making was performed  [x] See my orders for details  HPI  51-year-old male recently discharged from the hospital came in because of shortness of breath and dyspnea he was discharged on oxygen but his condition got worse came back to the hospital febrile temperature 102 he was put on BiPAP machine and then now he is on high flow nasal cannula unable to get any started the patient he moans and groans his saturation was in the 70s were admitted his BNP is elevated chest x-ray still shows bilateral infiltrate. PMH:  has a past medical history of Cirrhosis ([facility] [address] Diabetes ([facility] [address] Hypertension, and Stroke ([facility] [address] PSH:   has a past surgical history that includes hx back surgery; hx gi; and ir insert non tunl cvc over 5 yrs (11/9/2022). FHX: family history includes Heart Disease in his father. SHX:  reports that he has quit smoking. He has never used smokeless tobacco. He reports that he does not currently use alcohol. He reports that he does not currently use drugs.     ALL: No Known Allergies     MEDS:   [x] Reviewed - As Below   [] Not reviewed    Current Facility-Administered Medications   Medication    midodrine (PROAMATINE) tablet 10 mg    sodium chloride (NS) flush 5-10 mL    sodium chloride (NS) flush 5-40 mL    sodium chloride (NS) flush 5-40 mL    acetaminophen (TYLENOL) tablet 650 mg    Or    acetaminophen (TYLENOL) suppository 650 mg    polyethylene glycol (MIRALAX) packet 17 g    ondansetron (ZOFRAN ODT) tablet 4 mg    Or    ondansetron (ZOFRAN) injection 4 mg    enoxaparin (LOVENOX) injection 40 mg    VANCOMYCIN INFORMATION NOTE    vancomycin (VANCOCIN) 1,000 mg in 0.9% sodium chloride 250 mL ([id number])    Vancomycin trough level to be drawn on 11/30/22  at 1100 am.    piperacillin-tazobactam (ZOSYN) 3.375 g in 0.9% sodium chloride (MBP/ADV) 100 mL MBP    ARIPiprazole (ABILIFY) tablet 2 mg    atorvastatin (LIPITOR) tablet 40 mg    baclofen (LIORESAL) tablet 5 mg    gabapentin (NEURONTIN) capsule 100 mg losartan (COZAAR) tablet 100 mg    melatonin tablet 10 mg    traMADoL (ULTRAM) tablet 50 mg    hydrOXYzine HCL (ATARAX) tablet 25 mg    clopidogreL (PLAVIX) tablet 75 mg    carvediloL (COREG) tablet 12.5 mg    escitalopram oxalate (LEXAPRO) tablet 20 mg      MAR reviewed and pertinent medications noted or modified as needed   Current Facility-Administered Medications   Medication    midodrine (PROAMATINE) tablet 10 mg    sodium chloride (NS) flush 5-10 mL    sodium chloride (NS) flush 5-40 mL    sodium chloride (NS) flush 5-40 mL    acetaminophen (TYLENOL) tablet 650 mg    Or    acetaminophen (TYLENOL) suppository 650 mg    polyethylene glycol (MIRALAX) packet 17 g    ondansetron (ZOFRAN ODT) tablet 4 mg    Or    ondansetron (ZOFRAN) injection 4 mg    enoxaparin (LOVENOX) injection 40 mg    VANCOMYCIN INFORMATION NOTE    vancomycin (VANCOCIN) 1,000 mg in 0.9% sodium chloride 250 mL ([id number])    Vancomycin trough level to be drawn on 11/30/22  at 1100 am.    piperacillin-tazobactam (ZOSYN) 3.375 g in 0.9% sodium chloride (MBP/ADV) 100 mL MBP    ARIPiprazole (ABILIFY) tablet 2 mg    atorvastatin (LIPITOR) tablet 40 mg    baclofen (LIORESAL) tablet 5 mg    gabapentin (NEURONTIN) capsule 100 mg    losartan (COZAAR) tablet 100 mg    melatonin tablet 10 mg    traMADoL (ULTRAM) tablet 50 mg    hydrOXYzine HCL (ATARAX) tablet 25 mg    clopidogreL (PLAVIX) tablet 75 mg    carvediloL (COREG) tablet 12.5 mg    escitalopram oxalate (LEXAPRO) tablet 20 mg      PMH:  has a past medical history of Cirrhosis ([facility] [address] Diabetes ([facility] [address] Hypertension, and Stroke ([facility] [address] PSH:   has a past surgical history that includes hx back surgery; hx gi; and ir insert non tunl cvc over 5 yrs (11/9/2022). FHX: family history includes Heart Disease in his father. SHX:  reports that he has quit smoking. He has never used smokeless tobacco. He reports that he does not currently use alcohol. He reports that he does not currently use drugs. ROS:Review of systems not obtained due to patient factors. Hemodynamics:    CO:    CI:    CVP:    SVR:   PAP Systolic:    PAP Diastolic:    PVR:    SG74:        Ventilator Settings:      Mode Rate TV Press PEEP FiO2 PIP Min. Vent               55 %     15.5 l/min        Vital Signs: Telemetry:    normal sinus rhythm Intake/Output:   Visit Vitals  BP (!) 116/59   Pulse 87   Temp 98.6 °F (37 °C)   Resp 30   Ht 5' 9\" (1.753 m)   Wt 57.8 kg (127 lb 6.8 oz)   SpO2 (!) 87%   BMI 18.82 kg/m²       Temp (24hrs), Av.5 °F (37.5 °C), Min:98 °F (36.7 °C), Max:102.3 °F (39.1 °C)        O2 Device: Hi flow nasal cannula, Heated O2 Flow Rate (L/min): 45 l/min       Wt Readings from Last 4 Encounters:   22 57.8 kg (127 lb 6.8 oz)   11/10/22 62.5 kg (137 lb 12.6 oz)   22 84.8 kg (187 lb)   22 84.8 kg (187 lb)          Intake/Output Summary (Last 24 hours) at 2022 1036  Last data filed at 2022 0700  Gross per 24 hour   Intake 1289 ml   Output 550 ml   Net 739 ml         Last shift:      No intake/output data recorded. Last 3 shifts:  1901 -  0700  In: 1289 [I.V.:1289]  Out: 550 [Urine:550]       Physical Exam:     General: HFNC shouting  HEENT: NCAT, poor dentition, lips and mucosa dry  Eyes: anicteric; conjunctiva clear  Neck: no nodes,  trach midline; no accessory MM use. Chest: no deformity,   Cardiac: R regular; no murmur;   Lungs: distant breath sounds; no wheezes, bilateral rails  Abd: soft, NT, hypoactive BS  Ext: no edema; no joint swelling;  No clubbing  : NO kennedy, clear urine  Neuro:unable to examine  Psych- unable to assess  Skin: warm, dry, no cyanosis;   Pulses: 1-2+ Bilateral pedal, radial  Capillary: brisk; pale      DATA:    MAR reviewed and pertinent medications noted or modified as needed  MEDS:   Current Facility-Administered Medications   Medication    midodrine (PROAMATINE) tablet 10 mg    sodium chloride (NS) flush 5-10 mL    sodium chloride (NS) flush 5-40 mL    sodium chloride (NS) flush 5-40 mL    acetaminophen (TYLENOL) tablet 650 mg    Or    acetaminophen (TYLENOL) suppository 650 mg    polyethylene glycol (MIRALAX) packet 17 g    ondansetron (ZOFRAN ODT) tablet 4 mg    Or    ondansetron (ZOFRAN) injection 4 mg    enoxaparin (LOVENOX) injection 40 mg    VANCOMYCIN INFORMATION NOTE    vancomycin (VANCOCIN) 1,000 mg in 0.9% sodium chloride 250 mL ([id number])    Vancomycin trough level to be drawn on 11/30/22  at 1100 am.    piperacillin-tazobactam (ZOSYN) 3.375 g in 0.9% sodium chloride (MBP/ADV) 100 mL MBP    ARIPiprazole (ABILIFY) tablet 2 mg    atorvastatin (LIPITOR) tablet 40 mg    baclofen (LIORESAL) tablet 5 mg    gabapentin (NEURONTIN) capsule 100 mg    losartan (COZAAR) tablet 100 mg    melatonin tablet 10 mg    traMADoL (ULTRAM) tablet 50 mg    hydrOXYzine HCL (ATARAX) tablet 25 mg    clopidogreL (PLAVIX) tablet 75 mg    carvediloL (COREG) tablet 12.5 mg    escitalopram oxalate (LEXAPRO) tablet 20 mg        Labs:    Recent Labs     11/30/22  0310 11/29/22  1149 11/27/22  1139   WBC 5.6 14.8* 8.2   HGB 8.0* 9.8* 9.2*    317 292       Recent Labs     11/30/22  0310 11/29/22  1045 11/27/22  1139    140 139   K 3.0* 3.8 4.1   * 110* 109*   CO2 22 21 24   * 146* 94   BUN 20 15 12   CREA 0.49* 0.55* 0.43*   CA 7.7* 8.0* 8.1*   LAC  --  1.6  --    ALB  --  2.1*  --    ALT  --  48  --        Recent Labs     11/30/22  0330 11/29/22  1205   PH 7.55* 7.51*   PCO2 28* 25*   PO2 90 151*   HCO3 24 20*   FIO2 80 80       No results for input(s): CPK, CKNDX, TROIQ in the last 72 hours.     No lab exists for component: CPKMB  No results found for: BNPP, BNP   Lab Results   Component Value Date/Time    Culture result: No growth after 7 hours 11/30/2022 01:00 AM    Culture result: No growth after 23 hours 11/29/2022 10:45 AM    Culture result:  11/09/2022 02:10 AM     Heavy * methicillin resistant staphylococcus aureus *    Culture result: Heavy Diphtheroids 11/09/2022 02:10 AM     Lab Results   Component Value Date/Time    TSH 1.67 07/30/2022 07:11 AM        Imaging:    Results from Hospital Encounter encounter on 11/29/22    XR CHEST PORT    Narrative  EXAM:  XR CHEST PORT    INDICATION: CHF    COMPARISON: 11/29/2022    TECHNIQUE: Portable chest AP view    FINDINGS: The patient is status post median sternotomy. The cardiac silhouette  is within normal limits. Diffuse bilateral patchy airspace disease is not  significantly changed. The visualized bones and upper abdomen are unremarkable. Impression  Unchanged patchy bilateral airspace disease, compatible with pneumonia. Results from [place] encounter on 11/08/22    CT FOOT RT W CONT    Narrative  EXAM: CT FOOT RT W CONT    INDICATION: Right foot swelling and abscess. Evaluate for osteomyelitis. Hypertension, diabetes, and cirrhosis. COMPARISON: Right foot views on 11/10/2022    CONTRAST: 100 mL of [id number]. TECHNIQUE: Helical CT of the right foot during uneventful rapid bolus  intravenous contrast administration. Coronal and Sagittal reformats were  generated. Images reviewed in soft tissue and bone windows. CT dose reduction  was achieved through the use of a standardized protocol tailored for this  examination and automatic exposure control for dose modulation. FINDINGS: Bones: Mild osteopenia. No fracture or osteomyelitis. Joint fluid: Physiologic. Articulations: no evidence of septic arthritis. 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 Tendons: No full-thickness tendon tear. Muscles: Mild diffuse atrophy. Soft tissue mass: None. No fluid collection. Impression  1. No abscess or osteomyelitis. 2. No fracture.       11/30 on high flow nasal cannula hypokalemic replace potassium on vancomycin and Zosyn we will try to wean oxygen to regular nasal cannula

## 2022-11-30 NOTE — NURSE NAVIGATOR
Heart Failure education referral is inappropriate at this time due to physical and/or mental limitations diagnosis and/or treatment plans. RN-NN can be reconsulted if patient status/prognosis is changed.

## 2022-11-30 NOTE — WOUND CARE
IP WOUND CONSULT    [address] [facility] RECORD NUMBER:  [id number]  AGE: 70 y.o. GENDER: male  : 1950  TODAY'S DATE:  2022    GENERAL     [] Follow-up   [x] New Consult    Laverle Carrel is a 70 y.o. male referred by:   [x] Physician  [] Nursing  [] Other:         PAST MEDICAL HISTORY    Past Medical History:   Diagnosis Date    Cirrhosis ([facility] [address]     Diabetes ([facility] [address]     Hypertension     Stroke ([facility] [address]         PAST SURGICAL HISTORY    Past Surgical History:   Procedure Laterality Date    HX BACK SURGERY      HX GI      IR INSERT NON TUNL CVC OVER 5 YRS  2022       FAMILY HISTORY    Family History   Problem Relation Age of Onset    Heart Disease Father          ALLERGIES    No Known Allergies    MEDICATIONS    No current facility-administered medications on file prior to encounter. Current Outpatient Medications on File Prior to Encounter   Medication Sig Dispense Refill    aspirin 81 mg chewable tablet 81 mg by Per G Tube route daily. potassium chloride (KLOR-CON) 20 mEq pack 20 mEq by Per NG tube route two (2) times daily (with meals). insulin glargine (LANTUS) 100 unit/mL injection 10 units subq @ bedtime (Patient not taking: Reported on 2022)      insulin aspart U-100 (NovoLOG U-100 Insulin aspart) 100 unit/mL injection 2-4 units subcutaneously sliding scale      glyBURIDE (DIABETA) 5 mg tablet 1 tab(s) orally once a day (Patient not taking: Reported on 2022)      clobetasoL (TEMOVATE) 0.05 % topical cream       losartan (COZAAR) 50 mg tablet       [DISCONTINUED] furosemide (LASIX) 20 mg tablet 40 mg by Per G Tube route daily. traMADoL (ULTRAM) 50 mg tablet 50 mg by Per G Tube route every six (6) hours as needed for Pain.       insulin lispro (HUMALOG) 100 unit/mL injection INITIATE INSULIN CORRECTIVE PROTOCOL:  Normal Insulin Sensitivity   For Blood Sugar (mg/dL) of:     Less than 150 =   0 units           150 -199 =   2 units  200 -249 =   4 units  250 -299 =   6 units  300 -349 =   8 units  350 and above = 10 units and Call Physician     Initiate Hypoglycemia protocol if blood glucose is <70 mg/dL  Fast Acting - Administer Immediately - or within 15 minutes of start of meal, if mealtime coverage. 1 Each 1    clopidogreL (PLAVIX) 75 mg tab Take 1 Tablet by mouth in the morning. (Patient taking differently: 75 mg by PEG Tube route daily.) 30 Tablet 1    ARIPiprazole (ABILIFY) 2 mg tablet 2 mg by Per G Tube route daily. baclofen 5 mg tab 5 mg by Gastrostomy Tube route two (2) times a day. carvediloL (COREG) 12.5 mg tablet 12.5 mg by Per G Tube route two (2) times a day. Indications: high blood pressure      escitalopram oxalate (LEXAPRO) 20 mg tablet 20 mg by Per G Tube route daily. gabapentin (NEURONTIN) 100 mg capsule 100 mg by Per G Tube route nightly. hydrOXYzine HCL (ATARAX) 25 mg tablet 25 mg by Per G Tube route daily. furosemide (LASIX) 40 mg tablet 40 mg by Per G Tube route daily. Indications: visible water retention      losartan (COZAAR) 100 mg tablet 100 mg by Per G Tube route daily. melatonin 5 mg tablet 10 mg nightly. metFORMIN (GLUCOPHAGE) 500 mg tablet 1,000 mg by Per G Tube route two (2) times daily (with meals). senna-docusate (PERICOLACE) 8.6-50 mg per tablet 1 Tablet by Per G Tube route daily. tamsulosin (FLOMAX) 0.4 mg capsule 0.4 mg nightly. Via PEG      [DISCONTINUED] potassium chloride (K-DUR, KLOR-CON M20) 20 mEq tablet Take 20 mEq by mouth two (2) times a day. omeprazole (PRILOSEC) 20 mg capsule 20 mg daily. Via PEG      atorvastatin (LIPITOR) 40 mg tablet 40 mg by Per G Tube route daily. [DISCONTINUED] aspirin delayed-release 81 mg tablet Take 81 mg by mouth in the morning.            Luiz@[employer] Vitals  /62   Pulse 87   Temp 97.8 °F (36.6 °C)   Resp (!) 33   Ht 5' 9\" (1.753 m)   Wt 57.8 kg (127 lb 6.8 oz)   SpO2 96%   BMI 18.82 kg/m²       ASSESSMENT     Wound Identification & Type: Multiple, see below  Dressing change: see below    Contributing Factors: anticoagulation therapy, diabetes, poor glucose control, chronic pressure, decreased mobility, shear force, incontinence of stool, incontinence of urine, arterial insufficiency, and malnutrition    Wound Sacrum Posterior DTI/Stage 3 (Active)   Wound Image   11/30/22 1510   Wound Etiology Pressure Unstageable 11/30/22 1510   Dressing Status New dressing applied 11/30/22 1510   Cleansed Irrigated with saline 11/30/22 1510   Dressing/Treatment Alginate with Ag;Honey gel/honey paste; Foam 11/30/22 1510   Dressing Change Due 12/01/22 11/30/22 1510   Wound Length (cm) 4.2 cm 11/30/22 1510   Wound Width (cm) 2.8 cm 11/30/22 1510   Wound Depth (cm) 0.4 cm 11/30/22 1510   Wound Surface Area (cm^2) 11.76 cm^2 11/30/22 1510   Change in Wound Size % -1709.23 11/30/22 1510   Wound Volume (cm^3) 4.704 cm^3 11/30/22 1510   Wound Healing % -3518 11/30/22 1510   Wound Assessment Slough;Dry;Pink/red 11/30/22 1510   Drainage Amount None 11/30/22 1510   Drainage Description Serosanguinous 11/30/22 0030   Wound Odor None 11/30/22 1510   Alecia-Wound/Incision Assessment Fragile; Non-Blanchable erythema 11/30/22 1510   Edges Undefined edges 11/30/22 1510   Wound Thickness Description Full thickness 11/30/22 0030   Number of days: 22       Wound Leg lower Anterior; Left Abrasions and Scabs (Active)   Number of days: 22       Wound Leg lower Anterior;Right abrasions and scabs (Active)   Number of days: 22       Wound Thigh Left;Medial Partial Thickness (Active)   Wound Image   11/30/22 1509   Wound Etiology Other (Comment) 11/30/22 1509   Dressing Status New dressing applied 11/30/22 1509   Cleansed Cleansed with saline 11/30/22 1509   Dressing/Treatment Foam;Honey gel/honey paste 11/30/22 1509   Dressing Change Due 12/02/22 11/30/22 1509   Wound Length (cm) 3.3 cm 11/30/22 1509   Wound Width (cm) 4.2 cm 11/30/22 1509   Wound Depth (cm) 0.1 cm 11/30/22 1509   Wound Surface Area (cm^2) 13.86 cm^2 11/30/22 1509   Wound Volume (cm^3) 1.386 cm^3 11/30/22 1509   Wound Assessment Dry;Pink/red 11/30/22 1509   Drainage Amount None 11/30/22 1509   Wound Odor None 11/30/22 1509   Alecia-Wound/Incision Assessment Intact 11/30/22 1509   Edges Undefined edges 11/30/22 1509   Wound Thickness Description Partial thickness 11/30/22 1509   Number of days: 21       Wound Toe (Comment  which one) Anterior;Right 5th toe (Active)   Wound Image   11/30/22 1507   Wound Etiology Pressure Unstageable 11/30/22 1507   Dressing Status New dressing applied 11/30/22 1507   Cleansed Cleansed with saline 11/30/22 1507   Dressing/Treatment Betadine swabs/Povidone Iodine;Gauze dressing/dressing sponge;Roll gauze 11/30/22 1507   Offloading for Diabetic Foot Ulcers Offloading boot 11/30/22 1507   Dressing Change Due 12/01/22 11/30/22 1507   Wound Length (cm) 1 cm 11/30/22 1507   Wound Width (cm) 1 cm 11/30/22 1507   Wound Depth (cm) 0 cm 11/30/22 1507   Wound Surface Area (cm^2) 1 cm^2 11/30/22 1507   Wound Volume (cm^3) 0 cm^3 11/30/22 1507   Wound Assessment Eschar dry 11/30/22 1507   Drainage Amount None 11/30/22 1507   Wound Odor None 11/30/22 1507   Alecia-Wound/Incision Assessment Dry/flaky 11/30/22 1507   Edges Attached edges 11/30/22 1507   Number of days: 21       Wound Foot Anterior;Right;Medial (Active)   Wound Image   11/30/22 1450   Wound Etiology Other (Comment) 11/30/22 1450   Dressing Status New dressing applied 11/30/22 1450   Cleansed Cleansed with saline 11/30/22 1450   Dressing/Treatment Betadine swabs/Povidone Iodine;Moist to dry;ABD pad;Roll gauze 11/30/22 1450   Offloading for Diabetic Foot Ulcers Offloading boot 11/30/22 1450   Dressing Change Due 12/01/22 11/30/22 1450   Wound Length (cm) 13 cm 11/30/22 1450   Wound Width (cm) 5 cm 11/30/22 1450   Wound Depth (cm) 0.1 cm 11/30/22 1450   Wound Surface Area (cm^2) 65 cm^2 11/30/22 1450   Wound Volume (cm^3) 6.5 cm^3 11/30/22 1450   Wound Assessment Dry;Eschar dry; Purple/maroon 11/30/22 1450   Drainage Amount None 11/30/22 1450   Wound Odor None 11/30/22 1450   Alecia-Wound/Incision Assessment Denuded 11/30/22 1450   Edges Undefined edges 11/30/22 1450   Number of days: 21       Wound Foot Anterior;Right;Lateral (Active)   Wound Image   11/30/22 1515   Wound Etiology Deep Tissue/Injury 11/30/22 1515   Dressing Status New dressing applied 11/30/22 0030   Cleansed Other (Comment) 11/30/22 0030   Dressing/Treatment Roll gauze 11/30/22 1515   Offloading for Diabetic Foot Ulcers Offloading boot 11/30/22 1515   Dressing Change Due 12/01/22 11/30/22 0030   Wound Length (cm) 2.5 cm 11/30/22 1515   Wound Width (cm) 3 cm 11/30/22 1515   Wound Depth (cm) 0 cm 11/30/22 1515   Wound Surface Area (cm^2) 7.5 cm^2 11/30/22 1515   Wound Volume (cm^3) 0 cm^3 11/30/22 1515   Wound Assessment Purple/maroon;Non-blanchable erythema 11/30/22 1515   Drainage Amount None 11/30/22 1515   Wound Odor None 11/30/22 1515   Alecia-Wound/Incision Assessment Blanchable erythema 11/30/22 1515   Edges Attached edges 11/30/22 1515   Number of days: 21       Wound Foot Left;Lateral Stage 1 11/30/22 (Active)   Wound Etiology Pressure Stage 1 11/30/22 0030   Dressing Status Clean; Intact 11/30/22 0700   Cleansed Other (Comment) 11/30/22 0030   Dressing/Treatment Open to air 11/30/22 0030   Wound Assessment Non-blanchable erythema 11/30/22 0030   Drainage Amount None 11/30/22 0030   Wound Odor None 11/30/22 0030   Alecia-Wound/Incision Assessment Intact 11/30/22 0030   Number of days: 0       Wound Heel Left DTI 11/30/22 (Active)   Wound Image   11/30/22 1513   Wound Etiology Deep Tissue/Injury 11/30/22 1513   Dressing Status Clean;Dry; Intact 11/30/22 0700   Cleansed Other (Comment) 11/30/22 0030   Dressing/Treatment Open to air 11/30/22 1513   Offloading for Diabetic Foot Ulcers Offloading boot 11/30/22 1513   Wound Length (cm) 2 cm 11/30/22 1513   Wound Width (cm) 2 cm 11/30/22 1513   Wound Depth (cm) 0 cm 11/30/22 1513   Wound Surface Area (cm^2) 4 cm^2 11/30/22 1513   Wound Volume (cm^3) 0 cm^3 11/30/22 1513   Wound Assessment Non-blanchable erythema;Purple/maroon 11/30/22 1513   Drainage Amount None 11/30/22 1513   Wound Odor None 11/30/22 1513   Alecia-Wound/Incision Assessment Dry/flaky 11/30/22 1513   Edges Attached edges 11/30/22 1513   Number of days: 0       Wound Buttocks Left;Medial Stage 2 11/20/22 (Active)   Wound Image   11/30/22 1511   Wound Etiology Pressure Stage 2 11/30/22 1511   Dressing Status New dressing applied 11/30/22 1511   Cleansed Irrigated with saline 11/30/22 1511   Dressing/Treatment Foam;Honey gel/honey paste 11/30/22 1511   Dressing Change Due 12/01/22 11/30/22 1511   Wound Length (cm) 1.8 cm 11/30/22 1511   Wound Width (cm) 2.1 cm 11/30/22 1511   Wound Depth (cm) 0.1 cm 11/30/22 1511   Wound Surface Area (cm^2) 3.78 cm^2 11/30/22 1511   Wound Volume (cm^3) 0.378 cm^3 11/30/22 1511   Wound Assessment Dry;Pink/red 11/30/22 1511   Drainage Amount None 11/30/22 1511   Wound Odor None 11/30/22 1511   Alecia-Wound/Incision Assessment Non-Blanchable erythema;Fragile 11/30/22 1511   Edges Undefined edges 11/30/22 1511   Wound Thickness Description Partial thickness 11/30/22 1511   Number of days: 0       Wound Cheek Left Scabbed skin tear 11/23/22 (Active)   Wound Etiology Skin Tear 11/30/22 0030   Dressing Status Clean;Dry; Intact 11/30/22 0700   Cleansed Other (Comment) 11/30/22 0030   Dressing/Treatment Foam 11/30/22 0030   Wound Assessment Other (Comment) 11/30/22 0030   Drainage Amount None 11/30/22 0030   Wound Odor None 11/30/22 0030   Alecia-Wound/Incision Assessment Intact 11/30/22 0030   Number of days: 0       Wound Heel Right Stage 1 11/30/22 (Active)   Wound Image   11/30/22 1514   Wound Etiology Deep Tissue/Injury 11/30/22 1514   Cleansed Other (Comment) 11/30/22 0030   Dressing/Treatment Roll gauze 11/30/22 1514   Offloading for Diabetic Foot Ulcers Offloading boot 11/30/22 1514   Wound Length (cm) 1 cm 11/30/22 1514   Wound Width (cm) 1 cm 11/30/22 1514   Wound Depth (cm) 0.01 cm 11/30/22 1514   Wound Surface Area (cm^2) 1 cm^2 11/30/22 1514   Wound Volume (cm^3) 0.01 cm^3 11/30/22 1514   Wound Assessment Non-blanchable erythema;Purple/maroon 11/30/22 1514   Drainage Amount None 11/30/22 1514   Wound Odor None 11/30/22 1514   Alecia-Wound/Incision Assessment Dry/flaky;Fragile 11/30/22 1514   Number of days: 0       Wound Ankle Right;Medial Stage 1 11/30/22 (Active)   Wound Etiology Pressure Stage 1 11/30/22 0030   Dressing Status New dressing applied;Clean;Dry; Intact 11/30/22 0030   Cleansed Other (Comment) 11/30/22 0030   Dressing/Treatment Betadine swabs/Povidone Iodine; Roll gauze 11/30/22 0030   Dressing Change Due 12/01/22 11/30/22 0030   Wound Assessment Non-blanchable erythema 11/30/22 0030   Drainage Amount None 11/30/22 0030   Wound Odor None 11/30/22 0030   Alecia-Wound/Incision Assessment Blanchable erythema 11/30/22 0030   Number of days: 0       Wound Toe (Comment  which one) Right;Medial Dry Eschar 11/08/22 (Active)   Wound Etiology Diabetic 11/30/22 0030   Cleansed Other (Comment) 11/30/22 0030   Dressing/Treatment Betadine swabs/Povidone Iodine;Open to air 11/30/22 0030   Wound Assessment Eschar dry 11/30/22 0030   Drainage Amount None 11/30/22 0030   Wound Odor None 11/30/22 0030   Alecia-Wound/Incision Assessment Dry/flaky 11/30/22 0030   Number of days: 0       Wound Knee Anterior;Right Partial Thickness 11/30/22 (Active)   Wound Image   11/30/22 1506   Wound Etiology Other (Comment) 11/30/22 1506   Dressing Status New dressing applied 11/30/22 1506   Cleansed Cleansed with saline 11/30/22 1506   Dressing/Treatment Foam;Honey gel/honey paste 11/30/22 1506   Dressing Change Due 12/02/22 11/30/22 1506   Wound Assessment Pink/red 11/30/22 1506   Drainage Amount None 11/30/22 1506   Wound Odor None 11/30/22 1506   Alecia-Wound/Incision Assessment Denuded;Fragile 11/30/22 1506   Edges Undefined edges 11/30/22 1506   Wound Thickness Description Partial thickness 11/30/22 1506   Number of days: 0       Wound Knee Anterior; Left Partial Thickness 11/30/22 (Active)   Wound Image   11/30/22 1507   Wound Etiology Other (Comment) 11/30/22 1507   Dressing Status New dressing applied 11/30/22 1507   Cleansed Cleansed with saline 11/30/22 1507   Dressing/Treatment Foam;Honey gel/honey paste 11/30/22 1507   Dressing Change Due 12/02/22 11/30/22 1507   Wound Assessment Denuded;Pink/red 11/30/22 1507   Drainage Amount None 11/30/22 1507   Wound Odor None 11/30/22 1507   Alecia-Wound/Incision Assessment Denuded;Fragile 11/30/22 1507   Edges Undefined edges 11/30/22 1507   Wound Thickness Description Partial thickness 11/30/22 1507   Number of days: 0       [REMOVED] Wound Toe (Comment  which one) Anterior;Right purulent drainage. Large wound on R big toe. (Removed)   Number of days: 22       [REMOVED] Wound Thigh Anterior;Left;Medial (Removed)   Number of days: 21       [REMOVED] Wound Heel Right Intact blister (Removed)   Number of days: 21       [REMOVED] Wound Buttocks Left (Removed)   Number of days: 10       [REMOVED] Wound Cheek Left 11/23/22 (Removed)   Number of days: 7          PLAN     Skin Care & Pressure Relief Recommendations  Minimize layers of linen  Turn/reposition approximately every 2 hours  Pillow wedges  Manage incontinence   Promote continence; Skin Protective lotion/cream to buttocks and sacrum daily and as needed with incontinence care  Offloading boots    Kermit 9  Blood Glucose: 117 on 11/30/22                             Albumin: 2.1 on 11/29/22  WBCs: 5.6 on 11/30/22    Nutritionist Consulted: Yes   Nutrition Status: Moderate malnutrition    Support Surface: Total Care Sport w/air. Will need a low air loss bed when transfers to med-surg unit. Patient is high risk for wound deterioration and new wound development related to pressure.       Physician/Provider notified: Dr. Brenda Bedolla and Dr. Stacy Queen  Recommendations: Unstageable PI to sacrococcygeal (wound bed obscured with slough) and stage 2 PI to left buttock. Wounds are dry r/t dehydration. Apply Therahoney gel to both wound beds, lightly pack wound to coccyx with Opticell Ag double layered, and cover with sacral foam daily, see dressing orders. Multiple abrasions noted to knees and BLEs, many superficial.  For abrasions to knees apply Therahoney gel and cover with lite foam every other day, see dressing order. Wound to left medial thigh, etiology unclear. Possible MARSI related to kennedy securement device. Apply Therahoney gel and cover with lite foam every other day, see dressing order. sDTI to both heels and right lateral foot. Apply Venelex to areas BID. Unstageable PI to right 5th toe and right medial foot & hallux, with suspected arterial deficiency. Apply betadine wet/dry daily to these areas, see dressing order. No fluctuance noted at this time and eschar appears fairly stable. Requested order for Podiatry for examination of wound to medial right foot and hallux. Order received and entered for Podiatrist Dr. Stacy Queen. Informed Dr. Stacy Queen of consult via [employer] and confirmed. Patient has indwelling kennedy catheter to manage  incontinence. Use foam wedge to turn q2h at 30 degree angle to offload sacrum. Maintain heel boots on both feet while in bed for strict offloading of the heels. Maintain a pillow between BLEs to prevent skin-to-skin pressure related skin injury. Maintain HOB at 30 degrees or less, if not contraindicated, to reduce pressure to buttocks and sacrum. Raise foot of bed to help prevent friction and shear injury from sliding down in the bed. Will continue to follow weekly while in-patient.           Discharge Wound Care Needs: TBD    Teaching completed with:   [] Patient           [] Family member       [] Caregiver          [] Nursing  [] Other    Patient/Caregiver Teaching:  Level of patient/caregiver understanding able to:   [] Indicates understanding       [] Needs reinforcement  [] Unsuccessful      [] Verbal Understanding  [] Demonstrated understanding       [] No evidence of learning  [] Refused teaching         [] N/A       Electronically signed by Su Merrill RN on 11/30/2022 at 3:20 PM

## 2022-11-30 NOTE — PROGRESS NOTES
Hospitalist Progress Note         Manuela Waters MD          Daily Progress Note: 11/30/2022      Subjective: The patient is seen for follow  up. 70 y.o. male history of DM, HTN, CVA, Cirrhosis, here for shortness of breath and hypoxia. Pt was sent from his nursing home after they found him to have O2 saturations in the 70's on room air, tachypneic, labored breathing. Pt was placed on BiPAP in the ER with improvement of oxygen saturations but he still has eyes closed, does not speak. He does move his right arm around and groan repeatedly during evaluation but does not seem intentional in his movements. Of note, the patient was last admitted in this hospital on 11/8, discharged 2 days ago on 11/27, for sepsis. At time of discharge the patient was awake and alert, though not fully oriented  ----  The patient is seen for follow  up. His mentation is back to baseline today. Unfortunately he is not very capable of providing history at baseline so it is difficult to assess how he is feeling. Per RN, the patient's son was successfully contacted overnight but no significant history was obtained and no medical decisions were made. O2 requirement slowly coming down.     Problem List:  Problem List as of 11/30/2022 Never Reviewed            Codes Class Noted - Resolved    Aspiration pneumonia ([facility] ICD-10-CM: J69.0  ICD-9-CM: 507.0  11/27/2022 - Present        Septic shock ([facility] ICD-10-CM: A41.9, R65.21  ICD-9-CM: 038.9, 785.52, 995.92  11/20/2022 - Present        Infection of right great toe due to methicillin resistant Staphylococcus aureus (MRSA) ICD-10-CM: L08.9, B95.62  ICD-9-CM: 686.8, 041.12  11/20/2022 - Present        Metabolic encephalopathy [id number]: G93.41  ICD-9-CM: 348.31  11/20/2022 - Present        Severe protein-calorie malnutrition ([facility] ICD-10-CM: E43  ICD-9-CM: 262  11/20/2022 - Present        Dysphagia ICD-10-CM: R13.10  ICD-9-CM: 787.20  11/20/2022 - Present Acute respiratory failure with hypoxia [facility]) ICD-10-CM: J96.01  ICD-9-CM: 518.81  11/20/2022 - Present        PAD (peripheral artery disease) (HCC) ICD-10-CM: I73.9  ICD-9-CM: 443.9  11/20/2022 - Present        UTI (urinary tract infection) ICD-10-CM: N39.0  ICD-9-CM: 599.0  11/8/2022 - Present        Carotid stenosis ICD-10-CM: I65.29  ICD-9-CM: 433.10  8/2/2022 - Present        Cystitis ICD-10-CM: N30.90  ICD-9-CM: 595.9  8/2/2022 - Present        CVA (cerebral vascular accident) [facility]) ICD-10-CM: I63.9  ICD-9-CM: 434.91  7/31/2022 - Present        TIA (transient ischemic attack) ICD-10-CM: G45.9  ICD-9-CM: 435.9  7/29/2022 - Present        Chest pain ICD-10-CM: R07.9  ICD-9-CM: 786.50  10/28/2020 - Present        CAD (coronary artery disease) ICD-10-CM: I25.10  ICD-9-CM: 414.00  10/28/2020 - Present        NSTEMI (non-ST elevated myocardial infarction) [facility]) ICD-10-CM: I21.4  ICD-9-CM: 410.70  10/28/2020 - Present           Medications reviewed  Current Facility-Administered Medications   Medication Dose Route Frequency    midodrine (PROAMATINE) tablet 10 mg  10 mg Oral ONCE    potassium chloride (KLOR-CON) packet for solution 40 mEq  40 mEq Oral NOW    sodium chloride (NS) flush 5-10 mL  5-10 mL IntraVENous PRN    sodium chloride (NS) flush 5-40 mL  5-40 mL IntraVENous Q8H    sodium chloride (NS) flush 5-40 mL  5-40 mL IntraVENous PRN    acetaminophen (TYLENOL) tablet 650 mg  650 mg Oral Q6H PRN    Or    acetaminophen (TYLENOL) suppository 650 mg  650 mg Rectal Q6H PRN    polyethylene glycol (MIRALAX) packet 17 g  17 g Oral DAILY PRN    ondansetron (ZOFRAN ODT) tablet 4 mg  4 mg Oral Q8H PRN    Or    ondansetron (ZOFRAN) injection 4 mg  4 mg IntraVENous Q6H PRN    enoxaparin (LOVENOX) injection 40 mg  40 mg SubCUTAneous DAILY    VANCOMYCIN INFORMATION NOTE   Other Rx Dosing/Monitoring    vancomycin (VANCOCIN) 1,000 mg in 0.9% sodium chloride 250 mL ([id number])  1,000 mg IntraVENous Q12H    Vancomycin trough level to be drawn on 22  at 1100 am.   Other ONCE    piperacillin-tazobactam (ZOSYN) 3.375 g in 0.9% sodium chloride (MBP/ADV) 100 mL MBP  3.375 g IntraVENous Q8H    ARIPiprazole (ABILIFY) tablet 2 mg  2 mg Per G Tube DAILY    atorvastatin (LIPITOR) tablet 40 mg  40 mg Per G Tube DAILY    baclofen (LIORESAL) tablet 5 mg  5 mg Per G Tube BID    gabapentin (NEURONTIN) capsule 100 mg  100 mg Per G Tube QHS    losartan (COZAAR) tablet 100 mg  100 mg Per G Tube DAILY    melatonin tablet 10 mg  10 mg Per G Tube QHS    traMADoL (ULTRAM) tablet 50 mg  50 mg Per G Tube Q6H PRN    hydrOXYzine HCL (ATARAX) tablet 25 mg  25 mg Per G Tube Q6H PRN    clopidogreL (PLAVIX) tablet 75 mg  75 mg PEG Tube DAILY    carvediloL (COREG) tablet 12.5 mg  12.5 mg Per G Tube BID    escitalopram oxalate (LEXAPRO) tablet 20 mg  20 mg Per G Tube DAILY       Review of Systems:   A comprehensive review of systems was negative. Objective:   Physical Exam:     Visit Vitals  BP (!) 104/56   Pulse 85   Temp 98.6 °F (37 °C)   Resp (!) 33   Ht 5' 9\" (1.753 m)   Wt 57.8 kg (127 lb 6.8 oz)   SpO2 93%   BMI 18.82 kg/m²    O2 Flow Rate (L/min): 45 l/min O2 Device: Hi flow nasal cannula, Heated    Temp (24hrs), Av.5 °F (37.5 °C), Min:98 °F (36.7 °C), Max:102.3 °F (39.1 °C)    No intake/output data recorded.  1901 -  0700  In: 4811 [I.V.:1289]  Out: 550 [Urine:550]    General:  Alert, cooperative, no distress, appears stated age. Lungs:   Clear to auscultation bilaterally. Chest wall:  No tenderness or deformity. Heart:  Regular rate and rhythm, S1, S2 normal, no murmur, click, rub or gallop. Abdomen:   Soft, non-tender. Bowel sounds normal. No masses,  No organomegaly. Extremities: Extremities normal, atraumatic, no cyanosis or edema. Pulses: 2+ and symmetric all extremities. Skin: Skin color, texture, turgor normal. No rashes or lesions   Neurologic: CNII-XII intact.  No gross sensory or motor deficits     Data Review: Recent Days:  Recent Labs     11/30/22  0310 11/29/22  1149 11/27/22  1139   WBC 5.6 14.8* 8.2   HGB 8.0* 9.8* 9.2*   HCT 25.6* 32.0* 29.2*    317 292     Recent Labs     11/30/22  0310 11/29/22  1045 11/27/22  1139    140 139   K 3.0* 3.8 4.1   * 110* 109*   CO2 22 21 24   * 146* 94   BUN 20 15 12   CREA 0.49* 0.55* 0.43*   CA 7.7* 8.0* 8.1*   ALB  --  2.1*  --    TBILI  --  1.9*  --    ALT  --  48  --      Recent Labs     11/30/22  0330 11/29/22  1205   PH 7.55* 7.51*   PCO2 28* 25*   PO2 90 151*   HCO3 24 20*   FIO2 80 80       24 Hour Results:  Recent Results (from the past 24 hour(s))   CBC WITH AUTOMATED DIFF    Collection Time: 11/29/22 11:49 AM   Result Value Ref Range    WBC 14.8 (H) 4.1 - 11.1 K/uL    RBC 3.40 (L) 4.10 - 5.70 M/uL    HGB 9.8 (L) 12.1 - 17.0 g/dL    HCT 32.0 (L) 36.6 - 50.3 %    MCV 94.1 80.0 - 99.0 FL    MCH 28.8 26.0 - 34.0 PG    MCHC 30.6 30.0 - 36.5 g/dL    RDW 17.4 (H) 11.5 - 14.5 %    PLATELET 741 461 - 294 K/uL    MPV 10.4 8.9 - 12.9 FL    NRBC 0.0 0.0  WBC    ABSOLUTE NRBC 0.00 0.00 - 0.01 K/uL    NEUTROPHILS 92 (H) 32 - 75 %    LYMPHOCYTES 5 (L) 12 - 49 %    MONOCYTES 3 (L) 5 - 13 %    EOSINOPHILS 0 0 - 7 %    BASOPHILS 0 0 - 1 %    IMMATURE GRANULOCYTES 0 0 - 0.5 %    ABS. NEUTROPHILS 13.4 (H) 1.8 - 8.0 K/UL    ABS. LYMPHOCYTES 0.8 0.8 - 3.5 K/UL    ABS. MONOCYTES 0.4 0.0 - 1.0 K/UL    ABS. EOSINOPHILS 0.0 0.0 - 0.4 K/UL    ABS. BASOPHILS 0.0 0.0 - 0.1 K/UL    ABS. IMM.  GRANS. 0.1 (H) 0.00 - 0.04 K/UL    DF AUTOMATED     BLOOD GAS, ARTERIAL    Collection Time: 11/29/22 12:05 PM   Result Value Ref Range    pH 7.51 (H) 7.35 - 7.45      PCO2 25 (L) 35 - 45 mmHg    PO2 151 (H) 80 - 100 mmHg    O2 SATURATION 99 95 - 99 %    BICARBONATE 20 (L) 22 - 26 mmol/L    BASE DEFICIT 1.9 mmol/L    O2 METHOD BiPAP      FIO2 80 %    Tidal volume 480.0      PRESSURE SUPPORT 25      PEEP/CPAP 7.0      Sample source Arterial      SITE Left Radial      BRITNEY'S TEST YES      Carboxy-Hgb 0.1 (L) 1 - 2 %    Methemoglobin 0.3 0 - 1.4 %    Oxyhemoglobin 98.2 95 - 99 %    Performed by Christina Faye     TEMPERATURE 99.6     INFLUENZA A & B AG (RAPID TEST)    Collection Time: 11/29/22 12:59 PM   Result Value Ref Range    Influenza A Antigen Negative Negative      Influenza B Antigen Negative Negative     COVID-19 RAPID TEST    Collection Time: 11/29/22 12:59 PM   Result Value Ref Range    COVID-19 rapid test Not Detected Not Detected     GLUCOSE, POC    Collection Time: 11/29/22  8:15 PM   Result Value Ref Range    Glucose (POC) 149 (H) 65 - 100 mg/dL    Performed by Lavon Correia    URINALYSIS W/ RFLX MICROSCOPIC    Collection Time: 11/30/22 12:30 AM   Result Value Ref Range    Color Dark Yellow      Appearance Clear Clear      Specific gravity 1.010 1.003 - 1.030      pH (UA) 6.0      Protein Negative Negative mg/dL    Glucose Negative Negative mg/dL    Ketone Negative Negative mg/dL    Bilirubin Negative Negative      Blood Negative Negative      Urobilinogen 4.0 (H) 0.2 - 1.0 EU/dL    Nitrites Negative Negative      Leukocyte Esterase Negative Negative     MRSA SCREEN - PCR (NASAL)    Collection Time: 11/30/22 12:45 AM   Result Value Ref Range    MRSA by PCR, Nasal DETECTED (A) Not Detected     GLUCOSE, POC    Collection Time: 11/30/22  1:00 AM   Result Value Ref Range    Glucose (POC) 134 (H) 65 - 100 mg/dL    Performed by Ophelia Leigh    CULTURE, BLOOD    Collection Time: 11/30/22  1:00 AM    Specimen: Blood   Result Value Ref Range    Special Requests: No Special Requests      Culture result: No growth after 7 hours     METABOLIC PANEL, BASIC    Collection Time: 11/30/22  3:10 AM   Result Value Ref Range    Sodium 144 136 - 145 mmol/L    Potassium 3.0 (L) 3.5 - 5.1 mmol/L    Chloride 113 (H) 97 - 108 mmol/L    CO2 22 21 - 32 mmol/L    Anion gap 9 5 - 15 mmol/L    Glucose 117 (H) 65 - 100 mg/dL    BUN 20 6 - 20 mg/dL    Creatinine 0.49 (L) 0.70 - 1.30 mg/dL BUN/Creatinine ratio 41 (H) 12 - 20      eGFR >60 >60 ml/min/1.73m2    Calcium 7.7 (L) 8.5 - 10.1 mg/dL   CBC W/O DIFF    Collection Time: 11/30/22  3:10 AM   Result Value Ref Range    WBC 5.6 4.1 - 11.1 K/uL    RBC 2.77 (L) 4.10 - 5.70 M/uL    HGB 8.0 (L) 12.1 - 17.0 g/dL    HCT 25.6 (L) 36.6 - 50.3 %    MCV 92.4 80.0 - 99.0 FL    MCH 28.9 26.0 - 34.0 PG    MCHC 31.3 30.0 - 36.5 g/dL    RDW 17.1 (H) 11.5 - 14.5 %    PLATELET 262 198 - 450 K/uL    MPV 10.1 8.9 - 12.9 FL    NRBC 0.0 0.0  WBC    ABSOLUTE NRBC 0.00 0.00 - 0.01 K/uL   BLOOD GAS, ARTERIAL    Collection Time: 11/30/22  3:30 AM   Result Value Ref Range    pH 7.55 (H) 7.35 - 7.45      PCO2 28 (L) 35 - 45 mmHg    PO2 90 80 - 100 mmHg    O2 SATURATION 97 95 - 99 %    BICARBONATE 24 22 - 26 mmol/L    BASE EXCESS 1.7 0 - 3 mmol/L    O2 METHOD High Flow Nasal Cannula      O2 FLOW RATE 60.00 L/min    FIO2 80 %    Sample source Arterial      SITE Left Radial      BRITNEY'S TEST YES      Carboxy-Hgb 0.0 (L) 1 - 2 %    Methemoglobin 0.3 0 - 1.4 %    Oxyhemoglobin 96.4 95 - 99 %    Performed by Abena Nguyen     Critical value read back       Called to  Rashid Lundy RN on 11/30/2022 at 03:42    TEMPERATURE 98.0     GLUCOSE, POC    Collection Time: 11/30/22  5:31 AM   Result Value Ref Range    Glucose (POC) 105 (H) 65 - 100 mg/dL    Performed by Rashid Lundy            Assessment/        Acute hypoxic respiratory failure    -Suspect aspiration    -Continue high flow to maintain O2 saturation above 92%    -Monitor closely in ICU overnight     Probable aspiration pneumonia    -Continue IV Zosyn/vancomycin empirically    -Follow-up blood and sputum cultures     Sepsis    -Secondary to bilateral pneumonia     Generalized debilitation    -Secondary to acute illness and multiple comorbidities    -Doubt if patient is able to ambulate     Coronary artery disease status post CABG    -Stable for now      Plan:  Continue supportive care  Wean off high flow oxygen  Replete potassium with 40meq of KCL now      Care Plan discussed with: Nurse    Total time spent with patient: 30 minutes.     Manuela Waters MD

## 2022-11-30 NOTE — PROGRESS NOTES
Inez Martínez TRANSFER - IN REPORT:    Verbal report received from Sherren Filbert, RN on Laverle Carrel  being received from ED (unit) for routine progression of care      Report consisted of patients Situation, Background, Assessment and   Recommendations(SBAR). Information from the following report(s) SBAR, Kardex, ED Summary, Intake/Output, MAR, Recent Results, Med Rec Status, and Cardiac Rhythm NSR  was reviewed with the receiving nurse. Opportunity for questions and clarification was provided. Assessment completed upon patients arrival to unit and care assumed. Complete CHG bath given, Dual skin assessment done, with Neeta Oneill RN noted wounds on different areas of the body (see flowsheets), Lab works sent (Cultures sent). Patient arrived with no belongings. On heated highflow,2 PIVs, Shields catheter inserted for sacral wounds.

## 2022-11-30 NOTE — CONSULTS
Comprehensive Nutrition Assessment    Type and Reason for Visit: Initial, NPO/clear liquid    Nutrition Recommendations/Plan:   NPO, advance to PO diet per SLP rec's  Initiate TF via PEG as Osmolite 1.2 Michael at 25 mL/hr, advance to goal rate of 65 mL/hr. Flush with 160 mL water Q4H via PEG. Provides 1872 kcal(96%), 87 gm PRO(100%), 2239 mL Fluids (110%). Monitor and document TF initiation, rate/flushes, tolerance, Bms in I/Os. Malnutrition Assessment:  Malnutrition Status: Moderate malnutrition (11/30/22 1502)    Context:  Acute illness     Findings of the 6 clinical characteristics of malnutrition:   Energy Intake:  No significant decrease in energy intake  Weight Loss:  Greater than 5% over 1 month     Body Fat Loss:  Unable to assess,     Muscle Mass Loss:  Unable to assess,    Fluid Accumulation:  Mild, Extremities   Strength:  Not performed     Nutrition Assessment:    Admitted for ARF w/ hypoxia. RD consulted for TF rec's. Chart reviewed. Also, screened for low BMI and MST 2 d/t  noted \"unsure wt loss\". Pt is familiar to RD, recently seen 1 week ago. Noted significant wt loss (8%) within <1 month despite continued edema. RD to provide regimen. Labs: K 3.0, Cl 113, , Cr 0.49, Ca 7.7, H/H 8.0/25. 6. Meds: zosyn, vancomycin, lipitor. Nutrition Related Findings:    +Moderate malnutrition hx; NFPE deferred d/t isolation precaution. No N/V/D/C reported; consider h/o constipation. NPO, PEG in place. Last BM 11/30. Edema: +1 BLE, and sacral; +2 RUE edema. Wound Type: Multiple, Pressure injury, Stage I, Stage II, Diabetic ulcer, Skin tears, Deep tissue injury    Current Nutrition Intake & Therapies:  Average Meal Intake: NPO  Average Supplement Intake: NPO  DIET NPO    Anthropometric Measures:  Height: 5' 9\" (175.3 cm)  Ideal Body Weight (IBW): 160 lbs (73 kg)  Current Body Wt:  57.8 kg (127 lb 6.8 oz), 79.6 % IBW.  Bed scale  Current BMI (kg/m2): 18.8  Usual Body Weight: 62.8 kg (138 lb 7.2 oz) (~ 3 weeks ago per chart review.)  % Weight Change (Calculated): -8  Weight Adjustment: No adjustment  BMI Category: Underweight (BMI less than 22) age over 72  Weight Metrics 11/30/2022 11/10/2022 11/8/2022 9/15/2022 8/12/2022 7/29/2022 10/28/2020   Weight 127 lb 6.8 oz 137 lb 12.6 oz - - 187 lb 187 lb 220 lb   BMI 18.82 kg/m2 - 20.35 kg/m2 27.62 kg/m2 28.43 kg/m2 28.43 kg/m2 33.45 kg/m2     Estimated Daily Nutrient Needs:  Energy Requirements Based On: Kcal/kg  Weight Used for Energy Requirements: Current  Energy (kcal/day): 4685-5434 kcal/d  (30-35 kcal/kg)  Weight Used for Protein Requirements: Current  Protein (g/day): 70-87 gm/d  (1.2-1.5 gm/kg)  Method Used for Fluid Requirements: 1 ml/kcal  Fluid (ml/day): 9137-8259 mL/d    Nutrition Diagnosis:   Inadequate oral intake related to swallowing difficulty as evidenced by NPO or clear liquid status due to medical condition, nutrition support-enteral nutrition, weight loss greater than or equal to 5% in 1 month  Increased nutrient needs related to increased demand for energy/nutrients, inadequate protein-energy intake as evidenced by wounds, NPO or clear liquid status due to medical condition, weight loss    Nutrition Interventions:   Food and/or Nutrient Delivery: Continue NPO, Start tube feeding  Nutrition Education/Counseling: No recommendations at this time  Coordination of Nutrition Care: Continue to monitor while inpatient, Speech therapy  Plan of Care discussed with: RN    Goals:  Goals: Meet at least 75% of estimated needs, Initiate nutrition support, by next RD assessment, other (specify)  Specify Other Goals: Wt maintenance of +/- 0.5 kg x7 days.     Nutrition Monitoring and Evaluation:   Behavioral-Environmental Outcomes: None identified  Food/Nutrient Intake Outcomes: Enteral nutrition intake/tolerance  Physical Signs/Symptoms Outcomes: Biochemical data, Hemodynamic status, Weight, Nutrition focused physical findings, GI status    Discharge Planning: Too soon to determine    John Starks RD  Contact: ext. [phone].

## 2022-11-30 NOTE — PROGRESS NOTES
Pt found with BM in bed. Laid flat and BM cleaned x3. Desat to low 80s after bath. Pt boosted in bed, FiO2 bumped to 100% for about 10 min., NTS'd x2 with moderate thick, tan secretions out. Pt able to cough some secretions up. Chest PT started on bed with improvement in SpO2. Will continue to monitor.

## 2022-11-30 NOTE — ED NOTES
TRANSFER - OUT REPORT:    Verbal report given to Herson Sotomayor RN on Ricardo Goodwin  being transferred to CVICU for routine progression of care       Report consisted of patients Situation, Background, Assessment and   Recommendations(SBAR). Information from the following report(s) SBAR and MAR was reviewed with the receiving nurse. Lines:   Peripheral IV 11/29/22 Anterior;Right Forearm (Active)        Opportunity for questions and clarification was provided.       Patient transported with:   Monitor  Registered Nurse   O2 - Hi Flow

## 2022-11-30 NOTE — PROGRESS NOTES
*ATTENTION:  This note has been created by a medical student for educational purposes only. Please do not refer to the content of this note for clinical decision-making, billing, or other purposes. Please see attending physicians note to obtain clinical information on this patient. *                                     Hospitalist Progress Note         Cesia Gaxiola          Daily Progress Note: 11/30/2022      Subjective: Christina Luna is a 70 y.o. male history of DM, HTN, CVA, Cirrhosis, here for shortness of breath and hypoxia. Pt was sent from his nursing home after they found him to have O2 saturations in the 70's on room air, tachypneic, labored breathing. Pt was placed on BiPAP in the ER with improvement of oxygen saturations but he still has eyes closed, does not speak. He does move his right arm around and groan repeatedly during evaluation but does not seem intentional in his movements. Of note, the patient was last admitted in this hospital on 11/8, discharged 2 days ago on 11/27, for sepsis. At time of discharge the patient was awake and alert, though not fully oriented. --  The patient is seen for follow  up. His mentation is back to baseline today. Unfortunately he is not very capable of providing history at baseline so it is difficult to assess how he is feeling. Per RN, the patient's son was successfully contacted overnight but no significant history was obtained and no medical decisions were made. O2 requirement slowly coming down.     Problem List:  Problem List as of 11/30/2022 Never Reviewed            Codes Class Noted - Resolved    Aspiration pneumonia [facility]) ICD-10-CM: J69.0  ICD-9-CM: 507.0  11/27/2022 - Present        Septic shock ([facility] [address] ICD-10-CM: A41.9, R65.21  ICD-9-CM: 038.9, 785.52, 995.92  11/20/2022 - Present        Infection of right great toe due to methicillin resistant Staphylococcus aureus (MRSA) ICD-10-CM: L08.9, B95.62  ICD-9-CM: 686.8, 041.12  11/20/2022 - Present Metabolic encephalopathy [id number]: G93.41  ICD-9-CM: 348.31  11/20/2022 - Present        Severe protein-calorie malnutrition ([address] ICD-10-CM: E43  ICD-9-CM: 262  11/20/2022 - Present        Dysphagia ICD-10-CM: R13.10  ICD-9-CM: 787.20  11/20/2022 - Present        Acute respiratory failure with hypoxia [facility]) ICD-10-CM: J96.01  ICD-9-CM: 518.81  11/20/2022 - Present        PAD (peripheral artery disease) ([facility]) ICD-10-CM: I73.9  ICD-9-CM: 443.9  11/20/2022 - Present        UTI (urinary tract infection) ICD-10-CM: N39.0  ICD-9-CM: 599.0  11/8/2022 - Present        Carotid stenosis ICD-10-CM: I65.29  ICD-9-CM: 433.10  8/2/2022 - Present        Cystitis ICD-10-CM: N30.90  ICD-9-CM: 595.9  8/2/2022 - Present        CVA (cerebral vascular accident) [facility]) ICD-10-CM: I63.9  ICD-9-CM: 434.91  7/31/2022 - Present        TIA (transient ischemic attack) ICD-10-CM: G45.9  ICD-9-CM: 435.9  7/29/2022 - Present        Chest pain ICD-10-CM: R07.9  ICD-9-CM: 786.50  10/28/2020 - Present        CAD (coronary artery disease) ICD-10-CM: I25.10  ICD-9-CM: 414.00  10/28/2020 - Present        NSTEMI (non-ST elevated myocardial infarction) [facility]) ICD-10-CM: I21.4  ICD-9-CM: 410.70  10/28/2020 - Present           Medications reviewed  Current Facility-Administered Medications   Medication Dose Route Frequency    midodrine (PROAMATINE) tablet 10 mg  10 mg Oral ONCE    sodium chloride (NS) flush 5-10 mL  5-10 mL IntraVENous PRN    sodium chloride (NS) flush 5-40 mL  5-40 mL IntraVENous Q8H    sodium chloride (NS) flush 5-40 mL  5-40 mL IntraVENous PRN    acetaminophen (TYLENOL) tablet 650 mg  650 mg Oral Q6H PRN    Or    acetaminophen (TYLENOL) suppository 650 mg  650 mg Rectal Q6H PRN    polyethylene glycol (MIRALAX) packet 17 g  17 g Oral DAILY PRN    ondansetron (ZOFRAN ODT) tablet 4 mg  4 mg Oral Q8H PRN    Or    ondansetron (ZOFRAN) injection 4 mg  4 mg IntraVENous Q6H PRN    enoxaparin (LOVENOX) injection 40 mg  40 mg SubCUTAneous DAILY    VANCOMYCIN INFORMATION NOTE   Other Rx Dosing/Monitoring    vancomycin (VANCOCIN) 1,000 mg in 0.9% sodium chloride 250 mL ([id number])  1,000 mg IntraVENous Q12H    Vancomycin trough level to be drawn on 22  at 1100 am.   Other ONCE    piperacillin-tazobactam (ZOSYN) 3.375 g in 0.9% sodium chloride (MBP/ADV) 100 mL MBP  3.375 g IntraVENous Q8H    ARIPiprazole (ABILIFY) tablet 2 mg  2 mg Per G Tube DAILY    atorvastatin (LIPITOR) tablet 40 mg  40 mg Per G Tube DAILY    baclofen (LIORESAL) tablet 5 mg  5 mg Per G Tube BID    gabapentin (NEURONTIN) capsule 100 mg  100 mg Per G Tube QHS    losartan (COZAAR) tablet 100 mg  100 mg Per G Tube DAILY    melatonin tablet 10 mg  10 mg Per G Tube QHS    traMADoL (ULTRAM) tablet 50 mg  50 mg Per G Tube Q6H PRN    hydrOXYzine HCL (ATARAX) tablet 25 mg  25 mg Per G Tube Q6H PRN    clopidogreL (PLAVIX) tablet 75 mg  75 mg PEG Tube DAILY    carvediloL (COREG) tablet 12.5 mg  12.5 mg Per G Tube BID    escitalopram oxalate (LEXAPRO) tablet 20 mg  20 mg Per G Tube DAILY       Review of Systems:   A comprehensive review of systems was negative except for that written in the HPI. Objective:   Physical Exam:     Visit Vitals  /67   Pulse 84   Temp 98.6 °F (37 °C)   Resp 29   Ht 5' 9\" (1.753 m)   Wt 57.8 kg (127 lb 6.8 oz)   SpO2 94%   BMI 18.82 kg/m²    O2 Flow Rate (L/min): 45 l/min O2 Device: Hi flow nasal cannula, Heated    Temp (24hrs), Av.9 °F (37.7 °C), Min:98 °F (36.7 °C), Max:102.3 °F (39.1 °C)    No intake/output data recorded.  1901 -  0700  In: 2652 [I.V.:1289]  Out: 550 [Urine:550]    General:  Alert, cooperative, no distress, appears stated age. Lungs:   Clear to auscultation bilaterally. Chest wall:  No tenderness or deformity. Heart:  Regular rate and rhythm, S1, S2 normal, no murmur, click, rub or gallop. Abdomen:   Soft, non-tender. Bowel sounds normal. No masses,  No organomegaly.    Extremities: Extremities normal, atraumatic, no cyanosis or edema. Pulses: 2+ and symmetric all extremities. Skin: Skin color, texture, turgor normal. No rashes or lesions   Neurologic: CNII-XII intact. No gross sensory or motor deficits     Data Review:       Recent Days:  Recent Labs     11/30/22  0310 11/29/22  1149 11/27/22  1139   WBC 5.6 14.8* 8.2   HGB 8.0* 9.8* 9.2*   HCT 25.6* 32.0* 29.2*    317 292     Recent Labs     11/30/22  0310 11/29/22  1045 11/27/22  1139    140 139   K 3.0* 3.8 4.1   * 110* 109*   CO2 22 21 24   * 146* 94   BUN 20 15 12   CREA 0.49* 0.55* 0.43*   CA 7.7* 8.0* 8.1*   ALB  --  2.1*  --    TBILI  --  1.9*  --    ALT  --  48  --      Recent Labs     11/30/22  0330 11/29/22  1205   PH 7.55* 7.51*   PCO2 28* 25*   PO2 90 151*   HCO3 24 20*   FIO2 80 80       24 Hour Results:  Recent Results (from the past 24 hour(s))   EKG, 12 LEAD, INITIAL    Collection Time: 11/29/22 10:26 AM   Result Value Ref Range    Ventricular Rate 102 BPM    Atrial Rate 102 BPM    P-R Interval 144 ms    QRS Duration 90 ms    Q-T Interval 372 ms    QTC Calculation (Bezet) 484 ms    Calculated P Axis 59 degrees    Calculated R Axis 5 degrees    Calculated T Axis 121 degrees    Diagnosis       Sinus tachycardia with Premature atrial complexes with Abberant conduction  Possible Left atrial enlargement  ST & T wave abnormality, consider anterolateral ischemia  Abnormal ECG  When compared with ECG of 08-NOV-2022 10:43,  Abberant conduction is now Present  Vent.  rate has increased BY  42 BPM  Confirmed by Rose Shrestha MD, Mark Cousin ([id number]) on 11/29/2022 12:13:44 PM     TROPONIN-HIGH SENSITIVITY    Collection Time: 11/29/22 10:43 AM   Result Value Ref Range    Troponin-High Sensitivity 111 (H) 0 - 76 ng/L   METABOLIC PANEL, COMPREHENSIVE    Collection Time: 11/29/22 10:45 AM   Result Value Ref Range    Sodium 140 136 - 145 mmol/L    Potassium 3.8 3.5 - 5.1 mmol/L    Chloride 110 (H) 97 - 108 mmol/L    CO2 21 21 - 32 mmol/L    Anion gap 9 5 - 15 mmol/L    Glucose 146 (H) 65 - 100 mg/dL    BUN 15 6 - 20 mg/dL    Creatinine 0.55 (L) 0.70 - 1.30 mg/dL    BUN/Creatinine ratio 27 (H) 12 - 20      eGFR >60 >60 ml/min/1.73m2    Calcium 8.0 (L) 8.5 - 10.1 mg/dL    Bilirubin, total 1.9 (H) 0.2 - 1.0 mg/dL    AST (SGOT) 43 (H) 15 - 37 U/L    ALT (SGPT) 48 12 - 78 U/L    Alk. phosphatase 70 45 - 117 U/L    Protein, total 5.7 (L) 6.4 - 8.2 g/dL    Albumin 2.1 (L) 3.5 - 5.0 g/dL    Globulin 3.6 2.0 - 4.0 g/dL    A-G Ratio 0.6 (L) 1.1 - 2.2     LACTIC ACID    Collection Time: 11/29/22 10:45 AM   Result Value Ref Range    Lactic acid 1.6 0.4 - 2.0 mmol/L   NT-PRO BNP    Collection Time: 11/29/22 10:45 AM   Result Value Ref Range    NT pro-BNP 12,166 (H) <125 pg/mL   CBC WITH AUTOMATED DIFF    Collection Time: 11/29/22 11:49 AM   Result Value Ref Range    WBC 14.8 (H) 4.1 - 11.1 K/uL    RBC 3.40 (L) 4.10 - 5.70 M/uL    HGB 9.8 (L) 12.1 - 17.0 g/dL    HCT 32.0 (L) 36.6 - 50.3 %    MCV 94.1 80.0 - 99.0 FL    MCH 28.8 26.0 - 34.0 PG    MCHC 30.6 30.0 - 36.5 g/dL    RDW 17.4 (H) 11.5 - 14.5 %    PLATELET 355 565 - 503 K/uL    MPV 10.4 8.9 - 12.9 FL    NRBC 0.0 0.0  WBC    ABSOLUTE NRBC 0.00 0.00 - 0.01 K/uL    NEUTROPHILS 92 (H) 32 - 75 %    LYMPHOCYTES 5 (L) 12 - 49 %    MONOCYTES 3 (L) 5 - 13 %    EOSINOPHILS 0 0 - 7 %    BASOPHILS 0 0 - 1 %    IMMATURE GRANULOCYTES 0 0 - 0.5 %    ABS. NEUTROPHILS 13.4 (H) 1.8 - 8.0 K/UL    ABS. LYMPHOCYTES 0.8 0.8 - 3.5 K/UL    ABS. MONOCYTES 0.4 0.0 - 1.0 K/UL    ABS. EOSINOPHILS 0.0 0.0 - 0.4 K/UL    ABS. BASOPHILS 0.0 0.0 - 0.1 K/UL    ABS. IMM.  GRANS. 0.1 (H) 0.00 - 0.04 K/UL    DF AUTOMATED     BLOOD GAS, ARTERIAL    Collection Time: 11/29/22 12:05 PM   Result Value Ref Range    pH 7.51 (H) 7.35 - 7.45      PCO2 25 (L) 35 - 45 mmHg    PO2 151 (H) 80 - 100 mmHg    O2 SATURATION 99 95 - 99 %    BICARBONATE 20 (L) 22 - 26 mmol/L    BASE DEFICIT 1.9 mmol/L    O2 METHOD BiPAP      FIO2 80 %    Tidal volume 480.0 PRESSURE SUPPORT 25      PEEP/CPAP 7.0      Sample source Arterial      SITE Left Radial      BRITNEY'S TEST YES      Carboxy-Hgb 0.1 (L) 1 - 2 %    Methemoglobin 0.3 0 - 1.4 %    Oxyhemoglobin 98.2 95 - 99 %    Performed by Dottie Faye     TEMPERATURE 99.6     INFLUENZA A & B AG (RAPID TEST)    Collection Time: 11/29/22 12:59 PM   Result Value Ref Range    Influenza A Antigen Negative Negative      Influenza B Antigen Negative Negative     COVID-19 RAPID TEST    Collection Time: 11/29/22 12:59 PM   Result Value Ref Range    COVID-19 rapid test Not Detected Not Detected     GLUCOSE, POC    Collection Time: 11/29/22  8:15 PM   Result Value Ref Range    Glucose (POC) 149 (H) 65 - 100 mg/dL    Performed by Amadou Grijalva    URINALYSIS W/ RFLX MICROSCOPIC    Collection Time: 11/30/22 12:30 AM   Result Value Ref Range    Color Dark Yellow      Appearance Clear Clear      Specific gravity 1.010 1.003 - 1.030      pH (UA) 6.0      Protein Negative Negative mg/dL    Glucose Negative Negative mg/dL    Ketone Negative Negative mg/dL    Bilirubin Negative Negative      Blood Negative Negative      Urobilinogen 4.0 (H) 0.2 - 1.0 EU/dL    Nitrites Negative Negative      Leukocyte Esterase Negative Negative     MRSA SCREEN - PCR (NASAL)    Collection Time: 11/30/22 12:45 AM   Result Value Ref Range    MRSA by PCR, Nasal DETECTED (A) Not Detected     GLUCOSE, POC    Collection Time: 11/30/22  1:00 AM   Result Value Ref Range    Glucose (POC) 134 (H) 65 - 100 mg/dL    Performed by Reginaldo Welch    METABOLIC PANEL, BASIC    Collection Time: 11/30/22  3:10 AM   Result Value Ref Range    Sodium 144 136 - 145 mmol/L    Potassium 3.0 (L) 3.5 - 5.1 mmol/L    Chloride 113 (H) 97 - 108 mmol/L    CO2 22 21 - 32 mmol/L    Anion gap 9 5 - 15 mmol/L    Glucose 117 (H) 65 - 100 mg/dL    BUN 20 6 - 20 mg/dL    Creatinine 0.49 (L) 0.70 - 1.30 mg/dL    BUN/Creatinine ratio 41 (H) 12 - 20      eGFR >60 >60 ml/min/1.73m2    Calcium 7.7 (L) 8.5 - 10.1 mg/dL   CBC W/O DIFF    Collection Time: 11/30/22  3:10 AM   Result Value Ref Range    WBC 5.6 4.1 - 11.1 K/uL    RBC 2.77 (L) 4.10 - 5.70 M/uL    HGB 8.0 (L) 12.1 - 17.0 g/dL    HCT 25.6 (L) 36.6 - 50.3 %    MCV 92.4 80.0 - 99.0 FL    MCH 28.9 26.0 - 34.0 PG    MCHC 31.3 30.0 - 36.5 g/dL    RDW 17.1 (H) 11.5 - 14.5 %    PLATELET 835 315 - 919 K/uL    MPV 10.1 8.9 - 12.9 FL    NRBC 0.0 0.0  WBC    ABSOLUTE NRBC 0.00 0.00 - 0.01 K/uL   BLOOD GAS, ARTERIAL    Collection Time: 11/30/22  3:30 AM   Result Value Ref Range    pH 7.55 (H) 7.35 - 7.45      PCO2 28 (L) 35 - 45 mmHg    PO2 90 80 - 100 mmHg    O2 SATURATION 97 95 - 99 %    BICARBONATE 24 22 - 26 mmol/L    BASE EXCESS 1.7 0 - 3 mmol/L    O2 METHOD High Flow Nasal Cannula      O2 FLOW RATE 60.00 L/min    FIO2 80 %    Sample source Arterial      SITE Left Radial      BRITNEY'S TEST YES      Carboxy-Hgb 0.0 (L) 1 - 2 %    Methemoglobin 0.3 0 - 1.4 %    Oxyhemoglobin 96.4 95 - 99 %    Performed by Sherlyn Mcnair     Critical value read back       Called to  Arden Lundy RN on 11/30/2022 at 03:42    TEMPERATURE 98.0     GLUCOSE, POC    Collection Time: 11/30/22  5:31 AM   Result Value Ref Range    Glucose (POC) 105 (H) 65 - 100 mg/dL    Performed by Arden Lundy            Assessment/     Acute hypoxic respiratory failure    -Suspect aspiration    -Continue BiPAP to maintain O2 saturation above 92%    -Monitor closely in ICU overnight     Probable aspiration pneumonia    -Continue IV Zosyn/vancomycin empirically    -Follow-up blood and sputum cultures     Sepsis    -Secondary to bilateral pneumonia     Generalized debilitation    -Secondary to acute illness and multiple comorbidities    -Doubt if patient is able to ambulate     Coronary artery disease status post CABG    -Stable for now    Plan:    Plan as above  Continue monitoring in ICU, wean O2 requirement as able, pt is on 2L NC at baseline  Replete potassium with 5 rounds of 10 mEq IV potassium  CODE STATUS: Full code by default    Care Plan discussed with: Nurse    Total time spent with patient: 30 minutes.     Denise Rowland

## 2022-12-01 NOTE — HOSPICE
[facility]  [phone]     Patient Name: Edwin Mayorga  YOB: 1950  Age: 70 y.o. [address] [facility] RN Note:  Hospice consult received, chart reviewed and patient assessed. Mr. Omid Lundberg does meet criteria for GIP with goal to wean Hi Flow. Unable to reach son - text sent as requested with no response. Hopefully we can arrange for consents to be obtained in the morning. Thank you for the opportunity to be of service to Mr. Omid Lundberg and his family.      Feliberto Stevens RN  Clinical Nurse Liaison  [phone]

## 2022-12-01 NOTE — PROGRESS NOTES
Vancomycin Dosing Consult  Morgan Degroot is a 70 y.o. male with aspiration PNA/HAP. Pharmacy was consulted by Dr. Nilsa Rendon to dose and monitor vancomycin. Today is day 1 of restart. Patient was on vanc - but spiked temp after vanc was d/c'd. Antibiotic regimen: Vancomycin + pip/tazo    Temp (24hrs), Av.1 °F (38.4 °C), Min:98.9 °F (37.2 °C), Max:102.7 °F (39.3 °C)    Recent Labs     22  0336 22  0310 22  1149 22  1045   WBC 4.2 5.6 14.8*  --    CREA 0.66* 0.49*  --  0.55*   BUN 23* 20  --  15     Est CrCl: 82 mL/min  Concomitant nephrotoxic drugs: Loop diuretics    Cultures:    cdiff neg   blood - NGTD   sputum - 3+ coryneform GPR    MRSA Swab: Detected     Target range: AUC/TELMA 400-600    Recent level history:  Date/Time Dose & Interval Measured Level (mcg/mL) Associated AUC/TELMA    1245 1000mg IV q12h 9.8 576        Assessment/Plan:   Patient previously therapeutic on vancomycin 1000mg IV q12h. This dose was re-ordered. Level is scheduled for  1200.   Antimicrobial stop date TBD     Caitlin Menchaca, PharmD, BCPS, BCCCP  Clinical Pharmacist   (available on [employer])

## 2022-12-01 NOTE — PROGRESS NOTES
IMPRESSION:   Acute on chronic hypoxic hypercapnic respiratory failure  Congestive heart failure  Aspiration pneumonia  Sepsis  History of CVA  Fever  Additional workup outlined below  Pt is at high risk of sudden decline and decompensation with life threatening consequenses and continued end organ dysfunction and failure  Pt is critically ill. Time spent with pt and staff actively rendering care, managing pt and coordinating care as stated below; 30 minutes, exclusive of any procedures      RECOMMENDATIONS/PLAN:   ICU monitoring  51-year-old male recently discharged from the hospital came back with hypoxia now is on high flow nasal cannula life support to avoid worsening respiratory acidosis, hypercacarbic or hypoxic respiratory arrest   On high flow nasal cannula ABG this morning oxygen level was low on 50% FiO2 will increase FiO2  He is febrile temperature 102  Chest x-ray still showed bilateral pulmonary infiltrate versus congestion  BNP is 35[address]  Troponin is elevated  Agree with [facility] IV antibiotics pending culture results   Follow culture results on Zosyn and vancomycin  CVP monitoring we will give 1 dose of Lasix today  IV vasopressors for circulatory shock refractory to fluids to maintain SBP> 90  High flow nasal Cannula oxygen as salvage oxygen delivery device to provide high concentration of oxygen to overcome refractory hypoxia;    Patient requiring restraints due to threat of injury to self, interference with medical devices  Transfuse prn to maintain Hgb > 7  Labs to follow electrolytes, renal function and and blood counts  Bronchial hygiene with respiratory therapy techniques, bronchodilators  Pt needs IV fluids with additives and Drug therapy requiring intensive monitoring for toxicity  Prescription drug management with home med reconciliation reviewed  DVT, SUP prophylaxis  Will be available to assist in medical management while in the CCU pending disposition     [x] High complexity decision making was performed  [x] See my orders for details  HPI  77-year-old male recently discharged from the hospital came in because of shortness of breath and dyspnea he was discharged on oxygen but his condition got worse came back to the hospital febrile temperature 102 he was put on BiPAP machine and then now he is on high flow nasal cannula unable to get any started the patient he moans and groans his saturation was in the 70s were admitted his BNP is elevated chest x-ray still shows bilateral infiltrate. PMH:  has a past medical history of Cirrhosis ([facility] Diabetes ([facility] Hypertension, and Stroke ([facility] PSH:   has a past surgical history that includes hx back surgery; hx gi; and ir insert non tunl cvc over 5 yrs (11/9/2022). FHX: family history includes Heart Disease in his father. SHX:  reports that he has quit smoking. He has never used smokeless tobacco. He reports that he does not currently use alcohol. He reports that he does not currently use drugs.     ALL: No Known Allergies     MEDS:   [x] Reviewed - As Below   [] Not reviewed    Current Facility-Administered Medications   Medication    balsam peru-castor oiL (VENELEX) ointment    sodium chloride (NS) flush 5-10 mL    sodium chloride (NS) flush 5-40 mL    sodium chloride (NS) flush 5-40 mL    acetaminophen (TYLENOL) tablet 650 mg    Or    acetaminophen (TYLENOL) suppository 650 mg    polyethylene glycol (MIRALAX) packet 17 g    ondansetron (ZOFRAN ODT) tablet 4 mg    Or    ondansetron (ZOFRAN) injection 4 mg    enoxaparin (LOVENOX) injection 40 mg    piperacillin-tazobactam (ZOSYN) 3.375 g in 0.9% sodium chloride (MBP/ADV) 100 mL MBP    ARIPiprazole (ABILIFY) tablet 2 mg    atorvastatin (LIPITOR) tablet 40 mg    baclofen (LIORESAL) tablet 5 mg    gabapentin (NEURONTIN) capsule 100 mg    [Held by provider] losartan (COZAAR) tablet 100 mg    melatonin tablet 10 mg    traMADoL (ULTRAM) tablet 50 mg    hydrOXYzine HCL (ATARAX) tablet 25 mg clopidogreL (PLAVIX) tablet 75 mg    carvediloL (COREG) tablet 12.5 mg    escitalopram oxalate (LEXAPRO) tablet 20 mg      MAR reviewed and pertinent medications noted or modified as needed   Current Facility-Administered Medications   Medication    balsam peru-castor oiL (VENELEX) ointment    sodium chloride (NS) flush 5-10 mL    sodium chloride (NS) flush 5-40 mL    sodium chloride (NS) flush 5-40 mL    acetaminophen (TYLENOL) tablet 650 mg    Or    acetaminophen (TYLENOL) suppository 650 mg    polyethylene glycol (MIRALAX) packet 17 g    ondansetron (ZOFRAN ODT) tablet 4 mg    Or    ondansetron (ZOFRAN) injection 4 mg    enoxaparin (LOVENOX) injection 40 mg    piperacillin-tazobactam (ZOSYN) 3.375 g in 0.9% sodium chloride (MBP/ADV) 100 mL MBP    ARIPiprazole (ABILIFY) tablet 2 mg    atorvastatin (LIPITOR) tablet 40 mg    baclofen (LIORESAL) tablet 5 mg    gabapentin (NEURONTIN) capsule 100 mg    [Held by provider] losartan (COZAAR) tablet 100 mg    melatonin tablet 10 mg    traMADoL (ULTRAM) tablet 50 mg    hydrOXYzine HCL (ATARAX) tablet 25 mg    clopidogreL (PLAVIX) tablet 75 mg    carvediloL (COREG) tablet 12.5 mg    escitalopram oxalate (LEXAPRO) tablet 20 mg      PMH:  has a past medical history of Cirrhosis ([facility] Diabetes ([facility] Hypertension, and Stroke ([facility] PSH:   has a past surgical history that includes hx back surgery; hx gi; and ir insert non tunl cvc over 5 yrs (11/9/2022). FHX: family history includes Heart Disease in his father. SHX:  reports that he has quit smoking. He has never used smokeless tobacco. He reports that he does not currently use alcohol. He reports that he does not currently use drugs. ROS:Review of systems not obtained due to patient factors. Hemodynamics:    CO:    CI:    CVP:    SVR:   PAP Systolic:    PAP Diastolic:    PVR:    NK31:        Ventilator Settings:      Mode Rate TV Press PEEP FiO2 PIP Min.  Vent               60 %     15.5 l/min        Vital Signs: Telemetry:    normal sinus rhythm Intake/Output:   Visit Vitals  BP (!) 103/46   Pulse 80   Temp (!) 102.2 °F (39 °C)   Resp 30   Ht 5' 9\" (1.753 m)   Wt 60.2 kg (132 lb 11.5 oz)   SpO2 95%   BMI 19.60 kg/m²       Temp (24hrs), Av.8 °F (38.2 °C), Min:97.8 °F (36.6 °C), Max:102.7 °F (39.3 °C)        O2 Device: Heated, Hi flow nasal cannula O2 Flow Rate (L/min): 50 l/min       Wt Readings from Last 4 Encounters:   22 60.2 kg (132 lb 11.5 oz)   11/10/22 62.5 kg (137 lb 12.6 oz)   22 84.8 kg (187 lb)   22 84.8 kg (187 lb)          Intake/Output Summary (Last 24 hours) at 2022 0828  Last data filed at 2022 0604  Gross per 24 hour   Intake 1837 ml   Output 800 ml   Net 1037 ml         Last shift:      No intake/output data recorded. Last 3 shifts:  1901 -  0700  In: 2787 [I.V.:1600]  Out: 1350 [Urine:1350]       Physical Exam:     General: HFNC calm today  HEENT: NCAT, poor dentition, lips and mucosa dry  Eyes: anicteric; conjunctiva clear  Neck: no nodes,  trach midline; no accessory MM use. Chest: no deformity,   Cardiac: R regular; no murmur;   Lungs: distant breath sounds; no wheezes, bilateral rails  Abd: soft, NT, hypoactive BS  Ext: no edema; no joint swelling;  No clubbing  : NO kennedy, clear urine  Neuro:unable to examine  Psych- unable to assess  Skin: warm, dry, no cyanosis;   Pulses: 1-2+ Bilateral pedal, radial  Capillary: brisk; pale      DATA:    MAR reviewed and pertinent medications noted or modified as needed  MEDS:   Current Facility-Administered Medications   Medication    balsam peru-castor oiL (VENELEX) ointment    sodium chloride (NS) flush 5-10 mL    sodium chloride (NS) flush 5-40 mL    sodium chloride (NS) flush 5-40 mL    acetaminophen (TYLENOL) tablet 650 mg    Or    acetaminophen (TYLENOL) suppository 650 mg    polyethylene glycol (MIRALAX) packet 17 g    ondansetron (ZOFRAN ODT) tablet 4 mg    Or    ondansetron (ZOFRAN) injection 4 mg    enoxaparin (LOVENOX) injection 40 mg    piperacillin-tazobactam (ZOSYN) 3.375 g in 0.9% sodium chloride (MBP/ADV) 100 mL MBP    ARIPiprazole (ABILIFY) tablet 2 mg    atorvastatin (LIPITOR) tablet 40 mg    baclofen (LIORESAL) tablet 5 mg    gabapentin (NEURONTIN) capsule 100 mg    [Held by provider] losartan (COZAAR) tablet 100 mg    melatonin tablet 10 mg    traMADoL (ULTRAM) tablet 50 mg    hydrOXYzine HCL (ATARAX) tablet 25 mg    clopidogreL (PLAVIX) tablet 75 mg    carvediloL (COREG) tablet 12.5 mg    escitalopram oxalate (LEXAPRO) tablet 20 mg        Labs:    Recent Labs     12/01/22  0336 11/30/22 0310 11/29/22  1149   WBC 4.2 5.6 14.8*   HGB 7.9* 8.0* 9.8*    182 317       Recent Labs     12/01/22  0336 11/30/22 0310 11/29/22  1045   * 144 140   K 3.0* 3.0* 3.8   * 113* 110*   CO2 22 22 21   * 117* 146*   BUN 23* 20 15   CREA 0.66* 0.49* 0.55*   CA 8.0* 7.7* 8.0*   LAC  --   --  1.6   ALB  --   --  2.1*   ALT  --   --  48       Recent Labs     12/01/22  0352 11/30/22 0330 11/29/22  1205   PH 7.52* 7.55* 7.51*   PCO2 29* 28* 25*   PO2 57* 90 151*   HCO3 23 24 20*   FIO2 50 80 80       No results for input(s): CPK, CKNDX, TROIQ in the last 72 hours. No lab exists for component: CPKMB  No results found for: BNPP, BNP   Lab Results   Component Value Date/Time    Culture result: PENDING 11/30/2022 01:20 AM    Culture result: No growth after 13 hours 11/30/2022 01:00 AM    Culture result: No growth 1 day 11/29/2022 10:45 AM     Lab Results   Component Value Date/Time    TSH 1.67 07/30/2022 07:11 AM        Imaging:    Results from Hospital Encounter encounter on 11/29/22    XR CHEST PORT    Narrative  EXAM:  XR CHEST PORT    INDICATION: CHF    COMPARISON: 11/29/2022    TECHNIQUE: Portable chest AP view    FINDINGS: The patient is status post median sternotomy. The cardiac silhouette  is within normal limits. Diffuse bilateral patchy airspace disease is not  significantly changed.  The visualized bones and upper abdomen are unremarkable. Impression  Unchanged patchy bilateral airspace disease, compatible with pneumonia. Results from [place] encounter on 11/08/22    CT FOOT RT W CONT    Narrative  EXAM: CT FOOT RT W CONT    INDICATION: Right foot swelling and abscess. Evaluate for osteomyelitis. Hypertension, diabetes, and cirrhosis. COMPARISON: Right foot views on 11/10/2022    CONTRAST: 100 mL of Isovue-370. TECHNIQUE: Helical CT of the right foot during uneventful rapid bolus  intravenous contrast administration. Coronal and Sagittal reformats were  generated. Images reviewed in soft tissue and bone windows. CT dose reduction  was achieved through the use of a standardized protocol tailored for this  examination and automatic exposure control for dose modulation. FINDINGS: Bones: Mild osteopenia. No fracture or osteomyelitis. Joint fluid: Physiologic. Articulations: no evidence of septic arthritis. 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 Tendons: No full-thickness tendon tear. Muscles: Mild diffuse atrophy. Soft tissue mass: None. No fluid collection. Impression  1. No abscess or osteomyelitis. 2. No fracture.       11/30 on high flow nasal cannula hypokalemic replace potassium on vancomycin and Zosyn we will try to wean oxygen to regular nasal cannula  12/1 on high flow nasal cannula requiring more than 50% FiO2 potassium corrected BNP elevated ammonia level 35 C-reactive protein 1.6 procalcitonin 1.23 BNP 54890

## 2022-12-01 NOTE — PROGRESS NOTES
Hospitalist Progress Note         Lexy Swartz MD          Daily Progress Note: 12/1/2022      Subjective: The patient is seen for follow  up. 70 y.o. male history of DM, HTN, CVA, Cirrhosis, here for shortness of breath and hypoxia. Pt was sent from his nursing home after they found him to have O2 saturations in the 70's on room air, tachypneic, labored breathing. Pt was placed on BiPAP in the ER with improvement of oxygen saturations but he still has eyes closed, does not speak. He does move his right arm around and groan repeatedly during evaluation but does not seem intentional in his movements. Of note, the patient was last admitted in this hospital on 11/8, discharged 2 days ago on 11/27, for sepsis. At time of discharge the patient was awake and alert, though not fully oriented  ----  The patient is seen for follow  up. His mentation is back to baseline today. Unfortunately he is not very capable of providing history at baseline so it is difficult to assess how he is feeling. Slow to respond. Persistent fevers overnight. Attempts to reach son this am failed. VM left.     Problem List:  Problem List as of 12/1/2022 Never Reviewed            Codes Class Noted - Resolved    Aspiration pneumonia ([facility] ICD-10-CM: J69.0  ICD-9-CM: 507.0  11/27/2022 - Present        Septic shock ([facility] ICD-10-CM: A41.9, R65.21  ICD-9-CM: 038.9, 785.52, 995.92  11/20/2022 - Present        Infection of right great toe due to methicillin resistant Staphylococcus aureus (MRSA) ICD-10-CM: L08.9, B95.62  ICD-9-CM: 686.8, 041.12  11/20/2022 - Present        Metabolic encephalopathy XFE-81-OU: G93.41  ICD-9-CM: 348.31  11/20/2022 - Present        Severe protein-calorie malnutrition ([facility] ICD-10-CM: E43  ICD-9-CM: 262  11/20/2022 - Present        Dysphagia ICD-10-CM: R13.10  ICD-9-CM: 787.20  11/20/2022 - Present        Acute respiratory failure with hypoxia (HCC) ICD-10-CM: J96.01  ICD-9-CM: 518.81 11/20/2022 - Present        PAD (peripheral artery disease) ([facility]) ICD-10-CM: I73.9  ICD-9-CM: 443.9  11/20/2022 - Present        UTI (urinary tract infection) ICD-10-CM: N39.0  ICD-9-CM: 599.0  11/8/2022 - Present        Carotid stenosis ICD-10-CM: I65.29  ICD-9-CM: 433.10  8/2/2022 - Present        Cystitis ICD-10-CM: N30.90  ICD-9-CM: 595.9  8/2/2022 - Present        CVA (cerebral vascular accident) [facility]) ICD-10-CM: I63.9  ICD-9-CM: 434.91  7/31/2022 - Present        TIA (transient ischemic attack) ICD-10-CM: G45.9  ICD-9-CM: 435.9  7/29/2022 - Present        Chest pain ICD-10-CM: R07.9  ICD-9-CM: 786.50  10/28/2020 - Present        CAD (coronary artery disease) ICD-10-CM: I25.10  ICD-9-CM: 414.00  10/28/2020 - Present        NSTEMI (non-ST elevated myocardial infarction) [facility]) ICD-10-CM: I21.4  ICD-9-CM: 410.70  10/28/2020 - Present         Medications reviewed  Current Facility-Administered Medications   Medication Dose Route Frequency    furosemide (LASIX) injection 40 mg  40 mg IntraVENous DAILY    potassium chloride (KLOR-CON) packet for solution 20 mEq  20 mEq Per G Tube DAILY    balsam peru-castor oiL (VENELEX) ointment   Topical BID    sodium chloride (NS) flush 5-10 mL  5-10 mL IntraVENous PRN    sodium chloride (NS) flush 5-40 mL  5-40 mL IntraVENous Q8H    sodium chloride (NS) flush 5-40 mL  5-40 mL IntraVENous PRN    acetaminophen (TYLENOL) tablet 650 mg  650 mg Oral Q6H PRN    Or    acetaminophen (TYLENOL) suppository 650 mg  650 mg Rectal Q6H PRN    polyethylene glycol (MIRALAX) packet 17 g  17 g Oral DAILY PRN    ondansetron (ZOFRAN ODT) tablet 4 mg  4 mg Oral Q8H PRN    Or    ondansetron (ZOFRAN) injection 4 mg  4 mg IntraVENous Q6H PRN    enoxaparin (LOVENOX) injection 40 mg  40 mg SubCUTAneous DAILY    piperacillin-tazobactam (ZOSYN) 3.375 g in 0.9% sodium chloride (MBP/ADV) 100 mL MBP  3.375 g IntraVENous Q8H    ARIPiprazole (ABILIFY) tablet 2 mg  2 mg Per G Tube DAILY    atorvastatin (LIPITOR) tablet 40 mg  40 mg Per G Tube DAILY    baclofen (LIORESAL) tablet 5 mg  5 mg Per G Tube BID    gabapentin (NEURONTIN) capsule 100 mg  100 mg Per G Tube QHS    [Held by provider] losartan (COZAAR) tablet 100 mg  100 mg Per G Tube DAILY    melatonin tablet 10 mg  10 mg Per G Tube QHS    traMADoL (ULTRAM) tablet 50 mg  50 mg Per G Tube Q6H PRN    hydrOXYzine HCL (ATARAX) tablet 25 mg  25 mg Per G Tube Q6H PRN    clopidogreL (PLAVIX) tablet 75 mg  75 mg PEG Tube DAILY    carvediloL (COREG) tablet 12.5 mg  12.5 mg Per G Tube BID    escitalopram oxalate (LEXAPRO) tablet 20 mg  20 mg Per G Tube DAILY       Review of Systems:   A comprehensive review of systems was negative. Objective:   Physical Exam:     Visit Vitals  BP (!) 93/52 (BP 1 Location: Left upper arm, BP Patient Position: At rest)   Pulse 84   Temp (!) 101.8 °F (38.8 °C)   Resp (!) 32   Ht 5' 9\" (1.753 m)   Wt 60.2 kg (132 lb 11.5 oz)   SpO2 97%   BMI 19.60 kg/m²    O2 Flow Rate (L/min): 50 l/min O2 Device: Hi flow nasal cannula, Heated    Temp (24hrs), Av.9 °F (38.3 °C), Min:97.8 °F (36.6 °C), Max:102.7 °F (39.3 °C)    No intake/output data recorded.  1901 -  0700  In: 2787 [I.V.:1600]  Out: 1350 [Urine:1350]    General:  Arousable but slow to respond                  Respiratory: diminished bilaterally    Chest wall:  No tenderness or deformity. Heart:  Regular rate and rhythm, S1, S2 normal, no murmur, click, rub or gallop. Abdomen:   Soft, non-tender. Bowel sounds normal. No masses,  No organomegaly. Extremities: Extremities normal, atraumatic, no cyanosis or edema. Pulses: 2+ and symmetric all extremities. Skin: Skin color, texture, turgor normal. No rashes or lesions   Neurologic: CNII-XII intact.  No gross sensory or motor deficits     Data Review:       Recent Days:  Recent Labs     22  0336 22  0310 22  1149   WBC 4.2 5.6 14.8*   HGB 7.9* 8.0* 9.8*   HCT 24.9* 25.6* 32.0*    182 317       Recent Labs     12/01/22  0336 11/30/22  0310 11/29/22  1045   * 144 140   K 3.0* 3.0* 3.8   * 113* 110*   CO2 22 22 21   * 117* 146*   BUN 23* 20 15   CREA 0.66* 0.49* 0.55*   CA 8.0* 7.7* 8.0*   ALB  --   --  2.1*   TBILI  --   --  1.9*   ALT  --   --  48       Recent Labs     12/01/22  0352 11/30/22  0330 11/29/22  1205   PH 7.52* 7.55* 7.51*   PCO2 29* 28* 25*   PO2 57* 90 151*   HCO3 23 24 20*   FIO2 50 80 80         24 Hour Results:  Recent Results (from the past 24 hour(s))   VANCOMYCIN, TROUGH    Collection Time: 11/30/22 12:45 PM   Result Value Ref Range    Vancomycin,trough 9.8 5.0 - 10.0 ug/mL   GLUCOSE, POC    Collection Time: 11/30/22  1:13 PM   Result Value Ref Range    Glucose (POC) 103 (H) 65 - 100 mg/dL    Performed by Lizy Howe    GLUCOSE, POC    Collection Time: 11/30/22  5:43 PM   Result Value Ref Range    Glucose (POC) 101 (H) 65 - 100 mg/dL    Performed by Lizy Howe    GLUCOSE, POC    Collection Time: 11/30/22  7:37 PM   Result Value Ref Range    Glucose (POC) 114 (H) 65 - 100 mg/dL    Performed by Gretta Nguyen    GLUCOSE, POC    Collection Time: 11/30/22 11:44 PM   Result Value Ref Range    Glucose (POC) 92 65 - 100 mg/dL    Performed by Jeni Jean    CBC WITH AUTOMATED DIFF    Collection Time: 12/01/22  3:36 AM   Result Value Ref Range    WBC 4.2 4.1 - 11.1 K/uL    RBC 2.71 (L) 4.10 - 5.70 M/uL    HGB 7.9 (L) 12.1 - 17.0 g/dL    HCT 24.9 (L) 36.6 - 50.3 %    MCV 91.9 80.0 - 99.0 FL    MCH 29.2 26.0 - 34.0 PG    MCHC 31.7 30.0 - 36.5 g/dL    RDW 17.2 (H) 11.5 - 14.5 %    PLATELET 340 872 - 870 K/uL    MPV 10.4 8.9 - 12.9 FL    NRBC 0.0 0.0  WBC    ABSOLUTE NRBC 0.00 0.00 - 0.01 K/uL    NEUTROPHILS 92 (H) 32 - 75 %    LYMPHOCYTES 3 (L) 12 - 49 %    MONOCYTES 4 (L) 5 - 13 %    EOSINOPHILS 0 0 - 7 %    BASOPHILS 0 0 - 1 %    IMMATURE GRANULOCYTES 1 (H) 0 - 0.5 %    ABS. NEUTROPHILS 4.0 1.8 - 8.0 K/UL    ABS. LYMPHOCYTES 0.1 (L) 0.8 - 3.5 K/UL    ABS.  MONOCYTES 0.2 0.0 - 1.0 K/UL    ABS. EOSINOPHILS 0.0 0.0 - 0.4 K/UL    ABS. BASOPHILS 0.0 0.0 - 0.1 K/UL    ABS. IMM.  GRANS. 0.0 0.00 - 0.04 K/UL    DF AUTOMATED     METABOLIC PANEL, BASIC    Collection Time: 12/01/22  3:36 AM   Result Value Ref Range    Sodium 147 (H) 136 - 145 mmol/L    Potassium 3.0 (L) 3.5 - 5.1 mmol/L    Chloride 118 (H) 97 - 108 mmol/L    CO2 22 21 - 32 mmol/L    Anion gap 7 5 - 15 mmol/L    Glucose 160 (H) 65 - 100 mg/dL    BUN 23 (H) 6 - 20 mg/dL    Creatinine 0.66 (L) 0.70 - 1.30 mg/dL    BUN/Creatinine ratio 35 (H) 12 - 20      eGFR >60 >60 ml/min/1.73m2    Calcium 8.0 (L) 8.5 - 10.1 mg/dL   BLOOD GAS, ARTERIAL    Collection Time: 12/01/22  3:52 AM   Result Value Ref Range    pH 7.52 (H) 7.35 - 7.45      PCO2 29 (L) 35 - 45 mmHg    PO2 57 (L) 80 - 100 mmHg    O2 SATURATION 89 (L) 95 - 99 %    BICARBONATE 23 22 - 26 mmol/L    BASE EXCESS 0.4 0 - 3 mmol/L    O2 METHOD High Flow Nasal Cannula      O2 FLOW RATE 45.00 L/min    FIO2 50 %    Sample source Arterial      SITE Right Radial      BRITNEY'S TEST YES      Carboxy-Hgb 0.4 (L) 1 - 2 %    Methemoglobin 0.6 0 - 1.4 %    Oxyhemoglobin 88.1 (L) 95 - 99 %    Performed by Angi Louie     TEMPERATURE 101.0     GLUCOSE, POC    Collection Time: 12/01/22  5:35 AM   Result Value Ref Range    Glucose (POC) 124 (H) 65 - 100 mg/dL    Performed by Claudio Cotter            Assessment/        Acute hypoxic respiratory failure    -Suspect aspiration    -Continue high flow to maintain O2 saturation above 92%    -Monitor closely in ICU overnight     Probable aspiration pneumonia    -Continue IV Zosyn empirically    -Follow-up blood and sputum cultures     Sepsis    -Secondary to bilateral pneumonia     Generalized debilitation    -Secondary to acute illness and multiple comorbidities    -Doubt if patient is able to ambulate     Coronary artery disease status post CABG    -Stable for now    MRSA carrier     -Add bactroban BID     -Restart IV vancomycin      Plan:  Continue supportive care  Wean off high flow oxygen  Replete potassium with 40meq of KCL now  Attempted to reach son for goals of care discussion to no avail  Appropriate for hospice  135pm: spoke with son on overall clinicals and need for palliative care. He opted for hospice consultation. CODE Status is DNR/DNI  Care Plan discussed with: Nurse    Total time spent with patient: 30 minutes.     Rosa M Rothman MD

## 2022-12-01 NOTE — PROGRESS NOTES
CM reviewed Pt medicals, he comes from [facility]. Pt son called back and stated that he would like for Pt to return to [facility] when he is D/C. Pt son stated that Pt has lived at [facility] for about two years. 2:12 PM    Received an order for Hospice, called Pt son Burt Horner, he asked that CM send the referral to [address] [place] [address]. Verbal choice letter signed for [address] [place] [address], will send referral, will place on Pt chart. CM following for D/C needs.

## 2022-12-01 NOTE — PROGRESS NOTES
Hospitalist Progress Note         George Peterson          Daily Progress Note: 12/1/2022      Subjective: The patient is seen for follow  up. 70 y.o. male history of DM, HTN, CVA, Cirrhosis, here for shortness of breath and hypoxia. Pt was sent from his nursing home after they found him to have O2 saturations in the 70's on room air, tachypneic, labored breathing. Pt was placed on BiPAP in the ER with improvement of oxygen saturations but he still has eyes closed, does not speak. He does move his right arm around and groan repeatedly during evaluation but does not seem intentional in his movements. Of note, the patient was last admitted in this hospital on 11/8, discharged 2 days ago on 11/27, for sepsis. At time of discharge the patient was awake and alert, though not fully oriented  ----  The patient is seen for follow  up. Mentation continues at baseline. Unfortunately he is not very capable of providing history at baseline so it is difficult to assess how he is feeling. O2 requirement slowly coming down. Per RN, pt had fevers to 102 overnight.     Problem List:  Problem List as of 12/1/2022 Never Reviewed            Codes Class Noted - Resolved    Aspiration pneumonia ([facility] ICD-10-CM: J69.0  ICD-9-CM: 507.0  11/27/2022 - Present        Septic shock ([facility].) ICD-10-CM: A41.9, R65.21  ICD-9-CM: 038.9, 785.52, 995.92  11/20/2022 - Present        Infection of right great toe due to methicillin resistant Staphylococcus aureus (MRSA) ICD-10-CM: L08.9, B95.62  ICD-9-CM: 686.8, 041.12  11/20/2022 - Present        Metabolic encephalopathy [facility]38-JL: G93.41  ICD-9-CM: 348.31  11/20/2022 - Present        Severe protein-calorie malnutrition ([facility].) ICD-10-CM: E43  ICD-9-CM: 262  11/20/2022 - Present        Dysphagia ICD-10-CM: R13.10  ICD-9-CM: 787.20  11/20/2022 - Present        Acute respiratory failure with hypoxia (HCC) ICD-10-CM: J96.01  ICD-9-CM: 518.81  11/20/2022 - Present        PAD (peripheral artery disease) ([facility] ICD-10-CM: I73.9  ICD-9-CM: 443.9  11/20/2022 - Present        UTI (urinary tract infection) ICD-10-CM: N39.0  ICD-9-CM: 599.0  11/8/2022 - Present        Carotid stenosis ICD-10-CM: I65.29  ICD-9-CM: 433.10  8/2/2022 - Present        Cystitis ICD-10-CM: N30.90  ICD-9-CM: 595.9  8/2/2022 - Present        CVA (cerebral vascular accident) [facility]) ICD-10-CM: I63.9  ICD-9-CM: 434.91  7/31/2022 - Present        TIA (transient ischemic attack) ICD-10-CM: G45.9  ICD-9-CM: 435.9  7/29/2022 - Present        Chest pain ICD-10-CM: R07.9  ICD-9-CM: 786.50  10/28/2020 - Present        CAD (coronary artery disease) ICD-10-CM: I25.10  ICD-9-CM: 414.00  10/28/2020 - Present        NSTEMI (non-ST elevated myocardial infarction) [facility]) ICD-10-CM: I21.4  ICD-9-CM: 410.70  10/28/2020 - Present         Medications reviewed  Current Facility-Administered Medications   Medication Dose Route Frequency    furosemide (LASIX) injection 40 mg  40 mg IntraVENous DAILY    potassium chloride (KLOR-CON) packet for solution 20 mEq  20 mEq Per G Tube DAILY    mupirocin (BACTROBAN) 2 % ointment   Both Nostrils BID    balsam peru-castor oiL (VENELEX) ointment   Topical BID    sodium chloride (NS) flush 5-10 mL  5-10 mL IntraVENous PRN    sodium chloride (NS) flush 5-40 mL  5-40 mL IntraVENous Q8H    sodium chloride (NS) flush 5-40 mL  5-40 mL IntraVENous PRN    acetaminophen (TYLENOL) tablet 650 mg  650 mg Oral Q6H PRN    Or    acetaminophen (TYLENOL) suppository 650 mg  650 mg Rectal Q6H PRN    polyethylene glycol (MIRALAX) packet 17 g  17 g Oral DAILY PRN    ondansetron (ZOFRAN ODT) tablet 4 mg  4 mg Oral Q8H PRN    Or    ondansetron (ZOFRAN) injection 4 mg  4 mg IntraVENous Q6H PRN    enoxaparin (LOVENOX) injection 40 mg  40 mg SubCUTAneous DAILY    piperacillin-tazobactam (ZOSYN) 3.375 g in 0.9% sodium chloride (MBP/ADV) 100 mL MBP  3.375 g IntraVENous Q8H    ARIPiprazole (ABILIFY) tablet 2 mg  2 mg Per G Tube DAILY atorvastatin (LIPITOR) tablet 40 mg  40 mg Per G Tube DAILY    baclofen (LIORESAL) tablet 5 mg  5 mg Per G Tube BID    gabapentin (NEURONTIN) capsule 100 mg  100 mg Per G Tube QHS    [Held by provider] losartan (COZAAR) tablet 100 mg  100 mg Per G Tube DAILY    melatonin tablet 10 mg  10 mg Per G Tube QHS    traMADoL (ULTRAM) tablet 50 mg  50 mg Per G Tube Q6H PRN    hydrOXYzine HCL (ATARAX) tablet 25 mg  25 mg Per G Tube Q6H PRN    clopidogreL (PLAVIX) tablet 75 mg  75 mg PEG Tube DAILY    carvediloL (COREG) tablet 12.5 mg  12.5 mg Per G Tube BID    escitalopram oxalate (LEXAPRO) tablet 20 mg  20 mg Per G Tube DAILY       Review of Systems:   A comprehensive review of systems was negative. Objective:   Physical Exam:     Visit Vitals  BP (!) 93/52 (BP 1 Location: Left upper arm, BP Patient Position: At rest)   Pulse 84   Temp (!) 101.8 °F (38.8 °C)   Resp (!) 32   Ht 5' 9\" (1.753 m)   Wt 60.2 kg (132 lb 11.5 oz)   SpO2 97%   BMI 19.60 kg/m²    O2 Flow Rate (L/min): 50 l/min O2 Device: Hi flow nasal cannula, Heated    Temp (24hrs), Av.9 °F (38.3 °C), Min:97.8 °F (36.6 °C), Max:102.7 °F (39.3 °C)    No intake/output data recorded.  1901 -  0700  In: 2787 [I.V.:1600]  Out: 1350 [Urine:1350]    General:  Alert, cooperative, no distress, appears stated age. Lungs:   Clear to auscultation bilaterally. Chest wall:  No tenderness or deformity. Heart:  Regular rate and rhythm, S1, S2 normal, no murmur, click, rub or gallop. Abdomen:   Soft, non-tender. Bowel sounds normal. No masses,  No organomegaly. Extremities: Extremities normal, atraumatic, no cyanosis or edema. Pulses: 2+ and symmetric all extremities. Skin: Skin color, texture, turgor normal. No rashes or lesions   Neurologic: CNII-XII intact.  No gross sensory or motor deficits     Data Review:       Recent Days:  Recent Labs     22  0336 22  0310 22  1149   WBC 4.2 5.6 14.8*   HGB 7.9* 8.0* 9.8*   HCT 24.9* 25.6* 32.0*    182 317       Recent Labs     12/01/22  0336 11/30/22  0310 11/29/22  1045   * 144 140   K 3.0* 3.0* 3.8   * 113* 110*   CO2 22 22 21   * 117* 146*   BUN 23* 20 15   CREA 0.66* 0.49* 0.55*   CA 8.0* 7.7* 8.0*   ALB  --   --  2.1*   TBILI  --   --  1.9*   ALT  --   --  48       Recent Labs     12/01/22  0352 11/30/22  0330 11/29/22  1205   PH 7.52* 7.55* 7.51*   PCO2 29* 28* 25*   PO2 57* 90 151*   HCO3 23 24 20*   FIO2 50 80 80         24 Hour Results:  Recent Results (from the past 24 hour(s))   VANCOMYCIN, TROUGH    Collection Time: 11/30/22 12:45 PM   Result Value Ref Range    Vancomycin,trough 9.8 5.0 - 10.0 ug/mL   GLUCOSE, POC    Collection Time: 11/30/22  1:13 PM   Result Value Ref Range    Glucose (POC) 103 (H) 65 - 100 mg/dL    Performed by Marina Cisneros    GLUCOSE, POC    Collection Time: 11/30/22  5:43 PM   Result Value Ref Range    Glucose (POC) 101 (H) 65 - 100 mg/dL    Performed by Marina Cisneros    GLUCOSE, POC    Collection Time: 11/30/22  7:37 PM   Result Value Ref Range    Glucose (POC) 114 (H) 65 - 100 mg/dL    Performed by Lakshmi Barger    GLUCOSE, POC    Collection Time: 11/30/22 11:44 PM   Result Value Ref Range    Glucose (POC) 92 65 - 100 mg/dL    Performed by Adeola Archibald    CBC WITH AUTOMATED DIFF    Collection Time: 12/01/22  3:36 AM   Result Value Ref Range    WBC 4.2 4.1 - 11.1 K/uL    RBC 2.71 (L) 4.10 - 5.70 M/uL    HGB 7.9 (L) 12.1 - 17.0 g/dL    HCT 24.9 (L) 36.6 - 50.3 %    MCV 91.9 80.0 - 99.0 FL    MCH 29.2 26.0 - 34.0 PG    MCHC 31.7 30.0 - 36.5 g/dL    RDW 17.2 (H) 11.5 - 14.5 %    PLATELET 380 262 - 420 K/uL    MPV 10.4 8.9 - 12.9 FL    NRBC 0.0 0.0  WBC    ABSOLUTE NRBC 0.00 0.00 - 0.01 K/uL    NEUTROPHILS 92 (H) 32 - 75 %    LYMPHOCYTES 3 (L) 12 - 49 %    MONOCYTES 4 (L) 5 - 13 %    EOSINOPHILS 0 0 - 7 %    BASOPHILS 0 0 - 1 %    IMMATURE GRANULOCYTES 1 (H) 0 - 0.5 %    ABS. NEUTROPHILS 4.0 1.8 - 8.0 K/UL    ABS.  LYMPHOCYTES 0.1 (L) 0.8 - 3.5 K/UL    ABS. MONOCYTES 0.2 0.0 - 1.0 K/UL    ABS. EOSINOPHILS 0.0 0.0 - 0.4 K/UL    ABS. BASOPHILS 0.0 0.0 - 0.1 K/UL    ABS. IMM.  GRANS. 0.0 0.00 - 0.04 K/UL    DF AUTOMATED     METABOLIC PANEL, BASIC    Collection Time: 12/01/22  3:36 AM   Result Value Ref Range    Sodium 147 (H) 136 - 145 mmol/L    Potassium 3.0 (L) 3.5 - 5.1 mmol/L    Chloride 118 (H) 97 - 108 mmol/L    CO2 22 21 - 32 mmol/L    Anion gap 7 5 - 15 mmol/L    Glucose 160 (H) 65 - 100 mg/dL    BUN 23 (H) 6 - 20 mg/dL    Creatinine 0.66 (L) 0.70 - 1.30 mg/dL    BUN/Creatinine ratio 35 (H) 12 - 20      eGFR >60 >60 ml/min/1.73m2    Calcium 8.0 (L) 8.5 - 10.1 mg/dL   BLOOD GAS, ARTERIAL    Collection Time: 12/01/22  3:52 AM   Result Value Ref Range    pH 7.52 (H) 7.35 - 7.45      PCO2 29 (L) 35 - 45 mmHg    PO2 57 (L) 80 - 100 mmHg    O2 SATURATION 89 (L) 95 - 99 %    BICARBONATE 23 22 - 26 mmol/L    BASE EXCESS 0.4 0 - 3 mmol/L    O2 METHOD High Flow Nasal Cannula      O2 FLOW RATE 45.00 L/min    FIO2 50 %    Sample source Arterial      SITE Right Radial      BRITNEY'S TEST YES      Carboxy-Hgb 0.4 (L) 1 - 2 %    Methemoglobin 0.6 0 - 1.4 %    Oxyhemoglobin 88.1 (L) 95 - 99 %    Performed by Kristyn Martini     TEMPERATURE 101.0     GLUCOSE, POC    Collection Time: 12/01/22  5:35 AM   Result Value Ref Range    Glucose (POC) 124 (H) 65 - 100 mg/dL    Performed by Jignesh Huang            Assessment/        Acute hypoxic respiratory failure    -Suspect aspiration    -Continue high flow to maintain O2 saturation above 92%    -Monitor closely in ICU overnight     Probable aspiration pneumonia    -Continue IV Zosyn empirically    -Follow-up blood and sputum cultures     Sepsis    -Secondary to bilateral pneumonia     Generalized debilitation    -Secondary to acute illness and multiple comorbidities    -Doubt if patient is able to ambulate     Coronary artery disease status post CABG    -Stable for now    MRSA    -Start Bactroban and IV Vancomycin      Plan:  Continue supportive care  Wean off high flow oxygen  Replete potassium with 40meq of KCL now      Care Plan discussed with: Nurse    Total time spent with patient: 30 minutes.     Brien Murrell

## 2022-12-02 NOTE — HOSPICE
[facility] Those in Need  [phone]     Patient Name: Cathi Pennington  YOB: 1950  Age: 70 y.o. [facility] RN Note:      Was able to reach son this afternoon and discuss inpatient hospice. He is now running the decision past patient's brother and step daughter. He is unable to come to hospital and is willing to sign consents electronically. Hopefully we will be able to transition him to [facility] before end of day. 2:45 PM Contacted by son who states he had an uncle who has . He will not be able to consent for transition to [facility] until Tues. He states he is afraid his Dad will die quickly and he would not be able to handle both deaths at same time. CM updated.     Lea Sales RN  Clinical Nurse Liaison  [phone]

## 2022-12-02 NOTE — PROGRESS NOTES
Hospitalist Progress Note         Jailene Castro MD          Daily Progress Note: 12/2/2022      Subjective: The patient is seen for follow  up. 70 y.o. male history of DM, HTN, CVA, Cirrhosis, here for shortness of breath and hypoxia. Pt was sent from his nursing home after they found him to have O2 saturations in the 70's on room air, tachypneic, labored breathing. Pt was placed on BiPAP in the ER with improvement of oxygen saturations but he still has eyes closed, does not speak. He does move his right arm around and groan repeatedly during evaluation but does not seem intentional in his movements. Of note, the patient was last admitted in this hospital on 11/8, discharged 2 days ago on 11/27, for sepsis. At time of discharge the patient was awake and alert, though not fully oriented  ----  The patient is seen for follow  up. His mentation is back to baseline today. Unfortunately he is not very capable of providing history at baseline so it is difficult to assess how he is feeling. Slow to respond.   Son agreed to hospice care  Problem List:  Problem List as of 12/2/2022 Never Reviewed            Codes Class Noted - Resolved    Aspiration pneumonia ([facility].) ICD-10-CM: J69.0  ICD-9-CM: 507.0  11/27/2022 - Present        Septic shock ([facility].) ICD-10-CM: A41.9, R65.21  ICD-9-CM: 038.9, 785.52, 995.92  11/20/2022 - Present        Infection of right great toe due to methicillin resistant Staphylococcus aureus (MRSA) ICD-10-CM: L08.9, B95.62  ICD-9-CM: 686.8, 041.12  11/20/2022 - Present        Metabolic encephalopathy SLT-95-TG: G93.41  ICD-9-CM: 348.31  11/20/2022 - Present        Severe protein-calorie malnutrition ([facility].) ICD-10-CM: E43  ICD-9-CM: 154  11/20/2022 - Present        Dysphagia ICD-10-CM: R13.10  ICD-9-CM: 787.20  11/20/2022 - Present        Acute respiratory failure with hypoxia ([facility].) ICD-10-CM: J96.01  ICD-9-CM: 518.81  11/20/2022 - Present        PAD (peripheral artery disease) ([facility].) ICD-10-CM: I73.9  ICD-9-CM: 443.9  11/20/2022 - Present        UTI (urinary tract infection) ICD-10-CM: N39.0  ICD-9-CM: 599.0  11/8/2022 - Present        Carotid stenosis ICD-10-CM: I65.29  ICD-9-CM: 433.10  8/2/2022 - Present        Cystitis ICD-10-CM: N30.90  ICD-9-CM: 595.9  8/2/2022 - Present        CVA (cerebral vascular accident) [facility]) ICD-10-CM: I63.9  ICD-9-CM: 434.91  7/31/2022 - Present        TIA (transient ischemic attack) ICD-10-CM: G45.9  ICD-9-CM: 435.9  7/29/2022 - Present        Chest pain ICD-10-CM: R07.9  ICD-9-CM: 786.50  10/28/2020 - Present        CAD (coronary artery disease) ICD-10-CM: I25.10  ICD-9-CM: 414.00  10/28/2020 - Present        NSTEMI (non-ST elevated myocardial infarction) [facility]) ICD-10-CM: I21.4  ICD-9-CM: 410.70  10/28/2020 - Present       Medications reviewed  Current Facility-Administered Medications   Medication Dose Route Frequency    furosemide (LASIX) injection 40 mg  40 mg IntraVENous DAILY    potassium chloride (KLOR-CON) packet for solution 20 mEq  20 mEq Per G Tube DAILY    mupirocin (BACTROBAN) 2 % ointment   Both Nostrils BID    vancomycin (VANCOCIN) 1,000 mg in 0.9% sodium chloride 250 mL ([id number])  1,000 mg IntraVENous Q12H    VANCOMYCIN INFORMATION NOTE   Other Rx Dosing/Monitoring    midodrine (PROAMATINE) tablet 10 mg  10 mg Oral TID WITH MEALS    balsam peru-castor oiL (VENELEX) ointment   Topical BID    sodium chloride (NS) flush 5-10 mL  5-10 mL IntraVENous PRN    sodium chloride (NS) flush 5-40 mL  5-40 mL IntraVENous Q8H    sodium chloride (NS) flush 5-40 mL  5-40 mL IntraVENous PRN    acetaminophen (TYLENOL) tablet 650 mg  650 mg Oral Q6H PRN    Or    acetaminophen (TYLENOL) suppository 650 mg  650 mg Rectal Q6H PRN    polyethylene glycol (MIRALAX) packet 17 g  17 g Oral DAILY PRN    ondansetron (ZOFRAN ODT) tablet 4 mg  4 mg Oral Q8H PRN    Or    ondansetron (ZOFRAN) injection 4 mg  4 mg IntraVENous Q6H PRN    enoxaparin (LOVENOX) injection 40 mg  40 mg SubCUTAneous DAILY    piperacillin-tazobactam (ZOSYN) 3.375 g in 0.9% sodium chloride (MBP/ADV) 100 mL MBP  3.375 g IntraVENous Q8H    ARIPiprazole (ABILIFY) tablet 2 mg  2 mg Per G Tube DAILY    atorvastatin (LIPITOR) tablet 40 mg  40 mg Per G Tube DAILY    baclofen (LIORESAL) tablet 5 mg  5 mg Per G Tube BID    gabapentin (NEURONTIN) capsule 100 mg  100 mg Per G Tube QHS    [Held by provider] losartan (COZAAR) tablet 100 mg  100 mg Per G Tube DAILY    melatonin tablet 10 mg  10 mg Per G Tube QHS    traMADoL (ULTRAM) tablet 50 mg  50 mg Per G Tube Q6H PRN    hydrOXYzine HCL (ATARAX) tablet 25 mg  25 mg Per G Tube Q6H PRN    clopidogreL (PLAVIX) tablet 75 mg  75 mg PEG Tube DAILY    carvediloL (COREG) tablet 12.5 mg  12.5 mg Per G Tube BID    escitalopram oxalate (LEXAPRO) tablet 20 mg  20 mg Per G Tube DAILY       Review of Systems:   A comprehensive review of systems was negative. Objective:   Physical Exam:     Visit Vitals  BP (!) 115/56 (BP 1 Location: Left upper arm, BP Patient Position: At rest)   Pulse 83   Temp 99.6 °F (37.6 °C)   Resp (!) 32   Ht 5' 9\" (1.753 m)   Wt 60 kg (132 lb 4.4 oz)   SpO2 92%   BMI 19.53 kg/m²    O2 Flow Rate (L/min): 50 l/min O2 Device: Heated, Hi flow nasal cannula, Humidifier    Temp (24hrs), Av.7 °F (37.1 °C), Min:97.3 °F (36.3 °C), Max:100.2 °F (37.9 °C)    No intake/output data recorded.  1901 -  0700  In: 3007 [I.V.:1100]  Out: 1950 [Urine:1950]    General:  Arousable but slow to respond                  Respiratory: diminished bilaterally    Chest wall:  No tenderness or deformity. Heart:  Regular rate and rhythm, S1, S2 normal, no murmur, click, rub or gallop. Abdomen:   Soft, non-tender. Bowel sounds normal. No masses,  No organomegaly. Extremities: Extremities normal, atraumatic, no cyanosis or edema. Pulses: 2+ and symmetric all extremities.    Skin: Skin color, texture, turgor normal. No rashes or lesions   Neurologic: CNII-XII intact.  No gross sensory or motor deficits     Data Review:       Recent Days:  Recent Labs     12/02/22  0346 12/01/22 0336 11/30/22 0310   WBC 4.6 4.2 5.6   HGB 8.4* 7.9* 8.0*   HCT 26.8* 24.9* 25.6*    181 182       Recent Labs     12/02/22  0346 12/01/22  0336 11/30/22  0310   * 147* 144   K 3.2* 3.0* 3.0*   * 118* 113*   CO2 23 22 22   * 160* 117*   BUN 24* 23* 20   CREA 0.60* 0.66* 0.49*   CA 8.2* 8.0* 7.7*       Recent Labs     12/02/22  0159 12/01/22 0352 11/30/22 0330   PH 7.47* 7.52* 7.55*   PCO2 36 29* 28*   PO2 62* 57* 90   HCO3 25 23 24   FIO2 60 50 80         24 Hour Results:  Recent Results (from the past 24 hour(s))   GLUCOSE, POC    Collection Time: 12/02/22 12:21 AM   Result Value Ref Range    Glucose (POC) 132 (H) 65 - 100 mg/dL    Performed by Raymond Fontenot (PCT)    BLOOD GAS, ARTERIAL    Collection Time: 12/02/22  1:59 AM   Result Value Ref Range    pH 7.47 (H) 7.35 - 7.45      PCO2 36 35 - 45 mmHg    PO2 62 (L) 80 - 100 mmHg    O2 SATURATION 92 (L) 95 - 99 %    BICARBONATE 25 22 - 26 mmol/L    BASE EXCESS 1.5 0 - 3 mmol/L    O2 METHOD High Flow Nasal Cannula      O2 FLOW RATE 50.00 L/min    FIO2 60 %    Sample source Arterial      SITE Right Brachial      BRITNEY'S TEST YES      Carboxy-Hgb 0.3 (L) 1 - 2 %    Methemoglobin 0.3 0 - 1.4 %    Oxyhemoglobin 91.2 (L) 95 - 99 %    Performed by Barbara Valadez     TEMPERATURE 98.0     CBC WITH AUTOMATED DIFF    Collection Time: 12/02/22  3:46 AM   Result Value Ref Range    WBC 4.6 4.1 - 11.1 K/uL    RBC 2.89 (L) 4.10 - 5.70 M/uL    HGB 8.4 (L) 12.1 - 17.0 g/dL    HCT 26.8 (L) 36.6 - 50.3 %    MCV 92.7 80.0 - 99.0 FL    MCH 29.1 26.0 - 34.0 PG    MCHC 31.3 30.0 - 36.5 g/dL    RDW 17.3 (H) 11.5 - 14.5 %    PLATELET 717 926 - 745 K/uL    MPV 10.7 8.9 - 12.9 FL    NRBC 0.0 0.0  WBC    ABSOLUTE NRBC 0.00 0.00 - 0.01 K/uL    NEUTROPHILS 88 (H) 32 - 75 %    LYMPHOCYTES 8 (L) 12 - 49 %    MONOCYTES 3 (L) 5 - 13 % EOSINOPHILS 0 0 - 7 %    BASOPHILS 0 0 - 1 %    IMMATURE GRANULOCYTES 1 (H) 0 - 0.5 %    ABS. NEUTROPHILS 4.0 1.8 - 8.0 K/UL    ABS. LYMPHOCYTES 0.4 (L) 0.8 - 3.5 K/UL    ABS. MONOCYTES 0.2 0.0 - 1.0 K/UL    ABS. EOSINOPHILS 0.0 0.0 - 0.4 K/UL    ABS. BASOPHILS 0.0 0.0 - 0.1 K/UL    ABS. IMM. GRANS. 0.0 0.00 - 0.04 K/UL    DF AUTOMATED     METABOLIC PANEL, BASIC    Collection Time: 12/02/22  3:46 AM   Result Value Ref Range    Sodium 148 (H) 136 - 145 mmol/L    Potassium 3.2 (L) 3.5 - 5.1 mmol/L    Chloride 118 (H) 97 - 108 mmol/L    CO2 23 21 - 32 mmol/L    Anion gap 7 5 - 15 mmol/L    Glucose 175 (H) 65 - 100 mg/dL    BUN 24 (H) 6 - 20 mg/dL    Creatinine 0.60 (L) 0.70 - 1.30 mg/dL    BUN/Creatinine ratio 40 (H) 12 - 20      eGFR >60 >60 ml/min/1.73m2    Calcium 8.2 (L) 8.5 - 10.1 mg/dL   GLUCOSE, POC    Collection Time: 12/02/22  6:16 AM   Result Value Ref Range    Glucose (POC) 111 (H) 65 - 100 mg/dL    Performed by Rosario Medina    GLUCOSE, POC    Collection Time: 12/02/22 11:57 AM   Result Value Ref Range    Glucose (POC) 190 (H) 65 - 100 mg/dL    Performed by Valarie Grove            Assessment/        Acute hypoxic respiratory failure    -Suspect aspiration    -Continue high flow to maintain O2 saturation above 92%    -Monitor closely in ICU overnight     Probable aspiration pneumonia    -Continue IV Zosyn empirically    -Follow-up blood and sputum cultures     Sepsis    -Secondary to bilateral pneumonia     Generalized debilitation    -Secondary to acute illness and multiple comorbidities    -Doubt if patient is able to ambulate     Coronary artery disease status post CABG    -Stable for now    MRSA carrier     -Add bactroban BID     -Restart IV vancomycin      Plan:  Continue supportive care  Wean off high flow oxygen  Son has agreed to hospice care. Await hospice to take over service    Care Plan discussed with: Nurse    Total time spent with patient: 30 minutes.     Delvin Simon MD

## 2022-12-02 NOTE — PROGRESS NOTES
Hospitalist Progress Note         Luis Brasher          Daily Progress Note: 12/2/2022      Subjective: The patient is seen for follow  up. 70 y.o. male history of DM, HTN, CVA, Cirrhosis, here for shortness of breath and hypoxia. Pt was sent from his nursing home after they found him to have O2 saturations in the 70's on room air, tachypneic, labored breathing. Pt was placed on BiPAP in the ER with improvement of oxygen saturations but he still has eyes closed, does not speak. He does move his right arm around and groan repeatedly during evaluation but does not seem intentional in his movements. Of note, the patient was last admitted in this hospital on 11/8, discharged 2 days ago on 11/27, for sepsis. At time of discharge the patient was awake and alert, though not fully oriented  ----  The patient is seen for follow  up. His mentation is back to baseline today. Unfortunately he is not very capable of providing history at baseline so it is difficult to assess how he is feeling. Slow to respond. Overnight T-max of 100.2 degrees.  O2 requirement increased to 50 L/min    Problem List:  Problem List as of 12/2/2022 Never Reviewed            Codes Class Noted - Resolved    Aspiration pneumonia ([facility] ICD-10-CM: J69.0  ICD-9-CM: 507.0  11/27/2022 - Present        Septic shock ([facility] ICD-10-CM: A41.9, R65.21  ICD-9-CM: 038.9, 785.52, 995.92  11/20/2022 - Present        Infection of right great toe due to methicillin resistant Staphylococcus aureus (MRSA) ICD-10-CM: L08.9, B95.62  ICD-9-CM: 686.8, 041.12  11/20/2022 - Present        Metabolic encephalopathy OVA-26-PO: G93.41  ICD-9-CM: 348.31  11/20/2022 - Present        Severe protein-calorie malnutrition ([facility] ICD-10-CM: E43  ICD-9-CM: 262  11/20/2022 - Present        Dysphagia ICD-10-CM: R13.10  ICD-9-CM: 787.20  11/20/2022 - Present        Acute respiratory failure with hypoxia ([facility].) ICD-10-CM: J96.01  ICD-9-CM: 518.81  11/20/2022 - Present        PAD (peripheral artery disease) ([facility]) ICD-10-CM: I73.9  ICD-9-CM: 443.9  11/20/2022 - Present        UTI (urinary tract infection) ICD-10-CM: N39.0  ICD-9-CM: 599.0  11/8/2022 - Present        Carotid stenosis ICD-10-CM: I65.29  ICD-9-CM: 433.10  8/2/2022 - Present        Cystitis ICD-10-CM: N30.90  ICD-9-CM: 595.9  8/2/2022 - Present        CVA (cerebral vascular accident) [facility]) ICD-10-CM: I63.9  ICD-9-CM: 434.91  7/31/2022 - Present        TIA (transient ischemic attack) ICD-10-CM: G45.9  ICD-9-CM: 435.9  7/29/2022 - Present        Chest pain ICD-10-CM: R07.9  ICD-9-CM: 786.50  10/28/2020 - Present        CAD (coronary artery disease) ICD-10-CM: I25.10  ICD-9-CM: 414.00  10/28/2020 - Present        NSTEMI (non-ST elevated myocardial infarction) [facility]) ICD-10-CM: I21.4  ICD-9-CM: 410.70  10/28/2020 - Present       Medications reviewed  Current Facility-Administered Medications   Medication Dose Route Frequency    furosemide (LASIX) injection 40 mg  40 mg IntraVENous DAILY    potassium chloride (KLOR-CON) packet for solution 20 mEq  20 mEq Per G Tube DAILY    mupirocin (BACTROBAN) 2 % ointment   Both Nostrils BID    vancomycin (VANCOCIN) 1,000 mg in 0.9% sodium chloride 250 mL ([id number])  1,000 mg IntraVENous Q12H    VANCOMYCIN INFORMATION NOTE   Other Rx Dosing/Monitoring    Vancomycin - please draw level 12/2 1200. Thanks!    Other ONCE    midodrine (PROAMATINE) tablet 10 mg  10 mg Oral TID WITH MEALS    balsam peru-castor oiL (VENELEX) ointment   Topical BID    sodium chloride (NS) flush 5-10 mL  5-10 mL IntraVENous PRN    sodium chloride (NS) flush 5-40 mL  5-40 mL IntraVENous Q8H    sodium chloride (NS) flush 5-40 mL  5-40 mL IntraVENous PRN    acetaminophen (TYLENOL) tablet 650 mg  650 mg Oral Q6H PRN    Or    acetaminophen (TYLENOL) suppository 650 mg  650 mg Rectal Q6H PRN    polyethylene glycol (MIRALAX) packet 17 g  17 g Oral DAILY PRN    ondansetron (ZOFRAN ODT) tablet 4 mg  4 mg Oral Q8H PRN    Or    ondansetron (ZOFRAN) injection 4 mg  4 mg IntraVENous Q6H PRN    enoxaparin (LOVENOX) injection 40 mg  40 mg SubCUTAneous DAILY    piperacillin-tazobactam (ZOSYN) 3.375 g in 0.9% sodium chloride (MBP/ADV) 100 mL MBP  3.375 g IntraVENous Q8H    ARIPiprazole (ABILIFY) tablet 2 mg  2 mg Per G Tube DAILY    atorvastatin (LIPITOR) tablet 40 mg  40 mg Per G Tube DAILY    baclofen (LIORESAL) tablet 5 mg  5 mg Per G Tube BID    gabapentin (NEURONTIN) capsule 100 mg  100 mg Per G Tube QHS    [Held by provider] losartan (COZAAR) tablet 100 mg  100 mg Per G Tube DAILY    melatonin tablet 10 mg  10 mg Per G Tube QHS    traMADoL (ULTRAM) tablet 50 mg  50 mg Per G Tube Q6H PRN    hydrOXYzine HCL (ATARAX) tablet 25 mg  25 mg Per G Tube Q6H PRN    clopidogreL (PLAVIX) tablet 75 mg  75 mg PEG Tube DAILY    carvediloL (COREG) tablet 12.5 mg  12.5 mg Per G Tube BID    escitalopram oxalate (LEXAPRO) tablet 20 mg  20 mg Per G Tube DAILY       Review of Systems:   Review of systems not obtained due to patient factors. Objective:   Physical Exam:     Visit Vitals  BP (!) 116/49 (BP 1 Location: Left upper arm, BP Patient Position: At rest)   Pulse 76   Temp 99.6 °F (37.6 °C)   Resp 25   Ht 5' 9\" (1.753 m)   Wt 60 kg (132 lb 4.4 oz)   SpO2 96%   BMI 19.53 kg/m²    O2 Flow Rate (L/min): 50 l/min O2 Device: Heated, Hi flow nasal cannula, Humidifier    Temp (24hrs), Av.8 °F (37.1 °C), Min:97.3 °F (36.3 °C), Max:100.2 °F (37.9 °C)    No intake/output data recorded.  1901 -  0700  In: 3007 [I.V.:1100]  Out: 1950 [Urine:1950]    General:  Arousable but slow to respond                  Respiratory: diminished bilaterally    Chest wall:  No tenderness or deformity. Heart:  Regular rate and rhythm, S1, S2 normal, no murmur, click, rub or gallop. Abdomen:   Soft, non-tender. Bowel sounds normal. No masses,  No organomegaly. Extremities: Extremities normal, atraumatic, no cyanosis or edema.    Pulses: 2+ and symmetric all extremities. Skin: Skin color, texture, turgor normal. No rashes or lesions   Neurologic: CNII-XII intact.  No gross sensory or motor deficits     Data Review:       Recent Days:  Recent Labs     12/02/22  0346 12/01/22 0336 11/30/22 0310   WBC 4.6 4.2 5.6   HGB 8.4* 7.9* 8.0*   HCT 26.8* 24.9* 25.6*    181 182       Recent Labs     12/02/22  0346 12/01/22 0336 11/30/22  0310   * 147* 144   K 3.2* 3.0* 3.0*   * 118* 113*   CO2 23 22 22   * 160* 117*   BUN 24* 23* 20   CREA 0.60* 0.66* 0.49*   CA 8.2* 8.0* 7.7*       Recent Labs     12/02/22  0159 12/01/22  0352 11/30/22  0330   PH 7.47* 7.52* 7.55*   PCO2 36 29* 28*   PO2 62* 57* 90   HCO3 25 23 24   FIO2 60 50 80         24 Hour Results:  Recent Results (from the past 24 hour(s))   GLUCOSE, POC    Collection Time: 12/01/22 11:24 AM   Result Value Ref Range    Glucose (POC) 205 (H) 65 - 100 mg/dL    Performed by Dayana Leyva    GLUCOSE, POC    Collection Time: 12/02/22 12:21 AM   Result Value Ref Range    Glucose (POC) 132 (H) 65 - 100 mg/dL    Performed by Jay Rashid (PCT)    BLOOD GAS, ARTERIAL    Collection Time: 12/02/22  1:59 AM   Result Value Ref Range    pH 7.47 (H) 7.35 - 7.45      PCO2 36 35 - 45 mmHg    PO2 62 (L) 80 - 100 mmHg    O2 SATURATION 92 (L) 95 - 99 %    BICARBONATE 25 22 - 26 mmol/L    BASE EXCESS 1.5 0 - 3 mmol/L    O2 METHOD High Flow Nasal Cannula      O2 FLOW RATE 50.00 L/min    FIO2 60 %    Sample source Arterial      SITE Right Brachial      BRITNEY'S TEST YES      Carboxy-Hgb 0.3 (L) 1 - 2 %    Methemoglobin 0.3 0 - 1.4 %    Oxyhemoglobin 91.2 (L) 95 - 99 %    Performed by Nanetta Bamberger     TEMPERATURE 98.0     CBC WITH AUTOMATED DIFF    Collection Time: 12/02/22  3:46 AM   Result Value Ref Range    WBC 4.6 4.1 - 11.1 K/uL    RBC 2.89 (L) 4.10 - 5.70 M/uL    HGB 8.4 (L) 12.1 - 17.0 g/dL    HCT 26.8 (L) 36.6 - 50.3 %    MCV 92.7 80.0 - 99.0 FL    MCH 29.1 26.0 - 34.0 PG    MCHC 31.3 30.0 - 36.5 g/dL    RDW 17.3 (H) 11.5 - 14.5 %    PLATELET 270 816 - 911 K/uL    MPV 10.7 8.9 - 12.9 FL    NRBC 0.0 0.0  WBC    ABSOLUTE NRBC 0.00 0.00 - 0.01 K/uL    NEUTROPHILS 88 (H) 32 - 75 %    LYMPHOCYTES 8 (L) 12 - 49 %    MONOCYTES 3 (L) 5 - 13 %    EOSINOPHILS 0 0 - 7 %    BASOPHILS 0 0 - 1 %    IMMATURE GRANULOCYTES 1 (H) 0 - 0.5 %    ABS. NEUTROPHILS 4.0 1.8 - 8.0 K/UL    ABS. LYMPHOCYTES 0.4 (L) 0.8 - 3.5 K/UL    ABS. MONOCYTES 0.2 0.0 - 1.0 K/UL    ABS. EOSINOPHILS 0.0 0.0 - 0.4 K/UL    ABS. BASOPHILS 0.0 0.0 - 0.1 K/UL    ABS. IMM.  GRANS. 0.0 0.00 - 0.04 K/UL    DF AUTOMATED     METABOLIC PANEL, BASIC    Collection Time: 12/02/22  3:46 AM   Result Value Ref Range    Sodium 148 (H) 136 - 145 mmol/L    Potassium 3.2 (L) 3.5 - 5.1 mmol/L    Chloride 118 (H) 97 - 108 mmol/L    CO2 23 21 - 32 mmol/L    Anion gap 7 5 - 15 mmol/L    Glucose 175 (H) 65 - 100 mg/dL    BUN 24 (H) 6 - 20 mg/dL    Creatinine 0.60 (L) 0.70 - 1.30 mg/dL    BUN/Creatinine ratio 40 (H) 12 - 20      eGFR >60 >60 ml/min/1.73m2    Calcium 8.2 (L) 8.5 - 10.1 mg/dL   GLUCOSE, POC    Collection Time: 12/02/22  6:16 AM   Result Value Ref Range    Glucose (POC) 111 (H) 65 - 100 mg/dL    Performed by Artie Murray            Assessment/        Acute hypoxic respiratory failure    -Suspect aspiration    -Continue high flow to maintain O2 saturation above 92%    -Monitor closely in ICU overnight     Probable aspiration pneumonia    -Continue IV Zosyn empirically    -Follow-up blood and sputum cultures     Sepsis    -Secondary to bilateral pneumonia     Generalized debilitation    -Secondary to acute illness and multiple comorbidities    -Doubt if patient is able to ambulate     Coronary artery disease status post CABG    -Stable for now    MRSA carrier     -Add bactroban BID     -Restart IV vancomycin      Plan:  Continue supportive care  Wean off high flow oxygen  Replete potassium with 40meq of KCL now  Attempted to reach son for goals of care discussion to no avail  Appropriate for hospice  Spoke with son on overall clinicals and need for palliative care. He opted for hospice consultation. Hospice team trying to get in contact with son. CODE Status is DNR/DNI  Care Plan discussed with: Nurse    Total time spent with patient: 30 minutes.     Edu Carlson

## 2022-12-02 NOTE — PROGRESS NOTES
Patient currently in ICU on high flow oxygen. Evaluated for [facility] hospice by [facility], but son not ready to make decision on hospice until at least Tuesday due to another death in the family.

## 2022-12-02 NOTE — PROGRESS NOTES
Vancomycin Dosing Consult  Shawn Da Silva is a 70 y.o. male with aspiration PNA/HAP. Pharmacy was consulted by Dr. Patel Padilla to dose and monitor vancomycin. Today is day 2 of restart. Patient was on vanc - but spiked temp after vanc was d/c'd. Antibiotic regimen: Vancomycin + pip/tazo    Temp (24hrs), Av.8 °F (37.1 °C), Min:97.3 °F (36.3 °C), Max:100.2 °F (37.9 °C)    Recent Labs     22  0346 22  0336 22  0310   WBC 4.6 4.2 5.6   CREA 0.60* 0.66* 0.49*   BUN 24* 23* 20     Est CrCl: 82 mL/min  Concomitant nephrotoxic drugs: Loop diuretics    Cultures:    Blood: ngtd (prelim)   Cdiff: negative (final)   Blood: ngtd (prelim)   Sputum: 3+ coryneform GPR gram stain, moderate E.  Coli, moder GNR, heavy normal kristen (prelim)    MRSA Swab: Detected     Target range: AUC/TELMA 400-600    Recent level history:  Date/Time Dose & Interval Measured Level (mcg/mL) Associated AUC/TELMA    1245 1000mg IV q12h 9.8 576    1241 1000mg IV q12h 16.9 598      Assessment/Plan:   Febrile , WBC WNL  Changed vancomycin to 750 mg IV every 12 hours for a projected AUC/TELMA ratio of 453  Level is scheduled for  1100  Antimicrobial stop date TBD

## 2022-12-03 NOTE — PROGRESS NOTES
Hospitalist Progress Note         Valentín Craft MD          Daily Progress Note: 12/3/2022      Subjective: The patient is seen for follow  up. 70 y.o. male history of DM, HTN, CVA, Cirrhosis, here for shortness of breath and hypoxia. Pt was sent from his nursing home after they found him to have O2 saturations in the 70's on room air, tachypneic, labored breathing. Pt was placed on BiPAP in the ER with improvement of oxygen saturations but he still has eyes closed, does not speak. He does move his right arm around and groan repeatedly during evaluation but does not seem intentional in his movements. Of note, the patient was last admitted in this hospital on 11/8, discharged 2 days ago on 11/27, for sepsis. At time of discharge the patient was awake and alert, though not fully oriented  ----  The patient is seen for follow  up. His mentation is back to baseline today. Unfortunately he is not very capable of providing history at baseline so it is difficult to assess how he is feeling. Slow to respond. Son agreed to hospice care but now unsure about hospice.     Problem List:  Problem List as of 12/3/2022 Never Reviewed            Codes Class Noted - Resolved    Aspiration pneumonia ([facility] ICD-10-CM: J69.0  ICD-9-CM: 507.0  11/27/2022 - Present        Septic shock ([facility] ICD-10-CM: A41.9, R65.21  ICD-9-CM: 038.9, 785.52, 995.92  11/20/2022 - Present        Infection of right great toe due to methicillin resistant Staphylococcus aureus (MRSA) ICD-10-CM: L08.9, B95.62  ICD-9-CM: 686.8, 041.12  11/20/2022 - Present        Metabolic encephalopathy ZYF-61-HH: G93.41  ICD-9-CM: 348.31  11/20/2022 - Present        Severe protein-calorie malnutrition ([facility] ICD-10-CM: E43  ICD-9-CM: 262  11/20/2022 - Present        Dysphagia ICD-10-CM: R13.10  ICD-9-CM: 787.20  11/20/2022 - Present        Acute respiratory failure with hypoxia ([address] ICD-10-CM: J96.01  ICD-9-CM: 518.81  11/20/2022 - Present PAD (peripheral artery disease) ([facility]) ICD-10-CM: I73.9  ICD-9-CM: 443.9  11/20/2022 - Present        UTI (urinary tract infection) ICD-10-CM: N39.0  ICD-9-CM: 599.0  11/8/2022 - Present        Carotid stenosis ICD-10-CM: I65.29  ICD-9-CM: 433.10  8/2/2022 - Present        Cystitis ICD-10-CM: N30.90  ICD-9-CM: 595.9  8/2/2022 - Present        CVA (cerebral vascular accident) [facility]) ICD-10-CM: I63.9  ICD-9-CM: 434.91  7/31/2022 - Present        TIA (transient ischemic attack) ICD-10-CM: G45.9  ICD-9-CM: 435.9  7/29/2022 - Present        Chest pain ICD-10-CM: R07.9  ICD-9-CM: 786.50  10/28/2020 - Present        CAD (coronary artery disease) ICD-10-CM: I25.10  ICD-9-CM: 414.00  10/28/2020 - Present        NSTEMI (non-ST elevated myocardial infarction) [facility]) ICD-10-CM: I21.4  ICD-9-CM: 410.70  10/28/2020 - Present       Medications reviewed  Current Facility-Administered Medications   Medication Dose Route Frequency    vancomycin (VANCOCIN) 750 mg in 0.9% sodium chloride 250 mL ([id number])  750 mg IntraVENous Q12H    [START ON 12/4/2022] Vancomycin Level Due 12/4 1100 - Please make sure to draw lab   Other ONCE    furosemide (LASIX) injection 40 mg  40 mg IntraVENous DAILY    potassium chloride (KLOR-CON) packet for solution 20 mEq  20 mEq Per G Tube DAILY    mupirocin (BACTROBAN) 2 % ointment   Both Nostrils BID    VANCOMYCIN INFORMATION NOTE   Other Rx Dosing/Monitoring    midodrine (PROAMATINE) tablet 10 mg  10 mg Oral TID WITH MEALS    balsam peru-castor oiL (VENELEX) ointment   Topical BID    sodium chloride (NS) flush 5-10 mL  5-10 mL IntraVENous PRN    sodium chloride (NS) flush 5-40 mL  5-40 mL IntraVENous Q8H    sodium chloride (NS) flush 5-40 mL  5-40 mL IntraVENous PRN    acetaminophen (TYLENOL) tablet 650 mg  650 mg Oral Q6H PRN    Or    acetaminophen (TYLENOL) suppository 650 mg  650 mg Rectal Q6H PRN    polyethylene glycol (MIRALAX) packet 17 g  17 g Oral DAILY PRN    ondansetron (ZOFRAN ODT) tablet 4 mg  4 mg Oral Q8H PRN    Or    ondansetron (ZOFRAN) injection 4 mg  4 mg IntraVENous Q6H PRN    enoxaparin (LOVENOX) injection 40 mg  40 mg SubCUTAneous DAILY    piperacillin-tazobactam (ZOSYN) 3.375 g in 0.9% sodium chloride (MBP/ADV) 100 mL MBP  3.375 g IntraVENous Q8H    ARIPiprazole (ABILIFY) tablet 2 mg  2 mg Per G Tube DAILY    atorvastatin (LIPITOR) tablet 40 mg  40 mg Per G Tube DAILY    baclofen (LIORESAL) tablet 5 mg  5 mg Per G Tube BID    gabapentin (NEURONTIN) capsule 100 mg  100 mg Per G Tube QHS    [Held by provider] losartan (COZAAR) tablet 100 mg  100 mg Per G Tube DAILY    melatonin tablet 10 mg  10 mg Per G Tube QHS    traMADoL (ULTRAM) tablet 50 mg  50 mg Per G Tube Q6H PRN    hydrOXYzine HCL (ATARAX) tablet 25 mg  25 mg Per G Tube Q6H PRN    clopidogreL (PLAVIX) tablet 75 mg  75 mg PEG Tube DAILY    carvediloL (COREG) tablet 12.5 mg  12.5 mg Per G Tube BID    escitalopram oxalate (LEXAPRO) tablet 20 mg  20 mg Per G Tube DAILY       Review of Systems:   A comprehensive review of systems was negative. Objective:   Physical Exam:     Visit Vitals  BP (!) 106/49 (BP 1 Location: Left upper arm, BP Patient Position: At rest)   Pulse 68   Temp 98.2 °F (36.8 °C)   Resp 23   Ht 5' 9\" (1.753 m)   Wt 63 kg (138 lb 14.2 oz)   SpO2 95%   BMI 20.51 kg/m²    O2 Flow Rate (L/min): 50 l/min O2 Device: Heated, Hi flow nasal cannula    Temp (24hrs), Av.5 °F (36.9 °C), Min:97.7 °F (36.5 °C), Max:100.4 °F (38 °C)    701 -  190  In: 325   Out: 300 [Urine:300]   1901 -  0700  In: 2120 [I.V.:1000]  Out: 4450 [Urine:4450]    General:  Arousable but slow to respond                  Respiratory: diminished bilaterally    Chest wall:  No tenderness or deformity. Heart:  Regular rate and rhythm, S1, S2 normal, no murmur, click, rub or gallop. Abdomen:   Soft, non-tender. Bowel sounds normal. No masses,  No organomegaly. Extremities: Extremities normal, atraumatic, no cyanosis or edema. Pulses: 2+ and symmetric all extremities. Skin: Skin color, texture, turgor normal. No rashes or lesions   Neurologic: CNII-XII intact.  No gross sensory or motor deficits     Data Review:       Recent Days:  Recent Labs     12/03/22  0631 12/02/22  0346 12/01/22  0336   WBC 6.0 4.6 4.2   HGB 8.7* 8.4* 7.9*   HCT 28.8* 26.8* 24.9*    177 181       Recent Labs     12/03/22  0631 12/02/22  0346 12/01/22  0336   * 148* 147*   K 3.5 3.2* 3.0*   * 118* 118*   CO2 24 23 22   * 175* 160*   BUN 20 24* 23*   CREA 0.63* 0.60* 0.66*   CA 8.0* 8.2* 8.0*       Recent Labs     12/02/22  0159 12/01/22  0352   PH 7.47* 7.52*   PCO2 36 29*   PO2 62* 57*   HCO3 25 23   FIO2 60 50         24 Hour Results:  Recent Results (from the past 24 hour(s))   GLUCOSE, POC    Collection Time: 12/02/22 11:57 AM   Result Value Ref Range    Glucose (POC) 190 (H) 65 - 100 mg/dL    Performed by Judson Lorenzo, RANDOM    Collection Time: 12/02/22 12:41 PM   Result Value Ref Range    Vancomycin, random 16.9 ug/mL   GLUCOSE, POC    Collection Time: 12/02/22  5:24 PM   Result Value Ref Range    Glucose (POC) 136 (H) 65 - 100 mg/dL    Performed by [address] [place] [address] Dr Vega, POC    Collection Time: 12/02/22 11:41 PM   Result Value Ref Range    Glucose (POC) 160 (H) 65 - 100 mg/dL    Performed by [address] [place] [address], POC    Collection Time: 12/03/22  5:58 AM   Result Value Ref Range    Glucose (POC) 191 (H) 65 - 100 mg/dL    Performed by Rick CHAUDHARY WITH AUTOMATED DIFF    Collection Time: 12/03/22  6:31 AM   Result Value Ref Range    WBC 6.0 4.1 - 11.1 K/uL    RBC 3.11 (L) 4.10 - 5.70 M/uL    HGB 8.7 (L) 12.1 - 17.0 g/dL    HCT 28.8 (L) 36.6 - 50.3 %    MCV 92.6 80.0 - 99.0 FL    MCH 28.0 26.0 - 34.0 PG    MCHC 30.2 30.0 - 36.5 g/dL    RDW 17.3 (H) 11.5 - 14.5 %    PLATELET 617 653 - 572 K/uL    MPV 10.9 8.9 - 12.9 FL    NRBC 0.0 0.0  WBC    ABSOLUTE NRBC 0.00 0.00 - 0.01 K/uL NEUTROPHILS 85 (H) 32 - 75 %    LYMPHOCYTES 10 (L) 12 - 49 %    MONOCYTES 4 (L) 5 - 13 %    EOSINOPHILS 0 0 - 7 %    BASOPHILS 0 0 - 1 %    IMMATURE GRANULOCYTES 1 (H) 0 - 0.5 %    ABS. NEUTROPHILS 5.1 1.8 - 8.0 K/UL    ABS. LYMPHOCYTES 0.6 (L) 0.8 - 3.5 K/UL    ABS. MONOCYTES 0.2 0.0 - 1.0 K/UL    ABS. EOSINOPHILS 0.0 0.0 - 0.4 K/UL    ABS. BASOPHILS 0.0 0.0 - 0.1 K/UL    ABS. IMM. GRANS. 0.0 0.00 - 0.04 K/UL    DF AUTOMATED     METABOLIC PANEL, BASIC    Collection Time: 12/03/22  6:31 AM   Result Value Ref Range    Sodium 148 (H) 136 - 145 mmol/L    Potassium 3.5 3.5 - 5.1 mmol/L    Chloride 117 (H) 97 - 108 mmol/L    CO2 24 21 - 32 mmol/L    Anion gap 7 5 - 15 mmol/L    Glucose 194 (H) 65 - 100 mg/dL    BUN 20 6 - 20 mg/dL    Creatinine 0.63 (L) 0.70 - 1.30 mg/dL    BUN/Creatinine ratio 32 (H) 12 - 20      eGFR >60 >60 ml/min/1.73m2    Calcium 8.0 (L) 8.5 - 10.1 mg/dL   GLUCOSE, POC    Collection Time: 12/03/22  8:04 AM   Result Value Ref Range    Glucose (POC) 195 (H) 65 - 100 mg/dL    Performed by Cathy Crouch            Assessment/        Acute hypoxic respiratory failure    -Suspect aspiration    -Continue high flow to maintain O2 saturation above 92%    -Monitor closely in ICU overnight     Probable aspiration pneumonia    -Continue IV Zosyn empirically    -Follow-up blood and sputum cultures     Sepsis    -Secondary to bilateral pneumonia     Generalized debilitation    -Secondary to acute illness and multiple comorbidities    -Doubt if patient is able to ambulate     Coronary artery disease status post CABG    -Stable for now    MRSA carrier     -Add bactroban BID     -Restart IV vancomycin      Plan:  Continue supportive care  Transfer to [facility] telemetry floor  Long term prognosis very poor    Care Plan discussed with: Nurse    Total time spent with patient: 30 minutes.     Garfield Headley MD

## 2022-12-03 NOTE — PROGRESS NOTES
Hospitalist Progress Note         Marielos Hoffman          Daily Progress Note: 12/3/2022      Subjective: The patient is seen for follow  up. 70 y.o. male history of DM, HTN, CVA, Cirrhosis, here for shortness of breath and hypoxia. Pt was sent from his nursing home after they found him to have O2 saturations in the 70's on room air, tachypneic, labored breathing. Pt was placed on BiPAP in the ER with improvement of oxygen saturations but he still has eyes closed, does not speak. He does move his right arm around and groan repeatedly during evaluation but does not seem intentional in his movements. Of note, the patient was last admitted in this hospital on 11/8, discharged 2 days ago on 11/27, for sepsis. At time of discharge the patient was awake and alert, though not fully oriented  ----  The patient is seen for follow  up. His mentation is back to baseline today. Unfortunately he is not very capable of providing history at baseline so it is difficult to assess how he is feeling. Slow to respond. Son initially agreed to hospice but per Hospice Team note, he is now unsure.   Problem List:  Problem List as of 12/3/2022 Never Reviewed            Codes Class Noted - Resolved    Aspiration pneumonia ([facility] ICD-10-CM: J69.0  ICD-9-CM: 507.0  11/27/2022 - Present        Septic shock ([facility] ICD-10-CM: A41.9, R65.21  ICD-9-CM: 038.9, 785.52, 995.92  11/20/2022 - Present        Infection of right great toe due to methicillin resistant Staphylococcus aureus (MRSA) ICD-10-CM: L08.9, B95.62  ICD-9-CM: 686.8, 041.12  11/20/2022 - Present        Metabolic encephalopathy [id number]-17-JR: G93.41  ICD-9-CM: 348.31  11/20/2022 - Present        Severe protein-calorie malnutrition ([facility] ICD-10-CM: E43  ICD-9-CM: 262  11/20/2022 - Present        Dysphagia ICD-10-CM: R13.10  ICD-9-CM: 787.20  11/20/2022 - Present        Acute respiratory failure with hypoxia (HCC) ICD-10-CM: J96.01  ICD-9-CM: 518.81 11/20/2022 - Present        PAD (peripheral artery disease) ([facility]) ICD-10-CM: I73.9  ICD-9-CM: 443.9  11/20/2022 - Present        UTI (urinary tract infection) ICD-10-CM: N39.0  ICD-9-CM: 599.0  11/8/2022 - Present        Carotid stenosis ICD-10-CM: I65.29  ICD-9-CM: 433.10  8/2/2022 - Present        Cystitis ICD-10-CM: N30.90  ICD-9-CM: 595.9  8/2/2022 - Present        CVA (cerebral vascular accident) [facility]) ICD-10-CM: I63.9  ICD-9-CM: 434.91  7/31/2022 - Present        TIA (transient ischemic attack) ICD-10-CM: G45.9  ICD-9-CM: 435.9  7/29/2022 - Present        Chest pain ICD-10-CM: R07.9  ICD-9-CM: 786.50  10/28/2020 - Present        CAD (coronary artery disease) ICD-10-CM: I25.10  ICD-9-CM: 414.00  10/28/2020 - Present        NSTEMI (non-ST elevated myocardial infarction) [facility]) ICD-10-CM: I21.4  ICD-9-CM: 410.70  10/28/2020 - Present       Medications reviewed  Current Facility-Administered Medications   Medication Dose Route Frequency    vancomycin (VANCOCIN) 750 mg in 0.9% sodium chloride 250 mL ([id number])  750 mg IntraVENous Q12H    [START ON 12/4/2022] Vancomycin Level Due 12/4 1100 - Please make sure to draw lab   Other ONCE    furosemide (LASIX) injection 40 mg  40 mg IntraVENous DAILY    potassium chloride (KLOR-CON) packet for solution 20 mEq  20 mEq Per G Tube DAILY    mupirocin (BACTROBAN) 2 % ointment   Both Nostrils BID    VANCOMYCIN INFORMATION NOTE   Other Rx Dosing/Monitoring    midodrine (PROAMATINE) tablet 10 mg  10 mg Oral TID WITH MEALS    balsam peru-castor oiL (VENELEX) ointment   Topical BID    sodium chloride (NS) flush 5-10 mL  5-10 mL IntraVENous PRN    sodium chloride (NS) flush 5-40 mL  5-40 mL IntraVENous Q8H    sodium chloride (NS) flush 5-40 mL  5-40 mL IntraVENous PRN    acetaminophen (TYLENOL) tablet 650 mg  650 mg Oral Q6H PRN    Or    acetaminophen (TYLENOL) suppository 650 mg  650 mg Rectal Q6H PRN    polyethylene glycol (MIRALAX) packet 17 g  17 g Oral DAILY PRN    ondansetron (ZOFRAN ODT) tablet 4 mg  4 mg Oral Q8H PRN    Or    ondansetron (ZOFRAN) injection 4 mg  4 mg IntraVENous Q6H PRN    enoxaparin (LOVENOX) injection 40 mg  40 mg SubCUTAneous DAILY    piperacillin-tazobactam (ZOSYN) 3.375 g in 0.9% sodium chloride (MBP/ADV) 100 mL MBP  3.375 g IntraVENous Q8H    ARIPiprazole (ABILIFY) tablet 2 mg  2 mg Per G Tube DAILY    atorvastatin (LIPITOR) tablet 40 mg  40 mg Per G Tube DAILY    baclofen (LIORESAL) tablet 5 mg  5 mg Per G Tube BID    gabapentin (NEURONTIN) capsule 100 mg  100 mg Per G Tube QHS    [Held by provider] losartan (COZAAR) tablet 100 mg  100 mg Per G Tube DAILY    melatonin tablet 10 mg  10 mg Per G Tube QHS    traMADoL (ULTRAM) tablet 50 mg  50 mg Per G Tube Q6H PRN    hydrOXYzine HCL (ATARAX) tablet 25 mg  25 mg Per G Tube Q6H PRN    clopidogreL (PLAVIX) tablet 75 mg  75 mg PEG Tube DAILY    carvediloL (COREG) tablet 12.5 mg  12.5 mg Per G Tube BID    escitalopram oxalate (LEXAPRO) tablet 20 mg  20 mg Per G Tube DAILY       Review of Systems:   A comprehensive review of systems was negative. Objective:   Physical Exam:     Visit Vitals  BP (!) 106/49 (BP 1 Location: Left upper arm, BP Patient Position: At rest)   Pulse 68   Temp 98.2 °F (36.8 °C)   Resp 23   Ht 5' 9\" (1.753 m)   Wt 63 kg (138 lb 14.2 oz)   SpO2 96%   BMI 20.51 kg/m²    O2 Flow Rate (L/min): 50 l/min O2 Device: Hi flow nasal cannula, Heated    Temp (24hrs), Av.6 °F (37 °C), Min:97.7 °F (36.5 °C), Max:100.4 °F (38 °C)    No intake/output data recorded.  1901 -  0700  In: 2120 [I.V.:1000]  Out: 4450 [Urine:4450]    General:  Arousable but slow to respond                  Respiratory: diminished bilaterally    Chest wall:  No tenderness or deformity. Heart:  Regular rate and rhythm, S1, S2 normal, no murmur, click, rub or gallop. Abdomen:   Soft, non-tender. Bowel sounds normal. No masses,  No organomegaly. Extremities: Extremities normal, atraumatic, no cyanosis or edema. Pulses: 2+ and symmetric all extremities. Skin: Skin color, texture, turgor normal. No rashes or lesions   Neurologic: CNII-XII intact.  No gross sensory or motor deficits     Data Review:       Recent Days:  Recent Labs     12/03/22  0631 12/02/22  0346 12/01/22  0336   WBC 6.0 4.6 4.2   HGB 8.7* 8.4* 7.9*   HCT 28.8* 26.8* 24.9*    177 181       Recent Labs     12/03/22  0631 12/02/22  0346 12/01/22  0336   * 148* 147*   K 3.5 3.2* 3.0*   * 118* 118*   CO2 24 23 22   * 175* 160*   BUN 20 24* 23*   CREA 0.63* 0.60* 0.66*   CA 8.0* 8.2* 8.0*       Recent Labs     12/02/22  0159 12/01/22  0352   PH 7.47* 7.52*   PCO2 36 29*   PO2 62* 57*   HCO3 25 23   FIO2 60 50         24 Hour Results:  Recent Results (from the past 24 hour(s))   GLUCOSE, POC    Collection Time: 12/02/22 11:57 AM   Result Value Ref Range    Glucose (POC) 190 (H) 65 - 100 mg/dL    Performed by yV Guaman, RANDOM    Collection Time: 12/02/22 12:41 PM   Result Value Ref Range    Vancomycin, random 16.9 ug/mL   GLUCOSE, POC    Collection Time: 12/02/22  5:24 PM   Result Value Ref Range    Glucose (POC) 136 (H) 65 - 100 mg/dL    Performed by [address] [place] [address] Dr Vega, POC    Collection Time: 12/02/22 11:41 PM   Result Value Ref Range    Glucose (POC) 160 (H) 65 - 100 mg/dL    Performed by [address] [place] [address], POC    Collection Time: 12/03/22  5:58 AM   Result Value Ref Range    Glucose (POC) 191 (H) 65 - 100 mg/dL    Performed by Lauren Bhakta    CBC WITH AUTOMATED DIFF    Collection Time: 12/03/22  6:31 AM   Result Value Ref Range    WBC 6.0 4.1 - 11.1 K/uL    RBC 3.11 (L) 4.10 - 5.70 M/uL    HGB 8.7 (L) 12.1 - 17.0 g/dL    HCT 28.8 (L) 36.6 - 50.3 %    MCV 92.6 80.0 - 99.0 FL    MCH 28.0 26.0 - 34.0 PG    MCHC 30.2 30.0 - 36.5 g/dL    RDW 17.3 (H) 11.5 - 14.5 %    PLATELET 090 650 - 052 K/uL    MPV 10.9 8.9 - 12.9 FL    NRBC 0.0 0.0  WBC    ABSOLUTE NRBC 0.00 0.00 - 0.01 K/uL NEUTROPHILS 85 (H) 32 - 75 %    LYMPHOCYTES 10 (L) 12 - 49 %    MONOCYTES 4 (L) 5 - 13 %    EOSINOPHILS 0 0 - 7 %    BASOPHILS 0 0 - 1 %    IMMATURE GRANULOCYTES 1 (H) 0 - 0.5 %    ABS. NEUTROPHILS 5.1 1.8 - 8.0 K/UL    ABS. LYMPHOCYTES 0.6 (L) 0.8 - 3.5 K/UL    ABS. MONOCYTES 0.2 0.0 - 1.0 K/UL    ABS. EOSINOPHILS 0.0 0.0 - 0.4 K/UL    ABS. BASOPHILS 0.0 0.0 - 0.1 K/UL    ABS. IMM. GRANS. 0.0 0.00 - 0.04 K/UL    DF AUTOMATED     METABOLIC PANEL, BASIC    Collection Time: 12/03/22  6:31 AM   Result Value Ref Range    Sodium 148 (H) 136 - 145 mmol/L    Potassium 3.5 3.5 - 5.1 mmol/L    Chloride 117 (H) 97 - 108 mmol/L    CO2 24 21 - 32 mmol/L    Anion gap 7 5 - 15 mmol/L    Glucose 194 (H) 65 - 100 mg/dL    BUN 20 6 - 20 mg/dL    Creatinine 0.63 (L) 0.70 - 1.30 mg/dL    BUN/Creatinine ratio 32 (H) 12 - 20      eGFR >60 >60 ml/min/1.73m2    Calcium 8.0 (L) 8.5 - 10.1 mg/dL   GLUCOSE, POC    Collection Time: 12/03/22  8:04 AM   Result Value Ref Range    Glucose (POC) 195 (H) 65 - 100 mg/dL    Performed by Liza Grant            Assessment/        Acute hypoxic respiratory failure    -Suspect aspiration    -Continue high flow to maintain O2 saturation above 92%    -Monitor closely in ICU overnight     Probable aspiration pneumonia    -Continue IV Zosyn empirically    -Follow-up blood and sputum cultures     Sepsis    -Secondary to bilateral pneumonia     Generalized debilitation    -Secondary to acute illness and multiple comorbidities    -Doubt if patient is able to ambulate     Coronary artery disease status post CABG    -Stable for now    MRSA carrier     -Add bactroban BID     -Restart IV vancomycin      Plan:  Continue supportive care  Wean off high flow oxygen  Son now unsure about hospice care. Will attempt to place at care facility until definitive decision on this. Care Plan discussed with: Nurse    Total time spent with patient: 30 minutes.     Tariq Brar

## 2022-12-03 NOTE — PROGRESS NOTES
IMPRESSION:   Acute on chronic hypoxic hypercapnic respiratory failure  Congestive heart failure  Aspiration pneumonia  Sepsis  History of CVA  Fever  Additional workup outlined below  Pt is at high risk of sudden decline and decompensation with life threatening consequenses and continued end organ dysfunction and failure  Pt is critically ill. Time spent with pt and staff actively rendering care, managing pt and coordinating care as stated below; 30 minutes, exclusive of any procedures      RECOMMENDATIONS/PLAN:   ICU monitoring  59-year-old male recently discharged from the hospital came back with hypoxia now is on high flow nasal cannula life support to avoid worsening respiratory acidosis, hypercacarbic or hypoxic respiratory arrest   On high flow nasal cannula ABG this morning oxygen level was low on 50% FiO2 will increase FiO2  He is febrile temperature 102  Chest x-ray still showed bilateral pulmonary infiltrate versus congestion  [place] 48[address]  Troponin is elevated  Agree with Empiric IV antibiotics pending culture results   Follow culture results on Zosyn and vancomycin  CVP monitoring we will give 1 dose of Lasix today  IV vasopressors for circulatory shock refractory to fluids to maintain SBP> 90  High flow nasal Cannula oxygen as salvage oxygen delivery device to provide high concentration of oxygen to overcome refractory hypoxia;    Patient requiring restraints due to threat of injury to self, interference with medical devices  Transfuse prn to maintain Hgb > 7  Labs to follow electrolytes, renal function and and blood counts  Bronchial hygiene with respiratory therapy techniques, bronchodilators  Pt needs IV fluids with additives and Drug therapy requiring intensive monitoring for toxicity  Prescription drug management with home med reconciliation reviewed  DVT, SUP prophylaxis  Will be available to assist in medical management while in the CCU pending disposition     [x] High complexity decision making was performed  [x] See my orders for details  HPI  55-year-old male recently discharged from the hospital came in because of shortness of breath and dyspnea he was discharged on oxygen but his condition got worse came back to the hospital febrile temperature 102 he was put on BiPAP machine and then now he is on high flow nasal cannula unable to get any started the patient he moans and groans his saturation was in the 70s were admitted his BNP is elevated chest x-ray still shows bilateral infiltrate. PMH:  has a past medical history of Cirrhosis ([facility] Diabetes ([facility] Hypertension, and Stroke ([facility] PSH:   has a past surgical history that includes hx back surgery; hx gi; and ir insert non tunl cvc over 5 yrs (11/9/2022). FHX: family history includes Heart Disease in his father. SHX:  reports that he has quit smoking. He has never used smokeless tobacco. He reports that he does not currently use alcohol. He reports that he does not currently use drugs.     ALL: No Known Allergies     MEDS:   [x] Reviewed - As Below   [] Not reviewed    Current Facility-Administered Medications   Medication    vancomycin (VANCOCIN) 750 mg in 0.9% sodium chloride 250 mL ([id number])    [START ON 12/4/2022] Vancomycin Level Due 12/4 1100 - Please make sure to draw lab    furosemide (LASIX) injection 40 mg    potassium chloride (KLOR-CON) packet for solution 20 mEq    mupirocin (BACTROBAN) 2 % ointment    VANCOMYCIN INFORMATION NOTE    midodrine (PROAMATINE) tablet 10 mg    balsam peru-castor oiL (VENELEX) ointment    sodium chloride (NS) flush 5-10 mL    sodium chloride (NS) flush 5-40 mL    sodium chloride (NS) flush 5-40 mL    acetaminophen (TYLENOL) tablet 650 mg    Or    acetaminophen (TYLENOL) suppository 650 mg    polyethylene glycol (MIRALAX) packet 17 g    ondansetron (ZOFRAN ODT) tablet 4 mg    Or    ondansetron (ZOFRAN) injection 4 mg    enoxaparin (LOVENOX) injection 40 mg    piperacillin-tazobactam (ZOSYN) 3.375 g in 0.9% sodium chloride (MBP/ADV) 100 mL MBP    ARIPiprazole (ABILIFY) tablet 2 mg    atorvastatin (LIPITOR) tablet 40 mg    baclofen (LIORESAL) tablet 5 mg    gabapentin (NEURONTIN) capsule 100 mg    [Held by provider] losartan (COZAAR) tablet 100 mg    melatonin tablet 10 mg    traMADoL (ULTRAM) tablet 50 mg    hydrOXYzine HCL (ATARAX) tablet 25 mg    clopidogreL (PLAVIX) tablet 75 mg    carvediloL (COREG) tablet 12.5 mg    escitalopram oxalate (LEXAPRO) tablet 20 mg      MAR reviewed and pertinent medications noted or modified as needed   Current Facility-Administered Medications   Medication    vancomycin (VANCOCIN) 750 mg in 0.9% sodium chloride 250 mL ([id number])    [START ON 12/4/2022] Vancomycin Level Due 12/4 1100 - Please make sure to draw lab    furosemide (LASIX) injection 40 mg    potassium chloride (KLOR-CON) packet for solution 20 mEq    mupirocin (BACTROBAN) 2 % ointment    VANCOMYCIN INFORMATION NOTE    midodrine (PROAMATINE) tablet 10 mg    balsam peru-castor oiL (VENELEX) ointment    sodium chloride (NS) flush 5-10 mL    sodium chloride (NS) flush 5-40 mL    sodium chloride (NS) flush 5-40 mL    acetaminophen (TYLENOL) tablet 650 mg    Or    acetaminophen (TYLENOL) suppository 650 mg    polyethylene glycol (MIRALAX) packet 17 g    ondansetron (ZOFRAN ODT) tablet 4 mg    Or    ondansetron (ZOFRAN) injection 4 mg    enoxaparin (LOVENOX) injection 40 mg    piperacillin-tazobactam (ZOSYN) 3.375 g in 0.9% sodium chloride (MBP/ADV) 100 mL MBP    ARIPiprazole (ABILIFY) tablet 2 mg    atorvastatin (LIPITOR) tablet 40 mg    baclofen (LIORESAL) tablet 5 mg    gabapentin (NEURONTIN) capsule 100 mg    [Held by provider] losartan (COZAAR) tablet 100 mg    melatonin tablet 10 mg    traMADoL (ULTRAM) tablet 50 mg    hydrOXYzine HCL (ATARAX) tablet 25 mg    clopidogreL (PLAVIX) tablet 75 mg    carvediloL (COREG) tablet 12.5 mg    escitalopram oxalate (LEXAPRO) tablet 20 mg      PMH:  has a past medical history of Cirrhosis ([facility] Diabetes ([facility] Hypertension, and Stroke ([facility] PSH:   has a past surgical history that includes hx back surgery; hx gi; and ir insert non tunl cvc over 5 yrs (2022). FHX: family history includes Heart Disease in his father. SHX:  reports that he has quit smoking. He has never used smokeless tobacco. He reports that he does not currently use alcohol. He reports that he does not currently use drugs. ROS:Review of systems not obtained due to patient factors. Hemodynamics:    CO:    CI:    CVP:    SVR:   PAP Systolic:    PAP Diastolic:    PVR:    OU77:        Ventilator Settings:      Mode Rate TV Press PEEP FiO2 PIP Min. Vent               60 %     15.5 l/min        Vital Signs: Telemetry:    normal sinus rhythm Intake/Output:   Visit Vitals  BP (!) 125/54 (BP 1 Location: Left upper arm, BP Patient Position: At rest)   Pulse 76   Temp 98.2 °F (36.8 °C)   Resp 15   Ht 5' 9\" (1.753 m)   Wt 63 kg (138 lb 14.2 oz)   SpO2 100%   BMI 20.51 kg/m²       Temp (24hrs), Av.6 °F (37 °C), Min:97.7 °F (36.5 °C), Max:100.4 °F (38 °C)        O2 Device: Hi flow nasal cannula, Heated O2 Flow Rate (L/min): 50 l/min       Wt Readings from Last 4 Encounters:   22 63 kg (138 lb 14.2 oz)   11/10/22 62.5 kg (137 lb 12.6 oz)   22 84.8 kg (187 lb)   22 84.8 kg (187 lb)          Intake/Output Summary (Last 24 hours) at 12/3/2022 0918  Last data filed at 12/3/2022 0600  Gross per 24 hour   Intake 545 ml   Output 3450 ml   Net -2905 ml         Last shift:      No intake/output data recorded. Last 3 shifts:  1901 -  0700  In: 2120 [I.V.:1000]  Out: 4450 [Urine:4450]       Physical Exam:     General: HFNC calm today  HEENT: NCAT, poor dentition, lips and mucosa dry  Eyes: anicteric; conjunctiva clear  Neck: no nodes,  trach midline; no accessory MM use.   Chest: no deformity,   Cardiac: R regular; no murmur;   Lungs: distant breath sounds; no wheezes, bilateral rails  Abd: soft, NT, hypoactive BS  Ext: no edema; no joint swelling;  No clubbing  : NO kennedy, clear urine  Neuro:unable to examine  Psych- unable to assess  Skin: warm, dry, no cyanosis;   Pulses: 1-2+ Bilateral pedal, radial  Capillary: brisk; pale      DATA:    MAR reviewed and pertinent medications noted or modified as needed  MEDS:   Current Facility-Administered Medications   Medication    vancomycin (VANCOCIN) 750 mg in 0.9% sodium chloride 250 mL ([id number])    [START ON 12/4/2022] Vancomycin Level Due 12/4 1100 - Please make sure to draw lab    furosemide (LASIX) injection 40 mg    potassium chloride (KLOR-CON) packet for solution 20 mEq    mupirocin (BACTROBAN) 2 % ointment    VANCOMYCIN INFORMATION NOTE    midodrine (PROAMATINE) tablet 10 mg    balsam peru-castor oiL (VENELEX) ointment    sodium chloride (NS) flush 5-10 mL    sodium chloride (NS) flush 5-40 mL    sodium chloride (NS) flush 5-40 mL    acetaminophen (TYLENOL) tablet 650 mg    Or    acetaminophen (TYLENOL) suppository 650 mg    polyethylene glycol (MIRALAX) packet 17 g    ondansetron (ZOFRAN ODT) tablet 4 mg    Or    ondansetron (ZOFRAN) injection 4 mg    enoxaparin (LOVENOX) injection 40 mg    piperacillin-tazobactam (ZOSYN) 3.375 g in 0.9% sodium chloride (MBP/ADV) 100 mL MBP    ARIPiprazole (ABILIFY) tablet 2 mg    atorvastatin (LIPITOR) tablet 40 mg    baclofen (LIORESAL) tablet 5 mg    gabapentin (NEURONTIN) capsule 100 mg    [Held by provider] losartan (COZAAR) tablet 100 mg    melatonin tablet 10 mg    traMADoL (ULTRAM) tablet 50 mg    hydrOXYzine HCL (ATARAX) tablet 25 mg    clopidogreL (PLAVIX) tablet 75 mg    carvediloL (COREG) tablet 12.5 mg    escitalopram oxalate (LEXAPRO) tablet 20 mg        Labs:    Recent Labs     12/03/22  0631 12/02/22  0346 12/01/22  0336   WBC 6.0 4.6 4.2   HGB 8.7* 8.4* 7.9*    177 181       Recent Labs     12/03/22  0631 12/02/22  0346 12/01/22  0336   * 148* 147*   K 3.5 3.2* 3.0*   CL 117* 118* 118*   CO2 24 23 22   * 175* 160*   BUN 20 24* 23*   CREA 0.63* 0.60* 0.66*   CA 8.0* 8.2* 8.0*       Recent Labs     12/02/22  0159 12/01/22  0352   PH 7.47* 7.52*   PCO2 36 29*   PO2 62* 57*   HCO3 25 23   FIO2 60 50       No results for input(s): CPK, CKNDX, TROIQ in the last 72 hours. No lab exists for component: CPKMB  No results found for: BNPP, BNP   Lab Results   Component Value Date/Time    Culture result:  11/30/2022 01:20 AM     Moderate Carbapenem resistant E coli ** (EXTENDED SPECTRUM BETA LACTAMASE ) **    Culture result: Moderate Enterobacter cloacae complex 11/30/2022 01:20 AM    Culture result: Light Probable Pseudomonas species 11/30/2022 01:20 AM    Culture result: Heavy Normal respiratory kristen 11/30/2022 01:20 AM     Lab Results   Component Value Date/Time    TSH 1.67 07/30/2022 07:11 AM        Imaging:    Results from Hospital Encounter encounter on 11/29/22    XR CHEST PORT    Narrative  INDICATION: chf    EXAMINATION:  AP CHEST, PORTABLE    COMPARISON: 12/1/2022    FINDINGS: Single AP portable view of the chest demonstrates prior median  sternotomy. The cardiomediastinal silhouette is unchanged. Unchanged bilateral  airspace disease left greater than right. Impression  No significant change. Results from [place] encounter on 11/08/22    CT FOOT RT W CONT    Narrative  EXAM: CT FOOT RT W CONT    INDICATION: Right foot swelling and abscess. Evaluate for osteomyelitis. Hypertension, diabetes, and cirrhosis. COMPARISON: Right foot views on 11/10/2022    CONTRAST: 100 mL of Isovue-370. TECHNIQUE: Helical CT of the right foot during uneventful rapid bolus  intravenous contrast administration. Coronal and Sagittal reformats were  generated. Images reviewed in soft tissue and bone windows.  CT dose reduction  was achieved through the use of a standardized protocol tailored for this  examination and automatic exposure control for dose modulation. FINDINGS: Bones: Mild osteopenia. No fracture or osteomyelitis. Joint fluid: Physiologic. Articulations: no evidence of septic arthritis. 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 Tendons: No full-thickness tendon tear. Muscles: Mild diffuse atrophy. Soft tissue mass: None. No fluid collection. Impression  1. No abscess or osteomyelitis. 2. No fracture.       11/30 on high flow nasal cannula hypokalemic replace potassium on vancomycin and Zosyn we will try to wean oxygen to regular nasal cannula  12/1 on high flow nasal cannula requiring more than 50% FiO2 potassium corrected BNP elevated ammonia level 35 C-reactive protein 1.6 procalcitonin 1.23 BNP 10273

## 2022-12-03 NOTE — PROGRESS NOTES
Physician Progress Note      PATIENT:               Eliezer Peralta  CSN #:                  [id number]  :                       1950  ADMIT DATE:       2022 10:25 AM  DISCH DATE:  RESPONDING  PROVIDER #:        Delio Villarreal MD          QUERY TEXT:    Patient admitted with sepsis. If possible, please document in progress notes and discharge summary if you are evaluating and /or treating any of the following: The medical record reflects the following:  Risk Factors: 71 yo male with aspiration pneumonia, debilitation, CHF  Clinical Indicators: BMI 19.5,  Albumin 2.1;  Nutritional Consult: Moderate malnutrition- Greater than 5% weight loss over 1 month  Treatment: Initiate TF via PEG as Osmolite 1.2 Michael at 25 mL/hr, advance to goal rate of 65 mL/hr. Thank you,  Cindy YAJAIRA Zacarias, CCDS    ASPEN Criteria:  https://aspenjournals. onlinelibrary. corona. com/doi/full/10.1177/2146005435208458  Options provided:  -- Protein calorie malnutrition mild  -- Protein calorie malnutrition moderate  -- Protein calorie malnutrition severe  -- Other - I will add my own diagnosis  -- Disagree - Not applicable / Not valid  -- Disagree - Clinically unable to determine / Unknown  -- Refer to Clinical Documentation Reviewer    PROVIDER RESPONSE TEXT:    This patient has severe protein calorie malnutrition.     Query created by: Myra Santoyo on 2022 1:17 PM      Electronically signed by:  Delio Villarreal MD 12/3/2022 9:58 AM

## 2022-12-03 NOTE — PROGRESS NOTES
IMPRESSION:   Acute on chronic hypoxic hypercapnic respiratory failure  Congestive heart failure  Aspiration pneumonia  Sepsis  History of CVA  Fever resolved  Hypernatremia  Additional workup outlined below  Pt is at high risk of sudden decline and decompensation with life threatening consequenses and continued end organ dysfunction and failure  Pt is critically ill. Time spent with pt and staff actively rendering care, managing pt and coordinating care as stated below; 30 minutes, exclusive of any procedures      RECOMMENDATIONS/PLAN:   ICU monitoring  66-year-old male recently discharged from the hospital came back with hypoxia now is on high flow nasal cannula life support to avoid worsening respiratory acidosis, hypercacarbic or hypoxic respiratory arrest   On high flow nasal cannula ABG this morning oxygen level was low on 50% FiO2 will increase FiO2  He is febrile temperature 102  Chest x-ray still showed bilateral pulmonary infiltrate versus congestion  BNP is 99025  Troponin is elevated  Follow culture results on Zosyn and vancomycin  CVP monitoring we will give 1 dose of Lasix today  High flow nasal Cannula oxygen as salvage oxygen delivery device to provide high concentration of oxygen to overcome refractory hypoxia; Will be available to assist in medical management while in the CCU pending disposition  Hospice evaluation done     [x] High complexity decision making was performed  [x] See my orders for details  HPI  66-year-old male recently discharged from the hospital came in because of shortness of breath and dyspnea he was discharged on oxygen but his condition got worse came back to the hospital febrile temperature 102 he was put on BiPAP machine and then now he is on high flow nasal cannula unable to get any started the patient he moans and groans his saturation was in the 70s were admitted his BNP is elevated chest x-ray still shows bilateral infiltrate.   PMH:  has a past medical history of Cirrhosis ([facility] [address] Diabetes ([facility] [address] Hypertension, and Stroke ([facility] [address] PSH:   has a past surgical history that includes hx back surgery; hx gi; and ir insert non tunl cvc over 5 yrs (11/9/2022). FHX: family history includes Heart Disease in his father. SHX:  reports that he has quit smoking. He has never used smokeless tobacco. He reports that he does not currently use alcohol. He reports that he does not currently use drugs.     ALL: No Known Allergies     MEDS:   [x] Reviewed - As Below   [] Not reviewed    Current Facility-Administered Medications   Medication    vancomycin (VANCOCIN) 750 mg in 0.9% sodium chloride 250 mL ([id number])    [START ON 12/4/2022] Vancomycin Level Due 12/4 1100 - Please make sure to draw lab    furosemide (LASIX) injection 40 mg    potassium chloride (KLOR-CON) packet for solution 20 mEq    mupirocin (BACTROBAN) 2 % ointment    VANCOMYCIN INFORMATION NOTE    midodrine (PROAMATINE) tablet 10 mg    balsam peru-castor oiL (VENELEX) ointment    sodium chloride (NS) flush 5-10 mL    sodium chloride (NS) flush 5-40 mL    sodium chloride (NS) flush 5-40 mL    acetaminophen (TYLENOL) tablet 650 mg    Or    acetaminophen (TYLENOL) suppository 650 mg    polyethylene glycol (MIRALAX) packet 17 g    ondansetron (ZOFRAN ODT) tablet 4 mg    Or    ondansetron (ZOFRAN) injection 4 mg    enoxaparin (LOVENOX) injection 40 mg    piperacillin-tazobactam (ZOSYN) 3.375 g in 0.9% sodium chloride (MBP/ADV) 100 mL MBP    ARIPiprazole (ABILIFY) tablet 2 mg    atorvastatin (LIPITOR) tablet 40 mg    baclofen (LIORESAL) tablet 5 mg    gabapentin (NEURONTIN) capsule 100 mg    [Held by provider] losartan (COZAAR) tablet 100 mg    melatonin tablet 10 mg    traMADoL (ULTRAM) tablet 50 mg    hydrOXYzine HCL (ATARAX) tablet 25 mg    clopidogreL (PLAVIX) tablet 75 mg    carvediloL (COREG) tablet 12.5 mg    escitalopram oxalate (LEXAPRO) tablet 20 mg      MAR reviewed and pertinent medications noted or modified as needed   Current Facility-Administered Medications   Medication    vancomycin (VANCOCIN) 750 mg in 0.9% sodium chloride 250 mL ([id number])    [START ON 12/4/2022] Vancomycin Level Due 12/4 1100 - Please make sure to draw lab    furosemide (LASIX) injection 40 mg    potassium chloride (KLOR-CON) packet for solution 20 mEq    mupirocin (BACTROBAN) 2 % ointment    VANCOMYCIN INFORMATION NOTE    midodrine (PROAMATINE) tablet 10 mg    balsam peru-castor oiL (VENELEX) ointment    sodium chloride (NS) flush 5-10 mL    sodium chloride (NS) flush 5-40 mL    sodium chloride (NS) flush 5-40 mL    acetaminophen (TYLENOL) tablet 650 mg    Or    acetaminophen (TYLENOL) suppository 650 mg    polyethylene glycol (MIRALAX) packet 17 g    ondansetron (ZOFRAN ODT) tablet 4 mg    Or    ondansetron (ZOFRAN) injection 4 mg    enoxaparin (LOVENOX) injection 40 mg    piperacillin-tazobactam (ZOSYN) 3.375 g in 0.9% sodium chloride (MBP/ADV) 100 mL MBP    ARIPiprazole (ABILIFY) tablet 2 mg    atorvastatin (LIPITOR) tablet 40 mg    baclofen (LIORESAL) tablet 5 mg    gabapentin (NEURONTIN) capsule 100 mg    [Held by provider] losartan (COZAAR) tablet 100 mg    melatonin tablet 10 mg    traMADoL (ULTRAM) tablet 50 mg    hydrOXYzine HCL (ATARAX) tablet 25 mg    clopidogreL (PLAVIX) tablet 75 mg    carvediloL (COREG) tablet 12.5 mg    escitalopram oxalate (LEXAPRO) tablet 20 mg      PMH:  has a past medical history of Cirrhosis ([facility] [address] Diabetes ([facility] [address] Hypertension, and Stroke ([facility] [address] PSH:   has a past surgical history that includes hx back surgery; hx gi; and ir insert non tunl cvc over 5 yrs (11/9/2022). FHX: family history includes Heart Disease in his father. SHX:  reports that he has quit smoking. He has never used smokeless tobacco. He reports that he does not currently use alcohol. He reports that he does not currently use drugs. ROS:Review of systems not obtained due to patient factors.       Hemodynamics:    CO:    CI:    CVP: SVR:   PAP Systolic:    PAP Diastolic:    PVR:    OS68:        Ventilator Settings:      Mode Rate TV Press PEEP FiO2 PIP Min. Vent               60 %     15.5 l/min        Vital Signs: Telemetry:    normal sinus rhythm Intake/Output:   Visit Vitals  BP (!) 125/54 (BP 1 Location: Left upper arm, BP Patient Position: At rest)   Pulse 76   Temp 98.2 °F (36.8 °C)   Resp 15   Ht 5' 9\" (1.753 m)   Wt 63 kg (138 lb 14.2 oz)   SpO2 100%   BMI 20.51 kg/m²       Temp (24hrs), Av.6 °F (37 °C), Min:97.7 °F (36.5 °C), Max:100.4 °F (38 °C)        O2 Device: Hi flow nasal cannula, Heated O2 Flow Rate (L/min): 50 l/min       Wt Readings from Last 4 Encounters:   22 63 kg (138 lb 14.2 oz)   11/10/22 62.5 kg (137 lb 12.6 oz)   22 84.8 kg (187 lb)   22 84.8 kg (187 lb)          Intake/Output Summary (Last 24 hours) at 12/3/2022 0918  Last data filed at 12/3/2022 0600  Gross per 24 hour   Intake 545 ml   Output 3450 ml   Net -2905 ml         Last shift:      No intake/output data recorded. Last 3 shifts:  1901 -  0700  In: 2120 [I.V.:1000]  Out: 4450 [Urine:4450]       Physical Exam:     General: HFNC calm today  HEENT: NCAT, poor dentition, lips and mucosa dry  Eyes: anicteric; conjunctiva clear  Neck: no nodes,  trach midline; no accessory MM use. Chest: no deformity,   Cardiac: R regular; no murmur;   Lungs: distant breath sounds; no wheezes, bilateral rails  Abd: soft, NT, hypoactive BS  Ext: no edema; no joint swelling;  No clubbing  : NO kennedy, clear urine  Neuro:unable to examine  Psych- unable to assess  Skin: warm, dry, no cyanosis;   Pulses: 1-2+ Bilateral pedal, radial  Capillary: brisk; pale      DATA:    MAR reviewed and pertinent medications noted or modified as needed  MEDS:   Current Facility-Administered Medications   Medication    vancomycin (VANCOCIN) 750 mg in 0.9% sodium chloride 250 mL ([id number])    [START ON 2022] Vancomycin Level Due  1100 - Please make sure to draw lab    furosemide (LASIX) injection 40 mg    potassium chloride (KLOR-CON) packet for solution 20 mEq    mupirocin (BACTROBAN) 2 % ointment    VANCOMYCIN INFORMATION NOTE    midodrine (PROAMATINE) tablet 10 mg    balsam peru-castor oiL (VENELEX) ointment    sodium chloride (NS) flush 5-10 mL    sodium chloride (NS) flush 5-40 mL    sodium chloride (NS) flush 5-40 mL    acetaminophen (TYLENOL) tablet 650 mg    Or    acetaminophen (TYLENOL) suppository 650 mg    polyethylene glycol (MIRALAX) packet 17 g    ondansetron (ZOFRAN ODT) tablet 4 mg    Or    ondansetron (ZOFRAN) injection 4 mg    enoxaparin (LOVENOX) injection 40 mg    piperacillin-tazobactam (ZOSYN) 3.375 g in 0.9% sodium chloride (MBP/ADV) 100 mL MBP    ARIPiprazole (ABILIFY) tablet 2 mg    atorvastatin (LIPITOR) tablet 40 mg    baclofen (LIORESAL) tablet 5 mg    gabapentin (NEURONTIN) capsule 100 mg    [Held by provider] losartan (COZAAR) tablet 100 mg    melatonin tablet 10 mg    traMADoL (ULTRAM) tablet 50 mg    hydrOXYzine HCL (ATARAX) tablet 25 mg    clopidogreL (PLAVIX) tablet 75 mg    carvediloL (COREG) tablet 12.5 mg    escitalopram oxalate (LEXAPRO) tablet 20 mg        Labs:    Recent Labs     12/03/22  0631 12/02/22  0346 12/01/22  0336   WBC 6.0 4.6 4.2   HGB 8.7* 8.4* 7.9*    177 181       Recent Labs     12/03/22  0631 12/02/22  0346 12/01/22  0336   * 148* 147*   K 3.5 3.2* 3.0*   * 118* 118*   CO2 24 23 22   * 175* 160*   BUN 20 24* 23*   CREA 0.63* 0.60* 0.66*   CA 8.0* 8.2* 8.0*       Recent Labs     12/02/22  0159 12/01/22  0352   PH 7.47* 7.52*   PCO2 36 29*   PO2 62* 57*   HCO3 25 23   FIO2 60 50       No results for input(s): CPK, CKNDX, TROIQ in the last 72 hours.     No lab exists for component: CPKMB  No results found for: BNPP, BNP   Lab Results   Component Value Date/Time    Culture result:  11/30/2022 01:20 AM     Moderate Carbapenem resistant E coli ** (EXTENDED SPECTRUM BETA LACTAMASE ) ** Culture result: Moderate Enterobacter cloacae complex 11/30/2022 01:20 AM    Culture result: Light Probable Pseudomonas species 11/30/2022 01:20 AM    Culture result: Heavy Normal respiratory kristen 11/30/2022 01:20 AM     Lab Results   Component Value Date/Time    TSH 1.67 07/30/2022 07:11 AM        Imaging:    Results from Hospital Encounter encounter on 11/29/22    XR CHEST PORT    Narrative  INDICATION: chf    EXAMINATION:  AP CHEST, PORTABLE    COMPARISON: 12/1/2022    FINDINGS: Single AP portable view of the chest demonstrates prior median  sternotomy. The cardiomediastinal silhouette is unchanged. Unchanged bilateral  airspace disease left greater than right. Impression  No significant change. Results from [place] encounter on 11/08/22    CT FOOT RT W CONT    Narrative  EXAM: CT FOOT RT W CONT    INDICATION: Right foot swelling and abscess. Evaluate for osteomyelitis. Hypertension, diabetes, and cirrhosis. COMPARISON: Right foot views on 11/10/2022    CONTRAST: 100 mL of Isovue-370. TECHNIQUE: Helical CT of the right foot during uneventful rapid bolus  intravenous contrast administration. Coronal and Sagittal reformats were  generated. Images reviewed in soft tissue and bone windows. CT dose reduction  was achieved through the use of a standardized protocol tailored for this  examination and automatic exposure control for dose modulation. FINDINGS: Bones: Mild osteopenia. No fracture or osteomyelitis. Joint fluid: Physiologic. Articulations: no evidence of septic arthritis. 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 Tendons: No full-thickness tendon tear. Muscles: Mild diffuse atrophy. Soft tissue mass: None. No fluid collection. Impression  1. No abscess or osteomyelitis. 2. No fracture.       11/30 on high flow nasal cannula hypokalemic replace potassium on vancomycin and Zosyn we will try to wean oxygen to regular nasal cannula  12/1 on high flow nasal cannula requiring more than 50% FiO2 potassium corrected BNP elevated ammonia level 35 C-reactive protein 1.6 procalcitonin 1.23 BNP 42313  12/2 on high flow nasal cannula 60% and 50 L continue with antibiotics and Lasix

## 2022-12-04 NOTE — PROGRESS NOTES
Hospitalist Progress Note         Ashley Bower MD          Daily Progress Note: 12/4/2022      Subjective: The patient is seen for follow  up. 70 y.o. male history of DM, HTN, CVA, Cirrhosis, here for shortness of breath and hypoxia. Pt was sent from his nursing home after they found him to have O2 saturations in the 70's on room air, tachypneic, labored breathing. Pt was placed on BiPAP in the ER with improvement of oxygen saturations but he still has eyes closed, does not speak. He does move his right arm around and groan repeatedly during evaluation but does not seem intentional in his movements. Of note, the patient was last admitted in this hospital on 11/8, discharged 2 days ago on 11/27, for sepsis. At time of discharge the patient was awake and alert, though not fully oriented  ----  The patient is seen for follow  up. His mentation is back to baseline today. Unfortunately he is not very capable of providing history at baseline so it is difficult to assess how he is feeling. He remains confused. Slow to respond. Son agreed to hospice care but now unsure about hospice.     Problem List:  Problem List as of 12/4/2022 Never Reviewed            Codes Class Noted - Resolved    Aspiration pneumonia ([facility] ICD-10-CM: J69.0  ICD-9-CM: 507.0  11/27/2022 - Present        Septic shock ([facility] ICD-10-CM: A41.9, R65.21  ICD-9-CM: 038.9, 785.52, 995.92  11/20/2022 - Present        Infection of right great toe due to methicillin resistant Staphylococcus aureus (MRSA) ICD-10-CM: L08.9, B95.62  ICD-9-CM: 686.8, 041.12  11/20/2022 - Present        Metabolic encephalopathy [id number]: G93.41  ICD-9-CM: 348.31  11/20/2022 - Present        Severe protein-calorie malnutrition ([facility] ICD-10-CM: E43  ICD-9-CM: 262  11/20/2022 - Present        Dysphagia ICD-10-CM: R13.10  ICD-9-CM: 787.20  11/20/2022 - Present        Acute respiratory failure with hypoxia (H[facility]) ICD-10-CM: J96.01  ICD-9-CM: 518.81 11/20/2022 - Present        PAD (peripheral artery disease) ([facility]) ICD-10-CM: I73.9  ICD-9-CM: 443.9  11/20/2022 - Present        UTI (urinary tract infection) ICD-10-CM: N39.0  ICD-9-CM: 599.0  11/8/2022 - Present        Carotid stenosis ICD-10-CM: I65.29  ICD-9-CM: 433.10  8/2/2022 - Present        Cystitis ICD-10-CM: N30.90  ICD-9-CM: 595.9  8/2/2022 - Present        CVA (cerebral vascular accident) [facility]) ICD-10-CM: I63.9  ICD-9-CM: 434.91  7/31/2022 - Present        TIA (transient ischemic attack) ICD-10-CM: G45.9  ICD-9-CM: 435.9  7/29/2022 - Present        Chest pain ICD-10-CM: R07.9  ICD-9-CM: 786.50  10/28/2020 - Present        CAD (coronary artery disease) ICD-10-CM: I25.10  ICD-9-CM: 414.00  10/28/2020 - Present        NSTEMI (non-ST elevated myocardial infarction) [facility]) ICD-10-CM: I21.4  ICD-9-CM: 410.70  10/28/2020 - Present       Medications reviewed  Current Facility-Administered Medications   Medication Dose Route Frequency    cefTAZidime-avibactam (AVYCAZ) 2.5 g in 0.9% sodium chloride (MBP/ADV) 100 mL MPB  2.5 g IntraVENous Q8H    furosemide (LASIX) injection 40 mg  40 mg IntraVENous DAILY    potassium chloride (KLOR-CON) packet for solution 20 mEq  20 mEq Per G Tube DAILY    mupirocin (BACTROBAN) 2 % ointment   Both Nostrils BID    midodrine (PROAMATINE) tablet 10 mg  10 mg Oral TID WITH MEALS    balsam peru-castor oiL (VENELEX) ointment   Topical BID    sodium chloride (NS) flush 5-10 mL  5-10 mL IntraVENous PRN    sodium chloride (NS) flush 5-40 mL  5-40 mL IntraVENous Q8H    sodium chloride (NS) flush 5-40 mL  5-40 mL IntraVENous PRN    acetaminophen (TYLENOL) tablet 650 mg  650 mg Oral Q6H PRN    Or    acetaminophen (TYLENOL) suppository 650 mg  650 mg Rectal Q6H PRN    polyethylene glycol (MIRALAX) packet 17 g  17 g Oral DAILY PRN    ondansetron (ZOFRAN ODT) tablet 4 mg  4 mg Oral Q8H PRN    Or    ondansetron (ZOFRAN) injection 4 mg  4 mg IntraVENous Q6H PRN    enoxaparin (LOVENOX) injection 40 mg  40 mg SubCUTAneous DAILY    ARIPiprazole (ABILIFY) tablet 2 mg  2 mg Per G Tube DAILY    atorvastatin (LIPITOR) tablet 40 mg  40 mg Per G Tube DAILY    baclofen (LIORESAL) tablet 5 mg  5 mg Per G Tube BID    gabapentin (NEURONTIN) capsule 100 mg  100 mg Per G Tube QHS    [Held by provider] losartan (COZAAR) tablet 100 mg  100 mg Per G Tube DAILY    melatonin tablet 10 mg  10 mg Per G Tube QHS    traMADoL (ULTRAM) tablet 50 mg  50 mg Per G Tube Q6H PRN    hydrOXYzine HCL (ATARAX) tablet 25 mg  25 mg Per G Tube Q6H PRN    clopidogreL (PLAVIX) tablet 75 mg  75 mg PEG Tube DAILY    carvediloL (COREG) tablet 12.5 mg  12.5 mg Per G Tube BID    escitalopram oxalate (LEXAPRO) tablet 20 mg  20 mg Per G Tube DAILY       Review of Systems:   A comprehensive review of systems was negative. Objective:   Physical Exam:     Visit Vitals  BP (!) 115/58   Pulse 76   Temp 98.8 °F (37.1 °C)   Resp (!) 32   Ht 5' 9\" (1.753 m)   Wt 63 kg (138 lb 14.2 oz)   SpO2 (!) 85%   BMI 20.51 kg/m²    O2 Flow Rate (L/min): 50 l/min O2 Device: Heated, Hi flow nasal cannula    Temp (24hrs), Av.9 °F (37.2 °C), Min:98.6 °F (37 °C), Max:99.2 °F (37.3 °C)    701 - 1900  In: 225   Out: 3554 [[id number]]   1901 -  0700  In: 1100 [I.V.:550]  Out: 3025 [[id number]]    General:  Arousable but slow to respond                  Respiratory: diminished bilaterally    Chest wall:  No tenderness or deformity. Heart:  Regular rate and rhythm, S1, S2 normal, no murmur, click, rub or gallop. Abdomen:   Soft, non-tender. Bowel sounds normal. No masses,  No organomegaly. Extremities: Extremities normal, atraumatic, no cyanosis or edema. Pulses: 2+ and symmetric all extremities. Skin: Skin color, texture, turgor normal. No rashes or lesions   Neurologic: CNII-XII intact.  No gross sensory or motor deficits     Data Review:       Recent Days:  Recent Labs     22  0631 22  0346   WBC 6.0 4.6   HGB 8.7* 8.4*   HCT 28.8* 26.8*    177       Recent Labs     12/03/22  0631 12/02/22  0346   * 148*   K 3.5 3.2*   * 118*   CO2 24 23   * 175*   BUN 20 24*   CREA 0.63* 0.60*   CA 8.0* 8.2*       Recent Labs     12/04/22  0453 12/02/22  0159   PH 7.52* 7.47*   PCO2 34* 36   PO2 52* 62*   HCO3 27* 25   FIO2 60 60         24 Hour Results:  Recent Results (from the past 24 hour(s))   GLUCOSE, POC    Collection Time: 12/03/22 11:47 AM   Result Value Ref Range    Glucose (POC) 173 (H) 65 - 100 mg/dL    Performed by Che Romero    GLUCOSE, POC    Collection Time: 12/03/22  5:38 PM   Result Value Ref Range    Glucose (POC) 167 (H) 65 - 100 mg/dL    Performed by Che Romero    BLOOD GAS, ARTERIAL    Collection Time: 12/04/22  4:53 AM   Result Value Ref Range    pH 7.52 (H) 7.35 - 7.45      PCO2 34 (L) 35 - 45 mmHg    PO2 52 (L) 80 - 100 mmHg    O2 SATURATION 87 (L) 95 - 99 %    BICARBONATE 27 (H) 22 - 26 mmol/L    BASE EXCESS 4.0 (H) 0 - 3 mmol/L    O2 METHOD High Flow Nasal Cannula      O2 FLOW RATE 50.00 L/min    FIO2 60 %    Sample source Arterial      SITE Left Radial      BRITNEY'S TEST YES      Carboxy-Hgb 0.4 (L) 1 - 2 %    Methemoglobin 0.1 0 - 1.4 %    Oxyhemoglobin 86.2 (L) 95 - 99 %    Performed by Clora Ringer 99.2             Assessment/        Acute hypoxic respiratory failure    -Suspect aspiration    -Continue high flow to maintain O2 saturation above 92%    -Monitor closely in ICU overnight    Metabolic Encephalopathy    -Worsening     Probable aspiration pneumonia    -Continue IV Zosyn empirically    -Follow-up blood and sputum cultures     Sepsis    -Secondary to bilateral pneumonia     Generalized debilitation    -Secondary to acute illness and multiple comorbidities    -Doubt if patient is able to ambulate     Coronary artery disease status post CABG    -Stable for now    MRSA carrier     -Add bactroban BID     -Restart IV vancomycin      Plan:  Continue supportive care  Transfer to [facility] telemetry floor  Long term prognosis very poor  Hospice appropriate. Await sons decision    Care Plan discussed with: Nurse    Total time spent with patient: 30 minutes.     Bruno Luna MD

## 2022-12-04 NOTE — PROGRESS NOTES
Verbal report given to Chago Chambers RN  on Trupti Bambi. Report consisted of patients Situation, Background, Assessment and   Recommendations(SBAR).

## 2022-12-04 NOTE — CONSULTS
Infectious Disease Consult Note    Reason for Consult: Aspiration PNA   Date of Consultation: December 4, 2022  Date of Admission: 11/29/2022  Referring Physician: Hospitalist     HPI: 70 y.o WM me from a recent mission to [facility] last month, during which he was treated for aspiration PNA and right great toe infection. He underwent PEG tube placement during his last admission for dysphagia. His medical history is significant for carotid stenosis, recurrent CVA, CAD s/p CABG, MRSA colonization, liver cirrhosis, DM, HTN and PAD w ischemic changes involving right foot. He was discharged on 11/27 to SNF, readmitted on 11/29 for sepsis due to aspiration PNA. He is admission CXR revealed bilateral infiltrates. Carbapenem resistant E. coli, E Cloacae complex and P aeruginosa was isolated from sputum on 11/29. Blood Cx 11/29 is neg. His tested negative for C. difficile toxin 11/30 and MRSA screen was positive on 11/30 despite decolonization w mupirocin during last admission. CXR today revealed bilateral airspace disease, worse on right. He is maintaining O2 sats on HFNC. Pt is on Vanco and Zosyn, changed to [address] [place] [address] on 11/03. He was in the ICU, transferred to the floors today.      Review of Systems: Unobtainable, minimally responsive     Past Medical History:  Past Medical History:   Diagnosis Date    Cirrhosis ([facility]     Diabetes ([facility]     Hypertension     Stroke [facility])        Past surgical history   Past Surgical History:   Procedure Laterality Date    HX BACK SURGERY      HX GI      IR INSERT NON TUNL CVC OVER 5 YRS  11/9/2022        Social History   Social History     Tobacco Use    Smoking status: Former    Smokeless tobacco: Never   Vaping Use    Vaping Use: Never used   Substance Use Topics    Alcohol use: Not Currently    Drug use: Not Currently        Family history   Family History   Problem Relation Age of Onset    Heart Disease Father           Allergies:  No Known Allergies      Medications:  No current facility-administered medications on file prior to encounter. Current Outpatient Medications on File Prior to Encounter   Medication Sig Dispense Refill    aspirin 81 mg chewable tablet 81 mg by Per G Tube route daily. potassium chloride (KLOR-CON) 20 mEq pack 20 mEq by Per NG tube route two (2) times daily (with meals). insulin glargine (LANTUS) 100 unit/mL injection 10 units subq @ bedtime (Patient not taking: Reported on 11/30/2022)      insulin aspart U-100 (NovoLOG U-100 Insulin aspart) 100 unit/mL injection 2-4 units subcutaneously sliding scale      glyBURIDE (DIABETA) 5 mg tablet 1 tab(s) orally once a day (Patient not taking: Reported on 11/30/2022)      clobetasoL (TEMOVATE) 0.05 % topical cream       losartan (COZAAR) 50 mg tablet       traMADoL (ULTRAM) 50 mg tablet 50 mg by Per G Tube route every six (6) hours as needed for Pain. insulin lispro (HUMALOG) 100 unit/mL injection INITIATE INSULIN CORRECTIVE PROTOCOL:  Normal Insulin Sensitivity   For Blood Sugar (mg/dL) of:     Less than 150 =   0 units           150 -199 =   2 units  200 -249 =   4 units  250 -299 =   6 units  300 -349 =   8 units  350 and above = 10 units and Call Physician     Initiate Hypoglycemia protocol if blood glucose is <70 mg/dL  Fast Acting - Administer Immediately - or within 15 minutes of start of meal, if mealtime coverage. 1 Each 1    clopidogreL (PLAVIX) 75 mg tab Take 1 Tablet by mouth in the morning. (Patient taking differently: 75 mg by PEG Tube route daily.) 30 Tablet 1    ARIPiprazole (ABILIFY) 2 mg tablet 2 mg by Per G Tube route daily. baclofen 5 mg tab 5 mg by Gastrostomy Tube route two (2) times a day. carvediloL (COREG) 12.5 mg tablet 12.5 mg by Per G Tube route two (2) times a day. Indications: high blood pressure      escitalopram oxalate (LEXAPRO) 20 mg tablet 20 mg by Per G Tube route daily. gabapentin (NEURONTIN) 100 mg capsule 100 mg by Per G Tube route nightly. hydrOXYzine HCL (ATARAX) 25 mg tablet 25 mg by Per G Tube route daily. furosemide (LASIX) 40 mg tablet 40 mg by Per G Tube route daily. Indications: visible water retention      losartan (COZAAR) 100 mg tablet 100 mg by Per G Tube route daily. melatonin 5 mg tablet 10 mg nightly. metFORMIN (GLUCOPHAGE) 500 mg tablet 1,000 mg by Per G Tube route two (2) times daily (with meals). senna-docusate (PERICOLACE) 8.6-50 mg per tablet 1 Tablet by Per G Tube route daily. tamsulosin (FLOMAX) 0.4 mg capsule 0.4 mg nightly. Via PEG      omeprazole (PRILOSEC) 20 mg capsule 20 mg daily. Via PEG      atorvastatin (LIPITOR) 40 mg tablet 40 mg by Per G Tube route daily.        Physical Exam:    Vitals: Patient Vitals for the past 24 hrs:   Temp Pulse Resp BP SpO2   12/04/22 1620 98.5 °F (36.9 °C) 77 22 (!) 149/66 96 %   12/04/22 1500 97.9 °F (36.6 °C) 69 29 (!) 115/52 97 %   12/04/22 1339 -- -- -- -- 95 %   12/04/22 1129 -- 77 -- (!) 124/56 --   12/04/22 1100 99.4 °F (37.4 °C) 75 27 (!) 124/56 93 %   12/04/22 1000 -- 76 (!) 32 (!) 115/58 (!) 85 %   12/04/22 0900 -- 83 27 (!) 144/64 95 %   12/04/22 0828 -- 86 -- (!) 144/40 --   12/04/22 0801 -- -- -- -- 97 %   12/04/22 0800 -- -- -- -- 95 %   12/04/22 0700 98.8 °F (37.1 °C) 74 24 (!) 150/64 99 %   12/04/22 0517 99.2 °F (37.3 °C) -- -- 138/74 --   12/04/22 0500 -- 72 25 138/74 97 %   12/04/22 0447 -- -- -- -- 97 %   12/04/22 0300 -- 77 30 (!) 145/67 96 %   12/04/22 0100 -- 73 29 (!) 144/67 98 %   12/04/22 0036 -- -- -- -- 98 %   12/03/22 2350 98.6 °F (37 °C) 66 -- (!) 131/56 97 %   12/03/22 2300 -- 64 27 (!) 127/55 98 %   12/03/22 2100 -- 74 20 139/71 99 %   12/03/22 2046 98.7 °F (37.1 °C) 70 -- (!) 136/56 97 %   12/03/22 2017 -- -- -- -- 98 %   12/03/22 1900 -- 72 20 (!) 143/66 99 %   12/03/22 1800 -- 71 22 (!) 131/57 96 %     GEN: NAD, blanks stare, not following commands   HEENT: NCAT, PERRLA, HFNC in place   HEART: S1, S2+, RRR, No murmur   Lungs: Rhonchi in upper airways, creased BS to the bases  Abdomen: soft, ND, NT, +BS, + peg tube   Genitourinary:+ kennedy cath   Extremities: Ischemic changes involving right great toe and lateral foot   Skin: no obvious pressure ulcer, though sacrum not examined, healed scars on b/l anterior legs     Labs:   Recent Labs     12/03/22  0631 12/02/22  0346   WBC 6.0 4.6   HGB 8.7* 8.4*   HCT 28.8* 26.8*    177     Recent Labs     12/03/22  0631 12/02/22  0346   BUN 20 24*   CREA 0.63* 0.60*       Lab Results   Component Value Date/Time    C-Reactive protein 1.64 (H) 11/16/2022 10:18 AM        Microbiology Data:  - Sputum Cx 11/29: Carbapenem resistant E. coli, E Cloacae complex and P aeruginosa   - Blood Cx 11/29: Neg     Imaging:   CXR 12/04: Bilateral airspace opacities with some increase in the right lung base. Assessment / Plan:     Acute hypoxic respiratory failure due to chronic aspiration         E. Coli (CRE), E Cloacae complex and P aeruginosa isolated from sputum Cx (11/29)         B/l infiltrates on CXR (12/04), on HFNC, upper airway rhonchi on exam         Afebrile w a normal WBC on routine labs          On day # 2 of Ayse James. Routine labs in AM, keep HOB > 30 degrees        Routine labs in AM     2. MRSA colonization, recurrent, received 5 days of nasal mupirocin during last admit       Repeat screen on 11/29 still positive     3. PAD, ischemic ulcer involving right lateral foot and great         4. Recurrent CVA w sig debility, dysphagia, s/p peg placement     5. AMS, non-verbal, grimaces w minimal touch     6.  Sepsis on admission due to #1, clinically improved     Brandy Enamorado MD     12/4/2022

## 2022-12-04 NOTE — PROGRESS NOTES
IMPRESSION:   Acute on chronic hypoxic hypercapnic respiratory failure  Congestive heart failure  Aspiration pneumonia  Healthcare associated pneumonia carbenicillin resistant E. coli as well as Pseudomonas and Enterobacter currently on Avycaz  Sepsis  History of CVA  Fever resolved  Hypernatremia last sodium 148  Pulmonary hypertension RVSP 59 with IVC dilatation 11/10/2022  Pt is at high risk of sudden decline and decompensation with life threatening consequenses and continued end organ dysfunction and failure  Pt is critically ill. Time spent with pt and staff actively rendering care, managing pt and coordinating care as stated below; 30 minutes, exclusive of any procedures      RECOMMENDATIONS/PLAN:   ICU monitoring  79-year-old male recently discharged from the hospital came back with hypoxia now is on high flow nasal cannula life support to avoid worsening respiratory acidosis, hypercacarbic or hypoxic respiratory arrest   High flow nasal oxygen now at 50 L with FiO2 78%  T-max 99.4 last 24 hours blood cultures from admission no growth so far  No change in bilateral infiltrates left greater than right  BNP on admission 12,166 good diuresis overnight       [x] High complexity decision making was performed  [x] See my orders for details  HPI  79-year-old male recently discharged from the hospital came in because of shortness of breath and dyspnea he was discharged on oxygen but his condition got worse came back to the hospital febrile temperature 102 he was put on BiPAP machine and then now he is on high flow nasal cannula unable to get any started the patient he moans and groans his saturation was in the 70s were admitted his BNP is elevated chest x-ray still shows bilateral infiltrate. PMH:  has a past medical history of Cirrhosis ([address] Diabetes ([address] Hypertension, and Stroke ([facility] [address]     PSH:   has a past surgical history that includes hx back surgery; hx gi; and ir insert non tunl cvc over 5 yrs (11/9/2022). FHX: family history includes Heart Disease in his father. SHX:  reports that he has quit smoking. He has never used smokeless tobacco. He reports that he does not currently use alcohol. He reports that he does not currently use drugs.     ALL: No Known Allergies     MEDS:   [x] Reviewed - As Below   [] Not reviewed    Current Facility-Administered Medications   Medication    cefTAZidime-avibactam (AVYCAZ) 2.5 g in 0.9% sodium chloride (MBP/ADV) 100 mL MPB    furosemide (LASIX) injection 40 mg    potassium chloride (KLOR-CON) packet for solution 20 mEq    mupirocin (BACTROBAN) 2 % ointment    midodrine (PROAMATINE) tablet 10 mg    balsam peru-castor oiL (VENELEX) ointment    sodium chloride (NS) flush 5-10 mL    sodium chloride (NS) flush 5-40 mL    sodium chloride (NS) flush 5-40 mL    acetaminophen (TYLENOL) tablet 650 mg    Or    acetaminophen (TYLENOL) suppository 650 mg    polyethylene glycol (MIRALAX) packet 17 g    ondansetron (ZOFRAN ODT) tablet 4 mg    Or    ondansetron (ZOFRAN) injection 4 mg    enoxaparin (LOVENOX) injection 40 mg    ARIPiprazole (ABILIFY) tablet 2 mg    atorvastatin (LIPITOR) tablet 40 mg    baclofen (LIORESAL) tablet 5 mg    gabapentin (NEURONTIN) capsule 100 mg    [Held by provider] losartan (COZAAR) tablet 100 mg    melatonin tablet 10 mg    traMADoL (ULTRAM) tablet 50 mg    hydrOXYzine HCL (ATARAX) tablet 25 mg    clopidogreL (PLAVIX) tablet 75 mg    carvediloL (COREG) tablet 12.5 mg    escitalopram oxalate (LEXAPRO) tablet 20 mg      MAR reviewed and pertinent medications noted or modified as needed   Current Facility-Administered Medications   Medication    cefTAZidime-avibactam (AVYCAZ) 2.5 g in 0.9% sodium chloride (MBP/ADV) 100 mL MPB    furosemide (LASIX) injection 40 mg    potassium chloride (KLOR-CON) packet for solution 20 mEq    mupirocin (BACTROBAN) 2 % ointment    midodrine (PROAMATINE) tablet 10 mg    balsam peru-castor oiL (VENELEX) ointment    sodium chloride (NS) flush 5-10 mL    sodium chloride (NS) flush 5-40 mL    sodium chloride (NS) flush 5-40 mL    acetaminophen (TYLENOL) tablet 650 mg    Or    acetaminophen (TYLENOL) suppository 650 mg    polyethylene glycol (MIRALAX) packet 17 g    ondansetron (ZOFRAN ODT) tablet 4 mg    Or    ondansetron (ZOFRAN) injection 4 mg    enoxaparin (LOVENOX) injection 40 mg    ARIPiprazole (ABILIFY) tablet 2 mg    atorvastatin (LIPITOR) tablet 40 mg    baclofen (LIORESAL) tablet 5 mg    gabapentin (NEURONTIN) capsule 100 mg    [Held by provider] losartan (COZAAR) tablet 100 mg    melatonin tablet 10 mg    traMADoL (ULTRAM) tablet 50 mg    hydrOXYzine HCL (ATARAX) tablet 25 mg    clopidogreL (PLAVIX) tablet 75 mg    carvediloL (COREG) tablet 12.5 mg    escitalopram oxalate (LEXAPRO) tablet 20 mg      PMH:  has a past medical history of Cirrhosis ([facility] [address] Diabetes ([facility] [address] Hypertension, and Stroke ([facility] [address] PSH:   has a past surgical history that includes hx back surgery; hx gi; and ir insert non tunl cvc over 5 yrs (2022). FHX: family history includes Heart Disease in his father. SHX:  reports that he has quit smoking. He has never used smokeless tobacco. He reports that he does not currently use alcohol. He reports that he does not currently use drugs. ROS:Review of systems not obtained due to patient factors. Hemodynamics:    CO:    CI:    CVP:    SVR:   PAP Systolic:    PAP Diastolic:    PVR:    WM28:        Ventilator Settings:      Mode Rate TV Press PEEP FiO2 PIP Min.  Vent               65 %     15.5 l/min        Vital Signs: Telemetry:    normal sinus rhythm Intake/Output:   Visit Vitals  BP (!) 124/56   Pulse 77   Temp 99.4 °F (37.4 °C)   Resp 27   Ht 5' 9\" (1.753 m)   Wt 63 kg (138 lb 14.2 oz)   SpO2 95%   BMI 20.51 kg/m²       Temp (24hrs), Av °F (37.2 °C), Min:98.6 °F (37 °C), Max:99.4 °F (37.4 °C)        O2 Device: Heated, Hi flow nasal cannula O2 Flow Rate (L/min): 50 l/min       Wt Readings from Last 4 Encounters:   12/03/22 63 kg (138 lb 14.2 oz)   11/10/22 62.5 kg (137 lb 12.6 oz)   08/12/22 84.8 kg (187 lb)   07/29/22 84.8 kg (187 lb)          Intake/Output Summary (Last 24 hours) at 12/4/2022 1430  Last data filed at 12/4/2022 1100  Gross per 24 hour   Intake 880 ml   Output 2325 ml   Net -1445 ml         Last shift:      12/04 0701 - 12/04 1900  In: 530   Out: 1600 [Urine:1600]  Last 3 shifts: 12/02 1901 - 12/04 0700  In: 1100 [I.V.:550]  Out: 3025 [Urine:3025]       Physical Exam:     General: HFNC calm today  HEENT: NCAT,   Eyes: anicteric; conjunctiva clear extraocular movements intact  Neck: no nodes,  trach midline; no accessory MM use. Chest: no deformity,   Cardiac: R regular; no murmur;   Lungs: distant breath sounds; no wheezes, has bilateral rails  Abd: soft, NT, hypoactive BS  Ext: Mild edema; no joint swelling;  No clubbing  : clear urine  Neuro: Awake opens his eyes and looks at voice moves his upper extremities minimally  Psych- unable to assess  Skin: warm, dry, no cyanosis;   Pulses: Brachial and radial pulses intact  Capillary: Normal capillary refill      DATA:    MAR reviewed and pertinent medications noted or modified as needed  MEDS:   Current Facility-Administered Medications   Medication    cefTAZidime-avibactam (AVYCAZ) 2.5 g in 0.9% sodium chloride (MBP/ADV) 100 mL MPB    furosemide (LASIX) injection 40 mg    potassium chloride (KLOR-CON) packet for solution 20 mEq    mupirocin (BACTROBAN) 2 % ointment    midodrine (PROAMATINE) tablet 10 mg    balsam peru-castor oiL (VENELEX) ointment    sodium chloride (NS) flush 5-10 mL    sodium chloride (NS) flush 5-40 mL    sodium chloride (NS) flush 5-40 mL    acetaminophen (TYLENOL) tablet 650 mg    Or    acetaminophen (TYLENOL) suppository 650 mg    polyethylene glycol (MIRALAX) packet 17 g    ondansetron (ZOFRAN ODT) tablet 4 mg    Or    ondansetron (ZOFRAN) injection 4 mg    enoxaparin (LOVENOX) injection 40 mg    ARIPiprazole (ABILIFY) tablet 2 mg    atorvastatin (LIPITOR) tablet 40 mg    baclofen (LIORESAL) tablet 5 mg    gabapentin (NEURONTIN) capsule 100 mg    [Held by provider] losartan (COZAAR) tablet 100 mg    melatonin tablet 10 mg    traMADoL (ULTRAM) tablet 50 mg    hydrOXYzine HCL (ATARAX) tablet 25 mg    clopidogreL (PLAVIX) tablet 75 mg    carvediloL (COREG) tablet 12.5 mg    escitalopram oxalate (LEXAPRO) tablet 20 mg        Labs:    Recent Labs     12/03/22  0631 12/02/22  0346   WBC 6.0 4.6   HGB 8.7* 8.4*    177       Recent Labs     12/03/22  0631 12/02/22  0346   * 148*   K 3.5 3.2*   * 118*   CO2 24 23   * 175*   BUN 20 24*   CREA 0.63* 0.60*   CA 8.0* 8.2*       Recent Labs     12/04/22  0453 12/02/22  0159   PH 7.52* 7.47*   PCO2 34* 36   PO2 52* 62*   HCO3 27* 25   FIO2 60 60       BNP 12,000  11/29 blood culture no growth  11/30 sputum occasional polys with E. coli Carbapenem resistant as well as Pseudomonas and Enterobacter  11/10/2022 Echo ejection fraction 50% left atrium 5 cm RVSP 59 IVC dilated  Lab Results   Component Value Date/Time    Culture result:  11/30/2022 01:20 AM     Moderate Carbapenem resistant E coli ** (EXTENDED SPECTRUM BETA LACTAMASE ) **    Culture result: Moderate Enterobacter cloacae complex 11/30/2022 01:20 AM    Culture result: Light Pseudomonas aeruginosa 11/30/2022 01:20 AM    Culture result: Heavy Normal respiratory kristen 11/30/2022 01:20 AM     Lab Results   Component Value Date/Time    TSH 1.67 07/30/2022 07:11 AM        Imaging:    Results from Hospital Encounter encounter on 11/29/22    XR CHEST PORT    Narrative  EXAM:  XR CHEST PORT    INDICATION: CHF    COMPARISON: 12/20/2022    TECHNIQUE: Upright portable chest AP view    FINDINGS: Median sternotomy changes heart size is mildly enlarged. The pulmonary  vasculature is within normal limits. Bilateral airspace opacities with some increase in the right lung base.  The  visualized bones and upper abdomen are age-appropriate. Impression  Bilateral airspace opacities with some increase in the right lung base. Results from [place] encounter on 11/08/22    CT FOOT RT W CONT    Narrative  EXAM: CT FOOT RT W CONT    INDICATION: Right foot swelling and abscess. Evaluate for osteomyelitis. Hypertension, diabetes, and cirrhosis. COMPARISON: Right foot views on 11/10/2022    CONTRAST: 100 mL of Isovue-370. TECHNIQUE: Helical CT of the right foot during uneventful rapid bolus  intravenous contrast administration. Coronal and Sagittal reformats were  generated. Images reviewed in soft tissue and bone windows. CT dose reduction  was achieved through the use of a standardized protocol tailored for this  examination and automatic exposure control for dose modulation. FINDINGS: Bones: Mild osteopenia. No fracture or osteomyelitis. Joint fluid: Physiologic. Articulations: no evidence of septic arthritis. 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 Tendons: No full-thickness tendon tear. Muscles: Mild diffuse atrophy. Soft tissue mass: None. No fluid collection. Impression  1. No abscess or osteomyelitis. 2. No fracture. 11/30 on high flow nasal cannula hypokalemic replace potassium on vancomycin and Zosyn we will try to wean oxygen to regular nasal cannula  12/1 on high flow nasal cannula requiring more than 50% FiO2 potassium corrected BNP elevated ammonia level 35 C-reactive protein 1.6 procalcitonin 1.23 BNP 59917  12/2 on high flow nasal cannula 60% and 50 L continue with antibiotics and Lasix  12/4 remains on nasal high flow oxygen 50 L now 78% FiO2.   Sputum growing E. coli Pseudomonas and Enterobacter currently on Avycaz  Time of care 30 minutes  Emiliana Rivera MD

## 2022-12-04 NOTE — PROGRESS NOTES
Two person skin assessment performed with Frida Sethi RN. Skin warm, pale, and fragile. Pressure injuries noted to bilat heels, feet, knees, buttocks, and cheeks. Sacral pressure noted as well. Open areas noted to right toes. Multiple skin tears and bruising noted. Dressings placed per orders. Pt to be turned frequently and pillow placed between knees.

## 2022-12-04 NOTE — PROGRESS NOTES
Hospitalist Progress Note         Marcio Galloway          Daily Progress Note: 12/4/2022      Subjective: The patient is seen for follow  up. 70 y.o. male history of DM, HTN, CVA, Cirrhosis, here for shortness of breath and hypoxia. Pt was sent from his nursing home after they found him to have O2 saturations in the 70's on room air, tachypneic, labored breathing. Pt was placed on BiPAP in the ER with improvement of oxygen saturations but he still has eyes closed, does not speak. He does move his right arm around and groan repeatedly during evaluation but does not seem intentional in his movements. Of note, the patient was last admitted in this hospital on 11/8, discharged 2 days ago on 11/27, for sepsis. At time of discharge the patient was awake and alert, though not fully oriented  ----  The patient is seen for follow  up. His mentation is back to baseline today. Unfortunately he is not very capable of providing history at baseline so it is difficult to assess how he is feeling. Slow to respond. Son agreed to hospice care but now unsure about hospice.     Problem List:  Problem List as of 12/4/2022 Never Reviewed            Codes Class Noted - Resolved    Aspiration pneumonia ([facility] ICD-10-CM: J69.0  ICD-9-CM: 507.0  11/27/2022 - Present        Septic shock ([facility] ICD-10-CM: A41.9, R65.21  ICD-9-CM: 038.9, 785.52, 995.92  11/20/2022 - Present        Infection of right great toe due to methicillin resistant Staphylococcus aureus (MRSA) ICD-10-CM: L08.9, B95.62  ICD-9-CM: 686.8, 041.12  11/20/2022 - Present        Metabolic encephalopathy PMT-70-VM: G93.41  ICD-9-CM: 348.31  11/20/2022 - Present        Severe protein-calorie malnutrition ([facility] ICD-10-CM: E43  ICD-9-CM: 262  11/20/2022 - Present        Dysphagia ICD-10-CM: R13.10  ICD-9-CM: 787.20  11/20/2022 - Present        Acute respiratory failure with hypoxia ([facility] ICD-10-CM: J96.01  ICD-9-CM: 518.81  11/20/2022 - Present PAD (peripheral artery disease) ([facility]) ICD-10-CM: I73.9  ICD-9-CM: 443.9  11/20/2022 - Present        UTI (urinary tract infection) ICD-10-CM: N39.0  ICD-9-CM: 599.0  11/8/2022 - Present        Carotid stenosis ICD-10-CM: I65.29  ICD-9-CM: 433.10  8/2/2022 - Present        Cystitis ICD-10-CM: N30.90  ICD-9-CM: 595.9  8/2/2022 - Present        CVA (cerebral vascular accident) [facility]) ICD-10-CM: I63.9  ICD-9-CM: 434.91  7/31/2022 - Present        TIA (transient ischemic attack) ICD-10-CM: G45.9  ICD-9-CM: 435.9  7/29/2022 - Present        Chest pain ICD-10-CM: R07.9  ICD-9-CM: 786.50  10/28/2020 - Present        CAD (coronary artery disease) ICD-10-CM: I25.10  ICD-9-CM: 414.00  10/28/2020 - Present        NSTEMI (non-ST elevated myocardial infarction) [facility]) ICD-10-CM: I21.4  ICD-9-CM: 410.70  10/28/2020 - Present       Medications reviewed  Current Facility-Administered Medications   Medication Dose Route Frequency    cefTAZidime-avibactam (AVYCAZ) 2.5 g in 0.9% sodium chloride (MBP/ADV) 100 mL MPB  2.5 g IntraVENous Q8H    furosemide (LASIX) injection 40 mg  40 mg IntraVENous DAILY    potassium chloride (KLOR-CON) packet for solution 20 mEq  20 mEq Per G Tube DAILY    mupirocin (BACTROBAN) 2 % ointment   Both Nostrils BID    midodrine (PROAMATINE) tablet 10 mg  10 mg Oral TID WITH MEALS    balsam peru-castor oiL (VENELEX) ointment   Topical BID    sodium chloride (NS) flush 5-10 mL  5-10 mL IntraVENous PRN    sodium chloride (NS) flush 5-40 mL  5-40 mL IntraVENous Q8H    sodium chloride (NS) flush 5-40 mL  5-40 mL IntraVENous PRN    acetaminophen (TYLENOL) tablet 650 mg  650 mg Oral Q6H PRN    Or    acetaminophen (TYLENOL) suppository 650 mg  650 mg Rectal Q6H PRN    polyethylene glycol (MIRALAX) packet 17 g  17 g Oral DAILY PRN    ondansetron (ZOFRAN ODT) tablet 4 mg  4 mg Oral Q8H PRN    Or    ondansetron (ZOFRAN) injection 4 mg  4 mg IntraVENous Q6H PRN    enoxaparin (LOVENOX) injection 40 mg  40 mg SubCUTAneous DAILY    ARIPiprazole (ABILIFY) tablet 2 mg  2 mg Per G Tube DAILY    atorvastatin (LIPITOR) tablet 40 mg  40 mg Per G Tube DAILY    baclofen (LIORESAL) tablet 5 mg  5 mg Per G Tube BID    gabapentin (NEURONTIN) capsule 100 mg  100 mg Per G Tube QHS    [Held by provider] losartan (COZAAR) tablet 100 mg  100 mg Per G Tube DAILY    melatonin tablet 10 mg  10 mg Per G Tube QHS    traMADoL (ULTRAM) tablet 50 mg  50 mg Per G Tube Q6H PRN    hydrOXYzine HCL (ATARAX) tablet 25 mg  25 mg Per G Tube Q6H PRN    clopidogreL (PLAVIX) tablet 75 mg  75 mg PEG Tube DAILY    carvediloL (COREG) tablet 12.5 mg  12.5 mg Per G Tube BID    escitalopram oxalate (LEXAPRO) tablet 20 mg  20 mg Per G Tube DAILY       Review of Systems:   A comprehensive review of systems was negative. Objective:   Physical Exam:     Visit Vitals  BP (!) 115/58   Pulse 76   Temp 98.8 °F (37.1 °C)   Resp (!) 32   Ht 5' 9\" (1.753 m)   Wt 63 kg (138 lb 14.2 oz)   SpO2 (!) 85%   BMI 20.51 kg/m²    O2 Flow Rate (L/min): 50 l/min O2 Device: Heated, Hi flow nasal cannula    Temp (24hrs), Av.8 °F (37.1 °C), Min:98.6 °F (37 °C), Max:99.2 °F (37.3 °C)    701 - 1900  In: -   Out: 951 [ZRCZL:563]   1901 -  07  In: 1100 [I.V.:550]  Out: 3025 [FXKTS:3584]    General:  Arousable but slow to respond                  Respiratory: diminished bilaterally    Chest wall:  No tenderness or deformity. Heart:  Regular rate and rhythm, S1, S2 normal, no murmur, click, rub or gallop. Abdomen:   Soft, non-tender. Bowel sounds normal. No masses,  No organomegaly. Extremities: Extremities normal, atraumatic, no cyanosis or edema. Pulses: 2+ and symmetric all extremities. Skin: Skin color, texture, turgor normal. No rashes or lesions   Neurologic: CNII-XII intact.  No gross sensory or motor deficits     Data Review:       Recent Days:  Recent Labs     22  0631 22  0346   WBC 6.0 4.6   HGB 8.7* 8.4*   HCT 28.8* 26.8*    177 Recent Labs     12/03/22  0631 12/02/22  0346   * 148*   K 3.5 3.2*   * 118*   CO2 24 23   * 175*   BUN 20 24*   CREA 0.63* 0.60*   CA 8.0* 8.2*       Recent Labs     12/04/22  0453 12/02/22  0159   PH 7.52* 7.47*   PCO2 34* 36   PO2 52* 62*   HCO3 27* 25   FIO2 60 60         24 Hour Results:  Recent Results (from the past 24 hour(s))   GLUCOSE, POC    Collection Time: 12/03/22 11:47 AM   Result Value Ref Range    Glucose (POC) 173 (H) 65 - 100 mg/dL    Performed by Lizy Howe    GLUCOSE, POC    Collection Time: 12/03/22  5:38 PM   Result Value Ref Range    Glucose (POC) 167 (H) 65 - 100 mg/dL    Performed by Lizy Howe    BLOOD GAS, ARTERIAL    Collection Time: 12/04/22  4:53 AM   Result Value Ref Range    pH 7.52 (H) 7.35 - 7.45      PCO2 34 (L) 35 - 45 mmHg    PO2 52 (L) 80 - 100 mmHg    O2 SATURATION 87 (L) 95 - 99 %    BICARBONATE 27 (H) 22 - 26 mmol/L    BASE EXCESS 4.0 (H) 0 - 3 mmol/L    O2 METHOD High Flow Nasal Cannula      O2 FLOW RATE 50.00 L/min    FIO2 60 %    Sample source Arterial      SITE Left Radial      BRITNEY'S TEST YES      Carboxy-Hgb 0.4 (L) 1 - 2 %    Methemoglobin 0.1 0 - 1.4 %    Oxyhemoglobin 86.2 (L) 95 - 99 %    Performed by Allie Dalton 99.2             Assessment/        Acute hypoxic respiratory failure    -Suspect aspiration    -Continue high flow to maintain O2 saturation above 92%    -Monitor closely in ICU overnight     Probable aspiration pneumonia    -Continue IV Zosyn empirically    -Follow-up blood and sputum cultures     Sepsis    -Secondary to bilateral pneumonia     Generalized debilitation    -Secondary to acute illness and multiple comorbidities    -Doubt if patient is able to ambulate     Coronary artery disease status post CABG    -Stable for now    MRSA carrier     -Add bactroban BID     -Restart IV vancomycin      Plan:  Continue supportive care  Transfer to [facility] telemetry floor  Long term prognosis very poor  Will have the patient's son communicate further with hospice team when able    Care Plan discussed with: Nurse    Total time spent with patient: 30 minutes.     Arvind Harper

## 2022-12-05 NOTE — PROGRESS NOTES
[facility] to [facility] hospice. CM will continue to follow.

## 2022-12-05 NOTE — PROGRESS NOTES
IMPRESSION:   Acute on chronic hypoxic hypercapnic respiratory failure  Congestive heart failure  Aspiration pneumonia  Healthcare associated pneumonia carbenicillin resistant E. coli as well as Pseudomonas and Enterobacter currently on Avycaz  Sepsis  History of CVA  Fever resolved  Hypernatremia last sodium 148  Pulmonary hypertension RVSP 59 with IVC dilatation 11/10/2022      RECOMMENDATIONS/PLAN:   ICU monitoring  72-year-old male recently discharged from the hospital came back with hypoxia now is on high flow nasal cannula life support to avoid worsening respiratory acidosis, hypercacarbic or hypoxic respiratory arrest   High flow nasal oxygen now at 50 L with FiO2 78%  T-max 99.4 last 24 hours blood cultures from admission no growth so far  No change in bilateral infiltrates left greater than right  BNP on admission 12,166 good diuresis overnight     [x] High complexity decision making was performed  [x] See my orders for details  HPI  72-year-old male recently discharged from the hospital came in because of shortness of breath and dyspnea he was discharged on oxygen but his condition got worse came back to the hospital febrile temperature 102 he was put on BiPAP machine and then now he is on high flow nasal cannula unable to get any started the patient he moans and groans his saturation was in the 70s were admitted his BNP is elevated chest x-ray still shows bilateral infiltrate. PMH:  has a past medical history of Cirrhosis ([address] Diabetes ([address] Hypertension, and Stroke ([address] PSH:   has a past surgical history that includes hx back surgery; hx gi; and ir insert non tunl cvc over 5 yrs (11/9/2022). FHX: family history includes Heart Disease in his father. SHX:  reports that he has quit smoking. He has never used smokeless tobacco. He reports that he does not currently use alcohol. He reports that he does not currently use drugs.     ALL: No Known Allergies     MEDS:   [x] Reviewed - As Below [] Not reviewed    Current Facility-Administered Medications   Medication    potassium chloride 10 mEq in 100 ml IVPB    cefTAZidime-avibactam (AVYCAZ) 2.5 g in 0.9% sodium chloride (MBP/ADV) 100 mL MPB    furosemide (LASIX) injection 40 mg    potassium chloride (KLOR-CON) packet for solution 20 mEq    mupirocin (BACTROBAN) 2 % ointment    midodrine (PROAMATINE) tablet 10 mg    balsam peru-castor oiL (VENELEX) ointment    sodium chloride (NS) flush 5-10 mL    sodium chloride (NS) flush 5-40 mL    sodium chloride (NS) flush 5-40 mL    acetaminophen (TYLENOL) tablet 650 mg    Or    acetaminophen (TYLENOL) suppository 650 mg    polyethylene glycol (MIRALAX) packet 17 g    ondansetron (ZOFRAN ODT) tablet 4 mg    Or    ondansetron (ZOFRAN) injection 4 mg    enoxaparin (LOVENOX) injection 40 mg    ARIPiprazole (ABILIFY) tablet 2 mg    atorvastatin (LIPITOR) tablet 40 mg    baclofen (LIORESAL) tablet 5 mg    gabapentin (NEURONTIN) capsule 100 mg    [Held by provider] losartan (COZAAR) tablet 100 mg    melatonin tablet 10 mg    traMADoL (ULTRAM) tablet 50 mg    hydrOXYzine HCL (ATARAX) tablet 25 mg    clopidogreL (PLAVIX) tablet 75 mg    carvediloL (COREG) tablet 12.5 mg    escitalopram oxalate (LEXAPRO) tablet 20 mg      MAR reviewed and pertinent medications noted or modified as needed   Current Facility-Administered Medications   Medication    potassium chloride 10 mEq in 100 ml IVPB    cefTAZidime-avibactam (AVYCAZ) 2.5 g in 0.9% sodium chloride (MBP/ADV) 100 mL MPB    furosemide (LASIX) injection 40 mg    potassium chloride (KLOR-CON) packet for solution 20 mEq    mupirocin (BACTROBAN) 2 % ointment    midodrine (PROAMATINE) tablet 10 mg    balsam peru-castor oiL (VENELEX) ointment    sodium chloride (NS) flush 5-10 mL    sodium chloride (NS) flush 5-40 mL    sodium chloride (NS) flush 5-40 mL    acetaminophen (TYLENOL) tablet 650 mg    Or    acetaminophen (TYLENOL) suppository 650 mg    polyethylene glycol (MIRALAX) packet 17 g    ondansetron (ZOFRAN ODT) tablet 4 mg    Or    ondansetron (ZOFRAN) injection 4 mg    enoxaparin (LOVENOX) injection 40 mg    ARIPiprazole (ABILIFY) tablet 2 mg    atorvastatin (LIPITOR) tablet 40 mg    baclofen (LIORESAL) tablet 5 mg    gabapentin (NEURONTIN) capsule 100 mg    [Held by provider] losartan (COZAAR) tablet 100 mg    melatonin tablet 10 mg    traMADoL (ULTRAM) tablet 50 mg    hydrOXYzine HCL (ATARAX) tablet 25 mg    clopidogreL (PLAVIX) tablet 75 mg    carvediloL (COREG) tablet 12.5 mg    escitalopram oxalate (LEXAPRO) tablet 20 mg      PMH:  has a past medical history of Cirrhosis ([facility] Diabetes ([facility] Hypertension, and Stroke ([facility] PSH:   has a past surgical history that includes hx back surgery; hx gi; and ir insert non tunl cvc over 5 yrs (2022). FHX: family history includes Heart Disease in his father. SHX:  reports that he has quit smoking. He has never used smokeless tobacco. He reports that he does not currently use alcohol. He reports that he does not currently use drugs. ROS:Review of systems not obtained due to patient factors. Hemodynamics:    CO:    CI:    CVP:    SVR:   PAP Systolic:    PAP Diastolic:    PVR:    EY96:        Ventilator Settings:      Mode Rate TV Press PEEP FiO2 PIP Min.  Vent               80 %     15.5 l/min        Vital Signs: Telemetry:    normal sinus rhythm Intake/Output:   Visit Vitals  BP (!) 121/57 (BP 1 Location: Left upper arm, BP Patient Position: At rest)   Pulse 67   Temp 98 °F (36.7 °C)   Resp 27   Ht 5' 9\" (1.753 m)   Wt 63 kg (138 lb 14.2 oz)   SpO2 94%   BMI 20.51 kg/m²       Temp (24hrs), Av.4 °F (36.9 °C), Min:97.9 °F (36.6 °C), Max:99.4 °F (37.4 °C)        O2 Device: Hi flow nasal cannula, Heated O2 Flow Rate (L/min): 50 l/min       Wt Readings from Last 4 Encounters:   22 63 kg (138 lb 14.2 oz)   11/10/22 62.5 kg (137 lb 12.6 oz)   22 84.8 kg (187 lb)   22 84.8 kg (187 lb)          Intake/Output Summary (Last 24 hours) at 12/5/2022 0949  Last data filed at 12/5/2022 0114  Gross per 24 hour   Intake 855 ml   Output 1400 ml   Net -545 ml         Last shift:      No intake/output data recorded. Last 3 shifts: 12/03 1901 - 12/05 0700  In: 955 [I.V.:200]  Out: 2125 [Urine:2125]       Physical Exam:     General: HFNC calm today  HEENT: NCAT,   Eyes: anicteric; conjunctiva clear extraocular movements intact  Neck: no nodes,  trach midline; no accessory MM use. Chest: no deformity,   Cardiac: R regular; no murmur;   Lungs: distant breath sounds; no wheezes, has bilateral rails  Abd: soft, NT, hypoactive BS  Ext: Mild edema; no joint swelling;  No clubbing  : clear urine  Neuro: Awake opens his eyes and looks at voice moves his upper extremities minimally  Psych- unable to assess  Skin: warm, dry, no cyanosis;   Pulses: Brachial and radial pulses intact  Capillary: Normal capillary refill      DATA:    MAR reviewed and pertinent medications noted or modified as needed  MEDS:   Current Facility-Administered Medications   Medication    potassium chloride 10 mEq in 100 ml IVPB    cefTAZidime-avibactam (AVYCAZ) 2.5 g in 0.9% sodium chloride (MBP/ADV) 100 mL MPB    furosemide (LASIX) injection 40 mg    potassium chloride (KLOR-CON) packet for solution 20 mEq    mupirocin (BACTROBAN) 2 % ointment    midodrine (PROAMATINE) tablet 10 mg    balsam peru-castor oiL (VENELEX) ointment    sodium chloride (NS) flush 5-10 mL    sodium chloride (NS) flush 5-40 mL    sodium chloride (NS) flush 5-40 mL    acetaminophen (TYLENOL) tablet 650 mg    Or    acetaminophen (TYLENOL) suppository 650 mg    polyethylene glycol (MIRALAX) packet 17 g    ondansetron (ZOFRAN ODT) tablet 4 mg    Or    ondansetron (ZOFRAN) injection 4 mg    enoxaparin (LOVENOX) injection 40 mg    ARIPiprazole (ABILIFY) tablet 2 mg    atorvastatin (LIPITOR) tablet 40 mg    baclofen (LIORESAL) tablet 5 mg    gabapentin (NEURONTIN) capsule 100 mg    [Held by provider] losartan (COZAAR) tablet 100 mg    melatonin tablet 10 mg    traMADoL (ULTRAM) tablet 50 mg    hydrOXYzine HCL (ATARAX) tablet 25 mg    clopidogreL (PLAVIX) tablet 75 mg    carvediloL (COREG) tablet 12.5 mg    escitalopram oxalate (LEXAPRO) tablet 20 mg        Labs:    Recent Labs     12/05/22  0647 12/03/22  0631   WBC 5.3 6.0   HGB 8.5* 8.7*    169       Recent Labs     12/05/22  0647 12/03/22  0631   * 148*   K 3.1* 3.5   * 117*   CO2 30 24   * 194*   BUN 19 20   CREA 0.58* 0.63*   CA 7.7* 8.0*   PHOS 3.3  --    ALB 1.5*  --        Recent Labs     12/04/22  0453   PH 7.52*   PCO2 34*   PO2 52*   HCO3 27*   FIO2 60       BNP 12,000  11/29 blood culture no growth  11/30 sputum occasional polys with E. coli Carbapenem resistant as well as Pseudomonas and Enterobacter  11/10/2022 Echo ejection fraction 50% left atrium 5 cm RVSP 59 IVC dilated  Lab Results   Component Value Date/Time    Culture result:  11/30/2022 01:20 AM     Moderate Carbapenem resistant E coli ** (EXTENDED SPECTRUM BETA LACTAMASE ) **    Culture result: Moderate Enterobacter cloacae complex 11/30/2022 01:20 AM    Culture result: Light Pseudomonas aeruginosa 11/30/2022 01:20 AM    Culture result: Heavy Normal respiratory kristen 11/30/2022 01:20 AM     Lab Results   Component Value Date/Time    TSH 1.67 07/30/2022 07:11 AM        Imaging:    Results from Hospital Encounter encounter on 11/29/22    XR CHEST PORT    Narrative  EXAM:  XR CHEST PORT    INDICATION: CHF    COMPARISON: 12/20/2022    TECHNIQUE: Upright portable chest AP view    FINDINGS: Median sternotomy changes heart size is mildly enlarged. The pulmonary  vasculature is within normal limits. Bilateral airspace opacities with some increase in the right lung base. The  visualized bones and upper abdomen are age-appropriate. Impression  Bilateral airspace opacities with some increase in the right lung base.       Results from [facility] encounter on 11/08/22    CT FOOT RT W CONT    Narrative  EXAM: CT FOOT RT W CONT    INDICATION: Right foot swelling and abscess. Evaluate for osteomyelitis. Hypertension, diabetes, and cirrhosis. COMPARISON: Right foot views on 11/10/2022    CONTRAST: 100 mL of Isovue-370. TECHNIQUE: Helical CT of the right foot during uneventful rapid bolus  intravenous contrast administration. Coronal and Sagittal reformats were  generated. Images reviewed in soft tissue and bone windows. CT dose reduction  was achieved through the use of a standardized protocol tailored for this  examination and automatic exposure control for dose modulation. FINDINGS: Bones: Mild osteopenia. No fracture or osteomyelitis. Joint fluid: Physiologic. Articulations: no evidence of septic arthritis. 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 Tendons: No full-thickness tendon tear. Muscles: Mild diffuse atrophy. Soft tissue mass: None. No fluid collection. Impression  1. No abscess or osteomyelitis. 2. No fracture. 11/30 on high flow nasal cannula hypokalemic replace potassium on vancomycin and Zosyn we will try to wean oxygen to regular nasal cannula  12/1 on high flow nasal cannula requiring more than 50% FiO2 potassium corrected BNP elevated ammonia level 35 C-reactive protein 1.6 procalcitonin 1.23 BNP [id number]  12/2 on high flow nasal cannula 60% and 50 L continue with antibiotics and Lasix  12/4 remains on nasal high flow oxygen 50 L now 78% FiO2.   Sputum growing E. coli Pseudomonas and Enterobacter currently on Avycaz  12/5 on high flow nasal cannula condition remains same continue with antibiotics

## 2022-12-05 NOTE — PROGRESS NOTES
Hospitalist Progress Note    Subjective:   Daily Progress Note: 12/5/2022 9:13 AM    Patient is disoriented but resting comfortably. No acute events noted overnight.     Current Facility-Administered Medications   Medication Dose Route Frequency    cefTAZidime-avibactam (AVYCAZ) 2.5 g in 0.9% sodium chloride (MBP/ADV) 100 mL MPB  2.5 g IntraVENous Q8H    furosemide (LASIX) injection 40 mg  40 mg IntraVENous DAILY    potassium chloride (KLOR-CON) packet for solution 20 mEq  20 mEq Per G Tube DAILY    mupirocin (BACTROBAN) 2 % ointment   Both Nostrils BID    midodrine (PROAMATINE) tablet 10 mg  10 mg Oral TID WITH MEALS    balsam peru-castor oiL (VENELEX) ointment   Topical BID    sodium chloride (NS) flush 5-10 mL  5-10 mL IntraVENous PRN    sodium chloride (NS) flush 5-40 mL  5-40 mL IntraVENous Q8H    sodium chloride (NS) flush 5-40 mL  5-40 mL IntraVENous PRN    acetaminophen (TYLENOL) tablet 650 mg  650 mg Oral Q6H PRN    Or    acetaminophen (TYLENOL) suppository 650 mg  650 mg Rectal Q6H PRN    polyethylene glycol (MIRALAX) packet 17 g  17 g Oral DAILY PRN    ondansetron (ZOFRAN ODT) tablet 4 mg  4 mg Oral Q8H PRN    Or    ondansetron (ZOFRAN) injection 4 mg  4 mg IntraVENous Q6H PRN    enoxaparin (LOVENOX) injection 40 mg  40 mg SubCUTAneous DAILY    ARIPiprazole (ABILIFY) tablet 2 mg  2 mg Per G Tube DAILY    atorvastatin (LIPITOR) tablet 40 mg  40 mg Per G Tube DAILY    baclofen (LIORESAL) tablet 5 mg  5 mg Per G Tube BID    gabapentin (NEURONTIN) capsule 100 mg  100 mg Per G Tube QHS    [Held by provider] losartan (COZAAR) tablet 100 mg  100 mg Per G Tube DAILY    melatonin tablet 10 mg  10 mg Per G Tube QHS    traMADoL (ULTRAM) tablet 50 mg  50 mg Per G Tube Q6H PRN    hydrOXYzine HCL (ATARAX) tablet 25 mg  25 mg Per G Tube Q6H PRN    clopidogreL (PLAVIX) tablet 75 mg  75 mg PEG Tube DAILY    carvediloL (COREG) tablet 12.5 mg  12.5 mg Per G Tube BID    escitalopram oxalate (LEXAPRO) tablet 20 mg  20 mg Per G Tube DAILY        Review of Systems  Review of Systems   Unable to perform ROS: Acuity of condition          Objective:     Visit Vitals  BP (!) 121/57 (BP 1 Location: Left upper arm, BP Patient Position: At rest)   Pulse 67   Temp 98 °F (36.7 °C)   Resp 27   Ht 5' 9\" (1.753 m)   Wt 63 kg (138 lb 14.2 oz)   SpO2 96%   BMI 20.51 kg/m²    O2 Flow Rate (L/min): 50 l/min O2 Device: Heated, Hi flow nasal cannula    Temp (24hrs), Av.4 °F (36.9 °C), Min:97.9 °F (36.6 °C), Max:99.4 °F (37.4 °C)      No intake/output data recorded.  1901 -  0700  In: 955 [I.V.:200]  Out: 2125 [Urine:2125]    XR CHEST PORT   Final Result      Bilateral airspace opacities with some increase in the right lung base. XR CHEST PORT   Final Result   No significant change. XR CHEST PORT   Final Result   1. No significant change in heterogeneous patchy bilateral opacities. 2.  Possible trace left pleural effusion. XR CHEST PORT   Final Result      Unchanged patchy bilateral airspace disease, compatible with pneumonia. XR CHEST PORT   Final Result      Stable bilateral pulmonary infiltrates. XR CHEST PORT    (Results Pending)        PHYSICAL EXAM:    Physical Exam  Vitals and nursing note reviewed. Constitutional:       Appearance: He is ill-appearing. Comments: Chronically ill appearing male, mouth breathing   HENT:      Head: Normocephalic and atraumatic. Mouth/Throat:      Mouth: Mucous membranes are dry. Cardiovascular:      Rate and Rhythm: Normal rate and regular rhythm. Pulmonary:      Effort: No respiratory distress. Breath sounds: Rhonchi present. Comments: Rhonchi heard upper right and left lobes  Abdominal:      General: Bowel sounds are normal. There is no distension. Palpations: Abdomen is soft. Tenderness: There is no abdominal tenderness. Comments: PEG tube in place   Musculoskeletal:      Right lower leg: No edema.       Left lower leg: No edema. Comments: Bunny boots noted to B/L LE in proper placement    Skin:     Capillary Refill: Capillary refill takes less than 2 seconds. Neurological:      Mental Status: He is alert. He is disoriented. Psychiatric:      Comments: Unable to assess       Data Review    Recent Results (from the past 24 hour(s))   GLUCOSE, POC    Collection Time: 12/04/22 11:53 AM   Result Value Ref Range    Glucose (POC) 146 (H) 65 - 100 mg/dL    Performed by Penny Olivarez    RENAL FUNCTION PANEL    Collection Time: 12/05/22  6:47 AM   Result Value Ref Range    Sodium 148 (H) 136 - 145 mmol/L    Potassium 3.1 (L) 3.5 - 5.1 mmol/L    Chloride 112 (H) 97 - 108 mmol/L    CO2 30 21 - 32 mmol/L    Anion gap 6 5 - 15 mmol/L    Glucose 163 (H) 65 - 100 mg/dL    BUN 19 6 - 20 mg/dL    Creatinine 0.58 (L) 0.70 - 1.30 mg/dL    BUN/Creatinine ratio 33 (H) 12 - 20      eGFR >60 >60 ml/min/1.73m2    Calcium 7.7 (L) 8.5 - 10.1 mg/dL    Phosphorus 3.3 2.6 - 4.7 mg/dL    Albumin 1.5 (L) 3.5 - 5.0 g/dL   PROCALCITONIN    Collection Time: 12/05/22  6:47 AM   Result Value Ref Range    Procalcitonin 0.78 (H) 0 ng/mL   C REACTIVE PROTEIN, QT    Collection Time: 12/05/22  6:47 AM   Result Value Ref Range    C-Reactive protein 9.24 (H) 0.00 - 0.60 mg/dL   CBC WITH AUTOMATED DIFF    Collection Time: 12/05/22  6:47 AM   Result Value Ref Range    WBC 5.3 4.1 - 11.1 K/uL    RBC 3.00 (L) 4.10 - 5.70 M/uL    HGB 8.5 (L) 12.1 - 17.0 g/dL    HCT 27.7 (L) 36.6 - 50.3 %    MCV 92.3 80.0 - 99.0 FL    MCH 28.3 26.0 - 34.0 PG    MCHC 30.7 30.0 - 36.5 g/dL    RDW 16.2 (H) 11.5 - 14.5 %    PLATELET 874 134 - 240 K/uL    MPV 11.1 8.9 - 12.9 FL    NRBC 0.0 0.0  WBC    ABSOLUTE NRBC 0.00 0.00 - 0.01 K/uL    NEUTROPHILS PENDING %    LYMPHOCYTES PENDING %    MONOCYTES PENDING %    EOSINOPHILS PENDING %    BASOPHILS PENDING %    IMMATURE GRANULOCYTES PENDING %    ABS. NEUTROPHILS PENDING K/UL    ABS. LYMPHOCYTES PENDING K/UL    ABS.  MONOCYTES PENDING K/UL ABS. EOSINOPHILS PENDING K/UL    ABS. BASOPHILS PENDING K/UL    ABS. IMM. GRANS. PENDING K/UL    DF PENDING         Assessment/Plan:     Active Problems:    Acute respiratory failure with hypoxia (HCC) (13/28/3306)      Metabolic encephalopathy  Acute on chronic hypoxic hypercapnic respiratory failure  Aspiration pneumonia  Sepsis    Hospital Course: Buster Lombardo is a 77-year-old male with a PMH of diabetes, hypertension, CVA, cirrhosis, and previous MRSA infection who presented with shortness of breath. Of note patient was admitted in hospital 11/18 discharged 2 days ago on 11/27 for sepsis. In ED hypoxic placed on BiPAP. Initial labs significant for WBC of 14.8, hemoglobin of 9.8, glucose of 146, troponin of 111, BNP of 12,166, and T bilirubin of 1.9. COVID and flu negative. C. difficile antigen negative. CXR with stable bilateral pulmonary infiltrates. Patient admitted for further work-up. Patient started on IV Zosyn. Pulmonology consulted. Patient weaned off of BiPap to high flow. Patient made DNR/DNI. Podiatry consulted for multiple ulcers to right foot. Patient son initially agreed to hospice care available to sign papers tomorrow, 12/6.     Acute hypoxic respiratory failure  Currently on HFNC, wean as tolerated   Maintain O2 saturation above 78%    Metabolic Encephalopathy - worsening     Aspiration pneumonia  S/p zosyn and vancomycin  Continue on avycaz  11/30 sputum: CRE, pseudomonas aeruginosa, and enterobacter cloacae complex      Sepsis  Secondary to bilateral pneumonia  11/29 blood: no growth  1/30 blood: NGTD,prelim   C diff negative, MRSA positive     Generalized debilitation  Secondary to acute illness and multiple comorbidities  Doubt if patient is able to ambulate     Coronary artery disease   S/p CABG  Continue on atorvastatin, clopidogrel, and carvedilol    HFpEF with exacerbation, mild  Continue on IV Lasix    Hypertension, now with hypotension  Continue on midodrine     MRSA carrier  Continue on bactroban BID    Moderate protein calorie malnutrition  Continue on PEG tube feedings  Nutrition following    Multiple ulcers  Wound care per podiatry no intervention planned  Wound care and podiatry following    DVT Prophylaxis: lovenox  GI Prophylaxis: not indicated  Discharge and disposition barriers: hospice Tuesday, 24hr  CODE: DNR/DNI    Care Plan discussed with: Patient/Family and Nurse    Total time spent with patient: 35 minutes.

## 2022-12-05 NOTE — PROGRESS NOTES
Infectious Disease Progress Note           Subjective:   Pt seen and examined at bedside. Stable, denies new complaints, no acute events since last seen   Objective:   Physical Exam:     Visit Vitals  /66   Pulse 67   Temp 98 °F (36.7 °C)   Resp 27   Ht 5' 9\" (1.753 m)   Wt 136 lb 11 oz (62 kg)   SpO2 94%   BMI 20.18 kg/m²    O2 Flow Rate (L/min): 50 l/min O2 Device: Hi flow nasal cannula, Heated    Temp (24hrs), Av.2 °F (36.8 °C), Min:97.9 °F (36.6 °C), Max:98.8 °F (37.1 °C)    701 - 1900  In: 4645   Out: -    1901 - 700  In: 212 [I.V.:200]  Out:  [Urine:]    General: NAD, unresponsive, blank stare   HEENT: VANGIE, Moist mucosa   Lungs: CTA b/l, decreased at the bases, no wheeze/rhonchi   Heart: S1S2+, RRR, no murmur  Abdo: Soft, NT, ND, +BS   : + kennedy cath   Exts: ischemic changes involving right lateral foot and great toe   Skin: no obvious wounds, sacrum not examined     Data Review:       Recent Days:  Recent Labs     22  0647 22  0631   WBC 5.3 6.0   HGB 8.5* 8.7*   HCT 27.7* 28.8*    169     Recent Labs     22  0647 22  0631   BUN 19 20   CREA 0.58* 0.63*       Lab Results   Component Value Date/Time    C-Reactive protein 9.24 (H) 2022 06:47 AM          Microbiology     Results       Procedure Component Value Units Date/Time    CULTURE, RESPIRATORY/SPUTUM/BRONCH Poli Horowitz STAIN [[id number]]  (Susceptibility) Collected: 22 0120    Order Status: Completed Specimen: Sputum Updated: 22 4779     Special Requests: No Special Requests        GRAM STAIN Occasional WBCs seen         No Epithelial cells seen               3+ Gram Positive Rods (Coryneform)           Culture result:        Moderate Carbapenem resistant E coli ** (EXTENDED SPECTRUM BETA LACTAMASE ) **                  Moderate Enterobacter cloacae complex                  Light Pseudomonas aeruginosa                  Heavy Normal respiratory kristen          Susceptibility        Carbapenem Resistant E. Coli      TELMA      Amikacin ($) Susceptible      Ampicillin ($) Resistant      Ampicillin/sulbactam ($) Resistant      Cefazolin ($) Resistant      Cefepime ($$) Resistant      Cefoxitin Resistant      Ceftazidime ($) Resistant      Ceftriaxone ($) Resistant      Ciprofloxacin ($) Resistant      Ertapenem ($$$$) Resistant  [1]       Gentamicin ($) Susceptible      Imipenem Resistant  [1]       Levofloxacin ($) Resistant      Meropenem ($$) Resistant  [1]       Piperacillin/Tazobac ($) Resistant      Tobramycin ($) Susceptible      Trimeth/Sulfa Resistant                   [1]  (SENSITIVITIES PERFORMED BY E-TEST)               Susceptibility        Enterobacter cloacae complex      TELMA      Amikacin ($) Susceptible      Cefazolin ($) Resistant      Cefepime ($$) Susceptible      Cefoxitin Resistant      Ceftazidime ($) Intermediate      Ceftriaxone ($) Susceptible      Ciprofloxacin ($) Susceptible  [1]       Gentamicin ($) Susceptible      Levofloxacin ($) Susceptible  [2]       Meropenem ($$) Susceptible  [3]       Tobramycin ($) Susceptible      Trimeth/Sulfa Susceptible                   [1]  FDA**FDA INTERPRETATION REFLECTED, REFER TO CLSI FOR ALTERNATE  INTERPRETATIONS.**L       [2]  **FDA INTERPRETATION REFLECTED, REFER TO CLSI FOR ALTERNATE  INTERPRETATIONS.**       [3]  (SENSITIVITIES PERFORMED BY E-TEST)               Susceptibility        Pseudomonas aeruginosa      TELMA      Amikacin ($) Susceptible      Cefepime ($$) Susceptible      Ceftazidime ($) Susceptible      Ciprofloxacin ($) Susceptible      Gentamicin ($) Susceptible      Levofloxacin ($) Susceptible      Meropenem ($$) Susceptible      Piperacillin/Tazobac ($) Susceptible  [1]       Tobramycin ($) Susceptible                   [1]  **FDA INTERPRETATION REFLECTED, REFER TO CLSI FOR ALTERNATE  INTERPRETATIONS. **                     CULTURE, BLOOD [[id number]] Collected: 11/30/22 0100    Order Status: Completed Specimen: Blood Updated: 12/05/22 1145     Special Requests: No Special Requests        Culture result: No growth 5 days       MRSA SCREEN - PCR (NASAL) [[id number]]  (Abnormal) Collected: 11/30/22 0045    Order Status: Completed Specimen: Swab Updated: 11/30/22 0336     MRSA by PCR, Nasal DETECTED        Comment: Results verified, phoned to and read back by [email or web address]       C. DIFFICILE [address] A/B [[id number]] Collected: 11/30/22 0030    Order Status: Completed Specimen: Stool Updated: 11/30/22 2032     [address] [place] ANTIGEN Negative        C. difficile toxin Negative        INTERPRETATION       Negative for toxigenic C. difficile          INFLUENZA A & B AG (RAPID TEST) [[id number]] Collected: 11/29/22 1259    Order Status: Completed Specimen: Nasopharyngeal from Nasal washing Updated: 11/29/22 1325     Influenza A Antigen Negative        Influenza B Antigen Negative       COVID-19 RAPID TEST [[id number]] Collected: 11/29/22 1259    Order Status: Completed Specimen: Nasopharyngeal Updated: 11/29/22 1325     COVID-19 rapid test Not Detected        Comment: Rapid Abbott ID Now   The specimen is NEGATIVE for SARS-CoV2, the novel coronavirus associated with COVID-19. A negative result does not rule out COVID-19. This test has been authorized by the FDA under an Emergency Use Authorization (EUA) for use by authorized laboratories.    Fact sheet for Healthcare Providers:  http://www.naheed-shreyas.bianson/ Fact sheet for Patients: http://www.farfan-pritchett.biz/   Methodology: Isothermal Nucleic Acid Amplification       CULTURE, BLOOD, PAIRED [[id number]] Collected: 11/29/22 1045    Order Status: Completed Specimen: Blood Updated: 12/05/22 1145     Special Requests: No Special Requests        Culture result: No growth 6 days       COVID-19 RAPID TEST [[id number]] Collected: 11/23/22 0253    Order Status: Completed Specimen: Nasopharyngeal Updated: 11/23/22 0424     COVID-19 rapid test Not Detected        Comment: Rapid Abbott ID Now   The specimen is NEGATIVE for SARS-CoV2, the novel coronavirus associated with COVID-19. A negative result does not rule out COVID-19. This test has been authorized by the FDA under an Emergency Use Authorization (EUA) for use by authorized laboratories. Fact sheet for Healthcare Providers:  http://www.naomi.maxine/ Fact sheet for Patients: http://www.naomi.maxine/   Methodology: Isothermal Nucleic Acid Amplification                  Diagnostics   CXR Results  (Last 48 hours)                 12/05/22 1127  XR CHEST PORT Final result    Impression:      Stable Multilobar pneumonia with associated left pleural effusion           Narrative:  EXAM:  XR CHEST PORT       INDICATION: Respiratory failure       COMPARISON: 12/4/2022       TECHNIQUE: portable chest AP view at 1119 hours       FINDINGS: The cardiac silhouette is stable. There is diffuse patchy airspace   disease in the right midlung, at the right base, and throughout the left lung. A   small left pleural effusion persist. Sternotomy wires are intact. 12/04/22 0256  XR CHEST PORT Final result    Impression:      Bilateral airspace opacities with some increase in the right lung base. Narrative:  EXAM:  XR CHEST PORT       INDICATION: CHF       COMPARISON: 12/20/2022       TECHNIQUE: Upright portable chest AP view       FINDINGS: Median sternotomy changes heart size is mildly enlarged. The pulmonary   vasculature is within normal limits. Bilateral airspace opacities with some increase in the right lung base. The   visualized bones and upper abdomen are age-appropriate. Assessment/Plan     Acute hypoxic respiratory failure due to chronic aspiration         E.  Coli (CRE), E Cloacae complex and P aeruginosa isolated from sputum Cx (11/29)         Stable Multilobar pneumonia with associated left pleural effusion on CXR (12/05)         On day # 3 of Avycaz. Routine labs in the morning      2. MRSA colonization, recurrent, s/p decolonization during last admission      3. PAD, ischemic ulcer involving right lateral foot and great       Continue routine wound care as recommended by DPM         4. Recurrent CVA w sig debility, dysphagia, s/p peg placement      5.  AMS, incomprehensible speech, grimaces w minimal touch     Poor prognosis, fmly considering transition to hospice     Camila Real MD    12/5/2022

## 2022-12-06 PROBLEM — J96.90 RESPIRATORY FAILURE (HCC): Status: ACTIVE | Noted: 2022-01-01

## 2022-12-06 PROBLEM — E87.0 HYPERNATREMIA: Status: ACTIVE | Noted: 2022-01-01

## 2022-12-06 PROBLEM — Z95.1 S/P CABG X 4: Status: ACTIVE | Noted: 2022-01-01

## 2022-12-06 PROBLEM — K74.60 CIRRHOSIS OF LIVER (HCC): Status: ACTIVE | Noted: 2022-01-01
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
## 2022-12-06 PROBLEM — I27.20 PULMONARY HYPERTENSION (HCC): Status: ACTIVE | Noted: 2022-01-01

## 2022-12-06 PROBLEM — E11.9 TYPE II DIABETES MELLITUS (HCC): Status: ACTIVE | Noted: 2022-01-01

## 2022-12-06 PROBLEM — J18.9 HAP (HOSPITAL-ACQUIRED PNEUMONIA): Status: ACTIVE | Noted: 2022-01-01

## 2022-12-06 PROBLEM — T17.908A CHRONIC PULMONARY ASPIRATION: Status: ACTIVE | Noted: 2022-01-01

## 2022-12-06 NOTE — DISCHARGE SUMMARY
Admit date: 11/29/2022   Admitting Provider: Arnol Guerin MD    Discharge date: 12/6/2022  Discharging Provider: PABLO Wise      * Admission Diagnoses: Acute respiratory failure with hypoxia ([facility] [J96.01]    * Discharge Diagnoses:    Hospital Problems as of 12/6/2022 Never Reviewed            Codes Class Noted - Resolved POA    Chronic pulmonary aspiration ICD-10-CM: T17.908A  ICD-9-CM: 934.9  12/6/2022 - Present Unknown        HAP (hospital-acquired pneumonia) ICD-10-CM: J18.9, Y95  ICD-9-CM: 000  12/6/2022 - Present Unknown        Hypernatremia ICD-10-CM: E87.0  ICD-9-CM: 276.0  12/6/2022 - Present Unknown        Pulmonary hypertension ([facility] ICD-10-CM: I27.20  ICD-9-CM: 416.8  12/6/2022 - Present Unknown        Aspiration pneumonia ([facility] ICD-10-CM: J69.0  ICD-9-CM: 507.0  11/27/2022 - Present Yes        Sepsis ([facility] ICD-10-CM: A41.9  ICD-9-CM: 038.9, 995.91  11/20/2022 - Present Unknown        Severe protein-calorie malnutrition ([facility] ICD-10-CM: E43  ICD-9-CM: 262  11/20/2022 - Present Yes        Acute respiratory failure with hypoxia [facility]) ICD-10-CM: J96.01  ICD-9-CM: 518.81  11/20/2022 - Present Unknown         * Hospital Course: Chasity García is a 26-year-old male with a PMH of diabetes, hypertension, CVA, cirrhosis, and previous MRSA infection who presented with shortness of breath. Of note patient was admitted in hospital 11/18 discharged 2 days ago on 11/27 for sepsis. In ED hypoxic placed on BiPAP. Initial labs significant for WBC of 14.8, hemoglobin of 9.8, glucose of 146, troponin of 111, BNP of 12,166, and T bilirubin of 1.9. COVID and flu negative. C. difficile antigen negative. CXR with stable bilateral pulmonary infiltrates. Patient admitted for further work-up. Patient started on IV Zosyn. Pulmonology consulted. Patient weaned off of BiPap to high flow. Patient made DNR/DNI. Podiatry consulted for multiple ulcers to right foot. Patient continued to decompensate.  Patient was transferred to floor after family decided hospice was appropriate. Patient transitioned to [facility] hospice care on 12/6. * Procedures:   * No surgery found *      Consults:   Pulm, podiatry, and ID    Significant Diagnostic Studies: As discussed in hospital course    Discharge Exam:  Visit Vitals  BP (!) 116/58 (BP 1 Location: Left upper arm, BP Patient Position: At rest;Supine)   Pulse 77   Temp 97.8 °F (36.6 °C)   Resp 21   Ht 5' 9\" (1.753 m)   Wt 62 kg (136 lb 11 oz)   SpO2 96%   BMI 20.18 kg/m²       PHYSICAL EXAM:  Vitals and nursing note reviewed. Constitutional:       Appearance: He is ill-appearing. Comments: Chronically ill appearing male, mouth breathing   HENT:      Head: Normocephalic and atraumatic. Mouth/Throat:      Mouth: Mucous membranes are dry. Cardiovascular:      Rate and Rhythm: Normal rate and regular rhythm. Pulmonary:      Effort: No respiratory distress. Breath sounds: Rhonchi present. Comments: Rhonchi heard upper right and left lobes  Abdominal:      General: Bowel sounds are normal. There is no distension. Palpations: Abdomen is soft. Tenderness: There is no abdominal tenderness. Comments: PEG tube in place   Musculoskeletal:      Right lower leg: No edema. Left lower leg: No edema. Comments: Bunny boots noted to B/L LE in proper placement    Skin:     Capillary Refill: Capillary refill takes less than 2 seconds. Genitourinary: kennedy in place  Neurological:      Mental Status: He is alert. He is disoriented. Psychiatric:      Comments: Unable to assess        * Discharge Condition: stable  * Disposition:  GIP hospice    Discharge Medications:  Current Discharge Medication List        CONTINUE these medications which have NOT CHANGED    Details   aspirin 81 mg chewable tablet 81 mg by Per G Tube route daily. potassium chloride (KLOR-CON) 20 mEq pack 20 mEq by Per NG tube route two (2) times daily (with meals).       insulin aspart U-100 (NovoLOG U-100 Insulin aspart) 100 unit/mL injection 2-4 units subcutaneously sliding scale      clobetasoL (TEMOVATE) 0.05 % topical cream       traMADoL (ULTRAM) 50 mg tablet 50 mg by Per G Tube route every six (6) hours as needed for Pain. insulin lispro (HUMALOG) 100 unit/mL injection INITIATE INSULIN CORRECTIVE PROTOCOL:  Normal Insulin Sensitivity   For Blood Sugar (mg/dL) of:     Less than 150 =   0 units           150 -199 =   2 units  200 -249 =   4 units  250 -299 =   6 units  300 -349 =   8 units  350 and above = 10 units and Call Physician     Initiate Hypoglycemia protocol if blood glucose is <70 mg/dL  Fast Acting - Administer Immediately - or within 15 minutes of start of meal, if mealtime coverage. Qty: 1 Each, Refills: 1      clopidogreL (PLAVIX) 75 mg tab Take 1 Tablet by mouth in the morning. Qty: 30 Tablet, Refills: 1      ARIPiprazole (ABILIFY) 2 mg tablet 2 mg by Per G Tube route daily. baclofen 5 mg tab 5 mg by Gastrostomy Tube route two (2) times a day. carvediloL (COREG) 12.5 mg tablet 12.5 mg by Per G Tube route two (2) times a day. Indications: high blood pressure      escitalopram oxalate (LEXAPRO) 20 mg tablet 20 mg by Per G Tube route daily. gabapentin (NEURONTIN) 100 mg capsule 100 mg by Per G Tube route nightly. hydrOXYzine HCL (ATARAX) 25 mg tablet 25 mg by Per G Tube route daily. furosemide (LASIX) 40 mg tablet 40 mg by Per G Tube route daily. Indications: visible water retention      losartan (COZAAR) 100 mg tablet 100 mg by Per G Tube route daily. melatonin 5 mg tablet 10 mg nightly. metFORMIN (GLUCOPHAGE) 500 mg tablet 1,000 mg by Per G Tube route two (2) times daily (with meals). senna-docusate (PERICOLACE) 8.6-50 mg per tablet 1 Tablet by Per G Tube route daily. tamsulosin (FLOMAX) 0.4 mg capsule 0.4 mg nightly. Via PEG      omeprazole (PRILOSEC) 20 mg capsule 20 mg daily.  Via PEG      atorvastatin (LIPITOR) 40 mg tablet 40 mg by Per G Tube route daily. STOP taking these medications       insulin glargine (LANTUS) 100 unit/mL injection Comments:   Reason for Stopping:         glyBURIDE (DIABETA) 5 mg tablet Comments:   Reason for Stopping:               * Follow-up Care/Patient Instructions: Activity: per hospice  Diet: per hospice - was on PEG tube TF as Osmolite 1.2 Michael at 65 mL/hr. Flush with 160 mL H2O Q4H via PEG  Wound Care: per hospice - previously: For abrasions to both knees and wound to left medial thigh: irrigate with NS, apply Therahoney gel in 1 mm thickness, and cover with Optifoam Gentle Lite foam dressings every other day and prn for soiling. For left buttock: irrigate with NS, apply thin layer of Therahoney gel in 1 mm thickness, and cover with Optifoam Border Sacral foam dressing daily and prn for soiling. For right medial foot and hallux and right 5th toe: apply betadine moistened gauze, cover with dry 4x4s, ABD pad to medial aspect, wrap with stretch gauze, and secure with tape daily and prn for soiling. For sacrococcygeal: irrigate with NS, apply Therahoney gel in 1 mm thickness to slough, lightly pack with Opticell Ag double layer, and cover with Optifoam Border Sacral foam dressing daily and prn for soiling.     Follow-up Information       Follow up With Specialties Details Why Allie Delaney MD Lab, Internal Medicine Physician Schedule an appointment as soon as possible for a visit As needed [address] [place] [address]              Discharge summary greater than 35 minutes spent with the patient performing discharge instructions, medication review and physical exam    Signed:  PABLO Solis  12/6/2022  7:32 AM

## 2022-12-06 NOTE — PROGRESS NOTES
End of shift summary:     Notes: transition to hospice     Neuro: CONNIE  Cardiac: tele 68  Resp: HFNC  GI/: Shields  Skin: wounds  Lines/drains: R NICOLÁS PIV

## 2022-12-06 NOTE — DISCHARGE INSTRUCTIONS
For abrasions to both knees and wound to left medial thigh: irrigate with NS, apply Therahoney gel in 1 mm thickness, and cover with Optifoam Gentle Lite foam dressings every other day and prn for soiling. For left buttock: irrigate with NS, apply thin layer of Therahoney gel in 1 mm thickness, and cover with Optifoam Border Sacral foam dressing daily and prn for soiling. For right medial foot and hallux and right 5th toe: apply betadine moistened gauze, cover with dry 4x4s, ABD pad to medial aspect, wrap with stretch gauze, and secure with tape daily and prn for soiling. For sacrococcygeal: irrigate with NS, apply Therahoney gel in 1 mm thickness to slough, lightly pack with Opticell Ag double layer, and cover with Optifoam Border Sacral foam dressing daily and prn for soiling.

## 2022-12-06 NOTE — PROGRESS NOTES
Problem: Hospice Orientation  Goal: Demonstrate understanding of hospice philosophy, plan of care, and home hospice program  Description: The patient/family/caregiver will demonstrate understanding of hospice philosophy, plan of care and the home hospice program as evidenced by participation in meeting the patient's psychosocial, spiritual, medical, and physical needs inclusive of medical supplies/equipment focusing on symptoms.   Outcome: Progressing Towards Goal     Problem: Pain  Goal: *Control of acute pain  Outcome: Progressing Towards Goal     Problem: Infection - Risk of, Urinary Catheter-Associated Urinary Tract Infection  Goal: *Absence of infection signs and symptoms  Outcome: Progressing Towards Goal     Problem: Dyspnea Due to End of Life  Goal: Demonstrate understanding of and ability to manage respiratory symptoms at end of life  Outcome: Progressing Towards Goal     Problem: Dyspnea Due to End of Life  Goal: Demonstrate understanding of and ability to manage respiratory symptoms at end of life  Outcome: Progressing Towards Goal

## 2022-12-06 NOTE — HOSPICE
[facility]  [phone]     Patient Name: Buster Lombardo  YOB: 1950  Age: 70 y.o. [facility] RN Note:      08:45 VM left for son Nay Vicente ([phone]) and text sent to confirm he is ready to sign consents electronically. 9:00 Son ready to transition patient to [facility] - documents sent electronically.     Alden Hare RN  Clinical Nurse Liaison  [phone]

## 2022-12-06 NOTE — H&P
[facility]  [phone]    Patient Name: Meg Worthington  YOB: 1950    Date of Provider Hospice Visit: 12/06/22    Level of Care:   [x] General Inpatient (GIP)    [] Routine   [] Respite    Current Location of Care:  [] [facility] [] [facility] [] [facility] [x] [facility] [] Hospice [facility]    IF [facility], patient referred from:  [] [facility] [] [facility] [] [facility] [] [address] [] Home [] Other:     Date of Original Hospice Admission: 12/6/2022  Hospice Medical Director at time of admission: Dr. Frandy Lee Diagnosis: Acute respiratory failure with hypxia  Diagnoses RELATED to the terminal prognosis: Chronic aspiration, HAP, hypernatremia, sepsis, pulmonary HTN, severe protein calorie malnutrition  Other Diagnoses: DM, HTN, cirrhosis     HOSPICE SUMMARY     Meg Worthington is a 70y.o. year old  male who was admitted to [facility]. HPI and brief course of hospitalization as summarized in discharge note authored by Karla Linares:  Meg Worthington is a 57-year-old male with a PMH of diabetes, hypertension, CVA, cirrhosis, and previous MRSA infection who presented with shortness of breath. Of note patient was admitted in hospital 11/18 discharged 2 days ago on 11/27 for sepsis. In ED hypoxic placed on BiPAP. Initial labs significant for WBC of 14.8, hemoglobin of 9.8, glucose of 146, troponin of 111, BNP of 12,166, and T bilirubin of 1.9. COVID and flu negative. C. difficile antigen negative. CXR with stable bilateral pulmonary infiltrates. Patient admitted for further work-up. Patient started on IV Zosyn. Pulmonology consulted. Patient weaned off of BiPap to high flow. Patient made DNR/DNI. Podiatry consulted for multiple ulcers to right foot. Patient continued to decompensate. Patient was transferred to floor after family decided hospice was appropriate. Patient transitioned to [facility] hospice care on 12/6.     The patient's principle diagnosis has resulted in increased work of breathing, non verbal signs of pain, restlessness/agitation, and increased oropharyngeal secretions. Functionally, the patient's Karnofsky and/or Palliative Performance Scale has declined over a period of weeks and is estimated at 10. The patient is dependent on the following ADLs: all    Objective information that support this patients limited prognosis includes: Most recent ABG with PO2 52 on 50L with FiO2 60%  Study Result    Narrative & Impression   EXAM:  XR CHEST PORT     INDICATION: Respiratory failure     COMPARISON: 12/4/2022     TECHNIQUE: portable chest AP view at 1119 hours     FINDINGS: The cardiac silhouette is stable. There is diffuse patchy airspace  disease in the right midlung, at the right base, and throughout the left lung. A  small left pleural effusion persist. Sternotomy wires are intact. IMPRESSION     Stable Multilobar pneumonia with associated left pleural effusion     The patient/family chose comfort measures with the support of Hospice. HOSPICE DIAGNOSES   Active Symptoms:  1. Increased work of breathing  2. Non verbal signs of pain  3. Restlessness/agitation  4. Oropharyngeal secretions  5.  Hospice care     PLAN   Admit to [facility] level of care as pt remains on high flow oxygen which cannot be provided in home, and is at high risk for rapid decompensation while being weaned; requires frequent nursing assessment and administration and titration of parenteral medications to manage symptom burden  Robinul 0.2 mg IV every 4 hours for increased oropharyngeal secretions  Dilaudid 1 mg IV every 4 hours with prn dosing available every 15 minutes for signs of breakthrough pain or air hunger  Ativan 1 mg IV every 4 hours with prn dosing available every 15 minutes for breakthrough restlessness/agitation  All other symptom management medications as needed  Respiratory therapy to wean oxygen per protocol to transition to NC for comfort  Discontinue enteral feedings, IVF, and all previously ordered medications  Continue kennedy catheter to maintain bladder decompression and kennedy care per facility protocol  No family present at bedside during provider rounds; liaison spoke with son; hospice philosophy and goals of care reviewed with emphasis on comfort and safety; agreeable to hospice care plan   and SW to support family needs  Disposition: anticipate rapid decompensation and death in hospital  Hospice Plan of care was reviewed in detail and agree with current plan of care    Prognosis estimated based on 12/06/22 clinical assessment is:   [x] Hours to Days    [] Days to Weeks    [] Other:    Communicated plan of care with: Hospice Case Manager; Hospice IDT; Care Team     GOALS OF CARE     Patient/Medical POA stated Goal of Care: Comfort care and symptom management    [x] I have reviewed and/or updated ACP information in the Advance Care Planning Navigator. This information is available in the [address] [place] [address] Drive link in the patient's chart header. Primary Decision [facility] Agent):   Primary Decision Maker: Mynor Zamudio - [phone]    Primary Decision Maker: Jessica Murry [facility] - [phone]    Resuscitation Status: DNR  If DNR is there a Durable DNR on file? : [] Yes [x] No (If no, complete Durable DNR)    HISTORY     History obtained from: chart review; discussion with liaison    CHIEF COMPLAINT: unable to obtain  The patient is:   [] Verbal  [] Nonverbal  [x] Unresponsive    HPI/SUBJECTIVE:  No family at bedside during provider rounds. Pt observed in hospital bed. Minimally responsive. Thin, frail, and ill appearing with sunken facial features, temporal wasting, and prominent bony structures. Appears restless evidenced by frequent moaning and facial grimacing. Remains on high flow oxygen with SpO2 ranging 86-90%.       REVIEW OF SYSTEMS     The following systems were: [] reviewed  [x] unable to be reviewed    Positive ROS include:  Constitutional: fatigue, weakness, in pain, short of breath  Ears/nose/mouth/throat: increased airway secretions  Respiratory:shortness of breath, wheezing  Gastrointestinal:poor appetite, nausea, vomiting, abdominal pain, constipation, diarrhea  Musculoskeletal:pain, deformities, swelling legs  Neurologic:confusion, hallucinations, weakness  Psychiatric:anxiety, feeling depressed, poor sleep  Endocrine:     Adult Non-Verbal Pain Assessment Score: 5    Face  [] 0   No particular expression or smile  [] 1   Occasional grimace, tearing, frowning, wrinkled forehead  [x] 2   Frequent grimace, tearing, frowning, wrinkled forehead    Activity (movement)  [] 0   Lying quietly, normal position  [x] 1   Seeking attention through movement or slow, cautious movement  [] 2   Restless, excessive activity and/or withdrawal reflexes    Guarding  [x] 0   Lying quietly, no positioning of hands over areas of body  [] 1   Splinting areas of the body, tense  [] 2   Rigid, stiff    Physiology (vital signs)  [x] 0   Stable vital signs  [] 1   Change in any of the following: SBP > 20mm Hg; HR > 20/minute  [] 2   Change in any of the following: SBP > 30mm Hg; HR > 25/minute    Respiratory  [] 0   Baseline RR/SpO2, compliant with ventilator  [] 1   RR > 10 above baseline, or 5% drop SpO2, mild asynchrony with ventilator  [x] 2   RR > 20 above baseline, or 10% drop SpO2, asynchrony with ventilator     FUNCTIONAL ASSESSMENT     Palliative Performance Scale (PPS): 10       PSYCHOSOCIAL/SPIRITUAL ASSESSMENT     Active Problems:    Respiratory failure ([facility] (12/6/2022)      Past Medical History:   Diagnosis Date    Cirrhosis ([facility]     Diabetes ([facility]     Hypertension     Stroke [facility])       Past Surgical History:   Procedure Laterality Date    HX BACK SURGERY      HX GI      IR INSERT NON TUNL CVC OVER 5 YRS  11/9/2022      Social History     Tobacco Use    Smoking status: Former    Smokeless tobacco: Never   Substance Use Topics    Alcohol use: Not Currently     Family History   Problem Relation Age of Onset    Heart Disease Father       No Known Allergies   Current Facility-Administered Medications   Medication Dose Route Frequency    LORazepam (ATIVAN) injection 1 mg  1 mg IntraVENous Q15MIN PRN    LORazepam (ATIVAN) injection 1 mg  1 mg IntraVENous Q4H    glycopyrrolate (ROBINUL) injection 0.2 mg  0.2 mg IntraVENous Q4H    bisacodyL (DULCOLAX) suppository 10 mg  10 mg Rectal DAILY PRN    HYDROmorphone (DILAUDID) injection 1 mg  1 mg IntraVENous Q4H    HYDROmorphone (DILAUDID) injection 1 mg  1 mg IntraVENous Q15MIN PRN        PHYSICAL EXAM     Wt Readings from Last 3 Encounters:   12/06/22 62 kg (136 lb 11 oz)   12/05/22 62 kg (136 lb 11 oz)   11/10/22 62.5 kg (137 lb 12.6 oz)       Visit Vitals  Ht 5' 9\" (1.753 m)   Wt 62 kg (136 lb 11 oz)   SpO2 95%   BMI 20.18 kg/m²       Supplemental O2  [x] Yes 50L with FiO2 78%  [] NO  Last bowel movement:     Currently this patient has:  [x] Peripheral IV [] PICC  [] PORT [] ICD    [x] Shields Catheter [] NG Tube   [x] PEG Tube    [] Rectal Tube [] Drain  [] Other:     Constitutional: thin, frail, ill-appearing  Eyes: closed  ENMT: dry oral mucosa  Cardiovascular: regular; radial pulses intact; no edema  Respiratory: increased work of breathing; breath sounds coarse; audible oropharyngeal secretions  Gastrointestinal: bowel sounds in all quadrants; soft; PEG site dressing CDI  Musculoskeletal: RLE contracted approx 45 degrees; increased tone in all other extremities; muscle wasting  Skin: generalized pallor; foam dressing to R knee CDI; Kerlix dressing to R foot  Neurologic: minimally responsive; eyes closed; not following commands  Psychiatric: appears restless; frequent moaning and facial grimacing  Other:     Pertinent Lab and or Imaging Tests:  Lab Results   Component Value Date/Time    Sodium 149 (H) 12/06/2022 07:42 AM    Potassium 3.9 12/06/2022 07:42 AM    Chloride 113 (H) 12/06/2022 07:42 AM    CO2 30 12/06/2022 07:42 AM    Anion gap 6 12/06/2022 07:42 AM    Glucose 189 (H) 12/06/2022 07:42 AM    BUN 20 12/06/2022 07:42 AM    Creatinine 0.62 (L) 12/06/2022 07:42 AM    BUN/Creatinine ratio 32 (H) 12/06/2022 07:42 AM    GFR est AA 56 (L) 08/12/2022 11:20 AM    GFR est non-AA 46 (L) 08/12/2022 11:20 AM    Calcium 8.2 (L) 12/06/2022 07:42 AM     Lab Results   Component Value Date/Time    Protein, total 5.5 (L) 12/06/2022 07:42 AM    Albumin 1.6 (L) 12/06/2022 07:42 AM           Skyla ANDREWS-C

## 2022-12-06 NOTE — PROGRESS NOTES
IMPRESSION:   Acute on chronic hypoxic hypercapnic respiratory failure  Congestive heart failure  Aspiration pneumonia  Healthcare associated pneumonia carbenicillin resistant E. coli as well as Pseudomonas and Enterobacter currently on Avycaz  Sepsis  History of CVA  Fever resolved  Hypernatremia last sodium 148  Pulmonary hypertension RVSP 59 with IVC dilatation 11/10/2022      RECOMMENDATIONS/PLAN:   ICU monitoring  70-year-old male recently discharged from the hospital came back with hypoxia now is on high flow nasal cannula life support to avoid worsening respiratory acidosis, hypercacarbic or hypoxic respiratory arrest   High flow nasal oxygen now at 50 L with FiO2 78%  T-max 99.4 last 24 hours blood cultures from admission no growth so far  No change in bilateral infiltrates left greater than right  BNP on admission 12,166 good diuresis overnight     [x] High complexity decision making was performed  [x] See my orders for details  HPI  70-year-old male recently discharged from the hospital came in because of shortness of breath and dyspnea he was discharged on oxygen but his condition got worse came back to the hospital febrile temperature 102 he was put on BiPAP machine and then now he is on high flow nasal cannula unable to get any started the patient he moans and groans his saturation was in the 70s were admitted his BNP is elevated chest x-ray still shows bilateral infiltrate. PMH:  has a past medical history of Cirrhosis ([address] Diabetes ([address] Hypertension, and Stroke ([address] PSH:   has a past surgical history that includes hx back surgery; hx gi; and ir insert non tunl cvc over 5 yrs (11/9/2022). FHX: family history includes Heart Disease in his father. SHX:  reports that he has quit smoking. He has never used smokeless tobacco. He reports that he does not currently use alcohol. He reports that he does not currently use drugs.     ALL: No Known Allergies     MEDS:   [x] Reviewed - As Below [] Not reviewed    Current Facility-Administered Medications   Medication    cefTAZidime-avibactam (AVYCAZ) 2.5 g in 0.9% sodium chloride (MBP/ADV) 100 mL MPB    midodrine (PROAMATINE) tablet 10 mg    morphine injection 2 mg    furosemide (LASIX) injection 40 mg    potassium chloride (KLOR-CON) packet for solution 20 mEq    balsam peru-castor oiL (VENELEX) ointment    sodium chloride (NS) flush 5-10 mL    sodium chloride (NS) flush 5-40 mL    sodium chloride (NS) flush 5-40 mL    acetaminophen (TYLENOL) tablet 650 mg    Or    acetaminophen (TYLENOL) suppository 650 mg    polyethylene glycol (MIRALAX) packet 17 g    ondansetron (ZOFRAN ODT) tablet 4 mg    Or    ondansetron (ZOFRAN) injection 4 mg    enoxaparin (LOVENOX) injection 40 mg    ARIPiprazole (ABILIFY) tablet 2 mg    atorvastatin (LIPITOR) tablet 40 mg    baclofen (LIORESAL) tablet 5 mg    gabapentin (NEURONTIN) capsule 100 mg    [Held by provider] losartan (COZAAR) tablet 100 mg    melatonin tablet 10 mg    traMADoL (ULTRAM) tablet 50 mg    hydrOXYzine HCL (ATARAX) tablet 25 mg    clopidogreL (PLAVIX) tablet 75 mg    carvediloL (COREG) tablet 12.5 mg    escitalopram oxalate (LEXAPRO) tablet 20 mg      MAR reviewed and pertinent medications noted or modified as needed   Current Facility-Administered Medications   Medication    cefTAZidime-avibactam (AVYCAZ) 2.5 g in 0.9% sodium chloride (MBP/ADV) 100 mL MPB    midodrine (PROAMATINE) tablet 10 mg    morphine injection 2 mg    furosemide (LASIX) injection 40 mg    potassium chloride (KLOR-CON) packet for solution 20 mEq    balsam peru-castor oiL (VENELEX) ointment    sodium chloride (NS) flush 5-10 mL    sodium chloride (NS) flush 5-40 mL    sodium chloride (NS) flush 5-40 mL    acetaminophen (TYLENOL) tablet 650 mg    Or    acetaminophen (TYLENOL) suppository 650 mg    polyethylene glycol (MIRALAX) packet 17 g    ondansetron (ZOFRAN ODT) tablet 4 mg    Or    ondansetron (ZOFRAN) injection 4 mg    enoxaparin (LOVENOX) injection 40 mg    ARIPiprazole (ABILIFY) tablet 2 mg    atorvastatin (LIPITOR) tablet 40 mg    baclofen (LIORESAL) tablet 5 mg    gabapentin (NEURONTIN) capsule 100 mg    [Held by provider] losartan (COZAAR) tablet 100 mg    melatonin tablet 10 mg    traMADoL (ULTRAM) tablet 50 mg    hydrOXYzine HCL (ATARAX) tablet 25 mg    clopidogreL (PLAVIX) tablet 75 mg    carvediloL (COREG) tablet 12.5 mg    escitalopram oxalate (LEXAPRO) tablet 20 mg      PMH:  has a past medical history of Cirrhosis ([facility] [address] Diabetes ([facility] [address] Hypertension, and Stroke ([facility] [address] PSH:   has a past surgical history that includes hx back surgery; hx gi; and ir insert non tunl cvc over 5 yrs (2022). FHX: family history includes Heart Disease in his father. SHX:  reports that he has quit smoking. He has never used smokeless tobacco. He reports that he does not currently use alcohol. He reports that he does not currently use drugs. ROS:Review of systems not obtained due to patient factors. Hemodynamics:    CO:    CI:    CVP:    SVR:   PAP Systolic:    PAP Diastolic:    PVR:    HJ81:        Ventilator Settings:      Mode Rate TV Press PEEP FiO2 PIP Min.  Vent               80 %     15.5 l/min        Vital Signs: Telemetry:    normal sinus rhythm Intake/Output:   Visit Vitals  BP (!) 116/58 (BP 1 Location: Left upper arm, BP Patient Position: At rest;Supine)   Pulse 77   Temp 97.8 °F (36.6 °C)   Resp 21   Ht 5' 9\" (1.753 m)   Wt 62 kg (136 lb 11 oz)   SpO2 96%   BMI 20.18 kg/m²       Temp (24hrs), Av.1 °F (37.3 °C), Min:97.8 °F (36.6 °C), Max:100.9 °F (38.3 °C)        O2 Device: Heated, Hi flow nasal cannula O2 Flow Rate (L/min): 50 l/min       Wt Readings from Last 4 Encounters:   22 62 kg (136 lb 11 oz)   11/10/22 62.5 kg (137 lb 12.6 oz)   22 84.8 kg (187 lb)   22 84.8 kg (187 lb)          Intake/Output Summary (Last 24 hours) at 2022 0932  Last data filed at 2022 0048  Gross per 24 hour   Intake 1091 ml   Output 575 ml   Net 516 ml         Last shift:      No intake/output data recorded. Last 3 shifts: 12/04 1901 - 12/06 0700  In: 1091   Out: 56 [Urine:675]       Physical Exam:     General: HFNC calm today  HEENT: NCAT,   Eyes: anicteric; conjunctiva clear extraocular movements intact  Neck: no nodes,  trach midline; no accessory MM use. Chest: no deformity,   Cardiac: R regular; no murmur;   Lungs: distant breath sounds; no wheezes, has bilateral rails  Abd: soft, NT, hypoactive BS  Ext: Mild edema; no joint swelling;  No clubbing  : clear urine  Neuro: Awake opens his eyes and looks at voice moves his upper extremities minimally  Psych- unable to assess  Skin: warm, dry, no cyanosis;   Pulses: Brachial and radial pulses intact  Capillary: Normal capillary refill      DATA:    MAR reviewed and pertinent medications noted or modified as needed  MEDS:   Current Facility-Administered Medications   Medication    cefTAZidime-avibactam (AVYCAZ) 2.5 g in 0.9% sodium chloride (MBP/ADV) 100 mL MPB    midodrine (PROAMATINE) tablet 10 mg    morphine injection 2 mg    furosemide (LASIX) injection 40 mg    potassium chloride (KLOR-CON) packet for solution 20 mEq    balsam peru-castor oiL (VENELEX) ointment    sodium chloride (NS) flush 5-10 mL    sodium chloride (NS) flush 5-40 mL    sodium chloride (NS) flush 5-40 mL    acetaminophen (TYLENOL) tablet 650 mg    Or    acetaminophen (TYLENOL) suppository 650 mg    polyethylene glycol (MIRALAX) packet 17 g    ondansetron (ZOFRAN ODT) tablet 4 mg    Or    ondansetron (ZOFRAN) injection 4 mg    enoxaparin (LOVENOX) injection 40 mg    ARIPiprazole (ABILIFY) tablet 2 mg    atorvastatin (LIPITOR) tablet 40 mg    baclofen (LIORESAL) tablet 5 mg    gabapentin (NEURONTIN) capsule 100 mg    [Held by provider] losartan (COZAAR) tablet 100 mg    melatonin tablet 10 mg    traMADoL (ULTRAM) tablet 50 mg    hydrOXYzine HCL (ATARAX) tablet 25 mg    clopidogreL (PLAVIX) tablet 75 mg carvediloL (COREG) tablet 12.5 mg    escitalopram oxalate (LEXAPRO) tablet 20 mg        Labs:    Recent Labs     12/06/22  0742 12/05/22  0647   WBC 6.6 5.3   HGB 8.8* 8.5*    161       Recent Labs     12/06/22  0742 12/05/22  0647   * 148*   K 3.9 3.1*   * 112*   CO2 30 30   * 163*   BUN 20 19   CREA 0.62* 0.58*   CA 8.2* 7.7*   PHOS  --  3.3   ALB 1.6* 1.5*   *  --        Recent Labs     12/04/22  0453   PH 7.52*   PCO2 34*   PO2 52*   HCO3 27*   FIO2 60       BNP 12,000  11/29 blood culture no growth  11/30 sputum occasional polys with E. coli Carbapenem resistant as well as Pseudomonas and Enterobacter  11/10/2022 Echo ejection fraction 50% left atrium 5 cm RVSP 59 IVC dilated  Lab Results   Component Value Date/Time    Culture result:  11/30/2022 01:20 AM     Moderate Carbapenem resistant E coli ** (EXTENDED SPECTRUM BETA LACTAMASE ) **    Culture result: Moderate Enterobacter cloacae complex 11/30/2022 01:20 AM    Culture result: Light Pseudomonas aeruginosa 11/30/2022 01:20 AM    Culture result: Heavy Normal respiratory kristen 11/30/2022 01:20 AM     Lab Results   Component Value Date/Time    TSH 1.67 07/30/2022 07:11 AM        Imaging:    Results from Hospital Encounter encounter on 11/29/22    XR CHEST PORT    Narrative  EXAM:  XR CHEST PORT    INDICATION: Respiratory failure    COMPARISON: 12/4/2022    TECHNIQUE: portable chest AP view at 1119 hours    FINDINGS: The cardiac silhouette is stable. There is diffuse patchy airspace  disease in the right midlung, at the right base, and throughout the left lung. A  small left pleural effusion persist. Sternotomy wires are intact. Impression  Stable Multilobar pneumonia with associated left pleural effusion      Results from [place] encounter on 11/08/22    CT FOOT RT W CONT    Narrative  EXAM: CT FOOT RT W CONT    INDICATION: Right foot swelling and abscess. Evaluate for osteomyelitis.   Hypertension, diabetes, and cirrhosis. COMPARISON: Right foot views on 11/10/2022    CONTRAST: 100 mL of Isovue-370. TECHNIQUE: Helical CT of the right foot during uneventful rapid bolus  intravenous contrast administration. Coronal and Sagittal reformats were  generated. Images reviewed in soft tissue and bone windows. CT dose reduction  was achieved through the use of a standardized protocol tailored for this  examination and automatic exposure control for dose modulation. FINDINGS: Bones: Mild osteopenia. No fracture or osteomyelitis. Joint fluid: Physiologic. Articulations: no evidence of septic arthritis. 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 Tendons: No full-thickness tendon tear. Muscles: Mild diffuse atrophy. Soft tissue mass: None. No fluid collection. Impression  1. No abscess or osteomyelitis. 2. No fracture. 11/30 on high flow nasal cannula hypokalemic replace potassium on vancomycin and Zosyn we will try to wean oxygen to regular nasal cannula  12/1 on high flow nasal cannula requiring more than 50% FiO2 potassium corrected BNP elevated ammonia level 35 C-reactive protein 1.6 procalcitonin 1.23 BNP 75626  12/2 on high flow nasal cannula 60% and 50 L continue with antibiotics and Lasix  12/4 remains on nasal high flow oxygen 50 L now 78% FiO2.   Sputum growing E. coli Pseudomonas and Enterobacter currently on Avycaz  12/5 on high flow nasal cannula condition remains same continue with antibiotics  12/6 possible hospice

## 2022-12-06 NOTE — HOSPICE
[facility]  [phone]    Inpatient Nursing Admission   Patient Name: Carlos Enrique Peng  YOB: 1950  Age: 70 y.o. Date of Hospice Admission: 12/6/2022  Hospice Attending Elected by Patient: Niya Rincon MD  Primary Care Physician: Samantha Nur MD  Admitting RN: Jourdan Ponce RN  : ALIS Crabtree     Level of Care (GIP/Routine/Respite): GIP  Facility of Care: [facility]  Patient Room: 562/01     HOSPICE SUMMARY   ER Visits/ Hospitalizations in past year: 2  Hospice Diagnosis: Respiratory failure ([facility] [address] [J96.90]  Onset Date of Hospice Diagnosis: 12/6/22  Summary of Disease Progression Leading to Hospice Diagnosis:   HPI and brief course of hospitalization as summarized in discharge note authored by Gallo Otto:  Carlos Enrique Peng is a 26-year-old male with a PMH of diabetes, hypertension, CVA, cirrhosis, and previous MRSA infection who presented with shortness of breath. Of note patient was admitted in hospital 11/18 discharged 2 days ago on 11/27 for sepsis. In ED hypoxic placed on BiPAP. Initial labs significant for WBC of 14.8, hemoglobin of 9.8, glucose of 146, troponin of 111, BNP of 12,166, and T bilirubin of 1.9. COVID and flu negative. C. difficile antigen negative. CXR with stable bilateral pulmonary infiltrates. Patient admitted for further work-up. Patient started on IV Zosyn. Pulmonology consulted. Patient weaned off of BiPap to high flow. Patient made DNR/DNI. Podiatry consulted for multiple ulcers to right foot. Patient continued to decompensate. Patient was transferred to floor after family decided hospice was appropriate. Patient transitioned to [facility] hospice care on 12/6.   Patient Active Problem List   Diagnosis Code    CAD (coronary artery disease) I25.10    NSTEMI (non-ST elevated myocardial infarction) (HCC) I21.4    TIA (transient ischemic attack) G45.9    CVA (cerebral vascular accident) ([facility] [address] I63.9    Carotid stenosis I65.29 UTI (urinary tract infection) N39.0    Sepsis ([address] A41.9    Infection of right great toe due to methicillin resistant Staphylococcus aureus (MRSA) L08.9, G25.75    Metabolic encephalopathy L21.24    Severe protein-calorie malnutrition (HCC) E43    Dysphagia R13.10    Acute respiratory failure with hypoxia (HCC) J96.01    PAD (peripheral artery disease) (HCC) I73.9    Aspiration pneumonia (HCC) J69.0    Chronic pulmonary aspiration T17.908A    HAP (hospital-acquired pneumonia) J18.9, Y95    Hypernatremia E87.0    Pulmonary hypertension (HCC) I27.20    Type II diabetes mellitus (HCC) E11.9    S/P CABG x 4 Z95.1    Cirrhosis of liver (HCC) K74.60    Respiratory failure (HCC) J96.90     Diagnoses RELATED to the terminal prognosis: Sepsis, Aspiration Pneumonia, Dysphagia, Severe PCM  Other Diagnoses: Cirrhosis    Rationale for a prognosis of life expectancy of 6 months or less if the disease follows its normal course (Disease Specific History): Remigio Burch is a 70 y.o. who was admitted to [facility]. The patient's principle diagnosis of respiratory failure has resulted in pain, dyspnea, secretions, hypoxia and AMS. Functionally, the patient's Palliative Performance Scale has declined over a period of several weeks and is estimated at 10. The patient/family chose comfort measures with the support of Hospice. Objective information that support this patients limited prognosis includes:     EXAM:  XR CHEST PORT     INDICATION: Respiratory failure     COMPARISON: 12/4/2022     TECHNIQUE: portable chest AP view at 1119 hours     FINDINGS: The cardiac silhouette is stable. There is diffuse patchy airspace  disease in the right midlung, at the right base, and throughout the left lung. A  small left pleural effusion persist. Sternotomy wires are intact.      IMPRESSION     Stable Multilobar pneumonia with associated left pleural effusion      Lab Results   Component Value Date/Time     Sodium 149 (H) 12/06/2022 07:42 AM     Potassium 3.9 12/06/2022 07:42 AM     Chloride 113 (H) 12/06/2022 07:42 AM     CO2 30 12/06/2022 07:42 AM     Anion gap 6 12/06/2022 07:42 AM     Glucose 189 (H) 12/06/2022 07:42 AM     BUN 20 12/06/2022 07:42 AM     Creatinine 0.62 (L) 12/06/2022 07:42 AM     BUN/Creatinine ratio 32 (H) 12/06/2022 07:42 AM     GFR est AA 56 (L) 08/12/2022 11:20 AM     GFR est non-AA 46 (L) 08/12/2022 11:20 AM     Calcium 8.2 (L) 12/06/2022 07:42 AM            Lab Results   Component Value Date/Time     Protein, total 5.5 (L) 12/06/2022 07:42 AM     Albumin       Patient meets for GIP LOC as evidenced by need for frequent nursing assessment for management of parental medication that can not be administered outside of acute care setting. Prognosis estimated based on 12/06/22 clinical assessment is:   [] Few to Many Hours  [x] Hours to Days   [] Few to Many Days   [] Days to Weeks   [] Few to Many Weeks   [] Weeks to Months   [] Few to Many Months    ASSESSMENT    Patient self-reports:  []  Yes    [x] No    SYMPTOMS: pain, dyspnea, secretions, AMS    SIGNS/PHYSICAL FINDINGS: crying out, moaning, requiring increased oxygen demand, use of accessory muscles, no command following    KARNOFSKY: 10    Learning Assessment:  Patient  Is patient willing/able to learn? Unable  What is the highest level of education completed? Learning preference (written material, demonstration, visual)? Learning barriers (ESOL, [place], poor vision)? Caregiver  Is caregiver willing to learn care for patient? N/A  What is the highest level of education completed? Learning preference (written material, demonstration, visual)? Learning barriers (ESOL, [place], poor vision)?     CLINICAL INFORMATION      Visit Vitals  Ht 5' 9\" (1.753 m)   Wt 62 kg (136 lb 11 oz)   SpO2 95%   BMI 20.18 kg/m²       LAB VALUES    Lab Results   Component Value Date/Time    Protein, total 5.5 (L) 12/06/2022 07:42 AM    Albumin 1.6 (L) 12/06/2022 07:42 AM       Currently this patient has:  [x] HFNC O2 [x] Peripheral IV  [] PICC    [] PORT   [x] Kennedy Catheter [] NG Tube   [] PEG Tube [] Ostomy    [] AICD: Has ICD been deactivated? [] Yes [] No:______    PLAN   Admit to [facility] level of care as pt remains on high flow oxygen which cannot be provided in home, and is at high risk for rapid decompensation while being weaned; requires frequent nursing assessment and administration and titration of parenteral medications to manage symptom burden  Robinul 0.2 mg IV every 4 hours for increased oropharyngeal secretions  Dilaudid 1 mg IV every 4 hours with prn dosing available every 15 minutes for signs of breakthrough pain or air hunger  Ativan 1 mg IV every 4 hours with prn dosing available every 15 minutes for breakthrough restlessness/agitation  All other symptom management medications as needed  Respiratory therapy to wean oxygen per protocol to transition to NC for comfort  Discontinue enteral feedings, IVF, and all previously ordered medications  Continue kennedy catheter to maintain bladder decompression and kennedy care per facility protocol    Hospice Team Frequency Orders:  Skilled Nurse -  Daily x 7 days /every other day x 7 days  with 5 PRN visits for symptom control. MSW - 1 visit for initial assessment/evaluation for family support and need for volunteer services. Roxy Burton - 1 visit for initial assessment/evaluation for spiritual support.      ADVANCE CARE PLANNING (Complete in ACP Flow Sheet)   Code Status: DNR  Durable DNR: [x]  Yes  [x]  No  Code Status Discussed/Confirmed: Yes  Preference for Other Life Sustaining Treatment Discussed/Confirmed: Yes  Hospitalization Preference:  Family would like patient to receive EOL care in hospital  Advance Care Planning 11/30/2022   Confirm Advance Directive None   Patient Would Like to Complete Advance Directive Unable      Service: [x] Yes  []  No      [] Unknown  Appropriate for Pinning Ceremony:  [] Yes     [x] No  Holiness: No Preference   Home: TBD - Resources provided to son    DISCHARGE PLANNING     1. Discharge Plan: Discharge back to [facility] should condition stabilize  2. Patient/Family teaching: Goals of care/EOL process/Symptom management  3. Response to patient/family teaching: Son verbalized understanding and in agreement with current plan of care    SOCIAL/EMOTIONAL/SPIRITUAL NEEDS     Spiritual Issues Identified: None identified. Psych/ Social/ Emotional Issues Identified: Resident of [facility] prior to hospitalization    Caregiver Support:  [x] Provided information on End of Life Care   [x] Material Provided: Saba Orta From [employer] [facility] contacted, discharge to hospice order received  Dr. Claudette Hart contacted, agrees to serve as attending provider for hospice and provided verbal certification of terminal illness with life expectancy of 6 months or less. Orders for hospice admission, medications and plan of treatment received. Medication reconciliation completed.   MEDS: See medication list below  DME: Per hospital  Supplies: Per hospital  IDT communication to include MD, SN, SW, CH and support team    ALLERGIES AND MEDICATIONS     Allergies: No Known Allergies  Current Facility-Administered Medications   Medication Dose Route Frequency    LORazepam (ATIVAN) injection 1 mg  1 mg IntraVENous Q15MIN PRN    LORazepam (ATIVAN) injection 1 mg  1 mg IntraVENous Q4H    glycopyrrolate (ROBINUL) injection 0.2 mg  0.2 mg IntraVENous Q4H    bisacodyL (DULCOLAX) suppository 10 mg  10 mg Rectal DAILY PRN    HYDROmorphone (DILAUDID) injection 1 mg  1 mg IntraVENous Q4H    HYDROmorphone (DILAUDID) injection 1 mg  1 mg IntraVENous Q15MIN PRN

## 2022-12-06 NOTE — HOSPICE
[facility]  [phone]    Social Work Admission Note  Patient Name: Abel Connors  YOB: 1950  Age: 70 y.o. Date of Visit: 12/06/22  Facility of Care: [facility]  Patient Room: 562/     Hospice Attending: Veena Perez MD  Hospice Diagnosis: Respiratory failure [facility]) [J96.90]    Level of Care:    [x]  GIP    []  Respite   []  Routine    Consents/NCD Documentation:     Consents Reviewed:   []  Yes  [x]  No, completed by other hospice staff member. Person Reviewed/Signed with:  []  Patient   []  Pts NOK/MPOA  Name:     Right to NCD Reviewed:   []  Yes  []  No, completed by other hospice staff member. NCD Requested:   []  Yes  [x]  No    Admission Nurse/Intake Notified NCD was requested:  [x]  Yes  []  No  []  Not requested    Planned Start of Care Date: 12/6/2022    Hospice Witness Representative:Kraig GARZA   This MSW and   [address] Liaison  met with patient at bedside. Patient is a 70year old male with a hospice diagnosis of respiratory failure. PT is unresponsive, observed to be laying in the bed, growling in pain. Patient son, Nakita Rosas was at bedside. Nakita Rosas shared he is the only biological son; however patient has two other children, not biological, cared for by the patient. 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 Son indicated patient worked as an . 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 Son shared there has been several deaths, at least five in the last few days in the family, including the death of his uncle. Son shared patient wants to be cremated, F/H TBD. MSW  provided [facility] and resources guide. MSW offered support through active, and reflective listening, encouraged son to contact hospice for questions and concern. MSW will continue to monitor patient and family needs.   ADVANCE CARE PLANNING    Advance Directive:  []  Yes  []  No   []  Would like to complete  Primary MPOA:  Secondary MPOA: LNOKRah Phillip   Code Status: DNR   Durable DNR: _ Yes  _ No  Advance Care Planning 2022   Confirm Advance Directive None   Patient Would Like to Complete Advance Directive Unable       Relationship Status:  [x]  Single     []        []      []  Domestic Partner     []  /  []  Common Law  []    []  Unknown    If in a relationship, name of partner/spouse:  Duration of relationship:    Anglican/Spirituality: NO PREFERENCE  []  Active In Anglican/Spirituality   []  Not Active   []  N/A  Notes:     Home: TBD   Resources Provided:  []  [facility]  []  Information on applying for disability  []  FMLA Paperwork  []  Letters Requested by Ke[facility]   []  Perry Lerma  [x]  Final Arrangements Resources   []  Outside counseling services (individual or group therapy)  []  Grief resources   []  Matt Milligan  []  [employer]   []  [facility]   [x]  [facility]   []  Referral Sent to [facility]   []  Referral Sent to [address] [place] [address]   []  Referral Sent to [facility]  []  Other  Social Work Initial Assessment     Sex:  male    Pronoun Preference:   [x]  He/Him/His   []  She/Her/Hers   []  They/Them/Theirs   []   Patient Name   []   Decline to Answer  []   Unknown  []   Other   Notes:     Race/Ethnicity: (vanna all that apply)  []  [place] [facility]) or [place] Native  []    []  Black or [place] American  []   or   []  [facility] or [facility]  [x]  White  []  Unknown  []  Other     Inpatient Financial Agreement Completed:   [x]  Yes  []  No    Insurance:   [x]  Medicare   [x]  Medicaid     []  [employer]    []  Self-Pay/Uninsured   Notes:      Has pt applied for FAP? []  Needs to Apply  []  Application Completed and Submitted   []  Approved/ Expiration Date:   [x]  N/A  Notes:     Has pt applied for Medicaid?   []  Needs to Apply  []  Application Completed and Submitted/Application Number:   [x]  N/A  Notes: Has a long term care screening ([facility]) been requested? []  Requested   []  Not Requested, Needs follow up  []  Completed  [x]  N/A  Notes:     Does the pt have a long term care insurance policy? []  Yes  [x]  No  []  Yes, Needing assistance with paperwork  Notes:      Service:    [x]  Yes   []  No       []  Unknown    Appropriate for Pinning Ceremony:   []  Yes      [x]  No    Is patient using VA benefits? []  Yes      [x]  No  []  Needs assistance with accessing benefits  Notes:       Work History:   []  Full-Time/Part-Time  [x]  Retired   []  Other  Notes:     Primary Language: English   []   Needed  []   utilized during visit  []   Waived/ form completed    Ability to express thoughts/needs/feelings  []  Expressed thoughts/feelings/needs without difficulty  []  Requires extra time and cuing  []  Speech limited single words  []  Uses only gestures (eye, blinking eye or head movement/pointing)  []  Unable to express thoughts/feelings/needs (speech unintelligible or inappropriate)  [x]  Unresponsive  Notes:      Mental Status:  []  Alert-oriented to:     []  Person     []  Place     []  Time  []  Comatose-responds to:    []   Verbal stimuli    []  Tactile stimuli    []  Painful stimuli  []  Forgetful  []  Disoriented/Confused  []  Lethargic  []  Agitated  [x]  Unresponsive  []  Other (specify):    Notes:      Patients description of Illness/Current Health Status:    [x]  Patient unable to discuss  []  Patient unwilling to discuss  []  (Specify)        Knowledge/Understanding of Disease Process  Patient:    []  Demonstrates knowledge/understanding of disease process   []  Demonstrates knowledge/understanding of treatment plan   []  Demonstrates knowledge/understanding of prognosis   []  Demonstrates acceptance of prognosis   []  Demonstrates knowledge/understanding of resuscitation status   []  Other Unresponsive )  Caregiver:   [x]  Demonstrates knowledge/understanding of disease process   [x]  Demonstrates knowledge/understanding of treatment plan   [x]  Demonstrates knowledge/understanding of prognosis   [x]  Demonstrates acceptance of prognosis   [x]  Demonstrates knowledge/understanding of resuscitation status   [x]  Other (specify)  Notes:      Patients living arrangement/care setting:  Use the PRIOR COLUMN when the PATIENTS current health status necessitated a change in his/her primary residence. Prior Current Response              []             []    Patients own home/residence              []             []    Home of family member/friend              []             []    Boarding home/Group Home              []             []    Assisted living facility/FPC center              []             [x]    Hospital/Acute care facility              []             []    Skilled nursing facility              [x]             []    Long term care facility/Nursing home              []             []    Hospice in Patient      Primary Caregiver:  []  No Primary Caregiver  Name of Primary Caregiver: Ishaan Nance   Primary Caregiver Phone Number: [phone]  Relationship or Primary Caregiver:    []  Spouse/Significant other       [x]  Child        []  Step child       []  Sibling   []  Parent   []  Friend/Neighbor   []  Community/Adventism Volunteer   []  Paid help   []  Other (specify):___________  Notes:       Family members/Significant others:  Name:  Relationship:  Phone Number:  Actively involved in care? []  Yes  []  No    Name:  Relationship:  Phone Number:  Actively involved in care?   []  Yes  []  No    Social support systems: (select ONE best description)  []  Excellent social support system which includes three or more family members or friends  []  Good social support system which includes two or less members or friends  [x]  [address] support which includes one family member or friend  []  Poor social support; no family members or friends; basically ALONE  Notes: Emotional Status: (vanna all that apply)    Patient Caregiver Response                 [x]                [x]    Mood/Affect stable and appropriate                   []                []    Angry                 []                []    Anxious                 []                []    Apprehensive                 []                []    Avoidant                 []                []    Clinging                 []                []    Depressed                 []                []    Distraught                 []                []    Fearful                 []                []    Flat Affect                 []                []    Helpless                 []                []    Hostile                 []                []    Impulsive                 []                []    Irritable                 []                []    Labile                 []                []    Manic                 []                []    Restlessness                 []                []    Sad                 []                []    Strain/Stress                 []                []    Suspicious                 []                []     Tearful                 []                 []     Withdrawn          Notes:     Coping Skills (strengths/weakness):    Patient: Coping Skills (strength/weakness):    Family/caregiver (strength/weakness): Patient's son is supportive      Bee of care upon discharge (vanna all that apply):     [x]  No burden evident   []  Family must administer medications   []  Illness causing financial strain   []  Family/Support feels overwhelmed   []  Family/Support sleep disturbed with patients care   []  Patients care causes extra physical stress  of death  []  Illness causes changes in family lifestyle  []  Illness impacting family/support employment  []  Family experiencing increased time demands  []  Patients behavior endangers family  []  Denial of patients illness  []  Concern over outcome of illness/fear  []  Patients behavior embarrassing to family   Notes:      Risk Factors: (vanna all that apply):    [x]  No burden evident   []  Alcohol abuse  []  Financial resources inadequate to meet basic needs (food/house/etc)  []  Financial resources inadequate to meet health care needs (supplies/equipment/medications)  []  Food/nutrition resources inadequate  []  Home environment unsafe/inadequate for home care  []  Homicidal risk  []  Lives alone or without concerned relatives  []  Multiple medications/complex schedule  []  Physical limitations increase likelihood of falls  []  Plan of care/treatments complicated  []  Substance use/abuse  []  Suicidal risk  []  Visual impairment threatens safety/ability to perform self-care  []  Other (specify):     Abuse/Neglect (actual/potential risks):  [x]  No signs of abuse/neglect  []  History of abuse/neglect                 []  Physical          []  Sexual  []  History of domestic violence  []  Lacks adequate physical care  []  Lacks emotional nurturing/support  []  Lacks appropriate stimulation/cognitive experiences  []  Left alone inappropriately  []  Lacks necessary supervision  []  Inadequate or delayed medical care  []  Unsafe environment (i.e guns/drug use/history of violence in the home/etc.)  []  Bruising or other physical signs of injury present  []  Refer to child/adult protective services  []  Other (specify):   Notes:      Current Sources of Stress (in Addition to Current Illness):   [x]  None reported  []  Bills/Debt    []  Career/Job change    []   (short term)    []   (long term)    []  Death of a child (recent)    []  Death of a parent (recent)   []  Death of a spouse (recent)   []  Employment status changed   []  Family discord    []  Financial loss/Inadequate inther (specify):come  []  Job loss  []  Legal issues unresolved  []  Lifestyle change  []  Marital discord  []  Marriage within the last year  []  Paperwork (insurance/legal/etc) overwhelming  []  Separation/Divorce  []  Other (specify):  Notes:       Interventions/Plan of Care   []  MSW will assess social and emotional factors related to coping with end of life issues  []  MSW will provide community resource planning/referral   []  MSW to assist with relocation to different care setting if/when symptoms stabilize  [x]  Pt/Pts family will receive emotional support. []  Pt/Pts family will share the details surrounding the pts disease progression  []  Pt/Pts Family will show expression of grief by participating in life review. []  Pt/Pts Family will be educated and able to verbalize understanding of mental, emotional, and physical symptoms of grief. []  Pt/Pts Family will be educated and able to identify skills and social support to help cope with grief. []  Pts family will receive support for grief with emphasis on developmentally appropriate language.   [] Other:   Notes:       Discharge Planning   []  Home with family member    [x]  Return back to nursing home/facility  []  Needs assistance with placement/paid caregivers  []  Short Stay under routine level of care at [facility]   []  Other  Notes:     MSW Assessment Completed by: JURGEN Bansal  12/06/22    Time In:2:30 pm       Time Out:3:15 pm

## 2022-12-06 NOTE — WOUND CARE
Requested delivery of a [employer]rella Smart + Bed Max from [facility] for low air loss, turn assist, and microclimate control. Bed should deliver today, confirmation [id number].

## 2022-12-07 NOTE — PROGRESS NOTES
[facility]  [phone]    Patient Name: Jessica Yanez  YOB: 1950    Date of Provider Hospice Visit: 12/07/22    Level of Care:   [x] General Inpatient (GIP)    [] Routine   [] Respite    Current Location of Care:  [] [facility] [] [facility] [] [facility] [x] [facility] [] Hospice [facility]    IF [facility], patient referred from:  [] [facility] [] [facility] [] [facility] [] [address] [] Home [] Other:     Date of Original Hospice Admission: 12/6/2022  Hospice Medical Director at time of admission: Dr. Lupe Robbins Diagnosis: Acute respiratory failure with hypxia  Diagnoses RELATED to the terminal prognosis: Chronic aspiration, HAP, hypernatremia, sepsis, pulmonary HTN, severe protein calorie malnutrition  Other Diagnoses: DM, HTN, cirrhosis     HOSPICE SUMMARY     Jessica Yanez is a 70y.o. year old  male who was [facility]. HPI and brief course of hospitalization as summarized in discharge note authored by Calvin Garcia:  Jessica Yanez is a 77-year-old male with a PMH of diabetes, hypertension, CVA, cirrhosis, and previous MRSA infection who presented with shortness of breath. Of note patient was admitted in hospital 11/18 discharged 2 days ago on 11/27 for sepsis. In ED hypoxic placed on BiPAP. Initial labs significant for WBC of 14.8, hemoglobin of 9.8, glucose of 146, troponin of 111, BNP of 12,166, and T bilirubin of 1.9. COVID and flu negative. C. difficile antigen negative. CXR with stable bilateral pulmonary infiltrates. Patient admitted for further work-up. Patient started on IV Zosyn. Pulmonology consulted. Patient weaned off of BiPap to high flow. Patient made DNR/DNI. Podiatry consulted for multiple ulcers to right foot. Patient continued to decompensate. Patient was transferred to floor after family decided hospice was appropriate. Patient transitioned to [facility] hospice care on 12/6.     The patient's principle diagnosis has resulted in increased work of breathing, non verbal signs of pain, restlessness/agitation, and increased oropharyngeal secretions. Functionally, the patient's Karnofsky and/or Palliative Performance Scale has declined over a period of weeks and is estimated at 10. The patient is dependent on the following ADLs: all    Objective information that support this patients limited prognosis includes: Most recent ABG with PO2 52 on 50L with FiO2 60%  Study Result    Narrative & Impression   EXAM:  XR CHEST PORT     INDICATION: Respiratory failure     COMPARISON: 12/4/2022     TECHNIQUE: portable chest AP view at 1119 hours     FINDINGS: The cardiac silhouette is stable. There is diffuse patchy airspace  disease in the right midlung, at the right base, and throughout the left lung. A  small left pleural effusion persist. Sternotomy wires are intact. IMPRESSION     Stable Multilobar pneumonia with associated left pleural effusion     The patient/family chose comfort measures with the support of Hospice. HOSPICE DIAGNOSES   Active Symptoms:  1. Increased work of breathing  2. Non verbal signs of pain  3. Restlessness/agitation  4. Oropharyngeal secretions  5. Terminal fever  6.  Hospice care     PLAN   Continue GIP level of care as pt remains on high flow oxygen which cannot be provided in home, and is at high risk for rapid decompensation while being weaned; requires frequent nursing assessment and administration and titration of parenteral medications to manage symptom burden  Decrease Robinul to 0.2 mg IV every 6 hours for management of oropharyngeal secretions  Increase Dilaudid to 2 mg IV every 3 hours with prn dosing available every 15 minutes for signs of breakthrough pain or air hunger  Increase Ativan to 2 mg IV every 3 hours with prn dosing available every 15 minutes for breakthrough restlessness/agitation  Toradol 15 mg IV every 6 hours as needed for fever  All other symptom management medications as needed  Respiratory therapy to wean oxygen 5 L and Fio2 by 10% every 4 hours until transitioned to NC at 6L; notify hospice team for signs of unmanaged symptoms  Continue kennedy catheter to maintain bladder decompression and kennedy care per facility protocol  No family present at bedside during provider rounds; liaison has spoken with family and provided update; aware that pt nearing end of life and likely imminent; family en route to facility   and SW to support family needs  Disposition: anticipate rapid decompensation and death in hospital  Hospice Plan of care was reviewed in detail and agree with current plan of care    Prognosis estimated based on 12/07/22 clinical assessment is:   [x] Hours to Days    [] Days to Weeks    [] Other:    Communicated plan of care with: Hospice Case Manager; Hospice IDT; Care Team     GOALS OF CARE     Patient/Medical POA stated Goal of Care: Comfort care and symptom management    [x] I have reviewed and/or updated ACP information in the Advance Care Planning Navigator. This information is available in the [facility] [address] link in the patient's chart header. Primary Decision [facility] Agent):   Primary Decision Maker: Mynor Zamudio - [phone]    Primary Decision Maker: Jessica Murry   - [phone]    Resuscitation Status: DNR  If DNR is there a Durable DNR on file? : [] Yes [x] No (If no, complete Durable DNR)    HISTORY     History obtained from: chart review; discussion with liaison    CHIEF COMPLAINT: unable to obtain  The patient is:   [] Verbal  [] Nonverbal  [x] Unresponsive    HPI/SUBJECTIVE:  No family at bedside during provider rounds. Pt observed in hospital bed. Minimally responsive. Thin, frail, and ill appearing with sunken facial features, temporal wasting, and prominent bony structures. Appears restless evidenced by frequent moaning and facial grimacing. Remains on high flow oxygen with SpO2 ranging 86-90%. 12/7- Pt unresponsive. Continues to demonstrate increased work of breathing. Medication adjustments made and respiratory currently weaning high flow oxygen.     REVIEW OF SYSTEMS     The following systems were: [] reviewed  [x] unable to be reviewed    Positive ROS include:  Constitutional: fatigue, weakness, in pain, short of breath  Ears/nose/mouth/throat: increased airway secretions  Respiratory:shortness of breath, wheezing  Gastrointestinal:poor appetite, nausea, vomiting, abdominal pain, constipation, diarrhea  Musculoskeletal:pain, deformities, swelling legs  Neurologic:confusion, hallucinations, weakness  Psychiatric:anxiety, feeling depressed, poor sleep  Endocrine:     Adult Non-Verbal Pain Assessment Score: 2    Face  [x] 0   No particular expression or smile  [] 1   Occasional grimace, tearing, frowning, wrinkled forehead  [] 2   Frequent grimace, tearing, frowning, wrinkled forehead    Activity (movement)  [x] 0   Lying quietly, normal position  [] 1   Seeking attention through movement or slow, cautious movement  [] 2   Restless, excessive activity and/or withdrawal reflexes    Guarding  [x] 0   Lying quietly, no positioning of hands over areas of body  [] 1   Splinting areas of the body, tense  [] 2   Rigid, stiff    Physiology (vital signs)  [x] 0   Stable vital signs  [] 1   Change in any of the following: SBP > 20mm Hg; HR > 20/minute  [] 2   Change in any of the following: SBP > 30mm Hg; HR > 25/minute    Respiratory  [] 0   Baseline RR/SpO2, compliant with ventilator  [] 1   RR > 10 above baseline, or 5% drop SpO2, mild asynchrony with ventilator  [x] 2   RR > 20 above baseline, or 10% drop SpO2, asynchrony with ventilator     FUNCTIONAL ASSESSMENT     Palliative Performance Scale (PPS): 10       PSYCHOSOCIAL/SPIRITUAL ASSESSMENT     Active Problems:    Respiratory failure ([facility] [address] (12/6/2022)    Past Medical History:   Diagnosis Date    Cirrhosis ([facility] [address]     Diabetes ([facility] [address]     Hypertension     Stroke ([facility] [address]       Past Surgical History:   Procedure Laterality Date    HX BACK SURGERY      HX GI      IR INSERT NON TUNL CVC OVER 5 YRS  11/9/2022      Social History     Tobacco Use    Smoking status: Former    Smokeless tobacco: Never   Substance Use Topics    Alcohol use: Not Currently     Family History   Problem Relation Age of Onset    Heart Disease Father       No Known Allergies   Current Facility-Administered Medications   Medication Dose Route Frequency    [START ON 12/10/2022] HYDROmorphone (DILAUDID) injection 2 mg  2 mg IntraVENous Q3DAYS    LORazepam (ATIVAN) injection 2 mg  2 mg IntraVENous Q3H    HYDROmorphone (DILAUDID) injection 2 mg  2 mg IntraVENous Q15MIN PRN    LORazepam (ATIVAN) injection 2 mg  2 mg IntraVENous Q15MIN PRN    glycopyrrolate (ROBINUL) injection 0.2 mg  0.2 mg IntraVENous Q6H    bisacodyL (DULCOLAX) suppository 10 mg  10 mg Rectal DAILY PRN        PHYSICAL EXAM     Wt Readings from Last 3 Encounters:   12/06/22 62 kg (136 lb 11 oz)   12/05/22 62 kg (136 lb 11 oz)   11/10/22 62.5 kg (137 lb 12.6 oz)       Visit Vitals  /62   Pulse 92   Temp 99.8 °F (37.7 °C)   Resp (!) 35   Ht 5' 9\" (1.753 m)   Wt 62 kg (136 lb 11 oz)   SpO2 (!) 85%   BMI 20.18 kg/m²       Supplemental O2  [x] Yes 45L  [] NO  Last bowel movement:     Currently this patient has:  [x] Peripheral IV [] PICC  [] PORT [] ICD    [x] Shields Catheter [] NG Tube   [x] PEG Tube    [] Rectal Tube [] Drain  [] Other:     Constitutional: unresponsive; thin, frail, ill-appearing  Eyes: closed  ENMT: dry oral mucosa  Cardiovascular: regular; radial pulses intact; no edema  Respiratory: increased work of breathing; breath sounds diminished with decreased secretions  Gastrointestinal: decreased bowel sounds in all quadrants; soft; PEG site dressing CDI  : Shields catheter with concentrated ean urine  Musculoskeletal: RLE contracted approx 45 degrees; increased tone in all other extremities; muscle wasting  Skin: generalized pallor; foam dressing to R knee, R thigh, and L forearm CDI; Kerlix dressing to R foot  Neurologic: unresponsive  Psychiatric: appears calmer following medication adjustments  Other:     Pertinent Lab and or Imaging Tests:  Lab Results   Component Value Date/Time    Sodium 149 (H) 12/06/2022 07:42 AM    Potassium 3.9 12/06/2022 07:42 AM    Chloride 113 (H) 12/06/2022 07:42 AM    CO2 30 12/06/2022 07:42 AM    Anion gap 6 12/06/2022 07:42 AM    Glucose 189 (H) 12/06/2022 07:42 AM    BUN 20 12/06/2022 07:42 AM    Creatinine 0.62 (L) 12/06/2022 07:42 AM    BUN/Creatinine ratio 32 (H) 12/06/2022 07:42 AM    GFR est AA 56 (L) 08/12/2022 11:20 AM    GFR est non-AA 46 (L) 08/12/2022 11:20 AM    Calcium 8.2 (L) 12/06/2022 07:42 AM     Lab Results   Component Value Date/Time    Protein, total 5.5 (L) 12/06/2022 07:42 AM    Albumin 1.6 (L) 12/06/2022 07:42 AM           Demi IGLESIAS

## 2022-12-07 NOTE — PROGRESS NOTES
Patient under [facility] with [facility] agency. CM will continue to follow if any needs arise.

## 2022-12-07 NOTE — PROGRESS NOTES
This was an initial visit to assess needs and offer support. Pt was in bed and appeared to be to be sleeping comfortably. There was no family present. Offered a blessing for peace at bedside. Plan of care: to reach out to family in the next 5 days.

## 2022-12-07 NOTE — HOSPICE
New medication scheduled has slow is rapid breaths to 18-22. Using less accessory muscles. Family came by to visit for awhile. [facility]  [phone]    [facility] Daily Nursing Note   Patient Name: Any Mar  YOB: 1950  Age: 70 y.o. Date of Visit: 12/07/22  Facility of Care: [facility]  Patient Room: 562/01     Hospice Attending: Edita Bobo MD  Hospice Diagnosis: Respiratory failure [facility]) [J96.90]    Level of Care: [facility]    Current [facility] Symptoms    1. Dyspnea  2. Weaning High flow  3. Non verbal pain        ASSESSMENT & PLAN   1. Admitted to [facility] level of care at [facility]  2. Scheduled IV dilaudid increased from 1mg  q4hr to 2mg q3hr with PRN  3. Sheduled IV Loeazepam increased from 1mg q4hr to 2mg q3hr with a prn  4. Symptom PRN medications for break thru symptoms  5. Sw and  for support to family. Spiritual Interventions: needs support    Psych/ Social/ Emotional Interventions: met with SW yesterday    Care Coordination Needs: education with staff on medications and treatment needs    Care plan and New Orders discussed / approved with Radha Duran NP    Description History and Chart Review   Narrative History of last 24 hours that demonstrates care cannot be provided in another setting:  Patient is needing weaning of high flow O2 with management of nonverbal pain, restlessness, agitation, secretions and labored breathing. Patient requires assessment by medical team and adjustment to medications. What has been done to control the patient's symptoms in the last 24 hours? Scheduled IV medications    Does the patient currently require IV medications? yes  Does the patient currently require scheduled medications? yes  Does the patient currently require a PCA?  no    List number of doses of PRN medications in last 24 hours:  Medication 1:  Number of doses:    Medication 2:   Number of doses:    Medication 3:   Number of doses:    Supporting documentation for GIP need for pain control:  [x] Frequent evaluation by a doctor, nurse practitioner, nurse   [x] Frequent medication adjustment    [x] IVs that cannot be administered at home   [] Aggressive pain management   [x] Complicated technical delivery of medications              Supporting documentation for GIP need for symptom control:  [x]  Sudden decline necessitating intensive nursing intervention  []  Uncontrolled / intractable nausea or vomiting   []  Pathological fractures  []  Advanced open wounds requiring frequent skilled care  [x] Unmanageable respiratory distress  [] New or worsening delirium   [x] Delirium with behavior issues: Is 24 hour caregiver present due to safety concerns with agitation? (yes/no)  [] Imminent death - with skilled nursing needs documented above    DISCHARGE PLANNING     1. Discharge Plan: Discharge to [facility] should he stablize  2. Family teaching: Symptom management  3. Response to family teaching: verbalizes understanding    ASSESSMENT    KARNOFSKY: 10    Prognosis estimated based on 12/07/22 clinical assessment is:   [] Few to Many Hours  [x] Hours to Days   [] Few to Many Days   [] Days to Weeks   [] Few to Many Weeks   [] Weeks to Months   [] Few to Many Months    Quality Measure: Patient self-reports:  [] Yes    [x] No    ESAS:   Time of Assessment: 1010  Pain (1-10):4  Fatigue (1-10): 3  Shortness of breath (1-10):6  Nausea (1-10): Appetite (1-10):    Anxiety: (1-10):   Depression: (1-10):   Well-being: (1-10):   Constipation: _ Yes  _ No  LAST BM:     CLINICAL INFORMATION   Patient Vitals for the past 12 hrs:   Temp Pulse Resp BP SpO2   12/07/22 0802 -- -- -- -- (!) 82 %   12/07/22 0745 99.8 °F (37.7 °C) 92 (!) 35 126/62 (!) 80 %       Currently this patient has:  [x] Supplemental O2   [x] IV    [] PICC      [] PORT   [] NG Tube    [] PEG Tube   [] Ostomy     [x] Shields draining _______ urine  [] Other:     SIGNS/PHYSICAL FINDINGS     Skin (including wound):  [] Warm, dry, supple, intact and color normal for race  [x] Warm   [] Dry   [] Cool     [] Clammy       [] Diaphoretic    Turgor   [] Normal   [x] Decreased  Color:   [] Pink   [x] Pale   [] Cyanotic   [] Erythema   [] Jaundice   [] Normal for Race  [x]  Wounds:    Neuro:  [] Lethargy  [x] Restlessness / agitation  [] Confusion / delirium  [] Hallucinations  [] Responds to maximal stimulation  [x] Unresponsive  [] Seizures     Cardiac:  [] Dyspnea on Exertion  [] JVD  [] Murmur  [] Palpitations  [] Hypotension  [x] Hypertension  [] Tachycardia  [] Bradycardia  [] Irregular HR  [] Pulses Decreased  [] Pulses Absent  [] Edema:       (Location, Grade and Pitting)  [] Mottling:      (Location)    Respiratory:  Breath sounds:    [] Diminished   [] Wheeze   [] Rhonchi   [] Rales   [] Even and unlabored  [x] Labored:      using accessory muscles      [] Cough   [] Non Productive   [] Productive    [] Description:           [] Deep suctioned   [] O2 at __63_ LPM  [x] High flow oxygen greater than 10 LPM  [] Bi-Pap    GI  [] Abdomen (describe)   [] Ascites  [] Nausea  [] Vomiting  [x] Incontinent of bowels  [x] Bowel sounds (yes[] Diarrhea  [] Constipation (see above including last bowel movement)  [] Checked for impaction  [] Last BM     Nutrition  Diet:_____npo_____  Appetite:   [] Good   [] Fair   [] Poor   [] Tube Feeding       [] Voiding  [] Incontinent   [x] Shields    Musculoskeletal  [] Balance/Hanna Unsteady   [] Weak   Strength:    [] Normal    [] Limited    [x] Decreasing   Activities:    [] Up as tolerated   [x] Bedridden    [] Specify:    SAFETY  [x] 24 hr. Caregiver   [x] Side rails ?     [x] Hospital bed   [x] Reviewed Falls & Safety       ALLERGIES AND MEDICATIONS     Allergies: No Known Allergies    Current Facility-Administered Medications   Medication Dose Route Frequency    [START ON 12/10/2022] HYDROmorphone (DILAUDID) injection 2 mg  2 mg IntraVENous Q3DAYS    LORazepam (ATIVAN) injection 2 mg  2 mg IntraVENous Q3H    HYDROmorphone (DILAUDID) injection 2 mg  2 mg IntraVENous Q15MIN PRN    LORazepam (ATIVAN) injection 2 mg  2 mg IntraVENous Q15MIN PRN    glycopyrrolate (ROBINUL) injection 0.2 mg  0.2 mg IntraVENous Q6H    bisacodyL (DULCOLAX) suppository 10 mg  10 mg Rectal DAILY PRN          Visit Time In: 7108  Visit Time Out: on going thru day, 4:47

## 2022-12-08 NOTE — PROGRESS NOTES
Writer called [employer] at [phone] to notify them of patients death at 36. Spoke with Omega Muñiz. Patient deemed not a candidate for organ donation or eye donation. Reference number [id number].

## 2022-12-08 NOTE — HOSPICE
[facility]  [phone]    Discharge/Death Nursing Note   Patient Name: Rocky Felix  YOB: 1950  Age: 70 y.o.     Date of Death: 22  Admitted Date: 2022  Time of Death: [address] of Care: [facility]  Level of Care: [facility]  Patient Room: [facility]/     Hospice Attending: Srinath Burgess MD  Hospice Diagnosis: Respiratory failure [facility]) [J96.90]    Death Flonnie Carp of death completed by: Alicia Muniz, RN & Dominic RN    Agency staff was not present at the time of death    At the time of death the patient was documented as  by RT    The pt  within [facility]    The following were notified of the patient's death: Patient's brother    Medications were disposed of per facility protocol     Discharge Summary   Discharge Reason: Death    Summary of Care Provided:    [x] Post mortem care provided by nursing  [x] Notification of  home by nursing supervisor  [] Referrals/Community resources provided:   [] Goals completed  [] Durable Medical Equipment vendor notified     Disciplines involved: [x] RN [x] SW [x]  [] BALTAZAR [] Vol [] PT [] OT [] ST [] [facility]    [x] IDT communication/notification    Attending Physician, Dr. Paulino Vela, notified of death    Bereaved   On [address] [facility] Planning 2022   Confirm Advance Directive None   Patient Would Like to Complete Advance Directive Unable

## 2022-12-08 NOTE — PROGRESS NOTES
Call to Antony Navarrosilva, brother, left message. Second attempt, hang up on. Third attempt, brother answered, and states that he spoke with someone at 4 am and was already aware that his brother had passed. He stated he and Larry Donaldson (the patient's son) had not decided who would cremate the patient but stated that they spoke with someone here at the hospital 4 days ago and they said they could hold the body in the morgue for 10 days. He stated he would also try to get up with Larry Donaldson but that we could go ahead and take the patient to the morgue and he or Larry Donaldson would call back with information for  home.

## 2022-12-08 NOTE — PROGRESS NOTES
[employer], Nursing Supervisor, and , and [facility]. Attempted to notify pt keara Cornell with no answer. Attempted to call Gardenia solis at [phone] to assist in reaching pt JOSSY Cornell with no answer.

## 2022-12-08 NOTE — PROGRESS NOTES
The purpose of the visit was in response to death of the patient. The family had come and gone at the time of the visit. The  provided the ministry of presence on the unit, a moment of silence and prayerful meditation in the room. [facility] D.Min, M.Div.  [address] can be reached by calling the  at [facility]  [phone]

## 2022-12-08 NOTE — DISCHARGE SUMMARY
Hospice Discharge Summary    [facility]  [facility]        Date of Admission: 12/6/2022  Date of Discharge: 12/8/22    Sophia Salinsa is a 70y.o. year old who was admitted to [facility] at [facility] with a Hospice diagnosis of Respiratory failure ([facility] [address] [J96.90].       The patient's care was focused on comfort and the patient passed away on 12/8/22

## 2022-12-08 NOTE — DEATH NOTE
Patient found by respiratory therapy to be . He had just been checked on and had been breathing regularly with no signs of distress. He is a DNR hospice patient. Patient's death verified at 03:45 by myself and Serge Hartmann RN. Patient's daughter, Nati Santoro called and made aware. She stated that she would inform the rest of the family and that they would like to pay their respects at the bedside before he is moved. IV access and Shields removed by this nurse. Oxygen tubing removed by respiratory therapist.  Will contact the  when needed and follow hospital protocol for death of a patient.

## 2022-12-08 NOTE — PROGRESS NOTES
At 0700, writer attempted to contact Shamrila Rodriguez, son who is family primary decision maker by phone, and unable to reach, but able to leave message. I left message on voicemail for son to call us.

## (undated) DEVICE — CATHETER ANGIO 5FR L100CM GRY S STL NYL JL3.5 3 SEG BRAID L

## (undated) DEVICE — CATHETER GUID L100CM OD5FR ID.056IN XB3.5 RADPQ W/O

## (undated) DEVICE — GUIDEWIRE VASC L260CM DIA0.035IN RAD 3MM J TIP L7CM PTFE

## (undated) DEVICE — 3M™ STERI-DRAPE™ SMALL DRAPE WITH ADHESIVE APERTURE 1092 25/BX,4/CS&#X20;: Brand: STERI-DRAPE™

## (undated) DEVICE — CATHETER ANGIO 5FR L100CM GRY S STL NYL JR4 3 SEG BRAID L

## (undated) DEVICE — TUBING PRESS INJ FLX SH 30IN --

## (undated) DEVICE — WASTEBAG DRIP/ADAPTER: Brand: MEDLINE INDUSTRIES, INC.

## (undated) DEVICE — SYRINGE MED 10ML PUR GAM COMPATIBLE POLYCARB FIX M LUER CONN

## (undated) DEVICE — CANNULA NSL O2 AD 7 FT END-TIDAL CARBON DIOX VENTFLO

## (undated) DEVICE — SURGICAL PROCEDURE TRAY CRD CATH 3 PRT

## (undated) DEVICE — GLIDESHEATH SLENDER ACCESS KIT: Brand: GLIDESHEATH SLENDER

## (undated) DEVICE — COPE MANDRIL WIRE GUIDE STAINLESS STEEL: Brand: COPE

## (undated) DEVICE — CATHETER ANGIO PIG 145 0.045 INX5 FRX110 CM THRULUMEN EXPO

## (undated) DEVICE — KIT GASTMY PERC PEG PULL 20FR -- ENDOVIVE BX/2

## (undated) DEVICE — BAND RADIAL COMPR ARTERY 24CM -- REG BX/10

## (undated) DEVICE — ENDO KIT 1: Brand: MEDLINE INDUSTRIES, INC.

## (undated) DEVICE — RADIFOCUS GLIDEWIRE: Brand: GLIDEWIRE

## (undated) DEVICE — MOUTHPIECE ENDOSCP 20X27MM --

## (undated) DEVICE — PERCUTANEOUS ENTRY THINWALL NEEDLE  ONE-PART: Brand: COOK

## (undated) DEVICE — SURSITE 4 X 4.8  MED MSC2705Z